# Patient Record
Sex: FEMALE | Race: BLACK OR AFRICAN AMERICAN | NOT HISPANIC OR LATINO | Employment: OTHER | URBAN - METROPOLITAN AREA
[De-identification: names, ages, dates, MRNs, and addresses within clinical notes are randomized per-mention and may not be internally consistent; named-entity substitution may affect disease eponyms.]

---

## 2017-02-09 ENCOUNTER — HOSPITAL ENCOUNTER (OUTPATIENT)
Dept: RADIOLOGY | Facility: HOSPITAL | Age: 67
Discharge: HOME | End: 2017-02-09
Attending: NURSE PRACTITIONER
Payer: MEDICARE

## 2017-02-09 DIAGNOSIS — Z12.31 VISIT FOR SCREENING MAMMOGRAM: ICD-10-CM

## 2017-02-09 DIAGNOSIS — Z12.39 BREAST CANCER SCREENING: ICD-10-CM

## 2017-02-09 PROCEDURE — 77063 BREAST TOMOSYNTHESIS BI: CPT | Mod: 26,,, | Performed by: RADIOLOGY

## 2017-02-09 PROCEDURE — 77067 SCR MAMMO BI INCL CAD: CPT | Mod: 26,,, | Performed by: RADIOLOGY

## 2017-02-09 PROCEDURE — 77067 SCR MAMMO BI INCL CAD: CPT | Mod: TC

## 2017-02-10 ENCOUNTER — OFFICE VISIT (OUTPATIENT)
Dept: PODIATRY | Facility: CLINIC | Age: 67
End: 2017-02-10
Payer: MEDICARE

## 2017-02-10 VITALS — WEIGHT: 235 LBS | BODY MASS INDEX: 31.83 KG/M2 | HEIGHT: 72 IN

## 2017-02-10 DIAGNOSIS — B35.3 TINEA PEDIS OF LEFT FOOT: Primary | ICD-10-CM

## 2017-02-10 DIAGNOSIS — S90.822A BLISTER OF FOOT, LEFT, INITIAL ENCOUNTER: ICD-10-CM

## 2017-02-10 LAB — KOH PREP SPEC: NORMAL

## 2017-02-10 PROCEDURE — 87102 FUNGUS ISOLATION CULTURE: CPT

## 2017-02-10 PROCEDURE — 99999 PR PBB SHADOW E&M-EST. PATIENT-LVL III: CPT | Mod: PBBFAC,,, | Performed by: PODIATRIST

## 2017-02-10 PROCEDURE — 87210 SMEAR WET MOUNT SALINE/INK: CPT

## 2017-02-10 PROCEDURE — 99213 OFFICE O/P EST LOW 20 MIN: CPT | Mod: PBBFAC | Performed by: PODIATRIST

## 2017-02-10 PROCEDURE — 87075 CULTR BACTERIA EXCEPT BLOOD: CPT

## 2017-02-10 PROCEDURE — 99214 OFFICE O/P EST MOD 30 MIN: CPT | Mod: S$PBB,,, | Performed by: PODIATRIST

## 2017-02-10 PROCEDURE — 87070 CULTURE OTHR SPECIMN AEROBIC: CPT

## 2017-02-10 PROCEDURE — 87107 FUNGI IDENTIFICATION MOLD: CPT

## 2017-02-10 NOTE — PROGRESS NOTES
CC:    Blister left foot  Knot under right foot      HPI:   Poonam Alvarez is a 66 y.o. female who presents to clinic with complaints of blister left foot, started about two months ago, dried out and has recurred in the same area recently.  She also has dry flaky skin underneath the right foot in the arch.  She used to get pedicures but concerned about fungal infection.  She reports no changes in activities or shoes.  She does wear nylon footies/socks.  No pain or constitutional symptoms.           Patient Active Problem List   Diagnosis    Posterior subcapsular polar senile cataract    Osteoarthritis    History of Graves' disease    Hypothyroidism, postablative    Right posterior subcapsular cataract - Right Eye    Diplopia    Other after-cataract, not obscuring vision    Radial styloid tenosynovitis    Status post cataract extraction and insertion of intraocular lens - Right Eye    Pseudophakia of both eyes    Other specified disorder of skin    AR (allergic rhinitis)    GERD (gastroesophageal reflux disease)    Left knee pain    Primary localized osteoarthrosis, lower leg    Knee pain, right    Pre-op exam    Pain in joint, lower leg    S/P total knee arthroplasty    Acute hypokalemia    Hypovolemia    Pre-syncope    Hypokalemia    Hypophosphatemia    Constipation    Anticoagulation monitoring, special range    Dyspnea    Ankle edema    Right shoulder pain    Screening for colon cancer    Bilateral knee pain    Chest pain            Current Outpatient Prescriptions on File Prior to Visit   Medication Sig Dispense Refill    acetaminophen (TYLENOL) 325 MG tablet Take 2 tablets (650 mg total) by mouth every 6 (six) hours as needed.      aspirin 81 MG chewable tablet Take 1 tablet by mouth Daily.      b complex vitamins capsule Take 1 capsule by mouth once daily.      calcium-vitamin D3-vitamin K (VIACTIV) 500-100-40 mg-unit-mcg Chew Take 1 tablet by mouth Daily.       cholecalciferol, vitamin D3, (VITAMIN D3) 2,000 unit Cap Take 1 capsule by mouth once daily.      fish oil-omega-3 fatty acids 300-1,000 mg capsule Take 1 g by mouth once daily.      fluocinolone-hydroq.-tretinoin 0.01-4-0.05 % Crea Apply 1 application topically every evening. To dark spots on face ONLY. Wash off qam and apply sunscreen. 30 g 2    hydroquinone 4 % Crea LINCOLN TO DARK AREAS ON NECK HS. USE SUNSCREEN D  2    meloxicam (MOBIC) 15 MG tablet TAKE ONE TABLET BY MOUTH ONCE DAILY (Patient taking differently: PT taking 1 tablet mon , fri and 1/2 tablet weds) 90 tablet 0    multivitamin-minerals-lutein (CENTRUM SILVER) Tab Take 1 tablet by mouth once daily.       SYNTHROID 137 mcg Tab tablet Take 1 tablet (137 mcg total) by mouth once daily. 90 tablet 3     No current facility-administered medications on file prior to visit.         ALLG:  Review of patient's allergies indicates:   Allergen Reactions    No known drug allergies            ROS:    General ROS: negative for - chills, fatigue or fever  Cardiovascular ROS: no chest pain or dyspnea on exertion  Musculoskeletal ROS: negative for - joint pain or joint stiffness   Skin: Negative for rash, Positive for nail or hair changes.  no itching.         EXAM:   Vitals:    02/10/17 1240   Weight: 106.6 kg (235 lb)   Height: 6' (1.829 m)        General: alert and orient x 3, well-developed, well-nourished and in no apparent distress.    Vasc: Palpable pedal pulses getachew. Feet appropriately warm to touch.  Neurological:  Light touch, proprioception, and Sharp/dull sensation is intact.  Sensorimotor function intact.   Derm:  Dry, scaly skin with some vesicles on the plantar right foot and left heel.  Medial arch of the left foot has a fluid-filled blister,no pus, no cellulitis, underlying skin is raw and tender with direct palpation.   No necrosis.    Msk:  Muscle strength is 5/5 in all groups bilaterally.  All joint range of motion is full with no crepitus.  No  significant foot deformities.         Assessment / Plan:    I counseled the patient on her conditions, their implications and medical management.      Tinea pedis of left foot  -     CULTURE, FUNGUS  -     Aerobic culture  -     Culture, Anaerobic  -     KOH prep    Blister of foot, left, initial encounter  - With patient's permission, the blister was de-roofed and cleaned with sterile normal saline.   -     CULTURE, FUNGUS  -     Aerobic culture  -     Culture, Anaerobic  -     KOH prep  - Patient to apply miconazole to the affected BID, keep clean and protected.      Return in about 10 days (around 2/20/2017).

## 2017-02-13 LAB — BACTERIA SPEC AEROBE CULT: NO GROWTH

## 2017-02-14 LAB — BACTERIA SPEC ANAEROBE CULT: NORMAL

## 2017-02-17 ENCOUNTER — TELEPHONE (OUTPATIENT)
Dept: INTERNAL MEDICINE | Facility: CLINIC | Age: 67
End: 2017-02-17

## 2017-02-17 ENCOUNTER — TELEPHONE (OUTPATIENT)
Dept: PLASTIC SURGERY | Facility: CLINIC | Age: 67
End: 2017-02-17

## 2017-02-17 ENCOUNTER — OFFICE VISIT (OUTPATIENT)
Dept: INTERNAL MEDICINE | Facility: CLINIC | Age: 67
End: 2017-02-17
Payer: MEDICARE

## 2017-02-17 VITALS — SYSTOLIC BLOOD PRESSURE: 118 MMHG | HEIGHT: 72 IN | HEART RATE: 78 BPM | DIASTOLIC BLOOD PRESSURE: 70 MMHG

## 2017-02-17 DIAGNOSIS — E78.00 PURE HYPERCHOLESTEROLEMIA: ICD-10-CM

## 2017-02-17 DIAGNOSIS — E03.4 HYPOTHYROIDISM DUE TO ACQUIRED ATROPHY OF THYROID: ICD-10-CM

## 2017-02-17 DIAGNOSIS — Z00.00 ROUTINE GENERAL MEDICAL EXAMINATION AT A HEALTH CARE FACILITY: Primary | ICD-10-CM

## 2017-02-17 DIAGNOSIS — E55.9 VITAMIN D DEFICIENCY DISEASE: ICD-10-CM

## 2017-02-17 PROCEDURE — 99999 PR PBB SHADOW E&M-EST. PATIENT-LVL III: CPT | Mod: PBBFAC,,, | Performed by: INTERNAL MEDICINE

## 2017-02-17 PROCEDURE — 99397 PER PM REEVAL EST PAT 65+ YR: CPT | Mod: S$PBB,,, | Performed by: INTERNAL MEDICINE

## 2017-02-17 PROCEDURE — 99213 OFFICE O/P EST LOW 20 MIN: CPT | Mod: PBBFAC | Performed by: INTERNAL MEDICINE

## 2017-02-17 RX ORDER — GLUCOSAMINE HCL 500 MG
1 TABLET ORAL DAILY
COMMUNITY
End: 2022-11-22 | Stop reason: CLARIF

## 2017-02-17 RX ORDER — LEVOTHYROXINE SODIUM 137 UG/1
137 TABLET ORAL DAILY
Qty: 90 TABLET | Refills: 3 | Status: SHIPPED | OUTPATIENT
Start: 2017-02-17 | End: 2018-10-30

## 2017-02-17 RX ORDER — MELOXICAM 15 MG/1
15 TABLET ORAL DAILY
Qty: 90 TABLET | Refills: 1 | Status: SHIPPED | OUTPATIENT
Start: 2017-02-17 | End: 2017-06-22 | Stop reason: SDUPTHER

## 2017-02-17 NOTE — TELEPHONE ENCOUNTER
Dr Romero' staff  Pt saw Dr romero Jan 2016 for breast reduction and still wants to get a breast reduction.  She tried several times to contact the office about the surgery. No response from the office.  Can someone contact her to let her know what is the next step in getting this procedure done  Thanks  Lindsey Bach M.D.

## 2017-02-17 NOTE — PROGRESS NOTES
CHIEF COMPLAINT: Annual exam     HISTORY OF PRESENT ILLNESS: This is a 66-year-old woman who presents for her annual exam. She has been feeling well.  She will have occasional indigestion which is usually not bad enough for medication.      Knees are doing well. She had a right knee replacement on 3/27/14. Right knee is doing well. She finished physical therapy for her knee. She is exercising regularly. She has gone from 247 2014 to 232 today.      She has had some right shoulder pain that comes and goes. She will see ortho next week.  She is taking mobic 15 mg on Monday, 1/2 tablet on Wednesday and 1 tablet on Friday (due to stomach issues). Stomach is ok with 3 times a week. She has not needed ranitidine as needed. .     No nausea, vomiting, constipation, diarrhea. No heartburn.      BP is doing fine off hydrochlorothiazide 25 mg daily. No swelling. She continues to take Levoxyl 137 mcg daily for hypothyroidism.      She has a history of positive PPD 2010 - took INH for 9 months, CXR  was fine. NO fever, chills, night sweats, weight loss.      She retired 2015 and is working on her health. She bikes rides for 55 minutes daily.  Her  (she is  from him)  of lung cancer in 2015    She takes stool softner and metamucil daily to keep her bowels regular.     REVIEW OF SYSTEMS: She denies fevers, chills, night sweats, fatigue, visual change, hearing loss, sinus congestion, sore throat, chest pain, shortness of breath, nausea, vomiting, constipation, diarrhea, dysuria, hematuria, polydipsia, polyuria, joint pain, muscle pain, headaches, anxiety, depression, insomnia.        PAST MEDICAL HISTORY:   1. Atrial flutter, status post ablation in 2008.   2. Graves disease, status post radioactive iodine, now hypothyroid.   3. Hypertension.   4. History of headaches.   5. Osteoarthritis of the shoulders, hands and knees.   6. Status post arthroscopic surgery of the right  knee 2008.   7. Status post arthroscopic surgery of the right knee in .   8. Tumandi stahl 20 years ago.   9. Hemorrhoids removed.   10. Cataract removal bilaterally  11. Positive PPD diagnosed , completed 9 months of INH 1/10  12. GERD     SOCIAL HISTORY: She does not smoke. She drinks alcohol twice a year.   She is , has three children. She is a .     FAMILY HISTORY: Father  at age 83 from complications of pneumonia.   Mother  at age 98 from old age.  Sister had a pulmonary embolus, another   sister had sarcoidosis. A son has Crohn's disease.      MEDICATIONS AND ALLERGIES: Updated in EPIC.      PHYSICAL EXAMINATION:   Visit Vitals    /70    Pulse 78    Ht 6' (1.829 m)       GENERAL: She is alert, oriented, no apparent distress. Affect within normal limits.   Conjunctiva anicteric. Tympanic membranes clear. Oropharynx clear.   NECK: Supple.   RESPIRATORY: Effort normal. Lungs are clear to auscultation.   HEART: Regular rate and rhythm without murmurs, gallops or rubs.   No lower extremity edema.   ABDOMEN: soft, non distended, non tender, bowel sounds present, no hepatosplenomgaly  BREAST: no abn seen, no nodules palpated, no axillary lad        ASSESSMENT AND PLAN:   1. Annual exam - discussed diet, exercise and safety issues.  2. OA knee - s/p right knee replacement - doing well.  2. ALlergic rhinitis -stable   3. Hypertension - off hctz - doing well  4. Hypothyroidism - tsh   5. Screening - mammogram . Bone density was normal 2008. Colonoscopy due 2015 - normal due   6. GERD - asx  7. Obestiy - working at diet, exercise and weight loss.   8. Positive ppd - cxr ok   I will see her back in 3 months , sooner if problems arise.   Pneumovax  Labs

## 2017-02-17 NOTE — TELEPHONE ENCOUNTER
Returned pt's call re: her Jan 2016 consult for breast reduction. No answer. Left a voice message.

## 2017-02-20 ENCOUNTER — OFFICE VISIT (OUTPATIENT)
Dept: PODIATRY | Facility: CLINIC | Age: 67
End: 2017-02-20
Payer: MEDICARE

## 2017-02-20 ENCOUNTER — TELEPHONE (OUTPATIENT)
Dept: ORTHOPEDICS | Facility: CLINIC | Age: 67
End: 2017-02-20

## 2017-02-20 VITALS — WEIGHT: 235 LBS | BODY MASS INDEX: 31.83 KG/M2 | HEIGHT: 72 IN

## 2017-02-20 DIAGNOSIS — M77.8 SHOULDER TENDINITIS, UNSPECIFIED LATERALITY: Primary | ICD-10-CM

## 2017-02-20 DIAGNOSIS — B35.3 TINEA PEDIS OF LEFT FOOT: Primary | ICD-10-CM

## 2017-02-20 PROCEDURE — 99999 PR PBB SHADOW E&M-EST. PATIENT-LVL III: CPT | Mod: PBBFAC,,, | Performed by: PODIATRIST

## 2017-02-20 PROCEDURE — 99213 OFFICE O/P EST LOW 20 MIN: CPT | Mod: PBBFAC | Performed by: PODIATRIST

## 2017-02-20 PROCEDURE — 99213 OFFICE O/P EST LOW 20 MIN: CPT | Mod: S$PBB,,, | Performed by: PODIATRIST

## 2017-02-20 NOTE — PROGRESS NOTES
CC:    Blister left foot      HPI:  2/10/17   Poonam Alvarez is a 66 y.o. female who presents to clinic with complaints of blister left foot, started about two months ago, dried out and has recurred in the same area recently.  She also has dry flaky skin underneath the right foot in the arch.  She used to get pedicures but concerned about fungal infection.  She reports no changes in activities or shoes.  She does wear nylon footies/socks.  No pain or constitutional symptoms.     2/20/17  follow up tinea pedis.  She reports some improvement with miconazole application.        Patient Active Problem List   Diagnosis    Posterior subcapsular polar senile cataract    Osteoarthritis    History of Graves' disease    Hypothyroidism, postablative    Right posterior subcapsular cataract - Right Eye    Diplopia    Other after-cataract, not obscuring vision    Radial styloid tenosynovitis    Status post cataract extraction and insertion of intraocular lens - Right Eye    Pseudophakia of both eyes    Other specified disorder of skin    AR (allergic rhinitis)    GERD (gastroesophageal reflux disease)    Left knee pain    Primary localized osteoarthrosis, lower leg    Knee pain, right    Pre-op exam    Pain in joint, lower leg    S/P total knee arthroplasty    Acute hypokalemia    Hypovolemia    Pre-syncope    Hypokalemia    Hypophosphatemia    Constipation    Anticoagulation monitoring, special range    Dyspnea    Ankle edema    Right shoulder pain    Screening for colon cancer    Bilateral knee pain    Chest pain            Current Outpatient Prescriptions on File Prior to Visit   Medication Sig Dispense Refill    acetaminophen (TYLENOL) 325 MG tablet Take 2 tablets (650 mg total) by mouth every 6 (six) hours as needed.      aspirin 81 MG chewable tablet Take 1 tablet by mouth Daily.      b complex vitamins capsule Take 1 capsule by mouth once daily.      calcium-vitamin D3-vitamin K  (VIACTIV) 500-100-40 mg-unit-mcg Chew Take 1 tablet by mouth Daily.      cholecalciferol, vitamin D3, (VITAMIN D3) 2,000 unit Cap Take 1 capsule by mouth once daily.      fish oil-omega-3 fatty acids 300-1,000 mg capsule Take 1 g by mouth once daily.      fluocinolone-hydroq.-tretinoin 0.01-4-0.05 % Crea Apply 1 application topically every evening. To dark spots on face ONLY. Wash off qam and apply sunscreen. 30 g 2    hydroquinone 4 % Crea LINCOLN TO DARK AREAS ON NECK HS. USE SUNSCREEN D  2    meloxicam (MOBIC) 15 MG tablet Take 1 tablet (15 mg total) by mouth once daily. 90 tablet 1    multivitamin-minerals-lutein (CENTRUM SILVER) Tab Take 1 tablet by mouth once daily.       SYNTHROID 137 mcg Tab tablet Take 1 tablet (137 mcg total) by mouth once daily. 90 tablet 3    UBIDECARENONE (COENZYME Q10) 100 mg Tab Take 1 tablet by mouth once daily.       No current facility-administered medications on file prior to visit.         ALLG:  Review of patient's allergies indicates:   Allergen Reactions    No known drug allergies            ROS:    General ROS: negative for - chills, fatigue or fever  Cardiovascular ROS: no chest pain or dyspnea on exertion  Musculoskeletal ROS: negative for - joint pain or joint stiffness   Skin: Negative for rash, Positive for nail or hair changes.  no itching.         EXAM:   Vitals:    02/20/17 1526   Weight: 106.6 kg (235 lb)   Height: 6' (1.829 m)        General: alert and orient x 3, well-developed, well-nourished and in no apparent distress.    Vasc: Palpable pedal pulses getachew. Feet appropriately warm to touch.  Neurological:  Light touch, proprioception, and Sharp/dull sensation is intact.  Sensorimotor function intact.   Derm:  Dry, scaly skin with some vesicles on the plantar right foot and left heel.  Medial arch of the left foot with dry scaly flaky skin, no blistering this visit, no cellulitis, skin healing well.  No necrosis.    Msk:  Muscle strength is 5/5 in all groups  bilaterally.  All joint range of motion is full with no crepitus.  No significant foot deformities.     KOH negative  Aerobic and Anaerobic Negative  Pending Fungal culture.       Assessment / Plan:    I counseled the patient on her conditions, their implications and medical management.      Tinea pedis of left foot    Improving  · Continue topical miconazole BID    Follow up after [place] trip (she is leaving this weekend)  Patient is amenable to plan.

## 2017-02-20 NOTE — TELEPHONE ENCOUNTER
----- Message from Alina James sent at 2/20/2017 10:26 AM CST -----  Contact: self  Pt would like to speak with you regarding a referral for PT for her shoulder.

## 2017-02-20 NOTE — TELEPHONE ENCOUNTER
Would like physical therapy orders for bilateral shoulder pain sent to the [facility] beginning the week of March 6th. I informed patient that I will notify Lexie of her request. Patient verbalized understanding.

## 2017-02-22 ENCOUNTER — OFFICE VISIT (OUTPATIENT)
Dept: ORTHOPEDICS | Facility: CLINIC | Age: 67
End: 2017-02-22
Payer: MEDICARE

## 2017-02-22 VITALS — HEIGHT: 72 IN | WEIGHT: 235 LBS | BODY MASS INDEX: 31.83 KG/M2

## 2017-02-22 DIAGNOSIS — M17.12 PRIMARY OSTEOARTHRITIS OF LEFT KNEE: ICD-10-CM

## 2017-02-22 DIAGNOSIS — M19.011 PRIMARY OSTEOARTHRITIS OF RIGHT SHOULDER: Primary | ICD-10-CM

## 2017-02-22 PROCEDURE — 99213 OFFICE O/P EST LOW 20 MIN: CPT | Mod: 25,S$PBB,, | Performed by: NURSE PRACTITIONER

## 2017-02-22 PROCEDURE — 20610 DRAIN/INJ JOINT/BURSA W/O US: CPT | Mod: PBBFAC | Performed by: NURSE PRACTITIONER

## 2017-02-22 PROCEDURE — 99213 OFFICE O/P EST LOW 20 MIN: CPT | Mod: PBBFAC | Performed by: NURSE PRACTITIONER

## 2017-02-22 PROCEDURE — 20610 DRAIN/INJ JOINT/BURSA W/O US: CPT | Mod: S$PBB,RT,, | Performed by: NURSE PRACTITIONER

## 2017-02-22 PROCEDURE — 99999 PR PBB SHADOW E&M-EST. PATIENT-LVL III: CPT | Mod: PBBFAC,,, | Performed by: NURSE PRACTITIONER

## 2017-02-22 RX ADMIN — TRIAMCINOLONE ACETONIDE 40 MG: 40 INJECTION, SUSPENSION INTRA-ARTICULAR; INTRAMUSCULAR at 02:02

## 2017-02-24 RX ORDER — TRIAMCINOLONE ACETONIDE 40 MG/ML
40 INJECTION, SUSPENSION INTRA-ARTICULAR; INTRAMUSCULAR
Status: COMPLETED | OUTPATIENT
Start: 2017-02-24 | End: 2017-02-22

## 2017-02-24 RX ORDER — TRIAMCINOLONE ACETONIDE 40 MG/ML
40 INJECTION, SUSPENSION INTRA-ARTICULAR; INTRAMUSCULAR
Status: COMPLETED | OUTPATIENT
Start: 2017-02-22 | End: 2017-02-22

## 2017-02-24 RX ORDER — TRIAMCINOLONE ACETONIDE 40 MG/ML
40 INJECTION, SUSPENSION INTRA-ARTICULAR; INTRAMUSCULAR
Status: DISCONTINUED | OUTPATIENT
Start: 2017-02-24 | End: 2017-02-24

## 2017-02-24 NOTE — PROGRESS NOTES
CC: Pain of the Right Shoulder and Pain of the Left Knee      HPI: Pt with left knee pain for years. She has been getting cortisone injections with relief of her pain and she returns today for another injection. She has had a right knee replacement in the past. She also c/o right shoulder pain which is aching and located over the shoulder and upper arm. It is worse with all movement of the shoulder. She has had an MRI in the past which showed severe osteoarthritis of the shoulder. She's not ready for shoulder replacement at this time. She is ambulating without assistive device. There is not a limp.    ROS  General: denies fever and chills  Resp: no c/o sob  CVS: no c/o cp  MSK: c/o right shoulder pain which is aching and global and left knee pain which is aching and medial and worse with activity    PE  General: AAOx3, pleasant and cooperative  Resp: respirations even and unlabored  MSK: left knee exam  0 degrees extension  120 degrees flexion  No warmth or erythema   - effusion    Right shoulder exam  + neer  + sifuentes  + pain with internal and external rotation    Assessment:  Right shoulder djd  Left knee djd    Plan:  cortisone injection right shoulder  Cortisone injection left knee  RICE  nsaids prn  F/u as needed    Knee Injection Procedure Note    Pre-operative Diagnosis: left knee degenerative arthritis    Post-operative Diagnosis: same    Indications: left knee pain    Anesthesia: none    Procedure Details     Verbal consent was obtained for the procedure. The injection site was identified and the skin was prepared with alcohol. The left knee was injected from an anterolateral approach with 1 ml of Kenalog and 5 ml Lidocaine under sterile technique using a 22 gauge needle. The needle was removed and the area cleansed and dressed.    Complications:  None; patient tolerated the procedure well.    she was advised to rest the knee today, using ice and elevation as needed for comfort and swelling. she did  receive immediate relief of the knee pain. she was told this would be short lived and is secondary to the lidocaine. she may have an increase in discomfort tonight followed by steady improvement over the next several days. It may take 1-3 weeks following the injection to get the full benefit of the medication.    Shoulder Injection Procedure Note    Pre-operative Diagnosis: right shoulder djd    Post-operative Diagnosis: same    Indications: right shoulder pain    Anesthesia: none    Procedure Details     Verbal consent was obtained for the procedure. The injection site was identified and the skin was prepared with alcohol. The right shoulder was injected from a posterior approach with 1 ml of Kenalog and 5 ml Lidocaine under sterile technique using a 22 gauge 1 1/2 inch needle. The needle was removed and the area cleansed and dressed.    Complications:  None; patient tolerated the procedure well.    she was advised to rest the shoulder today, using ice as needed for comfort and swelling. she did receive immediate relief of the shoulder pain. she was told this would be short lived and is secondary to the lidocaine. she may have an increase in discomfort tonight followed by steady improvement over the next several days. It may take 1-3 weeks following the injection to get the full benefit of the medication.

## 2017-03-08 LAB — FUNGUS SPEC CULT: NORMAL

## 2017-03-09 ENCOUNTER — TELEPHONE (OUTPATIENT)
Dept: INTERNAL MEDICINE | Facility: CLINIC | Age: 67
End: 2017-03-09

## 2017-03-09 RX ORDER — DOXYCYCLINE 100 MG/1
100 CAPSULE ORAL 2 TIMES DAILY
Qty: 20 CAPSULE | Refills: 0 | Status: SHIPPED | OUTPATIENT
Start: 2017-03-09 | End: 2017-04-26

## 2017-03-09 NOTE — TELEPHONE ENCOUNTER
Pt has chest and head congestion. X yesterday. Pt is coughing up green mucus. Pt also has laryngitis.

## 2017-03-09 NOTE — TELEPHONE ENCOUNTER
----- Message from Jen Sheth MA sent at 3/9/2017  9:29 AM CST -----  Contact: [phone]  Pt stated that she has a Very bad Cough and she would like a Medication called in to her Local Pharm[facility] [address] [place] [address] [address] AT [facility]. Please call. Thanks!

## 2017-03-10 RX ORDER — PROMETHAZINE HYDROCHLORIDE AND DEXTROMETHORPHAN HYDROBROMIDE 6.25; 15 MG/5ML; MG/5ML
10 SYRUP ORAL EVERY 6 HOURS PRN
Qty: 240 ML | Refills: 1 | Status: SHIPPED | OUTPATIENT
Start: 2017-03-10 | End: 2017-04-26

## 2017-03-10 NOTE — TELEPHONE ENCOUNTER
----- Message from Idalmis Hathaway sent at 3/10/2017  4:17 PM CST -----  Contact: Self/[phone]  Pt said that she is calling in regards to needing to remind there nurse to have the doctor call in something for her cough.            Thank you

## 2017-03-10 NOTE — TELEPHONE ENCOUNTER
----- Message from Briones Lashae sent at 3/10/2017  8:22 AM CST -----  Contact: self/ [phone] home/ [phone]   Type: Sooner appointment than  is able to schedule    When is the first available appointment? 03/13/2017    What is the nature of the appointment? Cough/congestion/gagging    What appointment type: urgent    Comments: pt states that the cough she is experiencing keeps her up all night and she would like access to an appt today to come in and get a shot.  Please call and advise.    Thank you

## 2017-03-10 NOTE — TELEPHONE ENCOUNTER
----- Message from Nikita Ravi sent at 3/10/2017  7:23 AM CST -----  Contact: self [phone]  Patient stated if possible can she have an urgent care appointment today for  Very bad Cough  . No urgent care appointment till tomorrow . Please call and advise ,Thanks !

## 2017-03-14 ENCOUNTER — CLINICAL SUPPORT (OUTPATIENT)
Dept: REHABILITATION | Facility: HOSPITAL | Age: 67
End: 2017-03-14
Attending: NURSE PRACTITIONER
Payer: MEDICARE

## 2017-03-14 DIAGNOSIS — M25.511 CHRONIC RIGHT SHOULDER PAIN: Primary | ICD-10-CM

## 2017-03-14 DIAGNOSIS — G89.29 CHRONIC RIGHT SHOULDER PAIN: Primary | ICD-10-CM

## 2017-03-14 PROCEDURE — 97161 PT EVAL LOW COMPLEX 20 MIN: CPT

## 2017-03-14 PROCEDURE — G8978 MOBILITY CURRENT STATUS: HCPCS | Mod: CK

## 2017-03-14 PROCEDURE — G8979 MOBILITY GOAL STATUS: HCPCS | Mod: CJ

## 2017-03-14 PROCEDURE — 97110 THERAPEUTIC EXERCISES: CPT

## 2017-03-14 NOTE — PLAN OF CARE
[facility]   PATIENT EVALUATION    Date: 03/14/2017  Start Time: 215  Stop Time: 200    Patient Name: Poonam Alvarez  Clinic Number: [id number]  Age: 66 y.o.  Gender: female    Diagnosis:   Encounter Diagnosis   Name Primary?    Chronic right shoulder pain Yes       Referring Physician: Lexie Mcmanus NP  Treatment Orders: PT Eval and Treat    Evaluation Date: 3/14/2017      History     Past Medical History:   Diagnosis Date    AR (allergic rhinitis) 12/7/2012    Atrial flutter     ablation October 2008    Cataract     Generalized headaches     GERD (gastroesophageal reflux disease) 3/8/2013    Grave's disease     s/p radioactive iodine, now hypothyroidism    Hypertension     Keloid cicatrix     Obesity     Osteoarthritis     shoulders, hands, knees    Positive PPD, treated     9 months of INH, completed 1/2010       Current Outpatient Prescriptions   Medication Sig    acetaminophen (TYLENOL) 325 MG tablet Take 2 tablets (650 mg total) by mouth every 6 (six) hours as needed.    aspirin 81 MG chewable tablet Take 1 tablet by mouth Daily.    b complex vitamins capsule Take 1 capsule by mouth once daily.    calcium-vitamin D3-vitamin K (VIACTIV) 500-100-40 mg-unit-mcg Chew Take 1 tablet by mouth Daily.    cholecalciferol, vitamin D3, (VITAMIN D3) 2,000 unit Cap Take 1 capsule by mouth once daily.    doxycycline (MONODOX) 100 MG capsule Take 1 capsule (100 mg total) by mouth 2 (two) times daily.    fish oil-omega-3 fatty acids 300-1,000 mg capsule Take 1 g by mouth once daily.    fluocinolone-hydroq.-tretinoin 0.01-4-0.05 % Crea Apply 1 application topically every evening. To dark spots on face ONLY. Wash off qam and apply sunscreen.    hydroquinone 4 % Crea LINCOLN TO DARK AREAS ON NECK HS. USE SUNSCREEN D    meloxicam (MOBIC) 15 MG tablet Take 1 tablet (15 mg total) by mouth once daily.    multivitamin-minerals-lutein (CENTRUM SILVER) Tab Take 1 tablet by  mouth once daily.     promethazine-dextromethorphan (PROMETHAZINE-DM) 6.25-15 mg/5 mL Syrp Take 10 mLs by mouth every 6 (six) hours as needed.    SYNTHROID 137 mcg Tab tablet Take 1 tablet (137 mcg total) by mouth once daily.    UBIDECARENONE (COENZYME Q10) 100 mg Tab Take 1 tablet by mouth once daily.     No current facility-administered medications for this visit.        Review of patient's allergies indicates:   Allergen Reactions    No known drug allergies          Subjective     History of Present Condition: Patient reports having arthritis in her shoulders and for the past several months she notes pain in her shoulder. She received an injection, but once the injection wears off she is back where she started. Patient wants to try PT to see if it helps relieve her symptoms. Last shoulder injection was 2 weeks prior.    Onset Date: 1 year  Precautions: standard    Mechanism of Injury: gradual    Pain Location: shoulder   Pain Description: Aching and Burning  Current Pain: 0/10  Least Pain: 0/10  Worst Pain: 10/10  Aggravating Factors: reaching  Relieving Factors: aleve and meloxicam    Diagnostic Tests: MRI  Prior Therapy: yes, knee    Occupation: retired  Sports/Recreational Activities: upper arm bike  Extremity Dominance: right    Prior Level of Function: Independent  Functional Deficits Leading to Referral/Nature of Injury: chronic nature  Patient Therapy Goals: decrease symptoms, increase ROM, increase strength in R shoulder  Cultural/Environmental/Spiritual Barriers to Treatment or Learning: none      Objective     Observation: patient appears stated age  Posture: protracted shoulders, forward head    Dermatomes: WNL  Myotomes: WFL  DTRs: NT    Palpation: NT    Shoulder Range of Motion:   Flexion- R:135 degrees, L:175 degrees  Abduction- R: 90 degrees, L:180 degrees  IR- R:45 degrees, L:80 degrees  ER- R:55 degrees, L:90 degrees      Joint Mobility: will assess next visit    Strength:   5/5 BUE, except  4/5 R IR    Scapular Control/Dyskinesis:      Normal / Subtle / Obvious   Left subtle   Right obvious     Special Tests: + impingement      Treatment:   Scapular retraction 10 sec x10  Table slides flex and scaption x10    Treatment ended with ice prop x10 min    PT reviewed FOTO scores for Poonam Alvarez on 3/14/17  FOTO score: 42    Functional Limitations Reports - G Codes  Category: mobility  Tool: FOTO      Current ():  CK  Goal (): CJ  Discharge ():  NA       Assessment     This is a 66 y.o. female referred to outpatient physical therapy and presents with a medical diagnosis of B shoulder pain and instability and demonstrates limitations as described in the problem list. Pt demonstrates good rehab potential. Pt will benefit from physcial therapy services in order to maximize pain free and/or functional use of bilateral shoulders. The following goals were discussed with the patient and patient is in agreement with them as to be addressed in the treatment plan. Pt was given a HEP consisting of treatment this visit. Pt verbally understood the instructions as they were given and demonstrated proper form and technique during therapy. Pt was advised to perform these exercises free of pain, and to stop performing them if pain occurs.     Medical necessity is demonstrated by the following problem list:   - Pain limits function of effected part for all activities  - Unable to participate in daily activities     Short Term Goals (4 Weeks):  - Pt will increase ROM to 115 deg flex and abd R shoulder  - Pt will increase strength to 5/5 R ER  - Decrease Pain to 3/10  - Pt to self correct posture with minimal cues  - Pt independent with HEP with progressions.     Long Term Goals (8 Weeks):  - Pt will increase ROM to WNL R shoulder  - Decrease Pain to 1/10  - Pt to return to 80% PLOF        Plan     Pt will be treated by physical therapy 2 times a week for 8 weeks for manual therapy, therapeutic exercise, home  exercise program, patient education, and modalities PRN to achieve established goals. Poonam may at times be seen by a PTA as part of the Rehab Team.     Therapist: Tiera Jules, PT, DPT    I CERTIFY THE NEED FOR THESE SERVICES FURNISHED UNDER THIS PLAN OF TREATMENT AND WHILE UNDER MY CARE    Physician's comments: ________________________________________________________________________________________________________________________________________________    Physician's Name: ___________________________________    Please sign and return plan of care. Thank you for this referral.

## 2017-03-14 NOTE — PROGRESS NOTES
[facility]   PATIENT EVALUATION    Date: 03/14/2017  Start Time: 215  Stop Time: 200    Patient Name: Poonam Alvarez  Clinic Number: [id number]  Age: 66 y.o.  Gender: female    Diagnosis:   Encounter Diagnosis   Name Primary?    Chronic right shoulder pain Yes       Referring Physician: Lexie Mcmanus NP  Treatment Orders: PT Eval and Treat    Evaluation Date: 3/14/2017      History     Past Medical History:   Diagnosis Date    AR (allergic rhinitis) 12/7/2012    Atrial flutter     ablation October 2008    Cataract     Generalized headaches     GERD (gastroesophageal reflux disease) 3/8/2013    Grave's disease     s/p radioactive iodine, now hypothyroidism    Hypertension     Keloid cicatrix     Obesity     Osteoarthritis     shoulders, hands, knees    Positive PPD, treated     9 months of INH, completed 1/2010       Current Outpatient Prescriptions   Medication Sig    acetaminophen (TYLENOL) 325 MG tablet Take 2 tablets (650 mg total) by mouth every 6 (six) hours as needed.    aspirin 81 MG chewable tablet Take 1 tablet by mouth Daily.    b complex vitamins capsule Take 1 capsule by mouth once daily.    calcium-vitamin D3-vitamin K (VIACTIV) 500-100-40 mg-unit-mcg Chew Take 1 tablet by mouth Daily.    cholecalciferol, vitamin D3, (VITAMIN D3) 2,000 unit Cap Take 1 capsule by mouth once daily.    doxycycline (MONODOX) 100 MG capsule Take 1 capsule (100 mg total) by mouth 2 (two) times daily.    fish oil-omega-3 fatty acids 300-1,000 mg capsule Take 1 g by mouth once daily.    fluocinolone-hydroq.-tretinoin 0.01-4-0.05 % Crea Apply 1 application topically every evening. To dark spots on face ONLY. Wash off qam and apply sunscreen.    hydroquinone 4 % Crea LINCOLN TO DARK AREAS ON NECK HS. USE SUNSCREEN D    meloxicam (MOBIC) 15 MG tablet Take 1 tablet (15 mg total) by mouth once daily.    multivitamin-minerals-lutein (CENTRUM SILVER) Tab Take 1 tablet by  mouth once daily.     promethazine-dextromethorphan (PROMETHAZINE-DM) 6.25-15 mg/5 mL Syrp Take 10 mLs by mouth every 6 (six) hours as needed.    SYNTHROID 137 mcg Tab tablet Take 1 tablet (137 mcg total) by mouth once daily.    UBIDECARENONE (COENZYME Q10) 100 mg Tab Take 1 tablet by mouth once daily.     No current facility-administered medications for this visit.        Review of patient's allergies indicates:   Allergen Reactions    No known drug allergies          Subjective     History of Present Condition: Patient reports having arthritis in her shoulders and for the past several months she notes pain in her shoulder. She received an injection, but once the injection wears off she is back where she started. Patient wants to try PT to see if it helps relieve her symptoms. Last shoulder injection was 2 weeks prior.    Onset Date: 1 year  Precautions: standard    Mechanism of Injury: gradual    Pain Location: shoulder   Pain Description: Aching and Burning  Current Pain: 0/10  Least Pain: 0/10  Worst Pain: 10/10  Aggravating Factors: reaching  Relieving Factors: aleve and meloxicam    Diagnostic Tests: MRI  Prior Therapy: yes, knee    Occupation: retired  Sports/Recreational Activities: upper arm bike  Extremity Dominance: right    Prior Level of Function: Independent  Functional Deficits Leading to Referral/Nature of Injury: chronic nature  Patient Therapy Goals: decrease symptoms, increase ROM, increase strength in R shoulder  Cultural/Environmental/Spiritual Barriers to Treatment or Learning: none      Objective     Observation: patient appears stated age  Posture: protracted shoulders, forward head    Dermatomes: WNL  Myotomes: WFL  DTRs: NT    Palpation: NT    Shoulder Range of Motion:   Flexion- R:135 degrees, L:175 degrees  Abduction- R: 90 degrees, L:180 degrees  IR- R:45 degrees, L:80 degrees  ER- R:55 degrees, L:90 degrees      Joint Mobility: will assess next visit    Strength:   5/5 BUE, except  4/5 R IR    Scapular Control/Dyskinesis:      Normal / Subtle / Obvious   Left subtle   Right obvious     Special Tests: + impingement      Treatment:   Scapular retraction 10 sec x10  Table slides flex and scaption x10    Treatment ended with ice prop x10 min    PT reviewed FOTO scores for Poonam Alvarez on 3/14/17  FOTO score: 42    Functional Limitations Reports - G Codes  Category: mobility  Tool: FOTO      Current ():  CK  Goal (): CJ  Discharge ():  NA       Assessment     This is a 66 y.o. female referred to outpatient physical therapy and presents with a medical diagnosis of B shoulder pain and instability and demonstrates limitations as described in the problem list. Pt demonstrates good rehab potential. Pt will benefit from physcial therapy services in order to maximize pain free and/or functional use of bilateral shoulders. The following goals were discussed with the patient and patient is in agreement with them as to be addressed in the treatment plan. Pt was given a HEP consisting of treatment this visit. Pt verbally understood the instructions as they were given and demonstrated proper form and technique during therapy. Pt was advised to perform these exercises free of pain, and to stop performing them if pain occurs.     Medical necessity is demonstrated by the following problem list:   - Pain limits function of effected part for all activities  - Unable to participate in daily activities     Short Term Goals (4 Weeks):  - Pt will increase ROM to 115 deg flex and abd R shoulder  - Pt will increase strength to 5/5 R ER  - Decrease Pain to 3/10  - Pt to self correct posture with minimal cues  - Pt independent with HEP with progressions.     Long Term Goals (8 Weeks):  - Pt will increase ROM to WNL R shoulder  - Decrease Pain to 1/10  - Pt to return to 80% PLOF        Plan     Pt will be treated by physical therapy 2 times a week for 8 weeks for manual therapy, therapeutic exercise, home  exercise program, patient education, and modalities PRN to achieve established goals. Poonam may at times be seen by a PTA as part of the Rehab Team.     Therapist: Tiera Jules, PT, DPT    I CERTIFY THE NEED FOR THESE SERVICES FURNISHED UNDER THIS PLAN OF TREATMENT AND WHILE UNDER MY CARE    Physician's comments: ________________________________________________________________________________________________________________________________________________    Physician's Name: ___________________________________    Please sign and return plan of care. Thank you for this referral.

## 2017-03-16 ENCOUNTER — CLINICAL SUPPORT (OUTPATIENT)
Dept: REHABILITATION | Facility: HOSPITAL | Age: 67
End: 2017-03-16
Attending: NURSE PRACTITIONER
Payer: MEDICARE

## 2017-03-16 DIAGNOSIS — G89.29 CHRONIC RIGHT SHOULDER PAIN: Primary | ICD-10-CM

## 2017-03-16 DIAGNOSIS — M25.511 CHRONIC RIGHT SHOULDER PAIN: Primary | ICD-10-CM

## 2017-03-16 PROCEDURE — 97140 MANUAL THERAPY 1/> REGIONS: CPT

## 2017-03-16 PROCEDURE — 97110 THERAPEUTIC EXERCISES: CPT

## 2017-03-16 NOTE — PROGRESS NOTES
Outpatient Physical Therapy Progress Note     Start of treatment: 2:00  End of treatment: 3:00  Total Treatment time: 60      Subjective: Pt reports 5/10 shld pn today, states that she has not performed HEP due to shld pn.    Objective: Pt with guarding posture of R shld    Treatment: Pt was instructed in and performed ther ex x 45 min for UE strengthening, stretching, ROM, flexibility,   UBE 6 min   Pulleys flex/ABD 3 min ea  Shld flex/scap on table 30x  Scap retraction 30x  prone row 30x  prone ext 30x  prone HAB 30x  Pt received grade I shld jt/GH mobs, PROM x 10 min  Pt received CP to R shld x 10 min      Assessment: Pt cortney tx with ther ex, manual tx well. Pt with limited ROM due to pn. This is a 66 y.o. female referred to outpatient physical therapy and presents with a medical diagnosis of B shoulder pain and instability and demonstrates limitations as described in the problem list. Pt demonstrates good rehab potential. Pt will benefit from physcial therapy services in order to maximize pain free and/or functional use of bilateral shoulders             Medical necessity is demonstrated by:   Fall risk  Unable to participate in daily activities  Continued inability to participate in vocational pursuits  Pain limits function of effected part for all activities  Unable to participate fully in daily activities  Requires skilled supervision to complete and progress HEP      Progress towards goals: fair    New/Revised Goals:    Plan:  Continue with established Plan of Care toward PT goals.

## 2017-03-21 ENCOUNTER — CLINICAL SUPPORT (OUTPATIENT)
Dept: REHABILITATION | Facility: HOSPITAL | Age: 67
End: 2017-03-21
Attending: NURSE PRACTITIONER
Payer: MEDICARE

## 2017-03-21 DIAGNOSIS — M25.511 CHRONIC RIGHT SHOULDER PAIN: Primary | ICD-10-CM

## 2017-03-21 DIAGNOSIS — G89.29 CHRONIC RIGHT SHOULDER PAIN: Primary | ICD-10-CM

## 2017-03-21 PROCEDURE — 97140 MANUAL THERAPY 1/> REGIONS: CPT

## 2017-03-21 PROCEDURE — 97110 THERAPEUTIC EXERCISES: CPT

## 2017-03-21 NOTE — PROGRESS NOTES
Outpatient Physical Therapy Progress Note     Start of treatment: 2:00  End of treatment: 3:00  Total Treatment time: 60      Subjective: Pt reports 1/10 shld pn today, states that she has not performed HEP due to shld pn.    Objective: Pt with guarding posture of R shld    Treatment: Pt was instructed in and performed ther ex x 45 min for UE strengthening, stretching, ROM, flexibility,   UBE 6 min   Pulleys flex/ABD 3 min ea  Shld flex/scap on table 30x  Scap retraction 30x  prone row 30x  prone ext 30x  prone HAB 30x  Pt received grade I shld jt/GH mobs, PROM x 10 min  Pt received CP to R shld x 10 min      Assessment: Pt cortney tx with ther ex, manual tx well. Pt with limited ROM due to pn. This is a 66 y.o. female referred to outpatient physical therapy and presents with a medical diagnosis of B shoulder pain and instability and demonstrates limitations as described in the problem list. Pt demonstrates good rehab potential. Pt will benefit from physcial therapy services in order to maximize pain free and/or functional use of bilateral shoulders             Medical necessity is demonstrated by:   Fall risk  Unable to participate in daily activities  Continued inability to participate in vocational pursuits  Pain limits function of effected part for all activities  Unable to participate fully in daily activities  Requires skilled supervision to complete and progress HEP      Progress towards goals: fair    New/Revised Goals:    Plan:  Continue with established Plan of Care toward PT goals.

## 2017-03-23 ENCOUNTER — CLINICAL SUPPORT (OUTPATIENT)
Dept: REHABILITATION | Facility: HOSPITAL | Age: 67
End: 2017-03-23
Attending: NURSE PRACTITIONER
Payer: MEDICARE

## 2017-03-23 DIAGNOSIS — G89.29 CHRONIC RIGHT SHOULDER PAIN: Primary | ICD-10-CM

## 2017-03-23 DIAGNOSIS — M25.511 CHRONIC RIGHT SHOULDER PAIN: Primary | ICD-10-CM

## 2017-03-23 PROCEDURE — 97110 THERAPEUTIC EXERCISES: CPT

## 2017-03-23 PROCEDURE — 97140 MANUAL THERAPY 1/> REGIONS: CPT

## 2017-03-23 NOTE — PROGRESS NOTES
Outpatient Physical Therapy Progress Note     Start of treatment: 2:00  End of treatment: 3:00  Total Treatment time: 60      Subjective: Pt reports that her shld is feeling better today,     Objective: Pt with guarding posture of R shld    Treatment: Pt was instructed in and performed ther ex x 45 min for UE strengthening, stretching, ROM, flexibility,   UBE 6 min   Pulleys flex/ABD 3 min ea  Shld flex/scap on table 30x  Scap retraction 30x  prone row 30x  prone ext 30x  prone HAB 30x  supine flex with wand 30x  Pt received grade I shld jt/GH mobs, PROM x 10 min  Pt received CP to R shld x 10 min      Assessment: Pt cortney tx with progression of ther ex, manual tx well. Pt with limited ROM due to pn. This is a 66 y.o. female referred to outpatient physical therapy and presents with a medical diagnosis of B shoulder pain and instability and demonstrates limitations as described in the problem list. Pt demonstrates good rehab potential. Pt will benefit from physcial therapy services in order to maximize pain free and/or functional use of bilateral shoulders             Medical necessity is demonstrated by:   Fall risk  Unable to participate in daily activities  Continued inability to participate in vocational pursuits  Pain limits function of effected part for all activities  Unable to participate fully in daily activities  Requires skilled supervision to complete and progress HEP      Progress towards goals: fair    New/Revised Goals:    Plan:  Continue with established Plan of Care toward PT goals.

## 2017-03-28 ENCOUNTER — CLINICAL SUPPORT (OUTPATIENT)
Dept: REHABILITATION | Facility: HOSPITAL | Age: 67
End: 2017-03-28
Attending: NURSE PRACTITIONER
Payer: MEDICARE

## 2017-03-28 DIAGNOSIS — M25.511 CHRONIC RIGHT SHOULDER PAIN: Primary | ICD-10-CM

## 2017-03-28 DIAGNOSIS — G89.29 CHRONIC RIGHT SHOULDER PAIN: Primary | ICD-10-CM

## 2017-03-28 PROCEDURE — 97110 THERAPEUTIC EXERCISES: CPT

## 2017-03-28 PROCEDURE — 97140 MANUAL THERAPY 1/> REGIONS: CPT

## 2017-03-28 NOTE — PROGRESS NOTES
" Outpatient Physical Therapy Progress Note     Start of treatment: 2:00  End of treatment: 3:00  Total Treatment time: 60      Subjective: Pt states that her shld "was really hurting over the weekend" reports that her shld is feeling better today and rates sx 2/10,     Objective: Pt with guarding posture of R shld    Treatment: Pt was instructed in and performed ther ex x 45 min for UE strengthening, stretching, ROM, flexibility,   UBE 6 min   Pulleys flex/ABD 3 min ea  horiz row OTB 30x  B shld ext OTB 30x  SL Er 30x  Shld flex/scap on table 30x  Scap retraction 30x  prone row 30x 1#  prone ext 30x1#  prone HAB 30x  supine flex with wand 30x  Pt received grade I shld jt/GH mobs, PROM x 10 min  Pt received CP to R shld x 10 min      Assessment: Pt cortney tx with progression of ther ex, manual tx well. Pt continues  with limited ROM due to pn. This is a 66 y.o. female referred to outpatient physical therapy and presents with a medical diagnosis of B shoulder pain and instability and demonstrates limitations as described in the problem list. Pt demonstrates good rehab potential. Pt will benefit from physcial therapy services in order to maximize pain free and/or functional use of bilateral shoulders             Medical necessity is demonstrated by:   Fall risk  Unable to participate in daily activities  Continued inability to participate in vocational pursuits  Pain limits function of effected part for all activities  Unable to participate fully in daily activities  Requires skilled supervision to complete and progress HEP      Progress towards goals: fair    New/Revised Goals:    Plan:  Continue with established Plan of Care toward PT goals.        "

## 2017-03-30 ENCOUNTER — CLINICAL SUPPORT (OUTPATIENT)
Dept: REHABILITATION | Facility: HOSPITAL | Age: 67
End: 2017-03-30
Attending: NURSE PRACTITIONER
Payer: MEDICARE

## 2017-03-30 DIAGNOSIS — M25.511 CHRONIC RIGHT SHOULDER PAIN: Primary | ICD-10-CM

## 2017-03-30 DIAGNOSIS — G89.29 CHRONIC RIGHT SHOULDER PAIN: Primary | ICD-10-CM

## 2017-03-30 PROCEDURE — 97140 MANUAL THERAPY 1/> REGIONS: CPT

## 2017-03-30 PROCEDURE — 97110 THERAPEUTIC EXERCISES: CPT

## 2017-03-30 NOTE — PROGRESS NOTES
Outpatient Physical Therapy Progress Note     Start of treatment: 2:00  End of treatment: 3:10  Total Treatment time: 60      Subjective: Pt is w/o c/o shld pn at this time.    Objective: Pt with guarding posture of R shld    Treatment: Pt was instructed in and performed ther ex x 45 min for UE strengthening, stretching, ROM, flexibility,   UBE 6 min   Pulleys flex/ABD 3 min ea  horiz row OTB 30x  B shld ext OTB 30x  SL Er 30x 1#  Shld flex/scap on table 30x  Scap retraction 30x  prone row 30x 1#  prone ext 30x1#  prone HAB 30x 1#  supine flex with wand 30x  supine protraction wand 30x  Pt received grade I shld jt/GH mobs, PROM x 10 min  Pt received CP to R shld x 10 min      Assessment: Pt cortney tx with progression of ther ex, manual tx well. Pt continues  with limited ROM due to pn and guarding . This is a 66 y.o. female referred to outpatient physical therapy and presents with a medical diagnosis of B shoulder pain and instability and demonstrates limitations as described in the problem list. Pt demonstrates good rehab potential. Pt will benefit from physcial therapy services in order to maximize pain free and/or functional use of bilateral shoulders             Medical necessity is demonstrated by:   Fall risk  Unable to participate in daily activities  Continued inability to participate in vocational pursuits  Pain limits function of effected part for all activities  Unable to participate fully in daily activities  Requires skilled supervision to complete and progress HEP      Progress towards goals: fair    New/Revised Goals:    Plan:  Continue with established Plan of Care toward PT goals.

## 2017-03-31 ENCOUNTER — HOSPITAL ENCOUNTER (OUTPATIENT)
Dept: RADIOLOGY | Facility: HOSPITAL | Age: 67
Discharge: HOME | End: 2017-03-31
Attending: INTERNAL MEDICINE
Payer: MEDICARE

## 2017-03-31 ENCOUNTER — OFFICE VISIT (OUTPATIENT)
Dept: INTERNAL MEDICINE | Facility: CLINIC | Age: 67
End: 2017-03-31
Payer: MEDICARE

## 2017-03-31 VITALS — SYSTOLIC BLOOD PRESSURE: 110 MMHG | DIASTOLIC BLOOD PRESSURE: 60 MMHG | HEART RATE: 80 BPM | HEIGHT: 72 IN

## 2017-03-31 DIAGNOSIS — K21.9 GASTROESOPHAGEAL REFLUX DISEASE WITHOUT ESOPHAGITIS: ICD-10-CM

## 2017-03-31 DIAGNOSIS — R05.9 COUGH: ICD-10-CM

## 2017-03-31 DIAGNOSIS — E89.0 HYPOTHYROIDISM, POSTABLATIVE: ICD-10-CM

## 2017-03-31 DIAGNOSIS — M17.10 PRIMARY LOCALIZED OSTEOARTHROSIS, LOWER LEG, UNSPECIFIED LATERALITY: ICD-10-CM

## 2017-03-31 DIAGNOSIS — R05.9 COUGH: Primary | ICD-10-CM

## 2017-03-31 PROCEDURE — 99214 OFFICE O/P EST MOD 30 MIN: CPT | Mod: S$PBB,,, | Performed by: INTERNAL MEDICINE

## 2017-03-31 PROCEDURE — 71020 XR CHEST PA AND LATERAL: CPT | Mod: 26,GC,, | Performed by: RADIOLOGY

## 2017-03-31 PROCEDURE — 99999 PR PBB SHADOW E&M-EST. PATIENT-LVL III: CPT | Mod: PBBFAC,,, | Performed by: INTERNAL MEDICINE

## 2017-03-31 PROCEDURE — 71020 XR CHEST PA AND LATERAL: CPT | Mod: TC

## 2017-03-31 NOTE — PROGRESS NOTES
CHIEF COMPLAINT:  Cough      HISTORY OF PRESENT ILLNESS: This is a 66-year-old woman who presents due to continued cough. She caught a cold early March.  I sent in doxycycline 100 mg twice daily on 3/9/17 which helped. She then took a cough syrup- promethazine DM.  Cough is improved but continues to persist. She will occasionally bring up beige mucous.  Cough is no longer keeping her awake.  She is not short of breath. She wheezes sporadically.  No hoarseness, sinus congestion, post nasal drip, ear pain, sore throat, fever, chills.  No allergy symptoms.  She is only using cough drops for the cough currently     Knees are doing well. She had a right knee replacement on 3/27/14. Right knee is doing well.       She has had some right shoulder pain that comes and goes. She is in physical therapy which is possibly helping. She saw Lexie Mcmanus in late 2017. She is taking mobic 15 mg on Monday, 1/2 tablet on Wednesday and 1 tablet on Friday (due to stomach issues). Stomach is ok with 3 times a week. She has not needed ranitidine as needed. .      No nausea, vomiting, constipation, diarrhea. No heartburn.       BP is doing fine off hydrochlorothiazide 25 mg daily. No swelling. She continues to take Levoxyl 137 mcg daily for hypothyroidism.       She has a history of positive PPD 2010 - took INH for 9 months, CXR  was fine. NO fever, chills, night sweats, weight loss.       She retired 2015 and is working on her health. She bikes rides for 55 minutes daily.  Her  (she is  from him)  of lung cancer in 2015     She takes stool softner and metamucil daily to keep her bowels regular.      REVIEW OF SYSTEMS: She denies fevers, chills, night sweats, fatigue, visual change, hearing loss, sinus congestion, sore throat, chest pain, shortness of breath, nausea, vomiting, constipation, diarrhea, dysuria, hematuria, polydipsia, polyuria, joint pain, muscle pain, headaches,  anxiety, depression, insomnia.          PAST MEDICAL HISTORY:   1. Atrial flutter, status post ablation in 2008.   2. Graves disease, status post radioactive iodine, now hypothyroid.   3. Hypertension.   4. History of headaches.   5. Osteoarthritis of the shoulders, hands and knees.   6. Status post arthroscopic surgery of the right knee 2008.   7. Status post arthroscopic surgery of the right knee in .   8. Tummy tuck 20 years ago.   9. Hemorrhoids removed.   10. Cataract removal bilaterally  11. Positive PPD diagnosed , completed 9 months of INH 1/10  12. GERD     SOCIAL HISTORY: She does not smoke. She drinks alcohol twice a year.   She is , has three children. She is a .     FAMILY HISTORY: Father  at age 83 from complications of pneumonia.   Mother  at age 98 from old age.  Sister had a pulmonary embolus, another   sister had sarcoidosis. A son has Crohn's disease.       MEDICATIONS AND ALLERGIES: Updated in EPIC.       PHYSICAL EXAMINATION:   /60 (BP Location: Left arm, Patient Position: Sitting, BP Method: Manual)  Pulse 80  Ht 6' (1.829 m)       GENERAL: She is alert, oriented, no apparent distress. Affect within normal limits.   Conjunctiva anicteric. Tympanic membranes clear. Oropharynx clear.   NECK: Supple.   RESPIRATORY: Effort normal. Lungs are clear to auscultation.   HEART: Regular rate and rhythm without murmurs, gallops or rubs.   No lower extremity edema.             ASSESSMENT AND PLAN:   1. Cough - slow to resolved. CXR to be complete. Mucinex twice daily.   2. OA knee - s/p right knee replacement - doing well.  2. ALlergic rhinitis -stable   3. Hypertension - off hctz - doing well  4. Hypothyroidism - tsh   5. Screening - mammogram . Bone density was normal 2008. Colonoscopy due 2015 - normal due   6. GERD - asx  7. Obestiy - working at diet, exercise and weight loss.   8. Positive ppd - cxr   9. Shoulder pain - in  physical therapy  I will see her back as scheduled , sooner if problems arise.   Pneumovax 12/12  Prevnar 11/15  Labs 12/16

## 2017-03-31 NOTE — MR AVS SNAPSHOT
[facility]  [address]  [facility] [address]  Phone: [phone]  Fax: [phone]                  Poonam Alvarez   3/31/2017 1:30 PM   Office Visit    Description:  Female : 1950   Provider:  Lindsey Bach MD   Department:  [facility]           Reason for Visit     Follow-up           Diagnoses this Visit        Comments    Cough    -  Primary     Gastroesophageal reflux disease without esophagitis         Hypothyroidism, postablative         Primary localized osteoarthrosis, lower leg, unspecified laterality                To Do List           Future Appointments        Provider Department Dept Phone    3/31/2017 2:15 PM [facility] [id number] [facility] [phone]    2017 2:00 PM Tiera Jules, PT [facility] [phone]    2017 2:00 PM Omer Orourke PTA [facility] [phone]    2017 2:00 PM Tiera Jules PT [facility] [phone]    2017 2:00 PM Tiera Jules, PT [facility] [phone]      Goals (5 Years of Data)     None      [facility]     [facility] Nurse Care Line -  Assistance  Unless otherwise directed by your provider, please contact [facility], our nurse care line that is available for  assistance.     Registered nurses in the [facility] provide: appointment scheduling, clinical advisement, health education, and other advisory services.  Call: [phone] (toll free)               Medications           Message regarding Medications     Verify the changes and/or additions to your medication regime listed below are the same as discussed with your clinician today.  If any of these changes or additions are incorrect, please notify your healthcare provider.             Verify that the below list of medications is an accurate representation of the medications you are currently  taking.  If none reported, the list may be blank. If incorrect, please contact your healthcare provider. Carry this list with you in case of emergency.           Current Medications     acetaminophen (TYLENOL) 325 MG tablet Take 2 tablets (650 mg total) by mouth every 6 (six) hours as needed.    aspirin 81 MG chewable tablet Take 1 tablet by mouth Daily.    b complex vitamins capsule Take 1 capsule by mouth once daily.    calcium-vitamin D3-vitamin K (VIACTIV) 500-100-40 mg-unit-mcg Chew Take 1 tablet by mouth Daily.    cholecalciferol, vitamin D3, (VITAMIN D3) 2,000 unit Cap Take 1 capsule by mouth once daily.    doxycycline (MONODOX) 100 MG capsule Take 1 capsule (100 mg total) by mouth 2 (two) times daily.    fish oil-omega-3 fatty acids 300-1,000 mg capsule Take 1 g by mouth once daily.    fluocinolone-hydroq.-tretinoin 0.01-4-0.05 % Crea Apply 1 application topically every evening. To dark spots on face ONLY. Wash off qam and apply sunscreen.    hydroquinone 4 % Crea LINCOLN TO DARK AREAS ON NECK HS. USE SUNSCREEN D    meloxicam (MOBIC) 15 MG tablet Take 1 tablet (15 mg total) by mouth once daily.    multivitamin-minerals-lutein (CENTRUM SILVER) Tab Take 1 tablet by mouth once daily.     promethazine-dextromethorphan (PROMETHAZINE-DM) 6.25-15 mg/5 mL Syrp Take 10 mLs by mouth every 6 (six) hours as needed.    SYNTHROID 137 mcg Tab tablet Take 1 tablet (137 mcg total) by mouth once daily.    UBIDECARENONE (COENZYME Q10) 100 mg Tab Take 1 tablet by mouth once daily.           Clinical Reference Information           Your Vitals Were     BP Pulse Height             110/60 (BP Location: Left arm, Patient Position: Sitting, BP Method: Manual) 80 6' (1.829 m)         Blood Pressure          Most Recent Value    BP  110/60      Allergies as of 3/31/2017     No Known Drug Allergies      Immunizations Administered on Date of Encounter - 3/31/2017     None      Orders Placed During Today's Visit     Future Labs/Procedures  Expected by Expires    X-Ray Chest PA And Lateral  3/31/2017 3/31/2018      Language Assistance Services     ATTENTION: Language assistance services are available, free of charge. Please call [phone].      ATENCIÓN: Si casey lopez, tiene a melendez disposición servicios gratuitos de asistencia lingüística. Llame al [phone].     CHÚ Ý: N?u b?n nói Ti?ng Vi?t, có các d?ch v? h? tr? ngôn ng? mi?n phí dành cho b?n. G?i s? [phone].         [facility] complies with applicable Federal civil rights laws and does not discriminate on the basis of race, color, national origin, age, disability, or sex.

## 2017-04-03 ENCOUNTER — TELEPHONE (OUTPATIENT)
Dept: INTERNAL MEDICINE | Facility: CLINIC | Age: 67
End: 2017-04-03

## 2017-04-03 NOTE — TELEPHONE ENCOUNTER
----- Message from Lindsey Bach MD sent at 4/2/2017 10:40 AM CDT -----  Please notify pt that her CXR is fine  SF

## 2017-04-04 ENCOUNTER — CLINICAL SUPPORT (OUTPATIENT)
Dept: REHABILITATION | Facility: HOSPITAL | Age: 67
End: 2017-04-04
Attending: NURSE PRACTITIONER
Payer: MEDICARE

## 2017-04-04 DIAGNOSIS — M25.511 CHRONIC RIGHT SHOULDER PAIN: Primary | ICD-10-CM

## 2017-04-04 DIAGNOSIS — G89.29 CHRONIC RIGHT SHOULDER PAIN: Primary | ICD-10-CM

## 2017-04-04 PROCEDURE — 97110 THERAPEUTIC EXERCISES: CPT

## 2017-04-04 NOTE — PROGRESS NOTES
Outpatient Physical Therapy Progress Note     Start of treatment: 2:00  End of treatment: 250  Total Treatment time: 50      Subjective: Pt is w/o c/o shld pn at this time and states she is doing better.      Objective: Pt with guarding posture of R shld    Treatment: Pt was instructed in and performed ther ex x 40 min for UE strengthening, stretching, ROM, flexibility,   UBE 6 min   Pulleys flex/ABD 3 min ea  horiz row OTB 30x  B shld ext OTB 30x  SL Er, Hab, scaption 30x 1#  Shld flex/scap on table 30x-np  Scap retraction 30x-np  prone row 30x 1#  prone ext 30x1#  prone HAB 30x 1#  supine flex with wand 30x-np  supine protraction wand 30x-np  Pt received grade I shld jt/GH mobs, PROM x 10 min-np  Pt received CP to R shld x 10 min      Assessment:   Patient required verbal and tactile cues for scapular retraction during prone exercises. She showed appropriate fatigue and strengthening during SL exercises. Patient would continue to benefit from PT intervention to progress toward functional goals.  This is a 66 y.o. female referred to outpatient physical therapy and presents with a medical diagnosis of B shoulder pain and instability and demonstrates limitations as described in the problem list. Pt demonstrates good rehab potential. Pt will benefit from physcial therapy services in order to maximize pain free and/or functional use of bilateral shoulders             Medical necessity is demonstrated by:   Fall risk  Unable to participate in daily activities  Continued inability to participate in vocational pursuits  Pain limits function of effected part for all activities  Unable to participate fully in daily activities  Requires skilled supervision to complete and progress HEP      Progress towards goals: fair    New/Revised Goals:    Plan:  Continue with established Plan of Care toward PT goals.        Tiera Jules, PT, DPT

## 2017-04-06 ENCOUNTER — CLINICAL SUPPORT (OUTPATIENT)
Dept: REHABILITATION | Facility: HOSPITAL | Age: 67
End: 2017-04-06
Attending: NURSE PRACTITIONER
Payer: MEDICARE

## 2017-04-06 DIAGNOSIS — M25.511 CHRONIC RIGHT SHOULDER PAIN: Primary | ICD-10-CM

## 2017-04-06 DIAGNOSIS — G89.29 CHRONIC RIGHT SHOULDER PAIN: Primary | ICD-10-CM

## 2017-04-06 PROCEDURE — 97110 THERAPEUTIC EXERCISES: CPT

## 2017-04-06 NOTE — PROGRESS NOTES
Outpatient Physical Therapy Progress Note     Start of treatment: 2:00  End of treatment: 3:00  Total Treatment time: 50      Subjective: Pt states that she feels her shld has improved, however states that she has been sore since last tx, Rates sx 2/10 today.      Objective: Pt with guarding posture of R shld    Treatment: Pt was instructed in and performed ther ex x 40 min for UE strengthening, stretching, ROM, flexibility,   UBE 6 min   Pulleys flex/ABD 3 min ea  horiz row OTB 30x  B shld ext OTB 30x  SL Er, Hab, scaption 30x 1#  Shld flex/scap on table 30x-np  Scap retraction 30x-np  prone row 30x 1#  prone ext 30x1#  prone HAB 30x 1#  supine flex with wand 30x-np  supine protraction wand 30x-np  Pt received grade I shld jt/GH mobs, PROM x 10 min-np  Pt received CP to R shld x 10 min      Assessment:   Patient required verbal and tactile cues for scapular retraction during prone exercises. She showed appropriate fatigue and reported continued sx with strengthening during SL exercises. Patient would continue to benefit from PT intervention to progress toward functional goals.  This is a 66 y.o. female referred to outpatient physical therapy and presents with a medical diagnosis of B shoulder pain and instability and demonstrates limitations as described in the problem list. Pt demonstrates good rehab potential. Pt will benefit from physcial therapy services in order to maximize pain free and/or functional use of bilateral shoulders             Medical necessity is demonstrated by:   Fall risk  Unable to participate in daily activities  Continued inability to participate in vocational pursuits  Pain limits function of effected part for all activities  Unable to participate fully in daily activities  Requires skilled supervision to complete and progress HEP      Progress towards goals: fair    New/Revised Goals:    Plan:  Continue with established Plan of Care toward PT goals.

## 2017-04-11 ENCOUNTER — CLINICAL SUPPORT (OUTPATIENT)
Dept: REHABILITATION | Facility: HOSPITAL | Age: 67
End: 2017-04-11
Attending: NURSE PRACTITIONER
Payer: MEDICARE

## 2017-04-11 DIAGNOSIS — G89.29 CHRONIC RIGHT SHOULDER PAIN: Primary | ICD-10-CM

## 2017-04-11 DIAGNOSIS — M25.511 CHRONIC RIGHT SHOULDER PAIN: Primary | ICD-10-CM

## 2017-04-11 PROCEDURE — 97110 THERAPEUTIC EXERCISES: CPT

## 2017-04-11 NOTE — PROGRESS NOTES
Outpatient Physical Therapy Progress Note     Start of treatment: 2:10  End of treatment: 2:50  Total Treatment time: 40  Visit number:9    Subjective: Pt states that she is doing okay today. Patient also thinks she may need another steroid injection.      Objective: Pt with guarding posture of R shld    Treatment: Pt was instructed in and performed ther ex x 40 min for UE strengthening, stretching, ROM, flexibility,   UBE 8 min   Pulleys flex/ABD 3 min ea  horiz row OTB 30x-np  B shld ext OTB 30x-np  SL Er, Hab, scaption 30x 1#  Shld flex/scap on table 30x-np  Scap retraction 30x-np  prone row 30x 1#  prone ext 30x1#-np  prone HAB 30x 1#-np  supine flex with wand 30x-np  supine protraction wand 30x-np  Pt received grade I shld jt/GH mobs, PROM x 10 min-np  Pt received CP to R shld x 10 min      Assessment:   Patient appeared limited this visit and was hesitant with certain ROM of R shoulder. She is showing a better understanding of scapular retraction with prone stabilization. Patient would continue to benefit from PT intervention to progress toward functional goals.  This is a 66 y.o. female referred to outpatient physical therapy and presents with a medical diagnosis of B shoulder pain and instability and demonstrates limitations as described in the problem list. Pt demonstrates good rehab potential. Pt will benefit from physcial therapy services in order to maximize pain free and/or functional use of bilateral shoulders             Medical necessity is demonstrated by:   Fall risk  Unable to participate in daily activities  Continued inability to participate in vocational pursuits  Pain limits function of effected part for all activities  Unable to participate fully in daily activities  Requires skilled supervision to complete and progress HEP      Progress towards goals: fair    New/Revised Goals:    Plan:  Continue with established Plan of Care toward PT goals.        Tiera Jules, PT, DPT

## 2017-04-20 ENCOUNTER — CLINICAL SUPPORT (OUTPATIENT)
Dept: REHABILITATION | Facility: HOSPITAL | Age: 67
End: 2017-04-20
Attending: NURSE PRACTITIONER
Payer: MEDICARE

## 2017-04-20 DIAGNOSIS — G89.29 CHRONIC RIGHT SHOULDER PAIN: Primary | ICD-10-CM

## 2017-04-20 DIAGNOSIS — M25.511 CHRONIC RIGHT SHOULDER PAIN: Primary | ICD-10-CM

## 2017-04-20 PROCEDURE — 97140 MANUAL THERAPY 1/> REGIONS: CPT

## 2017-04-20 PROCEDURE — 97110 THERAPEUTIC EXERCISES: CPT

## 2017-04-20 NOTE — PROGRESS NOTES
Outpatient Physical Therapy Progress Note     Start of treatment: 2:00  End of treatment: 3:10  Total Treatment time:   Visit number:10    Subjective: Pt states that she is doing okay today. Patient also thinks she may need another steroid injection.      Objective: Pt with guarding posture of R shld    Treatment: Pt was instructed in and performed ther ex x 40 min for UE strengthening, stretching, ROM, flexibility,   UBE 8 min   Pulleys flex/ABD 3 min ea  horiz row OTB 30x  B shld ext OTB 30x  SL Er, Hab, scaption 30x 1#  Shld flex/scap on table 30x-np  Scap retraction 30x-np  prone row 30x 1#  prone ext 30x1#-np  prone HAB 30x 1#-np  supine flex with wand 30x-np  supine protraction wand 30x-np  Pt received grade I shld jt/GH mobs, PROM x 10 min  Pt received CP to R shld x 10 min      Assessment:   Patient appeared less limited this visit and was less hesitant with certain ROM of R shoulder. She is showing a better understanding of scapular retraction with prone stabilization. Patient would continue to benefit from PT intervention to progress toward functional goals.  This is a 66 y.o. female referred to outpatient physical therapy and presents with a medical diagnosis of B shoulder pain and instability and demonstrates limitations as described in the problem list. Pt demonstrates good rehab potential. Pt will benefit from physcial therapy services in order to maximize pain free and/or functional use of bilateral shoulders             Medical necessity is demonstrated by:   Fall risk  Unable to participate in daily activities  Continued inability to participate in vocational pursuits  Pain limits function of effected part for all activities  Unable to participate fully in daily activities  Requires skilled supervision to complete and progress HEP      Progress towards goals: fair    New/Revised Goals:    Plan:  Continue with established Plan of Care toward PT goals.

## 2017-04-25 ENCOUNTER — CLINICAL SUPPORT (OUTPATIENT)
Dept: REHABILITATION | Facility: HOSPITAL | Age: 67
End: 2017-04-25
Attending: NURSE PRACTITIONER
Payer: MEDICARE

## 2017-04-25 DIAGNOSIS — M25.511 CHRONIC RIGHT SHOULDER PAIN: Primary | ICD-10-CM

## 2017-04-25 DIAGNOSIS — G89.29 CHRONIC RIGHT SHOULDER PAIN: Primary | ICD-10-CM

## 2017-04-25 PROCEDURE — 97110 THERAPEUTIC EXERCISES: CPT

## 2017-04-25 NOTE — PROGRESS NOTES
Outpatient Physical Therapy Progress Note     Start of treatment: 4:00  End of treatment: 5:10  Total Treatment time:   Visit number:11    Subjective: Pt states that she is doing good today, no pn. .      Objective: Pt with guarding posture of R shld    Treatment: Pt was instructed in and performed ther ex x 40 min for UE strengthening, stretching, ROM, flexibility,   UBE 8 min   Pulleys flex/ABD 3 min ea  horiz row GTB 30x  B shld ext GTB 30x  SL Er, Hab, scaption 30x 1#  Shld flex/scap on table 30x-  Scap retraction 30x-np  prone row 30x 1# np  prone ext 30x1#-np  prone HAB 30x 1#-np  supine flex with wand 30x to 90 degrees  supine protraction wand 30x-  Pt received grade I shld jt/GH mobs, PROM x 10 min NP per pt request  Pt received CP to R shld x 10 min      Assessment:   Patient appeared less limited this visit and was less hesitant with certain ROM of R shoulder, pt declined PROM/jt mobs on this date. She is showing a better understanding of scapular retraction with prone stabilization. Patient would continue to benefit from PT intervention to progress toward functional goals.  This is a 66 y.o. female referred to outpatient physical therapy and presents with a medical diagnosis of B shoulder pain and instability and demonstrates limitations as described in the problem list. Pt demonstrates good rehab potential. Pt will benefit from physcial therapy services in order to maximize pain free and/or functional use of bilateral shoulders             Medical necessity is demonstrated by:   Fall risk  Unable to participate in daily activities  Continued inability to participate in vocational pursuits  Pain limits function of effected part for all activities  Unable to participate fully in daily activities  Requires skilled supervision to complete and progress HEP      Progress towards goals: fair    New/Revised Goals:    Plan:  Continue with established Plan of Care toward PT goals.

## 2017-04-26 ENCOUNTER — OFFICE VISIT (OUTPATIENT)
Dept: INTERNAL MEDICINE | Facility: CLINIC | Age: 67
End: 2017-04-26
Payer: MEDICARE

## 2017-04-26 VITALS
SYSTOLIC BLOOD PRESSURE: 120 MMHG | DIASTOLIC BLOOD PRESSURE: 68 MMHG | HEART RATE: 84 BPM | HEIGHT: 72 IN | OXYGEN SATURATION: 95 %

## 2017-04-26 DIAGNOSIS — J30.9 ALLERGIC RHINITIS, UNSPECIFIED ALLERGIC RHINITIS TRIGGER, UNSPECIFIED RHINITIS SEASONALITY: ICD-10-CM

## 2017-04-26 DIAGNOSIS — E89.0 HYPOTHYROIDISM, POSTABLATIVE: ICD-10-CM

## 2017-04-26 DIAGNOSIS — I10 ESSENTIAL HYPERTENSION: Primary | ICD-10-CM

## 2017-04-26 PROCEDURE — 99214 OFFICE O/P EST MOD 30 MIN: CPT | Mod: S$PBB,,, | Performed by: INTERNAL MEDICINE

## 2017-04-26 PROCEDURE — 99999 PR PBB SHADOW E&M-EST. PATIENT-LVL II: CPT | Mod: PBBFAC,,, | Performed by: INTERNAL MEDICINE

## 2017-04-26 PROCEDURE — 99212 OFFICE O/P EST SF 10 MIN: CPT | Mod: PBBFAC | Performed by: INTERNAL MEDICINE

## 2017-04-26 RX ORDER — HYDROCHLOROTHIAZIDE 12.5 MG/1
TABLET ORAL
Qty: 30 TABLET | Refills: 3 | Status: SHIPPED | OUTPATIENT
Start: 2017-04-26 | End: 2017-07-27 | Stop reason: ALTCHOICE

## 2017-04-27 ENCOUNTER — CLINICAL SUPPORT (OUTPATIENT)
Dept: REHABILITATION | Facility: HOSPITAL | Age: 67
End: 2017-04-27
Attending: NURSE PRACTITIONER
Payer: MEDICARE

## 2017-04-27 DIAGNOSIS — M25.511 CHRONIC RIGHT SHOULDER PAIN: Primary | ICD-10-CM

## 2017-04-27 DIAGNOSIS — G89.29 CHRONIC RIGHT SHOULDER PAIN: Primary | ICD-10-CM

## 2017-04-27 PROCEDURE — 97140 MANUAL THERAPY 1/> REGIONS: CPT

## 2017-04-27 PROCEDURE — 97110 THERAPEUTIC EXERCISES: CPT

## 2017-04-27 NOTE — PROGRESS NOTES
Outpatient Physical Therapy Progress Note     Start of treatment: 2:00  End of treatment: 3:10  Total Treatment time:   Visit number:12    Subjective: Pt with no new complaint today, however post discussion with pt of her lack of progress she assured PTA that shoulder is feeling better and her function is improving.      Objective: Pt with guarding posture of R shld    Treatment: Pt was instructed in and performed ther ex x 40 min for UE strengthening, stretching, ROM, flexibility,   UBE 6 min   Pulleys flex/ABD 3 min ea  Wall clocks YTB 10  Wall slide flex with towel 30x  horiz row GTB 30x  B shld ext GTB 30x  SL Er, Hab, scaption 30x 1#  Shld flex/scap on table 30x- NP  Scap retraction 30x-np  prone row 30x 1#   prone ext 30x1#-  prone HAB 30x 1#-np  supine flex with wand 30x to 90 degrees  supine protraction wand 30x-  Pt received grade I shld jt/GH mobs, PROM x 10 min with improved cortney and ROM  Pt received CP to R shld x 10 min      Assessment:   Patient cortney tx with progression of ther ex well, improved cortney to PROM with increased ROM on this date, Pt was agreeable to progression of ther and did not decline any exs or manual tx on this date.  Patient would continue to benefit from PT intervention to progress toward functional goals.  This is a 66 y.o. female referred to outpatient physical therapy and presents with a medical diagnosis of B shoulder pain and instability and demonstrates limitations as described in the problem list. Pt demonstrates good rehab potential. Pt will benefit from physcial therapy services in order to maximize pain free and/or functional use of bilateral shoulders             Medical necessity is demonstrated by:   Fall risk  Unable to participate in daily activities  Continued inability to participate in vocational pursuits  Pain limits function of effected part for all activities  Unable to participate fully in daily activities  Requires skilled supervision to complete and progress  HEP      Progress towards goals: fair    New/Revised Goals:    Plan:  Continue with established Plan of Care toward PT goals.

## 2017-04-30 PROBLEM — I10 ESSENTIAL HYPERTENSION: Status: ACTIVE | Noted: 2017-04-30

## 2017-05-01 NOTE — PROGRESS NOTES
CHIEF COMPLAINT: Elevated blood pressure      HISTORY OF PRESENT ILLNESS: This is a 66-year-old woman who presents due to elevated blood pressure. She started ideal protein diet and was told that her blood pressure was elevated and to get it checked out. She denies headache, chest pain, shortness of breath, palpitations. She has lost 8 pounds since starting Ideal Protein diet. She is off HCTZ.     Cough has resolved. Knees are doing well. She had a right knee replacement on 3/27/14. Right knee is doing well.       She has had some right shoulder pain that comes and goes. She is in physical therapy which is possibly helping. She saw Lexie Mcmanus in late 2017. She is taking mobic 15 mg on Monday, 1/2 tablet on Wednesday and 1 tablet on Friday (due to stomach issues). Stomach is ok with 3 times a week. She has not needed ranitidine as needed. .      No nausea, vomiting, constipation, diarrhea. No heartburn.       She continues to take Levoxyl 137 mcg daily for hypothyroidism.       She has a history of positive PPD 2010 - took INH for 9 months, CXR  was fine. NO fever, chills, night sweats, weight loss.       She retired 2015 and is working on her health. She bikes rides for 55 minutes daily. Her  (she is  from him)  of lung cancer in 2015      She takes stool softner and metamucil daily to keep her bowels regular.       REVIEW OF SYSTEMS: She denies fevers, chills, night sweats, fatigue, visual change, hearing loss, sinus congestion, sore throat, chest pain, shortness of breath, nausea, vomiting, constipation, diarrhea, dysuria, hematuria, polydipsia, polyuria, joint pain, muscle pain, headaches, anxiety, depression, insomnia.           PAST MEDICAL HISTORY:   1. Atrial flutter, status post ablation in 2008.   2. Graves disease, status post radioactive iodine, now hypothyroid.   3. Hypertension.   4. History of headaches.   5. Osteoarthritis of the  shoulders, hands and knees.   6. Status post arthroscopic surgery of the right knee 2008.   7. Status post arthroscopic surgery of the right knee in .   8. Tummy tuck 20 years ago.   9. Hemorrhoids removed.   10. Cataract removal bilaterally  11. Positive PPD diagnosed , completed 9 months of INH 1/10  12. GERD     SOCIAL HISTORY: She does not smoke. She drinks alcohol twice a year.   She is , has three children. She is a .     FAMILY HISTORY: Father  at age 83 from complications of pneumonia.   Mother  at age 98 from old age. Sister had a pulmonary embolus, another   sister had sarcoidosis. A son has Crohn's disease.        MEDICATIONS AND ALLERGIES: Updated in EPIC.       PHYSICAL EXAMINATION:   /68  Pulse 84  Ht 6' (1.829 m)  SpO2 95%      GENERAL: She is alert, oriented, no apparent distress. Affect within normal limits.   Conjunctiva anicteric. Tympanic membranes clear. Oropharynx clear.   NECK: Supple.   RESPIRATORY: Effort normal. Lungs are clear to auscultation.   HEART: Regular rate and rhythm without murmurs, gallops or rubs.   No lower extremity edema.              ASSESSMENT AND PLAN:   1. HTN- doing well off HCTZ> continue to montior  2. OA knee - s/p right knee replacement - doing well.  2. ALlergic rhinitis -stable   3. Hypothyroidism - tsh stable on last labs  5. Screening - mammogram . Bone density was normal 2008. Colonoscopy due 2015 - normal due   6. GERD - asx  7. Obestiy - working at diet, exercise and weight loss.   8. Positive ppd - cxr stable  9. Shoulder pain - in physical therapy  I will see her back as scheduled , sooner if problems arise.   Pneumovax   Prevnar 11/15  Labs

## 2017-05-02 ENCOUNTER — CLINICAL SUPPORT (OUTPATIENT)
Dept: REHABILITATION | Facility: HOSPITAL | Age: 67
End: 2017-05-02
Attending: NURSE PRACTITIONER
Payer: MEDICARE

## 2017-05-02 DIAGNOSIS — M25.511 CHRONIC RIGHT SHOULDER PAIN: Primary | ICD-10-CM

## 2017-05-02 DIAGNOSIS — G89.29 CHRONIC RIGHT SHOULDER PAIN: Primary | ICD-10-CM

## 2017-05-02 PROCEDURE — 97140 MANUAL THERAPY 1/> REGIONS: CPT

## 2017-05-02 PROCEDURE — 97110 THERAPEUTIC EXERCISES: CPT

## 2017-05-02 NOTE — PROGRESS NOTES
Outpatient Physical Therapy Progress Note     Start of treatment: 2:00  End of treatment: 3:10  Total Treatment time:   Visit number:13    Subjective: Pt states that shoulder is feeling better and her function is improving.      Objective: Pt with guarding posture of R shld    Treatment: Pt was instructed in and performed ther ex x 40 min for UE strengthening, stretching, ROM, flexibility,   UBE 6 min   Pulleys flex/ABD 3 min ea  Wall clocks YTB 10  Wall slide flex with towel 30x  horiz row GTB 30x  B shld ext GTB 30x  SL Er, Hab, scaption 30x 1#  Shld flex/scap on table 30x- NP  Scap retraction 30x-np  prone row 30x 1#   prone ext 30x1#-  prone HAB 30x 1#-np  supine flex with wand 30x to 90 degrees  supine protraction wand 30x-  Pt received grade I shld jt/GH mobs, PROM x 10 min with improved cortney and ROM  Pt received CP to R shld x 10 min      Assessment:   Patient cortney tx with progression of ther ex well, improved cortney to PROM with increased ROM on this date, Pt was agreeable to progression of ther and did not decline any exs or manual tx again on this date.  Patient would continue to benefit from PT intervention to progress toward functional goals.  This is a 66 y.o. female referred to outpatient physical therapy and presents with a medical diagnosis of B shoulder pain and instability and demonstrates limitations as described in the problem list. Pt demonstrates good rehab potential. Pt will benefit from physcial therapy services in order to maximize pain free and/or functional use of bilateral shoulders             Medical necessity is demonstrated by:   Fall risk  Unable to participate in daily activities  Continued inability to participate in vocational pursuits  Pain limits function of effected part for all activities  Unable to participate fully in daily activities  Requires skilled supervision to complete and progress HEP      Progress towards goals: fair    New/Revised Goals:    Plan:  Continue with  established Plan of Care toward PT goals.

## 2017-05-04 ENCOUNTER — CLINICAL SUPPORT (OUTPATIENT)
Dept: REHABILITATION | Facility: HOSPITAL | Age: 67
End: 2017-05-04
Attending: NURSE PRACTITIONER
Payer: MEDICARE

## 2017-05-04 DIAGNOSIS — M25.511 CHRONIC RIGHT SHOULDER PAIN: Primary | ICD-10-CM

## 2017-05-04 DIAGNOSIS — G89.29 CHRONIC RIGHT SHOULDER PAIN: Primary | ICD-10-CM

## 2017-05-04 PROCEDURE — 97110 THERAPEUTIC EXERCISES: CPT

## 2017-05-04 NOTE — PROGRESS NOTES
Outpatient Physical Therapy Progress Note     Start of treatment: 2:00  End of treatment: 3:00  Total Treatment time:   Visit number:14    Subjective: Pt states that shoulder is doing a lot better.      Objective: Pt with guarding posture of R shld    Treatment: Pt was instructed in and performed ther ex x 50 min for UE strengthening, stretching, ROM, flexibility,   UBE 10 min   Pulleys flex/ABD 3 min ea  Ball on wall roll up 2x 10 holding 5 sec ea  Wall clocks YTB 10  Wall slide flex with towel 30x  horiz row GTB 30x  B shld ext GTB 30x  SL Er, Hab, scaption 30x 1#-np  Shld flex/scap on table 30x- NP  Scap retraction 30x-np  prone row 30x 1#   prone ext 30x1#-  prone HAB 30x 1#-np  supine flex with wand 30x to 90 degrees-np  supine protraction wand 30x-  Pt received grade I shld jt/GH mobs, PROM x 10 min with improved cortney and ROM  Pt received CP to R shld x 10 min      Assessment:   Patient showing improved AROM this visit during functional exercises. She continues to fatigue quickly, but is tolerating more exercises each visit to improve endurance. Patient would continue to benefit from PT intervention to progress toward functional goals.  This is a 66 y.o. female referred to outpatient physical therapy and presents with a medical diagnosis of B shoulder pain and instability and demonstrates limitations as described in the problem list. Pt demonstrates good rehab potential. Pt will benefit from physcial therapy services in order to maximize pain free and/or functional use of bilateral shoulders             Medical necessity is demonstrated by:   Fall risk  Unable to participate in daily activities  Continued inability to participate in vocational pursuits  Pain limits function of effected part for all activities  Unable to participate fully in daily activities  Requires skilled supervision to complete and progress HEP      Progress towards goals: fair    New/Revised Goals:    Plan:  Continue with established Plan  of Care toward PT goals.

## 2017-05-09 ENCOUNTER — CLINICAL SUPPORT (OUTPATIENT)
Dept: REHABILITATION | Facility: HOSPITAL | Age: 67
End: 2017-05-09
Attending: NURSE PRACTITIONER
Payer: MEDICARE

## 2017-05-09 DIAGNOSIS — G89.29 CHRONIC RIGHT SHOULDER PAIN: Primary | ICD-10-CM

## 2017-05-09 DIAGNOSIS — M25.511 CHRONIC RIGHT SHOULDER PAIN: Primary | ICD-10-CM

## 2017-05-09 PROCEDURE — 97140 MANUAL THERAPY 1/> REGIONS: CPT

## 2017-05-09 PROCEDURE — 97110 THERAPEUTIC EXERCISES: CPT

## 2017-05-09 NOTE — PROGRESS NOTES
Outpatient Physical Therapy Progress Note     Start of treatment: 2:00  End of treatment: 3:00  Total Treatment time:   Visit number:14    Subjective: Pt states that she is w/o c/o pn at this time.      Objective: Pt with guarding posture of R shld    Treatment: Pt was instructed in and performed ther ex x 50 min for UE strengthening, stretching, ROM, flexibility,   UBE 10 min   Pulleys flex/ABD 3 min ea  Ball on wall roll up 2x 10 holding 5 sec ea  Wall clocks YTB 10  Wall slide flex with towel 30x  horiz row GTB 30x  B shld ext GTB 30x  SL Er, Hab, scaption 30x 1#-  Shld flex/scap on table 30x- NP  Scap retraction 30x-np  prone row 30x 1# NP  prone ext 30x1#- NP  prone HAB 30x 1#-np  supine flex with wand 30x to 90 degrees-np  supine protraction wand 30x-NP  Pt received grade I shld jt/GH mobs, PROM x 10 min with improved cortney and ROM  Pt received CP to R shld x 10 min      Assessment:   Patient showing improved AROM this visit during functional exercises. She continues to fatigue quickly, but is tolerating more exercises each visit to improve endurance. Patient would continue to benefit from PT intervention to progress toward functional goals.  This is a 66 y.o. female referred to outpatient physical therapy and presents with a medical diagnosis of B shoulder pain and instability and demonstrates limitations as described in the problem list. Pt demonstrates good rehab potential. Pt will benefit from physcial therapy services in order to maximize pain free and/or functional use of bilateral shoulders             Medical necessity is demonstrated by:   Fall risk  Unable to participate in daily activities  Continued inability to participate in vocational pursuits  Pain limits function of effected part for all activities  Unable to participate fully in daily activities  Requires skilled supervision to complete and progress HEP      Progress towards goals: fair    New/Revised Goals:    Plan:  Continue with established  Plan of Care toward PT goals.

## 2017-05-11 ENCOUNTER — CLINICAL SUPPORT (OUTPATIENT)
Dept: REHABILITATION | Facility: HOSPITAL | Age: 67
End: 2017-05-11
Attending: NURSE PRACTITIONER
Payer: MEDICARE

## 2017-05-11 DIAGNOSIS — G89.29 CHRONIC RIGHT SHOULDER PAIN: Primary | ICD-10-CM

## 2017-05-11 DIAGNOSIS — M25.511 CHRONIC RIGHT SHOULDER PAIN: Primary | ICD-10-CM

## 2017-05-11 PROCEDURE — 97140 MANUAL THERAPY 1/> REGIONS: CPT

## 2017-05-11 PROCEDURE — 97110 THERAPEUTIC EXERCISES: CPT

## 2017-05-11 NOTE — PROGRESS NOTES
Outpatient Physical Therapy Progress Note     Start of treatment: 2:00  End of treatment: 3:00  Total Treatment time:   Visit number:14    Subjective: Pt states that she is w/o c/o pn again today.      Objective: Pt with guarding posture of R shld    Treatment: Pt was instructed in and performed ther ex x 50 min for UE strengthening, stretching, ROM, flexibility,   UBE 10 min   Pulleys flex/ABD 3 min ea  Ball on wall roll up 2x 10 holding 5 sec ea  Wall clocks OTB 10  Wall slide flex with towel 30x  horiz row GTB 30x  B shld ext GTB 30x  SL Er, Hab, scaption 30x 1#-  Shld flex/scap on table 30x- NP  Scap retraction 30x-np  prone row 30x 1#   prone ext 30x1#-   prone HAB 30x   supine flex with wand 30x to 90 degrees-np  supine protraction wand 30x-NP  Pt received grade I shld jt/GH mobs, PROM x 10 min with improved cortney and ROM  Pt received CP to R shld x 10 min      Assessment:   Patient showing improved AROM this visit during functional exercises. She continues to fatigue quickly, but is tolerating more exercises and increased resistance each visit to improve endurance. Patient would continue to benefit from PT intervention to progress toward functional goals.  This is a 66 y.o. female referred to outpatient physical therapy and presents with a medical diagnosis of B shoulder pain and instability and demonstrates limitations as described in the problem list. Pt demonstrates good rehab potential. Pt will benefit from physcial therapy services in order to maximize pain free and/or functional use of bilateral shoulders             Medical necessity is demonstrated by:   Fall risk  Unable to participate in daily activities  Continued inability to participate in vocational pursuits  Pain limits function of effected part for all activities  Unable to participate fully in daily activities  Requires skilled supervision to complete and progress HEP      Progress towards goals: fair    New/Revised Goals:    Plan:  Continue  with established Plan of Care toward PT goals.

## 2017-05-18 ENCOUNTER — CLINICAL SUPPORT (OUTPATIENT)
Dept: REHABILITATION | Facility: HOSPITAL | Age: 67
End: 2017-05-18
Attending: NURSE PRACTITIONER
Payer: MEDICARE

## 2017-05-18 DIAGNOSIS — M25.511 CHRONIC RIGHT SHOULDER PAIN: Primary | ICD-10-CM

## 2017-05-18 DIAGNOSIS — G89.29 CHRONIC RIGHT SHOULDER PAIN: Primary | ICD-10-CM

## 2017-05-18 PROCEDURE — 97110 THERAPEUTIC EXERCISES: CPT

## 2017-05-18 NOTE — PROGRESS NOTES
Outpatient Physical Therapy Progress Note     Start of treatment: 2:00  End of treatment: 3:00  Total Treatment time:   Visit number:17    Subjective: Pt states that she feels like she is doing better.      Objective: Pt with guarding posture of R shld    Treatment: Pt was instructed in and performed ther ex x 50 min for UE strengthening, stretching, ROM, flexibility,   UBE 10 min   Pulleys flex/ABD 3 min ea-  Ball on wall roll up 2x 10 holding 5 sec ea  Wall clocks OTB 10  Wall slide flex with towel 30x  horiz row GTB 30x-np  B shld ext GTB 30x-np  SL Er, Hab, scaption 30x 2#-  Shld flex/scap on table 30x- NP  prone row 30x 1# -np  prone ext 30x1#- np  prone HAB 30x -np  supine flex with wand 30x to 90 degrees-np  supine protraction wand 30x-NP  Pt received grade I shld jt/GH mobs, PROM x 10 min with improved cortney and ROM-np  Pt received CP to R shld x 10 min      Assessment:   Patient tolerated increased resistance fairly well during SL. Patient continues to show compensations with upper trap and thoracic rotation that required cuing. Patient would continue to benefit from PT intervention to progress toward functional goals.  This is a 66 y.o. female referred to outpatient physical therapy and presents with a medical diagnosis of B shoulder pain and instability and demonstrates limitations as described in the problem list. Pt demonstrates good rehab potential. Pt will benefit from physcial therapy services in order to maximize pain free and/or functional use of bilateral shoulders             Medical necessity is demonstrated by:   Fall risk  Unable to participate in daily activities  Continued inability to participate in vocational pursuits  Pain limits function of effected part for all activities  Unable to participate fully in daily activities  Requires skilled supervision to complete and progress HEP      Progress towards goals: fair    New/Revised Goals:    Plan:  Continue with established Plan of Care toward  PT goals.        Tiera Jules, PT, DPT

## 2017-05-19 DIAGNOSIS — M77.8 SHOULDER TENDINITIS, RIGHT: Primary | ICD-10-CM

## 2017-05-19 NOTE — PROGRESS NOTES
Pt is getting relief with PT for right shoulder and would like to continue. Orders to continue PT at [facility] entered as requested.

## 2017-05-23 ENCOUNTER — CLINICAL SUPPORT (OUTPATIENT)
Dept: REHABILITATION | Facility: HOSPITAL | Age: 67
End: 2017-05-23
Attending: NURSE PRACTITIONER
Payer: MEDICARE

## 2017-05-23 DIAGNOSIS — M25.511 CHRONIC RIGHT SHOULDER PAIN: Primary | ICD-10-CM

## 2017-05-23 DIAGNOSIS — G89.29 CHRONIC RIGHT SHOULDER PAIN: Primary | ICD-10-CM

## 2017-05-23 PROCEDURE — 97140 MANUAL THERAPY 1/> REGIONS: CPT

## 2017-05-23 PROCEDURE — 97110 THERAPEUTIC EXERCISES: CPT

## 2017-05-23 NOTE — PROGRESS NOTES
Outpatient Physical Therapy Progress Note     Start of treatment: 2:12  End of treatment: 3:06  Total Treatment time:   Visit number:18    Subjective: Pt is w/o complaint on this date.      Objective: Pt with guarding posture of R shld    Treatment: Pt was instructed in and performed ther ex x 50 min for UE strengthening, stretching, ROM, flexibility,   UBE 10 min   Pulleys flex/ABD 3 min ea-  Ball on wall roll up 2x 10 holding 5 sec ea  Wall clocks OTB 10  Wall slide flex with towel 30x  horiz row GTB 30x-  B shld ext GTB 30x  SL Er, Hab, scaption 30x 2#-  Shld flex/scap on table 30x- NP  prone row 30x 1# -np  prone ext 30x1#- np  prone HAB 30x -np  supine flex with wand 30x to 90 degrees-np  supine protraction wand 30x-NP  Pt received grade I shld jt/GH mobs, PROM x 10 min with improved cortney and ROM-np  Pt received CP to R shld x 10 min      Assessment:   Patient tolerated tx with ther ex and manual tx well. Patient continues to show compensations with upper trap and thoracic rotation that required cuing. Patient would continue to benefit from PT intervention to progress toward functional goals.Improved AROM with flex and ER.  This is a 66 y.o. female referred to outpatient physical therapy and presents with a medical diagnosis of B shoulder pain and instability and demonstrates limitations as described in the problem list. Pt demonstrates good rehab potential. Pt will benefit from physcial therapy services in order to maximize pain free and/or functional use of bilateral shoulders             Medical necessity is demonstrated by:   Fall risk  Unable to participate in daily activities  Continued inability to participate in vocational pursuits  Pain limits function of effected part for all activities  Unable to participate fully in daily activities  Requires skilled supervision to complete and progress HEP      Progress towards goals: fair    New/Revised Goals:    Plan:  Continue with established Plan of Care  toward PT goals.

## 2017-05-24 ENCOUNTER — TELEPHONE (OUTPATIENT)
Dept: PODIATRY | Facility: CLINIC | Age: 67
End: 2017-05-24

## 2017-05-24 NOTE — TELEPHONE ENCOUNTER
----- Message from Idania Paris MA sent at 5/24/2017 11:35 AM CDT -----  Contact: self   Pt is calling requesting advice for the toe that was treated for fungus but the toe is very sensitive when she has her shoes on. The sensitivity comes and goes that was her reason for making the appt, she just canceled. Pt can be reached at  [phone].

## 2017-05-31 ENCOUNTER — CLINICAL SUPPORT (OUTPATIENT)
Dept: REHABILITATION | Facility: HOSPITAL | Age: 67
End: 2017-05-31
Attending: NURSE PRACTITIONER
Payer: MEDICARE

## 2017-05-31 DIAGNOSIS — M25.511 CHRONIC RIGHT SHOULDER PAIN: Primary | ICD-10-CM

## 2017-05-31 DIAGNOSIS — G89.29 CHRONIC RIGHT SHOULDER PAIN: Primary | ICD-10-CM

## 2017-05-31 PROCEDURE — 97110 THERAPEUTIC EXERCISES: CPT

## 2017-05-31 NOTE — PROGRESS NOTES
Outpatient Physical Therapy Progress Note     Start of treatment: 2:00  End of treatment: 240  Total Treatment time: 40  Visit number:19    Subjective: Pt is w/o complaint on this date and continues to note subjective improvement.      Objective: Pt with guarding posture of R shld    Treatment: Pt was instructed in and performed ther ex x 30 min for UE strengthening, stretching, ROM, flexibility,   UBE 10 min -np  Pulleys flex/ABD 3 min ea-np  Pulleys IR x3 min  Ball on wall roll up 2x 10 holding 5 sec ea-np  4 way ball on wall x30 sec ea  Wall clocks OTB 10  No money otb x30  Wall slide flex with towel 30x-np  horiz row GTB 30x-  B shld ext GTB 30x  SL Er, Hab, scaption 30x 2#-np  Shld flex/scap on table 30x- NP  prone row 30x 1# -np  prone ext 30x1#- np  prone HAB 30x -np  supine flex with wand 30x to 90 degrees-np  supine protraction wand 30x-NP  Pt received grade I shld jt/GH mobs, PROM x 10 min with improved cortney and ROM-np  Pt received CP to R shld x 10 min      Assessment:   Patient showed improved AROM this visit and is showing better scap stabilization. Patient treatment limited this visit secondary to bad weather and patient fear of flooding. Patient would continue to benefit from PT intervention to progress toward functional goals.Improved AROM with flex and ER.  This is a 66 y.o. female referred to outpatient physical therapy and presents with a medical diagnosis of B shoulder pain and instability and demonstrates limitations as described in the problem list. Pt demonstrates good rehab potential. Pt will benefit from physcial therapy services in order to maximize pain free and/or functional use of bilateral shoulders             Medical necessity is demonstrated by:   Fall risk  Unable to participate in daily activities  Continued inability to participate in vocational pursuits  Pain limits function of effected part for all activities  Unable to participate fully in daily activities  Requires skilled  supervision to complete and progress HEP      Progress towards goals: fair    New/Revised Goals:    Plan:  Continue with established Plan of Care toward PT goals.       Tiera Jules, PT, DPT

## 2017-06-06 ENCOUNTER — CLINICAL SUPPORT (OUTPATIENT)
Dept: REHABILITATION | Facility: HOSPITAL | Age: 67
End: 2017-06-06
Attending: NURSE PRACTITIONER
Payer: MEDICARE

## 2017-06-06 DIAGNOSIS — M25.511 CHRONIC RIGHT SHOULDER PAIN: Primary | ICD-10-CM

## 2017-06-06 DIAGNOSIS — G89.29 CHRONIC RIGHT SHOULDER PAIN: Primary | ICD-10-CM

## 2017-06-06 PROCEDURE — 97110 THERAPEUTIC EXERCISES: CPT

## 2017-06-06 PROCEDURE — 97140 MANUAL THERAPY 1/> REGIONS: CPT

## 2017-06-06 NOTE — PROGRESS NOTES
" Outpatient Physical Therapy Progress Note     Start of treatment: 2:00  End of treatment: 3:15  Total Treatment time: 60  Visit number:20    Subjective: Pt is w/o complaint on this date and continues to note improved function, stating, "I'm using it more".      Objective: Pt with guarding posture of R shld    Treatment: Pt was instructed in and performed ther ex x 30 min for UE strengthening, stretching, ROM, flexibility,   UBE 10 min   Pulleys flex/ABD 3 min ea  Pulleys IR x3 min  Ball on wall roll up 15x holding 15 sec ea-  4 way ball on wall x30 sec ea ABD  Wall clocks OTB 10x  No money otb x30 NP  Wall slide flex with towel 30x-  horiz row GTB 30x-  B shld ext GTB 30x  SL Er, Hab, scaption 30x 1#-  prone row 30x 1# -  prone ext 30x1#-   prone HAB 30x -  supine flex with wand 30x to 90 degrees-np  supine protraction wand 30x-NP  Pt received grade I shld jt/GH mobs, PROM x 10 min with improved cortney and ROM-  Pt received CP to R shld x 10 min      Assessment:   Patient showed improved AROM this visit and is showing better scap stabilization. . Patient would continue to benefit from PT intervention to progress toward functional goals.Improved AROM with flex and ER.  This is a 66 y.o. female referred to outpatient physical therapy and presents with a medical diagnosis of B shoulder pain and instability and demonstrates limitations as described in the problem list. Pt demonstrates good rehab potential. Pt will benefit from physcial therapy services in order to maximize pain free and/or functional use of bilateral shoulders             Medical necessity is demonstrated by:   Fall risk  Unable to participate in daily activities  Continued inability to participate in vocational pursuits  Pain limits function of effected part for all activities  Unable to participate fully in daily activities  Requires skilled supervision to complete and progress HEP      Progress towards goals: fair    New/Revised Goals:  "   Plan:  Continue with established Plan of Care toward PT goals.       Tiera Jules, PT, DPT

## 2017-06-07 ENCOUNTER — OFFICE VISIT (OUTPATIENT)
Dept: PODIATRY | Facility: CLINIC | Age: 67
End: 2017-06-07
Payer: MEDICARE

## 2017-06-07 VITALS
HEIGHT: 72 IN | WEIGHT: 235 LBS | SYSTOLIC BLOOD PRESSURE: 102 MMHG | BODY MASS INDEX: 31.83 KG/M2 | DIASTOLIC BLOOD PRESSURE: 59 MMHG

## 2017-06-07 DIAGNOSIS — B35.3 TINEA PEDIS, RECURRENT: ICD-10-CM

## 2017-06-07 DIAGNOSIS — R20.3 SENSITIVE SKIN: Primary | ICD-10-CM

## 2017-06-07 PROCEDURE — 99999 PR PBB SHADOW E&M-EST. PATIENT-LVL II: CPT | Mod: PBBFAC,,, | Performed by: PODIATRIST

## 2017-06-07 PROCEDURE — 1159F MED LIST DOCD IN RCRD: CPT | Mod: ,,, | Performed by: PODIATRIST

## 2017-06-07 PROCEDURE — 1157F ADVNC CARE PLAN IN RCRD: CPT | Mod: ,,, | Performed by: PODIATRIST

## 2017-06-07 PROCEDURE — 1125F AMNT PAIN NOTED PAIN PRSNT: CPT | Mod: ,,, | Performed by: PODIATRIST

## 2017-06-07 PROCEDURE — 99214 OFFICE O/P EST MOD 30 MIN: CPT | Mod: S$PBB,,, | Performed by: PODIATRIST

## 2017-06-07 PROCEDURE — 99212 OFFICE O/P EST SF 10 MIN: CPT | Mod: PBBFAC | Performed by: PODIATRIST

## 2017-06-08 NOTE — PROGRESS NOTES
"CC:    right great toe       HPI:   Poonam Alvarez is a 66 y.o. female who presents to clinic with complaints of sensitivity to the right great toe, in close toe shoes.  There is an area of dry skin where the "sensitivity" is noted.   No trauma reported to the area.  No changes in shoes.    She has history of tinea improved with miconazole on the left foot.         Patient Active Problem List   Diagnosis    Posterior subcapsular polar senile cataract    Osteoarthritis    History of Graves' disease    Hypothyroidism, postablative    Right posterior subcapsular cataract - Right Eye    Diplopia    Other after-cataract, not obscuring vision    Radial styloid tenosynovitis    Status post cataract extraction and insertion of intraocular lens - Right Eye    Pseudophakia of both eyes    Other specified disorder of skin    AR (allergic rhinitis)    GERD (gastroesophageal reflux disease)    Left knee pain    Primary localized osteoarthrosis, lower leg    Knee pain, right    Pre-op exam    Pain in joint, lower leg    S/P total knee arthroplasty    Acute hypokalemia    Hypovolemia    Pre-syncope    Hypokalemia    Hypophosphatemia    Constipation    Anticoagulation monitoring, special range    Dyspnea    Ankle edema    Right shoulder pain    Screening for colon cancer    Bilateral knee pain    Chest pain    Essential hypertension            Current Outpatient Prescriptions on File Prior to Visit   Medication Sig Dispense Refill    aspirin 81 MG chewable tablet Take 1 tablet by mouth Daily.      b complex vitamins capsule Take 1 capsule by mouth once daily.      calcium-vitamin D3-vitamin K (VIACTIV) 500-100-40 mg-unit-mcg Chew Take 1 tablet by mouth Daily.      cholecalciferol, vitamin D3, (VITAMIN D3) 2,000 unit Cap Take 1 capsule by mouth once daily.      fish oil-omega-3 fatty acids 300-1,000 mg capsule Take 2 g by mouth once daily.       fluocinolone-hydroq.-tretinoin 0.01-4-0.05 % " Crea Apply 1 application topically every evening. To dark spots on face ONLY. Wash off qam and apply sunscreen. 30 g 2    hydrochlorothiazide (HYDRODIURIL) 12.5 MG Tab 1/2-1 tablet daily as needed 30 tablet 3    hydroquinone 4 % Crea LINCOLN TO DARK AREAS ON NECK HS. USE SUNSCREEN D  2    meloxicam (MOBIC) 15 MG tablet Take 1 tablet (15 mg total) by mouth once daily. 90 tablet 1    multivitamin-minerals-lutein (CENTRUM SILVER) Tab Take 1 tablet by mouth once daily.       SYNTHROID 137 mcg Tab tablet Take 1 tablet (137 mcg total) by mouth once daily. 90 tablet 3    UBIDECARENONE (COENZYME Q10) 100 mg Tab Take 1 tablet by mouth once daily.       No current facility-administered medications on file prior to visit.         ALLG:  Review of patient's allergies indicates:   Allergen Reactions    No known drug allergies            ROS:    General ROS: negative for - chills, fatigue or fever  Cardiovascular ROS: no chest pain or dyspnea on exertion  Musculoskeletal ROS: negative for - joint pain or joint stiffness   Skin: Negative for rash, Positive for nail or hair changes.  no itching.         EXAM:   Vitals:    06/07/17 1518   BP: (!) 102/59   Weight: 106.6 kg (235 lb)   Height: 6' (1.829 m)        General: alert and orient x 3, well-developed, well-nourished and in no apparent distress.    Vasc: Palpable pedal pulses getachew. Feet appropriately warm to touch.  Neurological:  Light touch, proprioception, and Sharp/dull sensation is intact.  Sensorimotor function intact.   Derm:  Dry, scaly skin with some vesicles on the plantar medial right hallux, where a pinch callus wound be.  No open wounds.      Msk:  Muscle strength is 5/5 in all groups bilaterally.  All joint range of motion is full with no crepitus.  No significant foot deformities.         Assessment / Plan:    I counseled the patient on her conditions, their implications and medical management.      Sensitive skin - Right Foot 2/2  Tinea pedis,  recurrent  · topical miconazole to the area.    · Monitor shoe wear.   · Call or return to clinic prn if these symptoms worsen or fail to improve as anticipated.     Patient is amenable to plan.

## 2017-06-13 ENCOUNTER — TELEPHONE (OUTPATIENT)
Dept: ORTHOPEDICS | Facility: CLINIC | Age: 67
End: 2017-06-13

## 2017-06-13 NOTE — TELEPHONE ENCOUNTER
I spoke with patient regarding call and I informed her that Lexie is out of the office this week and I can schedule her with another provider. Patient scheduled for Wednesday 6/14/17 at 2:30pm as requested.

## 2017-06-13 NOTE — TELEPHONE ENCOUNTER
----- Message from Alina James sent at 6/13/2017  8:38 AM CDT -----  Contact: self  Pt would like to speak with you regarding scheduling an appt to have an inj in her rt shoulder. Ms. Alvarez can be reached at [phone].

## 2017-06-14 ENCOUNTER — HOSPITAL ENCOUNTER (OUTPATIENT)
Dept: RADIOLOGY | Facility: HOSPITAL | Age: 67
Discharge: HOME | End: 2017-06-14
Attending: ORTHOPAEDIC SURGERY
Payer: MEDICARE

## 2017-06-14 ENCOUNTER — OFFICE VISIT (OUTPATIENT)
Dept: ORTHOPEDICS | Facility: CLINIC | Age: 67
End: 2017-06-14
Payer: MEDICARE

## 2017-06-14 VITALS
DIASTOLIC BLOOD PRESSURE: 62 MMHG | BODY MASS INDEX: 31.83 KG/M2 | RESPIRATION RATE: 16 BRPM | HEART RATE: 75 BPM | WEIGHT: 235 LBS | HEIGHT: 72 IN | SYSTOLIC BLOOD PRESSURE: 114 MMHG

## 2017-06-14 DIAGNOSIS — M25.511 ACUTE PAIN OF RIGHT SHOULDER: ICD-10-CM

## 2017-06-14 DIAGNOSIS — M25.511 ACUTE PAIN OF RIGHT SHOULDER: Primary | ICD-10-CM

## 2017-06-14 PROCEDURE — 99999 PR PBB SHADOW E&M-EST. PATIENT-LVL IV: CPT | Mod: PBBFAC,,, | Performed by: PHYSICIAN ASSISTANT

## 2017-06-14 PROCEDURE — 99214 OFFICE O/P EST MOD 30 MIN: CPT | Mod: PBBFAC | Performed by: PHYSICIAN ASSISTANT

## 2017-06-14 PROCEDURE — 99499 UNLISTED E&M SERVICE: CPT | Mod: S$PBB,,, | Performed by: PHYSICIAN ASSISTANT

## 2017-06-19 ENCOUNTER — OFFICE VISIT (OUTPATIENT)
Dept: ORTHOPEDICS | Facility: CLINIC | Age: 67
End: 2017-06-19
Payer: MEDICARE

## 2017-06-19 VITALS — WEIGHT: 235 LBS | BODY MASS INDEX: 31.83 KG/M2 | HEIGHT: 72 IN

## 2017-06-19 DIAGNOSIS — M19.011 PRIMARY OSTEOARTHRITIS OF RIGHT SHOULDER: Primary | ICD-10-CM

## 2017-06-19 PROCEDURE — 20610 DRAIN/INJ JOINT/BURSA W/O US: CPT | Mod: PBBFAC | Performed by: NURSE PRACTITIONER

## 2017-06-19 PROCEDURE — 1157F ADVNC CARE PLAN IN RCRD: CPT | Mod: ,,, | Performed by: NURSE PRACTITIONER

## 2017-06-19 PROCEDURE — 20610 DRAIN/INJ JOINT/BURSA W/O US: CPT | Mod: S$PBB,RT,, | Performed by: NURSE PRACTITIONER

## 2017-06-19 PROCEDURE — 99999 PR PBB SHADOW E&M-EST. PATIENT-LVL III: CPT | Mod: PBBFAC,,, | Performed by: NURSE PRACTITIONER

## 2017-06-19 PROCEDURE — 99213 OFFICE O/P EST LOW 20 MIN: CPT | Mod: 25,S$PBB,, | Performed by: NURSE PRACTITIONER

## 2017-06-19 PROCEDURE — 99213 OFFICE O/P EST LOW 20 MIN: CPT | Mod: PBBFAC,25 | Performed by: NURSE PRACTITIONER

## 2017-06-19 PROCEDURE — 1159F MED LIST DOCD IN RCRD: CPT | Mod: ,,, | Performed by: NURSE PRACTITIONER

## 2017-06-19 RX ADMIN — TRIAMCINOLONE ACETONIDE 40 MG: 40 INJECTION, SUSPENSION INTRA-ARTICULAR; INTRAMUSCULAR at 11:06

## 2017-06-20 RX ORDER — TRIAMCINOLONE ACETONIDE 40 MG/ML
40 INJECTION, SUSPENSION INTRA-ARTICULAR; INTRAMUSCULAR
Status: COMPLETED | OUTPATIENT
Start: 2017-06-19 | End: 2017-06-19

## 2017-06-20 NOTE — PROGRESS NOTES
CC: Pain of the Right Shoulder      HPI: Pt with right shoulder pain for the past several weeks. The pain is aching and worse with movement. She has known advanced djd of the right shoulder and has had cortisone injections with relief in the past. She is not ready for shoulder replacement surgery yet and comes in for another cortisone injection today if possible. Her last cortisone injection was in February.    ROS  General: denies fever and chills  Resp: no c/o sob  CVS: no c/o cp  MSK: c/o right shoulder pain which is aching and worse with movement    PE  General: AAOx3, pleasant and cooperative  Resp: respirations even and unlabored  MSK: right shoulder exam  + pain with internal and external rotation  + neer  + sifuentes      Assessment:  Right shoulder djd    Plan:  Cortisone injection right shoulder today  nsaids prn  F/u as needed    Shoulder Injection Procedure Note    Pre-operative Diagnosis: right shoulder pain    Post-operative Diagnosis: same    Indications: right shoulder pain    Anesthesia: none    Procedure Details     Verbal consent was obtained for the procedure. The injection site was identified and the skin was prepared with alcohol. The right shoulder was injected from a posterior approach with 1 ml of Kenalog and 5 ml Lidocaine under sterile technique using a 22 gauge 1 1/2 inch needle. The needle was removed and the area cleansed and dressed.    Complications:  None; patient tolerated the procedure well.    she was advised to rest the shoulder today, using ice as needed for comfort and swelling. she did receive immediate relief of the shoulder pain. she was told this would be short lived and is secondary to the lidocaine. she may have an increase in discomfort tonight followed by steady improvement over the next several days. It may take 1-3 weeks following the injection to get the full benefit of the medication.

## 2017-06-22 ENCOUNTER — TELEPHONE (OUTPATIENT)
Dept: INTERNAL MEDICINE | Facility: CLINIC | Age: 67
End: 2017-06-22

## 2017-06-22 RX ORDER — MELOXICAM 15 MG/1
15 TABLET ORAL DAILY
Qty: 90 TABLET | Refills: 1 | Status: SHIPPED | OUTPATIENT
Start: 2017-06-22 | End: 2017-06-22 | Stop reason: SDUPTHER

## 2017-06-22 RX ORDER — MELOXICAM 15 MG/1
15 TABLET ORAL DAILY
Qty: 90 TABLET | Refills: 1 | Status: SHIPPED | OUTPATIENT
Start: 2017-06-22 | End: 2017-06-23 | Stop reason: SDUPTHER

## 2017-06-22 NOTE — TELEPHONE ENCOUNTER
----- Message from Tanya Gautam MA sent at 6/22/2017 12:46 PM CDT -----  Contact: self - [phone]   Type: Rx    Name of medication(s): meloxicam (MOBIC) 15 MG tablet    Is this a refill? New rx? Refill     Who prescribed medication?    [facility] Phone[facility] & [facility] [address] [place] [address] [address] AT [facility]    Comments: Please call. Thanks!

## 2017-06-23 RX ORDER — MELOXICAM 15 MG/1
15 TABLET ORAL DAILY
Qty: 90 TABLET | Refills: 1 | Status: SHIPPED | OUTPATIENT
Start: 2017-06-23 | End: 2018-10-30 | Stop reason: SDUPTHER

## 2017-06-28 ENCOUNTER — CLINICAL SUPPORT (OUTPATIENT)
Dept: REHABILITATION | Facility: HOSPITAL | Age: 67
End: 2017-06-28
Attending: NURSE PRACTITIONER
Payer: MEDICARE

## 2017-06-28 DIAGNOSIS — M25.511 CHRONIC RIGHT SHOULDER PAIN: Primary | ICD-10-CM

## 2017-06-28 DIAGNOSIS — G89.29 CHRONIC RIGHT SHOULDER PAIN: Primary | ICD-10-CM

## 2017-06-28 PROCEDURE — 97110 THERAPEUTIC EXERCISES: CPT

## 2017-06-28 PROCEDURE — 97140 MANUAL THERAPY 1/> REGIONS: CPT

## 2017-06-28 NOTE — PROGRESS NOTES
Outpatient Physical Therapy Progress Note     Start of treatment: 2:00  End of treatment: 3:15  Total Treatment time: 60  Visit number:21    Subjective: Pt is w/o complaint on this date and continues to note improved function, .      Objective: Pt with guarding posture of R shld    Treatment: Pt was instructed in and performed ther ex x 30 min for UE strengthening, stretching, ROM, flexibility,   UBE 10 min   Pulleys flex/ABD 3 min ea  Pulleys IR x3 min  Ball on wall roll up 15x holding 15 sec ea-  4 way ball on wall x30 sec ea ABD  Wall clocks OTB 10x  No money otb x30 NP  Wall slide flex with towel 30x-  horiz row GTB 30x-  B shld ext GTB 30x  SL Er, Hab, ABD 30x 1#-  prone row 30x 1# -  prone ext 30x1#-   prone HAB 30x -NP  supine flex with wand 30x to 90 degrees-np  supine protraction wand 30x-NP  Pt received grade I shld jt/GH mobs, PROM x 10 min with improved cortney and ROM-  Pt received CP to R shld x 10 min      Assessment:   Patient showed good AROM this visit and is showing better scap stabilization control. . Patient would continue to benefit from PT intervention to progress toward functional goals.Improved AROM with flex and ER.  This is a 66 y.o. female referred to outpatient physical therapy and presents with a medical diagnosis of B shoulder pain and instability and demonstrates limitations as described in the problem list. Pt demonstrates good rehab potential. Pt will benefit from physcial therapy services in order to maximize pain free and/or functional use of bilateral shoulders             Medical necessity is demonstrated by:   Fall risk  Unable to participate in daily activities  Continued inability to participate in vocational pursuits  Pain limits function of effected part for all activities  Unable to participate fully in daily activities  Requires skilled supervision to complete and progress HEP      Progress towards goals: fair    New/Revised Goals:    Plan:  Continue with established Plan of  Care toward PT goals.

## 2017-07-14 ENCOUNTER — CLINICAL SUPPORT (OUTPATIENT)
Dept: REHABILITATION | Facility: HOSPITAL | Age: 67
End: 2017-07-14
Attending: NURSE PRACTITIONER
Payer: MEDICARE

## 2017-07-14 DIAGNOSIS — M25.511 CHRONIC RIGHT SHOULDER PAIN: Primary | ICD-10-CM

## 2017-07-14 DIAGNOSIS — G89.29 CHRONIC RIGHT SHOULDER PAIN: Primary | ICD-10-CM

## 2017-07-14 PROCEDURE — G8979 MOBILITY GOAL STATUS: HCPCS | Mod: CI

## 2017-07-14 PROCEDURE — G8978 MOBILITY CURRENT STATUS: HCPCS | Mod: CJ

## 2017-07-14 PROCEDURE — 97110 THERAPEUTIC EXERCISES: CPT

## 2017-07-14 NOTE — PROGRESS NOTES
Outpatient Physical Therapy Progress Note     Start of treatment: 2:30  End of treatment: 300  Total Treatment time: 30  Visit number:30    Subjective: Pt states her shoulder is doing well.   Pain: 0/10    Objective:  Measurements this visit:  Shoulder Range of Motion:   Flexion- R:150 degrees, L:175 degrees  Abduction- R: 130 degrees, L:180 degrees  IR- R:50 degrees, L:80 degrees  ER- R:75 degrees, L:90 degrees    Strength:   5/5 BUE, except 4/5 R ER    Treatment: Pt was instructed in and performed ther ex x 30 min for UE strengthening, stretching, ROM, flexibility,   UBE 10 min     NP:  Pulleys flex/ABD 3 min ea  Pulleys IR x3 min  Ball on wall roll up 15x holding 15 sec ea-  4 way ball on wall x30 sec ea ABD  Wall clocks OTB 10x  No money otb x30 NP  Wall slide flex with towel 30x-  horiz row GTB 30x-  B shld ext GTB 30x  SL Er, Hab, ABD 30x 1#-  prone row 30x 1# -  prone ext 30x1#-   prone HAB 30x -NP  supine flex with wand 30x to 90 degrees-np  supine protraction wand 30x-NP  Pt received grade I shld jt/GH mobs, PROM x 10 min with improved cortney and ROM-  Pt received CP to R shld x 10 min      Assessment:   Upon reassessment patient is showing improved strength and ROM. She is showing good benefit from PT and is demonstrating steady progress toward meeting goals. Patient would continue to benefit from PT intervention to progress toward functional goals.Improved AROM with flex and ER.  This is a 66 y.o. female referred to outpatient physical therapy and presents with a medical diagnosis of B shoulder pain and instability and demonstrates limitations as described in the problem list. Pt demonstrates good rehab potential. Pt will benefit from physcial therapy services in order to maximize pain free and/or functional use of bilateral shoulders             Medical necessity is demonstrated by:   Unable to participate in daily activities    Progress towards goals: fair    New/Revised Goals:    Plan:  Continue with  established Plan of Care toward PT goals 2x a week for 4 more weeks.      Tiera Jules, PT, DPT

## 2017-07-24 ENCOUNTER — NURSE TRIAGE (OUTPATIENT)
Dept: ADMINISTRATIVE | Facility: CLINIC | Age: 67
End: 2017-07-24

## 2017-07-24 ENCOUNTER — OFFICE VISIT (OUTPATIENT)
Dept: OBSTETRICS AND GYNECOLOGY | Facility: CLINIC | Age: 67
End: 2017-07-24
Payer: MEDICARE

## 2017-07-24 ENCOUNTER — TELEPHONE (OUTPATIENT)
Dept: SURGERY | Facility: CLINIC | Age: 67
End: 2017-07-24

## 2017-07-24 ENCOUNTER — TELEPHONE (OUTPATIENT)
Dept: INTERNAL MEDICINE | Facility: CLINIC | Age: 67
End: 2017-07-24

## 2017-07-24 VITALS — DIASTOLIC BLOOD PRESSURE: 82 MMHG | SYSTOLIC BLOOD PRESSURE: 126 MMHG

## 2017-07-24 DIAGNOSIS — K46.9 HERNIA: Primary | ICD-10-CM

## 2017-07-24 PROCEDURE — 1159F MED LIST DOCD IN RCRD: CPT | Mod: S$GLB,,, | Performed by: NURSE PRACTITIONER

## 2017-07-24 PROCEDURE — 99999 PR PBB SHADOW E&M-EST. PATIENT-LVL III: CPT | Mod: PBBFAC,,, | Performed by: NURSE PRACTITIONER

## 2017-07-24 PROCEDURE — 1157F ADVNC CARE PLAN IN RCRD: CPT | Mod: S$GLB,,, | Performed by: NURSE PRACTITIONER

## 2017-07-24 PROCEDURE — 99213 OFFICE O/P EST LOW 20 MIN: CPT | Mod: PBBFAC | Performed by: NURSE PRACTITIONER

## 2017-07-24 PROCEDURE — 1125F AMNT PAIN NOTED PAIN PRSNT: CPT | Mod: S$GLB,,, | Performed by: NURSE PRACTITIONER

## 2017-07-24 PROCEDURE — 99213 OFFICE O/P EST LOW 20 MIN: CPT | Mod: S$GLB,,, | Performed by: NURSE PRACTITIONER

## 2017-07-24 NOTE — TELEPHONE ENCOUNTER
Pt has an hair bum/cyst in her vaginal area. Pt states that she has left several messages for gyn and no one will call her back. Pt wants pcp to reach out to them and have them see her today. Please advise

## 2017-07-24 NOTE — PROGRESS NOTES
"  CC: Knot in groin    HPI: Pt is a 66 y.o.  female who presents for c/o a "Knot in her groin."  Reports last night she had urinary frequency and noticed the knot when using the restroom.  Reports some associated dull ache. Denies any associated N/V, colicky abdominal pain, or abdominal distention.          ROS:  GENERAL: Feeling well overall. Denies fever or chills.   SKIN: Denies rash or lesions.   HEAD: Denies head injury or headache.   NODES: Denies enlarged lymph nodes.   CHEST: Denies chest pain or shortness of breath.   CARDIOVASCULAR: Denies palpitations or left sided chest pain.   ABDOMEN: No abdominal pain, constipation, diarrhea, nausea, vomiting or rectal bleeding.   URINARY: No dysuria, hematuria, or burning on urination.  REPRODUCTIVE: See HPI.   BREASTS: Denies pain, lumps, or nipple discharge.   HEMATOLOGIC: No easy bruisability or excessive bleeding.   MUSCULOSKELETAL: Denies joint pain or swelling.   NEUROLOGIC: Denies syncope or weakness.   PSYCHIATRIC: Denies depression, anxiety or mood swings.    PE:   APPEARANCE: Well nourished, well developed, Black or  female in no acute distress.  VULVA: No lesions. Normal external female genitalia. + irreducible hernia bulge to left groin.  + tender to palpation    URETHRAL MEATUS: Normal size and location, no lesions, no prolapse.  URETHRA: No masses, tenderness, or prolapse.  VAGINA: Moist. No lesions or lacerations noted. No abnormal discharge present. No odor present.   CERVIX: No lesions or discharge. No cervical motion tenderness.   UTERUS: Normal size, regular shape, mobile, non-tender.  ADNEXA: No tenderness. No fullness or masses palpated in the adnexal regions.   ANUS PERINEUM: Normal.      Diagnosis:  1. Hernia        Plan:   STAT Referral to General surgery   Discussed signs and symptoms of a strangulated hernia and need for emergent ER if she experiences any N/V, fever, sudden pain that quickly intensifies, a hernia bulge that " turns red, purple or dark, and inability to move your bowels or pass gas.      Orders Placed This Encounter    Ambulatory Referral to General Surgery       CAPRICE SilvermanC

## 2017-07-24 NOTE — TELEPHONE ENCOUNTER
Pt needs to be seen by [facility] for a cyst in her pubic area      Please call pt and advise  pt

## 2017-07-24 NOTE — TELEPHONE ENCOUNTER
----- Message from Callie Knapp sent at 7/24/2017  3:22 PM CDT -----  Contact: Self  Poonam Called and wanted to schedule a stat appointment with  And also had general questions. Please call  Poonam @ [phone]  . Thanks :)

## 2017-07-24 NOTE — TELEPHONE ENCOUNTER
Reason for Disposition   [1] Caller requesting NON-URGENT health information AND [2] PCP's office is the best resource    Protocols used: ST INFORMATION ONLY CALL-A-BHASKAR Levin is calling to report a lump under her pubic hair above the labia.  States this area is sore and did cause her to not rest well last night.  Poonam is unsure whom she should see for this.  Please contact Poonam at [phone] to advise.

## 2017-07-24 NOTE — TELEPHONE ENCOUNTER
----- Message from Sirisha Jones sent at 7/24/2017  3:02 PM CDT -----  Contact: Ying/urgent care  Caller states pt needs consult for hernia.Please call Ying moeller @ [phone] or you can Im her at [facility] [place].THank you.

## 2017-07-24 NOTE — TELEPHONE ENCOUNTER
----- Message from Kristen Cornell sent at 7/24/2017 10:08 AM CDT -----  Contact: Self/[phone]  Patient has a cyst under her pubic hair the size of a quarter. Was triaged this morning. Called back and wanted to come in and I scheduled her to see Nan Genao NP this afternoon. Patient wanted you to be informed.    Thank You

## 2017-07-24 NOTE — TELEPHONE ENCOUNTER
Pt reports hernia appeared over the wknd. Says she wants it out. sched pt to see Dr. ANTUNEZ and reminder mailed to her home.

## 2017-07-25 ENCOUNTER — TELEPHONE (OUTPATIENT)
Dept: OBSTETRICS AND GYNECOLOGY | Facility: CLINIC | Age: 67
End: 2017-07-25

## 2017-07-27 ENCOUNTER — INITIAL CONSULT (OUTPATIENT)
Dept: SURGERY | Facility: CLINIC | Age: 67
End: 2017-07-27
Attending: SURGERY
Payer: MEDICARE

## 2017-07-27 VITALS
WEIGHT: 217.69 LBS | BODY MASS INDEX: 29.48 KG/M2 | HEIGHT: 72 IN | TEMPERATURE: 99 F | SYSTOLIC BLOOD PRESSURE: 106 MMHG | DIASTOLIC BLOOD PRESSURE: 63 MMHG | HEART RATE: 77 BPM

## 2017-07-27 DIAGNOSIS — K46.9 HERNIA: ICD-10-CM

## 2017-07-27 DIAGNOSIS — R19.09 LEFT GROIN MASS: Primary | ICD-10-CM

## 2017-07-27 PROCEDURE — 99203 OFFICE O/P NEW LOW 30 MIN: CPT | Mod: S$PBB,,, | Performed by: SURGERY

## 2017-07-27 PROCEDURE — 1126F AMNT PAIN NOTED NONE PRSNT: CPT | Mod: ,,, | Performed by: SURGERY

## 2017-07-27 PROCEDURE — 1157F ADVNC CARE PLAN IN RCRD: CPT | Mod: ,,, | Performed by: SURGERY

## 2017-07-27 PROCEDURE — 99999 PR PBB SHADOW E&M-EST. PATIENT-LVL III: CPT | Mod: PBBFAC,,, | Performed by: SURGERY

## 2017-07-27 PROCEDURE — 1159F MED LIST DOCD IN RCRD: CPT | Mod: ,,, | Performed by: SURGERY

## 2017-07-27 PROCEDURE — 99213 OFFICE O/P EST LOW 20 MIN: CPT | Mod: PBBFAC | Performed by: SURGERY

## 2017-07-27 NOTE — PATIENT INSTRUCTIONS
Anatomy of the Abdomen and Groin  The abdomen is the largest space (cavity) in the body. It lies between the chest and the pelvis, holding many of the bodys organs. These include the liver, stomach, and intestines. The groin is the area in the body where the upper thighs meet the lowest part of the abdomen. Normally, the abdomen and groin are kept separate by a wall of muscle and tissue. The only openings in the wall are small tunnels called the inguinal and femoral canals. These allow nerves, blood vessels, and other structures to pass between these two areas.     The abdominal wall is formed by layers of tissue, such as muscle and connective tissue. It helps protect and enclose the intestines and other organs.          The abdomen and groin  · Muscles. These are soft tissues. They enclose the intestines and other organs in the abdomen.  · Connective tissue. These help bind the muscles together.  · Inguinal canal. This is a tunnel in the groin. It is formed by layers of muscle and other tissues in the wall of the abdomen.  · Femoral canal. This is a tunnel in the wall of the abdomen. It allows blood vessels and nerves to pass through the groin into the leg.  · Spermatic cord. This passes through the inguinal canal and connects to the testicle.  Date Last Reviewed: 10/1/2016  © 3461-8996 [employer]. [address] [place] [address], [place] [address]. All rights reserved. This information is not intended as a substitute for professional medical care. Always follow your healthcare professional's instructions.      How a Hernia Develops  Although a hernia bulge may appear suddenly, hernias often take years to develop. They grow larger as pressure inside the body presses the intestines or other tissues out through a weak area in the abdominal wall, often at the belly button or a site of previous surgery. With time, these tissues can bulge out beneath the skin.  Stages of hernia development    The wall weakens  or tears. The abdominal lining bulges out through a weak area and begins to form a hernia sac. The sac may contain fat, intestine, or other tissues. At this point, the hernia may or may not cause a visible bulge.        The intestine pushes into the sac. As the intestine pushes further into the sac, it forms a visible bulge. The bulge may flatten when you lie down or push against it. This is called a reducible hernia and does not cause any immediate danger.             The intestine may become trapped. The sac containing the intestine may become trapped by muscle (incarcerated). If this happens, you wont be able to flatten the bulge. You may also have pain. Prompt treatment is needed.        The intestine may become strangulated. If the intestine is tightly trapped, it becomes strangulated. The strangulated area loses blood supply and may die. This can cause severe pain and block the intestine. Emergency surgery is needed.   Date Last Reviewed: 8/1/2016  © 1056-3290 [employer]. [address] [place] [address], [place] [address]. All rights reserved. This information is not intended as a substitute for professional medical care. Always follow your healthcare professional's instructions.

## 2017-07-27 NOTE — PROGRESS NOTES
Subjective:      Poonam Alvarez is a 66 y.o. female who was referred by OB/GYN for evaluation of left groin pain, groin lump. Symptoms were first noted 1 week ago. Picked up on a bottle of [place] water.  No pain associated. Lump is not reducible. Patient has no symptoms of  chronic constipation, chronic cough, difficulty urinating. Patient does not have previous hx of groin surgery.    Now with left leg discomfort  2 days later she noted a knot. Monday saw a PCP.  The area is sore.      Active Ambulatory Problems     Diagnosis Date Noted    Posterior subcapsular polar senile cataract 01/26/2011    Osteoarthritis 06/28/2012    History of Graves' disease 06/28/2012    Hypothyroidism, postablative 06/28/2012    Right posterior subcapsular cataract - Right Eye 07/07/2012    Diplopia 05/05/2011    Other after-cataract, not obscuring vision 01/26/2011    Radial styloid tenosynovitis 05/31/2010    Status post cataract extraction and insertion of intraocular lens - Right Eye 07/19/2012    Pseudophakia of both eyes 08/17/2012    Other specified disorder of skin 10/17/2012    AR (allergic rhinitis) 12/07/2012    GERD (gastroesophageal reflux disease) 03/08/2013    Left knee pain 07/23/2013    Primary localized osteoarthrosis, lower leg 07/23/2013    Knee pain, right 01/06/2014    Pre-op exam 03/27/2014    Pain in joint, lower leg 03/27/2014    S/P total knee arthroplasty 03/28/2014    Acute hypokalemia 03/28/2014    Hypovolemia 03/28/2014    Pre-syncope 03/29/2014    Hypokalemia 03/30/2014    Hypophosphatemia 03/30/2014    Constipation 04/01/2014    Anticoagulation monitoring, special range 04/01/2014    Dyspnea 08/29/2014    Ankle edema 08/29/2014    Right shoulder pain 03/06/2015    Screening for colon cancer 11/05/2015    Bilateral knee pain 11/17/2015    Chest pain 01/20/2016    Essential hypertension 04/30/2017     Resolved Ambulatory Problems     Diagnosis Date Noted    Atrial  flutter 06/28/2012     Past Medical History:   Diagnosis Date    AR (allergic rhinitis) 12/7/2012    Atrial flutter     Cataract     Generalized headaches     GERD (gastroesophageal reflux disease) 3/8/2013    Grave's disease     Hypertension     Keloid cicatrix     Obesity     Osteoarthritis     Positive PPD, treated      Review of Systems  Constitutional: negative for chills, fatigue, fevers, malaise and night sweats  Eyes: negative for icterus and irritation  Respiratory: negative for cough and dyspnea on exertion  Cardiovascular: negative for chest pain and palpitations  Gastrointestinal: negative for constipation, diarrhea and dysphagia  Genitourinary:negative for dysuria, hematuria and nocturia  Integument/breast: negative for rash and skin color change  Hematologic/lymphatic: negative for bleeding and easy bruising  Neurological: negative for gait problems, headaches and seizures  Behavioral/Psych: negative for anxiety and depression    ENT ROS: negative  Endocrine ROS: negative for - hair pattern changes, malaise/lethargy, mood swings, palpitations or polydipsia/polyuria       Objective:       Vitals:    07/27/17 1332   BP: 106/63   Pulse: 77   Temp: 98.5 °F (36.9 °C)       General :  alert, appears stated age and cooperative   Neck:  no asymmetry, masses, or scars   Lungs:  clear to auscultation bilaterally   Heart:  regular rate and rhythm   Abdomen: soft, non-tender; bowel sounds normal; no masses,  no organomegaly   Genitalia:   No scarring.   Left groin mass is palpable on left side without Valsalva.       Assessment:     Patient presenting with likely left inguinal hernia vs groin mass.  Hernia is much more likely.   Plan:      1. Discussed possibility of incarceration, strangulation, enlargement in size over time, and the risk of emergency surgery in the face of strangulation.  Also discussed the risk of surgery including recurrence which can be up to 50% in the case of incisional or  complex hernias, use of prosthetic materials (mesh) and the increased risk of infxn, post-op infxn and the possible need for re-operation and removal of mesh if used, possibility of post-op SBO or ileus, and the risks of general anesthetic including MI, CVA, sudden death or even reaction to anesthetic medications. The patient understands the risks, any and all questions were answered to the patient's satisfaction.  2. Patient has elected to proceed with surgical treatment. In the interim we will obtain and US(limited) Procedure will be scheduled.  Written consent will be obtained on the morning of surgery.  3. Follow up for surgery.

## 2017-07-28 ENCOUNTER — HOSPITAL ENCOUNTER (OUTPATIENT)
Dept: RADIOLOGY | Facility: OTHER | Age: 67
Discharge: HOME | End: 2017-07-28
Attending: SURGERY
Payer: MEDICARE

## 2017-07-28 DIAGNOSIS — R19.09 MASS OF LEFT INGUINAL REGION: Primary | ICD-10-CM

## 2017-07-28 DIAGNOSIS — K40.90 LEFT INGUINAL HERNIA: ICD-10-CM

## 2017-07-28 DIAGNOSIS — R19.09 LEFT GROIN MASS: ICD-10-CM

## 2017-07-28 PROCEDURE — 76705 ECHO EXAM OF ABDOMEN: CPT | Mod: TC

## 2017-07-28 PROCEDURE — 76705 ECHO EXAM OF ABDOMEN: CPT | Mod: 26,,, | Performed by: RADIOLOGY

## 2017-07-28 RX ORDER — SODIUM CHLORIDE 9 MG/ML
INJECTION, SOLUTION INTRAVENOUS CONTINUOUS
Status: CANCELLED | OUTPATIENT
Start: 2017-07-28

## 2017-07-31 ENCOUNTER — TELEPHONE (OUTPATIENT)
Dept: SURGERY | Facility: CLINIC | Age: 67
End: 2017-07-31

## 2017-07-31 NOTE — PLAN OF CARE
07/31/17 1110   ED Admissions Case Approval   ED Admissions Case Approval (!) CM Approved  (OP)

## 2017-07-31 NOTE — PLAN OF CARE
07/31/17 1103   ED Admissions Case Approval   ED Admissions Case Approval (!) CM Approved  (OP)

## 2017-08-02 ENCOUNTER — ANESTHESIA EVENT (OUTPATIENT)
Dept: SURGERY | Facility: OTHER | Age: 67
End: 2017-08-02
Payer: MEDICARE

## 2017-08-02 ENCOUNTER — HOSPITAL ENCOUNTER (OUTPATIENT)
Dept: PREADMISSION TESTING | Facility: OTHER | Age: 67
Discharge: HOME | End: 2017-08-02
Attending: SURGERY
Payer: MEDICARE

## 2017-08-02 VITALS
HEART RATE: 71 BPM | BODY MASS INDEX: 28.04 KG/M2 | TEMPERATURE: 98 F | HEIGHT: 72 IN | SYSTOLIC BLOOD PRESSURE: 118 MMHG | OXYGEN SATURATION: 98 % | DIASTOLIC BLOOD PRESSURE: 73 MMHG | WEIGHT: 207 LBS

## 2017-08-02 RX ORDER — SODIUM CHLORIDE, SODIUM LACTATE, POTASSIUM CHLORIDE, CALCIUM CHLORIDE 600; 310; 30; 20 MG/100ML; MG/100ML; MG/100ML; MG/100ML
INJECTION, SOLUTION INTRAVENOUS CONTINUOUS
Status: CANCELLED | OUTPATIENT
Start: 2017-08-02

## 2017-08-02 NOTE — ANESTHESIA PREPROCEDURE EVALUATION
08/02/2017  Poonam Alvarez is a 66 y.o., female.    Anesthesia Evaluation    I have reviewed the Patient Summary Reports.    I have reviewed the Nursing Notes.   I have reviewed the Medications.     Review of Systems  Anesthesia Hx:  No problems with previous Anesthesia    Social:  Non-Smoker    Cardiovascular:   Exercise tolerance: good Hypertension, well controlled    Pulmonary:   Denies Shortness of breath.    Hepatic/GI:   GERD    Neurological:   Denies Headaches.    Endocrine:   Hypothyroidism        Physical Exam  General:  Well nourished    Airway/Jaw/Neck:  Airway Findings: Mouth Opening: Normal Tongue: Normal  General Airway Assessment: Adult  Mallampati: I  TM Distance: Normal, at least 6 cm  Jaw/Neck Findings:  Neck ROM: Normal ROM      Dental:  Dental Findings: lower partial dentures             Anesthesia Plan  Type of Anesthesia, risks & benefits discussed:  Anesthesia Type:  general  Patient's Preference:   Intra-op Monitoring Plan:   Intra-op Monitoring Plan Comments:   Post Op Pain Control Plan:   Post Op Pain Control Plan Comments:   Induction:   IV  Beta Blocker:         Informed Consent: Patient understands risks and agrees with Anesthesia plan.  Questions answered. Anesthesia consent signed with patient.  ASA Score: 2     Day of Surgery Review of History & Physical:    H&P update referred to the surgeon.         Ready For Surgery From Anesthesia Perspective.

## 2017-08-02 NOTE — DISCHARGE INSTRUCTIONS
PRE-ADMIT TESTING -  [phone]    [address]  [facility]        OUTPATIENT SURGERY UNIT - [phone]    Your surgery has been scheduled at [facility]. We are pleased to have the opportunity to serve you. For Further Information please call [phone].    On the day of surgery please report to the Information Desk on the 1st floor.    · CONTACT YOUR PHYSICIAN'S OFFICE THE DAY PRIOR TO YOUR SURGERY TO OBTAIN YOUR ARRIVAL TIME.     · The evening before surgery do not eat anything after 9 p.m. ( this includes hard candy, chewing gum and mints).  You may only have GATORADE, POWERADE AND WATER  from 9 p.m. until you leave your home.   DO NOT DRINK ANY LIQUIDS ON THE WAY TO THE HOSPITAL.      SPECIAL MEDICATION INSTRUCTIONS: TAKE medications checked off by the Anesthesiologist on your Medication List.    Angiogram Patients: Take medications as instructed by your physician, including aspirin.     Surgery Patients:    If you take ASPIRIN - Your PHYSICIAN/SURGEON will need to inform you IF/OR when you need to stop taking aspirin prior to your surgery.     Do Not take any medications containing IBUPROFEN.  Do Not Wear any make-up or dark nail polish   (especially eye make-up) to surgery. If you come to surgery with makeup on you will be required to remove the makeup or nail polish.    Do not shave your surgical area at least 5 days prior to your surgery. The surgical prep will be performed at the hospital according to Infection Control regulations.    Leave all valuables at home.   Do Not wear any jewelry or watches, including any metal in body piercings.  Contact Lens must be removed before surgery. Either do not wear the contact lens or bring a case and solution for storage.  Please bring a container for eyeglasses or dentures as required.  Bring any paperwork your physician has provided, such as consent forms,  history and physicals, doctor's orders, etc.   Bring comfortable clothes  that are loose fitting to wear upon discharge. Take into consideration the type of surgery being performed.  Maintain your diet as advised per your physician the day prior to surgery.      Adequate rest the night before surgery is advised.   Park in the Parking lot behind the hospital or in the [facility] across the street from the parking lot. Parking is complimentary.  If you will be discharged the same day as your procedure, please arrange for a responsible adult to drive you home or to accompany you if traveling by taxi.   YOU WILL NOT BE PERMITTED TO DRIVE OR TO LEAVE THE HOSPITAL ALONE AFTER SURGERY.   It is strongly recommended that you arrange for someone to remain with you for the first 24 hrs following your surgery.       Thank you for your cooperation.  The Staff of [facility].        Bathing Instructions                                                                 Please shower the evening before and morning of your procedure with    ANTIBACTERIAL SOAP. ( DIAL, etc )  Concentrate on the surgical area   for at least 3 minutes and rinse completely. Dry off as usual.   Do not use any deodorant, powder, body lotions, perfume, after shave or    cologne.

## 2017-08-03 ENCOUNTER — TELEPHONE (OUTPATIENT)
Dept: SURGERY | Facility: CLINIC | Age: 67
End: 2017-08-03

## 2017-08-04 ENCOUNTER — ANESTHESIA (OUTPATIENT)
Dept: SURGERY | Facility: OTHER | Age: 67
End: 2017-08-04
Payer: MEDICARE

## 2017-08-04 ENCOUNTER — SURGERY (OUTPATIENT)
Age: 67
End: 2017-08-04

## 2017-08-04 ENCOUNTER — HOSPITAL ENCOUNTER (OUTPATIENT)
Facility: OTHER | Age: 67
Discharge: HOME | End: 2017-08-04
Attending: SURGERY | Admitting: SURGERY
Payer: MEDICARE

## 2017-08-04 VITALS
HEART RATE: 65 BPM | OXYGEN SATURATION: 100 % | WEIGHT: 207 LBS | TEMPERATURE: 98 F | DIASTOLIC BLOOD PRESSURE: 72 MMHG | HEIGHT: 72 IN | SYSTOLIC BLOOD PRESSURE: 130 MMHG | BODY MASS INDEX: 28.04 KG/M2 | RESPIRATION RATE: 16 BRPM

## 2017-08-04 DIAGNOSIS — R19.09 LEFT GROIN MASS: ICD-10-CM

## 2017-08-04 DIAGNOSIS — K40.90 LEFT INGUINAL HERNIA: ICD-10-CM

## 2017-08-04 DIAGNOSIS — R19.09 MASS OF LEFT INGUINAL REGION: ICD-10-CM

## 2017-08-04 PROCEDURE — 37000008 HC ANESTHESIA 1ST 15 MINUTES: Performed by: SURGERY

## 2017-08-04 PROCEDURE — 36000706: Performed by: SURGERY

## 2017-08-04 PROCEDURE — 25000003 PHARM REV CODE 250: Performed by: NURSE ANESTHETIST, CERTIFIED REGISTERED

## 2017-08-04 PROCEDURE — 37000009 HC ANESTHESIA EA ADD 15 MINS: Performed by: SURGERY

## 2017-08-04 PROCEDURE — 88302 TISSUE EXAM BY PATHOLOGIST: CPT | Mod: 26,,, | Performed by: PATHOLOGY

## 2017-08-04 PROCEDURE — 71000033 HC RECOVERY, INTIAL HOUR: Performed by: SURGERY

## 2017-08-04 PROCEDURE — 25000003 PHARM REV CODE 250: Performed by: SURGERY

## 2017-08-04 PROCEDURE — 49507 PRP I/HERN INIT BLOCK >5 YR: CPT | Mod: LT,GC,, | Performed by: SURGERY

## 2017-08-04 PROCEDURE — 63600175 PHARM REV CODE 636 W HCPCS: Performed by: SPECIALIST

## 2017-08-04 PROCEDURE — 71000015 HC POSTOP RECOV 1ST HR: Performed by: SURGERY

## 2017-08-04 PROCEDURE — 63600175 PHARM REV CODE 636 W HCPCS: Performed by: NURSE ANESTHETIST, CERTIFIED REGISTERED

## 2017-08-04 PROCEDURE — 63600175 PHARM REV CODE 636 W HCPCS: Performed by: SURGERY

## 2017-08-04 PROCEDURE — C1781 MESH (IMPLANTABLE): HCPCS | Performed by: SURGERY

## 2017-08-04 PROCEDURE — 88302 TISSUE EXAM BY PATHOLOGIST: CPT | Performed by: PATHOLOGY

## 2017-08-04 PROCEDURE — 71000039 HC RECOVERY, EACH ADD'L HOUR: Performed by: SURGERY

## 2017-08-04 PROCEDURE — 36000707: Performed by: SURGERY

## 2017-08-04 PROCEDURE — 25000003 PHARM REV CODE 250: Performed by: ANESTHESIOLOGY

## 2017-08-04 DEVICE — MESH PARIETEX PROGRIP LEFT: Type: IMPLANTABLE DEVICE | Site: INGUINAL | Status: FUNCTIONAL

## 2017-08-04 RX ORDER — LIDOCAINE HCL/PF 100 MG/5ML
SYRINGE (ML) INTRAVENOUS
Status: DISCONTINUED | OUTPATIENT
Start: 2017-08-04 | End: 2017-08-04

## 2017-08-04 RX ORDER — GLYCOPYRROLATE 0.2 MG/ML
INJECTION INTRAMUSCULAR; INTRAVENOUS
Status: DISCONTINUED | OUTPATIENT
Start: 2017-08-04 | End: 2017-08-04

## 2017-08-04 RX ORDER — NEOSTIGMINE METHYLSULFATE 1 MG/ML
INJECTION, SOLUTION INTRAVENOUS
Status: DISCONTINUED | OUTPATIENT
Start: 2017-08-04 | End: 2017-08-04

## 2017-08-04 RX ORDER — OXYCODONE HYDROCHLORIDE 5 MG/1
5 TABLET ORAL
Status: DISCONTINUED | OUTPATIENT
Start: 2017-08-04 | End: 2017-08-04 | Stop reason: HOSPADM

## 2017-08-04 RX ORDER — ACETAMINOPHEN 10 MG/ML
INJECTION, SOLUTION INTRAVENOUS
Status: DISCONTINUED | OUTPATIENT
Start: 2017-08-04 | End: 2017-08-04

## 2017-08-04 RX ORDER — SODIUM CHLORIDE 0.9 % (FLUSH) 0.9 %
3 SYRINGE (ML) INJECTION
Status: DISCONTINUED | OUTPATIENT
Start: 2017-08-04 | End: 2017-08-04 | Stop reason: HOSPADM

## 2017-08-04 RX ORDER — OXYCODONE AND ACETAMINOPHEN 5; 325 MG/1; MG/1
1 TABLET ORAL EVERY 4 HOURS PRN
Qty: 55 TABLET | Refills: 0 | Status: SHIPPED | OUTPATIENT
Start: 2017-08-04 | End: 2017-08-04 | Stop reason: HOSPADM

## 2017-08-04 RX ORDER — FENTANYL CITRATE 50 UG/ML
25 INJECTION, SOLUTION INTRAMUSCULAR; INTRAVENOUS EVERY 5 MIN PRN
Status: DISCONTINUED | OUTPATIENT
Start: 2017-08-04 | End: 2017-08-04 | Stop reason: HOSPADM

## 2017-08-04 RX ORDER — HYDROMORPHONE HYDROCHLORIDE 2 MG/ML
0.4 INJECTION, SOLUTION INTRAMUSCULAR; INTRAVENOUS; SUBCUTANEOUS EVERY 5 MIN PRN
Status: DISCONTINUED | OUTPATIENT
Start: 2017-08-04 | End: 2017-08-04 | Stop reason: HOSPADM

## 2017-08-04 RX ORDER — BACITRACIN 50000 [IU]/1
INJECTION, POWDER, FOR SOLUTION INTRAMUSCULAR
Status: DISCONTINUED | OUTPATIENT
Start: 2017-08-04 | End: 2017-08-04 | Stop reason: HOSPADM

## 2017-08-04 RX ORDER — MIDAZOLAM HYDROCHLORIDE 1 MG/ML
INJECTION INTRAMUSCULAR; INTRAVENOUS
Status: DISCONTINUED | OUTPATIENT
Start: 2017-08-04 | End: 2017-08-04

## 2017-08-04 RX ORDER — MEPERIDINE HYDROCHLORIDE 50 MG/ML
12.5 INJECTION INTRAMUSCULAR; INTRAVENOUS; SUBCUTANEOUS ONCE AS NEEDED
Status: COMPLETED | OUTPATIENT
Start: 2017-08-04 | End: 2017-08-04

## 2017-08-04 RX ORDER — SENNOSIDES 8.6 MG/1
1 TABLET ORAL DAILY
Qty: 40 TABLET | Refills: 0 | Status: SHIPPED | OUTPATIENT
Start: 2017-08-04 | End: 2019-10-11

## 2017-08-04 RX ORDER — DIPHENHYDRAMINE HYDROCHLORIDE 50 MG/ML
25 INJECTION INTRAMUSCULAR; INTRAVENOUS EVERY 6 HOURS PRN
Status: DISCONTINUED | OUTPATIENT
Start: 2017-08-04 | End: 2017-08-04 | Stop reason: HOSPADM

## 2017-08-04 RX ORDER — CEFAZOLIN SODIUM 2 G/50ML
2 SOLUTION INTRAVENOUS
Status: COMPLETED | OUTPATIENT
Start: 2017-08-04 | End: 2017-08-04

## 2017-08-04 RX ORDER — FENTANYL CITRATE 50 UG/ML
INJECTION, SOLUTION INTRAMUSCULAR; INTRAVENOUS
Status: DISCONTINUED | OUTPATIENT
Start: 2017-08-04 | End: 2017-08-04

## 2017-08-04 RX ORDER — PROPOFOL 10 MG/ML
VIAL (ML) INTRAVENOUS
Status: DISCONTINUED | OUTPATIENT
Start: 2017-08-04 | End: 2017-08-04

## 2017-08-04 RX ORDER — ROCURONIUM BROMIDE 10 MG/ML
INJECTION, SOLUTION INTRAVENOUS
Status: DISCONTINUED | OUTPATIENT
Start: 2017-08-04 | End: 2017-08-04

## 2017-08-04 RX ORDER — BUPIVACAINE HYDROCHLORIDE 2.5 MG/ML
INJECTION, SOLUTION EPIDURAL; INFILTRATION; INTRACAUDAL
Status: DISCONTINUED | OUTPATIENT
Start: 2017-08-04 | End: 2017-08-04 | Stop reason: HOSPADM

## 2017-08-04 RX ORDER — SODIUM CHLORIDE, SODIUM LACTATE, POTASSIUM CHLORIDE, CALCIUM CHLORIDE 600; 310; 30; 20 MG/100ML; MG/100ML; MG/100ML; MG/100ML
INJECTION, SOLUTION INTRAVENOUS CONTINUOUS
Status: DISCONTINUED | OUTPATIENT
Start: 2017-08-04 | End: 2017-08-04 | Stop reason: HOSPADM

## 2017-08-04 RX ORDER — SODIUM CHLORIDE 9 MG/ML
INJECTION, SOLUTION INTRAVENOUS CONTINUOUS
Status: DISCONTINUED | OUTPATIENT
Start: 2017-08-04 | End: 2017-08-04 | Stop reason: HOSPADM

## 2017-08-04 RX ORDER — ONDANSETRON 2 MG/ML
4 INJECTION INTRAMUSCULAR; INTRAVENOUS DAILY PRN
Status: DISCONTINUED | OUTPATIENT
Start: 2017-08-04 | End: 2017-08-04 | Stop reason: HOSPADM

## 2017-08-04 RX ORDER — ONDANSETRON 2 MG/ML
INJECTION INTRAMUSCULAR; INTRAVENOUS
Status: DISCONTINUED | OUTPATIENT
Start: 2017-08-04 | End: 2017-08-04

## 2017-08-04 RX ORDER — MEPERIDINE HYDROCHLORIDE 50 MG/ML
12.5 INJECTION INTRAMUSCULAR; INTRAVENOUS; SUBCUTANEOUS ONCE AS NEEDED
Status: DISCONTINUED | OUTPATIENT
Start: 2017-08-04 | End: 2017-08-04 | Stop reason: HOSPADM

## 2017-08-04 RX ORDER — KETOROLAC TROMETHAMINE 30 MG/ML
INJECTION, SOLUTION INTRAMUSCULAR; INTRAVENOUS
Status: DISCONTINUED | OUTPATIENT
Start: 2017-08-04 | End: 2017-08-04

## 2017-08-04 RX ORDER — ACETAMINOPHEN AND CODEINE PHOSPHATE 300; 30 MG/1; MG/1
1 TABLET ORAL
Qty: 31 TABLET | Refills: 0 | Status: SHIPPED | OUTPATIENT
Start: 2017-08-04 | End: 2017-08-14

## 2017-08-04 RX ADMIN — GLYCOPYRROLATE 0.6 MG: 0.2 INJECTION, SOLUTION INTRAMUSCULAR; INTRAVENOUS at 11:08

## 2017-08-04 RX ADMIN — MEPERIDINE HYDROCHLORIDE 12.5 MG: 50 INJECTION INTRAMUSCULAR; INTRAVENOUS; SUBCUTANEOUS at 12:08

## 2017-08-04 RX ADMIN — SODIUM CHLORIDE, SODIUM LACTATE, POTASSIUM CHLORIDE, AND CALCIUM CHLORIDE: 600; 310; 30; 20 INJECTION, SOLUTION INTRAVENOUS at 08:08

## 2017-08-04 RX ADMIN — GLYCOPYRROLATE 0.2 MG: 0.2 INJECTION, SOLUTION INTRAMUSCULAR; INTRAVENOUS at 10:08

## 2017-08-04 RX ADMIN — FENTANYL CITRATE 100 MCG: 50 INJECTION, SOLUTION INTRAMUSCULAR; INTRAVENOUS at 09:08

## 2017-08-04 RX ADMIN — BACITRACIN 50000 UNITS: 50000 INJECTION, POWDER, FOR SOLUTION INTRAMUSCULAR at 10:08

## 2017-08-04 RX ADMIN — ROCURONIUM BROMIDE 40 MG: 10 INJECTION, SOLUTION INTRAVENOUS at 09:08

## 2017-08-04 RX ADMIN — BUPIVACAINE HYDROCHLORIDE 10 ML: 2.5 INJECTION, SOLUTION EPIDURAL; INFILTRATION; INTRACAUDAL; PERINEURAL at 10:08

## 2017-08-04 RX ADMIN — NEOSTIGMINE METHYLSULFATE 3 MG: 1 INJECTION INTRAVENOUS at 11:08

## 2017-08-04 RX ADMIN — LIDOCAINE HYDROCHLORIDE 50 MG: 20 INJECTION, SOLUTION INTRAVENOUS at 09:08

## 2017-08-04 RX ADMIN — KETOROLAC TROMETHAMINE 30 MG: 30 INJECTION, SOLUTION INTRAMUSCULAR; INTRAVENOUS at 11:08

## 2017-08-04 RX ADMIN — MIDAZOLAM HYDROCHLORIDE 2 MG: 1 INJECTION, SOLUTION INTRAMUSCULAR; INTRAVENOUS at 09:08

## 2017-08-04 RX ADMIN — CEFAZOLIN SODIUM 2 G: 2 SOLUTION INTRAVENOUS at 10:08

## 2017-08-04 RX ADMIN — PROPOFOL 200 MG: 10 INJECTION, EMULSION INTRAVENOUS at 09:08

## 2017-08-04 RX ADMIN — ONDANSETRON 4 MG: 2 INJECTION INTRAMUSCULAR; INTRAVENOUS at 11:08

## 2017-08-04 RX ADMIN — ACETAMINOPHEN 1000 MG: 10 INJECTION, SOLUTION INTRAVENOUS at 10:08

## 2017-08-04 NOTE — TRANSFER OF CARE
Anesthesia Transfer of Care Note    Patient: Poonam Alvarez    Procedure(s) Performed: Procedure(s) (LRB):  REPAIR-HERNIA-INGUINAL-INITIAL (5 YRS+) OPEN - 61893 (Left)  INSERTION-IMPLANT,  MESH PLACEMENT-27294 (Left)    Patient location: PACU    Anesthesia Type: general    Transport from OR: Transported from OR on 2-3 L/min O2 by NC with adequate spontaneous ventilation    Post pain: adequate analgesia    Post assessment: no apparent anesthetic complications    Post vital signs: stable    Level of consciousness: sedated and responds to stimulation    Nausea/Vomiting: no nausea/vomiting    Complications: none    Transfer of care protocol was followed      Last vitals:   Visit Vitals  /77 (BP Location: Right arm, Patient Position: Lying, BP Method: Automatic)   Pulse 68   Temp 37.2 °C (98.9 °F) (Oral)   Resp 16   Ht 6' (1.829 m)   Wt 93.9 kg (207 lb)   SpO2 99%   BMI 28.07 kg/m²

## 2017-08-04 NOTE — DISCHARGE INSTRUCTIONS
"  Instructions    Please take pain medication as needed, if taking narcotics please avoid driving.   We recommend a high fiber diet and use of stool softeners (senna daily) while on narcotics as they might cause constipation.   You may leave the dressing in place and do not shower for 48 hours, then you might take off the outer dressing. Once dressing removed you can shower and you will find small pieces of tape over your incision called "steri strips" which will fall on their own, or you can remove them in 10 days.   Do not lift anything heavier than 10 lb for at least 4 weeks.   Avoid swimming pools, baths or hot tubs for at least 2 weeks.   Please call if fevers, chills, sob, increase drainage from your wound or bleeding or you may go to the ED.     Please call as well to make an appointment in 2 weeks with Dr. Salinas for wound recheck.           Discharge Instructions for Open Hernia Repair  You had a procedure called open hernia repair. Also called a rupture, a hernia is a tear or weakness in the wall of the belly. This weakness may be present at birth. Or it can be caused by the wear and tear of daily living. Hernias may get worse with time or with physical stress. But surgery can help repair the weakness and eliminate symptoms.    Activity after surgery  Recommendations include the following:    · After surgery, take it easy for the rest of the day. If you had general anesthesia, dont use machinery or power tools, drink alcohol, or make any major decisions for at least the first 24 hours.  · Dont drive while you are still taking opioid pain medicine, and dont drive until you are able to step firmly on the brake pedal without hesitation.  · Ask others to help with chores and errands while you recover.  · Dont lift anything heavier than 10 pounds until your healthcare provider says its OK.  · Dont mow the lawn, use a vacuum , or do other strenuous activities until your healthcare provider says its " OK.  · Walk as often as you feel able.  · Continue the coughing and deep breathing exercises that you learned in the hospital.  · Ask your healthcare provider when you can expect to return to work.  · Avoid constipation:  ¨ Eat fruits, vegetables, and whole grains.  ¨ Drink 6 to 8 glasses of water a day, unless otherwise instructed.  ¨ Use a laxative or a mild stool softener as instructed by your healthcare provider.    Bandage and incision care  Tips include the following:  · Do not get the bandage or wound wet for 48 hours.  · If strips of tape were used to close your incision, dont pull them off. Let them fall off on their own.  · Remove any gauze bandage in 48 hours.  · Wash your incision with mild soap and water. Pat it dry. Dont use oils, powders, or lotions on your incision. Do not soak your incision or take tub baths until cleared by your healthcare provider.    Follow-up care  Keep follow-up appointments during your recovery. These allow your healthcare provider to check your progress and make sure youre healing well. You may also need to have your stitches, staples, or bandage removed. During office visits, tell your healthcare provider if you have any new symptoms. And be sure to ask any questions you have.     When to call your healthcare provider  Call your healthcare provider immediately if you have any of the following:    · A large amount of swelling or bruising (some testicular swelling and bruising is common)  · Bleeding  · Increasing pain  · Increased redness or drainage of the incision  · Fever of 100.4°F (38.0°C) or higher, or as directed by your healthcare provider  · Trouble urinating  · Nausea or vomiting       Discharge Instructions: After Your Surgery  Youve just had surgery. During surgery you were given medicine called anesthesia to keep you relaxed and free of pain. After surgery you may have some pain or nausea. This is common. Here are some tips for feeling better and getting well  after surgery.     Stay on schedule with your medication.     Going home  Your doctor or nurse will show you how to take care of yourself when you go home. He or she will also answer your questions. Have an adult family member or friend drive you home. For the first 24 hours after your surgery:    · Do not drive or use heavy equipment.  · Do not make important decisions or sign legal papers.  · Do not drink alcohol.  · Have someone stay with you, if needed. He or she can watch for problems and help keep you safe.    Be sure to go to all follow-up visits with your doctor. And rest after your surgery for as long as your doctor tells you to.    Coping with pain  If you have pain after surgery, pain medicine will help you feel better. Take it as told, before pain becomes severe. Also, ask your doctor or pharmacist about other ways to control pain. This might be with heat, ice, or relaxation. And follow any other instructions your surgeon or nurse gives you.    Tips for taking pain medicine  To get the best relief possible, remember these points:    · Pain medicines can upset your stomach. Taking them with a little food may help.  · Most pain relievers taken by mouth need at least 20 to 30 minutes to start to work.  · Taking medicine on a schedule can help you remember to take it. Try to time your medicine so that you can take it before starting an activity. This might be before you get dressed, go for a walk, or sit down for dinner.  · Constipation is a common side effect of pain medicines. Call your doctor before taking any medicines such as laxatives or stool softeners to help ease constipation. Also ask if you should skip any foods. Drinking lots of fluids and eating foods such as fruits and vegetables that are high in fiber can also help. Remember, do not take laxatives unless your surgeon has prescribed them.  · Drinking alcohol and taking pain medicine can cause dizziness and slow your breathing. It can even be  deadly. Do not drink alcohol while taking pain medicine.  · Pain medicine can make you react more slowly to things. Do not drive or run machinery while taking pain medicine.    Your health care provider may tell you to take acetaminophen to help ease your pain. Ask him or her how much you are supposed to take each day. Acetaminophen or other pain relievers may interact with your prescription medicines or other over-the-counter (OTC) drugs. Some prescription medicines have acetaminophen and other ingredients. Using both prescription and OTC acetaminophen for pain can cause you to overdose. Read the labels on your OTC medicines with care. This will help you to clearly know the list of ingredients, how much to take, and any warnings. It may also help you not take too much acetaminophen. If you have questions or do not understand the information, ask your pharmacist or health care provider to explain it to you before you take the OTC medicine.    Managing nausea  Some people have an upset stomach after surgery. This is often because of anesthesia, pain, or pain medicine, or the stress of surgery. These tips will help you handle nausea and eat healthy foods as you get better. If you were on a special food plan before surgery, ask your doctor if you should follow it while you get better. These tips may help:    · Do not push yourself to eat. Your body will tell you when to eat and how much.  · Start off with clear liquids and soup. They are easier to digest.  · Next try semi-solid foods, such as mashed potatoes, applesauce, and gelatin, as you feel ready.  · Slowly move to solid foods. Dont eat fatty, rich, or spicy foods at first.  · Do not force yourself to have 3 large meals a day. Instead eat smaller amounts more often.  · Take pain medicines with a small amount of solid food, such as crackers or toast, to avoid nausea.     Call your surgeon if  · You still have pain an hour after taking medicine. The medicine may  not be strong enough.  · You feel too sleepy, dizzy, or groggy. The medicine may be too strong.  · You have side effects like nausea, vomiting, or skin changes, such as rash, itching, or hives.       If you have obstructive sleep apnea  You were given anesthesia medicine during surgery to keep you comfortable and free of pain. After surgery, you may have more apnea spells because of this medicine and other medicines you were given. The spells may last longer than usual.   At home:    · Keep using the continuous positive airway pressure (CPAP) device when you sleep. Unless your health care provider tells you not to, use it when you sleep, day or night. CPAP is a common device used to treat obstructive sleep apnea.  · Talk with your provider before taking any pain medicine, muscle relaxants, or sedatives. Your provider will tell you about the possible dangers of taking these medicines.    © 9307-8171 [employer]. [address] [place] [address], [place] [address]. All rights reserved. This information is not intended as a substitute for professional medical care. Always follow your healthcare professional's instructions.    PLEASE FOLLOW ANY OTHER INSTRUCTIONS PROVIDED TO YOU BY DR. LOGAN!

## 2017-08-04 NOTE — BRIEF OP NOTE
[facility]  Brief Operative Note     SUMMARY     Surgery Date: 8/4/2017     Surgeon(s) and Role:     * Loli Rosario MD - Primary    Assisting Surgeon: None    Pre-op Diagnosis:  Mass of left inguinal region [R19.09]    Post-op Diagnosis:  Post-Op Diagnosis Codes:     * Mass of left inguinal region [R19.09]    Procedure(s) (LRB):  REPAIR-HERNIA-INGUINAL-INITIAL (5 YRS+) OPEN - 35666 (Left)  INSERTION-IMPLANT,  MESH PLACEMENT-82653 (Left)    Anesthesia: General    Description of the findings of the procedure: Left direct inguinal hernia repair with mesh, incarcerated omentum found.     Findings/Key Components: See Op note    Estimated Blood Loss: * No values recorded between 8/4/2017 10:09 AM and 8/4/2017 11:25 AM *         Specimens:   Specimen (12h ago through future)    Start     Ordered    08/04/17 1110  Specimen to Pathology - Surgery  Once     Comments:  1. LEFT INGUINAL HERNIA SAC      08/04/17 1111          Discharge Note    SUMMARY     Admit Date: 8/4/2017    Discharge Date and Time:  08/04/2017 11:26 AM    Hospital Course (synopsis of major diagnoses, care, treatment, and services provided during the course of the hospital stay):   Patient tolerated the procedure well and was discharged the same day with instructions to follow up Dr. Salinas in two weeks and with lifting restrictions no more than 10 lb for at least 6 weeks.      Final Diagnosis: Post-Op Diagnosis Codes:     * Mass of left inguinal region [R19.09]    Disposition: Home or Self Care    Follow Up/Patient Instructions:     Medications:  Reconciled Home Medications:   Current Discharge Medication List      START taking these medications    Details   senna (SENNA CONCENTRATE) 8.6 mg tablet Take 1 tablet by mouth once daily.  Qty: 40 tablet, Refills: 0         CONTINUE these medications which have NOT CHANGED    Details   b complex vitamins capsule Take 1 capsule by mouth once daily.      calcium-vitamin D3-vitamin K  (VIACTIV) 500-100-40 mg-unit-mcg Chew Take 1 tablet by mouth Daily.      cholecalciferol, vitamin D3, (VITAMIN D3) 2,000 unit Cap Take 1 capsule by mouth once daily.      multivitamin-minerals-lutein (CENTRUM SILVER) Tab Take 1 tablet by mouth once daily.       UBIDECARENONE (COENZYME Q10) 100 mg Tab Take 1 tablet by mouth once daily.      aspirin 81 MG chewable tablet Take 1 tablet by mouth Daily.      fish oil-omega-3 fatty acids 300-1,000 mg capsule Take 2 g by mouth once daily.       meloxicam (MOBIC) 15 MG tablet Take 1 tablet (15 mg total) by mouth once daily.  Qty: 90 tablet, Refills: 1      SYNTHROID 137 mcg Tab tablet Take 1 tablet (137 mcg total) by mouth once daily.  Qty: 90 tablet, Refills: 3             Discharge Procedure Orders  Diet general     Activity as tolerated     Keep surgical extremity elevated     Ice to affected area     Weight bearing restrictions (specify)     Call MD for:  temperature >100.4     Call MD for:  persistent nausea and vomiting     Call MD for:  severe uncontrolled pain     Call MD for:  difficulty breathing, headache or visual disturbances     Call MD for:  redness, tenderness, or signs of infection (pain, swelling, redness, odor or green/yellow discharge around incision site)     Call MD for:  hives     Call MD for:  persistent dizziness or light-headedness     Call MD for:  extreme fatigue     Remove dressing in 48 hours       Follow-up Information     Loli Rosario MD. Schedule an appointment as soon as possible for a visit in 2 weeks.    Specialty:  General Surgery  Why:  For wound re-check  Contact information:  [address]  [address] [id number] [phone]

## 2017-08-04 NOTE — PLAN OF CARE
Patient prefers to have Pamela present for discharge teaching. Please contact them @[phone].

## 2017-08-04 NOTE — OP NOTE
[facility]  Endocrine Surgery  Operative Note    SUMMARY     Date of Procedure: 8/4/2017     Procedure: Left inguinal hernia repair with mesh    Attending Surgeon: Loli Rosario M.D.     [place] Surgeon: MONIKA Nj MD    Pre-Operative Diagnosis:   Left inguinal hernia       Post-Operative Diagnosis:   Same (indirect containing incarcerated omentum)    Anesthesia: General    Indications: Poonam Alvarez is a 66 y.o..female who presented with a history of a left incarcerated inguinal hernia.      Procedure in detail:  The patient was seen again in the Holding Room. The risks, benefits, complications, treatment options, and expected outcomes were discussed with the patient. The possibilities of reaction to medication, pulmonary aspiration, perforation of viscus, bleeding, recurrent infection, the need for additional procedures, and development of a complication requiring transfusion or further operation were discussed with the patient and/or family. There was concurrence with the proposed plan, and informed consent was obtained. The site of surgery was properly noted/marked. The patient was taken to the Operating Room, identified as Poonam Alvarez, and the procedure verified as hernia repair. A Time Out was held and the above information confirmed.    The patient was placed in the supine position and underwent induction of anesthesia, the lower abdomen and groin was prepped and draped in the standard fashion.  A transverse incision was made. Dissection was carried through the soft tissue to expose the inguinal canal and inguinal ligament along its lower edge. The external oblique fascia was split along the course of its fibers, exposing the inguinal canal. The cord and nerve were looped using a Penrose drain and reflected out of the field. The hernia sac was opened and there were contents noted (incarcerated omentum). It was then amputated and closed using a 3-0 Vicryl suture. The defect was exposed  and a piece of ProGrip mesh was trimmed to size and placed over the defect.  Prolene (0) suture was then used in an interrupted fashion to repair the defect, with a stitch being placed at the pubic tubercle. The mesh was laid inferiorly and superiorly along the canal to a level just beyond the internal ring. The mesh was placed into the canal without nerve entrapment.     The contents were then returned to canal and the external oblique fashion was then closed in an interrupted fashion using 3-0 Vicryl suture taking care not to cause entrapment. Following this, 0.5% Marcaine was used to anesthetize the skin over the mid-portion of the inguinal canal. The wound was closed placing 3-0 Vicryl in the scarpas fascia in an interrupted fashion followed by 4-0 Monocryl to close the skin in an running subcuticular fashion. All sponge, instrument, and needle counts were correct at the termination of the hernia repair procedure.   Sterile dressing were placed.  The patient was successful extubated at transferred to the PACU in stable condition.    Description of the Findings of the Procedure:  Left inguinal hernia repair with mesh    Significant Surgical Tasks Conducted by the Assistant(s), if Applicable: none    Complications: No    Estimated Blood Loss (EBL):10mL           Implants:   Implant Name Type Inv. Item Serial No.  Lot No. LRB No. Used                                   Specimens:   Specimen (12h ago through future)    Start     Ordered    08/04/17 1110  Specimen to Pathology - Surgery  Once     Comments:  1. LEFT INGUINAL HERNIA SAC      08/04/17 1111           Condition: Good    Disposition: PACU - hemodynamically stable.    Attestation: I was present and scrubbed for the entire procedure.

## 2017-08-04 NOTE — INTERVAL H&P NOTE
The patient has been examined and the H&P has been reviewed:    I concur with the findings and no changes have occurred since H&P was written.    Anesthesia/Surgery risks, benefits and alternative options discussed and understood by patient/family.          Active Hospital Problems    Diagnosis  POA    Left inguinal hernia [K40.90]  Yes      Resolved Hospital Problems    Diagnosis Date Resolved POA   No resolved problems to display.

## 2017-08-04 NOTE — ANESTHESIA POSTPROCEDURE EVALUATION
Anesthesia Post Evaluation    Patient: Poonam Alvarez    Procedure(s) Performed: Procedure(s) (LRB):  REPAIR-HERNIA-INGUINAL-INITIAL (5 YRS+) OPEN - 48896 (Left)  INSERTION-IMPLANT,  MESH PLACEMENT-52462 (Left)    Final Anesthesia Type: general  Patient location during evaluation: PACU  Patient participation: Yes- Able to Participate  Level of consciousness: awake and alert  Post-procedure vital signs: reviewed and stable  Pain management: adequate  Airway patency: patent  PONV status at discharge: No PONV  Anesthetic complications: no      Cardiovascular status: blood pressure returned to baseline  Respiratory status: unassisted  Hydration status: euvolemic  Follow-up not needed.        Visit Vitals  /74   Pulse 60   Temp 36.6 °C (97.9 °F) (Oral)   Resp 16   Ht 6' (1.829 m)   Wt 93.9 kg (207 lb)   SpO2 100%   BMI 28.07 kg/m²       Pain/Heber Score: Pain Assessment Performed: Yes (8/4/2017 12:35 PM)  Presence of Pain: denies (8/4/2017 12:35 PM)  Heber Score: 10 (8/4/2017 12:35 PM)

## 2017-08-04 NOTE — OR NURSING
"Pt stated cannot take oxycodone or hydrocodone because when she had her knee surgery both " dropped my pressure." Dr. Willis notified and stated she will write something different for pt to go home with for pain control.   "

## 2017-08-07 ENCOUNTER — TELEPHONE (OUTPATIENT)
Dept: SURGERY | Facility: CLINIC | Age: 67
End: 2017-08-07

## 2017-08-07 NOTE — TELEPHONE ENCOUNTER
Pt reports she would like to come in this Thursday to see Dr. Salinas and be released to start going back to work slowly.  She reports she would like to start doing 3-4 hours of work the following day after 1wk post op appt and slowly increasing her hrs.  She reports that she does not lift/push/pull anything greater than 10 lbs. Says she is a  and talks all day long.    Advised to pt that I will discuss w/ Dr. ANTUNEZ regarding this and get back w/ the pt.

## 2017-08-07 NOTE — TELEPHONE ENCOUNTER
----- Message from Debora uDtta RN sent at 8/7/2017  9:44 AM CDT -----  Good morning,     Ms. Grayson called and would like for you to call her when you get a chance regarding her return to work note.  Her phone number is [phone].      Also, is your phone still forwarded to me?  If so, can I forward it back to you?    Thanks,    Debora

## 2017-08-09 NOTE — TELEPHONE ENCOUNTER
Spoke to pt who would like return to work note and will  tomorrow.     Return to work note done and will leave at  for the pt to .

## 2017-08-16 RX ORDER — HYDROCHLOROTHIAZIDE 12.5 MG/1
TABLET ORAL
Qty: 30 TABLET | Refills: 4 | Status: SHIPPED | OUTPATIENT
Start: 2017-08-16 | End: 2018-01-20 | Stop reason: SDUPTHER

## 2017-08-17 ENCOUNTER — OFFICE VISIT (OUTPATIENT)
Dept: SURGERY | Facility: CLINIC | Age: 67
End: 2017-08-17
Attending: SURGERY
Payer: MEDICARE

## 2017-08-17 VITALS
TEMPERATURE: 99 F | DIASTOLIC BLOOD PRESSURE: 79 MMHG | BODY MASS INDEX: 28.31 KG/M2 | HEART RATE: 81 BPM | SYSTOLIC BLOOD PRESSURE: 113 MMHG | WEIGHT: 209 LBS | HEIGHT: 72 IN

## 2017-08-17 DIAGNOSIS — R19.09 MASS OF LEFT INGUINAL REGION: ICD-10-CM

## 2017-08-17 DIAGNOSIS — Z09 POSTOP CHECK: Primary | ICD-10-CM

## 2017-08-17 DIAGNOSIS — K46.9 HERNIA: ICD-10-CM

## 2017-08-17 PROCEDURE — 99214 OFFICE O/P EST MOD 30 MIN: CPT | Mod: PBBFAC | Performed by: SURGERY

## 2017-08-17 PROCEDURE — 99999 PR PBB SHADOW E&M-EST. PATIENT-LVL IV: CPT | Mod: PBBFAC,,, | Performed by: SURGERY

## 2017-08-17 PROCEDURE — 99024 POSTOP FOLLOW-UP VISIT: CPT | Mod: ,,, | Performed by: SURGERY

## 2017-08-17 NOTE — LETTER
Endocrine/General Surgery  [address] [place] [address]  Phone: [phone]  Fax: [phone] August 17, 2017         ELI Lake NP  [address] [facility] [address]    Patient: Poonam Alvarez   YOB: 1950   Date of Visit: 8/17/2017     Dear Dr. Lake:    I wanted to let you know that I operated on Poonam Alvarez. She underwent an open left inguinal hernia repair with mesh on 8/4/2017.  You will find a copy of my operative report and the final pathology in [facility] for your review. She did well after the surgery and will see me back in the clinic today. I suspect that she will have an uneventful recovery.    If you have any questions or concerns, please contact me. Again, thank you kindly for this referral.    Regards,      Loli Rosario MD      CC  Malinda Negrete, BOAZ  [address] [address]  VIA In Basket     Lindsey Bach MD  [address]  [place] [address]  VIA In Basket

## 2017-08-17 NOTE — PATIENT INSTRUCTIONS
May take Magnesium Citrate intermittently for constipation.      Constipation (Adult)  Constipation means that you have bowel movements that are less frequent than usual. Stools often become very hard and difficult to pass.  Constipation is very common. At some point in life it affects almost everyone. Since everyone's bowel habits are different, what is constipation to one person may not be to another. Your healthcare provider may do tests to diagnose constipation. It depends on what he or she finds when evaluating you.    Symptoms of constipation include:  · Abdominal pain  · Bloating  · Vomiting  · Painful bowel movements  · Itching, swelling, bleeding, or pain around the anus  Causes  Constipation can have many causes. These include:  · Diet low in fiber  · Too much dairy  · Not drinking enough liquids  · Lack of exercise or physical activity. This is especially true for older adults.  · Changes in lifestyle or daily routine, including pregnancy, aging, work, and travel  · Frequent use or misuse of laxatives  · Ignoring the urge to have a bowel movement or delaying it until later  · Medicines, such as certain prescription pain medicines, iron supplements, antacids, certain antidepressants, and calcium supplements  · Diseases like irritable bowel syndrome, bowel obstructions, stroke, diabetes, thyroid disease, Parkinson disease, hemorrhoids, and colon cancer  Complications  Potential complications of constipation can include:  · Hemorrhoids  · Rectal bleeding from hemorrhoids or anal fissures (skin tears)  · Hernias  · Dependency on laxatives  · Chronic constipation  · Fecal impaction  · Bowel obstruction or perforation  Home care  All treatment should be done after talking with your healthcare provider. This is especially true if you have another medical problems, are taking prescription medicines, or are an older adult. Treatment most often involves lifestyle changes. You may also need medicines. Your  healthcare provider will tell you which will work best for you. Follow the advice below to help avoid this problem in the future.  Lifestyle changes  These lifestyle changes can help prevent constipation:  · Diet. Eat a high-fiber diet, with fresh fruit and vegetables, and reduce dairy intake, meats, and processed foods  · Fluids. It's important to get enough fluids each day. Drink plenty of water when you eat more fiber. If you are on diet that limits the amount of fluid you can have, talk about this with your healthcare provider.  · Regular exercise. Check with your healthcare provider first.  Medications  Take any medicines as directed. Some laxatives are safe to use only every now and then. Others can be taken on a regular basis. Talk with your doctor or pharmacist if you have questions.  Prescription pain medicines can cause constipation. If you are taking this kind of medicine, ask your healthcare provider if you should also take a stool softener.  Medicines you may take to treat constipation include:  · Fiber supplements  · Stool softeners  · Laxatives  · Enemas  · Rectal suppositories  Follow-up care  Follow up with your healthcare provider if symptoms don't get better in the next few days. You may need to have more tests or see a specialist.  Call 911  Call 911 if any of these occur:  · Trouble breathing  · Stiff, rigid abdomen that is severely painful to touch  · Confusion  · Fainting or loss of consciousness  · Rapid heart rate  · Chest pain  When to seek medical advice  Call your healthcare provider right away if any of these occur:  · Fever over 100.4°F (38°C)  · Failure to resume normal bowel movements  · Pain in your abdomen or back gets worse  · Nausea or vomiting  · Swelling in your abdomen  · Blood in the stool  · Black, tarry stool  · Involuntary weight loss  · Weakness  Date Last Reviewed: 12/30/2015  © 5379-9969 [employer]. [address] [place] [address], [place] [address]. All rights  reserved. This information is not intended as a substitute for professional medical care. Always follow your healthcare professional's instructions.

## 2017-08-17 NOTE — LETTER
Endocrine/General Surgery  [address] [place] [address]  Phone: [phone]  Fax: [phone] August 17, 2017     Patient: Poonam Alvarez   YOB: 1950   Date of Visit: 8/17/2017       To whom it may concern,    I saw Ms. Alvarez in clinic. Poonam has been under my care since 8/4/2017.    She is clear to return to school/work on 8/11/2017.  She can not lift/pull/push more than 10 pounds until 9/4/2017, though she can currently perform all duties of a  without restriction.     If you have any questions or concerns, please don't hesitate to contact my office. Thank you for accomodating Poonam during this time of her treatment.        Loli Lucas MD

## 2017-08-17 NOTE — PROGRESS NOTES
Subjective:       Poonam Alvarez presents to the clinic 2 weeks following left inguinal hernia repair with mesh. Eating a regular diet without difficulty. Bowel movements are Abnormal- continued constipation present prior to surgery.  The patient is not having any pain.       Objective:      /79 (BP Location: Right arm, Patient Position: Sitting, BP Method: Medium (Automatic))   Pulse 81   Temp 98.5 °F (36.9 °C) (Oral)   Ht 6' (1.829 m)   Wt 94.8 kg (208 lb 15.9 oz)   BMI 28.34 kg/m²     General:  alert, appears stated age and cooperative   Abdomen: soft, bowel sounds active, non-tender   Incision:   healing well, no drainage, no erythema, no hernia, no seroma, mild swelling consistent with healing induration ridge, no dehiscence, incision well approximated       Assessment:      Doing well postoperatively following open LIH repair with mesh.      Plan:      1. Continue any current medications.  We discussed natural and over the counter ways to combat constipation.  2. Wound care discussed.  3. Return to full duty in 6-8 weeks from the date of surgery.  4. Follow up prn.

## 2017-08-21 ENCOUNTER — TELEPHONE (OUTPATIENT)
Dept: PLASTIC SURGERY | Facility: CLINIC | Age: 67
End: 2017-08-21

## 2017-09-05 ENCOUNTER — TELEPHONE (OUTPATIENT)
Dept: SURGERY | Facility: CLINIC | Age: 67
End: 2017-09-05

## 2017-09-05 ENCOUNTER — TELEPHONE (OUTPATIENT)
Dept: OBSTETRICS AND GYNECOLOGY | Facility: CLINIC | Age: 67
End: 2017-09-05

## 2017-09-05 NOTE — TELEPHONE ENCOUNTER
----- Message from Christopher Zendejas sent at 9/5/2017  9:00 AM CDT -----  Contact: Pt   Pt would like a call back from nurse in ref to scheduling annual visit    Can be reached at [phone]

## 2017-09-05 NOTE — TELEPHONE ENCOUNTER
Idalia ANTUNEZ,  Pt is asking for a return to full duty note.  On the last RTW note, you advised pt on restricted duty until 9/4/17. I just wanted to make sure that this still stands and if so, I can do the pts RTW stating as of today, she is on full duty and fax it to the number that she provided. (Deepti at [facility] [phone]).  Thanks! M

## 2017-09-05 NOTE — TELEPHONE ENCOUNTER
----- Message from Christopher Zendejas sent at 9/5/2017  9:01 AM CDT -----  Contact: Pt   Pt would like a call back from staff in ref to note for work    Can be reached at [phone]

## 2017-09-05 NOTE — TELEPHONE ENCOUNTER
Returning patient call. Informed her that the October schedule is not open. Patient is not due for annul until 10/5/17. Patient stated that once opened she would like a Friday appointment after noon if possible. informed patient that I would try my best to work with her schedule and would give her a call once the October schedule is opened. Patient verbalized understanding.

## 2017-09-07 ENCOUNTER — TELEPHONE (OUTPATIENT)
Dept: OBSTETRICS AND GYNECOLOGY | Facility: CLINIC | Age: 67
End: 2017-09-07

## 2017-09-07 NOTE — TELEPHONE ENCOUNTER
Called patient to schedule her for WWE exam on Fri., 11/3 @1pm. Patient verbalized understanding.

## 2017-09-15 ENCOUNTER — OFFICE VISIT (OUTPATIENT)
Dept: ORTHOPEDICS | Facility: CLINIC | Age: 67
End: 2017-09-15
Payer: MEDICARE

## 2017-09-15 DIAGNOSIS — M19.012 PRIMARY OSTEOARTHRITIS OF LEFT SHOULDER: Primary | ICD-10-CM

## 2017-09-15 DIAGNOSIS — M17.12 PRIMARY OSTEOARTHRITIS OF LEFT KNEE: ICD-10-CM

## 2017-09-15 PROCEDURE — 99213 OFFICE O/P EST LOW 20 MIN: CPT | Mod: S$PBB,25,, | Performed by: NURSE PRACTITIONER

## 2017-09-15 PROCEDURE — 99213 OFFICE O/P EST LOW 20 MIN: CPT | Mod: PBBFAC | Performed by: NURSE PRACTITIONER

## 2017-09-15 PROCEDURE — 20610 DRAIN/INJ JOINT/BURSA W/O US: CPT | Mod: PBBFAC | Performed by: NURSE PRACTITIONER

## 2017-09-15 PROCEDURE — 99999 PR PBB SHADOW E&M-EST. PATIENT-LVL III: CPT | Mod: PBBFAC,,, | Performed by: NURSE PRACTITIONER

## 2017-09-15 PROCEDURE — 20610 DRAIN/INJ JOINT/BURSA W/O US: CPT | Mod: S$PBB,LT,, | Performed by: NURSE PRACTITIONER

## 2017-09-15 PROCEDURE — 1159F MED LIST DOCD IN RCRD: CPT | Mod: ,,, | Performed by: NURSE PRACTITIONER

## 2017-09-15 PROCEDURE — 1125F AMNT PAIN NOTED PAIN PRSNT: CPT | Mod: ,,, | Performed by: NURSE PRACTITIONER

## 2017-09-15 PROCEDURE — 1157F ADVNC CARE PLAN IN RCRD: CPT | Mod: ,,, | Performed by: NURSE PRACTITIONER

## 2017-09-15 RX ORDER — TRIAMCINOLONE ACETONIDE 40 MG/ML
40 INJECTION, SUSPENSION INTRA-ARTICULAR; INTRAMUSCULAR
Status: COMPLETED | OUTPATIENT
Start: 2017-09-15 | End: 2017-09-15

## 2017-09-15 RX ADMIN — TRIAMCINOLONE ACETONIDE 40 MG: 40 INJECTION, SUSPENSION INTRA-ARTICULAR; INTRAMUSCULAR at 02:09

## 2017-09-15 NOTE — PROGRESS NOTES
CC: Pain of the Left Shoulder and Pain of the Left Knee      HPI: Pt with left knee pain for the past few weeks. She has known djd and returns for a cortisone injection today. She is not ready for knee replacement at this time. She has a trip to [place] coming up and wants to get pain relief prior to leaving. She also has left shoulder pain which is aching and global. She has severe djd of the left shoulder, but she is not ready to pursue shoulder replacement at this time. She would like to have a cortisone injection in the shoulder today in anticipation of her trip. Her last injections were 6/19/17. She has recently lost a lot of weight through [employer]. She is ambulating without assistive device. There is not a limp.    ROS  General: denies fever and chills  Resp: no c/o sob  CVS: no c/o cp  MSK: c/o left shoulder pain which is aching and global and left knee pain which is global and medial    PE  General: AAOx3, pleasant and cooperative  Resp: respirations even and unlabored  MSK: left knee exam  0 degrees extension  120 degrees flexion  No warmth or erythema   - effusion    Left shoulder exam  + neer  + sifuentes  Limited ROM due to pain    Assessment:  Left shoulder djd  Left knee djd    Plan:  Cortisone injection left shoulder  Cortisone injection left knee  RICE  otc pain relievers prn  F/u as needed    Shoulder Injection Procedure Note    Pre-operative Diagnosis: left shoulder pain    Post-operative Diagnosis: same    Indications: left shoulder pain    Anesthesia: none    Procedure Details     Verbal consent was obtained for the procedure. The injection site was identified and the skin was prepared with alcohol. The left shoulder was injected from a posterior approach with 1 ml of Kenalog and 5 ml Lidocaine under sterile technique using a 22 gauge 1 1/2 inch needle. The needle was removed and the area cleansed and dressed.    Complications:  None; patient tolerated the procedure well.    she was advised  to rest the shoulder today, using ice as needed for comfort and swelling. she did receive immediate relief of the shoulder pain. she was told this would be short lived and is secondary to the lidocaine. she may have an increase in discomfort tonight followed by steady improvement over the next several days. It may take 1-3 weeks following the injection to get the full benefit of the medication.    Knee Injection Procedure Note    Pre-operative Diagnosis: left knee degenerative arthritis    Post-operative Diagnosis: same    Indications: left knee pain    Anesthesia: none    Procedure Details     Verbal consent was obtained for the procedure. The injection site was identified and the skin was prepared with alcohol. The left knee was injected from an anterolateral approach with 1 ml of Kenalog and 5 ml Lidocaine under sterile technique using a 22 gauge needle. The needle was removed and the area cleansed and dressed.    Complications:  None; patient tolerated the procedure well.    she was advised to rest the knee today, using ice and elevation as needed for comfort and swelling. she did receive immediate relief of the knee pain. she was told this would be short lived and is secondary to the lidocaine. she may have an increase in discomfort tonight followed by steady improvement over the next several days. It may take 1-3 weeks following the injection to get the full benefit of the medication.

## 2017-09-18 ENCOUNTER — LAB VISIT (OUTPATIENT)
Dept: LAB | Facility: HOSPITAL | Age: 67
End: 2017-09-18
Attending: INTERNAL MEDICINE
Payer: MEDICARE

## 2017-09-18 ENCOUNTER — OFFICE VISIT (OUTPATIENT)
Dept: INTERNAL MEDICINE | Facility: CLINIC | Age: 67
End: 2017-09-18
Payer: MEDICARE

## 2017-09-18 ENCOUNTER — IMMUNIZATION (OUTPATIENT)
Dept: INTERNAL MEDICINE | Facility: CLINIC | Age: 67
End: 2017-09-18
Payer: MEDICARE

## 2017-09-18 VITALS
BODY MASS INDEX: 28.24 KG/M2 | WEIGHT: 208.5 LBS | HEART RATE: 76 BPM | HEIGHT: 72 IN | SYSTOLIC BLOOD PRESSURE: 105 MMHG | DIASTOLIC BLOOD PRESSURE: 69 MMHG

## 2017-09-18 DIAGNOSIS — I10 ESSENTIAL HYPERTENSION: ICD-10-CM

## 2017-09-18 DIAGNOSIS — M25.561 RIGHT KNEE PAIN, UNSPECIFIED CHRONICITY: ICD-10-CM

## 2017-09-18 DIAGNOSIS — I10 ESSENTIAL HYPERTENSION: Primary | ICD-10-CM

## 2017-09-18 DIAGNOSIS — E89.0 HYPOTHYROIDISM, POSTABLATIVE: ICD-10-CM

## 2017-09-18 DIAGNOSIS — K21.9 GASTROESOPHAGEAL REFLUX DISEASE WITHOUT ESOPHAGITIS: ICD-10-CM

## 2017-09-18 LAB
ALBUMIN SERPL BCP-MCNC: 3.8 G/DL
ALP SERPL-CCNC: 79 U/L
ALT SERPL W/O P-5'-P-CCNC: 20 U/L
ANION GAP SERPL CALC-SCNC: 8 MMOL/L
AST SERPL-CCNC: 21 U/L
BASOPHILS # BLD AUTO: 0.01 K/UL
BASOPHILS NFR BLD: 0.2 %
BILIRUB SERPL-MCNC: 0.3 MG/DL
BUN SERPL-MCNC: 18 MG/DL
CALCIUM SERPL-MCNC: 9.8 MG/DL
CHLORIDE SERPL-SCNC: 105 MMOL/L
CHOLEST SERPL-MCNC: 232 MG/DL
CHOLEST/HDLC SERPL: 3.5 {RATIO}
CO2 SERPL-SCNC: 26 MMOL/L
CREAT SERPL-MCNC: 0.8 MG/DL
DIFFERENTIAL METHOD: ABNORMAL
EOSINOPHIL # BLD AUTO: 0 K/UL
EOSINOPHIL NFR BLD: 0 %
ERYTHROCYTE [DISTWIDTH] IN BLOOD BY AUTOMATED COUNT: 17.3 %
EST. GFR  (AFRICAN AMERICAN): >60 ML/MIN/1.73 M^2
EST. GFR  (NON AFRICAN AMERICAN): >60 ML/MIN/1.73 M^2
GLUCOSE SERPL-MCNC: 90 MG/DL
HCT VFR BLD AUTO: 35.8 %
HDLC SERPL-MCNC: 66 MG/DL
HDLC SERPL: 28.4 %
HGB BLD-MCNC: 11.7 G/DL
LDLC SERPL CALC-MCNC: 147.4 MG/DL
LYMPHOCYTES # BLD AUTO: 1.6 K/UL
LYMPHOCYTES NFR BLD: 27.1 %
MCH RBC QN AUTO: 26.4 PG
MCHC RBC AUTO-ENTMCNC: 32.7 G/DL
MCV RBC AUTO: 81 FL
MONOCYTES # BLD AUTO: 0.5 K/UL
MONOCYTES NFR BLD: 7.9 %
NEUTROPHILS # BLD AUTO: 3.8 K/UL
NEUTROPHILS NFR BLD: 64.6 %
NONHDLC SERPL-MCNC: 166 MG/DL
PLATELET # BLD AUTO: 291 K/UL
PMV BLD AUTO: 11.3 FL
POTASSIUM SERPL-SCNC: 3.9 MMOL/L
PROT SERPL-MCNC: 7.6 G/DL
RBC # BLD AUTO: 4.43 M/UL
SODIUM SERPL-SCNC: 139 MMOL/L
TRIGL SERPL-MCNC: 93 MG/DL
TSH SERPL DL<=0.005 MIU/L-ACNC: 1.75 UIU/ML
WBC # BLD AUTO: 5.83 K/UL

## 2017-09-18 PROCEDURE — 1157F ADVNC CARE PLAN IN RCRD: CPT | Mod: ,,, | Performed by: INTERNAL MEDICINE

## 2017-09-18 PROCEDURE — 99214 OFFICE O/P EST MOD 30 MIN: CPT | Mod: S$PBB,,, | Performed by: INTERNAL MEDICINE

## 2017-09-18 PROCEDURE — 1126F AMNT PAIN NOTED NONE PRSNT: CPT | Mod: ,,, | Performed by: INTERNAL MEDICINE

## 2017-09-18 PROCEDURE — 36415 COLL VENOUS BLD VENIPUNCTURE: CPT

## 2017-09-18 PROCEDURE — 85025 COMPLETE CBC W/AUTO DIFF WBC: CPT

## 2017-09-18 PROCEDURE — 99213 OFFICE O/P EST LOW 20 MIN: CPT | Mod: PBBFAC | Performed by: INTERNAL MEDICINE

## 2017-09-18 PROCEDURE — 3074F SYST BP LT 130 MM HG: CPT | Mod: ,,, | Performed by: INTERNAL MEDICINE

## 2017-09-18 PROCEDURE — 1159F MED LIST DOCD IN RCRD: CPT | Mod: ,,, | Performed by: INTERNAL MEDICINE

## 2017-09-18 PROCEDURE — 80061 LIPID PANEL: CPT

## 2017-09-18 PROCEDURE — 99999 PR PBB SHADOW E&M-EST. PATIENT-LVL III: CPT | Mod: PBBFAC,,, | Performed by: INTERNAL MEDICINE

## 2017-09-18 PROCEDURE — 3078F DIAST BP <80 MM HG: CPT | Mod: ,,, | Performed by: INTERNAL MEDICINE

## 2017-09-18 PROCEDURE — 80053 COMPREHEN METABOLIC PANEL: CPT

## 2017-09-18 PROCEDURE — 84443 ASSAY THYROID STIM HORMONE: CPT

## 2017-09-18 PROCEDURE — G0008 ADMIN INFLUENZA VIRUS VAC: HCPCS | Mod: PBBFAC

## 2017-09-18 NOTE — PROGRESS NOTES
CHIEF COMPLAINT: Follow up of hypertension, arthritis, positive ppd      HISTORY OF PRESENT ILLNESS: This is a 66-year-old woman who presents for follow up of above. BP has been doing well.  She is taking HCTZ 12.5 mg daily for edema.  No swelling.    Weight is good.  She lost 34 pounds on ideal protein and is maintaining her weight.     She had an injection in her left knee and right shoulder on 9/15/17 and her joints are doing ebtter.     She is taking mobic 15 mg on Monday, 1/2 tablet on Wednesday and 1 tablet on Friday (due to stomach issues). Stomach is ok with 3 times a week. She has not needed ranitidine as needed. .      No nausea, vomiting, constipation, diarrhea. No heartburn.       She continues to take Levoxyl 137 mcg daily for hypothyroidism.       She has a history of positive PPD 2010 - took INH for 9 months, CXR  was fine. NO fever, chills, night sweats, weight loss.       Her  (she is  from him)  of lung cancer in 2015      She takes stool softner - dulcolax and metamucil daily to keep her bowels regular.     Since our last visit, she had an incarcerated inguinal hernia repaired on 17      REVIEW OF SYSTEMS: She denies fevers, chills, night sweats, fatigue, visual change, hearing loss, sinus congestion, sore throat, chest pain, shortness of breath, nausea, vomiting, constipation, diarrhea, dysuria, hematuria, polydipsia, polyuria, joint pain, muscle pain, headaches, anxiety, depression, insomnia.           PAST MEDICAL HISTORY:   1. Atrial flutter, status post ablation in 2008.   2. Graves disease, status post radioactive iodine, now hypothyroid.   3. Hypertension.   4. History of headaches.   5. Osteoarthritis of the shoulders, hands and knees.   6. Status post arthroscopic surgery of the right knee 2008.   7. Status post arthroscopic surgery of the right knee in .   8. Tummy tuck 20 years ago.   9. Hemorrhoids removed.   10. Cataract  removal bilaterally  11. Positive PPD diagnosed , completed 9 months of INH 1/10  12. GERD     SOCIAL HISTORY: She does not smoke. She drinks alcohol twice a year.   She is , has three children. She is a .     FAMILY HISTORY: Father  at age 83 from complications of pneumonia.   Mother  at age 98 from old age. Sister had a pulmonary embolus, another   sister had sarcoidosis. A son has Crohn's disease.        MEDICATIONS AND ALLERGIES: Updated in EPIC.       PHYSICAL EXAMINATION:    /69 (BP Location: Right arm, Patient Position: Sitting, BP Method: Medium (Manual))   Pulse 76   Ht 6' (1.829 m)   Wt 94.6 kg (208 lb 8 oz)   BMI 28.28 kg/m²     GENERAL: She is alert, oriented, no apparent distress. Affect within normal limits.   Conjunctiva anicteric. Tympanic membranes clear. Oropharynx clear.   NECK: Supple.   RESPIRATORY: Effort normal. Lungs are clear to auscultation.   HEART: Regular rate and rhythm without murmurs, gallops or rubs.   No lower extremity edema.              ASSESSMENT AND PLAN:   1. HTN- doing well   2. OA knees - s/p injection in left knee - s/p right knee replacement - doing well.  2. ALlergic rhinitis -stable   3. Hypothyroidism - tsh   5. Screening - mammogram . Bone density was normal 2008. Colonoscopy due 2015 - normal due   6. GERD - asx  7. Obestiy - doing well with diet, exercise and weight loss.   8. Positive ppd - cxr stable  9. Left inguinal hernia repair - doing well  I will see her back in 6 months, sooner if problems arise.   Pneumovax   Prevnar 11/15  Labs

## 2017-10-09 ENCOUNTER — TELEPHONE (OUTPATIENT)
Dept: INTERNAL MEDICINE | Facility: CLINIC | Age: 67
End: 2017-10-09

## 2017-10-09 NOTE — TELEPHONE ENCOUNTER
----- Message from Kristen Cornell sent at 10/9/2017  2:53 PM CDT -----  Contact: Patient [phone]  Patient needs a letter mailed to her stating that she had the flu shot.    Please call and advise.    Thank You

## 2017-10-16 RX ORDER — LEVOTHYROXINE SODIUM 137 UG/1
TABLET ORAL
Qty: 90 TABLET | Refills: 4 | Status: SHIPPED | OUTPATIENT
Start: 2017-10-16 | End: 2017-11-24

## 2017-11-06 ENCOUNTER — TELEPHONE (OUTPATIENT)
Dept: INTERNAL MEDICINE | Facility: CLINIC | Age: 67
End: 2017-11-06

## 2017-11-06 DIAGNOSIS — E78.5 HYPERLIPIDEMIA, UNSPECIFIED HYPERLIPIDEMIA TYPE: Primary | ICD-10-CM

## 2017-11-06 NOTE — TELEPHONE ENCOUNTER
I have ordered the cardiac calcium score - it is likely not covered under her insurance. She will need to find out the cost. Transfer her to the financial reps

## 2017-11-06 NOTE — TELEPHONE ENCOUNTER
Pt states that she isn't having an issue however she wants to have a cardiac calium scoring test done. See below

## 2017-11-06 NOTE — TELEPHONE ENCOUNTER
----- Message from Nikita Ravi sent at 11/6/2017  9:26 AM CST -----  Contact: self [phone]   Patient would like a call back from the office to discuss getting an order for cardiac calcium score test . please advise, Thanks !

## 2017-11-24 ENCOUNTER — OFFICE VISIT (OUTPATIENT)
Dept: OBSTETRICS AND GYNECOLOGY | Facility: CLINIC | Age: 67
End: 2017-11-24
Payer: MEDICARE

## 2017-11-24 VITALS — DIASTOLIC BLOOD PRESSURE: 80 MMHG | HEIGHT: 72 IN | SYSTOLIC BLOOD PRESSURE: 100 MMHG

## 2017-11-24 DIAGNOSIS — Z01.419 WOMEN'S ANNUAL ROUTINE GYNECOLOGICAL EXAMINATION: Primary | ICD-10-CM

## 2017-11-24 DIAGNOSIS — Z11.51 SCREENING FOR HPV (HUMAN PAPILLOMAVIRUS): ICD-10-CM

## 2017-11-24 DIAGNOSIS — Z12.31 ENCOUNTER FOR SCREENING MAMMOGRAM FOR BREAST CANCER: ICD-10-CM

## 2017-11-24 DIAGNOSIS — Z12.4 ENCOUNTER FOR PAPANICOLAOU SMEAR FOR CERVICAL CANCER SCREENING: ICD-10-CM

## 2017-11-24 PROCEDURE — G0101 CA SCREEN;PELVIC/BREAST EXAM: HCPCS | Mod: PBBFAC | Performed by: NURSE PRACTITIONER

## 2017-11-24 PROCEDURE — 87624 HPV HI-RISK TYP POOLED RSLT: CPT

## 2017-11-24 PROCEDURE — 88175 CYTOPATH C/V AUTO FLUID REDO: CPT | Performed by: PATHOLOGY

## 2017-11-24 PROCEDURE — 88141 CYTOPATH C/V INTERPRET: CPT | Mod: ,,, | Performed by: PATHOLOGY

## 2017-11-24 PROCEDURE — 99213 OFFICE O/P EST LOW 20 MIN: CPT | Mod: PBBFAC | Performed by: NURSE PRACTITIONER

## 2017-11-24 PROCEDURE — G0101 CA SCREEN;PELVIC/BREAST EXAM: HCPCS | Mod: S$PBB,,, | Performed by: NURSE PRACTITIONER

## 2017-11-24 PROCEDURE — 99999 PR PBB SHADOW E&M-EST. PATIENT-LVL III: CPT | Mod: PBBFAC,,, | Performed by: NURSE PRACTITIONER

## 2017-11-24 NOTE — PROGRESS NOTES
CC: Annual  HPI: Pt is a 66 y.o.  female who presents for routine annual exam. She is postmenopausal.  Denies any GYN complaints.  Denies any episodes of postmenopausal bleeding. Pt with normal screening mammogram in 2/2017.    ROS:  GENERAL: Feeling well overall. Denies fever or chills.   SKIN: Denies rash or lesions.   HEAD: Denies head injury or headache.   NODES: Denies enlarged lymph nodes.   CHEST: Denies chest pain or shortness of breath.   CARDIOVASCULAR: Denies palpitations or left sided chest pain.   ABDOMEN: No abdominal pain, constipation, diarrhea, nausea, vomiting or rectal bleeding.   URINARY: No dysuria, hematuria, or burning on urination.  REPRODUCTIVE: See HPI.   BREASTS: Denies pain, lumps, or nipple discharge.   HEMATOLOGIC: No easy bruisability or excessive bleeding.   MUSCULOSKELETAL: Denies joint pain or swelling.   NEUROLOGIC: Denies syncope or weakness.   PSYCHIATRIC: Denies depression, anxiety or mood swings.    PE:   APPEARANCE: Well nourished, well developed, Black or  female in no acute distress.  NODES: no cervical, supraclavicular, or inguinal lymphadenopathy  BREASTS: Symmetrical, no skin changes or visible lesions. No palpable masses, nipple discharge or adenopathy bilaterally.  ABDOMEN: Soft. No tenderness or masses. No distention. No hernias palpated. No CVA tenderness.  VULVA: No lesions. Normal external female genitalia.  URETHRAL MEATUS: Normal size and location, no lesions, no prolapse.  URETHRA: No masses, tenderness, or prolapse.  VAGINA: . Atrophic. No lesions or lacerations noted. No abnormal discharge present. No odor present.   CERVIX:  No lesions or discharge. No cervical motion tenderness.   UTERUS: Normal size, regular shape, mobile, non-tender.  ADNEXA: No tenderness. No fullness or masses palpated in the adnexal regions.   ANUS PERINEUM: Normal.      Diagnosis:  1. Women's annual routine gynecological examination    2. Screening for HPV (human  papillomavirus)    3. Encounter for Papanicolaou smear for cervical cancer screening    4. Encounter for screening mammogram for breast cancer        Plan:   Pap  HPV cotest  Mammo after 2/10/18    Orders Placed This Encounter    HPV High Risk Genotypes, PCR    Mammo Digital Screening Bilat with Tomosynthesis CAD    Liquid-based pap smear, screening       Patient was counseled today on the new ACS guidelines for cervical cytology screening as well as the current recommendations for breast cancer screening. She was counseled to follow up with her PCP for other routine health maintenance. Counseling session lasted approximately 10 minutes, and all her questions were answered.    Follow-up with me in 1 year for routine exam    Malinda Negrete, JOSE-C

## 2017-11-29 LAB
HPV16 AG SPEC QL: NEGATIVE
HPV16+18+H RISK 12 DNA CVX-IMP: NEGATIVE
HPV18 DNA SPEC QL NAA+PROBE: NEGATIVE

## 2017-11-30 ENCOUNTER — TELEPHONE (OUTPATIENT)
Dept: OBSTETRICS AND GYNECOLOGY | Facility: CLINIC | Age: 67
End: 2017-11-30

## 2017-11-30 NOTE — TELEPHONE ENCOUNTER
Called pt to discuss her pap smear was not able to be processed due to scant cellularity.  HPV co-test was negative.  Discussed since she is 66 y.o. With no h.o abnormal pap and negative co-test no need for a repeat pap smear.  Pt desires a repeat pap in 1 year.   Will do then.  Pt verbalized understanding.

## 2017-11-30 NOTE — TELEPHONE ENCOUNTER
----- Message from Ying Mustafa MA sent at 11/30/2017  2:42 PM CST -----  Contact: ROSALINDA FARIAS [[id number]]      ----- Message -----  From: Debora Andres  Sent: 11/30/2017   2:24 PM  To: [facility] Staff    x_  1st Request  _  2nd Request  _  3rd Request        Who: ROSALINDA FARIAS [[id number]]    Why: Requesting a call back in regards to a call from the office she stated that she will have her phone near her and will be waiting for the call.     What Number to Call Back: [phone]    When to Expect a call back: (Within 24 hours)    Please return the call at earliest convenience. Thanks!

## 2018-01-12 ENCOUNTER — HOSPITAL ENCOUNTER (OUTPATIENT)
Dept: RADIOLOGY | Facility: OTHER | Age: 68
Discharge: HOME | End: 2018-01-12
Attending: INTERNAL MEDICINE
Payer: MEDICARE

## 2018-01-12 DIAGNOSIS — E78.5 HYPERLIPIDEMIA, UNSPECIFIED HYPERLIPIDEMIA TYPE: ICD-10-CM

## 2018-01-12 PROCEDURE — 75571 CT HRT W/O DYE W/CA TEST: CPT | Mod: 26,,, | Performed by: INTERNAL MEDICINE

## 2018-01-12 PROCEDURE — 75571 CT HRT W/O DYE W/CA TEST: CPT | Mod: TC

## 2018-01-21 RX ORDER — HYDROCHLOROTHIAZIDE 12.5 MG/1
TABLET ORAL
Qty: 30 TABLET | Refills: 6 | Status: SHIPPED | OUTPATIENT
Start: 2018-01-21 | End: 2018-05-01 | Stop reason: SDUPTHER

## 2018-02-05 ENCOUNTER — TELEPHONE (OUTPATIENT)
Dept: ORTHOPEDICS | Facility: CLINIC | Age: 68
End: 2018-02-05

## 2018-02-05 NOTE — TELEPHONE ENCOUNTER
Spoke with  and she explained to me that she is having severe pain in right knee, she also stated that  did her R knee replacement in 2014 and she does not understand why she's experiencing this pain. I informed  that I could get her in sooner than the 16th but it would be in the primary care location for [facility]. The pt verbalized understanding and accepted the appt. She was scheduled for 6:15pm on Thursday 2/8/18.

## 2018-02-08 ENCOUNTER — TELEPHONE (OUTPATIENT)
Dept: ORTHOPEDICS | Facility: CLINIC | Age: 68
End: 2018-02-08

## 2018-02-08 ENCOUNTER — OFFICE VISIT (OUTPATIENT)
Dept: ORTHOPEDICS | Facility: CLINIC | Age: 68
End: 2018-02-08
Payer: MEDICARE

## 2018-02-08 VITALS — HEIGHT: 72 IN

## 2018-02-08 DIAGNOSIS — B02.9 HERPES ZOSTER WITHOUT COMPLICATION: Primary | ICD-10-CM

## 2018-02-08 PROCEDURE — 1125F AMNT PAIN NOTED PAIN PRSNT: CPT | Mod: ,,, | Performed by: NURSE PRACTITIONER

## 2018-02-08 PROCEDURE — 99213 OFFICE O/P EST LOW 20 MIN: CPT | Mod: S$PBB,,, | Performed by: NURSE PRACTITIONER

## 2018-02-08 PROCEDURE — 1159F MED LIST DOCD IN RCRD: CPT | Mod: ,,, | Performed by: NURSE PRACTITIONER

## 2018-02-08 PROCEDURE — 99213 OFFICE O/P EST LOW 20 MIN: CPT | Mod: PBBFAC | Performed by: NURSE PRACTITIONER

## 2018-02-08 PROCEDURE — 99999 PR PBB SHADOW E&M-EST. PATIENT-LVL III: CPT | Mod: PBBFAC,,, | Performed by: NURSE PRACTITIONER

## 2018-02-08 RX ORDER — VALACYCLOVIR HYDROCHLORIDE 1 G/1
1000 TABLET, FILM COATED ORAL 3 TIMES DAILY
Qty: 30 TABLET | Refills: 0 | Status: SHIPPED | OUTPATIENT
Start: 2018-02-08 | End: 2018-10-30

## 2018-02-08 NOTE — TELEPHONE ENCOUNTER
Called to confirm appointment for this evening in [facility]. Received no answer.

## 2018-02-09 NOTE — PROGRESS NOTES
"CC: Pain and Rash of the Right Knee      HPI: Pt with right leg pain from mid thigh to the ankle. The pain started spontaneously on Tuesday. The pain was aching and throbbing. She has tried using "arthritis cream" and that helped a lot, but yesterday she noticed a new area to the lower/inner leg with 2 clusters of blisters. The area is itching, but she has not scratched it. She has a right knee replacement and has not had any problems with her knee since surgery. 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 She is ambulating without assistive device. There is not a limp.    ROS  General: denies fever and chills  Resp: no c/o sob  CVS: no c/o cp  MSK: c/o right leg pain with new onset blisters to the lower leg    PE  General: AAOx3, pleasant and cooperative  Resp: respirations even and unlabored  MSK: right knee exam  0 degrees extension  120 degrees flexion  No warmth or erythema   - effusion    Assessment:  Right leg shingles    Plan:  Discussed with Dr. Julia Cabrera by phone and will treat with valtrex 1gm po tid for 10 days. If no improvement by Monday, she will come in for further evaluation  "

## 2018-02-16 ENCOUNTER — TELEPHONE (OUTPATIENT)
Dept: OBSTETRICS AND GYNECOLOGY | Facility: CLINIC | Age: 68
End: 2018-02-16

## 2018-02-16 ENCOUNTER — HOSPITAL ENCOUNTER (OUTPATIENT)
Dept: RADIOLOGY | Facility: HOSPITAL | Age: 68
Discharge: HOME | End: 2018-02-16
Attending: NURSE PRACTITIONER
Payer: MEDICARE

## 2018-02-16 DIAGNOSIS — Z12.31 ENCOUNTER FOR SCREENING MAMMOGRAM FOR BREAST CANCER: ICD-10-CM

## 2018-02-16 PROCEDURE — 77067 SCR MAMMO BI INCL CAD: CPT | Mod: TC

## 2018-02-16 PROCEDURE — 77063 BREAST TOMOSYNTHESIS BI: CPT | Mod: 26,,, | Performed by: RADIOLOGY

## 2018-02-16 PROCEDURE — 77067 SCR MAMMO BI INCL CAD: CPT | Mod: 26,,, | Performed by: RADIOLOGY

## 2018-02-16 NOTE — TELEPHONE ENCOUNTER
Patient notified of results. Verbalized understanding.           Please notify pt her recent mammogram was normal.

## 2018-03-09 ENCOUNTER — TELEPHONE (OUTPATIENT)
Dept: ORTHOPEDICS | Facility: CLINIC | Age: 68
End: 2018-03-09

## 2018-03-09 NOTE — TELEPHONE ENCOUNTER
Spoke with  and we scheduled her to come in on 3/15 at 3:15. I told pt it was ok to come in from 11 to 1pm. I did inform her that we go to lunch at 12 so if she were to come at that time she would have to wait until 1 and she verbalized understanding.

## 2018-03-09 NOTE — TELEPHONE ENCOUNTER
----- Message from Alina James sent at 3/8/2018  4:36 PM CST -----  Contact: self/home   Pt is having rt shoulder pain and would like to schedule an appt for a cortisone inj next week if possible. Pt can be reached at [phone].

## 2018-03-15 ENCOUNTER — OFFICE VISIT (OUTPATIENT)
Dept: ORTHOPEDICS | Facility: CLINIC | Age: 68
End: 2018-03-15
Payer: MEDICARE

## 2018-03-15 DIAGNOSIS — M17.12 PRIMARY OSTEOARTHRITIS OF LEFT KNEE: Primary | ICD-10-CM

## 2018-03-15 DIAGNOSIS — M19.011 PRIMARY OSTEOARTHRITIS OF RIGHT SHOULDER: ICD-10-CM

## 2018-03-15 PROCEDURE — 99213 OFFICE O/P EST LOW 20 MIN: CPT | Mod: 25,S$PBB,, | Performed by: NURSE PRACTITIONER

## 2018-03-15 PROCEDURE — 99213 OFFICE O/P EST LOW 20 MIN: CPT | Mod: PBBFAC | Performed by: NURSE PRACTITIONER

## 2018-03-15 PROCEDURE — 20610 DRAIN/INJ JOINT/BURSA W/O US: CPT | Mod: PBBFAC | Performed by: NURSE PRACTITIONER

## 2018-03-15 PROCEDURE — 99999 PR PBB SHADOW E&M-EST. PATIENT-LVL III: CPT | Mod: PBBFAC,,, | Performed by: NURSE PRACTITIONER

## 2018-03-15 PROCEDURE — 20610 DRAIN/INJ JOINT/BURSA W/O US: CPT | Mod: S$PBB,RT,, | Performed by: NURSE PRACTITIONER

## 2018-03-15 RX ORDER — TRIAMCINOLONE ACETONIDE 40 MG/ML
40 INJECTION, SUSPENSION INTRA-ARTICULAR; INTRAMUSCULAR
Status: COMPLETED | OUTPATIENT
Start: 2018-03-15 | End: 2018-03-15

## 2018-03-15 RX ADMIN — TRIAMCINOLONE ACETONIDE 40 MG: 40 INJECTION, SUSPENSION INTRA-ARTICULAR; INTRAMUSCULAR at 04:03

## 2018-03-15 NOTE — PROGRESS NOTES
CC: Pain of the Right Shoulder and Pain of the Left Knee      HPI: Pt with right shoulder pain for the past several weeks. She has known djd of the shoulder, but has been putting off shoulder replacement surgery due to other obligations. She has had cortisone injections in the past with good relief and would like to repeat that today. She also has known left knee djd for which she has had cortisone injections with good relief in the past. The pain is aching and global and worse with increased activity. She would like to repeat cortisone injections today. Her last injections were in September. She is ambulating without assistive device. There is not a limp.    ROS  General: denies fever and chills  Resp: no c/o sob  CVS: no c/o cp  MSK: c/o right shoulder pain which is aching and global with radiation to the upper arm. The pain is worse at night. 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    Assessment:  Right shoulder djd  Left knee djd    Plan:  Cortisone injection right shoulder today  Cortisone injection left knee today  RICE  nsaids prn  F/u as needed    Shoulder Injection Procedure Note    Pre-operative Diagnosis: right shoulder pain    Post-operative Diagnosis: same    Indications: right shoulder pain    Anesthesia: none    Procedure Details     Verbal consent was obtained for the procedure. The injection site was identified and the skin was prepared with alcohol. The right shoulder was injected from a posterior approach with 1 ml of Kenalog and 5 ml Lidocaine under sterile technique using a 22 gauge  1 1/2 inch needle. The needle was removed and the area cleansed and dressed.    Complications:  None; patient tolerated the procedure well.    she was advised to rest the shoulder today, using ice as needed for comfort and swelling. she did receive immediate relief of the shoulder pain. she was told this would be short lived and is secondary to the lidocaine. she may have an increase in discomfort tonight followed by steady improvement over the next several days. It may take 1-3 weeks following the injection to get the full benefit of the medication.    Knee Injection Procedure Note    Pre-operative Diagnosis: left knee degenerative arthritis    Post-operative Diagnosis: same    Indications: left knee pain    Anesthesia: none    Procedure Details     Verbal consent was obtained for the procedure. The injection site was identified and the skin was prepared with alcohol. The left knee was injected from an anterolateral approach with 1 ml of Kenalog and 5 ml Lidocaine under sterile technique using a 22 gauge needle. The needle was removed and the area cleansed and dressed.    Complications:  None; patient tolerated the procedure well.    she was advised to rest the knee today, using ice and elevation as needed for comfort and swelling. she did receive immediate relief of the knee pain. she was told this would be short lived and is secondary to the lidocaine. she may have an increase in discomfort tonight followed by steady improvement over the next several days. It may take 1-3 weeks following the injection to get the full benefit of the medication.

## 2018-05-01 RX ORDER — HYDROCHLOROTHIAZIDE 12.5 MG/1
TABLET ORAL
Qty: 90 TABLET | Refills: 4 | Status: SHIPPED | OUTPATIENT
Start: 2018-05-01 | End: 2018-10-30 | Stop reason: SDUPTHER

## 2018-06-05 ENCOUNTER — TELEPHONE (OUTPATIENT)
Dept: ORTHOPEDICS | Facility: CLINIC | Age: 68
End: 2018-06-05

## 2018-06-05 NOTE — TELEPHONE ENCOUNTER
Spoke with  and the pt stated that she was interested in having another CSI in her left knee and R shoulder. I offered her an appt for Friday 6/15 at 11:15 which would put her a her 3 month jairon to have another, but the time was too early and the pt wanted something later. I then offered her an appt for Tuesday 6/19 at 3:45pm and  accepted.

## 2018-06-05 NOTE — TELEPHONE ENCOUNTER
----- Message from Bert Marino sent at 6/5/2018  9:10 AM CDT -----  Contact: Pt   Pt would like to be called back regarding scheduling injection.     Pt can be reached at [phone].

## 2018-06-19 ENCOUNTER — OFFICE VISIT (OUTPATIENT)
Dept: ORTHOPEDICS | Facility: CLINIC | Age: 68
End: 2018-06-19
Payer: MEDICARE

## 2018-06-19 DIAGNOSIS — M19.011 PRIMARY OSTEOARTHRITIS OF RIGHT SHOULDER: Primary | ICD-10-CM

## 2018-06-19 DIAGNOSIS — M17.12 PRIMARY OSTEOARTHRITIS OF LEFT KNEE: ICD-10-CM

## 2018-06-19 PROCEDURE — 99499 UNLISTED E&M SERVICE: CPT | Mod: S$PBB,,, | Performed by: NURSE PRACTITIONER

## 2018-06-19 PROCEDURE — 20610 DRAIN/INJ JOINT/BURSA W/O US: CPT | Mod: PBBFAC | Performed by: NURSE PRACTITIONER

## 2018-06-19 PROCEDURE — 99999 PR PBB SHADOW E&M-EST. PATIENT-LVL III: CPT | Mod: PBBFAC,,, | Performed by: NURSE PRACTITIONER

## 2018-06-19 PROCEDURE — 99213 OFFICE O/P EST LOW 20 MIN: CPT | Mod: PBBFAC,25 | Performed by: NURSE PRACTITIONER

## 2018-06-19 PROCEDURE — 20610 DRAIN/INJ JOINT/BURSA W/O US: CPT | Mod: S$PBB,RT,, | Performed by: NURSE PRACTITIONER

## 2018-06-19 RX ORDER — TRIAMCINOLONE ACETONIDE 40 MG/ML
40 INJECTION, SUSPENSION INTRA-ARTICULAR; INTRAMUSCULAR
Status: COMPLETED | OUTPATIENT
Start: 2018-06-19 | End: 2018-06-19

## 2018-06-19 RX ADMIN — TRIAMCINOLONE ACETONIDE 40 MG: 40 INJECTION, SUSPENSION INTRA-ARTICULAR; INTRAMUSCULAR at 05:06

## 2018-06-19 NOTE — PROGRESS NOTES
Pt with known djd of the right shoulder and left knee. She returns today for cortisone injections. She was last seen in March. She is going to consider shoulder surgery at the end of the year. The knee isn't very painful and she would prefer to continue having injections until she takes care of the shoulder.    Knee Injection Procedure Note    Diagnosis: left knee degenerative arthritis  Indications: left knee pain  Procedure Details: Verbal consent was obtained for the procedure. The injection site was identified and the skin was prepared with alcohol. The left knee was injected from an anterolateral approach with 1 ml of Kenalog and 4 ml Lidocaine under sterile technique using a 22 gauge needle. The needle was removed and the area cleansed and dressed.  Complications:  Patient tolerated the procedure well.    she was advised to rest the knee today, using ice and elevation as needed for comfort and swelling.Immediate relief of the knee pain may be short lived and secondary to the lidocaine. she may have an increase in discomfort tonight followed by steady improvement over the next several days. It may take 1-2 weeks following the injection to get the full benefit of the medication.    Shoulder Injection Procedure Note    Pre-operative Diagnosis: right shoulder pain    Post-operative Diagnosis: same    Indications: right shoulder pain    Anesthesia: none    Procedure Details     Verbal consent was obtained for the procedure. The injection site was identified and the skin was prepared with alcohol. The right shoulder was injected from a posterior approach with 1 ml of Kenalog and 5 ml Lidocaine under sterile technique using a 22 gauge 1 1/2 inch needle. The needle was removed and the area cleansed and dressed.    Complications:  None; patient tolerated the procedure well.    she was advised to rest the shoulder today, using ice as needed for comfort and swelling. she did receive immediate relief of the shoulder pain.  she was told this would be short lived and is secondary to the lidocaine. she may have an increase in discomfort tonight followed by steady improvement over the next several days. It may take 1-3 weeks following the injection to get the full benefit of the medication.

## 2018-07-30 ENCOUNTER — TELEPHONE (OUTPATIENT)
Dept: INTERNAL MEDICINE | Facility: CLINIC | Age: 68
End: 2018-07-30

## 2018-07-30 ENCOUNTER — TELEPHONE (OUTPATIENT)
Dept: PODIATRY | Facility: CLINIC | Age: 68
End: 2018-07-30

## 2018-07-30 NOTE — TELEPHONE ENCOUNTER
----- Message from Ying Alvarez sent at 7/30/2018  8:10 AM CDT -----  Contact: self  Patient Requesting Sooner Appointment.     Reason for sooner appt.: right foot sharp pain   When is the first available appointment?n/a   Communication Preference:[phone]  Additional Information:

## 2018-07-30 NOTE — TELEPHONE ENCOUNTER
Spoke with pt, has a cold not as bad a usual, head and chest congestion started with a scratchy throat on Friday. Tried a few OTC medication. Would like something called in did not want to schedule appointment.

## 2018-07-30 NOTE — TELEPHONE ENCOUNTER
----- Message from Kristen Cornell sent at 7/30/2018  8:16 AM CDT -----  Contact: [phone]      ----- Message -----  From: Nikita Ravi  Sent: 7/30/2018   8:14 AM  To: Isreal GOMES Staff    Patient requesting a call from the office to discuss getting medication call into pharmacy for cold , offer appointment patient denied. Please call and advise, Thanksw

## 2018-08-03 ENCOUNTER — OFFICE VISIT (OUTPATIENT)
Dept: INTERNAL MEDICINE | Facility: CLINIC | Age: 68
End: 2018-08-03
Attending: FAMILY MEDICINE
Payer: MEDICARE

## 2018-08-03 ENCOUNTER — TELEPHONE (OUTPATIENT)
Dept: PODIATRY | Facility: CLINIC | Age: 68
End: 2018-08-03

## 2018-08-03 ENCOUNTER — APPOINTMENT (OUTPATIENT)
Dept: PODIATRY | Facility: CLINIC | Age: 68
End: 2018-08-03
Payer: MEDICARE

## 2018-08-03 VITALS
HEART RATE: 79 BPM | OXYGEN SATURATION: 97 % | SYSTOLIC BLOOD PRESSURE: 102 MMHG | HEIGHT: 69 IN | DIASTOLIC BLOOD PRESSURE: 80 MMHG

## 2018-08-03 DIAGNOSIS — M72.2 PLANTAR FASCIITIS: Primary | ICD-10-CM

## 2018-08-03 PROCEDURE — 99213 OFFICE O/P EST LOW 20 MIN: CPT | Mod: S$PBB,,, | Performed by: FAMILY MEDICINE

## 2018-08-03 PROCEDURE — 99999 PR PBB SHADOW E&M-EST. PATIENT-LVL III: CPT | Mod: PBBFAC,,, | Performed by: FAMILY MEDICINE

## 2018-08-03 PROCEDURE — 99213 OFFICE O/P EST LOW 20 MIN: CPT | Mod: PBBFAC | Performed by: FAMILY MEDICINE

## 2018-08-03 NOTE — PATIENT INSTRUCTIONS
Plantar Fasciitis  Plantar fasciitis is a painful swelling of the plantar fascia. The plantar fascia is a thick, fibrous layer of tissue. It covers the bones on the bottom of your foot. And it supports the foot bones in an arched position.  This can happen gradually or suddenly. It usually affects one foot at a time. Heel pain can be sharp, like a knife sticking into the bottom of your foot. You may feel pain after exercising, long-distance jogging, stair climbing, long periods of standing, or after standing up.  Risk factors include: non-active lifestyle, arthritis, diabetes, obesity or recent weight gain, flat foot, high arch. Wearing high heels, loose shoes, or shoes with poor arch support for long periods of time adds to the risk. This problem is commonly found in runners and dancers. It also found in people who stand on hard surfaces for long periods of time.  Foot pain from this condition is usually worse in the morning. But it improves with walking. By the end of the day there may be a dull aching. Treatment requires short-term rest and controlling swelling. It may take up to 9 months before all symptoms go away. Rarely, a steroid injection into the foot, or surgery, may be needed.  Home care  · If you are overweight, lose weight to help healing.  · Choose supportive shoes with good arch support and shock absorbency. Replace athletic shoes when they become worn out. Dont walk or run barefoot.  · Premade or custom-fitted shoe inserts may be helpful. Inserts made of silicone seem to be the most effective. Custom-made inserts can be provided by a podiatrist or foot specialist, physical therapist, or orthopedist.  · Premade or custom-made night splints keep the heel stretched out while you sleep. They may prevent morning pain.  · Avoid activities that stress the feet: jogging, prolonged standing or walking, contact sports, etc.  · First thing in the morning and before sports, stretch the bottom of your feet.  Gently flex your ankle so the toes move toward your knee.  · Icing may help control heel pain. Apply an ice pack to the heel for 10-20 minutes as a preventive. Or ice your heel after a severe flare-up of symptoms. You may repeat this every 1-2 hours as needed.  · You may use over-the-counter pain medicine to control pain, unless another medicine was prescribed. Anti-inflammatory pain medicines, such as ibuprofen or naproxen, may work better than acetaminophen. If you have chronic liver or kidney disease or ever had a stomach ulcer or GI bleeding, talk with your healthcare provider before using these medicines.  Follow-up care  Follow up with your healthcare provider, physical therapist, or podiatrist or foot specialist as advised.  Call for an appointment if pain worsens or there is no relief after a few weeks of home treatment. Shoe inserts, a night splint, or a special boot may be required.  If X-rays were taken, you will be told of any new findings that may affect your care.  When to seek medical advice  Call your healthcare provider right away if any of these occur:  · Foot swelling  · Redness with increasing pain  Date Last Reviewed: 11/21/2015  © 6560-9156 [employer]. [address] [place] [address]. All rights reserved. This information is not intended as a substitute for professional medical care. Always follow your healthcare professional's instructions.        Treating Plantar Fasciitis  First, your healthcare provider tries to determine the cause of your problem in order to suggest ways to relieve pain. If your pain is due to poor foot mechanics, custom-made shoe inserts (orthoses) may help.    Reduce symptoms  · To relieve mild symptoms, try aspirin, ibuprofen, or other medicines as directed. Rubbing ice on the affected area may also help.  · To reduce severe pain and swelling, your healthcare provider may prescribe pills or injections or a walking cast in some instances.  Physical therapy, such as ultrasound or a daily stretching program, may also be recommended. Surgery is rarely required.  · To reduce symptoms caused by poor foot mechanics, your foot may be taped. This supports the arch and temporarily controls movement. Night splints may also help by stretching the fascia.  Control movement  If taping helps, your healthcare provider may prescribe orthoses. Built from plaster casts of your feet, these inserts control the way your foot moves. As a result, your symptoms should go away.  Reduce overuse  Every time your foot strikes the ground, the plantar fascia is stretched. You can reduce the strain on the plantar fascia and the possibility of overuse by following these suggestions:  · Lose any excess weight.  · Avoid running on hard or uneven ground.  · Use orthoses at all times in your shoes and house slippers.  If surgery is needed  Your healthcare provider may consider surgery if other types of treatment don't control your pain. During surgery, the plantar fascia is partially cut to release tension. As you heal, fibrous tissue fills the space between the heel bone and the plantar fascia.   Date Last Reviewed: 10/14/2015  © 9403-7605 [employer]. [address] [place] [address]. All rights reserved. This information is not intended as a substitute for professional medical care. Always follow your healthcare professional's instructions.        Understanding Plantar Fasciitis    Plantar fasciitis is a condition that causes foot and heel pain. The plantar fascia is a tough band of tissue that runs across the bottom of the foot from the heel to the toes. This tissue pulls on the heel bone. It supports the arch of the foot as it pushes off the ground. If the tissue becomes irritated or red and swollen (inflamed), it is called plantar fasciitis.  How to say it  PLAN-tuhr [id number]-IY-[id number]   What causes plantar fasciitis?  Plantar fasciitis most often occurs from  overusing the plantar fascia. The tissue may become damaged from activities that put repeated stress on the heel and foot. Or it may wear down over time with age and ankle stiffness. You are more likely to have plantar fasciitis if you:  · Do activities that require a lot of running, jumping, or dancing  · Have a job that requires being on your feet for long periods  · Are overweight or obese  · Have certain foot problems, such as a tight Achilles tendon, flat feet, or high arches  · Often wear poorly fitting shoes  Symptoms of plantar fasciitis  The condition most often causes pain in the heel and the bottom of the foot. The pain may occur when you take your first steps in the morning. It may get better as you walk throughout the day. But as you continue to put weight on the foot, the pain often returns. Pain may also occur after standing or sitting for long periods.  Treating plantar fasciitis  Treatments for plantar fasciitis include:  · Resting the foot. This involves limiting movements that make your foot hurt. You may also need to avoid certain sports and types of work for a time.  · Using cold packs. Put an ice pack on the heel and foot to help reduce pain and swelling.  · Taking pain medicines. Prescription and over-the-counter pain medicines can help relieve pain and swelling.  · Using heel cups or foot inserts (orthotics). These are placed in the shoes to help support the heel or arch and cushion the heel. You may also be told to buy proper-fitting shoes with good arch support and cushioned soles.  · Taping the foot. This supports the arch and limits the movement of the plantar fascia to help relieve symptoms.  · Wearing a night splint. This stretches the plantar fascia and leg muscles while you sleep. This may help relieve pain.  · Doing exercises and physical therapy. These stretch and strengthen the plantar fascia and the muscles in the leg that support the heel and foot.  · Getting shots of medicine  into the foot. These may help relieve symptoms for a time.  · Having surgery. This may be needed if other treatments fail to relieve symptoms. During surgery, the surgeon may partially cut the plantar fascia to release tension.  Possible complications of plantar fasciitis  Without proper care and treatment, healing may take longer than normal. Also, symptoms may continue or get worse. Over time, the plantar fascia may be damaged. This can make it hard to walk or even stand without pain.  When to call your healthcare provider  Call your healthcare provider right away if you have any of these:  · Fever of 100.4°F (38°C) or higher, or as directed  · Symptoms that dont get better with treatment, or get worse  · New symptoms, such as numbness, tingling, or weakness in the foot   Date Last Reviewed: 3/10/2016  © 1346-4879 [employer]. [address] [place] [address]. All rights reserved. This information is not intended as a substitute for professional medical care. Always follow your healthcare professional's instructions.        Toe Extension (Flexibility)    These instructions are for your right foot. Switch sides for your left foot.  1. Sit in a chair. Rest your right ankle on your left knee.  2. Hold your toes with your right hand. Gently bend the toes backward. Feel a stretch in the undersides of the toes and ball of the foot. Hold for 30 to 60 seconds.  3. Then gently bend the toes in the other direction. Gently press on them until your foot is pointed. Hold for 30 to 60 seconds.  4. Repeat 5 times, or as instructed.  Date Last Reviewed: 5/1/2016  © 2893-3164 [employer]. [address] [place] [address]. All rights reserved. This information is not intended as a substitute for professional medical care. Always follow your healthcare professional's instructions.

## 2018-08-03 NOTE — PROGRESS NOTES
Subjective:       Patient ID: Poonam Alvarez is a 67 y.o. female.    Chief Complaint: Foot Pain (right, worsens in morning )    HPI  Review of Systems   Constitutional: Negative for chills, fatigue, fever and unexpected weight change.   HENT: Negative for congestion and trouble swallowing.    Eyes: Negative for redness and visual disturbance.   Respiratory: Negative for cough, chest tightness and shortness of breath.    Cardiovascular: Negative for chest pain, palpitations and leg swelling.   Gastrointestinal: Negative for abdominal pain and blood in stool.   Genitourinary: Negative for difficulty urinating, hematuria and menstrual problem.   Musculoskeletal: Positive for gait problem. Negative for arthralgias, back pain, joint swelling, myalgias and neck pain.   Skin: Negative for color change and rash.   Neurological: Negative for tremors, speech difficulty, weakness, numbness and headaches.   Hematological: Negative for adenopathy. Does not bruise/bleed easily.   Psychiatric/Behavioral: Negative for behavioral problems, confusion and sleep disturbance. The patient is not nervous/anxious.        Objective:      Physical Exam   Constitutional: She is oriented to person, place, and time. She appears well-developed and well-nourished. No distress.   Neck: Neck supple.   Pulmonary/Chest: Effort normal.   Musculoskeletal: She exhibits no edema.        Right lower leg: She exhibits no edema.        Left lower leg: She exhibits no edema.        Right foot: There is tenderness. There is normal range of motion, no bony tenderness, no swelling, normal capillary refill, no crepitus, no deformity and no laceration.        Feet:    Neurological: She is alert and oriented to person, place, and time.   Skin: Skin is warm and dry. No rash noted.   Psychiatric: She has a normal mood and affect. Her behavior is normal. Judgment and thought content normal.   Nursing note and vitals reviewed.      Assessment:       1. Plantar  fasciitis        Plan:   Poonam was seen today for foot pain.    Diagnoses and all orders for this visit:    Plantar fasciitis      See meds, orders, follow up, routing and instructions sections of encounter.  HISTORY OF PRESENT ILLNESS:  A 67-year-old new patient, right foot pain one   week, no injury, mid arch.  No bony tenderness.  No redness, swelling.  No   injury.  She did change pattern at home walking on a tile floor, but she has   sedentary or nonambulatory job and other activities.    Tenderness in the mid arch.  No heel tenderness.  No forefoot tenderness and no   posterior bony tenderness.    RECOMMENDATIONS:  Stretching plantar fascia handouts.  Reassurance.  She is   wearing an insert at this time, may try different variety.  Follow up in four to   six weeks if still symptomatic, sooner for any changes, symptoms or onset of   bony tenderness.      MISHEL/HN  dd: 08/03/2018 15:47:34 (CDT)  td: 08/04/2018 03:00:43 (CDT)  Doc ID   #[id number]  Job ID #[id number]    CC:

## 2018-08-03 NOTE — TELEPHONE ENCOUNTER
----- Message from Kathy Perez sent at 8/3/2018 12:42 PM CDT -----  Contact: self  Pt called stating the [place] [employer] [place] on her way to her 1:15 appt and she just wanted to inform someone incase she is late. Pt could be reached at [phone]

## 2018-10-23 DIAGNOSIS — Z78.0 POSTMENOPAUSAL: Primary | ICD-10-CM

## 2018-10-28 RX ORDER — LEVOTHYROXINE SODIUM 137 UG/1
TABLET ORAL
Qty: 90 TABLET | Refills: 4 | Status: SHIPPED | OUTPATIENT
Start: 2018-10-28 | End: 2018-10-30 | Stop reason: SDUPTHER

## 2018-10-30 ENCOUNTER — IMMUNIZATION (OUTPATIENT)
Dept: PHARMACY | Facility: CLINIC | Age: 68
End: 2018-10-30
Payer: MEDICARE

## 2018-10-30 ENCOUNTER — OFFICE VISIT (OUTPATIENT)
Dept: ORTHOPEDICS | Facility: CLINIC | Age: 68
End: 2018-10-30
Payer: MEDICARE

## 2018-10-30 ENCOUNTER — HOSPITAL ENCOUNTER (OUTPATIENT)
Dept: RADIOLOGY | Facility: HOSPITAL | Age: 68
Discharge: HOME | End: 2018-10-30
Attending: NURSE PRACTITIONER
Payer: MEDICARE

## 2018-10-30 ENCOUNTER — OFFICE VISIT (OUTPATIENT)
Dept: INTERNAL MEDICINE | Facility: CLINIC | Age: 68
End: 2018-10-30
Payer: MEDICARE

## 2018-10-30 ENCOUNTER — IMMUNIZATION (OUTPATIENT)
Dept: INTERNAL MEDICINE | Facility: CLINIC | Age: 68
End: 2018-10-30
Payer: MEDICARE

## 2018-10-30 VITALS
OXYGEN SATURATION: 99 % | HEART RATE: 71 BPM | DIASTOLIC BLOOD PRESSURE: 60 MMHG | BODY MASS INDEX: 28.28 KG/M2 | SYSTOLIC BLOOD PRESSURE: 100 MMHG | HEIGHT: 72 IN

## 2018-10-30 DIAGNOSIS — Z23 NEED FOR TETANUS, DIPHTHERIA, AND ACELLULAR PERTUSSIS (TDAP) VACCINE: ICD-10-CM

## 2018-10-30 DIAGNOSIS — Z12.31 SCREENING MAMMOGRAM, ENCOUNTER FOR: ICD-10-CM

## 2018-10-30 DIAGNOSIS — I10 ESSENTIAL HYPERTENSION: Primary | ICD-10-CM

## 2018-10-30 DIAGNOSIS — R76.11 POSITIVE PPD: ICD-10-CM

## 2018-10-30 DIAGNOSIS — M25.562 LEFT KNEE PAIN, UNSPECIFIED CHRONICITY: ICD-10-CM

## 2018-10-30 DIAGNOSIS — M19.011 PRIMARY OSTEOARTHRITIS OF RIGHT SHOULDER: ICD-10-CM

## 2018-10-30 DIAGNOSIS — E55.9 VITAMIN D DEFICIENCY DISEASE: ICD-10-CM

## 2018-10-30 DIAGNOSIS — M25.562 LEFT KNEE PAIN, UNSPECIFIED CHRONICITY: Primary | ICD-10-CM

## 2018-10-30 DIAGNOSIS — E53.8 VITAMIN B12 DEFICIENCY: ICD-10-CM

## 2018-10-30 DIAGNOSIS — M17.12 PRIMARY OSTEOARTHRITIS OF LEFT KNEE: ICD-10-CM

## 2018-10-30 PROCEDURE — 73562 X-RAY EXAM OF KNEE 3: CPT | Mod: 26,XS,RT, | Performed by: RADIOLOGY

## 2018-10-30 PROCEDURE — 20610 DRAIN/INJ JOINT/BURSA W/O US: CPT | Mod: S$PBB,RT,, | Performed by: NURSE PRACTITIONER

## 2018-10-30 PROCEDURE — 99213 OFFICE O/P EST LOW 20 MIN: CPT | Mod: S$PBB,25,, | Performed by: NURSE PRACTITIONER

## 2018-10-30 PROCEDURE — 73564 X-RAY EXAM KNEE 4 OR MORE: CPT | Mod: TC,LT

## 2018-10-30 PROCEDURE — 99999 PR PBB SHADOW E&M-EST. PATIENT-LVL III: CPT | Mod: PBBFAC,,, | Performed by: INTERNAL MEDICINE

## 2018-10-30 PROCEDURE — G0008 ADMIN INFLUENZA VIRUS VAC: HCPCS | Mod: PBBFAC

## 2018-10-30 PROCEDURE — 20610 DRAIN/INJ JOINT/BURSA W/O US: CPT | Mod: PBBFAC,LT,59

## 2018-10-30 PROCEDURE — 73564 X-RAY EXAM KNEE 4 OR MORE: CPT | Mod: 26,LT,, | Performed by: RADIOLOGY

## 2018-10-30 PROCEDURE — 99999 PR PBB SHADOW E&M-EST. PATIENT-LVL III: CPT | Mod: PBBFAC,,, | Performed by: NURSE PRACTITIONER

## 2018-10-30 PROCEDURE — 99213 OFFICE O/P EST LOW 20 MIN: CPT | Mod: PBBFAC,25,27 | Performed by: NURSE PRACTITIONER

## 2018-10-30 PROCEDURE — 90662 IIV NO PRSV INCREASED AG IM: CPT | Mod: PBBFAC

## 2018-10-30 PROCEDURE — 99213 OFFICE O/P EST LOW 20 MIN: CPT | Mod: PBBFAC,25 | Performed by: INTERNAL MEDICINE

## 2018-10-30 PROCEDURE — 20610 DRAIN/INJ JOINT/BURSA W/O US: CPT | Mod: PBBFAC | Performed by: NURSE PRACTITIONER

## 2018-10-30 RX ORDER — TRIAMCINOLONE ACETONIDE 40 MG/ML
40 INJECTION, SUSPENSION INTRA-ARTICULAR; INTRAMUSCULAR
Status: COMPLETED | OUTPATIENT
Start: 2018-10-30 | End: 2018-10-30

## 2018-10-30 RX ORDER — MELOXICAM 15 MG/1
15 TABLET ORAL DAILY
Qty: 90 TABLET | Refills: 1 | Status: SHIPPED | OUTPATIENT
Start: 2018-10-30 | End: 2019-04-29 | Stop reason: SDUPTHER

## 2018-10-30 RX ORDER — LEVOTHYROXINE SODIUM 137 UG/1
137 TABLET ORAL DAILY
Qty: 90 TABLET | Refills: 4 | Status: SHIPPED | OUTPATIENT
Start: 2018-10-30 | End: 2019-01-07 | Stop reason: SDUPTHER

## 2018-10-30 RX ORDER — HYDROCHLOROTHIAZIDE 12.5 MG/1
TABLET ORAL
Qty: 90 TABLET | Refills: 4 | Status: SHIPPED | OUTPATIENT
Start: 2018-10-30 | End: 2019-10-11

## 2018-10-30 RX ORDER — PIMECROLIMUS 10 MG/G
CREAM TOPICAL
Refills: 3 | COMMUNITY
Start: 2018-10-19 | End: 2018-10-30

## 2018-10-30 RX ADMIN — TRIAMCINOLONE ACETONIDE 40 MG: 40 INJECTION, SUSPENSION INTRA-ARTICULAR; INTRAMUSCULAR at 02:10

## 2018-10-30 NOTE — PROGRESS NOTES
CC: Pain of the Left Knee      HPI: Pt with left knee and right shoulder pain. The left knee pain is aching and global and worse with activity, but she reports that the pain is completely relieved with a cortisone injection which she has gotten in the past most recently in June. She also has chronic right shoulder pain. She knows that she needs to have a right shoulder replacement, but she hasn't been ready until now. She has been getting cortisone injections for the right shoulder as well with good relief. She comes in today for cortisone injections and to get an appt to discuss shoulder replacement. She is ambulating without assistive device. There is not a limp.    ROS  General: denies fever and chills  Resp: no c/o sob  CVS: no c/o cp  MSK: c/o left knee pain which is aching and right shoulder pain which is aching    PE  General: AAOx3, pleasant and cooperative  Resp: respirations even and unlabored  MSK: left knee exam  0 degrees extension  120 degrees flexion  No warmth or erythema   - effusion    Right shoulder exam  + neer  + sifuentes  Range of motion limited by pain    Xray:  Pt declined a shoulder xray today  Left knee: Left: There is mild DJD.  No fracture dislocation bone destruction seen.    Assessment:  Right shoulder djd  Left knee djd    Plan:  Cortisone injection right shoulder  Cortisone injection left knee  Tylenol prn  appt with Dr. Xie for 11/29    Knee Injection Procedure Note    Diagnosis: left knee degenerative arthritis  Indications: left knee pain  Procedure Details: Verbal consent was obtained for the procedure. The injection site was identified and the skin was prepared with alcohol. The left knee was injected from an anterolateral approach with 1 ml of Kenalog and 4 ml Lidocaine under sterile technique using a 22 gauge needle. The needle was removed and the area cleansed and dressed.  Complications:  Patient tolerated the procedure well.    she was advised to rest the knee today,  using ice and elevation as needed for comfort and swelling.Immediate relief of the knee pain may be short lived and secondary to the lidocaine. she may have an increase in discomfort tonight followed by steady improvement over the next several days. It may take 1-2 weeks following the injection to get the full benefit of the medication.    Shoulder Injection Procedure Note    Pre-operative Diagnosis: right shoulder pain    Post-operative Diagnosis: same    Indications: right shoulder pain    Anesthesia: none    Procedure Details     Verbal consent was obtained for the procedure. The injection site was identified and the skin was prepared with alcohol. The right shoulder was injected from a posterior approach with 1 ml of Kenalog and 5 ml Lidocaine under sterile technique using a 22 gauge 1 1/2 inch needle. The needle was removed and the area cleansed and dressed.    Complications:  None; patient tolerated the procedure well.    she was advised to rest the shoulder today, using ice as needed for comfort and swelling. she did receive immediate relief of the shoulder pain. she was told this would be short lived and is secondary to the lidocaine. she may have an increase in discomfort tonight followed by steady improvement over the next several days. It may take 1-3 weeks following the injection to get the full benefit of the medication.

## 2018-10-30 NOTE — PROGRESS NOTES
CHIEF COMPLAINT: Annual exam      HISTORY OF PRESENT ILLNESS: This is a 67-year-old woman who presents for her annual exam    She has hyperpigmentation of the skin of her face, neck and upper chest.  Does not itch or hurt.  She saw a dermatologist, Laura Ovalle in [place] at [facility] who did a biopsy and said that it was a hyperpigmentation problem. She is using hydrocortisone cream on the area. She thinks the hydrocortisone cream is helping. Symptoms started several years ago after a bad sunburn then had chemicals put in her hair.  She saw dermatology last week. She may have laser done      BP has been doing well.  She is taking HCTZ 12.5 mg daily for edema.  No swelling.     Weight is good.  She recently gained 5 pounds. SHe had lost 34 pounds on ideal protein.     She had an injection in her left knee and right shoulder on 9/15/17 and her joints are doing ebtter.       She is taking mobic 15 mg on Monday andon Friday (due to stomach issues). Stomach is ok with 3 times a week. She has not needed ranitidine as needed. .      She continues to take Levoxyl 137 mcg daily for hypothyroidism.       She has a history of positive PPD 2010 - took INH for 9 months, CXR  was fine. NO fever, chills, night sweats, weight loss.       Her  (she is  from him)  of lung cancer in 2015      She takes stool softner - dulcolax and metamucil daily to keep her bowels regular.      She had an incarcerated inguinal hernia repaired on 17    She right shoulder pain - she will see Lexie Mcmanus today.     She has been tired. She has not been exercising regularly and does feel better when she exercises.     She is currently working for [employer] at [employer] - as a .       REVIEW OF SYSTEMS: She denies fevers, chills, night sweats, visual change, hearing loss, sinus congestion, sore throat, chest pain, shortness of breath, nausea, vomiting,  constipation, diarrhea, dysuria, hematuria, polydipsia, polyuria, other joint pain, muscle pain, headaches, anxiety, depression, insomnia.           PAST MEDICAL HISTORY:   1. Atrial flutter, status post ablation in 2008.   2. Graves disease, status post radioactive iodine, now hypothyroid.   3. Hypertension.   4. History of headaches.   5. Osteoarthritis of the shoulders, hands and knees.   6. Status post arthroscopic surgery of the right knee 2008.   7. Status post arthroscopic surgery of the right knee in .   8. Tummy tuck 20 years ago.   9. Hemorrhoids removed.   10. Cataract removal bilaterally  11. Positive PPD diagnosed , completed 9 months of INH 1/10  12. GERD     SOCIAL HISTORY: She does not smoke. She drinks alcohol twice a year.   She is , has three children. She is a .     FAMILY HISTORY: Father  at age 83 from complications of pneumonia.   Mother  at age 98 from old age. Sister had a pulmonary embolus, another   sister had sarcoidosis. A son has Crohn's disease.        MEDICATIONS AND ALLERGIES: Updated in EPIC.       PHYSICAL EXAMINATION:   /60 (BP Location: Left arm, Patient Position: Sitting, BP Method: Medium (Manual))   Pulse 71   Ht 6' (1.829 m)   SpO2 99%   BMI 28.28 kg/m²     GENERAL: She is alert, oriented, no apparent distress. Affect within normal limits.   Conjunctiva anicteric. Tympanic membranes clear. Oropharynx clear.   NECK: Supple.   RESPIRATORY: Effort normal. Lungs are clear to auscultation.   HEART: Regular rate and rhythm without murmurs, gallops or rubs.   No lower extremity edema.   ABDOMEN: soft, non distended, non tender, bowel sounds present, no hepatosplenomgaly  BREAST: no abn seen, no nodules palpated, no axillary lad               ASSESSMENT AND PLAN:     Annual exam - discussed diet, exercise and safety issues.    1. HTN- doing well   2. OA knees - s/p injection in left knee - s/p right knee replacement -  doing well.  2. ALlergic rhinitis -stable   3. Hypothyroidism - tsh   5. Screening - mammogram 2/18. Bone density was normal 07/2008. Colonoscopy due 11/5/2015 - normal due 2025  6. GERD - asx  7. Obestiy - doing well with diet, exercise and weight loss.   8. Positive ppd - cxr stable  9. Left inguinal hernia repair - doing well  I will see her back in 6 months, sooner if problems arise.   INfluenza and adacel  Pneumovax 12/12  Prevnar 11/15  Labs 12/16

## 2018-10-31 ENCOUNTER — TELEPHONE (OUTPATIENT)
Dept: INTERNAL MEDICINE | Facility: CLINIC | Age: 68
End: 2018-10-31

## 2018-10-31 NOTE — TELEPHONE ENCOUNTER
----- Message from Emiliano Ames sent at 10/31/2018  3:23 PM CDT -----  Contact: Patient [phone]  The patient said you wanted her to have a chest xray but, there are no orders.    Thank you

## 2018-11-29 ENCOUNTER — OFFICE VISIT (OUTPATIENT)
Dept: SPORTS MEDICINE | Facility: CLINIC | Age: 68
End: 2018-11-29
Payer: MEDICARE

## 2018-11-29 ENCOUNTER — HOSPITAL ENCOUNTER (OUTPATIENT)
Dept: RADIOLOGY | Facility: HOSPITAL | Age: 68
Discharge: HOME | End: 2018-11-29
Attending: ORTHOPAEDIC SURGERY
Payer: MEDICARE

## 2018-11-29 VITALS — BODY MASS INDEX: 28.21 KG/M2 | WEIGHT: 208 LBS

## 2018-11-29 DIAGNOSIS — M25.511 CHRONIC RIGHT SHOULDER PAIN: ICD-10-CM

## 2018-11-29 DIAGNOSIS — G89.29 CHRONIC RIGHT SHOULDER PAIN: ICD-10-CM

## 2018-11-29 DIAGNOSIS — M19.011 PRIMARY OSTEOARTHRITIS OF RIGHT SHOULDER: ICD-10-CM

## 2018-11-29 DIAGNOSIS — M25.511 RIGHT SHOULDER PAIN, UNSPECIFIED CHRONICITY: ICD-10-CM

## 2018-11-29 DIAGNOSIS — M25.511 RIGHT SHOULDER PAIN, UNSPECIFIED CHRONICITY: Primary | ICD-10-CM

## 2018-11-29 PROCEDURE — 99999 PR PBB SHADOW E&M-EST. PATIENT-LVL III: CPT | Mod: PBBFAC,,, | Performed by: ORTHOPAEDIC SURGERY

## 2018-11-29 PROCEDURE — 73030 X-RAY EXAM OF SHOULDER: CPT | Mod: TC,FY,PO,RT

## 2018-11-29 PROCEDURE — 73030 X-RAY EXAM OF SHOULDER: CPT | Mod: 26,RT,, | Performed by: RADIOLOGY

## 2018-11-29 PROCEDURE — 99213 OFFICE O/P EST LOW 20 MIN: CPT | Mod: PBBFAC,25,PO | Performed by: ORTHOPAEDIC SURGERY

## 2018-11-29 PROCEDURE — 99214 OFFICE O/P EST MOD 30 MIN: CPT | Mod: S$PBB,,, | Performed by: ORTHOPAEDIC SURGERY

## 2018-11-29 NOTE — LETTER
November 29, 2018      Lexie Mcmanus NP  [address]  [facility] [address]           [facility]  [address]  [facility] [address]  Phone: [phone]          Patient: Poonam Alvarez   MR Number: [id number]   YOB: 1950   Date of Visit: 11/29/2018       Dear Lexie Mcmanus:    Thank you for referring Poonam Alvarez to me for evaluation. Attached you will find relevant portions of my assessment and plan of care.    If you have questions, please do not hesitate to call me. I look forward to following Poonam Alvarez along with you.    Sincerely,    Sage Xie MD    Enclosure  CC:  No Recipients    If you would like to receive this communication electronically, please contact [email or web address] or [phone] to request more information on [facility] Link access.    For providers and/or their staff who would like to refer a patient to [facility], please contact us through our one-stop-shop provider referral line, [facility], at [phone].    If you feel you have received this communication in error or would no longer like to receive these types of communications, please e-mail [email or web address]

## 2018-11-29 NOTE — PROGRESS NOTES
CC:  right shoulder pain     67 y.o. Female Chronic right shoulder pain for about 1 year without TOYA.       She reports that the pain and weakness is worse with overhead activity. It also bothers her at night.    Is affecting ADLs.     PHYSICAL EXAMINATION:  General:  The patient is alert and oriented x 3.  Mood is pleasant.  Observation of ears, eyes and nose reveal no gross abnormalities.  No labored breathing observed.  Gait is coordinated. Patient can toe walk and heel walk without difficulty.       RIGHT Shoulder / Upper Extremity Exam    OBSERVATION:     Swelling  none  Deformity  none   Discoloration  none   Scapular winging none   Scars   none  Atrophy  none    TENDERNESS / CREPITUS (T/C):          T/C      T/C   Clavicle   -/-  SUPRAspinatus    -/-     AC Jt.    -/-  INFRAspinatus  -/-    SC Jt.    -/-  Deltoid    -/-      G. Tuberosity  -/-  LH BICEP groove  +/-   Acromion:  -/-  Midline Neck   -/-     Scapular Spine -/-  Trapezium   -/-   SMA Scapula  -/-  GH jt. line - post  -/-     Scapulothoracic  -/-         ROM: (* = with pain)  Right shoulder   Left shoulder        AROM (PROM)   AROM (PROM)   FE    170° (175°)     170° (175°)     ER at 0°    60°  (65°)    60°  (65°)   ER at 90° ABD  85°  (90°)    90°  (90°)   IR at 90°  ABD   40  (40°)     40  (40°)      IR (spine level)   T10     T10    STRENGTH: (* = with pain) RIGHT SHOULDER  LEFT SHOULDER   SCAPTION   5/5    5/5    IR    5/5    5/5   ER    5/5    5/5   BICEPS   5/5    5/5   Deltoid    5/5    5/5     SIGNS:  Painful side       NEER   +    OHENRIS  neg    RODAS   +    SPEEDS  neg     DROP ARM   +   BELLY PRESS neg   Superior escape none    LIFT-OFF  neg   X-Body ADD    neg    MOVING VALGUS neg        STABILITY TESTING    RIGHT SHOULDER   LEFT SHOULDER     Translation     Anterior  up face     up face    Posterior  up face    up face    Sulcus   < 10mm    < 10 mm     Signs   Apprehension   neg      neg       Relocation   no change     no  change      Jerk test  neg     neg    EXTREMITY NEURO-VASCULAR EXAM    Sensation grossly intact to light touch all dermatomal regions.    DTR 2+ Biceps, Triceps, BR and Negative Jarreds sign   Grossly intact motor function at Elbow, Wrist and Hand   Distal pulses radial and ulnar 2+, brisk cap refill, symmetric.      NECK:  Painless FROM and spinous processes non-tender. Negative Spurlings sign.      OTHER FINDINGS:  + scapular dyskinesia    XRAYS (11/29/18):  Significant DJD and joint space narrowing.  Irregularity of the osseous labrum identified.  There is also evidence of 1 cm calcified and/or ossified density adjacent to the a posterior aspect of the labrum probably a old fractured fragment versus loose body.  There is also some calcification noted above the humeral head consistent with calcified tendinopathy.  The degenerative osteoarthritic changes progressed as compared to the previous study.  No bone destruction.      ASSESSMENT: Right shoulder pain, DJD    PLAN:    1. MRI to rule out Rotator cuff tear  2. CT for presurgical planning  3. Follow up in clinic to discuss MRI and Ct    All questions were answered, pt will contact us for questions or concerns in the interim.

## 2018-12-26 ENCOUNTER — OFFICE VISIT (OUTPATIENT)
Dept: OBSTETRICS AND GYNECOLOGY | Facility: CLINIC | Age: 68
End: 2018-12-26
Attending: OBSTETRICS & GYNECOLOGY
Payer: MEDICARE

## 2018-12-26 ENCOUNTER — HOSPITAL ENCOUNTER (OUTPATIENT)
Dept: RADIOLOGY | Facility: HOSPITAL | Age: 68
Discharge: HOME | End: 2018-12-26
Attending: ORTHOPAEDIC SURGERY
Payer: MEDICARE

## 2018-12-26 VITALS — HEIGHT: 72 IN | DIASTOLIC BLOOD PRESSURE: 70 MMHG | BODY MASS INDEX: 28.21 KG/M2 | SYSTOLIC BLOOD PRESSURE: 110 MMHG

## 2018-12-26 DIAGNOSIS — M19.011 PRIMARY OSTEOARTHRITIS OF RIGHT SHOULDER: ICD-10-CM

## 2018-12-26 DIAGNOSIS — Z12.31 ENCOUNTER FOR SCREENING MAMMOGRAM FOR BREAST CANCER: ICD-10-CM

## 2018-12-26 DIAGNOSIS — Z78.0 POST-MENOPAUSAL: ICD-10-CM

## 2018-12-26 DIAGNOSIS — M25.511 CHRONIC RIGHT SHOULDER PAIN: ICD-10-CM

## 2018-12-26 DIAGNOSIS — N95.2 POSTMENOPAUSAL ATROPHIC VAGINITIS: ICD-10-CM

## 2018-12-26 DIAGNOSIS — G89.29 CHRONIC RIGHT SHOULDER PAIN: ICD-10-CM

## 2018-12-26 DIAGNOSIS — Z01.419 WOMEN'S ANNUAL ROUTINE GYNECOLOGICAL EXAMINATION: Primary | ICD-10-CM

## 2018-12-26 PROCEDURE — G0101 CA SCREEN;PELVIC/BREAST EXAM: HCPCS | Mod: S$PBB,,, | Performed by: OBSTETRICS & GYNECOLOGY

## 2018-12-26 PROCEDURE — 99999 PR PBB SHADOW E&M-EST. PATIENT-LVL III: CPT | Mod: PBBFAC,,, | Performed by: OBSTETRICS & GYNECOLOGY

## 2018-12-26 PROCEDURE — 73221 MRI JOINT UPR EXTREM W/O DYE: CPT | Mod: TC,RT

## 2018-12-26 PROCEDURE — 73200 CT UPPER EXTREMITY W/O DYE: CPT | Mod: 26,RT,, | Performed by: RADIOLOGY

## 2018-12-26 PROCEDURE — 73221 MRI JOINT UPR EXTREM W/O DYE: CPT | Mod: 26,RT,, | Performed by: RADIOLOGY

## 2018-12-26 PROCEDURE — 99213 OFFICE O/P EST LOW 20 MIN: CPT | Mod: PBBFAC | Performed by: OBSTETRICS & GYNECOLOGY

## 2018-12-26 PROCEDURE — 73200 CT UPPER EXTREMITY W/O DYE: CPT | Mod: TC,RT

## 2018-12-26 NOTE — PROGRESS NOTES
Subjective:     Patient ID: Poonam Alvarez is a 68 y.o. female.     Chief Complaint: Well Woman     History of Present Illness: This patient is a 68 y.o. female, who presents to the GYN clinic for her gyn well woman yearly exam.  She denies gyn complaints.      No LMP recorded. Patient is postmenopausal.    Review of Systems    GENERAL: No fever, chills, fatigability or weightchange  SKIN: No rashes, itching or changes in color or texture of skin.  HEAD: No headaches or recent head trauma.  EYES: Visual acuity fine. No photophobia,r diplopia.  EARS: Denies earache or vertigo  NOSE: No loss of smell, no epistaxis or postnasal drip.  MOUTH & THROAT: No hoarseness or change in voice.   NODES: Denies swollen glands.  CHEST: Denies FERRARO, cyanosis, wheezing, cough and sputum production.  CARDIOVASCULAR: Denies chest pain, PND, orthopnea or reduced exercise tolerance.  ABDOMEN: Appetite fine. No weight loss. bloating, Denies diarrhea, abdominal pain, hematemesis or blood in stool.  URINARY: No flank pain, dysuria or hematuria.  PERIPHERAL VASCULAR: No claudication or cyanosis.Varicosities  MUSCULOSKELETAL: No joint stiffness or swelling. Denies back pain.muscle aches  NEUROLOGIC: No history of seizures, paralysis, alteration of gait or coordination.       Objective:       Physical Exam     APPEARANCE: Well nourished, well developed, in no acute distress.    GENITOURINARY:  Vulva: No lesions. Normal female genital architecture.  Urethral Meatus: Normal size and location, no lesions, no prolapse.  Urethra: No masses, tenderness, prolapse or scarring.  Vagina: thin, atrophic and with decreased rugae, no discharge, no significant cystocele or rectocele.  Cervix: Absent  Uterus: Absent  Adnexa: No masses, tenderness or CDS nodularity.  Anus Perineum: No lesions, no relaxation, no external hemorrhoids.  Breasts: Symmetrical, no skin changes or visible lesions. No palpable masses, nipple discharge or adenopathy  bilaterally.  Abdomen: No masses, tenderness, hernia or ascites, no hepatasplenomegaly  Neck: Supple. Symmetric without masses. No thyromegaly.  Skin: No rashes, lesions, ulcers, acne, hirsutism.  Respiratory: Breath sounds clear bilateraly. Good air movement. No rales. No wheezes.  Cardiovascular: Normal rate. Regular rhythm.  Peripheral/lower extremities: No edema, erythema or tenderness.  Lymphatic: No axillary, neck or groin nodes palp.  Mental Status: Alert, oriented x 3, normal affect and mood.          @PROCEDURE:@           Assessment:      1. Women's annual routine gynecological examination    2. Post-menopausal    3. Postmenopausal atrophic vaginitis    4. Encounter for screening mammogram for breast cancer               Plan:  1.  Discuss new Pap smear recommendations.  2.  Mammogram order is placed.  3.  Return to clinic for well-woman exam.                No orders of the defined types were placed in this encounter.

## 2019-01-02 ENCOUNTER — TELEPHONE (OUTPATIENT)
Dept: ORTHOPEDICS | Facility: CLINIC | Age: 69
End: 2019-01-02

## 2019-01-02 DIAGNOSIS — M17.12 PRIMARY OSTEOARTHRITIS OF LEFT KNEE: Primary | ICD-10-CM

## 2019-01-02 NOTE — TELEPHONE ENCOUNTER
Spoke with  and she stated that the cortisone injections are no longer lasting and she would like to try gel injections. I informed her that I would send message to Lexie to order injections and give her a callback to schedule once this is done.  verbalized understanding.

## 2019-01-02 NOTE — TELEPHONE ENCOUNTER
----- Message from Srinivasa Holloway sent at 1/2/2019  3:55 PM CST -----  Contact: Self/ [phone]  Patient would like a call back to make a appt for her left knee injection patient would like Euflexxa.

## 2019-01-04 ENCOUNTER — TELEPHONE (OUTPATIENT)
Dept: ORTHOPEDICS | Facility: CLINIC | Age: 69
End: 2019-01-04

## 2019-01-04 DIAGNOSIS — M17.12 PRIMARY OSTEOARTHRITIS OF LEFT KNEE: Primary | ICD-10-CM

## 2019-01-04 NOTE — TELEPHONE ENCOUNTER
Spoke with pt and she stated that she was in a lot of pain   And wanted to being seen today by SB. She wanted an inj  But she still would have to wait until next week. Pt also stated she would like for SB to give her a call to see what she can take for her pain. I told her I som forward this message to SB to give her a call.         ----- Message from Ying Alvarez sent at 1/4/2019  1:52 PM CST -----  Contact: Self  Needs Advice    Reason for call:pt is feeling the need to be seen today for left knee pain I did offer pt appt on 1/11 she denied it pt can be reached @         Communication Preference: [phone]     Additional Information:

## 2019-01-04 NOTE — TELEPHONE ENCOUNTER
----- Message from Mindy Boyce MA sent at 1/4/2019  2:41 PM CST -----  Spoke with pt and she stated that she was in a lot of pain   And wanted to being seen today by SB. She wanted an inj  But she still would have to wait until next week. Pt also stated she would like for SB to give her a call to see what she can take for her pain. I told her I som forward this message to SB to give her a call.       Pt MRN: [id number]        ----- Message from Ying Alvarez sent at 1/4/2019  1:52 PM CST -----  Contact: Self  Needs Advice    Reason for call:pt is feeling the need to be seen today for left knee pain I did offer pt appt on 1/11 she denied it pt can be reached @         Communication Preference: [phone]     Additional Information:

## 2019-01-07 ENCOUNTER — OFFICE VISIT (OUTPATIENT)
Dept: ORTHOPEDICS | Facility: CLINIC | Age: 69
End: 2019-01-07
Payer: MEDICARE

## 2019-01-07 DIAGNOSIS — M17.12 PRIMARY OSTEOARTHRITIS OF LEFT KNEE: ICD-10-CM

## 2019-01-07 PROCEDURE — 99999 PR PBB SHADOW E&M-EST. PATIENT-LVL III: CPT | Mod: PBBFAC,,, | Performed by: NURSE PRACTITIONER

## 2019-01-07 PROCEDURE — 20610 DRAIN/INJ JOINT/BURSA W/O US: CPT | Mod: S$PBB,LT,, | Performed by: NURSE PRACTITIONER

## 2019-01-07 PROCEDURE — 99213 OFFICE O/P EST LOW 20 MIN: CPT | Mod: PBBFAC | Performed by: NURSE PRACTITIONER

## 2019-01-07 PROCEDURE — 99999 PR PBB SHADOW E&M-EST. PATIENT-LVL III: ICD-10-PCS | Mod: PBBFAC,,, | Performed by: NURSE PRACTITIONER

## 2019-01-07 PROCEDURE — 99499 UNLISTED E&M SERVICE: CPT | Mod: S$PBB,,, | Performed by: NURSE PRACTITIONER

## 2019-01-07 PROCEDURE — 99499 NO LOS: ICD-10-PCS | Mod: S$PBB,,, | Performed by: NURSE PRACTITIONER

## 2019-01-07 PROCEDURE — 20610 PR DRAIN/INJECT LARGE JOINT/BURSA: ICD-10-PCS | Mod: S$PBB,LT,, | Performed by: NURSE PRACTITIONER

## 2019-01-07 PROCEDURE — 20610 DRAIN/INJ JOINT/BURSA W/O US: CPT | Mod: PBBFAC | Performed by: NURSE PRACTITIONER

## 2019-01-07 RX ORDER — LEVOTHYROXINE SODIUM 137 UG/1
137 TABLET ORAL DAILY
Qty: 90 TABLET | Refills: 4 | Status: SHIPPED | OUTPATIENT
Start: 2019-01-07 | End: 2019-10-29 | Stop reason: SDUPTHER

## 2019-01-07 RX ADMIN — Medication 20 MG: at 03:01

## 2019-01-07 NOTE — TELEPHONE ENCOUNTER
----- Message from Sepideh S Grayson sent at 1/7/2019  1:59 PM CST -----  Contact: Pt Mobile [phone] or Home [phone]  RX request - refill or new RX.  Is this a refill or new RX:  Refill  RX name and strength: SYNTHROID 137 mcg Tab tablet  Directions:   Is this a 30 day or 90 day RX:  30  Local pharmacy or mail order pharmacy:  [facility] [place] [address] AT [facility] [address]  Pharmacy name and phone #Phone# [phone],Fax# [phone]

## 2019-01-08 ENCOUNTER — TELEPHONE (OUTPATIENT)
Dept: INTERNAL MEDICINE | Facility: CLINIC | Age: 69
End: 2019-01-08

## 2019-01-08 NOTE — TELEPHONE ENCOUNTER
----- Message from Canelo Plaza sent at 1/8/2019 12:15 PM CST -----  Contact: [facility]  [phone] Ref # [id number]  [employer] calling stating patient is need P.A for Rx SYNTHROID 137 mcg Tab tablet    Requesting a call back with response.    [facility]  [phone] Ref # [id number]    Please call an advise  Thank you

## 2019-01-09 NOTE — PROGRESS NOTES
Poonam Alvarez presents to clinic today for the first left knee Euflexxa injection.    Exam demonstrates the no effusion in the  left knee, and the skin is intact.    Diagnosis: osteoarthritis knee    After time out was performed and patient ID, side, and site were verified, the  left  knee was sterilly prepped in the standard fashion.  A 22-gauge needle was introduced into left knee joint from an sawyer-lateral site without complication and knee was then injected with 2 ml of Euflexxa.  Sterile dressing was applied.  The patient was instructed to resume activities as tolerated and to call with any problems.     We will see Poonam Alvarez back next week for the second injection.

## 2019-01-14 ENCOUNTER — OFFICE VISIT (OUTPATIENT)
Dept: ORTHOPEDICS | Facility: CLINIC | Age: 69
End: 2019-01-14
Payer: MEDICARE

## 2019-01-14 VITALS — WEIGHT: 207.88 LBS | BODY MASS INDEX: 28.2 KG/M2

## 2019-01-14 DIAGNOSIS — M17.12 PRIMARY OSTEOARTHRITIS OF LEFT KNEE: ICD-10-CM

## 2019-01-14 PROCEDURE — 20610 PR DRAIN/INJECT LARGE JOINT/BURSA: ICD-10-PCS | Mod: S$PBB,LT,, | Performed by: NURSE PRACTITIONER

## 2019-01-14 PROCEDURE — 20610 DRAIN/INJ JOINT/BURSA W/O US: CPT | Mod: PBBFAC | Performed by: NURSE PRACTITIONER

## 2019-01-14 PROCEDURE — 99999 PR PBB SHADOW E&M-EST. PATIENT-LVL III: CPT | Mod: PBBFAC,,, | Performed by: NURSE PRACTITIONER

## 2019-01-14 PROCEDURE — 20610 DRAIN/INJ JOINT/BURSA W/O US: CPT | Mod: S$PBB,LT,, | Performed by: NURSE PRACTITIONER

## 2019-01-14 PROCEDURE — 99213 OFFICE O/P EST LOW 20 MIN: CPT | Mod: PBBFAC | Performed by: NURSE PRACTITIONER

## 2019-01-14 PROCEDURE — 99499 NO LOS: ICD-10-PCS | Mod: S$PBB,,, | Performed by: NURSE PRACTITIONER

## 2019-01-14 PROCEDURE — 99999 PR PBB SHADOW E&M-EST. PATIENT-LVL III: ICD-10-PCS | Mod: PBBFAC,,, | Performed by: NURSE PRACTITIONER

## 2019-01-14 PROCEDURE — 99499 UNLISTED E&M SERVICE: CPT | Mod: S$PBB,,, | Performed by: NURSE PRACTITIONER

## 2019-01-14 RX ADMIN — Medication 20 MG: at 04:01

## 2019-01-14 NOTE — PROGRESS NOTES
Poonam Alvarez presents to clinic today for the second left knee Euflexxa injection.    Exam demonstrates the no effusion in the  left knee, and the skin is intact.    Diagnosis: osteoarthritis knee    After time out was performed and patient ID, side, and site were verified, the  left  knee was sterilly prepped in the standard fashion.  A 22-gauge needle was introduced into left knee joint from an sawyer-lateral site without complication and knee was then injected with 2 ml of Euflexxa.  Sterile dressing was applied.  The patient was instructed to resume activities as tolerated and to call with any problems.     We will see Poonam Alvarez back next week for the third injection.

## 2019-01-21 ENCOUNTER — OFFICE VISIT (OUTPATIENT)
Dept: ORTHOPEDICS | Facility: CLINIC | Age: 69
End: 2019-01-21
Payer: MEDICARE

## 2019-01-21 ENCOUNTER — HOSPITAL ENCOUNTER (OUTPATIENT)
Dept: RADIOLOGY | Facility: HOSPITAL | Age: 69
Discharge: HOME | End: 2019-01-21
Attending: NURSE PRACTITIONER
Payer: MEDICARE

## 2019-01-21 ENCOUNTER — TELEPHONE (OUTPATIENT)
Dept: ORTHOPEDICS | Facility: CLINIC | Age: 69
End: 2019-01-21

## 2019-01-21 VITALS — HEIGHT: 72 IN | BODY MASS INDEX: 28.16 KG/M2 | WEIGHT: 207.88 LBS

## 2019-01-21 DIAGNOSIS — M25.562 LEFT KNEE PAIN, UNSPECIFIED CHRONICITY: Primary | ICD-10-CM

## 2019-01-21 DIAGNOSIS — M25.562 LEFT KNEE PAIN, UNSPECIFIED CHRONICITY: ICD-10-CM

## 2019-01-21 DIAGNOSIS — M17.12 PRIMARY OSTEOARTHRITIS OF LEFT KNEE: ICD-10-CM

## 2019-01-21 PROCEDURE — 99999 PR PBB SHADOW E&M-EST. PATIENT-LVL III: CPT | Mod: PBBFAC,,, | Performed by: NURSE PRACTITIONER

## 2019-01-21 PROCEDURE — 20610 DRAIN/INJ JOINT/BURSA W/O US: CPT | Mod: PBBFAC | Performed by: NURSE PRACTITIONER

## 2019-01-21 PROCEDURE — 73564 XR KNEE ORTHO LEFT WITH FLEXION: ICD-10-PCS | Mod: 26,LT,, | Performed by: RADIOLOGY

## 2019-01-21 PROCEDURE — 99999 PR PBB SHADOW E&M-EST. PATIENT-LVL III: ICD-10-PCS | Mod: PBBFAC,,, | Performed by: NURSE PRACTITIONER

## 2019-01-21 PROCEDURE — 73562 X-RAY EXAM OF KNEE 3: CPT | Mod: TC,RT,59

## 2019-01-21 PROCEDURE — 20610 PR DRAIN/INJECT LARGE JOINT/BURSA: ICD-10-PCS | Mod: S$PBB,LT,, | Performed by: NURSE PRACTITIONER

## 2019-01-21 PROCEDURE — 73562 XR KNEE ORTHO LEFT WITH FLEXION: ICD-10-PCS | Mod: 26,XS,LT, | Performed by: RADIOLOGY

## 2019-01-21 PROCEDURE — 99213 OFFICE O/P EST LOW 20 MIN: CPT | Mod: PBBFAC,25 | Performed by: NURSE PRACTITIONER

## 2019-01-21 PROCEDURE — 73564 X-RAY EXAM KNEE 4 OR MORE: CPT | Mod: 26,LT,, | Performed by: RADIOLOGY

## 2019-01-21 PROCEDURE — 99499 UNLISTED E&M SERVICE: CPT | Mod: S$PBB,,, | Performed by: NURSE PRACTITIONER

## 2019-01-21 PROCEDURE — 20610 DRAIN/INJ JOINT/BURSA W/O US: CPT | Mod: S$PBB,LT,, | Performed by: NURSE PRACTITIONER

## 2019-01-21 PROCEDURE — 73562 X-RAY EXAM OF KNEE 3: CPT | Mod: 26,XS,LT, | Performed by: RADIOLOGY

## 2019-01-21 PROCEDURE — 99499 NO LOS: ICD-10-PCS | Mod: S$PBB,,, | Performed by: NURSE PRACTITIONER

## 2019-01-21 RX ORDER — GABAPENTIN 100 MG/1
300 CAPSULE ORAL NIGHTLY
Qty: 90 CAPSULE | Refills: 2 | Status: SHIPPED | OUTPATIENT
Start: 2019-01-21 | End: 2019-05-17 | Stop reason: SDUPTHER

## 2019-01-21 RX ADMIN — Medication 20 MG: at 10:01

## 2019-01-21 NOTE — TELEPHONE ENCOUNTER
Spoke with  and the pt stated that her left knee has been in extreme pain since last injections. She stated that she would like to come in a little earlier today to have x rays and further evaluation of the knee.  asked if 2pm would be ok. I informed her that it would be and that x rays will be ordered for her upon arrival. Lexie was also informed of this.

## 2019-01-21 NOTE — TELEPHONE ENCOUNTER
----- Message from Kathy Perez sent at 1/21/2019  8:05 AM CST -----  Contact: Self  Would like a call back at [phone] regarding her knee. Sometime today

## 2019-01-22 NOTE — PROGRESS NOTES
Poonam Alvarez presents to clinic today for the third left knee Euflexxa injection.    Exam demonstrates the no effusion in the  left knee, and the skin is intact.    Diagnosis: osteoarthritis knee    After time out was performed and patient ID, side, and site were verified, the  left  knee was sterilly prepped in the standard fashion.  A 22-gauge needle was introduced into left knee joint from an sawyer-lateral site without complication and knee was then injected with 2 ml of Euflexxa.  Sterile dressing was applied.  The patient was instructed to resume activities as tolerated and to call with any problems.     Pt reports good relief and will f/u as needed.

## 2019-02-13 ENCOUNTER — TELEPHONE (OUTPATIENT)
Dept: ORTHOPEDICS | Facility: CLINIC | Age: 69
End: 2019-02-13

## 2019-02-13 NOTE — TELEPHONE ENCOUNTER
----- Message from Gloria Ly MA sent at 2/13/2019  1:52 PM CST -----  Contact: patient       ----- Message -----  From: Aliza Aldrich  Sent: 2/13/2019   1:28 PM  To: Marietta Owen Staff    Please call pt at [phone]    Patient is requesting to have an MRI scan of the left knee    Thank you

## 2019-02-13 NOTE — TELEPHONE ENCOUNTER
Returned call to patient and explained that the MRI wouldn't benefit her as the xray already shows significant arthritis. She understands and would like to try a cortisone injection tomorrow afternoon. Appt made as requested.

## 2019-02-14 ENCOUNTER — OFFICE VISIT (OUTPATIENT)
Dept: ORTHOPEDICS | Facility: CLINIC | Age: 69
End: 2019-02-14
Payer: MEDICARE

## 2019-02-14 VITALS — HEIGHT: 72 IN | BODY MASS INDEX: 28.16 KG/M2 | WEIGHT: 207.88 LBS

## 2019-02-14 DIAGNOSIS — M17.12 PRIMARY OSTEOARTHRITIS OF LEFT KNEE: Primary | ICD-10-CM

## 2019-02-14 PROCEDURE — 99213 OFFICE O/P EST LOW 20 MIN: CPT | Mod: PBBFAC | Performed by: NURSE PRACTITIONER

## 2019-02-14 PROCEDURE — 99999 PR PBB SHADOW E&M-EST. PATIENT-LVL III: ICD-10-PCS | Mod: PBBFAC,,, | Performed by: NURSE PRACTITIONER

## 2019-02-14 PROCEDURE — 99213 PR OFFICE/OUTPT VISIT, EST, LEVL III, 20-29 MIN: ICD-10-PCS | Mod: S$PBB,25,, | Performed by: NURSE PRACTITIONER

## 2019-02-14 PROCEDURE — 20610 DRAIN/INJ JOINT/BURSA W/O US: CPT | Mod: PBBFAC | Performed by: NURSE PRACTITIONER

## 2019-02-14 PROCEDURE — 20610 PR DRAIN/INJECT LARGE JOINT/BURSA: ICD-10-PCS | Mod: S$PBB,LT,, | Performed by: NURSE PRACTITIONER

## 2019-02-14 PROCEDURE — 20610 DRAIN/INJ JOINT/BURSA W/O US: CPT | Mod: S$PBB,LT,, | Performed by: NURSE PRACTITIONER

## 2019-02-14 PROCEDURE — 99213 OFFICE O/P EST LOW 20 MIN: CPT | Mod: S$PBB,25,, | Performed by: NURSE PRACTITIONER

## 2019-02-14 PROCEDURE — 99999 PR PBB SHADOW E&M-EST. PATIENT-LVL III: CPT | Mod: PBBFAC,,, | Performed by: NURSE PRACTITIONER

## 2019-02-14 RX ADMIN — TRIAMCINOLONE ACETONIDE 40 MG: 40 INJECTION, SUSPENSION INTRA-ARTICULAR; INTRAMUSCULAR at 10:02

## 2019-02-15 ENCOUNTER — TELEPHONE (OUTPATIENT)
Dept: ORTHOPEDICS | Facility: CLINIC | Age: 69
End: 2019-02-15

## 2019-02-15 NOTE — TELEPHONE ENCOUNTER
----- Message from Charanjit Ravi sent at 2/15/2019  1:12 PM CST -----  Contact: self  Needs Advice    Reason for call: Need to speak to someone        Communication Preference: Phone     Additional Information: N/A

## 2019-02-15 NOTE — TELEPHONE ENCOUNTER
Attempted to contact  several times but did not get an answer. Also the phone did not ring at all. I also was unable to leave a message.

## 2019-02-17 RX ORDER — TRIAMCINOLONE ACETONIDE 40 MG/ML
40 INJECTION, SUSPENSION INTRA-ARTICULAR; INTRAMUSCULAR
Status: COMPLETED | OUTPATIENT
Start: 2019-02-14 | End: 2019-02-14

## 2019-02-17 NOTE — PROGRESS NOTES
CC: Pain of the Left Knee      HPI: Pt with c/o increased left knee pain for the past few weeks. She completed the euflexxa injections at the end of January and reports no relief from them. She wanted to get an MRI, but I explained to her, by phone, that an MRI is unnecessary in light of the amount of arthritis we know she has as seen on the xray. She had a right knee replacement in the past and is not interested in a knee replacement at this time. I explained that we can try a cortisone injection to see if that will give her some pain relief for now and she comes in today to get that injection. She is ambulating without assistive device. There is not a limp.    ROS  General: denies fever and chills  Resp: no c/o sob  CVS: no c/o cp  MSK: c/o left knee pain which is aching and global and constant. There is some locking and catching as well    PE  General: AAOx3, pleasant and cooperative  Resp: respirations even and unlabored  MSK: left knee exam  0 degrees extension  120 degrees flexion  No warmth or erythema   - effusion    Assessment:  Left knee djd    Plan:  Cortisone injection left knee today  RICE  nsaids prn  F/u as needed    Knee Injection Procedure Note    Diagnosis: left knee degenerative arthritis  Indications: left knee pain  Procedure Details: Verbal consent was obtained for the procedure. The injection site was identified and the skin was prepared with alcohol. The left knee was injected from an anterolateral approach with 1 ml of Kenalog and 4 ml Lidocaine under sterile technique using a 22 gauge needle. The needle was removed and the area cleansed and dressed.  Complications:  Patient tolerated the procedure well.    she was advised to rest the knee today, using ice and elevation as needed for comfort and swelling.Immediate relief of the knee pain may be short lived and secondary to the lidocaine. she may have an increase in discomfort tonight followed by steady improvement over the next several days.  It may take 1-2 weeks following the injection to get the full benefit of the medication.

## 2019-02-18 ENCOUNTER — HOSPITAL ENCOUNTER (OUTPATIENT)
Dept: RADIOLOGY | Facility: HOSPITAL | Age: 69
Discharge: HOME | End: 2019-02-18
Attending: INTERNAL MEDICINE
Payer: MEDICARE

## 2019-02-18 DIAGNOSIS — Z12.31 SCREENING MAMMOGRAM, ENCOUNTER FOR: ICD-10-CM

## 2019-02-18 PROCEDURE — 77063 BREAST TOMOSYNTHESIS BI: CPT | Mod: 26,,, | Performed by: RADIOLOGY

## 2019-02-18 PROCEDURE — 77067 MAMMO DIGITAL SCREENING BILAT WITH TOMOSYNTHESIS_CAD: ICD-10-PCS | Mod: 26,,, | Performed by: RADIOLOGY

## 2019-02-18 PROCEDURE — 77067 SCR MAMMO BI INCL CAD: CPT | Mod: 26,,, | Performed by: RADIOLOGY

## 2019-02-18 PROCEDURE — 77063 MAMMO DIGITAL SCREENING BILAT WITH TOMOSYNTHESIS_CAD: ICD-10-PCS | Mod: 26,,, | Performed by: RADIOLOGY

## 2019-02-18 PROCEDURE — 77067 SCR MAMMO BI INCL CAD: CPT | Mod: TC

## 2019-02-28 ENCOUNTER — OFFICE VISIT (OUTPATIENT)
Dept: SPORTS MEDICINE | Facility: CLINIC | Age: 69
End: 2019-02-28
Payer: MEDICARE

## 2019-02-28 VITALS — WEIGHT: 207 LBS | HEIGHT: 72 IN | BODY MASS INDEX: 28.04 KG/M2

## 2019-02-28 DIAGNOSIS — G89.29 CHRONIC RIGHT SHOULDER PAIN: Primary | ICD-10-CM

## 2019-02-28 DIAGNOSIS — M25.511 CHRONIC RIGHT SHOULDER PAIN: Primary | ICD-10-CM

## 2019-02-28 DIAGNOSIS — M19.011 DJD OF RIGHT SHOULDER: ICD-10-CM

## 2019-02-28 PROCEDURE — 99999 PR PBB SHADOW E&M-EST. PATIENT-LVL II: CPT | Mod: PBBFAC,,, | Performed by: ORTHOPAEDIC SURGERY

## 2019-02-28 PROCEDURE — 99999 PR PBB SHADOW E&M-EST. PATIENT-LVL II: ICD-10-PCS | Mod: PBBFAC,,, | Performed by: ORTHOPAEDIC SURGERY

## 2019-02-28 PROCEDURE — 99214 PR OFFICE/OUTPT VISIT, EST, LEVL IV, 30-39 MIN: ICD-10-PCS | Mod: S$PBB,,, | Performed by: ORTHOPAEDIC SURGERY

## 2019-02-28 PROCEDURE — 99212 OFFICE O/P EST SF 10 MIN: CPT | Mod: PBBFAC,PO | Performed by: ORTHOPAEDIC SURGERY

## 2019-02-28 PROCEDURE — 99214 OFFICE O/P EST MOD 30 MIN: CPT | Mod: S$PBB,,, | Performed by: ORTHOPAEDIC SURGERY

## 2019-02-28 NOTE — PROGRESS NOTES
CC:  right shoulder pain     68 y.o. Female Chronic right shoulder pain for about 1 year without TOYA.       She reports that the pain and weakness is worse with overhead activity. It also bothers her at night.    Is affecting ADLs.     PHYSICAL EXAMINATION:  General:  The patient is alert and oriented x 3.  Mood is pleasant.  Observation of ears, eyes and nose reveal no gross abnormalities.  No labored breathing observed.  Gait is coordinated. Patient can toe walk and heel walk without difficulty.       RIGHT Shoulder / Upper Extremity Exam    OBSERVATION:     Swelling  none  Deformity  none   Discoloration  none   Scapular winging none   Scars   none  Atrophy  none    TENDERNESS / CREPITUS (T/C):          T/C      T/C   Clavicle   -/-  SUPRAspinatus    -/-     AC Jt.    -/-  INFRAspinatus  -/-    SC Jt.    -/-  Deltoid    -/-      G. Tuberosity  -/-  LH BICEP groove  +/-   Acromion:  -/-  Midline Neck   -/-     Scapular Spine -/-  Trapezium   -/-   SMA Scapula  -/-  GH jt. line - post  -/-     Scapulothoracic  -/-         ROM: (* = with pain)  Right shoulder   Left shoulder        AROM (PROM)   AROM (PROM)   FE    170° (175°)     170° (175°)     ER at 0°    60°  (65°)    60°  (65°)   ER at 90° ABD  85°  (90°)    90°  (90°)   IR at 90°  ABD   40  (40°)     40  (40°)      IR (spine level)   T10     T10    STRENGTH: (* = with pain) RIGHT SHOULDER  LEFT SHOULDER   SCAPTION   5/5    5/5    IR    5/5    5/5   ER    5/5    5/5   BICEPS   5/5    5/5   Deltoid    5/5    5/5     SIGNS:  Painful side       NEER   +    OHENRIS  neg    RODAS   +    SPEEDS  neg     DROP ARM   +   BELLY PRESS neg   Superior escape none    LIFT-OFF  neg   X-Body ADD    neg    MOVING VALGUS neg        STABILITY TESTING    RIGHT SHOULDER   LEFT SHOULDER     Translation     Anterior  up face     up face    Posterior  up face    up face    Sulcus   < 10mm    < 10 mm     Signs   Apprehension   neg      neg       Relocation   no change     no  change      Jerk test  neg     neg    EXTREMITY NEURO-VASCULAR EXAM    Sensation grossly intact to light touch all dermatomal regions.    DTR 2+ Biceps, Triceps, BR and Negative Jarreds sign   Grossly intact motor function at Elbow, Wrist and Hand   Distal pulses radial and ulnar 2+, brisk cap refill, symmetric.      NECK:  Painless FROM and spinous processes non-tender. Negative Spurlings sign.      OTHER FINDINGS:  + scapular dyskinesia    XRAYS (11/29/18):  Significant DJD and joint space narrowing.  Irregularity of the osseous labrum identified.  There is also evidence of 1 cm calcified and/or ossified density adjacent to the a posterior aspect of the labrum probably a old fractured fragment versus loose body.  There is also some calcification noted above the humeral head consistent with calcified tendinopathy.  The degenerative osteoarthritic changes progressed as compared to the previous study.  No bone destruction.      MRI (12/26/18):  Rotator cuff tendinosis with full-thickness supraspinatus tendon tear and partial-thickness infraspinatus tendon tear.  Moderate supraspinatus muscle atrophy with mild atrophy of remaining rotator cuff muscles.  Mineralized foci within the rotator cuff may represent heterotopic calcification or calcific tendinitis.    Severe glenohumeral cartilage loss with subchondral edema and cystic change.  Mild effusion with synovitis.    Biceps tendinosis and tenosynovitis.    Moderate acromioclavicular arthrosis.    Subacromial subdeltoid bursitis.    CT (12/26/18):  There is severe DJD of the glenohumeral joint a pattern suggestive of H ADD arthropathy.  No fracture dislocation bone destruction seen.  There is DJD of the AC joint and spine.  Right lung is clear.  No loose bodies are seen.  No soft tissue masses are identified.  There is calcific rotator cuff tendinitis.      ASSESSMENT: Right shoulder pain, DJD and rotator cuff tear    PLAN:    1. Total shoulder replacement with  possible RCR (open)  2. Will need PCP clearance    All questions were answered, pt will contact us for questions or concerns in the interim.

## 2019-03-13 ENCOUNTER — TELEPHONE (OUTPATIENT)
Dept: SPORTS MEDICINE | Facility: CLINIC | Age: 69
End: 2019-03-13

## 2019-03-13 DIAGNOSIS — M19.011 DJD OF RIGHT SHOULDER: Primary | ICD-10-CM

## 2019-03-13 DIAGNOSIS — M19.011 PRIMARY OSTEOARTHRITIS OF RIGHT SHOULDER: Primary | ICD-10-CM

## 2019-03-13 NOTE — TELEPHONE ENCOUNTER
Right shoulder djd 4/19    ----- Message from Lizbet Villalobos MA sent at 3/13/2019  8:36 AM CDT -----  [facility]    Pre op - 03/26/19    Date of surgery - 04/01/19

## 2019-03-18 ENCOUNTER — TELEPHONE (OUTPATIENT)
Dept: INTERNAL MEDICINE | Facility: CLINIC | Age: 69
End: 2019-03-18

## 2019-03-18 NOTE — TELEPHONE ENCOUNTER
----- Message from Yolette Flannery sent at 3/18/2019  4:29 PM CDT -----  Contact: Pt  Pt was calling to speak with nurse about a sooner appt.    Pt would like a call back at [phone].    Thank You.

## 2019-03-18 NOTE — TELEPHONE ENCOUNTER
----- Message from Yolette Flannery sent at 3/18/2019  4:29 PM CDT -----  Contact: Pt  Pt was calling to speak with nurse about a sooner appt.    Pt would like a call back at [phone].    Thank You.

## 2019-03-19 ENCOUNTER — TELEPHONE (OUTPATIENT)
Dept: INTERNAL MEDICINE | Facility: CLINIC | Age: 69
End: 2019-03-19

## 2019-03-19 DIAGNOSIS — I10 ESSENTIAL HYPERTENSION: Primary | ICD-10-CM

## 2019-03-19 NOTE — TELEPHONE ENCOUNTER
----- Message from Lev Mccall MA sent at 3/19/2019  1:51 PM CDT -----  Regarding: FW: Medical Clearance      ----- Message -----  From: Daniel Zavala MA  Sent: 3/19/2019   1:40 PM  To: Lindsey Bach MD, [facility] Staff  Subject: Medical Clearance                                Dr. Bach,    Dr. Xie is recommending surgery for your mutual patient.  Prior to the pre-op appointment on 3/26/19 they will need to have the following completed through your office:    -EKG  -Blood work  -Letter stating medically cleared for surgery    Please feel free to contact myself or Dr. Xie with questions.    Thank you,    Daniel  Medical Assistant to Angelica Mahan PA-C

## 2019-03-21 ENCOUNTER — HOSPITAL ENCOUNTER (OUTPATIENT)
Dept: CARDIOLOGY | Facility: CLINIC | Age: 69
Discharge: HOME | End: 2019-03-21
Payer: MEDICARE

## 2019-03-21 DIAGNOSIS — I10 ESSENTIAL HYPERTENSION: ICD-10-CM

## 2019-03-21 PROCEDURE — 93010 EKG 12-LEAD: ICD-10-PCS | Mod: S$PBB,,, | Performed by: INTERNAL MEDICINE

## 2019-03-21 PROCEDURE — 93010 ELECTROCARDIOGRAM REPORT: CPT | Mod: S$PBB,,, | Performed by: INTERNAL MEDICINE

## 2019-03-21 PROCEDURE — 93005 ELECTROCARDIOGRAM TRACING: CPT | Mod: PBBFAC | Performed by: INTERNAL MEDICINE

## 2019-03-23 ENCOUNTER — LAB VISIT (OUTPATIENT)
Dept: LAB | Facility: HOSPITAL | Age: 69
End: 2019-03-23
Attending: INTERNAL MEDICINE
Payer: MEDICARE

## 2019-03-23 DIAGNOSIS — I10 ESSENTIAL HYPERTENSION: ICD-10-CM

## 2019-03-23 LAB
ALBUMIN SERPL BCP-MCNC: 3.7 G/DL
ALP SERPL-CCNC: 81 U/L
ALT SERPL W/O P-5'-P-CCNC: 16 U/L
ANION GAP SERPL CALC-SCNC: 10 MMOL/L
AST SERPL-CCNC: 23 U/L
BASOPHILS # BLD AUTO: 0.02 K/UL
BASOPHILS NFR BLD: 0.5 %
BILIRUB SERPL-MCNC: 0.4 MG/DL
BUN SERPL-MCNC: 17 MG/DL
CALCIUM SERPL-MCNC: 10.1 MG/DL
CHLORIDE SERPL-SCNC: 105 MMOL/L
CO2 SERPL-SCNC: 27 MMOL/L
CREAT SERPL-MCNC: 0.8 MG/DL
DIFFERENTIAL METHOD: ABNORMAL
EOSINOPHIL # BLD AUTO: 0.1 K/UL
EOSINOPHIL NFR BLD: 1.4 %
ERYTHROCYTE [DISTWIDTH] IN BLOOD BY AUTOMATED COUNT: 15.7 %
EST. GFR  (AFRICAN AMERICAN): >60 ML/MIN/1.73 M^2
EST. GFR  (NON AFRICAN AMERICAN): >60 ML/MIN/1.73 M^2
GLUCOSE SERPL-MCNC: 69 MG/DL
HCT VFR BLD AUTO: 37.7 %
HGB BLD-MCNC: 11.6 G/DL
LYMPHOCYTES # BLD AUTO: 1.7 K/UL
LYMPHOCYTES NFR BLD: 39.1 %
MCH RBC QN AUTO: 26.4 PG
MCHC RBC AUTO-ENTMCNC: 30.8 G/DL
MCV RBC AUTO: 86 FL
MONOCYTES # BLD AUTO: 0.5 K/UL
MONOCYTES NFR BLD: 10.7 %
NEUTROPHILS # BLD AUTO: 2 K/UL
NEUTROPHILS NFR BLD: 48.1 %
PLATELET # BLD AUTO: 259 K/UL
PMV BLD AUTO: 10.6 FL
POTASSIUM SERPL-SCNC: 4.2 MMOL/L
PROT SERPL-MCNC: 7 G/DL
RBC # BLD AUTO: 4.4 M/UL
SODIUM SERPL-SCNC: 142 MMOL/L
WBC # BLD AUTO: 4.22 K/UL

## 2019-03-23 PROCEDURE — 80053 COMPREHEN METABOLIC PANEL: CPT

## 2019-03-23 PROCEDURE — 85025 COMPLETE CBC W/AUTO DIFF WBC: CPT

## 2019-03-23 PROCEDURE — 36415 COLL VENOUS BLD VENIPUNCTURE: CPT

## 2019-03-25 ENCOUNTER — OFFICE VISIT (OUTPATIENT)
Dept: INTERNAL MEDICINE | Facility: CLINIC | Age: 69
End: 2019-03-25
Payer: MEDICARE

## 2019-03-25 VITALS
HEIGHT: 72 IN | HEART RATE: 70 BPM | SYSTOLIC BLOOD PRESSURE: 110 MMHG | DIASTOLIC BLOOD PRESSURE: 70 MMHG | WEIGHT: 215 LBS | BODY MASS INDEX: 29.12 KG/M2

## 2019-03-25 DIAGNOSIS — M25.511 CHRONIC RIGHT SHOULDER PAIN: Primary | ICD-10-CM

## 2019-03-25 DIAGNOSIS — J30.9 ALLERGIC RHINITIS, UNSPECIFIED SEASONALITY, UNSPECIFIED TRIGGER: ICD-10-CM

## 2019-03-25 DIAGNOSIS — K21.9 GASTROESOPHAGEAL REFLUX DISEASE WITHOUT ESOPHAGITIS: ICD-10-CM

## 2019-03-25 DIAGNOSIS — G89.29 CHRONIC RIGHT SHOULDER PAIN: Primary | ICD-10-CM

## 2019-03-25 DIAGNOSIS — E89.0 HYPOTHYROIDISM, POSTABLATIVE: ICD-10-CM

## 2019-03-25 PROCEDURE — 99999 PR PBB SHADOW E&M-EST. PATIENT-LVL III: CPT | Mod: PBBFAC,,, | Performed by: INTERNAL MEDICINE

## 2019-03-25 PROCEDURE — 99214 OFFICE O/P EST MOD 30 MIN: CPT | Mod: S$PBB,,, | Performed by: INTERNAL MEDICINE

## 2019-03-25 PROCEDURE — 99213 OFFICE O/P EST LOW 20 MIN: CPT | Mod: PBBFAC | Performed by: INTERNAL MEDICINE

## 2019-03-25 PROCEDURE — 99999 PR PBB SHADOW E&M-EST. PATIENT-LVL III: ICD-10-PCS | Mod: PBBFAC,,, | Performed by: INTERNAL MEDICINE

## 2019-03-25 PROCEDURE — 99214 PR OFFICE/OUTPT VISIT, EST, LEVL IV, 30-39 MIN: ICD-10-PCS | Mod: S$PBB,,, | Performed by: INTERNAL MEDICINE

## 2019-03-25 NOTE — PROGRESS NOTES
CHIEF COMPLAINT: pre op exam f      HISTORY OF PRESENT ILLNESS: This is a 68-year-old woman who presents for pre op exam for  right shoulder arthroplasty on 19 with Dr Sage Xie. She currently does not have right shoulder pain. When she has right shoulder pain, her pain is 6/10.  She has decreased range of motion at the right shoulder. She can have pain to sleep on the right side.  Her right shoulder issues are limiting her in activities of daily living. She has pain when she tries to get dressed and doing housework.     The hyperpigmentation of the skin of her face, neck and upper chest is improving. She has seen Pino Clay MD.       BP has been doing well.  She is taking HCTZ 12.5 mg daily for edema.  No swelling.     Weight is good. SHe had lost 34 pounds on ideal protein. She has been off ideal protein for the last year and has gained 8 pounds in the last year.     Knees are doing fine. Had a right knee replacement.     She is taking mobic 15 mg on  only(due to stomach issues). Stomach is ok. She has not needed ranitidine as needed. .      She continues to take Levoxyl 137 mcg daily for hypothyroidism.       She has a history of positive PPD 2010 - took INH for 9 months, CXR  was fine. NO fever, chills, night sweats, weight loss.       Her  (she is  from him)  of lung cancer in 2015      She takes stool softner - dulcolax and metamucil daily to keep her bowels regular.      She had an incarcerated inguinal hernia repaired on 17      She is currently working for [employer] at [employer] for [employer] - as a .     She is taking gabapentin 100 mg 2 in the morning and 1 at night for left knee pain - pain is better. She is getting off the gabapentin.     REVIEW OF SYSTEMS: She denies fevers, chills, night sweats, fatigue, visual change, hearing loss, sinus congestion, sore throat, chest pain, shortness of breath, nausea, vomiting, constipation,  diarrhea, dysuria, hematuria, polydipsia, polyuria, joint pain, muscle pain, headaches, anxiety, depression, insomnia.         REVIEW OF SYSTEMS: She denies fevers, chills, night sweats, visual change, hearing loss, sinus congestion, sore throat, chest pain, shortness of breath, nausea, vomiting, constipation, diarrhea, dysuria, hematuria, polydipsia, polyuria, other joint pain, muscle pain, headaches, anxiety, depression, insomnia.           PAST MEDICAL HISTORY:   1. Atrial flutter, status post ablation in 2008.   2. Graves disease, status post radioactive iodine, now hypothyroid.   3. Hypertension.   4. History of headaches.   5. Osteoarthritis of the shoulders, hands and knees.   6. Status post arthroscopic surgery of the right knee 2008.   7. Status post arthroscopic surgery of the right knee in .   8. Tummy tuck 20 years ago.   9. Hemorrhoids removed.   10. Cataract removal bilaterally  11. Positive PPD diagnosed , completed 9 months of INH 1/10  12. GERD     SOCIAL HISTORY: She does not smoke. She drinks alcohol twice a year.   She is , has three children. She is a .     FAMILY HISTORY: Father  at age 83 from complications of pneumonia.   Mother  at age 98 from old age. Sister had a pulmonary embolus, another   sister had sarcoidosis. A son has Crohn's disease.        MEDICATIONS AND ALLERGIES: Updated in EPIC.       PHYSICAL EXAMINATION:    /70   Pulse 70   Ht 6' (1.829 m)   Wt 97.5 kg (215 lb)   BMI 29.16 kg/m²     GENERAL: She is alert, oriented, no apparent distress. Affect within normal limits.   Conjunctiva anicteric. Tympanic membranes clear. Oropharynx clear.   NECK: Supple.   RESPIRATORY: Effort normal. Lungs are clear to auscultation.   HEART: Regular rate and rhythm without murmurs, gallops or rubs.   No lower extremity edema.   Decreased range of motion of the right shoulder     EKG 3/21/19 reviewed    CBC and CMP acceptable.            ASSESSMENT AND PLAN:   1. OA right shoulder -  cleared for surgery. May have procedure done.  SHe had immediate release oxycodone and oxycodone 12 hour preparation after her knee surgery. She does not want oxycodone immediate release. She has taken Tylenol #3 without problems.   2. HTN- doing well   3. OA knees - s/p injection in left knee - s/p right knee replacement - doing well.  4. ALlergic rhinitis -stable   5. Hypothyroidism - tsh stable  6. GERD - asx  7. Obestiy - doing well with diet, exercise and weight loss.   8. Positive ppd - cxr stable  9. Left inguinal hernia repair - doing well    Screening - mammogram 2/19. Bone density was normal 07/2008. Colonoscopy due 11/5/2015 - normal due 2025    I will see her back in 6 months, sooner if problems arise.   INfluenza and adacel  Pneumovax 12/12  Prevnar 11/15

## 2019-03-26 ENCOUNTER — OFFICE VISIT (OUTPATIENT)
Dept: SPORTS MEDICINE | Facility: CLINIC | Age: 69
End: 2019-03-26
Payer: MEDICARE

## 2019-03-26 VITALS — BODY MASS INDEX: 29.12 KG/M2 | HEIGHT: 72 IN | WEIGHT: 215 LBS

## 2019-03-26 DIAGNOSIS — M19.011 PRIMARY OSTEOARTHRITIS OF RIGHT SHOULDER: Primary | ICD-10-CM

## 2019-03-26 PROCEDURE — 99999 PR PBB SHADOW E&M-EST. PATIENT-LVL III: CPT | Mod: PBBFAC,,, | Performed by: PHYSICIAN ASSISTANT

## 2019-03-26 PROCEDURE — 99499 NO LOS: ICD-10-PCS | Mod: S$PBB,,, | Performed by: PHYSICIAN ASSISTANT

## 2019-03-26 PROCEDURE — 99499 UNLISTED E&M SERVICE: CPT | Mod: S$PBB,,, | Performed by: PHYSICIAN ASSISTANT

## 2019-03-26 PROCEDURE — 99213 OFFICE O/P EST LOW 20 MIN: CPT | Mod: PBBFAC,PO | Performed by: PHYSICIAN ASSISTANT

## 2019-03-26 PROCEDURE — 99999 PR PBB SHADOW E&M-EST. PATIENT-LVL III: ICD-10-PCS | Mod: PBBFAC,,, | Performed by: PHYSICIAN ASSISTANT

## 2019-03-26 RX ORDER — PROMETHAZINE HYDROCHLORIDE 25 MG/1
25 TABLET ORAL EVERY 6 HOURS PRN
Qty: 40 TABLET | Refills: 0 | Status: SHIPPED | OUTPATIENT
Start: 2019-03-26 | End: 2019-12-23

## 2019-03-26 RX ORDER — OXYCODONE HYDROCHLORIDE 5 MG/1
10 TABLET ORAL
Status: CANCELLED | OUTPATIENT
Start: 2019-03-26

## 2019-03-26 RX ORDER — ASPIRIN 325 MG
325 TABLET, DELAYED RELEASE (ENTERIC COATED) ORAL 2 TIMES DAILY
Qty: 42 TABLET | Refills: 0 | Status: SHIPPED | OUTPATIENT
Start: 2019-03-26 | End: 2020-06-26 | Stop reason: DRUGHIGH

## 2019-03-26 RX ORDER — ROPIVACAINE HYDROCHLORIDE 2 MG/ML
6 INJECTION, SOLUTION EPIDURAL; INFILTRATION; PERINEURAL CONTINUOUS
Status: CANCELLED | OUTPATIENT
Start: 2019-03-26

## 2019-03-26 RX ORDER — AMOXICILLIN 250 MG
1 CAPSULE ORAL 2 TIMES DAILY
Status: CANCELLED | OUTPATIENT
Start: 2019-03-26

## 2019-03-26 RX ORDER — ACETAMINOPHEN AND CODEINE PHOSPHATE 300; 30 MG/1; MG/1
1 TABLET ORAL EVERY 6 HOURS PRN
Qty: 30 TABLET | Refills: 0 | Status: SHIPPED | OUTPATIENT
Start: 2019-03-26 | End: 2019-12-23

## 2019-03-26 RX ORDER — FAMOTIDINE 20 MG/1
20 TABLET, FILM COATED ORAL 2 TIMES DAILY
Status: CANCELLED | OUTPATIENT
Start: 2019-03-26

## 2019-03-26 RX ORDER — OXYCODONE HYDROCHLORIDE 5 MG/1
5 TABLET ORAL
Status: CANCELLED | OUTPATIENT
Start: 2019-03-26

## 2019-03-26 RX ORDER — TRAMADOL HYDROCHLORIDE 50 MG/1
50 TABLET ORAL EVERY 6 HOURS PRN
Qty: 40 TABLET | Refills: 0 | Status: SHIPPED | OUTPATIENT
Start: 2019-03-26 | End: 2019-04-16 | Stop reason: SDUPTHER

## 2019-03-26 RX ORDER — ACETAMINOPHEN 500 MG
1000 TABLET ORAL EVERY 6 HOURS
Status: CANCELLED | OUTPATIENT
Start: 2019-03-26 | End: 2019-03-28

## 2019-03-26 RX ORDER — DEXTROSE MONOHYDRATE AND SODIUM CHLORIDE 5; .9 G/100ML; G/100ML
INJECTION, SOLUTION INTRAVENOUS CONTINUOUS
Status: CANCELLED | OUTPATIENT
Start: 2019-03-26

## 2019-03-26 RX ORDER — MUPIROCIN 20 MG/G
OINTMENT TOPICAL
Status: CANCELLED | OUTPATIENT
Start: 2019-03-26

## 2019-03-26 RX ORDER — SODIUM CHLORIDE 0.9 % (FLUSH) 0.9 %
3 SYRINGE (ML) INJECTION
Status: CANCELLED | OUTPATIENT
Start: 2019-03-26

## 2019-03-26 RX ORDER — MUPIROCIN 20 MG/G
1 OINTMENT TOPICAL 2 TIMES DAILY
Status: CANCELLED | OUTPATIENT
Start: 2019-03-26 | End: 2019-03-31

## 2019-03-26 RX ORDER — ONDANSETRON 4 MG/1
8 TABLET, ORALLY DISINTEGRATING ORAL EVERY 8 HOURS PRN
Status: CANCELLED | OUTPATIENT
Start: 2019-03-26

## 2019-03-26 RX ORDER — POLYETHYLENE GLYCOL 3350 17 G/17G
17 POWDER, FOR SOLUTION ORAL DAILY
Status: CANCELLED | OUTPATIENT
Start: 2019-03-26

## 2019-03-26 NOTE — H&P (VIEW-ONLY)
"CC:  right shoulder pain     HPI:    PLAN OF ACTION: Poonam Alvarez  is here for a completion of her perioperative paperwork. she Is scheduled to undergo right shoulder arthoplasty on 4/1/19.  She does need clearance for this procedure which she received from Dr. Bach. "cleared for surgery. May have procedure done."    Risks, indications and benefits of the surgical procedure were discussed with the patient. All questions with regard to surgery, rehab, expected return to functional activities, activities of daily living and recreational endeavors were answered to her satisfaction.    Review of patient's allergies indicates:   Allergen Reactions    Hydrocodone-acetaminophen Other (See Comments)     Low blood pressure    Oxycodone-acetaminophen Other (See Comments)     Causes low blood pressure       Past Medical History:   Diagnosis Date    AR (allergic rhinitis) 12/7/2012    Atrial flutter     ablation October 2008    Cataract     Generalized headaches     GERD (gastroesophageal reflux disease) 03/08/2013    resolved    Grave's disease     s/p radioactive iodine, now hypothyroidism    Keloid cicatrix     Obesity     Osteoarthritis     shoulders, hands, knees    Positive PPD, treated     9 months of INH, completed 1/2010       Past Surgical History:   Procedure Laterality Date    ABLATION OF DYSRHYTHMIC FOCUS  10/24/2008    atrial flutter    ARTHROPLASTY, KNEE, TOTAL Right 3/27/2014    Performed [facility] at [facility] 2ND FLR    CAROTID ENDARTERECTOMY      CATARACT EXTRACTION W/  INTRAOCULAR LENS IMPLANT Bilateral     COLONOSCOPY N/A 11/5/2015    Performed by Jose Ly MD at [facility] ENDO (4TH FLR)    EYE SURGERY      cataract removal right eye    INSERTION-IMPLANT,  MESH PLACEMENT-55504 Left 8/4/2017    [facility]    KNEE ARTHROSCOPY W/ DEBRIDEMENT      right, 2005 and 2008    KNEE SURGERY  3-27-14    right TKA    " REPAIR-HERNIA-INGUINAL-INITIAL (5 YRS+) OPEN - 39011 Left 8/4/2017    [facility] at [facility]       Social History     Socioeconomic History    Marital status:      Spouse name: Not on file    Number of children: Not on file    Years of education: Not on file    Highest education level: Not on file   Occupational History    Occupation:      Employer: [employer]   Social Needs    Financial resource strain: Not on file    Food insecurity:     Worry: Not on file     Inability: Not on file    Transportation needs:     Medical: Not on file     Non-medical: Not on file   Tobacco Use    Smoking status: Never Smoker    Smokeless tobacco: Never Used   Substance and Sexual Activity    Alcohol use: No    Drug use: No    Sexual activity: Not Currently     Partners: Male     Birth control/protection: None   Lifestyle    Physical activity:     Days per week: Not on file     Minutes per session: Not on file    Stress: Not on file   Relationships    Social connections:     Talks on phone: Not on file     Gets together: Not on file     Attends Judaism service: Not on file     Active member of club or organization: Not on file     Attends meetings of clubs or organizations: Not on file     Relationship status: Not on file    Intimate partner violence:     Fear of current or ex partner: Not on file     Emotionally abused: Not on file     Physically abused: Not on file     Forced sexual activity: Not on file   Other Topics Concern    Are you pregnant or think you may be? No    Breast-feeding No   Social History Narrative           Family History   Problem Relation Age of Onset    Diabetes Mother     Glaucoma Father     Cataracts Father     No Known Problems Sister     No Known Problems Son     No Known Problems Brother     No Known Problems Maternal Aunt     No Known Problems Maternal Uncle     No Known Problems Paternal Aunt      No Known Problems Paternal Uncle     No Known Problems Maternal Grandmother     No Known Problems Maternal Grandfather     No Known Problems Paternal Grandmother     No Known Problems Paternal Grandfather     Colon cancer Neg Hx     Ovarian cancer Neg Hx     Breast cancer Neg Hx          Current Outpatient Medications:     aspirin 81 MG chewable tablet, Take 1 tablet by mouth Daily., Disp: , Rfl:     b complex vitamins capsule, Take 1 capsule by mouth once daily., Disp: , Rfl:     calcium-vitamin D3-vitamin K (VIACTIV) 500-100-40 mg-unit-mcg Chew, Take 1 tablet by mouth Daily., Disp: , Rfl:     cholecalciferol, vitamin D3, (VITAMIN D3) 2,000 unit Cap, Take 1 capsule by mouth once daily., Disp: , Rfl:     fish oil-omega-3 fatty acids 300-1,000 mg capsule, Take 2 g by mouth once daily. , Disp: , Rfl:     gabapentin (NEURONTIN) 100 MG capsule, Take 3 capsules (300 mg total) by mouth every evening., Disp: 90 capsule, Rfl: 2    hydroCHLOROthiazide (HYDRODIURIL) 12.5 MG Tab, TAKE 1/2 TO 1 TABLET BY MOUTH DAILY AS NEEDED, Disp: 90 tablet, Rfl: 4    meloxicam (MOBIC) 15 MG tablet, Take 1 tablet (15 mg total) by mouth once daily., Disp: 90 tablet, Rfl: 1    multivitamin-minerals-lutein (CENTRUM SILVER) Tab, Take 1 tablet by mouth once daily. , Disp: , Rfl:     senna (SENNA CONCENTRATE) 8.6 mg tablet, Take 1 tablet by mouth once daily., Disp: 40 tablet, Rfl: 0    SYNTHROID 137 mcg Tab tablet, Take 1 tablet (137 mcg total) by mouth once daily., Disp: 90 tablet, Rfl: 4    UBIDECARENONE (COENZYME Q10) 100 mg Tab, Take 1 tablet by mouth once daily., Disp: , Rfl:     acetaminophen-codeine 300-30mg (TYLENOL #3) 300-30 mg Tab, Take 1 tablet by mouth every 6 (six) hours as needed., Disp: 30 tablet, Rfl: 0    aspirin (ECOTRIN) 325 MG EC tablet, Take 1 tablet (325 mg total) by mouth 2 (two) times daily. Take an antacid with medication. for 42 doses, Disp: 42 tablet, Rfl: 0    promethazine (PHENERGAN) 25 MG  tablet, Take 1 tablet (25 mg total) by mouth every 6 (six) hours as needed for Nausea., Disp: 40 tablet, Rfl: 0    traMADol (ULTRAM) 50 mg tablet, Take 1 tablet (50 mg total) by mouth every 6 (six) hours as needed for Pain., Disp: 40 tablet, Rfl: 0    Please see patient's chart for applicable emotional/behavioral/social status.    REVIEW OF SYSTEMS:  Constitution: Negative. Negative for chills, fever and night sweats.   HENT: Negative for congestion and headaches.    Eyes: Negative for blurred vision, left vision loss and right vision loss.   Cardiovascular: Negative for chest pain and syncope.   Respiratory: Negative for cough and shortness of breath.    Endocrine: Negative for polydipsia, polyphagia and polyuria.   Hematologic/Lymphatic: Negative for bleeding problem. Does not bruise/bleed easily.   Skin: Negative for dry skin, itching and rash.   Musculoskeletal: Negative for falls. Positive for right shoulder pain and muscle weakness.   Gastrointestinal: Negative for abdominal pain and bowel incontinence.   Genitourinary: Negative for bladder incontinence and nocturia.   Neurological: Negative for disturbances in coordination, loss of balance and seizures.   Psychiatric/Behavioral: Negative for depression. The patient does not have insomnia.    Allergic/Immunologic: Negative for hives and persistent infections.     Once no other questions were asked, a brief history and physical exam was then performed.    PHYSICAL EXAM:  GEN: A&Ox3, WD WN NAD  HEENT: WNL  CHEST: CTAB, no W/R/R  HEART: RRR, no M/R/G  ABD: Soft, NT ND, BS x4 QUADS  MS; See Epic  NEURO: CN II-XII intact       The surgical consent was then reviewed with the patient, who agreed with all the contents of the consent form and it was signed. she was then given the [facility] surgery packet to bring with her to [facility] for the anesthesia portion of her perioperative paperwork.     PHYSICAL THERAPY:  She was also instructed regarding physical therapy and  will begin 2 weeks post op. She was given a copy of the original prescription to schedule. Another copy of this prescription was also faxed to [facility].    POST OP CARE:instructions were reviewed including care of the wound and dressing after surgery and when she can shower.     PAIN MANAGEMENT: Poonam Alvarez was also given her pain management regime, which includes the TENS unit given to her by Selwyn Velasquez along with the education required for its use. She was also instructed regarding the Polar ice unit that will be in place after surgery and her postoperative pain medications.     MEDICATION:  Percocet 10/325mg 1 po q 4-6 hours prn pain  Ultram 50 mg one p.o. q.4-6 hours p.r.n. breakthrough pain,   Phenergan 25 mg one p.o. q.4-6 hours p.r.n. nausea and vomiting.  Colace 100mg BID PRN constipation  EC ASA 325mg BID x 3 weeks  Prilosec OTC    Patient was educated on the signs and symptoms of DVTs as well as the risk of their occurrence.     IMPRESSION: surgical candidate for right shoulder arthroplasty    CONCLUSION: Pre-operative information reviewed with patient and they have voiced their understanding and signed consent. Proceed with surgery as planned.    Dr. Xie was present in the office at the time of pre op appointment and available for questions if the patient had any for him. As there were no other questions to be asked, she was given my business card along with Sage Xie MD business card if she has any questions or concerns prior to surgery or in the postop period.

## 2019-03-26 NOTE — H&P
"CC:  right shoulder pain     HPI:    PLAN OF ACTION: Poonam Alvarez  is here for a completion of her perioperative paperwork. she Is scheduled to undergo right shoulder arthoplasty on 4/1/19.  She does need clearance for this procedure which she received from Dr. Bach. "cleared for surgery. May have procedure done."    Risks, indications and benefits of the surgical procedure were discussed with the patient. All questions with regard to surgery, rehab, expected return to functional activities, activities of daily living and recreational endeavors were answered to her satisfaction.    Review of patient's allergies indicates:   Allergen Reactions    Hydrocodone-acetaminophen Other (See Comments)     Low blood pressure    Oxycodone-acetaminophen Other (See Comments)     Causes low blood pressure       Past Medical History:   Diagnosis Date    AR (allergic rhinitis) 12/7/2012    Atrial flutter     ablation October 2008    Cataract     Generalized headaches     GERD (gastroesophageal reflux disease) 03/08/2013    resolved    Grave's disease     s/p radioactive iodine, now hypothyroidism    Keloid cicatrix     Obesity     Osteoarthritis     shoulders, hands, knees    Positive PPD, treated     9 months of INH, completed 1/2010       Past Surgical History:   Procedure Laterality Date    ABLATION OF DYSRHYTHMIC FOCUS  10/24/2008    atrial flutter    ARTHROPLASTY, KNEE, TOTAL Right 3/27/2014    Performed by [facility] Schneider [facility] at [facility] OR 2ND FLR    CAROTID ENDARTERECTOMY      CATARACT EXTRACTION W/  INTRAOCULAR LENS IMPLANT Bilateral     COLONOSCOPY N/A 11/5/2015    Performed by Jose Ly MD at [facility] ENDO (4TH FLR)    EYE SURGERY      cataract removal right eye    INSERTION-IMPLANT,  MESH PLACEMENT-22547 Left 8/4/2017    [facility]    KNEE ARTHROSCOPY W/ DEBRIDEMENT      right, 2005 and 2008    KNEE SURGERY  3-27-14    right TKA    " REPAIR-HERNIA-INGUINAL-INITIAL (5 YRS+) OPEN - 36032 Left 8/4/2017    [facility] OR       Social History     Socioeconomic History    Marital status:      Spouse name: Not on file    Number of children: Not on file    Years of education: Not on file    Highest education level: Not on file   Occupational History    Occupation:      Employer: [employer]   Social Needs    Financial resource strain: Not on file    Food insecurity:     Worry: Not on file     Inability: Not on file    Transportation needs:     Medical: Not on file     Non-medical: Not on file   Tobacco Use    Smoking status: Never Smoker    Smokeless tobacco: Never Used   Substance and Sexual Activity    Alcohol use: No    Drug use: No    Sexual activity: Not Currently     Partners: Male     Birth control/protection: None   Lifestyle    Physical activity:     Days per week: Not on file     Minutes per session: Not on file    Stress: Not on file   Relationships    Social connections:     Talks on phone: Not on file     Gets together: Not on file     Attends Alevism service: Not on file     Active member of club or organization: Not on file     Attends meetings of clubs or organizations: Not on file     Relationship status: Not on file    Intimate partner violence:     Fear of current or ex partner: Not on file     Emotionally abused: Not on file     Physically abused: Not on file     Forced sexual activity: Not on file   Other Topics Concern    Are you pregnant or think you may be? No    Breast-feeding No   Social History Narrative           Family History   Problem Relation Age of Onset    Diabetes Mother     Glaucoma Father     Cataracts Father     No Known Problems Sister     No Known Problems Son     No Known Problems Brother     No Known Problems Maternal Aunt     No Known Problems Maternal Uncle     No Known Problems Paternal Aunt      No Known Problems Paternal Uncle     No Known Problems Maternal Grandmother     No Known Problems Maternal Grandfather     No Known Problems Paternal Grandmother     No Known Problems Paternal Grandfather     Colon cancer Neg Hx     Ovarian cancer Neg Hx     Breast cancer Neg Hx          Current Outpatient Medications:     aspirin 81 MG chewable tablet, Take 1 tablet by mouth Daily., Disp: , Rfl:     b complex vitamins capsule, Take 1 capsule by mouth once daily., Disp: , Rfl:     calcium-vitamin D3-vitamin K (VIACTIV) 500-100-40 mg-unit-mcg Chew, Take 1 tablet by mouth Daily., Disp: , Rfl:     cholecalciferol, vitamin D3, (VITAMIN D3) 2,000 unit Cap, Take 1 capsule by mouth once daily., Disp: , Rfl:     fish oil-omega-3 fatty acids 300-1,000 mg capsule, Take 2 g by mouth once daily. , Disp: , Rfl:     gabapentin (NEURONTIN) 100 MG capsule, Take 3 capsules (300 mg total) by mouth every evening., Disp: 90 capsule, Rfl: 2    hydroCHLOROthiazide (HYDRODIURIL) 12.5 MG Tab, TAKE 1/2 TO 1 TABLET BY MOUTH DAILY AS NEEDED, Disp: 90 tablet, Rfl: 4    meloxicam (MOBIC) 15 MG tablet, Take 1 tablet (15 mg total) by mouth once daily., Disp: 90 tablet, Rfl: 1    multivitamin-minerals-lutein (CENTRUM SILVER) Tab, Take 1 tablet by mouth once daily. , Disp: , Rfl:     senna (SENNA CONCENTRATE) 8.6 mg tablet, Take 1 tablet by mouth once daily., Disp: 40 tablet, Rfl: 0    SYNTHROID 137 mcg Tab tablet, Take 1 tablet (137 mcg total) by mouth once daily., Disp: 90 tablet, Rfl: 4    UBIDECARENONE (COENZYME Q10) 100 mg Tab, Take 1 tablet by mouth once daily., Disp: , Rfl:     acetaminophen-codeine 300-30mg (TYLENOL #3) 300-30 mg Tab, Take 1 tablet by mouth every 6 (six) hours as needed., Disp: 30 tablet, Rfl: 0    aspirin (ECOTRIN) 325 MG EC tablet, Take 1 tablet (325 mg total) by mouth 2 (two) times daily. Take an antacid with medication. for 42 doses, Disp: 42 tablet, Rfl: 0    promethazine (PHENERGAN) 25 MG  tablet, Take 1 tablet (25 mg total) by mouth every 6 (six) hours as needed for Nausea., Disp: 40 tablet, Rfl: 0    traMADol (ULTRAM) 50 mg tablet, Take 1 tablet (50 mg total) by mouth every 6 (six) hours as needed for Pain., Disp: 40 tablet, Rfl: 0    Please see patient's chart for applicable emotional/behavioral/social status.    REVIEW OF SYSTEMS:  Constitution: Negative. Negative for chills, fever and night sweats.   HENT: Negative for congestion and headaches.    Eyes: Negative for blurred vision, left vision loss and right vision loss.   Cardiovascular: Negative for chest pain and syncope.   Respiratory: Negative for cough and shortness of breath.    Endocrine: Negative for polydipsia, polyphagia and polyuria.   Hematologic/Lymphatic: Negative for bleeding problem. Does not bruise/bleed easily.   Skin: Negative for dry skin, itching and rash.   Musculoskeletal: Negative for falls. Positive for right shoulder pain and muscle weakness.   Gastrointestinal: Negative for abdominal pain and bowel incontinence.   Genitourinary: Negative for bladder incontinence and nocturia.   Neurological: Negative for disturbances in coordination, loss of balance and seizures.   Psychiatric/Behavioral: Negative for depression. The patient does not have insomnia.    Allergic/Immunologic: Negative for hives and persistent infections.     Once no other questions were asked, a brief history and physical exam was then performed.    PHYSICAL EXAM:  GEN: A&Ox3, WD WN NAD  HEENT: WNL  CHEST: CTAB, no W/R/R  HEART: RRR, no M/R/G  ABD: Soft, NT ND, BS x4 QUADS  MS; See Epic  NEURO: CN II-XII intact       The surgical consent was then reviewed with the patient, who agreed with all the contents of the consent form and it was signed. she was then given the [facility] surgery packet to bring with her to [facility] for the anesthesia portion of her perioperative paperwork.     PHYSICAL THERAPY:  She was also instructed regarding physical therapy and  will begin 2 weeks post op. She was given a copy of the original prescription to schedule. Another copy of this prescription was also faxed to [facility].    POST OP CARE:instructions were reviewed including care of the wound and dressing after surgery and when she can shower.     PAIN MANAGEMENT: Poonam Alvarez was also given her pain management regime, which includes the TENS unit given to her by Selwyn Velasquez along with the education required for its use. She was also instructed regarding the Polar ice unit that will be in place after surgery and her postoperative pain medications.     MEDICATION:  Percocet 10/325mg 1 po q 4-6 hours prn pain  Ultram 50 mg one p.o. q.4-6 hours p.r.n. breakthrough pain,   Phenergan 25 mg one p.o. q.4-6 hours p.r.n. nausea and vomiting.  Colace 100mg BID PRN constipation  EC ASA 325mg BID x 3 weeks  Prilosec OTC    Patient was educated on the signs and symptoms of DVTs as well as the risk of their occurrence.     IMPRESSION: surgical candidate for right shoulder arthroplasty    CONCLUSION: Pre-operative information reviewed with patient and they have voiced their understanding and signed consent. Proceed with surgery as planned.    Dr. Xie was present in the office at the time of pre op appointment and available for questions if the patient had any for him. As there were no other questions to be asked, she was given my business card along with Sage Xie MD business card if she has any questions or concerns prior to surgery or in the postop period.

## 2019-03-27 ENCOUNTER — ANESTHESIA EVENT (OUTPATIENT)
Dept: SURGERY | Facility: OTHER | Age: 69
DRG: 483 | End: 2019-03-27
Payer: MEDICARE

## 2019-03-27 ENCOUNTER — HOSPITAL ENCOUNTER (OUTPATIENT)
Dept: PREADMISSION TESTING | Facility: OTHER | Age: 69
Discharge: HOME | End: 2019-03-27
Attending: ORTHOPAEDIC SURGERY
Payer: MEDICARE

## 2019-03-27 VITALS
WEIGHT: 215 LBS | SYSTOLIC BLOOD PRESSURE: 110 MMHG | DIASTOLIC BLOOD PRESSURE: 70 MMHG | HEIGHT: 72 IN | TEMPERATURE: 98 F | OXYGEN SATURATION: 100 % | HEART RATE: 76 BPM | BODY MASS INDEX: 29.12 KG/M2

## 2019-03-27 DIAGNOSIS — M19.011 PRIMARY OSTEOARTHRITIS OF RIGHT SHOULDER: ICD-10-CM

## 2019-03-27 LAB
ABO + RH BLD: NORMAL
BLD GP AB SCN CELLS X3 SERPL QL: NORMAL

## 2019-03-27 PROCEDURE — 86850 RBC ANTIBODY SCREEN: CPT

## 2019-03-27 PROCEDURE — 36415 COLL VENOUS BLD VENIPUNCTURE: CPT

## 2019-03-27 RX ORDER — SODIUM CHLORIDE, SODIUM LACTATE, POTASSIUM CHLORIDE, CALCIUM CHLORIDE 600; 310; 30; 20 MG/100ML; MG/100ML; MG/100ML; MG/100ML
INJECTION, SOLUTION INTRAVENOUS CONTINUOUS
Status: CANCELLED | OUTPATIENT
Start: 2019-03-27

## 2019-03-27 NOTE — ANESTHESIA PREPROCEDURE EVALUATION
03/27/2019  Poonam Alvarez is a 68 y.o., female.    Anesthesia Evaluation    I have reviewed the Patient Summary Reports.    I have reviewed the Nursing Notes.   I have reviewed the Medications.     Review of Systems  Anesthesia Hx:  No problems with previous Anesthesia  Denies Family Hx of Anesthesia complications.   Denies Personal Hx of Anesthesia complications.   Social:  Non-Smoker    Hematology/Oncology:  Hematology Normal   Oncology Normal     EENT/Dental:EENT/Dental Normal   Cardiovascular:   Exercise tolerance: good Denies Hypertension.  2008 ablation for a-fib   Pulmonary:   Denies Shortness of breath.    Renal/:  Renal/ Normal     Hepatic/GI:   GERD    Musculoskeletal:   Arthritis     Neurological:   Denies Headaches.    Endocrine:   Hypothyroidism    Dermatological:  Skin Normal    Psych:  Psychiatric Normal           Physical Exam  General:  Obesity    Airway/Jaw/Neck:  Airway Findings: Mouth Opening: Normal Tongue: Normal  General Airway Assessment: Adult  Mallampati: I  TM Distance: Normal, at least 6 cm  Jaw/Neck Findings:  Neck ROM: Normal ROM      Dental:  Dental Findings: Lower partial dentures             Anesthesia Plan  Type of Anesthesia, risks & benefits discussed:  Anesthesia Type:  general  Patient's Preference:   Intra-op Monitoring Plan: standard ASA monitors  Intra-op Monitoring Plan Comments:   Post Op Pain Control Plan: peripheral nerve block and multimodal analgesia  Post Op Pain Control Plan Comments:   Induction:   IV  Beta Blocker:         Informed Consent: Patient understands risks and agrees with Anesthesia plan.  Questions answered. Anesthesia consent signed with patient.  ASA Score: 3     Day of Surgery Review of History & Physical:   Significant changes noted: Surgeon notified.  H&P update referred to the surgeon.         Ready For Surgery From Anesthesia  Perspective.

## 2019-03-27 NOTE — DISCHARGE INSTRUCTIONS
PRE-ADMIT TESTING -  [phone]    [address]  [place] [facility]          Your surgery has been scheduled at [facility]. We are pleased to have the opportunity to serve you. For Further Information please call [phone].    On the day of surgery please report to the Information Desk on the 1st floor.    · CONTACT YOUR PHYSICIAN'S OFFICE THE DAY PRIOR TO YOUR SURGERY TO OBTAIN YOUR ARRIVAL TIME.     · The evening before surgery do not eat anything after 9 p.m. ( this includes hard candy, chewing gum and mints).  You may only have GATORADE, POWERADE AND WATER  from 9 p.m. until you leave your home.   DO NOT DRINK ANY LIQUIDS ON THE WAY TO THE HOSPITAL.      SPECIAL MEDICATION INSTRUCTIONS: TAKE medications checked off by the Anesthesiologist on your Medication List.    Angiogram Patients: Take medications as instructed by your physician, including aspirin.     Surgery Patients:    If you take ASPIRIN - Your PHYSICIAN/SURGEON will need to inform you IF/OR when you need to stop taking aspirin prior to your surgery.     Do Not take any medications containing IBUPROFEN.  Do Not Wear any make-up or dark nail polish   (especially eye make-up) to surgery. If you come to surgery with makeup on you will be required to remove the makeup or nail polish.    Do not shave your surgical area at least 5 days prior to your surgery. The surgical prep will be performed at the hospital according to Infection Control regulations.    Leave all valuables at home.   Do Not wear any jewelry or watches, including any metal in body piercings. Jewelry must be removed prior to coming to the hospital.  There is a possibility that rings that are unable to be removed may be cut off if they are on the surgical extremity.    Contact Lens must be removed before surgery. Either do not wear the contact lens or bring a case and solution for storage.  Please bring a container for eyeglasses or dentures as required.  Bring  any paperwork your physician has provided, such as consent forms,  history and physicals, doctor's orders, etc.   Bring comfortable clothes that are loose fitting to wear upon discharge. Take into consideration the type of surgery being performed.  Maintain your diet as advised per your physician the day prior to surgery.      Adequate rest the night before surgery is advised.   Park in the Parking lot behind the hospital or in the [facility] across the street from the parking lot. Parking is complimentary.  If you will be discharged the same day as your procedure, please arrange for a responsible adult to drive you home or to accompany you if traveling by taxi.   YOU WILL NOT BE PERMITTED TO DRIVE OR TO LEAVE THE HOSPITAL ALONE AFTER SURGERY.   It is strongly recommended that you arrange for someone to remain with you for the first 24 hrs following your surgery.       Thank you for your cooperation.  The Staff of [facility].                Bathing Instructions with Hibiclens     Shower the evening before and morning of your procedure with Hibiclens:   Wash your face with water and your regular face wash/soap   Apply Hibiclens directly on your skin or on a wet washcloth and wash gently. When showering: Move away from the shower stream when applying Hibiclens to avoid rinsing off too soon.   Rinse thoroughly with warm water   Do not dilute Hibiclens         Dry off as usual, do not use any deodorant, powder, body lotions, perfume, after shave or cologne.

## 2019-03-29 ENCOUNTER — TELEPHONE (OUTPATIENT)
Dept: SPORTS MEDICINE | Facility: CLINIC | Age: 69
End: 2019-03-29

## 2019-03-29 NOTE — TELEPHONE ENCOUNTER
Called to notify patient her FMLA is complete. She did not answer and her voicemail is full.    Lauren Haynes MA  [facility]

## 2019-04-01 ENCOUNTER — HOSPITAL ENCOUNTER (INPATIENT)
Facility: OTHER | Age: 69
LOS: 1 days | Discharge: HOME HEALTH | DRG: 483 | End: 2019-04-02
Attending: ORTHOPAEDIC SURGERY | Admitting: ORTHOPAEDIC SURGERY
Payer: MEDICARE

## 2019-04-01 ENCOUNTER — ANESTHESIA (OUTPATIENT)
Dept: SURGERY | Facility: OTHER | Age: 69
DRG: 483 | End: 2019-04-01
Payer: MEDICARE

## 2019-04-01 DIAGNOSIS — M19.011 PRIMARY OSTEOARTHRITIS OF RIGHT SHOULDER: ICD-10-CM

## 2019-04-01 LAB
HCT VFR BLD AUTO: 36 % (ref 37–48.5)
HGB BLD-MCNC: 11.4 G/DL (ref 12–16)

## 2019-04-01 PROCEDURE — 23430 REPAIR BICEPS TENDON: CPT | Mod: 51,59,RT,GC | Performed by: ORTHOPAEDIC SURGERY

## 2019-04-01 PROCEDURE — 63600175 PHARM REV CODE 636 W HCPCS: Performed by: ORTHOPAEDIC SURGERY

## 2019-04-01 PROCEDURE — 94799 UNLISTED PULMONARY SVC/PX: CPT

## 2019-04-01 PROCEDURE — 63600175 PHARM REV CODE 636 W HCPCS: Performed by: NURSE ANESTHETIST, CERTIFIED REGISTERED

## 2019-04-01 PROCEDURE — 37000009 HC ANESTHESIA EA ADD 15 MINS: Performed by: ORTHOPAEDIC SURGERY

## 2019-04-01 PROCEDURE — C1713 ANCHOR/SCREW BN/BN,TIS/BN: HCPCS | Performed by: ORTHOPAEDIC SURGERY

## 2019-04-01 PROCEDURE — 25000003 PHARM REV CODE 250: Performed by: PHYSICIAN ASSISTANT

## 2019-04-01 PROCEDURE — 36000711: Performed by: ORTHOPAEDIC SURGERY

## 2019-04-01 PROCEDURE — 63600175 PHARM REV CODE 636 W HCPCS: Performed by: ANESTHESIOLOGY

## 2019-04-01 PROCEDURE — 23472 PR RECONSTR TOTAL SHOULDER IMPLANT: ICD-10-PCS | Mod: RT,GC,, | Performed by: ORTHOPAEDIC SURGERY

## 2019-04-01 PROCEDURE — 63600175 PHARM REV CODE 636 W HCPCS: Performed by: PHYSICIAN ASSISTANT

## 2019-04-01 PROCEDURE — 97530 THERAPEUTIC ACTIVITIES: CPT

## 2019-04-01 PROCEDURE — 37000008 HC ANESTHESIA 1ST 15 MINUTES: Performed by: ORTHOPAEDIC SURGERY

## 2019-04-01 PROCEDURE — 94761 N-INVAS EAR/PLS OXIMETRY MLT: CPT

## 2019-04-01 PROCEDURE — 25000003 PHARM REV CODE 250: Performed by: STUDENT IN AN ORGANIZED HEALTH CARE EDUCATION/TRAINING PROGRAM

## 2019-04-01 PROCEDURE — 27201423 OPTIME MED/SURG SUP & DEVICES STERILE SUPPLY: Performed by: ORTHOPAEDIC SURGERY

## 2019-04-01 PROCEDURE — 23430 PR REPAIR BICEPS LONG TENDON: ICD-10-PCS | Mod: 51,59,RT,GC | Performed by: ORTHOPAEDIC SURGERY

## 2019-04-01 PROCEDURE — 36000710: Performed by: ORTHOPAEDIC SURGERY

## 2019-04-01 PROCEDURE — 71000039 HC RECOVERY, EACH ADD'L HOUR: Performed by: ORTHOPAEDIC SURGERY

## 2019-04-01 PROCEDURE — C1776 JOINT DEVICE (IMPLANTABLE): HCPCS | Performed by: ORTHOPAEDIC SURGERY

## 2019-04-01 PROCEDURE — 11000001 HC ACUTE MED/SURG PRIVATE ROOM

## 2019-04-01 PROCEDURE — 25000003 PHARM REV CODE 250: Performed by: ANESTHESIOLOGY

## 2019-04-01 PROCEDURE — 23472 RECONSTRUCT SHOULDER JOINT: CPT | Mod: RT,GC,, | Performed by: ORTHOPAEDIC SURGERY

## 2019-04-01 PROCEDURE — 71000033 HC RECOVERY, INTIAL HOUR: Performed by: ORTHOPAEDIC SURGERY

## 2019-04-01 PROCEDURE — 23412 REPAIR ROTATOR CUFF CHRONIC: CPT | Mod: 59,51,RT,GC | Performed by: ORTHOPAEDIC SURGERY

## 2019-04-01 PROCEDURE — 97166 OT EVAL MOD COMPLEX 45 MIN: CPT

## 2019-04-01 PROCEDURE — 97535 SELF CARE MNGMENT TRAINING: CPT

## 2019-04-01 PROCEDURE — 25000003 PHARM REV CODE 250: Performed by: NURSE ANESTHETIST, CERTIFIED REGISTERED

## 2019-04-01 PROCEDURE — C1729 CATH, DRAINAGE: HCPCS | Performed by: ORTHOPAEDIC SURGERY

## 2019-04-01 PROCEDURE — 85014 HEMATOCRIT: CPT

## 2019-04-01 PROCEDURE — 85018 HEMOGLOBIN: CPT

## 2019-04-01 PROCEDURE — 23412 PR REPAIR ROTATOR CUFF,CHRONIC: ICD-10-PCS | Mod: 59,51,RT,GC | Performed by: ORTHOPAEDIC SURGERY

## 2019-04-01 DEVICE — PIN GUIDE DELTA 2.5X120MM: Type: IMPLANTABLE DEVICE | Site: SHOULDER | Status: FUNCTIONAL

## 2019-04-01 DEVICE — IMPLANTABLE DEVICE: Type: IMPLANTABLE DEVICE | Site: SHOULDER | Status: FUNCTIONAL

## 2019-04-01 DEVICE — CEMENT BONE IMPLANT: Type: IMPLANTABLE DEVICE | Site: SHOULDER | Status: FUNCTIONAL

## 2019-04-01 RX ORDER — CEFAZOLIN SODIUM 2 G/50ML
2 SOLUTION INTRAVENOUS
Status: COMPLETED | OUTPATIENT
Start: 2019-04-01 | End: 2019-04-02

## 2019-04-01 RX ORDER — ASPIRIN 325 MG
325 TABLET ORAL 2 TIMES DAILY
Status: DISCONTINUED | OUTPATIENT
Start: 2019-04-01 | End: 2019-04-02 | Stop reason: HOSPADM

## 2019-04-01 RX ORDER — MUPIROCIN 20 MG/G
OINTMENT TOPICAL
Status: DISCONTINUED | OUTPATIENT
Start: 2019-04-01 | End: 2019-04-01 | Stop reason: HOSPADM

## 2019-04-01 RX ORDER — DEXTROSE MONOHYDRATE AND SODIUM CHLORIDE 5; .9 G/100ML; G/100ML
INJECTION, SOLUTION INTRAVENOUS CONTINUOUS
Status: DISCONTINUED | OUTPATIENT
Start: 2019-04-01 | End: 2019-04-02 | Stop reason: HOSPADM

## 2019-04-01 RX ORDER — FENTANYL CITRATE 50 UG/ML
100 INJECTION, SOLUTION INTRAMUSCULAR; INTRAVENOUS EVERY 5 MIN PRN
Status: DISCONTINUED | OUTPATIENT
Start: 2019-04-01 | End: 2019-04-01

## 2019-04-01 RX ORDER — ACETAMINOPHEN 500 MG
1000 TABLET ORAL EVERY 6 HOURS
Status: DISCONTINUED | OUTPATIENT
Start: 2019-04-01 | End: 2019-04-02 | Stop reason: HOSPADM

## 2019-04-01 RX ORDER — MUPIROCIN 20 MG/G
1 OINTMENT TOPICAL 2 TIMES DAILY
Status: DISCONTINUED | OUTPATIENT
Start: 2019-04-01 | End: 2019-04-02 | Stop reason: HOSPADM

## 2019-04-01 RX ORDER — MIDAZOLAM HYDROCHLORIDE 1 MG/ML
2 INJECTION INTRAMUSCULAR; INTRAVENOUS
Status: DISCONTINUED | OUTPATIENT
Start: 2019-04-01 | End: 2019-04-01 | Stop reason: HOSPADM

## 2019-04-01 RX ORDER — LIDOCAINE HCL/PF 100 MG/5ML
SYRINGE (ML) INTRAVENOUS
Status: DISCONTINUED | OUTPATIENT
Start: 2019-04-01 | End: 2019-04-01

## 2019-04-01 RX ORDER — ONDANSETRON HYDROCHLORIDE 2 MG/ML
INJECTION, SOLUTION INTRAMUSCULAR; INTRAVENOUS
Status: DISCONTINUED | OUTPATIENT
Start: 2019-04-01 | End: 2019-04-01

## 2019-04-01 RX ORDER — PHENYLEPHRINE HYDROCHLORIDE 10 MG/ML
INJECTION INTRAVENOUS
Status: DISCONTINUED | OUTPATIENT
Start: 2019-04-01 | End: 2019-04-01

## 2019-04-01 RX ORDER — ONDANSETRON 8 MG/1
8 TABLET, ORALLY DISINTEGRATING ORAL EVERY 8 HOURS PRN
Status: DISCONTINUED | OUTPATIENT
Start: 2019-04-01 | End: 2019-04-02 | Stop reason: HOSPADM

## 2019-04-01 RX ORDER — GLYCOPYRROLATE 0.2 MG/ML
INJECTION INTRAMUSCULAR; INTRAVENOUS
Status: DISCONTINUED | OUTPATIENT
Start: 2019-04-01 | End: 2019-04-01

## 2019-04-01 RX ORDER — POLYETHYLENE GLYCOL 3350 17 G/17G
17 POWDER, FOR SOLUTION ORAL DAILY
Status: DISCONTINUED | OUTPATIENT
Start: 2019-04-01 | End: 2019-04-02 | Stop reason: HOSPADM

## 2019-04-01 RX ORDER — ACETAMINOPHEN 10 MG/ML
INJECTION, SOLUTION INTRAVENOUS
Status: DISCONTINUED | OUTPATIENT
Start: 2019-04-01 | End: 2019-04-01

## 2019-04-01 RX ORDER — DEXAMETHASONE SODIUM PHOSPHATE 4 MG/ML
INJECTION, SOLUTION INTRA-ARTICULAR; INTRALESIONAL; INTRAMUSCULAR; INTRAVENOUS; SOFT TISSUE
Status: DISCONTINUED | OUTPATIENT
Start: 2019-04-01 | End: 2019-04-01

## 2019-04-01 RX ORDER — NEOSTIGMINE METHYLSULFATE 1 MG/ML
INJECTION, SOLUTION INTRAVENOUS
Status: DISCONTINUED | OUTPATIENT
Start: 2019-04-01 | End: 2019-04-01

## 2019-04-01 RX ORDER — FENTANYL CITRATE 50 UG/ML
INJECTION, SOLUTION INTRAMUSCULAR; INTRAVENOUS
Status: DISCONTINUED | OUTPATIENT
Start: 2019-04-01 | End: 2019-04-01

## 2019-04-01 RX ORDER — ROPIVACAINE HYDROCHLORIDE 5 MG/ML
INJECTION, SOLUTION EPIDURAL; INFILTRATION; PERINEURAL
Status: COMPLETED | OUTPATIENT
Start: 2019-04-01 | End: 2019-04-01

## 2019-04-01 RX ORDER — MIDAZOLAM HYDROCHLORIDE 1 MG/ML
2 INJECTION INTRAMUSCULAR; INTRAVENOUS
Status: COMPLETED | OUTPATIENT
Start: 2019-04-01 | End: 2019-04-01

## 2019-04-01 RX ORDER — HYDROMORPHONE HYDROCHLORIDE 2 MG/ML
0.4 INJECTION, SOLUTION INTRAMUSCULAR; INTRAVENOUS; SUBCUTANEOUS EVERY 5 MIN PRN
Status: DISCONTINUED | OUTPATIENT
Start: 2019-04-01 | End: 2019-04-01 | Stop reason: HOSPADM

## 2019-04-01 RX ORDER — SODIUM CHLORIDE, SODIUM LACTATE, POTASSIUM CHLORIDE, CALCIUM CHLORIDE 600; 310; 30; 20 MG/100ML; MG/100ML; MG/100ML; MG/100ML
INJECTION, SOLUTION INTRAVENOUS CONTINUOUS
Status: DISCONTINUED | OUTPATIENT
Start: 2019-04-01 | End: 2019-04-02 | Stop reason: HOSPADM

## 2019-04-01 RX ORDER — GABAPENTIN 300 MG/1
300 CAPSULE ORAL NIGHTLY
Status: DISCONTINUED | OUTPATIENT
Start: 2019-04-01 | End: 2019-04-02 | Stop reason: HOSPADM

## 2019-04-01 RX ORDER — ROPIVACAINE HYDROCHLORIDE 2 MG/ML
6 INJECTION, SOLUTION EPIDURAL; INFILTRATION; PERINEURAL CONTINUOUS
Status: DISCONTINUED | OUTPATIENT
Start: 2019-04-01 | End: 2019-04-01 | Stop reason: SDUPTHER

## 2019-04-01 RX ORDER — ONDANSETRON 2 MG/ML
4 INJECTION INTRAMUSCULAR; INTRAVENOUS DAILY PRN
Status: DISCONTINUED | OUTPATIENT
Start: 2019-04-01 | End: 2019-04-01 | Stop reason: HOSPADM

## 2019-04-01 RX ORDER — SODIUM CHLORIDE 0.9 % (FLUSH) 0.9 %
3 SYRINGE (ML) INJECTION
Status: DISCONTINUED | OUTPATIENT
Start: 2019-04-01 | End: 2019-04-02 | Stop reason: HOSPADM

## 2019-04-01 RX ORDER — PROPOFOL 10 MG/ML
VIAL (ML) INTRAVENOUS
Status: DISCONTINUED | OUTPATIENT
Start: 2019-04-01 | End: 2019-04-01

## 2019-04-01 RX ORDER — ROCURONIUM BROMIDE 10 MG/ML
INJECTION, SOLUTION INTRAVENOUS
Status: DISCONTINUED | OUTPATIENT
Start: 2019-04-01 | End: 2019-04-01

## 2019-04-01 RX ORDER — CEFAZOLIN SODIUM 1 G/3ML
2 INJECTION, POWDER, FOR SOLUTION INTRAMUSCULAR; INTRAVENOUS
Status: COMPLETED | OUTPATIENT
Start: 2019-04-01 | End: 2019-04-01

## 2019-04-01 RX ORDER — AMOXICILLIN 250 MG
1 CAPSULE ORAL 2 TIMES DAILY
Status: DISCONTINUED | OUTPATIENT
Start: 2019-04-01 | End: 2019-04-02 | Stop reason: HOSPADM

## 2019-04-01 RX ORDER — OXYCODONE HYDROCHLORIDE 5 MG/1
10 TABLET ORAL
Status: DISCONTINUED | OUTPATIENT
Start: 2019-04-01 | End: 2019-04-02 | Stop reason: HOSPADM

## 2019-04-01 RX ORDER — FAMOTIDINE 20 MG/1
20 TABLET, FILM COATED ORAL 2 TIMES DAILY
Status: DISCONTINUED | OUTPATIENT
Start: 2019-04-01 | End: 2019-04-02 | Stop reason: HOSPADM

## 2019-04-01 RX ORDER — OXYCODONE HYDROCHLORIDE 5 MG/1
5 TABLET ORAL
Status: DISCONTINUED | OUTPATIENT
Start: 2019-04-01 | End: 2019-04-01 | Stop reason: HOSPADM

## 2019-04-01 RX ORDER — OXYCODONE HYDROCHLORIDE 5 MG/1
5 TABLET ORAL
Status: DISCONTINUED | OUTPATIENT
Start: 2019-04-01 | End: 2019-04-02 | Stop reason: HOSPADM

## 2019-04-01 RX ORDER — MEPERIDINE HYDROCHLORIDE 25 MG/ML
12.5 INJECTION INTRAMUSCULAR; INTRAVENOUS; SUBCUTANEOUS ONCE AS NEEDED
Status: DISCONTINUED | OUTPATIENT
Start: 2019-04-01 | End: 2019-04-01 | Stop reason: HOSPADM

## 2019-04-01 RX ADMIN — LIDOCAINE HYDROCHLORIDE 100 MG: 20 INJECTION, SOLUTION INTRAVENOUS at 07:04

## 2019-04-01 RX ADMIN — GLYCOPYRROLATE 0.6 MG: 0.2 INJECTION, SOLUTION INTRAMUSCULAR; INTRAVENOUS at 11:04

## 2019-04-01 RX ADMIN — MIDAZOLAM HYDROCHLORIDE 2 MG: 1 INJECTION, SOLUTION INTRAMUSCULAR; INTRAVENOUS at 06:04

## 2019-04-01 RX ADMIN — SODIUM CHLORIDE, SODIUM LACTATE, POTASSIUM CHLORIDE, AND CALCIUM CHLORIDE: 600; 310; 30; 20 INJECTION, SOLUTION INTRAVENOUS at 09:04

## 2019-04-01 RX ADMIN — ROPIVACAINE HYDROCHLORIDE: 2 INJECTION, SOLUTION EPIDURAL; INFILTRATION at 12:04

## 2019-04-01 RX ADMIN — PHENYLEPHRINE HYDROCHLORIDE 0.5 MCG/KG/MIN: 10 INJECTION INTRAVENOUS at 08:04

## 2019-04-01 RX ADMIN — FENTANYL CITRATE 50 MCG: 50 INJECTION, SOLUTION INTRAMUSCULAR; INTRAVENOUS at 10:04

## 2019-04-01 RX ADMIN — ASPIRIN 325 MG ORAL TABLET 325 MG: 325 PILL ORAL at 08:04

## 2019-04-01 RX ADMIN — ROPIVACAINE HYDROCHLORIDE 20 ML: 5 INJECTION, SOLUTION EPIDURAL; INFILTRATION; PERINEURAL at 06:04

## 2019-04-01 RX ADMIN — ONDANSETRON 4 MG: 2 INJECTION, SOLUTION INTRAMUSCULAR; INTRAVENOUS at 08:04

## 2019-04-01 RX ADMIN — PHENYLEPHRINE HYDROCHLORIDE 200 MCG: 10 INJECTION INTRAVENOUS at 10:04

## 2019-04-01 RX ADMIN — ACETAMINOPHEN 1000 MG: 10 INJECTION, SOLUTION INTRAVENOUS at 10:04

## 2019-04-01 RX ADMIN — PHENYLEPHRINE HYDROCHLORIDE 100 MCG: 10 INJECTION INTRAVENOUS at 10:04

## 2019-04-01 RX ADMIN — ROCURONIUM BROMIDE 50 MG: 10 INJECTION, SOLUTION INTRAVENOUS at 07:04

## 2019-04-01 RX ADMIN — DEXTROSE AND SODIUM CHLORIDE: 5; .9 INJECTION, SOLUTION INTRAVENOUS at 11:04

## 2019-04-01 RX ADMIN — DEXAMETHASONE SODIUM PHOSPHATE 8 MG: 4 INJECTION, SOLUTION INTRAMUSCULAR; INTRAVENOUS at 08:04

## 2019-04-01 RX ADMIN — POLYETHYLENE GLYCOL 3350 17 G: 17 POWDER, FOR SOLUTION ORAL at 04:04

## 2019-04-01 RX ADMIN — GLYCOPYRROLATE 0.2 MG: 0.2 INJECTION, SOLUTION INTRAMUSCULAR; INTRAVENOUS at 08:04

## 2019-04-01 RX ADMIN — SODIUM CHLORIDE, SODIUM LACTATE, POTASSIUM CHLORIDE, AND CALCIUM CHLORIDE: 600; 310; 30; 20 INJECTION, SOLUTION INTRAVENOUS at 06:04

## 2019-04-01 RX ADMIN — CEFAZOLIN SODIUM 2 G: 2 SOLUTION INTRAVENOUS at 11:04

## 2019-04-01 RX ADMIN — FENTANYL CITRATE 100 MCG: 50 INJECTION, SOLUTION INTRAMUSCULAR; INTRAVENOUS at 07:04

## 2019-04-01 RX ADMIN — ACETAMINOPHEN 1000 MG: 500 TABLET ORAL at 06:04

## 2019-04-01 RX ADMIN — PROPOFOL 150 MG: 10 INJECTION, EMULSION INTRAVENOUS at 07:04

## 2019-04-01 RX ADMIN — DEXTROSE AND SODIUM CHLORIDE: 5; .9 INJECTION, SOLUTION INTRAVENOUS at 04:04

## 2019-04-01 RX ADMIN — CEFAZOLIN SODIUM 2 G: 2 SOLUTION INTRAVENOUS at 04:04

## 2019-04-01 RX ADMIN — STANDARDIZED SENNA CONCENTRATE AND DOCUSATE SODIUM 1 TABLET: 8.6; 5 TABLET, FILM COATED ORAL at 08:04

## 2019-04-01 RX ADMIN — PROPOFOL 30 MG: 10 INJECTION, EMULSION INTRAVENOUS at 10:04

## 2019-04-01 RX ADMIN — FAMOTIDINE 20 MG: 20 TABLET, FILM COATED ORAL at 08:04

## 2019-04-01 RX ADMIN — MUPIROCIN 1 G: 20 OINTMENT TOPICAL at 08:04

## 2019-04-01 RX ADMIN — GABAPENTIN 300 MG: 300 CAPSULE ORAL at 08:04

## 2019-04-01 RX ADMIN — CEFAZOLIN 2 G: 330 INJECTION, POWDER, FOR SOLUTION INTRAMUSCULAR; INTRAVENOUS at 08:04

## 2019-04-01 RX ADMIN — NEOSTIGMINE METHYLSULFATE 5 MG: 1 INJECTION INTRAVENOUS at 11:04

## 2019-04-01 RX ADMIN — PHENYLEPHRINE HYDROCHLORIDE 100 MCG: 10 INJECTION INTRAVENOUS at 11:04

## 2019-04-01 RX ADMIN — SODIUM CHLORIDE, SODIUM LACTATE, POTASSIUM CHLORIDE, AND CALCIUM CHLORIDE: 600; 310; 30; 20 INJECTION, SOLUTION INTRAVENOUS at 11:04

## 2019-04-01 RX ADMIN — FENTANYL CITRATE 100 MCG: 50 INJECTION, SOLUTION INTRAMUSCULAR; INTRAVENOUS at 06:04

## 2019-04-01 RX ADMIN — MUPIROCIN: 20 OINTMENT TOPICAL at 06:04

## 2019-04-01 NOTE — PLAN OF CARE
Patient met with Angelica RN Ortho Liaison - discharge disposition has changed to home with home health - MD updated - patient request I speak with her daughter for agency preference - voicemail left

## 2019-04-01 NOTE — ANESTHESIA POSTPROCEDURE EVALUATION
Anesthesia Post Evaluation    Patient: Poonam Alvarez    Procedure(s) Performed: Procedure(s) (LRB):  ARTHROPLASTY, SHOULDER (Right)    Final Anesthesia Type: general  Patient location during evaluation: PACU  Patient participation: Yes- Able to Participate  Level of consciousness: awake and alert  Post-procedure vital signs: reviewed and stable  Pain management: adequate  Airway patency: patent  PONV status at discharge: No PONV  Anesthetic complications: no      Cardiovascular status: blood pressure returned to baseline  Respiratory status: unassisted and spontaneous ventilation  Hydration status: euvolemic  Follow-up not needed.          Vitals Value Taken Time   /66 4/1/2019 12:46 PM   Temp 36.7 °C (98 °F) 4/1/2019  5:55 AM   Pulse 64 4/1/2019 12:52 PM   Resp 16 4/1/2019 12:01 PM   SpO2 100 % 4/1/2019 12:52 PM   Vitals shown include unvalidated device data.      No case tracking events are documented in the log.      Pain/Heber Score: Pain Rating Prior to Med Admin: 2 (4/1/2019 12:37 PM)  Pain Rating Post Med Admin: 0 (4/1/2019 11:42 AM)  Heber Score: 9 (4/1/2019 11:42 AM)

## 2019-04-01 NOTE — ANESTHESIA PROCEDURE NOTES
Peripheral Block    Patient location during procedure: holding area   Block not for primary anesthetic.  Reason for block: at surgeon's request and post-op pain management   Post-op Pain Location: shoulder pain   End time: 4/1/2019 7:23 AM  Staffing  Anesthesiologist: Otoniel Dominguez MD  Performed: anesthesiologist   Preanesthetic Checklist  Completed: patient identified, site marked, surgical consent, pre-op evaluation, timeout performed, IV checked, risks and benefits discussed and monitors and equipment checked  Peripheral Block  Patient position: left lateral decubitus  Prep: ChloraPrep  Patient monitoring: heart rate, cardiac monitor, continuous pulse ox and frequent blood pressure checks  Block type: interscalene  Laterality: right  Injection technique: continuous  Needle  Needle type: Tuohy   Needle gauge: 19 G  Needle length: 3.5 in  Needle localization: ultrasound guidance and nerve stimulator  Catheter type: non-stimulating  Catheter size: 20 G   -ultrasound image captured on disc.  Assessment  Injection assessment: negative aspiration, negative parasthesia and local visualized surrounding nerve  Paresthesia pain: none  Slow fractionated injection: yes

## 2019-04-01 NOTE — PT/OT/SLP EVAL
Occupational Therapy   Evaluation and Treatment    Name: Poonam Alvarez  MRN: [id number]  Admitting Diagnosis:  Primary osteoarthritis of right shoulder Day of Surgery    Recommendations:     Discharge Recommendations: home with home health, home health OT, home health PT  Discharge Equipment Recommendations:  other (see comments)(3-in-1 commode )  Barriers to discharge:  Other (Comment)(current level of function)    Assessment:   Initial OT eval/treat complete.  Verbalized understanding of NWB to RUE, movement restrictions, and not removing UE sling.  Unable to perform ball squeezes though verbalized understanding of there-ex.  Numbness at entire forearm/hand with inability move fingers.  CGA for functional ambulation bed<>bathroom.  Total A for UB dressing to don hospital gown and adjust sling.  Received adjustment to sling though placed slack in shoulder strap once returning to bed for line protection as central line is at L-lateral neck.  Recommend  OT.  Needs 3-in-1 commode.  Son able to provide assist while at home, but needs MD signature on [facility] paperwork approval for time off from work.  To benefit from continued acute care OT services to increase independence in self-care/functional transfers.  OT to follow.     Poonam Alvarez is a 68 y.o. female with a medical diagnosis of Primary osteoarthritis of right shoulder.  She presents with below deficits decreasing independence in self-care/functional transfers. Performance deficits affecting function: weakness, impaired sensation, impaired self care skills, impaired balance, decreased upper extremity function, decreased ROM, impaired fine motor, impaired skin, orthopedic precautions.      Rehab Prognosis: Good; patient would benefit from acute skilled OT services to address these deficits and reach maximum level of function.       Plan:     Patient to be seen daily to address the above listed problems via self-care/home management, therapeutic activities,  therapeutic exercises  · Plan of Care Expires: 04/08/19  · Plan of Care Reviewed with: patient    Subjective     Chief Complaint: No c/o pain.    Patient/Family Comments/goals: Return to PLOF.    Occupational Profile:  Has lived alone since spouse passed away in 3SH with 5 FISH and R-handrail, bedroom/bathroom on 2nd FL with 1 flight of stairs and R-handrail.  Pt. Reports rarely going to 3rd FL.  Has tub/shower combo and standard toilet.  Works part-time as a  for [employer].  PTA, independent with ADL, cooking, cleaning, and driving.  Enjoys gardening.   Equipment Used at Home:  none  Assistance upon Discharge: Son able to provide assist while at home, but needs MD signature on [facility] paperwork approval for time off from work.     Pain/Comfort:  Pain Rating 1: 0/10  Pain Rating Post-Intervention 1: 0/10    Patients cultural, spiritual, Episcopal conflicts given the current situation: other (see comments)(None stated. )    Objective:     Communicated with: Nursing prior to session.  Patient found HOB elevated with central line, peripheral IV, garza catheter upon OT entry to room.    General Precautions: Standard, fall   Orthopedic Precautions:RUE non weight bearing(no AROM to shoulder, ER, or ABD)   Braces: Shoulder abduction brace, UE Sling(do not remove sling per MD orders)     Occupational Performance:    Bed Mobility:    · Patient completed Supine to Sit with stand by assistance and HOB elevated.    Functional Mobility/Transfers:  · Patient completed Sit <> Stand Transfer with contact guard assistance  with  no assistive device   · Patient completed Toilet Transfer Step Transfer technique with contact guard assistance with  no AD  · Functional Mobility: Ambulated short household distance bed<>bathroom with CGA for steadying/safety without AD.     Activities of Daily Living:  · Upper Body Dressing: total assistance for sling adjustment and [facility] gown    Cognitive/Visual  Perceptual:  Cognitive/Psychosocial Skills:  -       Oriented to: Person, Place, Time and Situation   -       Follows Commands/attention:Follows one-step commands and Follows two-step commands  -       Communication: clear/fluent  -       Memory: No Deficits noted  -       Safety awareness/insight to disability: intact   -       Mood/Affect/Coping skills/emotional control: Appropriate to situation  Visual/Perceptual:  -grossly  intact    Physical Exam:  Postural examination/scapula alignment: -       Rounded shoulders  -       Forward head  Skin integrity: R-shoulder with dressing and polar ice padding  Edema:  None noted  Sensation: impaired light touch to R-fingers, hand, and forearm endorsing tingling sensation  Dominant hand: -       Right  Upper Extremity Range of Motion:  -       Right Upper Extremity: RUE hand movement currently impaired (recieved nerve block for procedure); other movements not tested due to MD orders  -       Left Upper Extremity: WFL  Upper Extremity Strength: -       Right Upper Extremity: RUE hand movement currently impaired (recieved nerve block for procedure); other movements not teted due to MD orders  -       Left Upper Extremity: WFL   Strength: -       Right Upper Extremity: impaired   -       Left Upper Extremity: WFL  Fine Motor Coordination: RUE impaired    AMPAC 6 Click ADL:  AMPAC Total Score: 19    Treatment & Education:  Educated on role of OT, POC, functional transfer/ADL safety, NWB to RUE, movement restrictions, no removal of sling, and ball squeezes.   Education:    Patient left HOB elevated with all lines intact, call button in reach, nursing notified and son present    GOALS:   Multidisciplinary Problems     Occupational Therapy Goals        Problem: Occupational Therapy Goal    Goal Priority Disciplines Outcome Interventions   Occupational Therapy Goal     OT, PT/OT Ongoing (interventions implemented as appropriate)    Description:  Goals to be met by:    4/15/19.    Patient will increase functional independence with ADLs by performing:    UE Dressing with Moderate Assistance.  LE Dressing with Stand-by Assistance.  Grooming while standing at sink with Stand-by Assistance.  Toileting from bedside commode with Stand-by Assistance for hygiene and clothing management.   Toilet transfer to bedside commode with Stand-by Assistance.                      History:     Past Medical History:   Diagnosis Date    AR (allergic rhinitis) 12/7/2012    Atrial flutter     ablation October 2008    Cataract     Generalized headaches     GERD (gastroesophageal reflux disease) 03/08/2013    resolved    Grave's disease     s/p radioactive iodine, now hypothyroidism    Keloid cicatrix     Obesity     Osteoarthritis     shoulders, hands, knees    Positive PPD, treated     9 months of INH, completed 1/2010         Past Surgical History:   Procedure Laterality Date    ABLATION OF DYSRHYTHMIC FOCUS  10/24/2008    atrial flutter    ARTHROPLASTY, KNEE, TOTAL Right 3/27/2014    [facility] 2[facility]    CATARACT EXTRACTION W/  INTRAOCULAR LENS IMPLANT Bilateral     COLONOSCOPY N/A 11/5/2015    Per[facility] Jose Ly [facility] (4TH FLR)    EYE SURGERY      cataract removal right eye    HERNIA REPAIR      INSERTION-IMPLANT,  MESH PLACEMENT-[id number] Left 8/4/2017    Performed by Loli Rosario MD at [facility] OR    KNEE ARTHROSCOPY W/ DEBRIDEMENT      right, 2005 and 2008    KNEE SURGERY  3-27-14    right TKA    REPAIR-HERNIA-INGUINAL-INITIAL (5 YRS+) OPEN - [id number] Left 8/4/2017    Performed by Loli Rosario MD at [facility] OR       Time Tracking:     OT Date of Treatment: 04/01/19  OT Start Time: 1631  OT Stop Time: 1720  OT Total Time (min): 49 min    Billable Minutes:Evaluation 15  Self Care/Home Management 20  Therapeutic Activity 14    Aida Christianson, OT  4/1/2019

## 2019-04-01 NOTE — PLAN OF CARE
[facility]    HOME HEALTH ORDERS  FACE TO FACE ENCOUNTER    Patient Name: Poonam Alvarez  YOB: 1950    PCP: Lindsey Bach MD   [place]P Address[place] [address] [facility] [place] [address]  PCP Phone Number: [phone]  PCP Fax: [phone]    Encounter Date: 04/01/2019    Admit to Home Health    Diagnoses:  Active Hospital Problems    Diagnosis  POA    Primary osteoarthritis of right shoulder [M19.011]  Yes      Resolved Hospital Problems   No resolved problems to display.       Future Appointments   Date Time Provider Department Center   4/16/2019 10:30 AM Angelica Mahan PA-C [facility]   4/18/2019  5:00 PM Tiera Carreon PT [facility]   5/14/2019 11:00 AM Sage Xie MD [facility]           I have seen and examined this patient face to face today. My clinical findings that support the need for the home health skilled services and home bound status are the following:  Weakness/numbness causing balance and gait disturbance due to Joint Replacement making it taxing to leave home.    Allergies:  Review of patient's allergies indicates:   Allergen Reactions    Hydrocodone-acetaminophen Other (See Comments)     Low blood pressure; doesn't want    Oxycodone-acetaminophen Other (See Comments)     Causes low blood pressure; doesn't want       Diet: regular diet    Activities: nwb rue, stay in sling at all times, no ROM    Nursing:   SN to complete comprehensive assessment including routine vital signs. Instruct on disease process and s/s of complications to report to MD. Review/verify medication list sent home with the patient at time of discharge  and instruct patient/caregiver as needed. Frequency may be adjusted depending on start of care date.    Notify MD if SBP > 160 or < 90; DBP > 90 or < 50; HR > 120 or < 50; Temp > 101; Other:         CONSULTS:    Physical Therapy to evaluate and treat. Evaluate for home safety and equipment needs;  Establish/upgrade home exercise program. Perform / instruct on therapeutic exercises, gait training, transfer training, and Range of Motion.  Occupational Therapy to evaluate and treat. Evaluate home environment for safety and equipment needs. Perform/Instruct on transfers, ADL training, ROM, and therapeutic exercises.  Aide to provide assistance with personal care, ADLs, and vital signs.    NWB RUE, stay in sling at all times, no ROM    WOUND CARE ORDERS  n/a      Medications: Review discharge medications with patient and family and provide education.      Current Discharge Medication List      CONTINUE these medications which have NOT CHANGED    Details   calcium-vitamin D3-vitamin K (VIACTIV) 500-100-40 mg-unit-mcg Chew Take 1 tablet by mouth Daily.      cholecalciferol, vitamin D3, (VITAMIN D3) 2,000 unit Cap Take 1 capsule by mouth once daily.      gabapentin (NEURONTIN) 100 MG capsule Take 3 capsules (300 mg total) by mouth every evening.  Qty: 90 capsule, Refills: 2    Associated Diagnoses: Left knee pain, unspecified chronicity      SYNTHROID 137 mcg Tab tablet Take 1 tablet (137 mcg total) by mouth once daily.  Qty: 90 tablet, Refills: 4      UBIDECARENONE (COENZYME Q10) 100 mg Tab Take 1 tablet by mouth once daily.      acetaminophen-codeine 300-30mg (TYLENOL #3) 300-30 mg Tab Take 1 tablet by mouth every 6 (six) hours as needed.  Qty: 30 tablet, Refills: 0    Associated Diagnoses: Primary osteoarthritis of right shoulder      aspirin (ECOTRIN) 325 MG EC tablet Take 1 tablet (325 mg total) by mouth 2 (two) times daily. Take an antacid with medication. for 42 doses  Qty: 42 tablet, Refills: 0    Associated Diagnoses: Primary osteoarthritis of right shoulder      aspirin 81 MG chewable tablet Take 1 tablet by mouth Daily.      b complex vitamins capsule Take 1 capsule by mouth once daily.      fish oil-omega-3 fatty acids 300-1,000 mg capsule Take 2 g by mouth once daily.       hydroCHLOROthiazide  (HYDRODIURIL) 12.5 MG Tab TAKE 1/2 TO 1 TABLET BY MOUTH DAILY AS NEEDED  Qty: 90 tablet, Refills: 4      meloxicam (MOBIC) 15 MG tablet Take 1 tablet (15 mg total) by mouth once daily.  Qty: 90 tablet, Refills: 1      multivitamin-minerals-lutein (CENTRUM SILVER) Tab Take 1 tablet by mouth once daily.       promethazine (PHENERGAN) 25 MG tablet Take 1 tablet (25 mg total) by mouth every 6 (six) hours as needed for Nausea.  Qty: 40 tablet, Refills: 0    Associated Diagnoses: Primary osteoarthritis of right shoulder      senna (SENNA CONCENTRATE) 8.6 mg tablet Take 1 tablet by mouth once daily.  Qty: 40 tablet, Refills: 0      traMADol (ULTRAM) 50 mg tablet Take 1 tablet (50 mg total) by mouth every 6 (six) hours as needed for Pain.  Qty: 40 tablet, Refills: 0    Associated Diagnoses: Primary osteoarthritis of right shoulder             I certify that this patient is confined to her home and needs intermittent skilled nursing care, physical therapy and occupational therapy.

## 2019-04-01 NOTE — INTERVAL H&P NOTE
The patient has been examined and the H&P has been reviewed:    I concur with the findings and no changes have occurred since H&P was written.    Anesthesia/Surgery risks, benefits and alternative options discussed and understood by patient/family.          Active Hospital Problems    Diagnosis  POA    Primary osteoarthritis of right shoulder [M19.011]  Yes      Resolved Hospital Problems   No resolved problems to display.

## 2019-04-01 NOTE — OP NOTE
[facility]  Surgery Department  Operative Note    SUMMARY     Date of Procedure: 4/1/2019     Procedure: Procedure(s) (LRB):  ARTHROPLASTY, SHOULDER (Right)     Surgeon(s) and Role:     * Sage Xie MD - Primary    Assisting:   ALLAN Pastrana    Pre-Operative Diagnosis:   1. Primary osteoarthritis of right shoulder [M19.011]  2. Rotator cuff tear  3. Biceps tendinopathy    Post-Operative Diagnosis: Post-Op Diagnosis Codes:  1. Primary osteoarthritis of right shoulder [M19.011]  2. Rotator cuff tear  3. Biceps tendinopathy    Anesthesia: General plus interscalene block with catheter    Technical Procedures Used:     DATE OF SURGERY: 4/1/2019      PREOPERATIVE DIAGNOSIS:   1. Degenerative joint disease, right shoulder.   2. Right shoulder rotator cuff tear  3. Right shoulder biceps tendinopathy    POSTOPERATIVE DIAGNOSIS:   1. Degenerative joint disease, right shoulder.   2. Right shoulder rotator cuff tear  3. Right shoulder biceps tendinopathy    PROCEDURE:   1. Right total shoulder replacement.   2. Right shoulder rotator cuff repair  3. Right shoulder biceps tenodesis    SURGEON: Sage Xie M.D.     ASSISTANTS:   1. Shermna Feliz M.D.  2. Bernice Rowley    COMPLICATIONS: None.     POSITION: Modified Beach chair.     ANESTHESIA: General plus left upper extremity interscalene block with catheter.     IMPLANT USED: [employer] size 12 press fit stem with 48 + 18 eccentric head and 52 mm all-polyethylene glenoid.     APPROACH: Deltopectoral     INTRA-OP FINDINGS:   1. Supraspinatus tear, amenable for repair.   2. Severe glenohumeral osteoarthritis  3. Glenoid bone stock adequate for all poly glenoid  4. No significant proximal migration or end stage cuff tear arthroplathy    INDICATIONS FOR PROCEDURE: The patient is a 68 y.o.-year-old female with a history of right shoulder arthritis and a small rotator cuff tear. Options including reverse and total shoulder  replacement versus hemiarthroplasty were discussed. She elected operative intervention to include shoulder replacement with rotator cuff repair.  All risks and benefits have been discussed, including the risk of loosening, fracture or need for further surgery, and she elected to proceed.     PROCEDURE IN DETAIL: The patient was brought in the room after undergoing a right upper extremity interscalene block with catheter.  After undergoing general anesthesia, she was placed in a well-padded beach chair positioner. Her right upper extremity was prepped and draped in usual sterile fashion including the use of Barrier Ioban devices and space suits, which were worn during the case.     Perioperative antibiotics, ancef, was given during the case, prior to the incision and redosed appropriately.     An 10 cm deltopectoral incision was made from the tip of the coracoid down to the insertion of the deltoid. After going through skin and subcutaneous tissues, excellent hemostasis was achieved. Blunt dissection was taken down to the deltopectoral interval. The cephalic vein was identified, mobilized and taken laterally, during the case. Blunt dissection to the deltopectoral interval was performed. Adhesions of the subdeltoid space were cleaned up. A deltoid retractor and medially a Dylan retractor were used for visualization. The soft tissue lateral to the conjoined tendon condyle of the clavipectoral fascia was cleaned off.     Biceps tenodesis: The biceps was identified, found in it's groove and was released for planned tenodesis to the upper border of the pec tendon.  This was freed up and released.  The upper 1 cm of the pec major tendon was released. The released biceps was then identified distally, pulled up and tagged with #1 vicryl suture into the overlying soft tissue at the level just below the subscap repair.  This was whipstitched in soft tissue to minimize distal retraction.    The subscapularis was identified.  The large three sisters were suture-ligated twice with #1 Vicryls.   The subscapularis had a tear which was identified.  Repair of this and the overlying torn supraspinatus tendon was planned in addition to a total shoulder replacement.    Then #2 Orthocord sutures were placed as tagging sutures into the torn retracted subscapularis.  Mobilization and releases confirmed adequate tissue for subscapularis repair following implant placement.  The overlying supraspinatus was also mobilized with tagging sutures and was judged to be amenable for repair.     Joint fluid was evacuated. The biceps tendon was identified and released, and the remnant was tenotomized. The soft tissue on the neck of the humerus was cleaned off with the Bovie device. A Dylan device was placed inferiorly to protect the axillary nerve during the entire portion of the case. A 360-degree subscapularis release for mobilization was performed, and a large loose body was cleaned out of the anterior interval. With external rotation, the posterior capsule was released directly off the humerus, for increased mobility and glenoid visualization. The overlying supraspinatus was protected during the case, had a tendinopathy, but was intact. Osteophytes were removed with a large Leksell rongeur.     Attention was turned to the humerus first. A starting reamer, followed by sequential reamers up to a size 12, were inserted. Once this was in place, the neck cut intermedullary jig was applied. A 30-degree retroverted cut was planned, which was in line with the normal anatomy. The cut was provisionally done, once the guide had been pinned in place. The reamer was removed, and the neck cut was completed. All excess bony osteophytes were removed.     The broach was used and sequentially up to a size 12, which was judged to be the correct size. The broach was left in place for stabilization of the humerus.     Retractors were taken out and the glenoid was exposed.  Multiple forked glenoid retractors were used for a complete exposure. The remnant in the biceps was removed. The labrum was removed in 360 degrees. Remaining osteophytes were removed. Despite the thin glenoid, the center location could be visualized well enough and the decision was made that placement of a glenoid component was reasonable with repair of the overlying cuff tear. The central guide hole was identified, and a guidewire was inserted. This was judged to be in a correct location. The reamer, followed by peripheral reamer, was used and a rim of bone was removed. The central reamer, followed by 3 peripheral peg holes for an anchor peg glenoid was planned. All areas were removed when a small amount of bleeding bone appeared from the glenoid.     A trial 52 was judged to be the correct size of our glenoid. A size 48 with 18 mm eccentric head was judged to be the correct size on the humerus. After trials were performed, the retractors were placed back in place. After pulsatile lavage, the peripheral holes were cemented, and bone was placed in the anchor peg central hole. This was impacted into position and held in place until the permanent component was completely cemented in the correct location. The trial broach was taken out, and the permanent size 12 stem and 48 mm x 18 mm eccentric head was put in good position. The head was completely covered. There was excellent range of motion with no evidence of impingement or instability after final implants were in place.     The wound was copiously irrigated with a pulsatile lavage.     Rotator cuff repair:  Subscapularis tendon that had been tagged was repaired with #2 Orthocord. Multiple passes were taken through lesser tuberosity bone and with a modified filiberto-walter stitch into the subscap tendon.  The previously placed tagging sutures were then used with a becker needle to reinforce the tendon repair in the the residual subscap stump.  Excellent mobilization and  repair was made with this technique. The rotator interval was closed with #2 Orthocord, with the arm in external rotation and adducted to prevent over-tightening. The overlying supraspinatus was visualized with a Dylan and tagging sutures were used here to repair directly to the overlying greater tuberosity in a transosseus manner. After final rotator cuff repair, full range of motion was achieved.     The deltopectoral interval was reapproximated with #1 Vicryl. Skin was closed with 0 Vicryl, 2-0 Vicryl and 4-0 running Monocryl. The wounds were covered with Mastisol, Steri-Strips, Xeroform, 4 x 4 fluffs and island dressing. The patient was placed in a sling for protection, was extubated in the room and transferred to the Recovery Room in stable condition, accompanied by her physician.     There were no complications in the case. I was present, scrubbed and did perform all critical portions of the case.     Postop plan for the patient is to follow our total shoulder repair protocol.      Sage Xie MD     Description of the Findings of the Procedure: above    Significant Surgical Tasks Conducted by the Assistant(s), if Applicable: none    Complications: No    Estimated Blood Loss (EBL): * No values recorded between 4/1/2019  8:36 AM and 4/1/2019 10:58 AM *           Implants:   Implant Name Type Inv. Item Serial No.  Lot No. LRB No. Used   PIN TEMPORARY - [id number]  PIN TEMPORARY  [employer] [id number] Right 1   PEG ANCH LARRY CRSLINK GLB 5200 - [id number]  PEG ANCH LARRY CRSLINK GLB 5200  [employer] [id number] Right 1   CEMENT BONE IMPLANT - [id number]  CEMENT BONE IMPLANT  [employer] [id number] Right 1   STEM HUM CLLRD STD SZ12 - [id number]  STEM HUM CLLRD STD SZ12  [employer] [id number] Right 1   STEM HUM MOD 135DEG SZ12 - [id number]  STEM HUM MOD 135DEG SZ12  [employer] [id number] Right 1   ECCENTRIC HUMERAL HEAD     [id number] Right 1       Specimens:   Specimen (12h ago, onward)    None                   Condition: Good    Disposition: PACU - hemodynamically stable.    Attestation: I was present and scrubbed for the key portions of the procedure.

## 2019-04-01 NOTE — ANESTHESIA PROCEDURE NOTES
Central Line    Diagnosis: OA shoulder  Doctor requesting consult: Rojas  Patient location during procedure: done in OR  Procedure end time: 4/1/2019 8:10 AM  Staffing  Anesthesiologist: Otoniel Dominguez MD  Performed: anesthesiologist   Anesthesiologist was present at the time of the procedure.  Preanesthetic Checklist  Completed: patient identified, site marked, surgical consent, pre-op evaluation, timeout performed, IV checked, risks and benefits discussed, monitors and equipment checked and anesthesia consent given  Indication   Indication: vascular access     Anesthesia   general anesthesia    Central Line   Skin Prep: skin prepped with ChloraPrep, skin prep agent completely dried prior to procedure  maximum sterile barriers used during central venous catheter insertion  hand hygiene performed prior to central venous catheter insertion  Location: left, internal jugular.   Catheter type: triple lumen  Catheter Size: 7 Fr  Inserted Catheter Length: 16 cm  Ultrasound: vascular probe with ultrasound  Vessel Caliber: medium, patent, compressibility normal  Needle advanced into vessel with real time Ultrasound guidance.  Guidewire confirmed in vessel.  Image recorded and saved.  Manometry: none  Insertion Attempts: 1   Securement:line sutured, chlorhexidine patch, sterile dressing applied and blood return through all ports    Post-Procedure   Adverse Events:none    Guidewire Guidewire removed intact. Guidewire removed intact, verified with nurse.

## 2019-04-01 NOTE — PLAN OF CARE
Met with patient at bedside to complete discharge planning assessment. Discharge plan is SNF - referrals forwarded via Health system to patient's preferred SNFs - awaiting PT/OT judah      04/01/19 3910   Discharge Assessment   Assessment Type Discharge Planning Assessment   Confirmed/corrected address and phone number on facesheet? Yes   Assessment information obtained from? Patient   Expected Length of Stay (days) 3   Communicated expected length of stay with patient/caregiver yes   Prior to hospitilization cognitive status: Alert/Oriented   Prior to hospitalization functional status: Independent   Current cognitive status: Alert/Oriented   Current Functional Status: Needs Assistance   Lives With alone   Able to Return to Prior Arrangements no   Is patient able to care for self after discharge? No   Patient's perception of discharge disposition skilled nursing facility   Readmission Within the Last 30 Days no previous admission in last 30 days   Patient currently being followed by outpatient case management? No   Patient currently receives any other outside agency services? No   Equipment Currently Used at Home none   Do you have any problems affording any of your prescribed medications? No   Is the patient taking medications as prescribed? yes   Does the patient have transportation home? Yes   Transportation Anticipated agency   Does the patient receive services at the [facility]? No   Discharge Plan A Skilled Nursing Facility   Patient/Family in Agreement with Plan yes

## 2019-04-01 NOTE — DISCHARGE INSTRUCTIONS
[address], [place]                                    [phone]             Postoperative Instructions for Shoulder Surgery               Your Surgery Included:   Open              Arthroscopic [] Instability Repair     [] Diagnostic   [x] Rotator Cuff Repair     [] Lysis of Adhesions / Manipulation [] Distal Clavicle Resection    [] Debridement [x] Biceps Tenodesis       [] Labrum  [] Rotator Cuff   [] Cartilage   [] Contracture Release    [] SLAP Repair   [] Fracture Fixation    [] Instability Repair  [] AC Joint Reconstruction      [] Rotator Cuff Repair [x] Joint Replacement     [] Subacromial Decompression / Bursectomy   [] Hemiarthroplasty  [x] Total Shoulder    [] Biceps Tenotomy / Tenodesis    [] Reverse Total Shoulder       [] Distal Clavicle Resection          [] Amniox Arthrocentesis    [] Contracture Release                 Call our office ([phone]) immediately if you experience any of the following:      Excessive bleeding or pus like drainage at the incision site      Uncontrollable pain not relieved by pain medication      Excessive swelling or redness at the incision site      Fever above 101.5 degrees not controlled with Tylenol or Motrin      Shortness of Breath      Any foul odor or blistering from the surgery site   FOR EMERGENCIES: If any unusual problems or difficulties occur, call our office at [phone], or page the  at [phone] who will direct your call appropriately   1.   Pain Management: A cold therapy cuff, pain medications, local injections, and in some cases, regional anesthesia injections are used to manage your post-operative pain. The decision to use each of these options is based on their risks and benefits.    Medications: You were given one or more of the following medication prescriptions before leaving the hospital. Have the prescriptions filled at a pharmacy on your way home and follow the instructions on the  bottles. If you need a refill, please call your pharmacy.     Narcotic Medication (usually Vicodin ES, Lortab, Percocet or Nucynta): Begin taking the medication before your shoulder starts to hurt. Some patients do not like to take any medication, but if you wait until your pain is severe before taking, you will be very uncomfortable for several hours waiting for the narcotic to work. Always take with food.    Nausea / Vomiting: For this issue, we prescribe Phenergan, use this medication as directed.    Cold Therapy: You may have been sent home with a dax [facility] cold therapy unit and wrap for your shoulder. Fill with ice and water to the indicated fill line and use throughout the day for the first two days and then as needed to help relieve pain and control swelling.     Regional Anesthesia Injections (Blocks): You may have been given a regional nerve block either before or after surgery. This may make your entire shoulder numb for 24-36 hours.                    2.   Diet: Eat a bland diet for the first day after surgery. Progress your diet as tolerated. Constipation may occur with Narcotic usage, contact our office if you are experiencing constipation.   3.   Activity: After you arrive at home, spend most of the first 24 hours resting in bed, on the couch, or in a reclining chair. After the first 24 hours at home, slowly increase your activity level based on your symptoms.   4.   Dressing Change: Keep dressing on until clinic visit. It is normal for some blood to be seen on the dressings. If you are concerned by the drainage or the appearance of your shoulder, please call one of the numbers listed below.   5.   Showering: Sponge bath only until clinic visit.   6.   Shoulder Sling (with/without Pillow attachment): You may have been sent home with a sling / pillow attachment holding your arm away from your body. Keep the sling on at all times.           [x] You need to wear the sling with pillow for 24 hours a day  for 6 weeks.   7.  Shoulder Exercises: Begin these exercises the first day after surgery in order to help you regain your motion and strength. You may do the following marked exercises for 2-5 mins five times a day:   [x] Shoulder shrugs - Shrug your shoulders up and down.   [] Pendulums - Bend forward allowing your arm to hang down in front of you. Gently swing your arm side-to-side and front to back.                                                                                                                               [] Passive abduction - Have a family member gently lift your arm away from your body bringing your elbow up to the level of your shoulder.                                                                                                                   [] Shoulder rotation - With your arm at your side, have a family member gently rotate your arm internally and externally.                                                                                                                  [x] Scapular retractions - (Squeeze shoulder blades together): Squeeze shoulder blades together while slightly pulling them down (do not shrug your shoulders upward); You can perform 10-15 reps, several times throughout the day, when seated at your desk, driving in the car, etc.                                                                                                                     [] Pulley exercises - Put a towel or long sleeve shirt over the top of a door. Stand facing the door. Use your good arm to gently pull your operative arm up in front of you.   [] Elbow motion - Straighten and bend your elbow.                                                                                                               [x] Ball squeezes - use ball attached to sling/pillow or soft (nerf) ball for  strengthening                                                                                                                  8.  Physical Therapy: Physical therapy is an essential component to your recovery from surgery. Your physical therapy will start in 2 weeks.   FIRST POSTOPERATIVE VISIT: As scheduled.

## 2019-04-01 NOTE — NURSING
Pt arrived to floor via stretcher with CRUZITO Stock and transferred to bed. IVF started, SCDs applied, oriented to room, call light placed within reach, bed low and locked, and family at bedside. No complaints of pain. Montiel noted draining clear yellow urine to gravity. Incisions noted to Right shoulder, CDI. Sling and polar care in place. No acute distress noted at this time. Will continue to monitor.

## 2019-04-01 NOTE — PLAN OF CARE
Problem: Occupational Therapy Goal  Goal: Occupational Therapy Goal  Goals to be met by:   4/15/19.    Patient will increase functional independence with ADLs by performing:    UE Dressing with Moderate Assistance.  LE Dressing with Stand-by Assistance.  Grooming while standing at sink with Stand-by Assistance.  Toileting from bedside commode with Stand-by Assistance for hygiene and clothing management.   Toilet transfer to bedside commode with Stand-by Assistance.    Outcome: Ongoing (interventions implemented as appropriate)  Initial OT eval/treat complete.  Recommend  OT.  Needs 3-in-1 commode.  Son able to provide assist while at home, but needs MD signature for [facility] paperwork approval.  To benefit from continued acute care OT services to increase independence in self-care/functional transfers.  OT to follow.

## 2019-04-01 NOTE — TRANSFER OF CARE
Anesthesia Transfer of Care Note    Patient: Poonam Alvarez    Procedure(s) Performed: Procedure(s) (LRB):  ARTHROPLASTY, SHOULDER (Right)    Patient location: PACU    Anesthesia Type: general    Transport from OR: Transported from OR on 2-3 L/min O2 by NC with adequate spontaneous ventilation    Post pain: adequate analgesia    Post assessment: no apparent anesthetic complications and tolerated procedure well    Post vital signs: stable    Level of consciousness: awake and alert    Nausea/Vomiting: no nausea/vomiting    Complications: none    Transfer of care protocol was followed      Last vitals:   Visit Vitals  /78 (BP Location: Left arm, Patient Position: Lying)   Pulse 81   Temp 36.7 °C (98 °F) (Oral)   Resp 16   Ht 6' (1.829 m)   Wt 97.5 kg (215 lb)   SpO2 96%   Breastfeeding? No   BMI 29.16 kg/m²

## 2019-04-01 NOTE — BRIEF OP NOTE
[facility]  Brief Operative Note    SUMMARY     Surgery Date: 4/1/2019     Surgeon(s) and Role:     * Sage Xie MD - Primary    Assisting Surgeon: None    Pre-op Diagnosis:  Primary osteoarthritis of right shoulder [M19.011]    Post-op Diagnosis:  Post-Op Diagnosis Codes:     * Primary osteoarthritis of right shoulder [M19.011]    Procedure(s) (LRB):  ARTHROPLASTY, SHOULDER (Right)    Anesthesia: General    Description of Procedure: see op note             Specimens:   Specimen (12h ago, onward)    None

## 2019-04-01 NOTE — ASSESSMENT & PLAN NOTE
68 y.o. female POD1 s/p R TSA    Pain control  PT/OT: NWB RUE; stay in sling at all times; no ROM  DVT PPx:  BID, FCDs at all times when not ambulating  Abx: postop Ancef  Labs: reviewed  Drain: none  Montiel: DC today    Dispo: f/u PT recs

## 2019-04-02 VITALS
TEMPERATURE: 99 F | HEART RATE: 83 BPM | WEIGHT: 214.94 LBS | OXYGEN SATURATION: 90 % | BODY MASS INDEX: 29.11 KG/M2 | RESPIRATION RATE: 18 BRPM | SYSTOLIC BLOOD PRESSURE: 130 MMHG | HEIGHT: 72 IN | DIASTOLIC BLOOD PRESSURE: 60 MMHG

## 2019-04-02 PROCEDURE — 97163 PT EVAL HIGH COMPLEX 45 MIN: CPT

## 2019-04-02 PROCEDURE — 97530 THERAPEUTIC ACTIVITIES: CPT

## 2019-04-02 PROCEDURE — 25000003 PHARM REV CODE 250: Performed by: STUDENT IN AN ORGANIZED HEALTH CARE EDUCATION/TRAINING PROGRAM

## 2019-04-02 PROCEDURE — 25000003 PHARM REV CODE 250: Performed by: PHYSICIAN ASSISTANT

## 2019-04-02 PROCEDURE — 97535 SELF CARE MNGMENT TRAINING: CPT

## 2019-04-02 RX ADMIN — MUPIROCIN 1 G: 20 OINTMENT TOPICAL at 08:04

## 2019-04-02 RX ADMIN — LEVOTHYROXINE SODIUM 137 MCG: 25 TABLET ORAL at 06:04

## 2019-04-02 RX ADMIN — FAMOTIDINE 20 MG: 20 TABLET, FILM COATED ORAL at 08:04

## 2019-04-02 RX ADMIN — STANDARDIZED SENNA CONCENTRATE AND DOCUSATE SODIUM 1 TABLET: 8.6; 5 TABLET, FILM COATED ORAL at 08:04

## 2019-04-02 RX ADMIN — ACETAMINOPHEN 1000 MG: 500 TABLET ORAL at 08:04

## 2019-04-02 RX ADMIN — POLYETHYLENE GLYCOL 3350 17 G: 17 POWDER, FOR SOLUTION ORAL at 08:04

## 2019-04-02 RX ADMIN — ASPIRIN 325 MG ORAL TABLET 325 MG: 325 PILL ORAL at 08:04

## 2019-04-02 NOTE — PT/OT/SLP PROGRESS
Occupational Therapy   Treatment    Name: Poonam Alvarez  MRN: [id number]  Admitting Diagnosis:  Primary osteoarthritis of right shoulder  1 Day Post-Op    Recommendations:     Discharge Recommendations: home with home health, home health OT, home health PT  Discharge Equipment Recommendations:  other (see comments)(3-in-1 commode already delivered to room)  Barriers to discharge:  Other (Comment)(current level of function)    Assessment:   Pt. Able to make slight fist today with RUE.  Verbalized instruction to make fist as able for there-ex modification until able to perform ball squeezes.  Required assist with UE sling/ABD brace adjustment and 2 buttons; able to drape button front shirt over RUE/back, thread LUE, and button buttons X 2 via single handed technique while seated.  Donned LB clothing threading BLE into underwear/pants and donning socks via cross leg tech in sitting; pulled clothing to waist in stance with SBA.  Progressing towards goals.  To benefit from continued acute care OT services to increase independence in self-care/functional transfers.  Continue POC.    Poonam Alvarez is a 68 y.o. female with a medical diagnosis of Primary osteoarthritis of right shoulder.  She presents with below deficits decreasing independence in self-care/functional transfers. Performance deficits affecting function are decreased upper extremity function, decreased ROM, impaired skin, orthopedic precautions, impaired coordination, weakness, impaired self care skills, decreased coordination.     Rehab Prognosis:  Good; patient would benefit from acute skilled OT services to address these deficits and reach maximum level of function.       Plan:     Patient to be seen daily to address the above listed problems via self-care/home management, therapeutic activities, therapeutic exercises  · Plan of Care Expires: 04/08/19  · Plan of Care Reviewed with: patient    Subjective     Pain/Comfort:  · Pain Rating 1: 0/10  · Pain  Rating Post-Intervention 1: 0/10    Objective:     Communicated with: Nursing prior to session.  Patient found up in chair with central line, peripheral IV, garza catheter upon OT entry to room.    General Precautions: Standard, fall   Orthopedic Precautions:RUE non weight bearing(no AROM RUE shoulder, ER, or ABD)   Braces: Shoulder abduction brace, UE Sling(do not remove sling)     Occupational Performance:     Functional Mobility/Transfers:  · Patient completed Sit <> Stand Transfer with stand by assistance and contact guard assistance  with  no assistive device      Activities of Daily Living:  · Upper Body Dressing: minimum assistance with 2 buttons of button front shirt; needed assist with adjusting sling and ABD pillow  · Lower Body Dressing: stand by assistance to pull clothing to waist; threaded BLE and donned socks seated in bedside chair      [facility] 6 Click ADL: 20    Treatment & Education:  Educated on ADL safety and there-ex..     Patient left up in chair with all lines intact, call button in reach and nursing notifiedEducation:      GOALS:   Multidisciplinary Problems     Occupational Therapy Goals        Problem: Occupational Therapy Goal    Goal Priority Disciplines Outcome Interventions   Occupational Therapy Goal     OT, PT/OT Ongoing (interventions implemented as appropriate)    Description:  Goals to be met by:   4/15/19.    Patient will increase functional independence with ADLs by performing:    UE Dressing with Moderate Assistance.  GOAL MET 4/2/19.  LE Dressing with Stand-by Assistance.  GOAL MET 4/2/19.  Grooming while standing at sink with Stand-by Assistance.  Toileting from bedside commode with Stand-by Assistance for hygiene and clothing management.   Toilet transfer to bedside commode with Stand-by Assistance.                       Time Tracking:     OT Date of Treatment: 04/02/19  OT Start Time: 1128  OT Stop Time: 1204  OT Total Time (min): 36 min    Billable Minutes:Self Care/Home  Management 36    Aida Christianson, OT  4/2/2019

## 2019-04-02 NOTE — PT/OT/SLP DISCHARGE
Occupational Therapy Discharge Summary    Poonam Alvarez  MRN: [id number]   Principal Problem: Primary osteoarthritis of right shoulder      Patient Discharged from acute Occupational Therapy on 4/2/19.  Please refer to prior OT note dated 4/2/19 for functional status.    Assessment:      Patient appropriate for care in another setting. Goals partially met.     Objective:     GOALS:   Multidisciplinary Problems     Occupational Therapy Goals        Problem: Occupational Therapy Goal    Goal Priority Disciplines Outcome Interventions   Occupational Therapy Goal     OT, PT/OT Ongoing (interventions implemented as appropriate)    Description:  Goals to be met by:   4/15/19.    Patient will increase functional independence with ADLs by performing:    UE Dressing with Moderate Assistance.  GOAL MET 4/2/19.  LE Dressing with Stand-by Assistance.  GOAL MET 4/2/19.  Grooming while standing at sink with Stand-by Assistance.  Toileting from bedside commode with Stand-by Assistance for hygiene and clothing management.   Toilet transfer to bedside commode with Stand-by Assistance.                       Reasons for Discontinuation of Therapy Services  Transfer to alternate level of care.      Plan:     Patient Discharged to: Home with Home Health Service    Aida Christianson OT  4/2/2019

## 2019-04-02 NOTE — PLAN OF CARE
Spoke with daughter Radha who requested patient be evaluated for IRF - spoke with Bijal from [facility] who reports MD does not feel like patient meets medical necessity - Discharge home planned for today - patient open to any home health agency that will accept her insurance - [facility] - Sonja from [facility] gave approval to pull bedside commode from our supply - [facility] to deliver to bedside

## 2019-04-02 NOTE — PLAN OF CARE
Patient accepted by [facility]      04/02/19 1032   Final Note   Assessment Type Final Discharge Note   Anticipated Discharge Disposition Home-Health   What phone number can be called within the next 1-3 days to see how you are doing after discharge? [phone]   Hospital Follow Up  Appt(s) scheduled? Yes   Discharge plans and expectations educations in teach back method with documentation complete? Yes   Right Care Referral Info   Post Acute Recommendation Home-care   Facility Name [facility]

## 2019-04-02 NOTE — PT/OT/SLP EVAL
Physical Therapy Evaluation and Treatment    Patient Name:  Poonam Alvarez   MRN:  [id number]    Recommendations:     Discharge Recommendations:  (pending assessment of transfers, gait training, stair training)  Discharge Equipment Recommendations: (pending further assessment, has yet to ambulate with PT or perform stair training. Noted OT recommendations for 3-1 commode)   Barriers to discharge: Has yet to perform transfers, standing, ambulation, stairs with PT    Assessment:     Poonam Alvarez is a 68 y.o. female admitted with a medical diagnosis of Primary osteoarthritis of right shoulder.  She presents with the following impairments/functional limitations:  impaired cardiopulmonary response to activity, decreased upper extremity function, impaired self care skills, impaired functional mobility affecting participation in transfers, household ambulation, stair negotiation, standing activity, ADLs, driving.    Patient evaluated by PT.      Pt initially required standby assist for bed mobility and sitting balance. After approximately 10 minutes of sitting activity including adjustment of R UE shoulder abduction brace and LE MMT, pt c/o worsening dizziness, slumped over to her L, decreased alertness, and loss of bladder control (urinary incontinence onto floor). She required total assist for back to supine and protection of R UE. Once in supine, improved alertness noted and vital signs monitoring as follows:     · SpO2: room air 90%  · Heart rate 65 bpm  · Blood pressure 105/61     Bedside nurse Alicja, charge nurse Carlos, and ortho navigator Angelica notified immediately. PT will continue to follow and progress as tolerated. It is recommended pt complete gait training and stair training prior to discharge from acute setting, however has yet to tolerate transfers, standing activity, or ambulation today.     Rehab Prognosis: Good; patient would benefit from acute skilled PT services to address these deficits and reach  "maximum level of function.    Recent Surgery: Procedure(s) (LRB):  ARTHROPLASTY, SHOULDER (Right) 1 Day Post-Op    Plan:     During this hospitalization, patient to be seen daily to address the identified rehab impairments via gait training, therapeutic activities, therapeutic exercises, neuromuscular re-education, wheelchair management/training and progress toward the following goals:    · Plan of Care Expires:  05/02/19    Subjective     Chief Complaint: feeling dizzy in sitting  Patient/Family Comments/goals: "my pressure has been dropping" Pt expressing concerns about her blood pressure, goals for normal blood pressure and resolution of symptoms. Once returned to bed after near syncopal episode, pt stated "let me try again!"  Pain/Comfort:  · Pain Rating 1: 0/10  · Pain Addressed 1: ("ON-Q" regional pain pump in place; settings found at 6mL/hr)  · Pain Rating Post-Intervention 1: 0/10    Patients cultural, spiritual, [employer] conflicts given the current situation: no    Living Environment:  · Per patient: Pt lives alone in a 3 story house with 5 steps to enter and unilateral handrail on R to ascend. Full flight to second story living arrangement (bedroom and bathroom) with unilateral hand rails on R to ascend.   · Pt has a tub/shower and a standard height toilet.   · Upon discharge, patient will have assistance from son who works full time, however may be able to assist if [facility] paperwork signed.  Prior level of function:  · Ambulation: Indep no AD  · ADL's: Indep no AD  · iADL's: Indep cooking, cleaning, driving; she works part-time for [employer] as a . He enjoys gardening   · Falls: denies  Equipment used at home:   · none    Objective:     Communicated with bedside nurse Alicja prior to session.  Patient found supine in bed with HOB elevated with central line(L neck at site of shoulder abduction brace strap; R shoulder "ON-Q")  upon PT entry to room.    General Precautions: Standard,     Orthopedic " Precautions:RUE non weight bearing(no abduction, external rotation, ROM to R shoulder)   Braces: Shoulder abduction brace     Exams:  Cognition:   Patient is oriented to name, , name of facility, month, year and reason for admission.  Pt follows approximately 100% of simple commands while alert initially, more difficulty with near syncopal episode during session.    Musculoskeletal:  Posture:    -       R UE guarding in sling  LE ROM/Strength: Did not assess hips due to near syncopal episodes during sitting MMT    Left  Muscle group Right   Did not assess Hip flexion Did not assess   5/5 Knee extension 5/5   5/5 Ankle dorsiflexion 5/5     Neuromuscular:  Sensation: Intact to light touch bilateral LEs. Diminished to R distal LE  Coordination: impaired finger/thumb opp R hand; (-) rapid alt movements bilateral LEs  Tone/Reflexes: Generally hypotonic R distal UE; able to perform partial finger flexion during ball squeeze  Balance: initially supervision unsupported sitting; near syncopal episode with L lateral and posterior lean requiring total assist for trunk control (nurse notified)  Visual-vestibular: No impairments identified with functional mobility. No formal testing performed.  Integument: R shoulder not observed with dressing and ON-Q catheter  Cardiopulmonary:  Edema: present R UE  Vital signs:   SpO2 on room air: 90%   Heart rate 65 bpm  Blood pressure: 105/51 lying at end session     Functional Mobility:  Bed Mobility:     Supine to Sit: SBA with HOB elevated to pt's L side (her preference)  Sit to Supine: total assist following near syncopal episode  Transfers:     Sit to Stand:  Deferred due to impaired alertness with loss of trunk control, bladder control, monitoring of vitals signs following near syncopal episode    Therapeutic Activities and Exercises:   Evaluation, mobility, and monitoring of vitals signs as above  Assist to adjust R shoulder abduction brace for proper fit  PT educated patient re:    Role of PT, PT plan of care   Safety with OOB mobility   Fit of R shoulder abduction brace  R UE precautions    AM-PAC 6 CLICK MOBILITY  Total Score:10     Patient left supine in bed with HOB elevated with all lines intact, call button in reach and bedside nurse, charge nurse and ortho navigator notified.    GOALS:   Multidisciplinary Problems     Physical Therapy Goals        Problem: Physical Therapy Goal    Goal Priority Disciplines Outcome Goal Variances Interventions   Physical Therapy Goal     PT, PT/OT Ongoing (interventions implemented as appropriate)     Description:  Goals to be met by: 19     Patient will increase functional independence with mobility by performin. Supine to sit with supervision.   2. Sit to supine with supervision.   3. Sit<>stand transfer with supervision with or without single point cane.   4. Gait > 150 feet with SBA with or without single point cane.   5. Ascend/descend 8 stairs with unilateral handrails with CGA with or without AD.                      History:     Past Medical History:   Diagnosis Date    AR (allergic rhinitis) 2012    Atrial flutter     ablation 2008    Cataract     Generalized headaches     GERD (gastroesophageal reflux disease) 2013    resolved    Grave's disease     s/p radioactive iodine, now hypothyroidism    Keloid cicatrix     Obesity     Osteoarthritis     shoulders, hands, knees    Positive PPD, treated     9 months of INH, completed 2010       Past Surgical History:   Procedure Laterality Date    ABLATION OF DYSRHYTHMIC FOCUS  10/24/2008    atrial flutter    ARTHROPLASTY, KNEE, TOTAL Right 3/27/2014    Performed [facility] at [facility] OR 2ND FLR    ARTHROPLASTY, SHOULDER Right 2019    [facility]    CATARACT EXTRACTION W/  INTRAOCULAR LENS IMPLANT Bilateral     COLONOSCOPY N/A 2015    Performed by Jose Ly MD at [facility] ENDO (4TH FLR)    EYE  SURGERY      cataract removal right eye    HERNIA REPAIR      INSERTION-IMPLANT,  MESH PLACEMENT-[id number] Left 8/4/2017    [facility] OR    KNEE ARTHROSCOPY W/ DEBRIDEMENT      right, 2005 and 2008    KNEE SURGERY  3-27-14    right TKA    REPAIR-HERNIA-INGUINAL-INITIAL (5 YRS+) OPEN - [id number] Left 8/4/2017    Per[facility] by Loli [facility] at [facility] OR       Time Tracking:     PT Received On: 04/02/19  PT Start Time: 0910     PT Stop Time: 0956  PT Total Time (min): 46 min     Billable Minutes: Evaluation 20 and Therapeutic Activity 26      Olga Sheehan, PT  04/02/2019

## 2019-04-02 NOTE — SUBJECTIVE & OBJECTIVE
Subjective:     Principal Problem:Primary osteoarthritis of right shoulder    Follow-up For: Procedure(s) (LRB):  ARTHROPLASTY, SHOULDER (Right)  Post-Operative Day: 1 Day Post-Op      Interval History:  Patient seen and examined. Doing well without new complains. Pain is well controlled. No fever, chills, nausea, vomit, diarrhea, shortness-of-breath, chest pain. Discussed treatment and discharge plans.    Objective:     Vitals:    19 0426 19 0831 19 0944 19 0957   BP: (!) 113/53 (!) 108/53 (!) 105/51 130/60   BP Location: Left arm      Patient Position: Lying  Lying    Pulse: 77 92 65 83   Resp: 18 18     Temp: 99.3 °F (37.4 °C)   99.3 °F (37.4 °C)   TempSrc: Oral      SpO2: 96% 95% (!) 90%    Weight:       Height:         Temp (48hrs), Av.5 °F (36.9 °C), Min:97.7 °F (36.5 °C), Max:99.3 °F (37.4 °C)      General    Constitutional: She is oriented to person, place, and time. No distress.   Eyes: Pupils are equal, round, and reactive to light.   Cardiovascular: Normal rate and intact distal pulses.    Pulmonary/Chest: Effort normal. No respiratory distress. She has no wheezes. She has no rales.   Abdominal: Soft.   Neurological: She is alert and oriented to person, place, and time.   Psychiatric: She has a normal mood and affect. Her behavior is normal. Judgment and thought content normal.         Right Shoulder Exam     Comments:  Dressings c/d/i. Pain catheter in place  SILT to m/r/u nerves but decreased in all. SILT to axillary nerve. Motor intact to ain/axillary nerve. 0/5 to pin/u nerves  Radial pulse 2+. Bcr.      Drain: no drain  Drain Output   0701 -  0700  In: 5010   Out: 2845     Significant Labs: All pertinent labs within the past 24 hours have been reviewed.

## 2019-04-02 NOTE — ASSESSMENT & PLAN NOTE
69 yo F w R shoulder oa s/p TSA POD1    -NWB RUE  -pt/ot for ambulation/adls   Needs to complete 1 flight of stairs prior to d/c  -pain controlled with oral pain meds and nerve catheter   Expect return of function after catheter removed  -ASA for dvt ppx    Disop: plan for d/c home with family with home health assistance.

## 2019-04-02 NOTE — HPI
68 y.o. Female Chronic right shoulder pain for about 1 year without TOYA.     She reports that the pain and weakness is worse with overhead activity. It also bothers her at night.  Is affecting ADLs. Xray showed OA. Plan for R TSA

## 2019-04-02 NOTE — PT/OT/SLP PROGRESS
"Physical Therapy Treatment and Discharge Summary    Patient Name:  Poonam Alvarez   MRN:  [id number]    Recommendations:     Discharge Recommendations:  home health PT, home health OT, home with home health   Discharge Equipment Recommendations: (has received 3-1 commode)   Barriers to discharge: Pt has completed stair training    Assessment:     Poonam Alvarez is a 68 y.o. female admitted with a medical diagnosis of Primary osteoarthritis of right shoulder.  She presents with the following impairments/functional limitations:  impaired cardiopulmonary response to activity, decreased upper extremity function, impaired self care skills, impaired functional mobilty affecting participation in transfers, household ambulation, stair negotiations, standing activity, ADLs.    Significant improvement follow up session today; increased alertness, no evidence of orthostatic hypotension/syncope; improved gait distance and quality, completed stair training.     Rehab Prognosis: Good; continue PT in home setting  Recent Surgery: Procedure(s) (LRB):  ARTHROPLASTY, SHOULDER (Right) 1 Day Post-Op    Plan:     During this hospitalization, patient to be seen daily to address the identified rehab impairments via gait training, therapeutic activities, therapeutic exercises, neuromuscular re-education, wheelchair management/training and progress toward the following goals:    · Plan of Care Expires:  05/02/19   · At time of documentation patient has discharged home with home health and daughter    Subjective     Chief Complaint: no complaints  Patient/Family Comments/goals: eager for discharge  Pain/Comfort:  · Pain Rating 1: 0/10  · Pain Addressed 1: ("ON-Q" regional pain pump in place; settings found at 6mL/hr)  · Pain Rating Post-Intervention 1: 0/10      Objective:     Communicated with bedside nurse and ortho navigator prior to session.  Patient found sitting up in bedside chair with shoulder abduction brace strap; R shoulder " ""ON-Q" upon PT entry to room. Daughter present    General Precautions: Standard  Orthopedic Precautions:RUE non weight bearing(no abduction, external rotation, ROM to R shoulder)   Braces: Shoulder abduction brace     Functional Mobility:  Bed Mobility:     Supine to Sit: did not occur  Sit to Supine: did not occur  Transfers:     Sit to Stand:  supervision with no AD. Verbal cues for technique.   Gait: Approximately 100 ft on level tile with no AD and supervision. Verbal cues for safety to guard R UE while in sling to avoid collision with door frames and corner.  · Stairs: Pt ascended/descended 4 with unilateral handrails with contact guard assist and reciprocal pattern. Daughter present for supervision.       AM-PAC 6 CLICK MOBILITY  Turning over in bed (including adjusting bedclothes, sheets and blankets)?: 3  Sitting down on and standing up from a chair with arms (e.g., wheelchair, bedside commode, etc.): 3  Moving from lying on back to sitting on the side of the bed?: 3  Moving to and from a bed to a chair (including a wheelchair)?: 3  Need to walk in hospital room?: 3  Climbing 3-5 steps with a railing?: 3  Basic Mobility Total Score: 18     Patient left sitting up in w/c with transport preparing for discharge with all lines intact, call button in reach, bedside nurse and ortho navigator notified and transport and daughter present.    Goals partially met as below.   GOALS:   Multidisciplinary Problems     Physical Therapy Goals        Problem: Physical Therapy Goal    Goal Priority Disciplines Outcome Goal Variances Interventions   Physical Therapy Goal     PT, PT/OT Ongoing (interventions implemented as appropriate)     Description:  Goals to be met by: 19     Patient will increase functional independence with mobility by performin. Supine to sit with supervision.   2. Sit to supine with supervision.   3. Sit<>stand transfer with supervision with or without single point cane. -- MET without " device  4. Gait > 150 feet with SBA with or without single point cane.   5. Ascend/descend 8 stairs with unilateral handrails with CGA with or without AD.                      Time Tracking:     PT Received On: 04/02/19  PT Start Time: 1432     PT Stop Time: 1446  PT Total Time (min): 14 min     Billable Minutes: Therapeutic Activity 14    Treatment Type: Treatment(Evaluation)  PT/PTA: PT     PTA Visit Number: 0     Olga Sheehan, PT  04/02/2019

## 2019-04-02 NOTE — PLAN OF CARE
Problem: Occupational Therapy Goal  Goal: Occupational Therapy Goal  Goals to be met by:   4/15/19.    Patient will increase functional independence with ADLs by performing:    UE Dressing with Moderate Assistance.  GOAL MET 4/2/19.  LE Dressing with Stand-by Assistance.  GOAL MET 4/2/19.  Grooming while standing at sink with Stand-by Assistance.  Toileting from bedside commode with Stand-by Assistance for hygiene and clothing management.   Toilet transfer to bedside commode with Stand-by Assistance.     Outcome: Ongoing (interventions implemented as appropriate)  Pt. Able to make slight fist today with RUE.  Verbalized instruction to make fist as able for there-ex modification until able to perform ball squeezes.  Required assist with UE sling/ABD brace adjustment and 2 buttons; able to drape button front shirt over RUE/back, thread LUE, and button buttons X 2 via single handed technique while seated.  Donned LB clothing threading BLE into underwear/pants and donning socks via cross leg tech in sitting; pulled clothing to waist in stance with SBA.  Progressing towards goals.  To benefit from continued acute care OT services to increase independence in self-care/functional transfers.  Continue POC.

## 2019-04-02 NOTE — PROGRESS NOTES
[facility]  Sports Medicine  Progress Note    Patient Name: Poonam Alvarez  MRN: [id number]  Admission Date: 2019  Hospital Length of Stay: 1 days  Attending Provider: Sage Xie MD    Subjective:     Principal Problem:Primary osteoarthritis of right shoulder    Follow-up For: Procedure(s) (LRB):  ARTHROPLASTY, SHOULDER (Right)  Post-Operative Day: 1 Day Post-Op      Interval History:  Patient seen and examined. Doing well without new complains. Pain is well controlled. No fever, chills, nausea, vomit, diarrhea, shortness-of-breath, chest pain. Discussed treatment and discharge plans.    Objective:     Vitals:    19 0426 19 0831 19 0944 19 0957   BP: (!) 113/53 (!) 108/53 (!) 105/51 130/60   BP Location: Left arm      Patient Position: Lying  Lying    Pulse: 77 92 65 83   Resp: 18 18     Temp: 99.3 °F (37.4 °C)   99.3 °F (37.4 °C)   TempSrc: Oral      SpO2: 96% 95% (!) 90%    Weight:       Height:         Temp (48hrs), Av.5 °F (36.9 °C), Min:97.7 °F (36.5 °C), Max:99.3 °F (37.4 °C)      General    Constitutional: She is oriented to person, place, and time. No distress.   Eyes: Pupils are equal, round, and reactive to light.   Cardiovascular: Normal rate and intact distal pulses.    Pulmonary/Chest: Effort normal. No respiratory distress. She has no wheezes. She has no rales.   Abdominal: Soft.   Neurological: She is alert and oriented to person, place, and time.   Psychiatric: She has a normal mood and affect. Her behavior is normal. Judgment and thought content normal.         Right Shoulder Exam     Comments:  Dressings c/d/i. Pain catheter in place  SILT to m/r/u nerves but decreased in all. SILT to axillary nerve. Motor intact to ain/axillary nerve. 0/5 to pin/u nerves  Radial pulse 2+. Bcr.      Drain: no drain  Drain Output   0701 -  0700  In: 5010   Out: 2845     Significant Labs: All pertinent labs within the past 24 hours have been  reviewed.    Assessment/Plan:     * Primary osteoarthritis of right shoulder  69 yo F w R shoulder oa s/p TSA POD1    -NWB RUE  -pt/ot for ambulation/adls   Needs to complete 1 flight of stairs prior to d/c  -pain controlled with oral pain meds and nerve catheter   Expect return of function after catheter removed  -ASA for dvt ppx    Disop: plan for d/c home with family with home health assistance.          Disposition: PT/OT, Pain control, DC when stable today or tomorrow    Johann Orr MD  Sports Medicine  [facility]

## 2019-04-02 NOTE — PLAN OF CARE
Problem: Adult Inpatient Plan of Care  Goal: Plan of Care Review  Outcome: Outcome(s) achieved Date Met: 04/02/19  Eager & in agreement w/ DC. VU of DC instructions--paperwork & prescriptions passed & explained. IV removed w/ cath tip intact, WNL. Verified HH & DME set up via [facility] & walker has arrived to pt room. [employer] care packed. To be DCd home w/ daughter-- will be escorted downstairs via  transport team once dressed, ready & ride arrives. Free from falls, injury, or skin breakdown this hospital admission.

## 2019-04-02 NOTE — DISCHARGE SUMMARY
[facility]  Orthopedics  Discharge Summary      Patient Name: Poonam Alvarez  MRN: [id number]  Admission Date: 4/1/2019  Hospital Length of Stay: 1 days  Discharge Date and Time:  04/02/2019 4:56 PM  Attending Physician: Sage Xie MD  Discharging Provider: Shay Alas MD  Primary Care Provider: Lindsey Bach MD    HPI: Presented for elective R TSA.    Procedure(s) (LRB):  ARTHROPLASTY, SHOULDER (Right)      Hospital Course: Uncomplicated.  DC home POD1 after cleared by PT.  ASA for DVT PPx.  NWB RUE, no ROM, sling at all times.  F/u 2 weeks for wound check.    Consults (From admission, onward)        Status Ordering Provider     Inpatient consult to SNF  Once     Provider:  (Not yet assigned)    Completed SHAY ALAS     Inpatient consult to SNF [place]  Once     Provider:  (Not yet assigned)    Completed SHAY ALAS     Inpatient consult to SNF [facility]  Once     Provider:  (Not yet assigned)    Completed SHAY ALAS     Inpatient consult to Social Work/Case Management  Once     Provider:  (Not yet assigned)    Completed SAMAN MEZA          Significant Diagnostic Studies: No pertinent studies.    Pending Diagnostic Studies:     None        Final Active Diagnoses:    Diagnosis Date Noted POA    PRINCIPAL PROBLEM:  Primary osteoarthritis of right shoulder [M19.011] 04/01/2019 Yes    Essential hypertension [I10] 04/30/2017 Yes    Hypokalemia [E87.6] 03/30/2014 Yes    Hypophosphatemia [E83.39] 03/30/2014 Yes    GERD (gastroesophageal reflux disease) [K21.9] 03/08/2013 Yes    AR (allergic rhinitis) [J30.9] 12/07/2012 Yes    Pseudophakia of both eyes [Z96.1] 08/17/2012 Not Applicable    Hypothyroidism, postablative [E89.0] 06/28/2012 Yes    Diplopia [H53.2] 05/05/2011 Yes    Posterior subcapsular polar senile cataract [H25.049] 01/26/2011 Yes    Radial styloid tenosynovitis [M65.4] 05/31/2010 Yes      Problems Resolved During this Admission:       Discharged Condition: stable    Disposition: Home-Health Care [facility]    Follow Up:  Follow-up Information     Angelica Mahan PA-C On 4/16/2019.    Specialty:  Sports Medicine  Contact information:  [address] [place] [id number] [phone]             [facility].    Specialties:  Home Health Services, Physical Therapy, Occupational Therapy  Why:  Home Care   Contact information:  [address]  [address]  [facility] [address]  [phone]             [facility]    Specialty:  DME Provider  Why:  bedside commode   Contact information:  [address]  [address]  [facility] [address]  [phone]                 Patient Instructions:      COMMODE FOR HOME USE     Order Specific Question Answer Comments   Type: Standard    Height: 6' (1.829 m)    Weight: 97.5 kg (214 lb 15.2 oz)    Does patient have medical equipment at home? none    Length of need (1-99 months): 99      Discharge diet   Order Comments: Eat a bland diet for the first day after surgery. Progress your diet as tolerated. Constipation may occur with Narcotic usage, contact our office if you are experiencing constipation.     Discharge instructions - Pain Management Information   Order Comments: Pain Management: A cold therapy cuff, pain medications, local injections, and in some cases, regional anesthesia injections are used to manage your post-operative pain. The decision to use each of these options is based on their risks and benefits.  Medications: You were given one or more of the following medication prescriptions before leaving the hospital. Have the prescriptions filled at a pharmacy on your way home and follow the instructions on the bottles. If you need a refill, please call your pharmacy.   Narcotic Medication (usually Vicodin ES, Lortab, Percocet or Nucynta): Begin taking the medication before your shoulder starts to hurt. Some patients do not like to take any medication, but if you wait until your  pain is severe before taking, you will be very uncomfortable for several hours waiting for the narcotic to work. Always take with food.  Nausea / Vomiting: For this issue, we prescribe Phenergan, use this medication as directed.  Cold Therapy: You may have been sent home with a [employer]® cold therapy unit and wrap for your shoulder. Fill with ice and water to the indicated fill line and use throughout the day for the first two days and then as needed to help relieve pain and control swelling.   Regional Anesthesia Injections (Blocks): You may have been given a regional nerve block either before or after surgery. This may make your entire shoulder numb for 24-36 hours.     Notify physician   Order Comments: Call the doctor's office immediately if you experience any of the following:  - Excessive bleeding or pus like drainage at the incision site  - Uncontrollable pain not relieved by pain medication  - Excessive swelling or redness at the incision site  - Fever above 101.5 degrees not controlled with Tylenol or Motrin  - Shortness of Breath  - Any foul odor or blistering from the surgery site  - Persistent nausea and vomiting or diarrhea  - Difficulty breathing or increased cough  - Severe persistent headache  - Persistent dizziness, light-headedness, or visual disturbances  - Increased confusion or weakness     2:  Activity   Order Comments: Exercises to be performed 5 times per day for 5 minutes each time; do not remove sling until follow up visit with doctor; no abduction, no external rotation, non weight bearing, and no active ROM on shoulder  Exercises:   Ball Squeezes: Use ball attached to sling/pillow or soft (nerf) ball for  strengthening     Showering - Aquacel   Order Comments: You may shower after 24 hours. Do not remove dressing, we will take it off at 2 week post op apt.     Medications:  Reconciled Home Medications:      Medication List      CHANGE how you take these medications    aspirin 325 MG  EC tablet  Commonly known as:  ECOTRIN  Take 1 tablet (325 mg total) by mouth 2 (two) times daily. Take an antacid with medication. for 42 doses  What changed:  Another medication with the same name was removed. Continue taking this medication, and follow the directions you see here.        CONTINUE taking these medications    acetaminophen-codeine 300-30mg 300-30 mg Tab  Commonly known as:  TYLENOL #3  Take 1 tablet by mouth every 6 (six) hours as needed.     b complex vitamins capsule  Take 1 capsule by mouth once daily.     CENTRUM SILVER Tab  Generic drug:  multivitamin-minerals-lutein  Take 1 tablet by mouth once daily.     coenzyme Q10 100 mg Tab  Take 1 tablet by mouth once daily.     fish oil-omega-3 fatty acids 300-1,000 mg capsule  Take 2 g by mouth once daily.     gabapentin 100 MG capsule  Commonly known as:  NEURONTIN  Take 3 capsules (300 mg total) by mouth every evening.     hydroCHLOROthiazide 12.5 MG Tab  Commonly known as:  HYDRODIURIL  TAKE 1/2 TO 1 TABLET BY MOUTH DAILY AS NEEDED     meloxicam 15 MG tablet  Commonly known as:  MOBIC  Take 1 tablet (15 mg total) by mouth once daily.     promethazine 25 MG tablet  Commonly known as:  PHENERGAN  Take 1 tablet (25 mg total) by mouth every 6 (six) hours as needed for Nausea.     senna 8.6 mg tablet  Commonly known as:  SENNA CONCENTRATE  Take 1 tablet by mouth once daily.     SYNTHROID 137 MCG Tab tablet  Generic drug:  levothyroxine  Take 1 tablet (137 mcg total) by mouth once daily.     traMADol 50 mg tablet  Commonly known as:  ULTRAM  Take 1 tablet (50 mg total) by mouth every 6 (six) hours as needed for Pain.     VIACTIV 500-100-40 mg-unit-mcg Chew  Generic drug:  calcium-vitamin D3-vitamin K  Take 1 tablet by mouth Daily.     VITAMIN D3 2,000 unit Cap  Generic drug:  cholecalciferol (vitamin D3)  Take 1 capsule by mouth once daily.            Sherman Alas MD  Orthopedics  [facility]

## 2019-04-02 NOTE — PLAN OF CARE
[facility]    HOME HEALTH ORDERS  FACE TO FACE ENCOUNTER    Patient Name: Poonam Alvarez  YOB: 1950    PCP: Lindsey Bach MD   [facility] Address[facility] [address] [facility] [address]  PCP Phone Number: [phone]  PCP Fax: [phone]    Encounter Date: 04/02/2019    Admit to Home Health    Diagnoses:  Active Hospital Problems    Diagnosis  POA    *Primary osteoarthritis of right shoulder [M19.011]  Yes    Essential hypertension [I10]  Yes    Hypokalemia [E87.6]  Yes    Hypophosphatemia [E83.39]  Yes    GERD (gastroesophageal reflux disease) [K21.9]  Yes    AR (allergic rhinitis) [J30.9]  Yes    Pseudophakia of both eyes [Z96.1]  Not Applicable    Hypothyroidism, postablative [E89.0]  Yes    Diplopia [H53.2]  Yes    Posterior subcapsular polar senile cataract [H25.049]  Yes    Radial styloid tenosynovitis [M65.4]  Yes      Resolved Hospital Problems   No resolved problems to display.       Future Appointments   Date Time Provider Department Center   4/16/2019 10:30 AM Angelica Mahan PA-C [facility]   4/18/2019  5:00 PM Tiera Carreon PT [facility]   5/14/2019 11:00 AM Sage Xie MD [facility]     Follow-up Information     Angelica Mahan PA-C On 4/16/2019.    Specialty:  Sports Medicine  Contact information:  [address] [place] [id number] [phone]                     I have seen and examined this patient face to face today. My clinical findings that support the need for the home health skilled services and home bound status are the following:  Weakness/numbness causing balance and gait disturbance due to Joint Replacement making it taxing to leave home.    Allergies:  Review of patient's allergies indicates:   Allergen Reactions    Hydrocodone-acetaminophen Other (See Comments)     Low blood pressure; doesn't want    Oxycodone-acetaminophen Other (See Comments)     Causes low blood pressure; doesn't want        Diet: regular diet    Activities: NWB RUE, No abduction, No external rotation, No AROM         Ball Squeezes TID for 5 mins each time, Do not remove Sling     Nursing:   SN to complete comprehensive assessment including routine vital signs. Instruct on disease process and s/s of complications to report to MD. Review/verify medication list sent home with the patient at time of discharge  and instruct patient/caregiver as needed. Frequency may be adjusted depending on start of care date.    Notify MD if SBP > 160 or < 90; DBP > 90 or < 50; HR > 120 or < 50; Temp > 101      CONSULTS:    Physical Therapy to evaluate and treat. Evaluate for home safety and equipment needs; Establish/upgrade home exercise program. Perform / instruct on therapeutic exercises, gait training, transfer training, and Range of Motion.  Occupational Therapy to evaluate and treat. Evaluate home environment for safety and equipment needs. Perform/Instruct on transfers, ADL training, ROM, and therapeutic exercises.  Aide to provide assistance with personal care, ADLs, and vital signs.    WOUND CARE ORDERS  Leave surgical dressing intact until follow up appt       I certify that this patient is confined to her home and needs intermittent skilled nursing care, physical therapy and occupational therapy.    ________________________________  Sage Xie MD

## 2019-04-02 NOTE — HOSPITAL COURSE
Patient tolerated the procedure well. Hgb post op 11.6. Completed OT and did well.   Home health order placed. Plan for d/c after completes stairs

## 2019-04-02 NOTE — PLAN OF CARE
Problem: Physical Therapy Goal  Goal: Physical Therapy Goal  Goals to be met by: 19     Patient will increase functional independence with mobility by performin. Supine to sit with supervision.   2. Sit to supine with supervision.   3. Sit<>stand transfer with supervision with or without single point cane.   4. Gait > 150 feet with SBA with or without single point cane.   5. Ascend/descend 8 stairs with unilateral handrails with CGA with or without AD.    Outcome: Ongoing (interventions implemented as appropriate)  Patient evaluated by PT.     Pt initially required standby assist for bed mobility and sitting balance. After approximately 10 minutes of sitting activity including adjustment of R UE shoulder abduction brace and LE MMT, pt c/o worsening dizziness, slumped over to her L, decreased alertness, and loss of bladder control (urinary incontinence onto floor). She required total assist for back to supine and protection of R UE. Once in supine, improved alertness noted and vital signs monitoring as follows:    · SpO2: room air 90%  · Heart rate 65 bpm  · Blood pressure 105/61    Bedside nurse Alicja, charge nurse Carlos, and ortho navigator Angelica notified immediately. PT will continue to follow and progress as tolerated. It is recommended pt complete gait training and stair training prior to discharge from acute setting, however has yet to tolerate transfers, standing activity, or ambulation today.

## 2019-04-03 ENCOUNTER — TELEPHONE (OUTPATIENT)
Dept: SPORTS MEDICINE | Facility: CLINIC | Age: 69
End: 2019-04-03

## 2019-04-03 NOTE — TELEPHONE ENCOUNTER
----- Message from Charley Alcazar sent at 4/3/2019 12:40 PM CDT -----  Contact: Self  Patient requesting for her post-op appt on 4/16 to be moved to one of the last appts of the day if possible. She has no ride until the end of the day. (Please move the appt on 5/14 to the end of day as well)    Patient requesting a call with the new time at [phone]

## 2019-04-03 NOTE — TELEPHONE ENCOUNTER
Patient called to notify us that she accidentally pulled her pain pump out. She states that she is not in pain as her home health nurse told her to alternate her pain medication every 3 hours. Told patient that is fine and to call us if she has any problems. Patient understood.

## 2019-04-04 DIAGNOSIS — G89.18 POST-OP PAIN: ICD-10-CM

## 2019-04-04 DIAGNOSIS — Z96.611 S/P SHOULDER REPLACEMENT, RIGHT: Primary | ICD-10-CM

## 2019-04-04 RX ORDER — OXYCODONE AND ACETAMINOPHEN 10; 325 MG/1; MG/1
1 TABLET ORAL EVERY 6 HOURS PRN
Qty: 28 TABLET | Refills: 0 | Status: SHIPPED | OUTPATIENT
Start: 2019-04-04 | End: 2019-10-11

## 2019-04-04 NOTE — TELEPHONE ENCOUNTER
----- Message from Kimi Amaya sent at 4/4/2019  8:06 AM CDT -----  Contact: Self  Pt is calling to speak with Staff regarding calling a prescription for pain into her Pharmacy.  She is requesting a returned call.    She can be reached at [phone].    Thank you.

## 2019-04-11 ENCOUNTER — TELEPHONE (OUTPATIENT)
Dept: SPORTS MEDICINE | Facility: CLINIC | Age: 69
End: 2019-04-11

## 2019-04-11 NOTE — TELEPHONE ENCOUNTER
Received [facility] forms to fill out for her  to care for her. Forms completed and faxed back to [facility] Source at [phone].    Lauren Kwoksdangelo [facility] Medicine

## 2019-04-16 ENCOUNTER — OFFICE VISIT (OUTPATIENT)
Dept: SPORTS MEDICINE | Facility: CLINIC | Age: 69
End: 2019-04-16
Payer: MEDICARE

## 2019-04-16 ENCOUNTER — HOSPITAL ENCOUNTER (OUTPATIENT)
Dept: RADIOLOGY | Facility: HOSPITAL | Age: 69
Discharge: HOME | End: 2019-04-16
Attending: PHYSICIAN ASSISTANT
Payer: MEDICARE

## 2019-04-16 VITALS
HEART RATE: 96 BPM | HEIGHT: 72 IN | WEIGHT: 214 LBS | DIASTOLIC BLOOD PRESSURE: 72 MMHG | BODY MASS INDEX: 28.99 KG/M2 | SYSTOLIC BLOOD PRESSURE: 109 MMHG

## 2019-04-16 DIAGNOSIS — Z96.611 STATUS POST REPLACEMENT OF RIGHT SHOULDER JOINT: Primary | ICD-10-CM

## 2019-04-16 DIAGNOSIS — M25.511 RIGHT SHOULDER PAIN, UNSPECIFIED CHRONICITY: ICD-10-CM

## 2019-04-16 PROCEDURE — 73030 XR SHOULDER COMPLETE 2 OR MORE VIEWS RIGHT: ICD-10-PCS | Mod: 26,RT,, | Performed by: RADIOLOGY

## 2019-04-16 PROCEDURE — 99999 PR PBB SHADOW E&M-EST. PATIENT-LVL IV: CPT | Mod: PBBFAC,,, | Performed by: PHYSICIAN ASSISTANT

## 2019-04-16 PROCEDURE — 99214 OFFICE O/P EST MOD 30 MIN: CPT | Mod: PBBFAC,25,PO | Performed by: PHYSICIAN ASSISTANT

## 2019-04-16 PROCEDURE — 99999 PR PBB SHADOW E&M-EST. PATIENT-LVL IV: ICD-10-PCS | Mod: PBBFAC,,, | Performed by: PHYSICIAN ASSISTANT

## 2019-04-16 PROCEDURE — 73030 X-RAY EXAM OF SHOULDER: CPT | Mod: TC,FY,PO,RT

## 2019-04-16 PROCEDURE — 73030 X-RAY EXAM OF SHOULDER: CPT | Mod: 26,RT,, | Performed by: RADIOLOGY

## 2019-04-16 PROCEDURE — 99024 POSTOP FOLLOW-UP VISIT: CPT | Mod: POP,,, | Performed by: PHYSICIAN ASSISTANT

## 2019-04-16 PROCEDURE — 99024 PR POST-OP FOLLOW-UP VISIT: ICD-10-PCS | Mod: POP,,, | Performed by: PHYSICIAN ASSISTANT

## 2019-04-16 RX ORDER — TRAMADOL HYDROCHLORIDE 50 MG/1
50 TABLET ORAL EVERY 6 HOURS PRN
Qty: 40 TABLET | Refills: 0 | Status: SHIPPED | OUTPATIENT
Start: 2019-04-16 | End: 2019-10-11

## 2019-04-16 RX ORDER — ACETAMINOPHEN AND CODEINE PHOSPHATE 300; 30 MG/1; MG/1
1 TABLET ORAL EVERY 6 HOURS PRN
Status: DISCONTINUED | OUTPATIENT
Start: 2019-04-16 | End: 2022-07-06

## 2019-04-16 NOTE — LETTER
[facility] Sports Medicine  [address]  [facility] [address]  Phone: [phone] April 16, 2019     Patient: Poonam Alvarez   YOB: 1950   Date of Visit: 4/16/2019       To Whom It May Concern:    Please allow patient to return to work on 5/1/19 with restrictions of the right upper extremity. Ok to sit at a computer and type, make phone calls and speak with patients in person.    If you have any questions or concerns, please don't hesitate to contact my office.    Sincerely,        Angelica Mahan PA-C

## 2019-04-16 NOTE — PROGRESS NOTES
CC: right shoulder post op 2 weeks    Pain well tolerated on pain medication  Sling in place  No issues reported    Date of Procedure: 4/1/2019      Procedure: Procedure(s) (LRB):  ARTHROPLASTY, SHOULDER (Right)      Surgeon(s) and Role:     * Sage Xie MD - Primary     Assisting:   ALLAN Pastrana     Pre-Operative Diagnosis:   1. Primary osteoarthritis of right shoulder [M19.011]  2. Rotator cuff tear  3. Biceps tendinopathy     Post-Operative Diagnosis: Post-Op Diagnosis Codes:  1. Primary osteoarthritis of right shoulder [M19.011]  2. Rotator cuff tear  3. Biceps tendinopathy     Anesthesia: General plus interscalene block with catheter     Technical Procedures Used:      DATE OF SURGERY: 4/1/2019      PREOPERATIVE DIAGNOSIS:   1. Degenerative joint disease, right shoulder.   2. Right shoulder rotator cuff tear  3. Right shoulder biceps tendinopathy     POSTOPERATIVE DIAGNOSIS:   1. Degenerative joint disease, right shoulder.   2. Right shoulder rotator cuff tear  3. Right shoulder biceps tendinopathy     PROCEDURE:   1. Right total shoulder replacement.   2. Right shoulder rotator cuff repair  3. Right shoulder biceps tenodesis     SURGEON: Sage Xie M.D.    Review of Systems   Constitution: Negative. Negative for chills, fever and night sweats.    Cardiovascular: Negative for chest pain and syncope.   Respiratory: Negative for cough and shortness of breath.    Gastrointestinal: Negative for abdominal pain and bowel incontinence.      PE:    There were no vitals taken for this visit.     right shoulder    Incision healed  No sign of infection  Swelling resolved  Compartments soft  Neurovascular status intact in extremity    Imaging Right shoulder XR:  FINDINGS:  Skeletal structures show no acute fracture or dislocation.  Postoperative findings of right shoulder arthroplasty are again identified with prostheses in satisfactory position and alignment.  Surrounding soft  tissues have a few small calcifications.  Previous air has resorbed.    Assessment:  2 weeks s/p right TSA    Plan:  1. Start PT   2. Pain medication as needed for PT; try to wean off for next visit  3. Return to clinic in 4 weeks for 6 week post op follow up

## 2019-04-18 ENCOUNTER — CLINICAL SUPPORT (OUTPATIENT)
Dept: REHABILITATION | Facility: HOSPITAL | Age: 69
End: 2019-04-18
Attending: ORTHOPAEDIC SURGERY
Payer: MEDICARE

## 2019-04-18 DIAGNOSIS — M25.511 CHRONIC RIGHT SHOULDER PAIN: Primary | ICD-10-CM

## 2019-04-18 DIAGNOSIS — G89.29 CHRONIC RIGHT SHOULDER PAIN: Primary | ICD-10-CM

## 2019-04-18 PROCEDURE — 97110 THERAPEUTIC EXERCISES: CPT

## 2019-04-18 PROCEDURE — 97161 PT EVAL LOW COMPLEX 20 MIN: CPT

## 2019-04-18 PROCEDURE — G8979 MOBILITY GOAL STATUS: HCPCS | Mod: CJ

## 2019-04-18 PROCEDURE — G8978 MOBILITY CURRENT STATUS: HCPCS | Mod: CL

## 2019-04-18 NOTE — PLAN OF CARE
[facility]  Physical Therapy Initial Evaluation    Name: Poonam Alvarez  Clinic Number: [id number]    Therapy Diagnosis:   Encounter Diagnosis   Name Primary?    Chronic right shoulder pain Yes     Physician: Sage Xie MD    Physician Orders: PT Eval and Treat   Medical Diagnosis: M19.011 (ICD-10-CM) - DJD of right shoulder  Date of Surgery: 4/1/19  Evaluation Date: 4/18/2019  Authorization Period Expiration: 3/14/20  Plan of Care Certification Period: 10/3/19  Visit # / Visits authorized: 1/ 1    Time In: 100 pm  Time Out: 150 pm  Total Billable Time: 50 minutes    Precautions: Standard  Postop plan for the patient is to follow our total shoulder repair protocol.     Subjective   Date of onset: 2005  History of current condition - Poonam reports: Pt reports initial injury in 2005 after she slipped and fell causing a hairline fracture in shoulder. Due to increased pain and dec function and independence with ADLs, underwent TSA. Reports being compliant with with surgeon's post op protocol and has not been taking any prescription pain medications.        Past Medical History:   Diagnosis Date    AR (allergic rhinitis) 12/7/2012    Atrial flutter     ablation October 2008    Cataract     Generalized headaches     GERD (gastroesophageal reflux disease) 03/08/2013    resolved    Grave's disease     s/p radioactive iodine, now hypothyroidism    Keloid cicatrix     Obesity     Osteoarthritis     shoulders, hands, knees    Positive PPD, treated     9 months of INH, completed 1/2010     Poonam Alvarez  has a past surgical history that includes Eye surgery; Knee arthroscopy w/ debridement; Ablation of dysrhythmic focus (10/24/2008); Knee surgery (3-27-14); Colonoscopy (N/A, 11/5/2015); Cataract extraction w/  intraocular lens implant (Bilateral); Hernia repair; and Arthroplasty of shoulder (Right, 4/1/2019).    Poonam has a current medication list which includes the  following prescription(s): acetaminophen-codeine 300-30mg, aspirin, b complex vitamins, calcium-vitamin d3-vitamin k, cholecalciferol (vitamin d3), fish oil-omega-3 fatty acids, gabapentin, hydrochlorothiazide, meloxicam, multivitamin-minerals-lutein, oxycodone-acetaminophen, promethazine, senna, synthroid, tramadol, and coenzyme q10, and the following Facility-Administered Medications: acetaminophen-codeine 300-30mg.    Review of patient's allergies indicates:   Allergen Reactions    Hydrocodone-acetaminophen Other (See Comments)     Low blood pressure; doesn't want    Oxycodone-acetaminophen Other (See Comments)     Causes low blood pressure; doesn't want  4/4/19 - patient reports she is not actually allergic to it (just doesn't like how it makes her feel)        Imaging, MRI studies:    Prior Therapy: Yes  Social History: Lives alone, three story home with bedroom on second floor. Able to live independently post op.  lives alone  Occupation: Retired  Prior Level of Function: Able to complete ADLs and IADLs I   Current Level of Function: Limited per protocol     Pain:  Current 0/10, worst 2/10, best 0/10   Location: right shoulder   Description: Tingling  Aggravating Factors: dressing, showering, using shoulder  Easing Factors: relaxation and rest    Pts goals: Return to prior level of function with UE.    Objective     Observation: patient appears stated  Posture: R shoulder elevation, slight protraction      Passive Range of Motion: Measured in degrees      Shoulder Left Right   Flexion 160 NA   Abduction 180 45   ER at 0 NA 10   ER at 90 90 NA   IR 45 50       Upper Extremity Strength      Shoulder Left   Shoulder flexion: 5/5   Shoulder Abduction: 5/5   Shoulder ER 5/5   Shoulder IR 5/5       Special Tests: not indicated post operative status        Palpation Elbow:  No tenderness noted    Palpation Shoulder:  TTP around incision site        CMS Impairment/Limitation/Restriction for FOTO Shoulder  Survey    Therapist reviewed FOTO scores for Poonam Alvarez on 4/18/2019.   FOTO documents entered into [email or web address] - see Media section.    Limitation Score: 71%  Category: Mobility    Current : CL = least 60% but < 80% impaired, limited or restricted  Goal: CJ = at least 20% but < 40% impaired, limited or restricted  Discharge: NA         TREATMENT   Treatment Time In: 130 pm  Treatment Time Out: 140 pm  Total Treatment time separate from Evaluation time:10 min    Poonam received therapeutic exercises to develop strength, endurance and ROM for 10 minutes including:  Scap Squeeze [id number]  Pendulums forward and back 30x   Pendulums side to side 30x  Passive Elbow Extension Supine 4x10 sec    Poonam received cold pack for 10 minutes to R shoulder.      Home Exercises and Patient Education Provided    Education provided re: HeP to Go    Written Home Exercises Provided: yes.  Exercises were reviewed and Poonam was able to demonstrate them prior to the end of the session.   Pt received a written copy of exercises to perform at home. Poonam demonstrated good  understanding of the education provided.     See media section for exercises given.   Assessment   Poonam is a 68 y.o. female referred to outpatient Physical Therapy with a medical diagnosis of chronic R shoulder pain and recent s/p R TSA. Pt presents with decreased R shoulder ROM and strength limiting her ability to perform functional ADLs.    Pt prognosis is Good.   Pt will benefit from skilled outpatient Physical Therapy to address the deficits stated above and in the chart below, provide pt/family education, and to maximize pt's level of independence.     Plan of care discussed with patient: Yes  Pt's spiritual, cultural and educational needs considered and patient is agreeable to the plan of care and goals as stated below:     Anticipated Barriers for therapy: none    Medical Necessity is demonstrated by the following  History  Co-morbidities and personal factors  that may impact the plan of care Co-morbidities:   obesity, atrial flutter    Personal Factors:   no deficits     moderate   Examination  Body Structures and Functions, activity limitations and participation restrictions that may impact the plan of care Body Regions:   upper extremities    Body Systems:    ROM  strength    Participation Restrictions:   none    Activity limitations:   Mobility  lifting and carrying objects    Self care  dressing    Domestic Life  no deficits    Interactions/Relationships  no deficits    Life Areas  no deficits    Community and Social Life  no deficits         low   Clinical Presentation stable and uncomplicated low   Decision Making/ Complexity Score: low     Goals:  Short Term Goals: (4 weeks)   - Pt will increase ROM to 100 deg flex and abd passively R shoulder  - Decrease Pain to 1/10 as worst with 1 hour of PT exercises  - Pt to self correct posture with minimal cues to avoid upper trap compensation  - Pt independent with HEP with progressions.     Long Term Goals (24 Weeks):  - Pt will increase ROM to WNL RUE for functional ADLs and dressing  - Pt will increase strength to 5/5 RUE to return to lifting and carrying >10 #  - Decrease Pain to 0/10 with 1 full day of normal activities  - Pt to return to 80% PLOF      Plan   Certification Period/Plan of care expiration: 4/18/2019 to 10/3/19.    Outpatient Physical Therapy 2 times weekly for 24 weeks to include the following interventions: Manual Therapy, Moist Heat/ Ice and Therapeutic Exercise.     Tiera Carreon, PT, DPT, COMT

## 2019-04-22 ENCOUNTER — CLINICAL SUPPORT (OUTPATIENT)
Dept: REHABILITATION | Facility: HOSPITAL | Age: 69
End: 2019-04-22
Attending: ORTHOPAEDIC SURGERY
Payer: MEDICARE

## 2019-04-22 DIAGNOSIS — G89.29 CHRONIC RIGHT SHOULDER PAIN: ICD-10-CM

## 2019-04-22 DIAGNOSIS — M25.511 CHRONIC RIGHT SHOULDER PAIN: ICD-10-CM

## 2019-04-22 PROCEDURE — 97110 THERAPEUTIC EXERCISES: CPT

## 2019-04-22 NOTE — PROGRESS NOTES
Physical Therapy Daily Note     Name: Poonam Alvarez  Clinic Number: [id number]  Diagnosis: No diagnosis found.  Physician: Sage Xie MD  Precautions: TSA protocol surgery: 4/1/19  Visit #: 2 of 20  PTA Visit #: 2  Time In: 5:00  Time Out: 5:54  Total Treatment Time 1:1: 44    Physician Orders: PT Eval and Treat   Medical Diagnosis: M19.011 (ICD-10-CM) - DJD of right shoulder  Date of Surgery: 4/1/19  Evaluation Date: 4/18/2019  Authorization Period Expiration: 3/14/20  Plan of Care Certification Period: 10/3/19        Subjective     Pt reports: I knee my R UE back.   Pain Scale: Poonam rates pain at R shoulder on a scale of 0-10 to be 0 currently.(pt did not verb number)    Objective     Poonam received individual therapeutic exercises to develop strength, endurance, ROM and posture for 24 minutes including:  Scap Squeeze [id number]  Pendulums forward and back 30x   Pendulums side to side 30x  Passive Elbow FL/Extension seated 4x10 sec  R Wrist UD/RD/FL/Ex 30x ea.     Add next session:  Pulley  Sub-max isometrics in neutral   4. Begin scapular isometrics and sub-maximal shoulder isometrics (in neutral)   5. Pulleys (flexion and abduction) - as long as greater than 90 degrees of PROM         Poonam received the following manual therapy techniques: Soft tissue Mobilization were applied to the: R shoulder for 20 minutes including:  PROM: FL to 100 degrees, gentle ER to 30 degree. IR to chest, abd to 45 degree (IR/ER in scap plane)    CP x 10 min to R shoulder    Written Home Exercises Provided: as per eval  Pt demo good understanding of the education provided. Poonam demonstrated good return demonstration of activities.     Education provided re: Gabriele watson  Poonam verbalized good understanding of education provided.   No spiritual or educational barriers to learning provided    Assessment     Patient amb into session w/o walter patel. Pt is on wk 3 and  pt was educated that she should still be wearing sling still as per protocol. Pt states it is uncomfortable to sleep w/ sling. Pt is going to contact MD to see instructions about sleeping w/ sling. ED pt on PROM VS AROM. Attempted pulley for shoulder FL but stopped 2* pt not able to flex to 90 degrees as well as not hike shoulder. PROM to 90 degrees. Some R shoulder discomfort w/ scapular ER to 30 degrees. Pt is guarded through out session 2* fear which she verbalized.Cont to advance as per protocol.   This is a 68 y.o. female referred to outpatient physical therapy and presents with a medical diagnosis of R shoulder pain and demonstrates limitations as described in the problem list. Pt prognosis is Good. Pt will continue to benefit from skilled outpatient physical therapy to address the deficits listed in the problem list, provide pt/family education and to maximize pt's level of independence in the home and community environment.     Goals as follows:  Short Term Goals: (4 weeks)   - Pt will increase ROM to 100 deg flex and abd passively R shoulder  - Decrease Pain to 1/10 as worst with 1 hour of PT exercises  - Pt to self correct posture with minimal cues to avoid upper trap compensation  - Pt independent with HEP with progressions.      Long Term Goals (24 Weeks):  - Pt will increase ROM to WNL RUE for functional ADLs and dressing  - Pt will increase strength to 5/5 RUE to return to lifting and carrying >10 #  - Decrease Pain to 0/10 with 1 full day of normal activities  - Pt to return to 80% PLOF       Plan     Continue with established Plan of Care towards PT goals.    Therapist: Ying Phillips, PTA  4/22/2019

## 2019-04-26 ENCOUNTER — TELEPHONE (OUTPATIENT)
Dept: ADMINISTRATIVE | Facility: CLINIC | Age: 69
End: 2019-04-26

## 2019-04-26 NOTE — TELEPHONE ENCOUNTER
Home Health SOC 04/03/2019 - 06/01/2019 with [facility] ([place]) - Dr. Sage Xie. Patient received SN and PT services.

## 2019-04-29 ENCOUNTER — CLINICAL SUPPORT (OUTPATIENT)
Dept: REHABILITATION | Facility: HOSPITAL | Age: 69
End: 2019-04-29
Attending: ORTHOPAEDIC SURGERY
Payer: MEDICARE

## 2019-04-29 DIAGNOSIS — M25.511 CHRONIC RIGHT SHOULDER PAIN: ICD-10-CM

## 2019-04-29 DIAGNOSIS — G89.29 CHRONIC RIGHT SHOULDER PAIN: ICD-10-CM

## 2019-04-29 PROCEDURE — 97140 MANUAL THERAPY 1/> REGIONS: CPT

## 2019-04-29 PROCEDURE — 97110 THERAPEUTIC EXERCISES: CPT

## 2019-04-29 RX ORDER — MELOXICAM 15 MG/1
TABLET ORAL
Qty: 90 TABLET | Refills: 0 | Status: SHIPPED | OUTPATIENT
Start: 2019-04-29 | End: 2019-07-08 | Stop reason: SDUPTHER

## 2019-04-29 NOTE — PROGRESS NOTES
"                                                    Physical Therapy Daily Note     Name: Poonam Alvarez  Clinic Number: [id number]  Diagnosis: No diagnosis found.  Physician: Sage Xie MD  Precautions: TSA protocol surgery: 4/1/19  Visit #: 3 of 20  PTA Visit #: 3  Time In: 4:00  Time Out: 4:53  Total Treatment Time 1:1: 23    Physician Orders: PT Eval and Treat   Medical Diagnosis: M19.011 (ICD-10-CM) - DJD of right shoulder  Date of Surgery: 4/1/19  Evaluation Date: 4/18/2019  Authorization Period Expiration: 3/14/20  Plan of Care Certification Period: 10/3/19        Subjective     Pt reports: feeling better  Pain Scale: Poonam rates pain at R shoulder on a scale of 0-10 to be 0 currently.(pt did not verb number)    Objective     Poonam received individual therapeutic exercises to develop strength, endurance, ROM and posture for 23 minutes including:  Scap Squeeze 30x5" sec hold  Pendulums forward and back 30x   Pendulums side to side 30x  Passive Elbow FL/Extension seated 4x10 sec  R Wrist UD/RD/FL/Ex 30x ea.  Pulley scaption x2'     Add next session:  Sub-max isometrics in neutral   4. Begin scapular isometrics and sub-maximal shoulder isometrics (in neutral)            Poonam received the following manual therapy techniques: Soft tissue Mobilization were applied to the: R shoulder for 20 minutes including:  PROM: FL to 100 degrees, gentle ER to 45 degree. IR to chest, abd to 90 degree (IR/ER in scap plane)    CP x 10 min to R shoulder    Written Home Exercises Provided: as per eval  Pt demo good understanding of the education provided. Poonam demonstrated good return demonstration of activities.     Education provided re: Gabriele watson  Poonam verbalized good understanding of education provided.   No spiritual or educational barriers to learning provided    Assessment     Patient amb into session w/walter patel. States shoulder has been feeling better and that she is compliant w/ HEP. Added pulley " today in scaption to work on shoulder PROM. Able to passively flex shoulder past 90 degrees. Guarded w/ passive ER. Cont to progress as per protocol.   This is a 68 y.o. female referred to outpatient physical therapy and presents with a medical diagnosis of R shoulder pain and demonstrates limitations as described in the problem list. Pt prognosis is Good. Pt will continue to benefit from skilled outpatient physical therapy to address the deficits listed in the problem list, provide pt/family education and to maximize pt's level of independence in the home and community environment.     Goals as follows:  Short Term Goals: (4 weeks)   - Pt will increase ROM to 100 deg flex and abd passively R shoulder  - Decrease Pain to 1/10 as worst with 1 hour of PT exercises  - Pt to self correct posture with minimal cues to avoid upper trap compensation  - Pt independent with HEP with progressions.      Long Term Goals (24 Weeks):  - Pt will increase ROM to WNL RUE for functional ADLs and dressing  - Pt will increase strength to 5/5 RUE to return to lifting and carrying >10 #  - Decrease Pain to 0/10 with 1 full day of normal activities  - Pt to return to 80% PLOF       Plan     Continue with established Plan of Care towards PT goals.    Therapist: Ying Phillips, PTA  4/29/2019

## 2019-05-06 ENCOUNTER — CLINICAL SUPPORT (OUTPATIENT)
Dept: REHABILITATION | Facility: HOSPITAL | Age: 69
End: 2019-05-06
Attending: ORTHOPAEDIC SURGERY
Payer: MEDICARE

## 2019-05-06 DIAGNOSIS — G89.29 CHRONIC RIGHT SHOULDER PAIN: ICD-10-CM

## 2019-05-06 DIAGNOSIS — M25.511 CHRONIC RIGHT SHOULDER PAIN: ICD-10-CM

## 2019-05-06 PROCEDURE — 97110 THERAPEUTIC EXERCISES: CPT

## 2019-05-06 NOTE — PROGRESS NOTES
"                                                    Physical Therapy Daily Note     Name: Poonam Alvarez  Clinic Number: [id number]  Diagnosis:   Encounter Diagnosis   Name Primary?    Chronic right shoulder pain      Physician: Sage Xie MD  Precautions: TSA protocol surgery: 4/1/19  Visit #: 4 of 20  PTA Visit #: 3  Time In: 5:05  Time Out: 6:00  Total Treatment Time 1:1: 23    Physician Orders: PT Eval and Treat   Medical Diagnosis: M19.011 (ICD-10-CM) - DJD of right shoulder  Date of Surgery: 4/1/19  Evaluation Date: 4/18/2019  Authorization Period Expiration: 3/14/20  Plan of Care Certification Period: 10/3/19        Subjective     Pt reports: feeling ok  Pain Scale: Poonam rates pain at R shoulder on a scale of 0-10 to be 0 currently.(pt did not verb number)    Objective     Poonam received individual therapeutic exercises to develop strength, endurance, ROM and posture for 35 minutes including:  Scap Squeeze 30x5" sec hold  Pendulums forward and back 30x   Pendulums side to side 30x   Elbow FL/Extension seated 4x10 sec #2  R Wrist UD/RD/FL/Ex 30x ea. #2  Pulley scaption x2'  UBE 1/1' PROM ( emphasized no push/pull w/ R UE)  wall submax iso FL,abd,IR,ER 10x 5" hold       Poonam received the following manual therapy techniques: Soft tissue Mobilization were applied to the: R shoulder for10 minutes including:  PROM: FL/abd, ER in scap plane as cortney    CP x 10 min to R shoulder    Written Home Exercises Provided: as per eval  Pt demo good understanding of the education provided. Poonam demonstrated good return demonstration of activities.     Education provided re: Gabriele watson  Poonam verbalized good understanding of education provided.   No spiritual or educational barriers to learning provided    Assessment     Pt was advanced today as per protocol. Pt states she felt throbbing in shoulder during reji. Pt does not take pain meds. Heavy VC to not push/pulls w/ R UE on UBE as well as to perform 50% or " less of pressure for wall iso. PROM is slowly progressing per pt cortney.    This is a 68 y.o. female referred to outpatient physical therapy and presents with a medical diagnosis of R shoulder pain and demonstrates limitations as described in the problem list. Pt prognosis is Good. Pt will continue to benefit from skilled outpatient physical therapy to address the deficits listed in the problem list, provide pt/family education and to maximize pt's level of independence in the home and community environment.     Goals as follows:  Short Term Goals: (4 weeks)   - Pt will increase ROM to 100 deg flex and abd passively R shoulder  - Decrease Pain to 1/10 as worst with 1 hour of PT exercises  - Pt to self correct posture with minimal cues to avoid upper trap compensation  - Pt independent with HEP with progressions.      Long Term Goals (24 Weeks):  - Pt will increase ROM to WNL RUE for functional ADLs and dressing  - Pt will increase strength to 5/5 RUE to return to lifting and carrying >10 #  - Decrease Pain to 0/10 with 1 full day of normal activities  - Pt to return to 80% PLOF       Plan     Continue with established Plan of Care towards PT goals.    Therapist: Ying Phillips, PTA  5/6/2019

## 2019-05-13 ENCOUNTER — CLINICAL SUPPORT (OUTPATIENT)
Dept: REHABILITATION | Facility: HOSPITAL | Age: 69
End: 2019-05-13
Attending: ORTHOPAEDIC SURGERY
Payer: MEDICARE

## 2019-05-13 DIAGNOSIS — M25.511 CHRONIC RIGHT SHOULDER PAIN: ICD-10-CM

## 2019-05-13 DIAGNOSIS — G89.29 CHRONIC RIGHT SHOULDER PAIN: ICD-10-CM

## 2019-05-13 PROCEDURE — 97110 THERAPEUTIC EXERCISES: CPT

## 2019-05-13 NOTE — PROGRESS NOTES
"                                                    Physical Therapy Daily Note     Name: Poonam Alvarez  Clinic Number: [id number]  Diagnosis:   Encounter Diagnosis   Name Primary?    Chronic right shoulder pain      Physician: Sage Xie MD  Precautions: TSA protocol surgery: 4/1/19  Visit #: 5 of 20  PTA Visit #: 4  Time In: 5:00  Time Out: 6:00  Total Treatment Time 1:1: 55    Physician Orders: PT Eval and Treat   Medical Diagnosis: M19.011 (ICD-10-CM) - DJD of right shoulder  Date of Surgery: 4/1/19  Evaluation Date: 4/18/2019  Authorization Period Expiration: 3/14/20  Plan of Care Certification Period: 10/3/19        Subjective     Pt reports: feeling ok  Pain Scale: Poonam rates pain at R shoulder on a scale of 0-10 to be 0 currently.(pt did not verb number)    Objective     Poonam received individual therapeutic exercises to develop strength, endurance, ROM and posture for 45 minutes including:  Scap Squeeze 30x5" sec hold  Pendulums forward and back 30x   Pendulums side to side 30x   Elbow FL 3x10 sec #2  Kasi scaption x3'  UBE 2/2' PROM ( emphasized no push/pull w/ R UE)  wall submax iso FL,abd,IR,ER 10x 5" hold   Rows YTB 2x5 5" hold  Shoulder ext YTB 3x5  Wand FL/ABD/ER 15x 5" hold  Wand HZ ADD 10x      Poonam received the following manual therapy techniques: Soft tissue Mobilization were applied to the: R shoulder for10 minutes including:  PROM: FL/abd, ER in scap plane as cortney    CP x 10 min to R shoulder    Written Home Exercises Provided: as per eval  Pt demo good understanding of the education provided. Poonam demonstrated good return demonstration of activities.     Education provided re: Gabriele watson  Poonam verbalized good understanding of education provided.   No spiritual or educational barriers to learning provided    Assessment     Pt was advanced today as per protocol. AAROM was added w/ wand as well as scap strengthening exs. R shoulder discomfort w/ AAROM. Able to passively FL " and ABD past 90 degrees. Pt still  Guarded at end range of PROM.  This is a 68 y.o. female referred to outpatient physical therapy and presents with a medical diagnosis of R shoulder pain and demonstrates limitations as described in the problem list. Pt prognosis is Good. Pt will continue to benefit from skilled outpatient physical therapy to address the deficits listed in the problem list, provide pt/family education and to maximize pt's level of independence in the home and community environment.     Goals as follows:  Short Term Goals: (4 weeks)   - Pt will increase ROM to 100 deg flex and abd passively R shoulder  - Decrease Pain to 1/10 as worst with 1 hour of PT exercises  - Pt to self correct posture with minimal cues to avoid upper trap compensation  - Pt independent with HEP with progressions.      Long Term Goals (24 Weeks):  - Pt will increase ROM to WNL RUE for functional ADLs and dressing  - Pt will increase strength to 5/5 RUE to return to lifting and carrying >10 #  - Decrease Pain to 0/10 with 1 full day of normal activities  - Pt to return to 80% PLOF       Plan     Continue with established Plan of Care towards PT goals.    Therapist: Ying Phillips, PTA  5/13/2019

## 2019-05-14 ENCOUNTER — HOSPITAL ENCOUNTER (OUTPATIENT)
Dept: RADIOLOGY | Facility: HOSPITAL | Age: 69
Discharge: HOME | End: 2019-05-14
Attending: ORTHOPAEDIC SURGERY
Payer: MEDICARE

## 2019-05-14 ENCOUNTER — OFFICE VISIT (OUTPATIENT)
Dept: SPORTS MEDICINE | Facility: CLINIC | Age: 69
End: 2019-05-14
Payer: MEDICARE

## 2019-05-14 ENCOUNTER — CLINICAL SUPPORT (OUTPATIENT)
Dept: REHABILITATION | Facility: HOSPITAL | Age: 69
End: 2019-05-14
Attending: ORTHOPAEDIC SURGERY
Payer: MEDICARE

## 2019-05-14 VITALS — BODY MASS INDEX: 28.99 KG/M2 | HEIGHT: 72 IN | WEIGHT: 214 LBS

## 2019-05-14 DIAGNOSIS — M25.511 RIGHT SHOULDER PAIN, UNSPECIFIED CHRONICITY: ICD-10-CM

## 2019-05-14 DIAGNOSIS — Z96.611 S/P SHOULDER REPLACEMENT, RIGHT: ICD-10-CM

## 2019-05-14 DIAGNOSIS — M25.511 CHRONIC RIGHT SHOULDER PAIN: ICD-10-CM

## 2019-05-14 DIAGNOSIS — M25.511 RIGHT SHOULDER PAIN, UNSPECIFIED CHRONICITY: Primary | ICD-10-CM

## 2019-05-14 DIAGNOSIS — G89.29 CHRONIC RIGHT SHOULDER PAIN: ICD-10-CM

## 2019-05-14 PROCEDURE — 99024 PR POST-OP FOLLOW-UP VISIT: ICD-10-PCS | Mod: POP,,, | Performed by: ORTHOPAEDIC SURGERY

## 2019-05-14 PROCEDURE — 99024 POSTOP FOLLOW-UP VISIT: CPT | Mod: POP,,, | Performed by: ORTHOPAEDIC SURGERY

## 2019-05-14 PROCEDURE — 73030 XR SHOULDER COMPLETE 2 OR MORE VIEWS RIGHT: ICD-10-PCS | Mod: 26,RT,, | Performed by: RADIOLOGY

## 2019-05-14 PROCEDURE — 97110 THERAPEUTIC EXERCISES: CPT

## 2019-05-14 PROCEDURE — 73030 X-RAY EXAM OF SHOULDER: CPT | Mod: TC,FY,PO,RT

## 2019-05-14 PROCEDURE — 99999 PR PBB SHADOW E&M-EST. PATIENT-LVL III: ICD-10-PCS | Mod: PBBFAC,,, | Performed by: ORTHOPAEDIC SURGERY

## 2019-05-14 PROCEDURE — 73030 X-RAY EXAM OF SHOULDER: CPT | Mod: 26,RT,, | Performed by: RADIOLOGY

## 2019-05-14 PROCEDURE — 99213 OFFICE O/P EST LOW 20 MIN: CPT | Mod: PBBFAC,25,PO | Performed by: ORTHOPAEDIC SURGERY

## 2019-05-14 PROCEDURE — 99999 PR PBB SHADOW E&M-EST. PATIENT-LVL III: CPT | Mod: PBBFAC,,, | Performed by: ORTHOPAEDIC SURGERY

## 2019-05-14 NOTE — PROGRESS NOTES
CC: Right shoulder post op 6 weeks    Poonam Alvarez is a patient of mine.  She returns for follow up evaluation of her right shoulder.  She has been compliant with sling.    DATE OF SURGERY: 4/1/2019      PREOPERATIVE DIAGNOSIS:   1. Degenerative joint disease, right shoulder.   2. Right shoulder rotator cuff tear  3. Right shoulder biceps tendinopathy     POSTOPERATIVE DIAGNOSIS:   1. Degenerative joint disease, right shoulder.   2. Right shoulder rotator cuff tear  3. Right shoulder biceps tendinopathy     PROCEDURE:   1. Right total shoulder replacement.   2. Right shoulder rotator cuff repair  3. Right shoulder biceps tenodesis     SURGEON: Sage Xie M.D.    Pain well tolerated on pain medication  Sling in place  No issues reported  Physical therapy at [facility] 2 x week    Review of Systems   Constitution: Negative. Negative for chills, fever and night sweats.    Cardiovascular: Negative for chest pain and syncope.   Respiratory: Negative for cough and shortness of breath.   Gastrointestinal: Negative for abdominal pain and bowel incontinence.     PE:  Vitals:    05/14/19 1349   Weight: 97.1 kg (214 lb)   Height: 6' (1.829 m)   PainSc: 0-No pain     Right shoulder  Incision healed  No sign of infection  Swelling resolved  Compartments soft  Neurovascular status intact in extremity    PROM  Forward elevation 90 degrees  External rotation 60 degrees    XRAY (5/14/19): Right shoulder arthroplasty identified.  The position and alignment is satisfactory and unchanged as compared to the previous study    Assessment:  6 weeks s/p right TSA    Plan:  1. Continue physical therapy  3. Follow up 6 weeks

## 2019-05-14 NOTE — PROGRESS NOTES
"                                                    Physical Therapy Daily Note     Name: Poonam Alvarez  [facility] Number: [id number]  Diagnosis:   Encounter Diagnosis   Name Primary?    Chronic right shoulder pain      Physician: Sage Xie MD  Precautions: TSA protocol surgery: 4/1/19  Visit #: 6 of 20  PTA Visit #: 5  Time In: 3:00  Time Out: 3:50  Total Treatment Time 1:1: 23    Physician Orders: PT Eval and Treat   Medical Diagnosis: M19.011 (ICD-10-CM) - DJD of right shoulder  Date of Surgery: 4/1/19  Evaluation Date: 4/18/2019  Authorization Period Expiration: 3/14/20  Plan of Care Certification Period: 10/3/19        Subjective     Pt reports: feeling ok  Pain Scale: Poonam rates pain at R shoulder on a scale of 0-10 to be 0 currently.(pt did not verb number)    Objective     Poonam received individual therapeutic exercises to develop strength, endurance, ROM and posture for 30 minutes including:  Pendulums forward and back 30x -NT  Pendulums side to side 30x-NT   Elbow FL 3x10 sec #2  Kasi scaption x3'  UBE 2/2' PROM ( emphasized no push/pull w/ R UE)  wall submax iso FL,abd,IR,ER 10x 5" hold   Rows YTB 2x10  Shoulder ext YTB 2x10  Wand FL/ABD/ER 10x 5" hold  Wand HZ ADD 10x      Poonam received the following manual therapy techniques: Soft tissue Mobilization were applied to the: R shoulder for10 minutes including:  PROM: FL/abd, ER in scap plane as cortney    CP x 10 min to R shoulder    Written Home Exercises Provided: as per eval  Pt demo good understanding of the education provided. Poonam demonstrated good return demonstration of activities.     Education provided re: Gabriele watson  Poonam verbalized good understanding of education provided.   No spiritual or educational barriers to learning provided    Assessment     Pt was advanced today as per protocol.Pt sore from MD visit today and taking x-rays. ROM FL and ABD has improved slightly from yesterday. ROM limited by pain. This is a 68 y.o. " female referred to outpatient physical therapy and presents with a medical diagnosis of R shoulder pain and demonstrates limitations as described in the problem list. Pt prognosis is Good. Pt will continue to benefit from skilled outpatient physical therapy to address the deficits listed in the problem list, provide pt/family education and to maximize pt's level of independence in the home and community environment.     Goals as follows:  Short Term Goals: (4 weeks)   - Pt will increase ROM to 100 deg flex and abd passively R shoulder  - Decrease Pain to 1/10 as worst with 1 hour of PT exercises  - Pt to self correct posture with minimal cues to avoid upper trap compensation  - Pt independent with HEP with progressions.      Long Term Goals (24 Weeks):  - Pt will increase ROM to WNL RUE for functional ADLs and dressing  - Pt will increase strength to 5/5 RUE to return to lifting and carrying >10 #  - Decrease Pain to 0/10 with 1 full day of normal activities  - Pt to return to 80% PLOF       Plan     Continue with established Plan of Care towards PT goals.    Therapist: Ying Phillips, PTA  5/14/2019

## 2019-05-17 DIAGNOSIS — M25.562 LEFT KNEE PAIN, UNSPECIFIED CHRONICITY: ICD-10-CM

## 2019-05-17 RX ORDER — GABAPENTIN 100 MG/1
CAPSULE ORAL
Qty: 90 CAPSULE | Refills: 0 | Status: SHIPPED | OUTPATIENT
Start: 2019-05-17 | End: 2019-07-08 | Stop reason: SDUPTHER

## 2019-05-20 ENCOUNTER — TELEPHONE (OUTPATIENT)
Dept: ORTHOPEDICS | Facility: CLINIC | Age: 69
End: 2019-05-20

## 2019-05-20 NOTE — TELEPHONE ENCOUNTER
Spoke with Ms.Antonio, and the pt stated that she would like to come in at the end of the day on Friday to have CSI in knee. I was able to add her on at 3:45pm. Ms.Antonio was very thankful.

## 2019-05-20 NOTE — TELEPHONE ENCOUNTER
----- Message from Carol Ann Judge sent at 5/20/2019 10:36 AM CDT -----  Contact: Self   Pt wants to schedule injection appt.     [phone]

## 2019-05-21 ENCOUNTER — CLINICAL SUPPORT (OUTPATIENT)
Dept: REHABILITATION | Facility: HOSPITAL | Age: 69
End: 2019-05-21
Attending: ORTHOPAEDIC SURGERY
Payer: MEDICARE

## 2019-05-21 DIAGNOSIS — G89.29 CHRONIC RIGHT SHOULDER PAIN: ICD-10-CM

## 2019-05-21 DIAGNOSIS — M25.511 CHRONIC RIGHT SHOULDER PAIN: ICD-10-CM

## 2019-05-21 PROCEDURE — 97110 THERAPEUTIC EXERCISES: CPT

## 2019-05-21 NOTE — PROGRESS NOTES
"                                                    Physical Therapy Daily Note     Name: Poonam Alvarez  Clinic Number: [id number]  Diagnosis:   Encounter Diagnosis   Name Primary?    Chronic right shoulder pain      Physician: Sage Xie MD  Precautions: TSA protocol surgery: 4/1/19  Visit #: 7 of 20  Time In: 5:00 pm  Time Out: 600 pm  Total Treatment Time 1:1:30    Physician Orders: PT Eval and Treat   Medical Diagnosis: M19.011 (ICD-10-CM) - DJD of right shoulder  Date of Surgery: 4/1/19  Evaluation Date: 4/18/2019  Authorization Period Expiration: 3/14/20  Plan of Care Certification Period: 10/3/19        Subjective     Pt reports: feeling better and is noticing improvements.  Pain Scale: Poonam rates pain at R shoulder on a scale of 0-10 to be 0 currently.    Objective   Measurements this visit: (5/21/19)  R shoulder PROM:  Flex: 140 deg  Abd: 120 deg  IR: 50 deg  ER: 45 deg    Poonam received individual therapeutic exercises to develop strength, endurance, ROM and posture for 40 minutes including:  Pulley flex/scaption x3' ea  UBE 3'/3 PROM (okay to push with RUE)  Step outs ytb 2x10 with 5 sec hold IR and ER  Rows OTB 2x15 with 5 sec hold  Shoulder ext YTB 2x10-np  Wand FL 2x10x 5" hold  Wand HZ ADD 10x-np      Poonam received the following manual therapy techniques: Soft tissue Mobilization were applied to the: R shoulder for10 minutes including:  PROM: FL/abd, ER in scap plane as cortney    CP x 10 min to R shoulder    Written Home Exercises Provided: as per eval  Pt demo good understanding of the education provided. Poonam demonstrated good return demonstration of activities.     Education provided re: Gabriele watson  Poonam verbalized good understanding of education provided.   No spiritual or educational barriers to learning provided    Assessment     Upon reassessment patient is making progress toward PROM goals. She continues to show improvement in strength each session, but does require " encouragement and motivation. Patient otherwise progressing well toward goals.    This is a 68 y.o. female referred to outpatient physical therapy and presents with a medical diagnosis of R shoulder pain and demonstrates limitations as described in the problem list. Pt prognosis is Good. Pt will continue to benefit from skilled outpatient physical therapy to address the deficits listed in the problem list, provide pt/family education and to maximize pt's level of independence in the home and community environment.     Goals as follows:  Short Term Goals: (4 weeks)   - Pt will increase ROM to 100 deg flex and abd passively R shoulder  - Decrease Pain to 1/10 as worst with 1 hour of PT exercises  - Pt to self correct posture with minimal cues to avoid upper trap compensation  - Pt independent with HEP with progressions.      Long Term Goals (24 Weeks):  - Pt will increase ROM to WNL RUE for functional ADLs and dressing  - Pt will increase strength to 5/5 RUE to return to lifting and carrying >10 #  - Decrease Pain to 0/10 with 1 full day of normal activities  - Pt to return to 80% PLOF       Plan     Continue with established Plan of Care towards PT goals.    Therapist: Tiera Carreon, PT  5/21/2019

## 2019-05-23 ENCOUNTER — CLINICAL SUPPORT (OUTPATIENT)
Dept: REHABILITATION | Facility: HOSPITAL | Age: 69
End: 2019-05-23
Attending: ORTHOPAEDIC SURGERY
Payer: MEDICARE

## 2019-05-23 DIAGNOSIS — M25.511 CHRONIC RIGHT SHOULDER PAIN: ICD-10-CM

## 2019-05-23 DIAGNOSIS — G89.29 CHRONIC RIGHT SHOULDER PAIN: ICD-10-CM

## 2019-05-23 PROCEDURE — 97110 THERAPEUTIC EXERCISES: CPT

## 2019-05-23 NOTE — PROGRESS NOTES
"                                                    Physical Therapy Daily Note     Name: Poonam Alvarez  [facility] Number: [id number]  Diagnosis:   Encounter Diagnosis   Name Primary?    Chronic right shoulder pain      Physician: Sage Xie MD  Precautions: TSA protocol surgery: 4/1/19  Visit #: 8 of 20  Time In: 5:00 pm  Time Out: 600 pm   Total Treatment Time 1:1: 40    Physician Orders: PT Eval and Treat   Medical Diagnosis: M19.011 (ICD-10-CM) - DJD of right shoulder  Date of Surgery: 4/1/19  Evaluation Date: 4/18/2019  Authorization Period Expiration: 3/14/20  Plan of Care Certification Period: 10/3/19        Subjective     Pt reports:no pain but more soreness in her shoulder.   Pain Scale: Poonam rates pain at R shoulder on a scale of 0-10 to be 3/10 currently.     Objective   Measurements this visit: (5/21/19)  R shoulder PROM:  Flex: 140 deg  Abd: 120 deg  IR: 50 deg  ER: 45 deg    Poonam received individual therapeutic exercises to develop strength, endurance, ROM and posture for 40 minutes including:  UBE x5' forward, 3' back  Pulleys 3'/3' scap and flex  Step outs gtb 2x10 with 5 sec hold IR and ER  Rows GTB 2x15 with 5 sec hold  Shoulder ext YTB 2x10-np  Wand FL, abd, ER 2x10x 5" hold  Wand HZ ADD 10x-np  SL R shoulder ER, abd, flex and hab 2x10 (in available ranges)      Poonam received the following manual therapy techniques: Soft tissue Mobilization were applied to the: R shoulder for 0 minutes including:  PROM: FL/abd, ER in scap plane as cortney    CP x 10 min to R shoulder    Written Home Exercises Provided: as per eval  Pt demo good understanding of the education provided. Poonam demonstrated good return demonstration of activities.     Education provided re:  Poonam verbalized good understanding of education provided.   No spiritual or educational barriers to learning provided    Assessment     Patient tolerated new SL exercises well, but was unable to achieve full AROM at end of sets. She " is making better progress this session and showed more motivation towards exercises. She is progressing well to goals for this stage of protocol.    This is a 68 y.o. female referred to outpatient physical therapy and presents with a medical diagnosis of R shoulder pain and demonstrates limitations as described in the problem list. Pt prognosis is Good. Pt will continue to benefit from skilled outpatient physical therapy to address the deficits listed in the problem list, provide pt/family education and to maximize pt's level of independence in the home and community environment.     Goals as follows:  Short Term Goals: (4 weeks)   - Pt will increase ROM to 100 deg flex and abd passively R shoulder (Progressing, not met)  - Decrease Pain to 1/10 as worst with 1 hour of PT exercises (Progressing, not met)  - Pt to self correct posture with minimal cues to avoid upper trap compensation (Progressing, not met)  - Pt independent with HEP with progressions.  (Progressing, not met)     Long Term Goals (24 Weeks):  - Pt will increase ROM to WNL RUE for functional ADLs and dressing (Progressing, not met)  - Pt will increase strength to 5/5 RUE to return to lifting and carrying >10 # (Progressing, not met)  - Decrease Pain to 0/10 with 1 full day of normal activities (Progressing, not met)  - Pt to return to 80% PLOF (Progressing, not met)       Plan     Continue with established Plan of Care towards PT goals progressing with AARANGELIA.     Therapist: Tiera Carreon, PT  5/23/2019

## 2019-05-28 ENCOUNTER — CLINICAL SUPPORT (OUTPATIENT)
Dept: REHABILITATION | Facility: HOSPITAL | Age: 69
End: 2019-05-28
Attending: ORTHOPAEDIC SURGERY
Payer: MEDICARE

## 2019-05-28 DIAGNOSIS — G89.29 CHRONIC RIGHT SHOULDER PAIN: ICD-10-CM

## 2019-05-28 DIAGNOSIS — M25.511 CHRONIC RIGHT SHOULDER PAIN: ICD-10-CM

## 2019-05-28 PROCEDURE — 97110 THERAPEUTIC EXERCISES: CPT

## 2019-05-28 NOTE — PROGRESS NOTES
"                                                    Physical Therapy Daily Note     Name: Poonam Alvarez  [facility] Number: [id number]  Diagnosis:   Encounter Diagnosis   Name Primary?    Chronic right shoulder pain      Physician: Sage Xie MD  Precautions: TSA protocol surgery: 4/1/19  Visit #: 9 of 20  Time In: 5:20 pm  Time Out: 6:08 pm   Total Treatment Time 1:1: 38    Physician Orders: PT Eval and Treat   Medical Diagnosis: M19.011 (ICD-10-CM) - DJD of right shoulder  Date of Surgery: 4/1/19  Evaluation Date: 4/18/2019  Authorization Period Expiration: 3/14/20  Plan of Care Certification Period: 10/3/19      Subjective     Pt reports:Shoulder feels sore because she has been using it more.   Pain Scale: Poonam rates pain at R shoulder on a scale of 0-10 to be 2/10 currently.     Objective   Measurements this visit: (5/21/19)  R shoulder PROM:  Flex: 140 deg  Abd: 120 deg  IR: 50 deg  ER: 45 deg    Poonam received individual therapeutic exercises to develop strength, endurance, ROM and posture for 38 minutes including:  UBE x4' forward, 4' back  Pulleys 3'/3' scap and flex np  Step outs gtb 2x10 with 5 sec hold IR and ER  Rows GTB 2x15 with 5 sec hold np  Shoulder ext YTB 2x10-np  Wand FL, abd, ER 2x10x 5" hold  Wand HZ ADD 10x-np  SL R shoulder ER, abd, flex and hab 2x10 (in available ranges)  Shoulder scaption exercise 30 x 2      Poonam received the following manual therapy techniques: Soft tissue Mobilization were applied to the: R shoulder for 0 minutes including:  PROM: FL/abd, ER in scap plane as cortney    CP x 10 min to R shoulder    Written Home Exercises Provided: as per eval  Pt demo good understanding of the education provided. Poonam demonstrated good return demonstration of activities.     Education provided re:  Poonam verbalized good understanding of education provided.   No spiritual or educational barriers to learning provided    Assessment     Patient arrived 20 minutes late today so " treatment limited, but tolerated treatment well today. Pt has decreased endurance during scaption exercise and required breaks between rounds of ten during exercise. She required additional breaks for IR/ER walkouts as well. She improved in shoulder flexion with the wand as well during this session showing improved AAROM in shoulder flexion. Continue to progress with endurance shoulder exercises.    This is a 68 y.o. female referred to outpatient physical therapy and presents with a medical diagnosis of R shoulder pain and demonstrates limitations as described in the problem list. Pt prognosis is Good. Pt will continue to benefit from skilled outpatient physical therapy to address the deficits listed in the problem list, provide pt/family education and to maximize pt's level of independence in the home and community environment.     Goals as follows:  Short Term Goals: (4 weeks)   - Pt will increase ROM to 100 deg flex and abd passively R shoulder (Progressing, not met)  - Decrease Pain to 1/10 as worst with 1 hour of PT exercises (Progressing, not met)  - Pt to self correct posture with minimal cues to avoid upper trap compensation (Progressing, not met)  - Pt independent with HEP with progressions.  (Progressing, not met)     Long Term Goals (24 Weeks):  - Pt will increase ROM to WNL RUE for functional ADLs and dressing (Progressing, not met)  - Pt will increase strength to 5/5 RUE to return to lifting and carrying >10 # (Progressing, not met)  - Decrease Pain to 0/10 with 1 full day of normal activities (Progressing, not met)  - Pt to return to 80% PLOF (Progressing, not met)       Plan     Continue with established Plan of Care towards PT goals progressing with AAROM.     Steven Lewis, SPT    I certify that I was present in the room directing the student in service delivery and guiding them using my skilled judgment. As the co-signing therapist I have reviewed the students documentation and am responsible  for the treatment, assessment, and plan.     Therapist: Tiera Carreon, PT  5/28/2019

## 2019-05-30 ENCOUNTER — CLINICAL SUPPORT (OUTPATIENT)
Dept: REHABILITATION | Facility: HOSPITAL | Age: 69
End: 2019-05-30
Attending: ORTHOPAEDIC SURGERY
Payer: MEDICARE

## 2019-05-30 DIAGNOSIS — M25.511 CHRONIC RIGHT SHOULDER PAIN: ICD-10-CM

## 2019-05-30 DIAGNOSIS — G89.29 CHRONIC RIGHT SHOULDER PAIN: ICD-10-CM

## 2019-05-30 PROCEDURE — 97110 THERAPEUTIC EXERCISES: CPT

## 2019-05-30 NOTE — PROGRESS NOTES
"                                                    Physical Therapy Daily Note     Name: Poonam Alvarez  [facility] Number: [id number]  Diagnosis:   Encounter Diagnosis   Name Primary?    Chronic right shoulder pain      Physician: Sage Xie MD  Precautions: TSA protocol surgery: 4/1/19  Visit #: 9 of 20  Time In: 5:20 pm  Time Out: 6:08 pm   Total Treatment Time 1:1: 38    Physician Orders: PT Eval and Treat   Medical Diagnosis: M19.011 (ICD-10-CM) - DJD of right shoulder  Date of Surgery: 4/1/19  Evaluation Date: 4/18/2019  Authorization Period Expiration: 3/14/20  Plan of Care Certification Period: 10/3/19      Subjective     Pt reports:Shoulder feels sore because she has been using it more.   Pain Scale: Poonam rates pain at R shoulder on a scale of 0-10 to be 2/10 currently.     Objective   Measurements this visit: (5/21/19)  R shoulder PROM:  Flex: 140 deg  Abd: 120 deg  IR: 50 deg  ER: 45 deg    Poonam received individual therapeutic exercises to develop strength, endurance, ROM and posture for 50 minutes including:  UBE x4' forward, 4' back  Pulleys 3'/3' scap and flex   Step outs gtb 2x10 with 5 sec hold IR and ER  Rows GTB 2x15 with 5 sec hold np  Shoulder ext YTB 2x10-np  Wand FL, abd, ER 2x10x 5" hold  Wand HZ ADD 10x-np  SL R shoulder ER, abd, flex and hab 2x10 (in available ranges)  Shoulder scaption exercise 30 x 2      Poonam received the following manual therapy techniques: Soft tissue Mobilization were applied to the: R shoulder for 10 minutes including:  PROM: FL/abd, ER in scap plane as cortney    CP x 10 min to R shoulder    Written Home Exercises Provided: as per eval  Pt demo good understanding of the education provided. Poonam demonstrated good return demonstration of activities.     Education provided re:  Poonam verbalized good understanding of education provided.   No spiritual or educational barriers to learning provided    Assessment     Patient showed more motivation this session " with exercises and is making improvements. Patient progressing well with functional ROM and is slowly improving passive range with ER.  Continue to progress with endurance shoulder exercises.    This is a 68 y.o. female referred to outpatient physical therapy and presents with a medical diagnosis of R shoulder pain and demonstrates limitations as described in the problem list. Pt prognosis is Good. Pt will continue to benefit from skilled outpatient physical therapy to address the deficits listed in the problem list, provide pt/family education and to maximize pt's level of independence in the home and community environment.     Goals as follows:  Short Term Goals: (4 weeks)   - Pt will increase ROM to 100 deg flex and abd passively R shoulder (Progressing, not met)  - Decrease Pain to 1/10 as worst with 1 hour of PT exercises (Progressing, not met)  - Pt to self correct posture with minimal cues to avoid upper trap compensation (Progressing, not met)  - Pt independent with HEP with progressions.  (Progressing, not met)     Long Term Goals (24 Weeks):  - Pt will increase ROM to WNL RUE for functional ADLs and dressing (Progressing, not met)  - Pt will increase strength to 5/5 RUE to return to lifting and carrying >10 # (Progressing, not met)  - Decrease Pain to 0/10 with 1 full day of normal activities (Progressing, not met)  - Pt to return to 80% PLOF (Progressing, not met)       Plan     Continue with established Plan of Care towards PT goals progressing with DIVYA Lewis, SPT    I certify that I was present in the room directing the student in service delivery and guiding them using my skilled judgment. As the co-signing therapist I have reviewed the students documentation and am responsible for the treatment, assessment, and plan.     Therapist: Tiera Carreon, PT  5/30/2019

## 2019-06-03 ENCOUNTER — CLINICAL SUPPORT (OUTPATIENT)
Dept: REHABILITATION | Facility: HOSPITAL | Age: 69
End: 2019-06-03
Attending: ORTHOPAEDIC SURGERY
Payer: MEDICARE

## 2019-06-03 DIAGNOSIS — G89.29 CHRONIC RIGHT SHOULDER PAIN: ICD-10-CM

## 2019-06-03 DIAGNOSIS — M25.511 CHRONIC RIGHT SHOULDER PAIN: ICD-10-CM

## 2019-06-03 PROCEDURE — 97110 THERAPEUTIC EXERCISES: CPT

## 2019-06-03 NOTE — PROGRESS NOTES
"                                                    Physical Therapy Daily Note     Name: Poonam Alvarez  [facility] Number: [id number]  Diagnosis:   Encounter Diagnosis   Name Primary?    Chronic right shoulder pain      Physician: Sage Xie MD  Precautions: TSA protocol surgery: 4/1/19  Visit #: 10 of 20  Time In: 5:00 pm  Time Out: 6:10 pm   Total Treatment Time 1:1: 25    Physician Orders: PT Eval and Treat   Medical Diagnosis: M19.011 (ICD-10-CM) - DJD of right shoulder  Date of Surgery: 4/1/19  Evaluation Date: 4/18/2019  Authorization Period Expiration: 3/14/20  Plan of Care Certification Period: 10/3/19      Subjective     Pt reports:no new complaints  Pain Scale: Poonam rates pain at R shoulder on a scale of 0-10 to be 2/10 currently.     Objective   Measurements this visit: (5/21/19)  R shoulder PROM:  Flex: 140 deg  Abd: 120 deg  IR: 50 deg  ER: 45 deg    Poonam received individual therapeutic exercises to develop strength, endurance, ROM and posture for 40 minutes including:  UBE x4' forward, 4' back  Pulleys 3'/3' scap and flex   Step outs gtb 2x10 with 5 sec hold IR and ER  Rows GTB 2x15 with 5 sec hold   Shoulder ext YTB 2x10-np  Wand FL, abd, ER 2x10x 5" hold #2  Wand HZ ADD 10x-np  SL R shoulder ER, abd, flex and hab 2x10 (in available ranges)-NT  Shoulder scaption exercise 2x15      Poonam received the following manual therapy techniques: Soft tissue Mobilization were applied to the: R shoulder for 10 minutes including:  PROM: FL/abd, ER in scap plane as cortney    CP x 10 min to R shoulder    Written Home Exercises Provided: as per eval  Pt demo good understanding of the education provided. Poonam demonstrated good return demonstration of activities.     Education provided re:  Poonam verbalized good understanding of education provided.   No spiritual or educational barriers to learning provided    Assessment     Pt still lacking R shoulder ROM mainly FL and ER. Guarded w/ PROM. Shoulder " soreness reported w/ wand ABD. Cont to advance per protocol    This is a 68 y.o. female referred to outpatient physical therapy and presents with a medical diagnosis of R shoulder pain and demonstrates limitations as described in the problem list. Pt prognosis is Good. Pt will continue to benefit from skilled outpatient physical therapy to address the deficits listed in the problem list, provide pt/family education and to maximize pt's level of independence in the home and community environment.     Goals as follows:  Short Term Goals: (4 weeks)   - Pt will increase ROM to 100 deg flex and abd passively R shoulder (Progressing, not met)  - Decrease Pain to 1/10 as worst with 1 hour of PT exercises (Progressing, not met)  - Pt to self correct posture with minimal cues to avoid upper trap compensation (Progressing, not met)  - Pt independent with HEP with progressions.  (Progressing, not met)     Long Term Goals (24 Weeks):  - Pt will increase ROM to WNL RUE for functional ADLs and dressing (Progressing, not met)  - Pt will increase strength to 5/5 RUE to return to lifting and carrying >10 # (Progressing, not met)  - Decrease Pain to 0/10 with 1 full day of normal activities (Progressing, not met)  - Pt to return to 80% PLOF (Progressing, not met)       Plan     Continue with established Plan of Care towards PT goals progressing with AAROM.       Therapist: Ying Phillips, MOE  6/3/2019

## 2019-06-06 ENCOUNTER — CLINICAL SUPPORT (OUTPATIENT)
Dept: REHABILITATION | Facility: HOSPITAL | Age: 69
End: 2019-06-06
Attending: ORTHOPAEDIC SURGERY
Payer: MEDICARE

## 2019-06-06 DIAGNOSIS — M25.511 CHRONIC RIGHT SHOULDER PAIN: ICD-10-CM

## 2019-06-06 DIAGNOSIS — G89.29 CHRONIC RIGHT SHOULDER PAIN: ICD-10-CM

## 2019-06-06 PROCEDURE — 97110 THERAPEUTIC EXERCISES: CPT

## 2019-06-06 NOTE — PROGRESS NOTES
"                                                    Physical Therapy Daily Note     Name: Poonam Alvarez  [facility] Number: [id number]  Diagnosis:   Encounter Diagnosis   Name Primary?    Chronic right shoulder pain      Physician: Sage Xie MD  Precautions: TSA protocol surgery: 4/1/19  Visit #: 11 of 20  Time In: 5:00 pm  Time Out: 6:00 pm   Total Treatment Time: 50 min    Physician Orders: PT Eval and Treat   Medical Diagnosis: M19.011 (ICD-10-CM) - DJD of right shoulder  Date of Surgery: 4/1/19  Evaluation Date: 4/18/2019  Authorization Period Expiration: 3/14/20  Plan of Care Certification Period: 10/3/19      Subjective     Pt reports:she was doing a lot of computer work and is having more soreness than pain.   Pain Scale: Poonam rates pain at R shoulder on a scale of 0-10 to be 3/10 currently.     Objective   Measurements this visit: (6/6/19)  R shoulder PROM:  Flex: 155 deg  Abd: 150 deg  IR: 70 deg  ER: 70 deg    Poonam received individual therapeutic exercises to develop strength, endurance, ROM and posture for 40 minutes including:  UBE x4' forward, 4' back  Pulleys 3'/3' scap and flex   Step outs gtb 2x10 with 5 sec hold IR and ER  Rows GTB 2x15 with 5 sec hold   Shoulder ext YTB 2x10-np  Wand FL, abd, ER 2x10x 5" hold #2-np  Wand HZ ADD 10x-np  SL R shoulder ER, abd, flex and hab 2x10 (in available ranges) 1#  Shoulder scaption exercise 2x15      Poonam received the following manual therapy techniques: Soft tissue Mobilization were applied to the: R shoulder for 10 minutes including:  PROM: FL/abd, ER in scap plane as cortney    CP x 10 min to R shoulder    Written Home Exercises Provided: as per eval  Pt demo good understanding of the education provided. Poonam demonstrated good return demonstration of activities.     Education provided re:  oPonam verbalized good understanding of education provided.   No spiritual or educational barriers to learning provided    Assessment     Upon reassessment " patient showing improved PROM, but remains hesitant with any manual therapy due to sensitivity to pain. Pt otherwise making good progress with strength gains.     This is a 68 y.o. female referred to outpatient physical therapy and presents with a medical diagnosis of R shoulder pain and demonstrates limitations as described in the problem list. Pt prognosis is Good. Pt will continue to benefit from skilled outpatient physical therapy to address the deficits listed in the problem list, provide pt/family education and to maximize pt's level of independence in the home and community environment.     Goals as follows:  Short Term Goals: (4 weeks)   - Pt will increase ROM to 100 deg flex and abd passively R shoulder (Progressing, not met)  - Decrease Pain to 1/10 as worst with 1 hour of PT exercises (Progressing, not met)  - Pt to self correct posture with minimal cues to avoid upper trap compensation (Progressing, not met)  - Pt independent with HEP with progressions.  (Progressing, not met)     Long Term Goals (24 Weeks):  - Pt will increase ROM to WNL RUE for functional ADLs and dressing (Progressing, not met)  - Pt will increase strength to 5/5 RUE to return to lifting and carrying >10 # (Progressing, not met)  - Decrease Pain to 0/10 with 1 full day of normal activities (Progressing, not met)  - Pt to return to 80% PLOF (Progressing, not met)       Plan     Continue with established Plan of Care towards PT goals progressing with AAROM.       Therapist: Tiera Carreon, PT  6/6/2019

## 2019-06-07 ENCOUNTER — OFFICE VISIT (OUTPATIENT)
Dept: ORTHOPEDICS | Facility: CLINIC | Age: 69
End: 2019-06-07
Payer: MEDICARE

## 2019-06-07 DIAGNOSIS — M17.12 PRIMARY OSTEOARTHRITIS OF LEFT KNEE: Primary | ICD-10-CM

## 2019-06-07 PROCEDURE — 20610 PR DRAIN/INJECT LARGE JOINT/BURSA: ICD-10-PCS | Mod: S$PBB,LT,, | Performed by: NURSE PRACTITIONER

## 2019-06-07 PROCEDURE — 99499 NO LOS: ICD-10-PCS | Mod: S$PBB,,, | Performed by: NURSE PRACTITIONER

## 2019-06-07 PROCEDURE — 20610 DRAIN/INJ JOINT/BURSA W/O US: CPT | Mod: PBBFAC | Performed by: NURSE PRACTITIONER

## 2019-06-07 PROCEDURE — 99999 PR PBB SHADOW E&M-EST. PATIENT-LVL II: ICD-10-PCS | Mod: PBBFAC,,, | Performed by: NURSE PRACTITIONER

## 2019-06-07 PROCEDURE — 20610 DRAIN/INJ JOINT/BURSA W/O US: CPT | Mod: S$PBB,LT,, | Performed by: NURSE PRACTITIONER

## 2019-06-07 PROCEDURE — 99212 OFFICE O/P EST SF 10 MIN: CPT | Mod: PBBFAC | Performed by: NURSE PRACTITIONER

## 2019-06-07 PROCEDURE — 99499 UNLISTED E&M SERVICE: CPT | Mod: S$PBB,,, | Performed by: NURSE PRACTITIONER

## 2019-06-07 PROCEDURE — 99999 PR PBB SHADOW E&M-EST. PATIENT-LVL II: CPT | Mod: PBBFAC,,, | Performed by: NURSE PRACTITIONER

## 2019-06-07 RX ORDER — TRIAMCINOLONE ACETONIDE 40 MG/ML
40 INJECTION, SUSPENSION INTRA-ARTICULAR; INTRAMUSCULAR
Status: COMPLETED | OUTPATIENT
Start: 2019-06-07 | End: 2019-06-07

## 2019-06-07 RX ADMIN — TRIAMCINOLONE ACETONIDE 40 MG: 40 INJECTION, SUSPENSION INTRA-ARTICULAR; INTRAMUSCULAR at 03:06

## 2019-06-07 NOTE — PROGRESS NOTES
Pt with left knee djd. She is not interested in knee replacement surgery. She has had a right knee replacement in the past and isn't ready to go through that again. She has gotten good relief from the gel injections, but she is having intermittent pain for which she would like to repeat a cortisone injection today.     Knee Injection Procedure Note    Diagnosis: left knee degenerative arthritis  Indications: left knee pain  Procedure Details: Verbal consent was obtained for the procedure. The injection site was identified and the skin was prepared with alcohol. The left knee was injected from an anterolateral approach with 1 ml of Kenalog and 4 ml Lidocaine under sterile technique using a 22 gauge needle. The needle was removed and the area cleansed and dressed.  Complications:  Patient tolerated the procedure well.    she was advised to rest the knee today, using ice and elevation as needed for comfort and swelling.Immediate relief of the knee pain may be short lived and secondary to the lidocaine. she may have an increase in discomfort tonight followed by steady improvement over the next several days. It may take 1-2 weeks following the injection to get the full benefit of the medication.

## 2019-06-11 ENCOUNTER — CLINICAL SUPPORT (OUTPATIENT)
Dept: REHABILITATION | Facility: HOSPITAL | Age: 69
End: 2019-06-11
Attending: ORTHOPAEDIC SURGERY
Payer: MEDICARE

## 2019-06-11 DIAGNOSIS — G89.29 CHRONIC RIGHT SHOULDER PAIN: ICD-10-CM

## 2019-06-11 DIAGNOSIS — M25.511 CHRONIC RIGHT SHOULDER PAIN: ICD-10-CM

## 2019-06-11 PROCEDURE — 97110 THERAPEUTIC EXERCISES: CPT

## 2019-06-11 NOTE — PROGRESS NOTES
Physical Therapy Daily Note     Name: Poonam Alvarez  [facility] Number: [id number]  Diagnosis:   Encounter Diagnosis   Name Primary?    Chronic right shoulder pain      Physician: Sage Xie MD  Precautions: TSA protocol surgery: 4/1/19  Visit #: 12 of 20  Time In: 5:00 pm  Time Out: 6:10 pm   Total Treatment Time: 30 min    Physician Orders: PT Eval and Treat   Medical Diagnosis: M19.011 (ICD-10-CM) - DJD of right shoulder  Date of Surgery: 4/1/19  Evaluation Date: 4/18/2019  Authorization Period Expiration: 3/14/20  Plan of Care Certification Period: 10/3/19      Subjective     Pt reports:she was able to put her arm behind her back.   Pain Scale: Poonam rates pain at R shoulder on a scale of 0-10 to be 3/10 currently.     Objective   Measurements this visit: (6/6/19)  R shoulder PROM:  Flex: 155 deg  Abd: 150 deg  IR: 70 deg  ER: 70 deg    Poonam received individual therapeutic exercises to develop strength, endurance, ROM and posture for 45 minutes including:  UBE x4' forward, 4' back  Pulleys 3'/3' scap and flex   Standing dowel flexion x15  Step outs gtb 2x10 with 5 sec hold IR and ER  Rows GTB 2x15 with 5 sec hold -np  Shoulder ext YTB 2x10-np  SL R shoulder ER, abd, flex and hab 2x10 (in available ranges) 1#-np  Shoulder scaption exercise 2x15-np      Poonam received the following manual therapy techniques: Soft tissue Mobilization were applied to the: R shoulder for 5 minutes including:  PROM: FL/abd, ER in scap plane as cortney    CP x 10 min to R shoulder    Written Home Exercises Provided: as per eval  Pt demo good understanding of the education provided. Poonam demonstrated good return demonstration of activities.     Education provided re:  Poonam verbalized good understanding of education provided.   No spiritual or educational barriers to learning provided    Assessment     Patient showing better tolerance to all exercises and is taking less rest  breaks between each exercises.  Pt otherwise making good progress with strength gains.     This is a 68 y.o. female referred to outpatient physical therapy and presents with a medical diagnosis of R shoulder pain and demonstrates limitations as described in the problem list. Pt prognosis is Good. Pt will continue to benefit from skilled outpatient physical therapy to address the deficits listed in the problem list, provide pt/family education and to maximize pt's level of independence in the home and community environment.     Goals as follows:  Short Term Goals: (4 weeks)   - Pt will increase ROM to 100 deg flex and abd passively R shoulder (Progressing, not met)  - Decrease Pain to 1/10 as worst with 1 hour of PT exercises (Progressing, not met)  - Pt to self correct posture with minimal cues to avoid upper trap compensation (Progressing, not met)  - Pt independent with HEP with progressions.  (Progressing, not met)     Long Term Goals (24 Weeks):  - Pt will increase ROM to WNL RUE for functional ADLs and dressing (Progressing, not met)  - Pt will increase strength to 5/5 RUE to return to lifting and carrying >10 # (Progressing, not met)  - Decrease Pain to 0/10 with 1 full day of normal activities (Progressing, not met)  - Pt to return to 80% PLOF (Progressing, not met)       Plan     Continue with established Plan of Care towards PT goals progressing with AAROM.       Therapist: Tiera Carreon, PT  6/11/2019

## 2019-06-18 ENCOUNTER — CLINICAL SUPPORT (OUTPATIENT)
Dept: REHABILITATION | Facility: HOSPITAL | Age: 69
End: 2019-06-18
Attending: ORTHOPAEDIC SURGERY
Payer: MEDICARE

## 2019-06-18 DIAGNOSIS — G89.29 CHRONIC RIGHT SHOULDER PAIN: ICD-10-CM

## 2019-06-18 DIAGNOSIS — M25.511 CHRONIC RIGHT SHOULDER PAIN: ICD-10-CM

## 2019-06-18 PROCEDURE — 97110 THERAPEUTIC EXERCISES: CPT

## 2019-06-18 NOTE — PROGRESS NOTES
Physical Therapy Daily Note     Name: Poonam Alvarez  [facility] Number: [id number]  Diagnosis:   Encounter Diagnosis   Name Primary?    Chronic right shoulder pain      Physician: Sage Xie MD  Precautions: TSA protocol surgery: 4/1/19  Visit #: 13 of 20  Time In: 5:00 pm  Time Out: 6:10 pm   Total Treatment Time: 30 min    Physician Orders: PT Eval and Treat   Medical Diagnosis: M19.011 (ICD-10-CM) - DJD of right shoulder  Date of Surgery: 4/1/19  Evaluation Date: 4/18/2019  Authorization Period Expiration: 3/14/20  Plan of Care Certification Period: 10/3/19      Subjective     Pt reports:she noticed a little pain the other day, but otherwise feels good.  Pain Scale: Poonam rates pain at R shoulder on a scale of 0-10 to be 3/10 currently.     Objective   Measurements this visit: (6/6/19)  R shoulder PROM:  Flex: 155 deg  Abd: 150 deg  IR: 70 deg  ER: 70 deg    Poonam received individual therapeutic exercises to develop strength, endurance, ROM and posture for 60 minutes including:  UBE x4' forward, 4' back  Pulleys 3'/3' scap and flex   Standing dowel flexion x15-np  Ball on wall roll up 2x10 with 5 sec hol  Step outs gtb 2x10 with 5 sec hold IR and ER  Rows GTB 2x15 with 5 sec hold -  Shoulder ext YTB 2x10-np  SL R shoulder ER, abd, flex and hab 2x10 (in available ranges) 1#-np  Shoulder scaption exercise 2x15-np      Poonam received the following manual therapy techniques: Soft tissue Mobilization were applied to the: R shoulder for 5 minutes including:  PROM: FL/abd, ER in scap plane as cortney    CP x 10 min to R shoulder    Written Home Exercises Provided: as per eval  Pt demo good understanding of the education provided. Poonam demonstrated good return demonstration of activities.     Education provided re:  Poonam verbalized good understanding of education provided.   No spiritual or educational barriers to learning provided    Assessment     Patient  demonstrated improved active and passive flex and abd ranges this session, but still remains limited with IR and ER.  Pt otherwise making good progress with strength gains.     This is a 68 y.o. female referred to outpatient physical therapy and presents with a medical diagnosis of R shoulder pain and demonstrates limitations as described in the problem list. Pt prognosis is Good. Pt will continue to benefit from skilled outpatient physical therapy to address the deficits listed in the problem list, provide pt/family education and to maximize pt's level of independence in the home and community environment.     Goals as follows:  Short Term Goals: (4 weeks)   - Pt will increase ROM to 100 deg flex and abd passively R shoulder ( met)  - Decrease Pain to 1/10 as worst with 1 hour of PT exercises ( met)  - Pt to self correct posture with minimal cues to avoid upper trap compensation (met)  - Pt independent with HEP with progressions.  (met)     Long Term Goals (24 Weeks):  - Pt will increase ROM to WNL RUE for functional ADLs and dressing (Progressing, not met)  - Pt will increase strength to 5/5 RUE to return to lifting and carrying >10 # (Progressing, not met)  - Decrease Pain to 0/10 with 1 full day of normal activities (Progressing, not met)  - Pt to return to 80% PLOF (Progressing, not met)       Plan     Continue with established Plan of Care towards PT goals progressing with AAROM.       Therapist: Tiera Carreon, PT  6/18/2019

## 2019-06-20 ENCOUNTER — CLINICAL SUPPORT (OUTPATIENT)
Dept: REHABILITATION | Facility: HOSPITAL | Age: 69
End: 2019-06-20
Attending: ORTHOPAEDIC SURGERY
Payer: MEDICARE

## 2019-06-20 DIAGNOSIS — G89.29 CHRONIC RIGHT SHOULDER PAIN: ICD-10-CM

## 2019-06-20 DIAGNOSIS — M25.511 CHRONIC RIGHT SHOULDER PAIN: ICD-10-CM

## 2019-06-20 PROCEDURE — 97110 THERAPEUTIC EXERCISES: CPT

## 2019-06-20 NOTE — PROGRESS NOTES
Physical Therapy Daily Note     Name: Poonam Alvarez  [facility] Number: [id number]  Diagnosis:   Encounter Diagnosis   Name Primary?    Chronic right shoulder pain      Physician: Sage Xie MD  Precautions: TSA protocol surgery: 4/1/19  Visit #: 13 of 20  Time In: 5:00 pm  Time Out: 6:10 pm   Total Treatment Time: 30 min    Physician Orders: PT Eval and Treat   Medical Diagnosis: M19.011 (ICD-10-CM) - DJD of right shoulder  Date of Surgery: 4/1/19  Evaluation Date: 4/18/2019  Authorization Period Expiration: 3/14/20  Plan of Care Certification Period: 10/3/19      Subjective     Pt reports:she has been trying to stretch on her own.  Pain Scale: Poonam rates pain at R shoulder on a scale of 0-10 to be 3/10 currently.     Objective   Measurements this visit: (6/6/19)  R shoulder PROM:  Flex: 155 deg  Abd: 150 deg  IR: 70 deg  ER: 70 deg    Poonam received individual therapeutic exercises to develop strength, endurance, ROM and posture for 55 minutes including:  UBE x4' forward, 4' back  Pulleys 3'/3' scap and flex   Standing dowel flexion x15-np  Ball on wall roll up 2x10 with 5 sec hol  Step outs gtb 2x10 with 5 sec hold IR (btb) and ER  Rows GTB 2x15 with 5 sec hold -  Shoulder ext gTB 2x10-np  SL R shoulder ER, abd, flex and hab 2x10 (in available ranges) 1#-np  Shoulder scaption exercise 2x15-np  Pec stretch in doorway 4x 15 sec   Supine ER stretch 2# 3x fatigue  Supine flex 2# 3x fatigue    Poonam received the following manual therapy techniques: Soft tissue Mobilization were applied to the: R shoulder for 5 minutes including:  PROM: FL/abd, ER in scap plane as cortney    CP x 10 min to R shoulder    Written Home Exercises Provided: as per eval  Pt demo good understanding of the education provided. Poonam demonstrated good return demonstration of activities.     Education provided re:  Poonam verbalized good understanding of education provided.   No  spiritual or educational barriers to learning provided    Assessment     Patient treatment focused more on stretching and how to add this into her home program. Patient was able to progress IR strengthening.  Pt otherwise making good progress with strength gains.     This is a 68 y.o. female referred to outpatient physical therapy and presents with a medical diagnosis of R shoulder pain and demonstrates limitations as described in the problem list. Pt prognosis is Good. Pt will continue to benefit from skilled outpatient physical therapy to address the deficits listed in the problem list, provide pt/family education and to maximize pt's level of independence in the home and community environment.     Goals as follows:  Short Term Goals: (4 weeks)   - Pt will increase ROM to 100 deg flex and abd passively R shoulder ( met)  - Decrease Pain to 1/10 as worst with 1 hour of PT exercises ( met)  - Pt to self correct posture with minimal cues to avoid upper trap compensation (met)  - Pt independent with HEP with progressions.  (met)     Long Term Goals (24 Weeks):  - Pt will increase ROM to WNL RUE for functional ADLs and dressing (Progressing, not met)  - Pt will increase strength to 5/5 RUE to return to lifting and carrying >10 # (Progressing, not met)  - Decrease Pain to 0/10 with 1 full day of normal activities (Progressing, not met)  - Pt to return to 80% PLOF (Progressing, not met)       Plan     Continue with established Plan of Care towards PT goals progressing with AAROM.       Therapist: Tiera Carreon, PT  6/20/2019

## 2019-06-24 ENCOUNTER — CLINICAL SUPPORT (OUTPATIENT)
Dept: REHABILITATION | Facility: HOSPITAL | Age: 69
End: 2019-06-24
Attending: ORTHOPAEDIC SURGERY
Payer: MEDICARE

## 2019-06-24 DIAGNOSIS — G89.29 CHRONIC RIGHT SHOULDER PAIN: ICD-10-CM

## 2019-06-24 DIAGNOSIS — M25.511 CHRONIC RIGHT SHOULDER PAIN: ICD-10-CM

## 2019-06-24 PROCEDURE — 97110 THERAPEUTIC EXERCISES: CPT

## 2019-06-24 NOTE — PROGRESS NOTES
Physical Therapy Daily Note     Name: Poonam Alvarez  Clinic Number: [id number]  Diagnosis:   No diagnosis found.  Physician: Sage Xie MD  Precautions: TSA protocol surgery: 4/1/19  Visit #: 14 of 20  Time In: 1645  Time Out: 1800   Total Treatment Time: 60 min    Physician Orders: PT Eval and Treat   Medical Diagnosis: M19.011 (ICD-10-CM) - DJD of right shoulder  Date of Surgery: 4/1/19  Evaluation Date: 4/18/2019  Authorization Period Expiration: 3/14/20  Plan of Care Certification Period: 10/3/19      Subjective     Pt reports:Still compliant with stretches.  Pain Scale: Poonam rates pain at R shoulder on a scale of 0-10 to be 0/10 currently.     Objective   Measurements this visit: (6/6/19)  R shoulder PROM:  Flex: 155 deg  Abd: 150 deg  IR: 70 deg  ER: 70 deg    Poonam received individual therapeutic exercises to develop strength, endurance, ROM and posture for 52 minutes including:    UBE x4' forward, 4' back  Pulleys 3'/3' scap and flex   Ball on wall roll up 2x10 with 5 sec hol  Step outs gtb 2x10 with 5 sec hold IR (btb) and ER  Rows GTB 2x15 with 5 sec hold -  Pec stretch in doorway 6x 15 sec   Supine ER stretch 2# 3x fatigue  Supine flex 2# 2x8    Standing dowel flexion x15-np  Shoulder ext gTB 2x10-np  SL R shoulder ER, abd, flex and hab 2x10 (in available ranges) 1#-np  Shoulder scaption exercise 2x15-np    Poonam received the following manual therapy techniques: Soft tissue Mobilization were applied to the: R shoulder for 8 minutes including:  PROM: FL/abd, ER in scap plane as cortney    CP x 10 min to R shoulder    Written Home Exercises Provided: as per eval  Pt demo good understanding of the education provided. Poonam demonstrated good return demonstration of activities.     Education provided re:  Poonam verbalized good understanding of education provided.   No spiritual or educational barriers to learning provided    Assessment     Pt cortney  tx well w/o adverse reaction. Cont to be restricted w/ shoulder ROM mainly ER. Challenged performing supine shoulder FL 2* decrease strength. Shoulder fatigue after session.     This is a 68 y.o. female referred to outpatient physical therapy and presents with a medical diagnosis of R shoulder pain and demonstrates limitations as described in the problem list. Pt prognosis is Good. Pt will continue to benefit from skilled outpatient physical therapy to address the deficits listed in the problem list, provide pt/family education and to maximize pt's level of independence in the home and community environment.     Goals as follows:  Short Term Goals: (4 weeks)   - Pt will increase ROM to 100 deg flex and abd passively R shoulder ( met)  - Decrease Pain to 1/10 as worst with 1 hour of PT exercises ( met)  - Pt to self correct posture with minimal cues to avoid upper trap compensation (met)  - Pt independent with HEP with progressions.  (met)     Long Term Goals (24 Weeks):  - Pt will increase ROM to WNL RUE for functional ADLs and dressing (Progressing, not met)  - Pt will increase strength to 5/5 RUE to return to lifting and carrying >10 # (Progressing, not met)  - Decrease Pain to 0/10 with 1 full day of normal activities (Progressing, not met)  - Pt to return to 80% PLOF (Progressing, not met)       Plan     Continue with established Plan of Care towards PT goals progressing with AAROM.       Therapist: Sudarshan Monteiro, SPTA  6/24/2019   Ying Phillips, PTA 6/24/19  I certify that I was present in the room directing the student in service delivery and guiding them using my skilled judgment. As the co-signing therapist I have reviewed the students documentation and am responsible for the treatment, assessment, and plan.

## 2019-06-25 ENCOUNTER — OFFICE VISIT (OUTPATIENT)
Dept: SPORTS MEDICINE | Facility: CLINIC | Age: 69
End: 2019-06-25
Payer: MEDICARE

## 2019-06-25 ENCOUNTER — HOSPITAL ENCOUNTER (OUTPATIENT)
Dept: RADIOLOGY | Facility: HOSPITAL | Age: 69
Discharge: HOME | End: 2019-06-25
Attending: ORTHOPAEDIC SURGERY
Payer: MEDICARE

## 2019-06-25 VITALS — BODY MASS INDEX: 28.99 KG/M2 | HEIGHT: 72 IN | WEIGHT: 214 LBS

## 2019-06-25 DIAGNOSIS — M25.511 RIGHT SHOULDER PAIN, UNSPECIFIED CHRONICITY: Primary | ICD-10-CM

## 2019-06-25 DIAGNOSIS — M25.511 RIGHT SHOULDER PAIN, UNSPECIFIED CHRONICITY: ICD-10-CM

## 2019-06-25 DIAGNOSIS — Z96.612 S/P SHOULDER REPLACEMENT, LEFT: ICD-10-CM

## 2019-06-25 PROCEDURE — 99024 PR POST-OP FOLLOW-UP VISIT: ICD-10-PCS | Mod: POP,,, | Performed by: ORTHOPAEDIC SURGERY

## 2019-06-25 PROCEDURE — 99024 POSTOP FOLLOW-UP VISIT: CPT | Mod: POP,,, | Performed by: ORTHOPAEDIC SURGERY

## 2019-06-25 PROCEDURE — 73030 X-RAY EXAM OF SHOULDER: CPT | Mod: TC,FY,PO,RT

## 2019-06-25 PROCEDURE — 73030 X-RAY EXAM OF SHOULDER: CPT | Mod: 26,RT,, | Performed by: RADIOLOGY

## 2019-06-25 PROCEDURE — 73030 XR SHOULDER COMPLETE 2 OR MORE VIEWS RIGHT: ICD-10-PCS | Mod: 26,RT,, | Performed by: RADIOLOGY

## 2019-06-25 PROCEDURE — 99999 PR PBB SHADOW E&M-EST. PATIENT-LVL III: ICD-10-PCS | Mod: PBBFAC,,, | Performed by: ORTHOPAEDIC SURGERY

## 2019-06-25 PROCEDURE — 99999 PR PBB SHADOW E&M-EST. PATIENT-LVL III: CPT | Mod: PBBFAC,,, | Performed by: ORTHOPAEDIC SURGERY

## 2019-06-25 PROCEDURE — 99213 OFFICE O/P EST LOW 20 MIN: CPT | Mod: PBBFAC,25,PO | Performed by: ORTHOPAEDIC SURGERY

## 2019-06-25 NOTE — PROGRESS NOTES
CC: Right shoulder post op 3 month    Poonam Alvarez returns for follow up evaluation of her right shoulder.  She continues to attend physical therapy at [facility].    DATE OF SURGERY: 4/1/2019      PREOPERATIVE DIAGNOSIS:   1. Degenerative joint disease, right shoulder.   2. Right shoulder rotator cuff tear  3. Right shoulder biceps tendinopathy     POSTOPERATIVE DIAGNOSIS:   1. Degenerative joint disease, right shoulder.   2. Right shoulder rotator cuff tear  3. Right shoulder biceps tendinopathy     PROCEDURE:   1. Right total shoulder replacement.   2. Right shoulder rotator cuff repair  3. Right shoulder biceps tenodesis     SURGEON: Sage Xie M.D.    Review of Systems   Constitution: Negative. Negative for chills, fever and night sweats.    Cardiovascular: Negative for chest pain and syncope.   Respiratory: Negative for cough and shortness of breath.   Gastrointestinal: Negative for abdominal pain and bowel incontinence.     PE:  Vitals:    06/25/19 1438   Weight: 97.1 kg (214 lb)   Height: 6' (1.829 m)   PainSc: 0-No pain     Right shoulder  Incision healed  No sign of infection  Swelling resolved  Compartments soft  Neurovascular status intact in extremity    PROM  Forward elevation 150 degrees  External rotation 65 degrees    XRAY (6/525/19): There is a TSA in place good alignment no complication.    Assessment:  3 months s/p right TSA    Plan:  1. Continue physical therapy  2. Cleared to return to work  3. Follow up 3 months

## 2019-06-25 NOTE — LETTER
[facility]  [facility]  [facility] [address]  Phone: [phone] June 25, 2019     Patient: Poonam Alvarez   YOB: 1950   Date of Visit: 6/25/2019       To Whom It May Concern:    Poonam Alvarez is cleared to return to work as of 6/25/19.  No lifting over 20lbs.    If you have any questions or concerns, please don't hesitate to contact my office.    Sincerely,    Sage Xie MD

## 2019-07-02 ENCOUNTER — CLINICAL SUPPORT (OUTPATIENT)
Dept: REHABILITATION | Facility: HOSPITAL | Age: 69
End: 2019-07-02
Attending: ORTHOPAEDIC SURGERY
Payer: MEDICARE

## 2019-07-02 DIAGNOSIS — G89.29 CHRONIC RIGHT SHOULDER PAIN: ICD-10-CM

## 2019-07-02 DIAGNOSIS — M25.511 CHRONIC RIGHT SHOULDER PAIN: ICD-10-CM

## 2019-07-02 PROCEDURE — 97110 THERAPEUTIC EXERCISES: CPT

## 2019-07-02 NOTE — PROGRESS NOTES
Physical Therapy Daily Note     Name: Poonam Alvarez  [facility] Number: [id number]  Diagnosis:   Encounter Diagnosis   Name Primary?    Chronic right shoulder pain      Physician: Sage Xie MD  Precautions: TSA protocol surgery: 4/1/19  Visit #: 15 of 20  Time In: 500 pm  Time Out: 600 pm  Total Treatment Time: 30 min    Physician Orders: PT Eval and Treat   Medical Diagnosis: M19.011 (ICD-10-CM) - DJD of right shoulder  Date of Surgery: 4/1/19  Evaluation Date: 4/18/2019  Authorization Period Expiration: 3/14/20  Plan of Care Certification Period: 10/3/19      Subjective     Pt reports:Still compliant with stretches and thinks it is getting better.  Pain Scale: Poonam rates pain at R shoulder on a scale of 0-10 to be 0/10 currently.     Objective   Measurements this visit: (6/6/19)  R shoulder PROM:  Flex: 155 deg  Abd: 150 deg  IR: 70 deg  ER: 70 deg    Poonam received individual therapeutic exercises to develop strength, endurance, ROM and posture for 50 minutes including:    UBE x4' forward, 4' back  Pulleys 3'/3' scap and flex   Ball on wall roll up 2x10 with 5 sec hol-np  Shoulder flexion on mirror with towel 2x10 with 3 sec hold  Step outs gtb 2x10 with 5 sec hold IR (gtb) and ER  Rows GTB 2x15 with 5 sec hold -  Pec stretch in doorway 3x 30 sec hold   Supine ER stretch 2# 3x fatigue-np  Supine flex 2# 2x8-np    Standing dowel flexion x15-np  Shoulder ext gTB 2x10-np  SL R shoulder ER, abd, flex and hab 2x10 (in available ranges) 1#-np  Shoulder scaption exercise 2x15-np    Poonam received the following manual therapy techniques: Soft tissue Mobilization were applied to the: R shoulder for 5 minutes including:  PROM: FL/abd, ER in scap plane as cortney    CP x 10 min to R shoulder    Written Home Exercises Provided: as per eval  Pt demo good understanding of the education provided. Poonam demonstrated good return demonstration of activities.      Education provided re:  Poonam verbalized good understanding of education provided.   No spiritual or educational barriers to learning provided    Assessment     Pt showing improved AAROM, but continues to be limited from progressing shoulder ER secondary to pain and increased muscle guarding. Patient making fair progress this session toward goals.     This is a 68 y.o. female referred to outpatient physical therapy and presents with a medical diagnosis of R shoulder pain and demonstrates limitations as described in the problem list. Pt prognosis is Good. Pt will continue to benefit from skilled outpatient physical therapy to address the deficits listed in the problem list, provide pt/family education and to maximize pt's level of independence in the home and community environment.     Goals as follows:  Short Term Goals: (4 weeks)   - Pt will increase ROM to 100 deg flex and abd passively R shoulder ( met)  - Decrease Pain to 1/10 as worst with 1 hour of PT exercises ( met)  - Pt to self correct posture with minimal cues to avoid upper trap compensation (met)  - Pt independent with HEP with progressions.  (met)     Long Term Goals (24 Weeks):  - Pt will increase ROM to WNL RUE for functional ADLs and dressing (Progressing, not met)  - Pt will increase strength to 5/5 RUE to return to lifting and carrying >10 # (Progressing, not met)  - Decrease Pain to 0/10 with 1 full day of normal activities (Progressing, not met)  - Pt to return to 80% PLOF (Progressing, not met)       Plan     Continue with established Plan of Care towards PT goals progressing with AAROM.       Therapist: Tiera Carreon, PT  7/2/2019

## 2019-07-08 DIAGNOSIS — M25.562 LEFT KNEE PAIN, UNSPECIFIED CHRONICITY: ICD-10-CM

## 2019-07-08 RX ORDER — MELOXICAM 15 MG/1
TABLET ORAL
Qty: 90 TABLET | Refills: 0 | Status: SHIPPED | OUTPATIENT
Start: 2019-07-08 | End: 2019-10-13 | Stop reason: SDUPTHER

## 2019-07-09 ENCOUNTER — CLINICAL SUPPORT (OUTPATIENT)
Dept: REHABILITATION | Facility: HOSPITAL | Age: 69
End: 2019-07-09
Attending: ORTHOPAEDIC SURGERY
Payer: MEDICARE

## 2019-07-09 DIAGNOSIS — G89.29 CHRONIC RIGHT SHOULDER PAIN: ICD-10-CM

## 2019-07-09 DIAGNOSIS — M25.511 CHRONIC RIGHT SHOULDER PAIN: ICD-10-CM

## 2019-07-09 PROCEDURE — 97110 THERAPEUTIC EXERCISES: CPT

## 2019-07-09 RX ORDER — GABAPENTIN 100 MG/1
CAPSULE ORAL
Qty: 90 CAPSULE | Refills: 0 | Status: SHIPPED | OUTPATIENT
Start: 2019-07-09 | End: 2019-12-23

## 2019-07-09 NOTE — PROGRESS NOTES
Physical Therapy Daily Note     Name: Poonam Alvarez  [facility] Number: [id number]  Diagnosis:   Encounter Diagnosis   Name Primary?    Chronic right shoulder pain      Physician: Sage Xie MD  Precautions: TSA protocol surgery: 4/1/19  Visit #: 16 of 20  Time In: 500 pm  Time Out: 605 pm   Total Treatment Time: 55 min    Physician Orders: PT Eval and Treat   Medical Diagnosis: M19.011 (ICD-10-CM) - DJD of right shoulder  Date of Surgery: 4/1/19  Evaluation Date: 4/18/2019  Authorization Period Expiration: 3/14/20  Plan of Care Certification Period: 10/3/19      Subjective     Pt reports:she is proud of her progress.  Pain Scale: Poonam rates pain at R shoulder on a scale of 0-10 to be 0/10 currently.     Objective   Measurements this visit: (6/6/19)  R shoulder PROM:  Flex: 155 deg  Abd: 150 deg  IR: 70 deg  ER: 70 deg    Poonam received individual therapeutic exercises to develop strength, endurance, ROM and posture for 55 minutes including:    UBE x4' forward, 4' back  Pulleys 3'/3' scap and flex   Ball on wall roll up 2x10 with 5 sec hol-np  Shoulder flexion on mirror with towel 2x10 with 3 sec hold  Step outs btb 2x10 with 5 sec hold IR and ER  Rows GTB 2x15 with 5 sec hold -  Pec stretch in doorway 3x 30 sec hold   Supine ER stretch 2# 3x fatigue-np  Supine flex 2# 2x8-np  Mirror shoulder abd 2x10  SL R shoulder ER, abd, flex and hab 2x10  1#  IR towel stretch 10 sec x5    Poonam received the following manual therapy techniques: Soft tissue Mobilization were applied to the: R shoulder for 0 minutes including:  PROM: FL/abd, ER in scap plane as cortney    CP x 10 min to R shoulder    Written Home Exercises Provided: as per eval  Pt demo good understanding of the education provided. Poonam demonstrated good return demonstration of activities.     Education provided re:  Poonam verbalized good understanding of education provided.   No spiritual or  educational barriers to learning provided    Assessment     Pt showing improved rotational ranges compared to previous visits indicating patient compliance at home. Patient progressing well toward goals.      This is a 68 y.o. female referred to outpatient physical therapy and presents with a medical diagnosis of R shoulder pain and demonstrates limitations as described in the problem list. Pt prognosis is Good. Pt will continue to benefit from skilled outpatient physical therapy to address the deficits listed in the problem list, provide pt/family education and to maximize pt's level of independence in the home and community environment.     Goals as follows:  Short Term Goals: (4 weeks)   - Pt will increase ROM to 100 deg flex and abd passively R shoulder ( met)  - Decrease Pain to 1/10 as worst with 1 hour of PT exercises ( met)  - Pt to self correct posture with minimal cues to avoid upper trap compensation (met)  - Pt independent with HEP with progressions.  (met)     Long Term Goals (24 Weeks):  - Pt will increase ROM to WNL RUE for functional ADLs and dressing (Progressing, not met)  - Pt will increase strength to 5/5 RUE to return to lifting and carrying >10 # (Progressing, not met)  - Decrease Pain to 0/10 with 1 full day of normal activities (Progressing, not met)  - Pt to return to 80% PLOF (Progressing, not met)       Plan     Continue with established Plan of Care towards PT goals progressing with AAROM.       Therapist: Tiera Carreon, PT  7/9/2019

## 2019-07-11 ENCOUNTER — CLINICAL SUPPORT (OUTPATIENT)
Dept: REHABILITATION | Facility: HOSPITAL | Age: 69
End: 2019-07-11
Attending: ORTHOPAEDIC SURGERY
Payer: MEDICARE

## 2019-07-11 ENCOUNTER — OFFICE VISIT (OUTPATIENT)
Dept: INTERNAL MEDICINE | Facility: CLINIC | Age: 69
End: 2019-07-11
Payer: MEDICARE

## 2019-07-11 VITALS
HEART RATE: 79 BPM | DIASTOLIC BLOOD PRESSURE: 70 MMHG | TEMPERATURE: 99 F | OXYGEN SATURATION: 96 % | SYSTOLIC BLOOD PRESSURE: 108 MMHG | HEIGHT: 72 IN | BODY MASS INDEX: 29.02 KG/M2

## 2019-07-11 DIAGNOSIS — L30.9 DERMATITIS, UNSPECIFIED: Primary | ICD-10-CM

## 2019-07-11 DIAGNOSIS — M25.511 CHRONIC RIGHT SHOULDER PAIN: ICD-10-CM

## 2019-07-11 DIAGNOSIS — G89.29 CHRONIC RIGHT SHOULDER PAIN: ICD-10-CM

## 2019-07-11 PROCEDURE — 97110 THERAPEUTIC EXERCISES: CPT

## 2019-07-11 PROCEDURE — 99213 PR OFFICE/OUTPT VISIT, EST, LEVL III, 20-29 MIN: ICD-10-PCS | Mod: S$PBB,,, | Performed by: NURSE PRACTITIONER

## 2019-07-11 PROCEDURE — 99999 PR PBB SHADOW E&M-EST. PATIENT-LVL III: ICD-10-PCS | Mod: PBBFAC,,, | Performed by: NURSE PRACTITIONER

## 2019-07-11 PROCEDURE — 99213 OFFICE O/P EST LOW 20 MIN: CPT | Mod: S$PBB,,, | Performed by: NURSE PRACTITIONER

## 2019-07-11 PROCEDURE — 99999 PR PBB SHADOW E&M-EST. PATIENT-LVL III: CPT | Mod: PBBFAC,,, | Performed by: NURSE PRACTITIONER

## 2019-07-11 PROCEDURE — 99213 OFFICE O/P EST LOW 20 MIN: CPT | Mod: PBBFAC | Performed by: NURSE PRACTITIONER

## 2019-07-11 RX ORDER — TRIAMCINOLONE ACETONIDE 5 MG/G
CREAM TOPICAL 2 TIMES DAILY
Qty: 15 G | Refills: 0 | Status: SHIPPED | OUTPATIENT
Start: 2019-07-11 | End: 2019-12-23

## 2019-07-11 NOTE — PATIENT INSTRUCTIONS
Cream and Antihistamine pill twice a day for 7 days    Cool showers, cold compresses as needed    Return to clinic if fever develops, pain, redness increases       Understanding Contact Dermatitis     A cool, moist compress can help reduce itching.     Contact dermatitis is a common type of skin rash. Its caused by something that touches the skin and makes it irritated and inflamed. It can occur on skin on any part of the body, such as the face, neck, hands, arms, and legs. Contact dermatitis is not spread from person to person.  Often, the reaction of contact dermatitis occurs 1 to 2 days after contact with the offending agent.  How to say it  DEON-tact [id number]   What causes contact dermatitis?  Its caused by something that irritates the skin, or that creates an allergic reaction on the skin. People can get contact dermatitis from many kinds of things. These include:  · Plant oils in poison ivy, oak, and sumac  · Chemicals in household , solvents, and glue  · Chemicals in makeup, soap, laundry detergent, perfume, acne cream, and hair products  · Certain medicines, such as neomycin, bacitracin, benzocaine, and thimerosal  · Metals such as nickel, found in some jewelry and watch bands   · The sticky material on the back of bandages and tape (adhesive)  · Things that can cause tiny breaks in the skin, such as wood, fiberglass, metal tools, and plant thorns  · Rubber latex in surgical gloves and other medical supplies  Dermatitis can also be caused by the skin being damp for long periods of time. This can happen from washing your hands too often, or working with wet materials.  Symptoms of contact dermatitis  Symptoms can include skin that is:  · Blistered  · Burning  · Cracked  · Dry  · Itchy  · Painful  · Red  · Rough, thickened, and leathery  · Swollen  · Warm  The blisters may ooze fluid and form crusts.  Treatment for contact dermatitis  Treatment is done to help relieve itching and reduce  inflammation. The rash should go away in a few days to a few weeks. Treatments include:  · Cool, moist compress. Use a clean damp cloth. Put it on the area for 20 to 30 minutes, 5 to 6 times a day for the first 3 days.  · Steroid cream or ointment. You can apply this medicine several times a day on clean skin.  · Oral corticosteroid. Your healthcare provider may prescribe this medicine if you have severe skin symptoms on a large part of your body.  Your healthcare provider may give you a steroid injection instead of pills.  · Oral antihistamine. This medicine can help reduce itching.  · Colloidal oatmeal bath. Soaking in water with colloidal oatmeal can help soothe skin.  · Plain cream, lotion, or ointment. Cream, lotion, or ointment without medicine can help to soothe and protect your skin.  Living with contact dermatitis  Talk with your healthcare provider about what may have caused your contact dermatitis. Patch testing may help you figure out what caused the rash so you can avoid further contact with it. Once you learn what caused your rash, make sure to avoid that substance. If your skin comes into contact with it again, make sure to wash your skin right away. If you cant avoid the substance, wear gloves or other protective clothing before you touch it. Or use a cream, lotion, or ointment to protect your skin.  When to call your healthcare provider  Call your healthcare provider right away if you have any of these:  · Fever of 100.4°F (38°C) or higher, or as directed  · Symptoms that dont get better, or get worse  · New symptoms   Date Last Reviewed: 5/1/2016  © 6962-9824 [employer]. [address] [place] [address], [place] [address]. All rights reserved. This information is not intended as a substitute for professional medical care. Always follow your healthcare professional's instructions.
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
## 2019-07-16 ENCOUNTER — CLINICAL SUPPORT (OUTPATIENT)
Dept: REHABILITATION | Facility: HOSPITAL | Age: 69
End: 2019-07-16
Attending: ORTHOPAEDIC SURGERY
Payer: MEDICARE

## 2019-07-16 DIAGNOSIS — G89.29 CHRONIC RIGHT SHOULDER PAIN: ICD-10-CM

## 2019-07-16 DIAGNOSIS — M25.511 CHRONIC RIGHT SHOULDER PAIN: ICD-10-CM

## 2019-07-16 PROCEDURE — 97110 THERAPEUTIC EXERCISES: CPT

## 2019-07-16 NOTE — PROGRESS NOTES
Physical Therapy Daily Note     Name: Poonam Alvarez  [facility] Number: [id number]  Diagnosis:   Encounter Diagnosis   Name Primary?    Chronic right shoulder pain      Physician: Sage Xie MD   Physician Orders: PT Eval and Treat   Medical Diagnosis: M19.011 (ICD-10-CM) - DJD of right shoulder  Date of Surgery: 4/1/19  Evaluation Date: 4/18/2019  Authorization Period Expiration: 3/14/20  Plan of Care Certification Period: 10/3/19    Visit #: 18 of 20  Time In: 500 pm  Time Out: 600 pm   Total Treatment Time: 30 min    Precautions: TSA protocol surgery: 4/1/19    Subjective     Pt reports:she is feeling better.  Pain Scale: Poonam rates pain at R shoulder on a scale of 0-10 to be 0/10 currently.     Objective   Measurements this visit: (6/6/19)  R shoulder PROM:  Flex: 155 deg  Abd: 150 deg  IR: 70 deg  ER: 70 deg    Poonam received individual therapeutic exercises to develop strength, endurance, ROM and posture for 50 minutes including:    UBE x4' forward, 4' back  Pulleys 3'/3' scap and flex   Ball on wall roll up 2x10 with 5 sec hol-np  Shoulder flexion on mirror with towel 2x10 with 3 sec hold-  Step outs btb 2x10 with 5 sec hold IR and ER-  Rows GTB 2x15 with 5 sec hold --np  Pec stretch in doorway 3x 45 sec hold   Supine ER stretch 2# 3x fatigue-np  Standing flex and abd 2# 2x10  Mirror shoulder abd 2x10  SL R shoulder ER, abd, flex and hab 2x10  1#-np  IR towel stretch 10 sec x5-np    Poonam received the following manual therapy techniques: Soft tissue Mobilization were applied to the: R shoulder for 5 minutes including:  PROM: FL/abd, ER in scap plane as cortney    CP x 10 min to R shoulder    Written Home Exercises Provided: as per eval  Pt demo good understanding of the education provided. Poonam demonstrated good return demonstration of activities.     Education provided re:  Poonam verbalized good understanding of education provided.   No spiritual  or educational barriers to learning provided    Assessment     Pt showing better ER with PROM this session and continues to demonstrate steady improvements. Patient progressing well toward goals.      This is a 68 y.o. female referred to outpatient physical therapy and presents with a medical diagnosis of R shoulder pain and demonstrates limitations as described in the problem list. Pt prognosis is Good. Pt will continue to benefit from skilled outpatient physical therapy to address the deficits listed in the problem list, provide pt/family education and to maximize pt's level of independence in the home and community environment.     Goals as follows:  Short Term Goals: (4 weeks)   - Pt will increase ROM to 100 deg flex and abd passively R shoulder ( met)  - Decrease Pain to 1/10 as worst with 1 hour of PT exercises ( met)  - Pt to self correct posture with minimal cues to avoid upper trap compensation (met)  - Pt independent with HEP with progressions.  (met)     Long Term Goals (24 Weeks):  - Pt will increase ROM to WNL RUE for functional ADLs and dressing (Progressing, not met)  - Pt will increase strength to 5/5 RUE to return to lifting and carrying >10 # (Progressing, not met)  - Decrease Pain to 0/10 with 1 full day of normal activities (Progressing, not met)  - Pt to return to 80% PLOF (Progressing, not met)       Plan     Continue with established Plan of Care towards PT goals progressing with AAROM.       Therapist: Tiera Carreon, PT  7/16/2019

## 2019-07-18 ENCOUNTER — CLINICAL SUPPORT (OUTPATIENT)
Dept: REHABILITATION | Facility: HOSPITAL | Age: 69
End: 2019-07-18
Attending: ORTHOPAEDIC SURGERY
Payer: MEDICARE

## 2019-07-18 DIAGNOSIS — G89.29 CHRONIC RIGHT SHOULDER PAIN: ICD-10-CM

## 2019-07-18 DIAGNOSIS — M25.511 CHRONIC RIGHT SHOULDER PAIN: ICD-10-CM

## 2019-07-18 PROCEDURE — 97110 THERAPEUTIC EXERCISES: CPT

## 2019-07-18 NOTE — PROGRESS NOTES
Physical Therapy Daily Note     Name: Poonam Alvarez  [facility] Number: [id number]  Diagnosis:   Encounter Diagnosis   Name Primary?    Chronic right shoulder pain      Physician: Sage Xie MD   Physician Orders: PT Eval and Treat   Medical Diagnosis: M19.011 (ICD-10-CM) - DJD of right shoulder  Date of Surgery: 4/1/19  Evaluation Date: 4/18/2019  Authorization Period Expiration: 3/14/20  Plan of Care Certification Period: 10/3/19    Visit #: 19 of 20  Time In: 500 pm  Time Out: 540 pm   Total Treatment Time: 30 min    Precautions: TSA protocol surgery: 4/1/19    Subjective     Pt reports:she is feeling sore secondary to mopping yesterday.  Pain Scale: Poonam rates pain at R shoulder on a scale of 0-10 to be 0/10 currently.     Objective   Measurements this visit: (6/6/19)  R shoulder PROM:  Flex: 155 deg  Abd: 150 deg  IR: 70 deg  ER: 70 deg    Poonam received individual therapeutic exercises to develop strength, endurance, ROM and posture for 30 minutes including:    UBE x5' forward, 5' back  Pulleys 3'/3' scap and flex   Ball on wall roll up 2x10 with 5 sec hol-np  Shoulder flexion on mirror with towel 2x10 with 3 sec hold-np  Step outs mtb 2x10 with 5 sec hold IR and ER-  Rows GTB 2x15 with 5 sec hold --np  Pec stretch in doorway 3x 45 sec hold   Supine ER stretch 2# 3x fatigue-np  Standing flex and abd 2# 2x10-np  Mirror shoulder abd 2x10-np  SL R shoulder ER, abd, flex and hab 2x10  1#-np  IR towel stretch 10 sec x5-np    Poonam received the following manual therapy techniques: Soft tissue Mobilization were applied to the: R shoulder for 0 minutes including:  PROM: FL/abd, ER in scap plane as cortney    CP x 10 min to R shoulder    Written Home Exercises Provided: as per eval  Pt demo good understanding of the education provided. Poonam demonstrated good return demonstration of activities.     Education provided re:  Poonam verbalized good understanding  of education provided.   No spiritual or educational barriers to learning provided    Assessment     Pt did not want to push her shoulder much this session secondary to increased soreness from household chores. She did however wish to increase resistance on bands for step outs.  Patient progressing well toward goals.      This is a 68 y.o. female referred to outpatient physical therapy and presents with a medical diagnosis of R shoulder pain and demonstrates limitations as described in the problem list. Pt prognosis is Good. Pt will continue to benefit from skilled outpatient physical therapy to address the deficits listed in the problem list, provide pt/family education and to maximize pt's level of independence in the home and community environment.     Goals as follows:  Short Term Goals: (4 weeks)   - Pt will increase ROM to 100 deg flex and abd passively R shoulder ( met)  - Decrease Pain to 1/10 as worst with 1 hour of PT exercises ( met)  - Pt to self correct posture with minimal cues to avoid upper trap compensation (met)  - Pt independent with HEP with progressions.  (met)     Long Term Goals (24 Weeks):  - Pt will increase ROM to WNL RUE for functional ADLs and dressing (Progressing, not met)  - Pt will increase strength to 5/5 RUE to return to lifting and carrying >10 # (Progressing, not met)  - Decrease Pain to 0/10 with 1 full day of normal activities (Progressing, not met)  - Pt to return to 80% PLOF (Progressing, not met)       Plan     Continue with established Plan of Care towards PT goals progressing with AROM of R shoulder and strengthening.      Therapist: Tiera Carreon, PT  7/18/2019

## 2019-07-23 ENCOUNTER — CLINICAL SUPPORT (OUTPATIENT)
Dept: REHABILITATION | Facility: HOSPITAL | Age: 69
End: 2019-07-23
Attending: ORTHOPAEDIC SURGERY
Payer: MEDICARE

## 2019-07-23 DIAGNOSIS — G89.29 CHRONIC RIGHT SHOULDER PAIN: ICD-10-CM

## 2019-07-23 DIAGNOSIS — M25.511 CHRONIC RIGHT SHOULDER PAIN: ICD-10-CM

## 2019-07-23 PROCEDURE — 97110 THERAPEUTIC EXERCISES: CPT

## 2019-07-23 NOTE — PROGRESS NOTES
Physical Therapy Daily Note     Name: Poonam Alvarez  Clinic Number: [id number]  Diagnosis:   Encounter Diagnosis   Name Primary?    Chronic right shoulder pain      Physician: Sage Xie MD   Physician Orders: PT Eval and Treat   Medical Diagnosis: M19.011 (ICD-10-CM) - DJD of right shoulder  Date of Surgery: 4/1/19  Evaluation Date: 4/18/2019  Authorization Period Expiration: 3/14/20  Plan of Care Certification Period: 10/3/19    Visit #: 20 of 20  Time In: 500 pm  Time Out: 610 pm   Total Treatment Time: 30 min    Precautions: TSA protocol surgery: 4/1/19    Subjective     Pt reports:she still has some soreness in her shoulder, but it is not limiting her from her normal ADLs.  Pain Scale: Poonam rates pain at R shoulder on a scale of 0-10 to be 0/10 currently.     Objective   Measurements this visit: (6/6/19)  R shoulder PROM:  Flex: 155 deg  Abd: 150 deg  IR: 70 deg  ER: 70 deg    Poonam received individual therapeutic exercises to develop strength, endurance, ROM and posture for 50 minutes including:    UBE x5' forward, 5' back  Pulleys 3'/3' scap and flex   Ball on wall roll up 2x10 with 5 sec hol-np  Shoulder flexion on mirror with towel 2x10 with 3 sec hold-np  Step outs btb 2x10 with 5 sec hold IR and ER-  Rows GTB 2x15 with 5 sec hold --np  Pec stretch in doorway 3x 1 min hold   Supine ER stretch x2 min  Standing flex, scap and abd 1# 3x10  Mirror shoulder abd 2x10-np  SL R shoulder ER, abd, flex and hab 2x10  1#-np  IR towel stretch 10 sec x5-np  Sleeper stretch 3x 30 sec    Poonam received the following manual therapy techniques: Soft tissue Mobilization were applied to the: R shoulder for 5 minutes including:  PROM: FL/abd, ER in scap plane as cortney    CP x 10 min to R shoulder    Written Home Exercises Provided: updated to include IR and ER stretching  Pt demo good understanding of the education provided. Poonam demonstrated good return  demonstration of activities.     Education provided re:  Poonam verbalized good understanding of education provided.   No spiritual or educational barriers to learning provided    Assessment     Pt is making progress on ER stretching, but still limited with IR. Patient continues to progress with increasing resistance each session.  Patient progressing well toward goals.      This is a 68 y.o. female referred to outpatient physical therapy and presents with a medical diagnosis of R shoulder pain and demonstrates limitations as described in the problem list. Pt prognosis is Good. Pt will continue to benefit from skilled outpatient physical therapy to address the deficits listed in the problem list, provide pt/family education and to maximize pt's level of independence in the home and community environment.     Goals as follows:  Short Term Goals: (4 weeks)   - Pt will increase ROM to 100 deg flex and abd passively R shoulder ( met)  - Decrease Pain to 1/10 as worst with 1 hour of PT exercises ( met)  - Pt to self correct posture with minimal cues to avoid upper trap compensation (met)  - Pt independent with HEP with progressions.  (met)     Long Term Goals (24 Weeks):  - Pt will increase ROM to WNL RUE for functional ADLs and dressing (Progressing, not met)  - Pt will increase strength to 5/5 RUE to return to lifting and carrying >10 # (Progressing, not met)  - Decrease Pain to 0/10 with 1 full day of normal activities (Progressing, not met)  - Pt to return to 80% PLOF (Progressing, not met)       Plan     Continue with established Plan of Care towards PT goals progressing with AROM of R shoulder and strengthening.      Therapist: Tiera Carreon, PT  7/23/2019

## 2019-07-25 ENCOUNTER — CLINICAL SUPPORT (OUTPATIENT)
Dept: REHABILITATION | Facility: HOSPITAL | Age: 69
End: 2019-07-25
Attending: ORTHOPAEDIC SURGERY
Payer: MEDICARE

## 2019-07-25 DIAGNOSIS — M25.511 CHRONIC RIGHT SHOULDER PAIN: ICD-10-CM

## 2019-07-25 DIAGNOSIS — G89.29 CHRONIC RIGHT SHOULDER PAIN: ICD-10-CM

## 2019-07-25 PROCEDURE — 97110 THERAPEUTIC EXERCISES: CPT

## 2019-07-25 NOTE — PROGRESS NOTES
Physical Therapy Daily Note     Name: Poonam Alvarez  [facility] Number: [id number]  Diagnosis:   Encounter Diagnosis   Name Primary?    Chronic right shoulder pain      Physician: Sage Xie MD   Physician Orders: PT Eval and Treat   Medical Diagnosis: M19.011 (ICD-10-CM) - DJD of right shoulder  Date of Surgery: 4/1/19  Evaluation Date: 4/18/2019  Authorization Period Expiration: 3/14/20  Plan of Care Certification Period: 10/3/19    Visit #: 2/10  Time In: 500 pm  Time Out: 550  pm   Total Treatment Time: 30 min    Precautions: TSA protocol surgery: 4/1/19    Subjective     Pt reports:she feels good.   Pain Scale: Poonam rates pain at R shoulder on a scale of 0-10 to be 0/10 currently.     Objective   Measurements this visit: (6/6/19)  R shoulder PROM:  Flex: 155 deg  Abd: 150 deg  IR: 70 deg  ER: 70 deg    Poonam received individual therapeutic exercises to develop strength, endurance, ROM and posture for 40 minutes including:    UBE x5' forward, 5' back  Pulleys 3'/3' scap and flex   Ball on wall roll up 2x10 with 5 sec hol-np  Shoulder flexion on mirror with towel 2x10 with 3 sec hold-np  Step outs btb 2x10 with 5 sec hold IR and ER-  Rows GTB 2x15 with 5 sec hold --np  Pec stretch in doorway 3x 1 min hold   Supine ER stretch x2 min-np  Standing flex, scap and abd 1# 3x10  Mirror shoulder abd 2x10-np  SL R shoulder ER, abd, flex and hab 2x10  1#-np  IR towel stretch 10 sec x5-np  Sleeper stretch 3x 30 sec-np    Poonam received the following manual therapy techniques: Soft tissue Mobilization were applied to the: R shoulder for 5 minutes including:  PROM: FL/abd, ER in scap plane as cortney    CP x 10 min to R shoulder    Written Home Exercises Provided: updated to include IR and ER stretching  Pt demo good understanding of the education provided. Poonam demonstrated good return demonstration of activities.     Education provided re:  Poonam verbalized good  understanding of education provided.   No spiritual or educational barriers to learning provided    Assessment     Pt was less motivated to progress this session secondary to fatigue.  Patient continues to progress with increasing resistance each session.  Patient progressing well toward goals.      This is a 68 y.o. female referred to outpatient physical therapy and presents with a medical diagnosis of R shoulder pain and demonstrates limitations as described in the problem list. Pt prognosis is Good. Pt will continue to benefit from skilled outpatient physical therapy to address the deficits listed in the problem list, provide pt/family education and to maximize pt's level of independence in the home and community environment.     Goals as follows:  Short Term Goals: (4 weeks)   - Pt will increase ROM to 100 deg flex and abd passively R shoulder ( met)  - Decrease Pain to 1/10 as worst with 1 hour of PT exercises ( met)  - Pt to self correct posture with minimal cues to avoid upper trap compensation (met)  - Pt independent with HEP with progressions.  (met)     Long Term Goals (24 Weeks):  - Pt will increase ROM to WNL RUE for functional ADLs and dressing (Progressing, not met)  - Pt will increase strength to 5/5 RUE to return to lifting and carrying >10 # (Progressing, not met)  - Decrease Pain to 0/10 with 1 full day of normal activities (Progressing, not met)  - Pt to return to 80% PLOF (Progressing, not met)       Plan     Continue with established Plan of Care towards PT goals progressing with AROM of R shoulder and strengthening.      Therapist: Tiera Carreon, PT  7/25/2019

## 2019-07-30 ENCOUNTER — CLINICAL SUPPORT (OUTPATIENT)
Dept: REHABILITATION | Facility: HOSPITAL | Age: 69
End: 2019-07-30
Attending: ORTHOPAEDIC SURGERY
Payer: MEDICARE

## 2019-07-30 DIAGNOSIS — M25.511 CHRONIC RIGHT SHOULDER PAIN: ICD-10-CM

## 2019-07-30 DIAGNOSIS — G89.29 CHRONIC RIGHT SHOULDER PAIN: ICD-10-CM

## 2019-07-30 PROCEDURE — 97140 MANUAL THERAPY 1/> REGIONS: CPT

## 2019-07-30 PROCEDURE — 97110 THERAPEUTIC EXERCISES: CPT

## 2019-08-06 ENCOUNTER — CLINICAL SUPPORT (OUTPATIENT)
Dept: REHABILITATION | Facility: HOSPITAL | Age: 69
End: 2019-08-06
Attending: ORTHOPAEDIC SURGERY
Payer: MEDICARE

## 2019-08-06 DIAGNOSIS — G89.29 CHRONIC RIGHT SHOULDER PAIN: ICD-10-CM

## 2019-08-06 DIAGNOSIS — M25.511 CHRONIC RIGHT SHOULDER PAIN: ICD-10-CM

## 2019-08-06 PROCEDURE — 97140 MANUAL THERAPY 1/> REGIONS: CPT

## 2019-08-06 PROCEDURE — 97110 THERAPEUTIC EXERCISES: CPT

## 2019-08-06 NOTE — PROGRESS NOTES
Physical Therapy Daily Note     Name: Poonam Alvarez  [facility] Number: [id number]  Diagnosis:   Encounter Diagnosis   Name Primary?    Chronic right shoulder pain      Physician: Sage Xie MD   Physician Orders: PT Eval and Treat   Medical Diagnosis: M19.011 (ICD-10-CM) - DJD of right shoulder  Date of Surgery: 4/1/19  Evaluation Date: 4/18/2019  Authorization Period Expiration: 3/14/20  Plan of Care Certification Period: 10/3/19    Visit #: 4/10  Time In: 450 pm  Time Out: 600  pm   Total Treatment Time: 55 min    Precautions: TSA protocol surgery: 4/1/19    Subjective     Pt reports:she feels good and continues to note improvements functionally.  Pain Scale: Poonam rates pain at R shoulder on a scale of 0-10 to be 0/10 currently.     Objective   Measurements this visit: (8/6/19)  R shoulder PROM:  Flex: 170 deg  Abd: 160 deg  IR: 70 deg  ER: 80 deg    Poonam received individual therapeutic exercises to develop strength, endurance, ROM and posture for 45 minutes including:    UBE x5' forward  Pulleys 5'/5' scap and flex   Ball on wall roll up 2x10 with 5 sec hol-np  Shoulder flexion on mirror with towel 2x10 with 3 sec hold-np  Step outs btb 2x10 with 5 sec hold IR and ER-np  Rows GTB 2x15 with 5 sec hold --np  Pec stretch in doorway 3x 1 min hold   Supine ER stretch x2 min-np  Standing flex, scap and abd 1# 3x10  Mirror shoulder abd 2x10-np  SL R shoulder ER, abd, flex and hab 2x10  1#-np  IR towel stretch 10 sec x5-np  Sleeper stretch 3x 30 sec-np    Poonam received the following manual therapy techniques: Soft tissue Mobilization were applied to the: R shoulder for 10 minutes including:  PROM: FL/abd, ER in scap plane as cortney    CP x 10 min to R shoulder    Written Home Exercises Provided: updated to include IR and ER stretching  Pt demo good understanding of the education provided. Poonam demonstrated good return demonstration of activities.      Education provided re:  Poonam verbalized good understanding of education provided.   No spiritual or educational barriers to learning provided    Assessment   Patient demonstrated improved PROM this session and continues to make progress this session. Patient demonstrates a good understanding of HEP to reach functional goals.       This is a 68 y.o. female referred to outpatient physical therapy and presents with a medical diagnosis of R shoulder pain and demonstrates limitations as described in the problem list. Pt prognosis is Good. Pt will continue to benefit from skilled outpatient physical therapy to address the deficits listed in the problem list, provide pt/family education and to maximize pt's level of independence in the home and community environment.     Goals as follows:  Short Term Goals: (4 weeks)   - Pt will increase ROM to 100 deg flex and abd passively R shoulder ( met)  - Decrease Pain to 1/10 as worst with 1 hour of PT exercises ( met)  - Pt to self correct posture with minimal cues to avoid upper trap compensation (met)  - Pt independent with HEP with progressions.  (met)     Long Term Goals (24 Weeks):  - Pt will increase ROM to WNL RUE for functional ADLs and dressing (Progressing, not met)  - Pt will increase strength to 5/5 RUE to return to lifting and carrying >10 # (Progressing, not met)  - Decrease Pain to 0/10 with 1 full day of normal activities (Progressing, not met)  - Pt to return to 80% PLOF (Progressing, not met)       Plan     Continue with established Plan of Care towards PT goals progressing with AROM of R shoulder and strengthening.      Therapist: Tiera Carreon, PT  8/6/2019

## 2019-08-08 ENCOUNTER — CLINICAL SUPPORT (OUTPATIENT)
Dept: REHABILITATION | Facility: HOSPITAL | Age: 69
End: 2019-08-08
Attending: ORTHOPAEDIC SURGERY
Payer: MEDICARE

## 2019-08-08 DIAGNOSIS — M25.511 CHRONIC RIGHT SHOULDER PAIN: ICD-10-CM

## 2019-08-08 DIAGNOSIS — G89.29 CHRONIC RIGHT SHOULDER PAIN: ICD-10-CM

## 2019-08-08 PROCEDURE — 97110 THERAPEUTIC EXERCISES: CPT

## 2019-08-08 NOTE — PROGRESS NOTES
Physical Therapy Daily Note     Name: Poonam Alvarez  [facility] Number: [id number]  Diagnosis:   Encounter Diagnosis   Name Primary?    Chronic right shoulder pain      Physician: Sage Xie MD   Physician Orders: PT Eval and Treat   Medical Diagnosis: M19.011 (ICD-10-CM) - DJD of right shoulder  Date of Surgery: 4/1/19  Evaluation Date: 4/18/2019  Authorization Period Expiration: 3/14/20  Plan of Care Certification Period: 10/3/19    Visit #: 5/10  Time In: 500 pm  Time Out: 600  pm   Total Treatment Time: 30 min    Precautions: TSA protocol surgery: 4/1/19    Subjective     Pt reports:she feels good and continues to note improvements functionally.  Pain Scale: Poonam rates pain at R shoulder on a scale of 0-10 to be 0/10 currently.     Objective   Measurements this visit: (8/6/19)  R shoulder PROM:  Flex: 170 deg  Abd: 160 deg  IR: 70 deg  ER: 80 deg    Poonam received individual therapeutic exercises to develop strength, endurance, ROM and posture for 45 minutes including:    UBE x5' forward  Pulleys 5'/5' scap and flex   Ball on wall roll up 2x10 with 5 sec hol-np  Shoulder flexion on mirror with towel 2x10 with 3 sec hold-np  Step outs btb 2x10 with 5 sec hold IR and ER-np  Rows GTB 2x15 with 5 sec hold --np  Pec stretch in doorway 3x 1 min hold   Supine ER stretch x2 min-np  Standing flex, scap and abd 1# 3x10  landmines yellow pole 3x10  SL R shoulder ER, abd, flex and hab 2x10  1#-np  IR towel stretch 10 sec x5-np  Sleeper stretch IR and ER 2# x2 min    Poonam received the following manual therapy techniques: Soft tissue Mobilization were applied to the: R shoulder for 10 minutes including:  PROM: FL/abd, ER in scap plane as cortney    CP x 10 min to R shoulder    Written Home Exercises Provided: updated to include IR and ER stretching  Pt demo good understanding of the education provided. Poonam demonstrated good return demonstration of activities.      Education provided re:  Poonam verbalized good understanding of education provided.   No spiritual or educational barriers to learning provided    Assessment   Patient showed appropriate fatigue with upper trap rotations this visit, which caused her to show increased muscle guarding with manual therapy. Patient demonstrates a good understanding of HEP to reach functional goals.       This is a 68 y.o. female referred to outpatient physical therapy and presents with a medical diagnosis of R shoulder pain and demonstrates limitations as described in the problem list. Pt prognosis is Good. Pt will continue to benefit from skilled outpatient physical therapy to address the deficits listed in the problem list, provide pt/family education and to maximize pt's level of independence in the home and community environment.     Goals as follows:  Short Term Goals: (4 weeks)   - Pt will increase ROM to 100 deg flex and abd passively R shoulder ( met)  - Decrease Pain to 1/10 as worst with 1 hour of PT exercises ( met)  - Pt to self correct posture with minimal cues to avoid upper trap compensation (met)  - Pt independent with HEP with progressions.  (met)     Long Term Goals (24 Weeks):  - Pt will increase ROM to WNL RUE for functional ADLs and dressing (Progressing, not met)  - Pt will increase strength to 5/5 RUE to return to lifting and carrying >10 # (Progressing, not met)  - Decrease Pain to 0/10 with 1 full day of normal activities (Progressing, not met)  - Pt to return to 80% PLOF (Progressing, not met)       Plan     Continue with established Plan of Care towards PT goals progressing with AROM of R shoulder and strengthening.      Therapist: Tirea Carreon, PT  8/8/2019

## 2019-08-09 ENCOUNTER — OFFICE VISIT (OUTPATIENT)
Dept: OPTOMETRY | Facility: CLINIC | Age: 69
End: 2019-08-09
Payer: MEDICARE

## 2019-08-09 DIAGNOSIS — H52.7 REFRACTIVE ERRORS: ICD-10-CM

## 2019-08-09 DIAGNOSIS — H26.493 POSTERIOR CAPSULAR OPACIFICATION NON VISUALLY SIGNIFICANT OF BOTH EYES: Primary | ICD-10-CM

## 2019-08-09 DIAGNOSIS — Z98.41 S/P BILATERAL CATARACT EXTRACTION: ICD-10-CM

## 2019-08-09 DIAGNOSIS — Z98.42 S/P BILATERAL CATARACT EXTRACTION: ICD-10-CM

## 2019-08-09 DIAGNOSIS — Z13.5 SCREENING FOR EYE CONDITION: ICD-10-CM

## 2019-08-09 PROCEDURE — 99213 OFFICE O/P EST LOW 20 MIN: CPT | Mod: PBBFAC | Performed by: OPTOMETRIST

## 2019-08-09 PROCEDURE — 92014 COMPRE OPH EXAM EST PT 1/>: CPT | Mod: S$PBB,,, | Performed by: OPTOMETRIST

## 2019-08-09 PROCEDURE — 92015 DETERMINE REFRACTIVE STATE: CPT | Mod: ,,, | Performed by: OPTOMETRIST

## 2019-08-09 PROCEDURE — 92015 PR REFRACTION: ICD-10-PCS | Mod: ,,, | Performed by: OPTOMETRIST

## 2019-08-09 PROCEDURE — 99999 PR PBB SHADOW E&M-EST. PATIENT-LVL III: CPT | Mod: PBBFAC,,, | Performed by: OPTOMETRIST

## 2019-08-09 PROCEDURE — 92014 PR EYE EXAM, EST PATIENT,COMPREHESV: ICD-10-PCS | Mod: S$PBB,,, | Performed by: OPTOMETRIST

## 2019-08-09 PROCEDURE — 99999 PR PBB SHADOW E&M-EST. PATIENT-LVL III: ICD-10-PCS | Mod: PBBFAC,,, | Performed by: OPTOMETRIST

## 2019-08-09 NOTE — PATIENT INSTRUCTIONS
S/p cataract surgery in each eye. Bilateral posterior chamber intraocular lens.  Mild clouding of intact posterior lens capsule in each eye.   Still no need for YAG laser posterior capsulotomy in either eye. Monitor.  Otherwise, good ocular health in each eye.  Slight residual refractive error in each eye, with good correctable visual acuity in each eye.  Stable refractive error in each eye.  New spectacle lens Rx issued for use as desired.   Okay to use OTC reading glasses if happy with near VA with those glasses and if happy with unaided distance VA.    Recheck in one year, or prior if any problems or bothersome decrease in VA in the interim.

## 2019-08-09 NOTE — PROGRESS NOTES
HPI     Concerns About Ocular Health      Additional comments: General eye examination and refraction.  Pt feels near vision has changed.   Wearing OTC reading glasses only.    Pt complains of night driving              Comments     Patient's age: 68 y.o. AA female   Occupation: Retired   Approximate date of last eye examination: 12/05/2016  Name of last eye doctor seen: Dr Daniels   [place]   Wears glasses? Yes, OTC +1.25     If yes, wears  Full-time or part-time?  Part-time   Present glasses are: Bifocal, SV Distance, SV Reading?  SV reading lenses   Approximate age of present glasses: no   Got new glasses following last exam, or subsequently?:  No    Any problem with VA with glasses?  No  Wears CLs?:  No   Headaches?  No   Eye pain/discomfort?  No                                                                                     Flashes?  No   Floaters?  No   Diplopia/Double vision?  No   Patient's Ocular History:          Any eye surgeries? Cataract SX OU          Any eye injury?  No          Any treatment for eye disease?  No   Family history of eye disease?     Parents + Cataracts   Significant patient medical history:         1. Diabetes?  No           2. HBP?  No               3. Other (describe):      ! OTC eyedrops currently using:  No   ! Prescription eye meds currently using:  No    ! Any history of allergy/adverse reaction to any eye meds used   previously?  No    ! Any history of allergy/adverse reaction to eyedrops used during prior   eye exam(s)? No    ! Any history of allergy/adverse reaction to Novacaine or similar meds?   No    ! Any history of allergy/adverse reaction to Epinephrine or similar meds?   No    ! Patient okay with use of anesthetic eyedrops to check eye pressure?    Yes        ! Patient okay with use of eyedrops to dilate pupils today?  Yes    !  Allergies/Medications/Medical History/Family History reviewed today?    Yes       PD =  73/69  Desired reading distance =  " 21.5"                                                                          Last edited by Dionte Daniels, OD on 8/9/2019 10:21 AM. (History)            Assessment /Plan     For exam results, see Encounter Report.    1. Posterior capsular opacification non visually significant of both eyes     2. S/P bilateral cataract extraction     3. Refractive errors     4. Screening for eye condition                    S/p cataract surgery in each eye. Bilateral posterior chamber intraocular lens.  Mild clouding of intact posterior lens capsule in each eye.   Still no need for YAG laser posterior capsulotomy in either eye. Monitor.  Otherwise, good ocular health in each eye.  Slight residual refractive error in each eye, with good correctable visual acuity in each eye.  Stable refractive error in each eye.  New spectacle lens Rx issued for use as desired.   Okay to use OTC reading glasses if happy with near VA with those glasses and if happy with unaided distance VA.    Recheck in one year, or prior if any problems or bothersome decrease in VA in the interim.                   "

## 2019-08-13 ENCOUNTER — CLINICAL SUPPORT (OUTPATIENT)
Dept: REHABILITATION | Facility: HOSPITAL | Age: 69
End: 2019-08-13
Attending: ORTHOPAEDIC SURGERY
Payer: MEDICARE

## 2019-08-13 ENCOUNTER — OFFICE VISIT (OUTPATIENT)
Dept: PODIATRY | Facility: CLINIC | Age: 69
End: 2019-08-13
Payer: MEDICARE

## 2019-08-13 VITALS
SYSTOLIC BLOOD PRESSURE: 119 MMHG | HEART RATE: 68 BPM | BODY MASS INDEX: 28.99 KG/M2 | WEIGHT: 214 LBS | DIASTOLIC BLOOD PRESSURE: 85 MMHG | HEIGHT: 72 IN

## 2019-08-13 DIAGNOSIS — G89.29 CHRONIC RIGHT SHOULDER PAIN: ICD-10-CM

## 2019-08-13 DIAGNOSIS — B35.3 TINEA PEDIS OF BOTH FEET: Primary | ICD-10-CM

## 2019-08-13 DIAGNOSIS — M25.511 CHRONIC RIGHT SHOULDER PAIN: ICD-10-CM

## 2019-08-13 PROCEDURE — 99999 PR PBB SHADOW E&M-EST. PATIENT-LVL IV: ICD-10-PCS | Mod: PBBFAC,,, | Performed by: PODIATRIST

## 2019-08-13 PROCEDURE — 99213 OFFICE O/P EST LOW 20 MIN: CPT | Mod: S$PBB,,, | Performed by: PODIATRIST

## 2019-08-13 PROCEDURE — 99213 PR OFFICE/OUTPT VISIT, EST, LEVL III, 20-29 MIN: ICD-10-PCS | Mod: S$PBB,,, | Performed by: PODIATRIST

## 2019-08-13 PROCEDURE — 99214 OFFICE O/P EST MOD 30 MIN: CPT | Mod: PBBFAC | Performed by: PODIATRIST

## 2019-08-13 PROCEDURE — 97110 THERAPEUTIC EXERCISES: CPT

## 2019-08-13 PROCEDURE — 99999 PR PBB SHADOW E&M-EST. PATIENT-LVL IV: CPT | Mod: PBBFAC,,, | Performed by: PODIATRIST

## 2019-08-13 RX ORDER — CLOTRIMAZOLE 1 %
CREAM (GRAM) TOPICAL 2 TIMES DAILY
Qty: 1 TUBE | Refills: 3 | Status: SHIPPED | OUTPATIENT
Start: 2019-08-13 | End: 2019-12-23

## 2019-08-13 NOTE — PROGRESS NOTES
Physical Therapy Daily Note     Name: Poonam Alvarez  Clinic Number: [id number]  Diagnosis:   Encounter Diagnosis   Name Primary?    Chronic right shoulder pain      Physician: Sage Xie MD   Physician Orders: PT Eval and Treat   Medical Diagnosis: M19.011 (ICD-10-CM) - DJD of right shoulder  Date of Surgery: 4/1/19  Evaluation Date: 4/18/2019  Authorization Period Expiration: 3/14/20  Plan of Care Certification Period: 10/3/19    Visit #: 6/10  Time In: 500 pm  Time Out: 605  pm  Total Treatment Time: 55 min    Precautions: TSA protocol surgery: 4/1/19    Subjective     Pt reports:she feels good and continues to note improvements functionally and states she is able to raise her hands in Buddhism now.  Pain Scale: Poonam rates pain at R shoulder on a scale of 0-10 to be 0/10 currently.     Objective   Measurements this visit: (8/13/19)  R shoulder PROM:  Flex: 170 deg  Abd: 160 deg  IR: 62 deg  ER: 85 deg    Poonam received individual therapeutic exercises to develop strength, endurance, ROM and posture for 50 minutes including:    UBE x5' forward/back ea  Pulleys 5'/5' scap and flex   Ball on wall roll up 2x10 with 5 sec hol-np  Shoulder flexion on mirror with towel 2x10 with 3 sec hold-np  Step outs btb 2x10 with 5 sec hold IR and ER-np  Rows GTB 2x15 with 5 sec hold --np  Pec stretch in doorway 3x 1 min hold   Supine ER stretch x2 min-np  Standing flex, scap and abd 2# 3x10  landmines yellow pole 3x10  SL R shoulder ER, abd, flex and hab 2x10  1#-np  IR towel stretch 10 sec x5-np  Sleeper stretch IR and ER 2# x2 min-np    Poonam received the following manual therapy techniques: Soft tissue Mobilization were applied to the: R shoulder for 5 minutes including:  PROM: FL/abd, ER in scap plane as cortney    CP x 10 min to R shoulder    Written Home Exercises Provided: updated to include IR and ER stretching  Pt demo good understanding of the education  provided. Poonam demonstrated good return demonstration of activities.     Education provided re:  Poonam verbalized good understanding of education provided.   No spiritual or educational barriers to learning provided    Assessment   Patient demonstrated improved ER ROM prior to exercise this session. Patient demonstrates a good understanding of HEP to reach functional goals.       This is a 68 y.o. female referred to outpatient physical therapy and presents with a medical diagnosis of R shoulder pain and demonstrates limitations as described in the problem list. Pt prognosis is Good. Pt will continue to benefit from skilled outpatient physical therapy to address the deficits listed in the problem list, provide pt/family education and to maximize pt's level of independence in the home and community environment.     Goals as follows:  Short Term Goals: (4 weeks)   - Pt will increase ROM to 100 deg flex and abd passively R shoulder ( met)  - Decrease Pain to 1/10 as worst with 1 hour of PT exercises ( met)  - Pt to self correct posture with minimal cues to avoid upper trap compensation (met)  - Pt independent with HEP with progressions.  (met)     Long Term Goals (24 Weeks):  - Pt will increase ROM to WNL RUE for functional ADLs and dressing (Progressing, not met)  - Pt will increase strength to 5/5 RUE to return to lifting and carrying >10 # (Progressing, not met)  - Decrease Pain to 0/10 with 1 full day of normal activities (Progressing, not met)  - Pt to return to 80% PLOF (Progressing, not met)       Plan     Continue with established Plan of Care towards PT goals progressing with AROM of R shoulder and strengthening.      Therapist: Tiera Carreon, PT  8/13/2019

## 2019-08-15 ENCOUNTER — CLINICAL SUPPORT (OUTPATIENT)
Dept: REHABILITATION | Facility: HOSPITAL | Age: 69
End: 2019-08-15
Attending: ORTHOPAEDIC SURGERY
Payer: MEDICARE

## 2019-08-15 DIAGNOSIS — M25.511 CHRONIC RIGHT SHOULDER PAIN: ICD-10-CM

## 2019-08-15 DIAGNOSIS — G89.29 CHRONIC RIGHT SHOULDER PAIN: ICD-10-CM

## 2019-08-15 PROCEDURE — 97110 THERAPEUTIC EXERCISES: CPT

## 2019-08-15 NOTE — PROGRESS NOTES
Physical Therapy Daily Note     Name: Poonam Alvarez  [facility] Number: [id number]  Diagnosis:   Encounter Diagnosis   Name Primary?    Chronic right shoulder pain      Physician: Sage Xie MD   Physician Orders: PT Eval and Treat   Medical Diagnosis: M19.011 (ICD-10-CM) - DJD of right shoulder  Date of Surgery: 4/1/19  Evaluation Date: 4/18/2019  Authorization Period Expiration: 3/14/20  Plan of Care Certification Period: 10/3/19    Visit #: 7/10  Time In: 525 pm  Time Out: 545 pm  Total Treatment Time: 20 min    Precautions: TSA protocol surgery: 4/1/19    Subjective     Pt reports:she feels good but is running late from work.  Pain Scale: Poonam rates pain at R shoulder on a scale of 0-10 to be 0/10 currently.     Objective   Measurements this visit: (8/13/19)  R shoulder PROM:  Flex: 170 deg  Abd: 160 deg  IR: 62 deg  ER: 85 deg    Poonam received individual therapeutic exercises to develop strength, endurance, ROM and posture for 20 minutes including:    UBE x5' forward/back ea  Pulleys 5'/5' scap and flex -np  Ball on wall roll up 2x10 with 5 sec hol-np  Shoulder flexion on mirror with towel 2x10 with 3 sec hold-np  Step outs btb 2x10 with 5 sec hold IR and ER-np  Rows GTB 2x15 with 5 sec hold --np  Pec stretch in doorway 3x 1 min hold   Supine ER stretch x2 min-np  Standing flex, scap and abd 2# 3x10-np  landmines yellow pole 3x10-np  SL R shoulder ER, abd, flex and hab 2x10  1#-np  IR towel stretch 10 sec x5-np  Sleeper stretch IR and ER 2# x2 min-np    Poonam received the following manual therapy techniques: Soft tissue Mobilization were applied to the: R shoulder for 0 minutes including:  PROM: FL/abd, ER in scap plane as cortney    CP x 10 min to R shoulder    Written Home Exercises Provided: updated to include IR and ER stretching  Pt demo good understanding of the education provided. Poonam demonstrated good return demonstration of activities.      Education provided re:  Poonam verbalized good understanding of education provided.   No spiritual or educational barriers to learning provided    Assessment   Patient treatment limited today secondary to running late due to work. Patient demonstrates a good understanding of HEP to reach functional goals.       This is a 68 y.o. female referred to outpatient physical therapy and presents with a medical diagnosis of R shoulder pain and demonstrates limitations as described in the problem list. Pt prognosis is Good. Pt will continue to benefit from skilled outpatient physical therapy to address the deficits listed in the problem list, provide pt/family education and to maximize pt's level of independence in the home and community environment.     Goals as follows:  Short Term Goals: (4 weeks)   - Pt will increase ROM to 100 deg flex and abd passively R shoulder ( met)  - Decrease Pain to 1/10 as worst with 1 hour of PT exercises ( met)  - Pt to self correct posture with minimal cues to avoid upper trap compensation (met)  - Pt independent with HEP with progressions.  (met)     Long Term Goals (24 Weeks):  - Pt will increase ROM to WNL RUE for functional ADLs and dressing (Progressing, not met)  - Pt will increase strength to 5/5 RUE to return to lifting and carrying >10 # (Progressing, not met)  - Decrease Pain to 0/10 with 1 full day of normal activities (Progressing, not met)  - Pt to return to 80% PLOF (Progressing, not met)       Plan     Continue with established Plan of Care towards PT goals progressing with AROM of R shoulder and strengthening.      Therapist: Tiera Carreon, PT  8/15/2019

## 2019-08-20 ENCOUNTER — CLINICAL SUPPORT (OUTPATIENT)
Dept: REHABILITATION | Facility: HOSPITAL | Age: 69
End: 2019-08-20
Attending: ORTHOPAEDIC SURGERY
Payer: MEDICARE

## 2019-08-20 DIAGNOSIS — G89.29 CHRONIC RIGHT SHOULDER PAIN: ICD-10-CM

## 2019-08-20 DIAGNOSIS — M25.511 CHRONIC RIGHT SHOULDER PAIN: ICD-10-CM

## 2019-08-20 PROCEDURE — 97140 MANUAL THERAPY 1/> REGIONS: CPT

## 2019-08-20 PROCEDURE — 97110 THERAPEUTIC EXERCISES: CPT

## 2019-08-20 NOTE — PROGRESS NOTES
Physical Therapy Daily Note     Name: Poonam Alvarez  [facility] Number: [id number]  Diagnosis:   Encounter Diagnosis   Name Primary?    Chronic right shoulder pain      Physician: Sage Xie MD   Physician Orders: PT Eval and Treat   Medical Diagnosis: M19.011 (ICD-10-CM) - DJD of right shoulder  Date of Surgery: 4/1/19  Evaluation Date: 4/18/2019  Authorization Period Expiration: 3/14/20  Plan of Care Certification Period: 10/3/19    Visit #: 8/10  Time In: 1:00 pm  Time Out: 210 pm  Total Treatment Time: 58 min    Precautions: TSA protocol surgery: 4/1/19    Subjective     Pt reports:she feels good but is running late from work.  Pain Scale: Poonam rates pain at R shoulder on a scale of 0-10 to be 0/10 currently.     Objective   Measurements this visit: (8/13/19)  R shoulder PROM:  Flex: 170 deg  Abd: 160 deg  IR: 62 deg  ER: 85 deg    Poonam received individual therapeutic exercises to develop strength, endurance, ROM and posture for 40 minutes including:    UBE x5' forward/back ea  Pulleys 5'/5' scap and flex -np  Ball on wall roll up 2x10 with 5 sec hold  Shoulder flexion on mirror with towel 2x10 with 3 sec hold-np  Step outs btb 2x10 with 5 sec hold IR and ER  Rows GTB 2x15 with 5 sec hold -np  Pec stretch in doorway 3x 1 min hold   Supine ER stretch x2 min-np  Standing flex, scap and abd 2# 3x10  landmines yellow pole 3x10  SL R shoulder ER, abd, flex and hab 2x10  1#-np  IR towel stretch 10 sec x5-np  Sleeper stretch IR and ER 2# x2 min    Poonam received the following manual therapy techniques: Soft tissue Mobilization were applied to the: R shoulder for 8 minutes including:  PROM: FL/abd, ER in scap plane as cortney    CP x 10 min to R shoulder    Written Home Exercises Provided: updated to include IR and ER stretching  Pt demo good understanding of the education provided. Poonam demonstrated good return demonstration of activities.     Education  provided re:  Poonam verbalized good understanding of education provided.   No spiritual or educational barriers to learning provided    Assessment   Pt cont to improve w/ R shoulder ROM and strength.       This is a 68 y.o. female referred to outpatient physical therapy and presents with a medical diagnosis of R shoulder pain and demonstrates limitations as described in the problem list. Pt prognosis is Good. Pt will continue to benefit from skilled outpatient physical therapy to address the deficits listed in the problem list, provide pt/family education and to maximize pt's level of independence in the home and community environment.     Goals as follows:  Short Term Goals: (4 weeks)   - Pt will increase ROM to 100 deg flex and abd passively R shoulder ( met)  - Decrease Pain to 1/10 as worst with 1 hour of PT exercises ( met)  - Pt to self correct posture with minimal cues to avoid upper trap compensation (met)  - Pt independent with HEP with progressions.  (met)     Long Term Goals (24 Weeks):  - Pt will increase ROM to WNL RUE for functional ADLs and dressing (Progressing, not met)  - Pt will increase strength to 5/5 RUE to return to lifting and carrying >10 # (Progressing, not met)  - Decrease Pain to 0/10 with 1 full day of normal activities (Progressing, not met)  - Pt to return to 80% PLOF (Progressing, not met)       Plan     Continue with established Plan of Care towards PT goals progressing with AROM of R shoulder and strengthening.      Therapist: Ying Phillips, PTA  8/20/2019

## 2019-08-20 NOTE — PROGRESS NOTES
"Subjective:      Patient ID: Poonam Alvarez is a 68 y.o. female.    Chief Complaint: Foot Problem    Pt presents today c/o itching, rash, and some blisters on both feet. Pt has been using a "cream" and nothing has helped.     Review of Systems   Constitution: Negative for chills, fever and malaise/fatigue.   HENT: Negative for hearing loss.    Cardiovascular: Negative for claudication.   Respiratory: Negative for shortness of breath.    Skin: Positive for dry skin, itching and rash. Negative for flushing.   Musculoskeletal: Negative for joint pain and myalgias.   Neurological: Negative for loss of balance, numbness, paresthesias and sensory change.   Psychiatric/Behavioral: Negative for altered mental status.           Objective:      Physical Exam   Cardiovascular:   Pulses:       Dorsalis pedis pulses are 2+ on the right side, and 2+ on the left side.        Posterior tibial pulses are 2+ on the right side, and 2+ on the left side.   No edema noted to b/L LEs   Musculoskeletal:   Adequate joint ROM noted to all lower extremity muscle groups with no pain or crepitation noted. Muscle strength is 5/5 in all groups bilaterally.     Feet:   Right Foot:   Protective Sensation: 5 sites tested. 5 sites sensed.   Left Foot:   Protective Sensation: 5 sites tested. 5 sites sensed.   Neurological:   Intact gross sensation noted to b/L LEs   Skin:   Scaling dryness in a moccasin distribution is noted to the bilateral lower extremities with associated erythema.                 Assessment:       Encounter Diagnosis   Name Primary?    Tinea pedis of both feet Yes         Plan:       Poonam was seen today for foot problem.    Diagnoses and all orders for this visit:    Tinea pedis of both feet    Other orders  -     clotrimazole (LOTRIMIN) 1 % cream; Apply topically 2 (two) times daily.      I counseled the patient on her conditions, their implications and medical management.      Pt advised that she has tinea on both of her " feet.   Rx lotrimin  Pt advised to keep feet clean and dry.   Call or return to clinic prn if these symptoms worsen or fail to improve as anticipated.    .

## 2019-08-22 ENCOUNTER — CLINICAL SUPPORT (OUTPATIENT)
Dept: REHABILITATION | Facility: HOSPITAL | Age: 69
End: 2019-08-22
Attending: ORTHOPAEDIC SURGERY
Payer: MEDICARE

## 2019-08-22 DIAGNOSIS — G89.29 CHRONIC RIGHT SHOULDER PAIN: ICD-10-CM

## 2019-08-22 DIAGNOSIS — M25.511 CHRONIC RIGHT SHOULDER PAIN: ICD-10-CM

## 2019-08-22 PROCEDURE — 97110 THERAPEUTIC EXERCISES: CPT

## 2019-08-22 NOTE — PROGRESS NOTES
Physical Therapy Daily Note     Name: Poonam Alvarez  [facility] Number: [id number]  Diagnosis:   Encounter Diagnosis   Name Primary?    Chronic right shoulder pain      Physician: Sage Xie MD   Physician Orders: PT Eval and Treat   Medical Diagnosis: M19.011 (ICD-10-CM) - DJD of right shoulder  Date of Surgery: 4/1/19  Evaluation Date: 4/18/2019  Authorization Period Expiration: 3/14/20  Plan of Care Certification Period: 10/3/19    Visit #: 9/10  Time In: 5:10 pm  Time Out: 605 pm  Total Treatment Time: 45 min    Precautions: TSA protocol surgery: 4/1/19    Subjective     Pt reports:she feels good today and feels stretched out.  Pain Scale: Poonam rates pain at R shoulder on a scale of 0-10 to be 0/10 currently.     Objective   Measurements this visit: (8/13/19)  R shoulder PROM:  Flex: 170 deg  Abd: 160 deg  IR: 62 deg  ER: 85 deg    Poonam received individual therapeutic exercises to develop strength, endurance, ROM and posture for 45 minutes including:    UBE x5' forward/back ea  Pulleys 5'/5' scap and flex   Ball on wall roll up 2x10 with 5 sec hold-np  Shoulder flexion on mirror with towel 2x10 with 3 sec hold-np  Step outs mtb 2x10 with 5 sec hold IR and ER  Rows GTB 2x15 with 5 sec hold -np  Pec stretch in doorway 3x 1 min hold   Supine ER stretch x2 min-np  Standing flex, scap and abd 2# 3x10  landmines yellow pole 3x10 -np  Arms overhead x3 laps walking  Open books 2# x10 with 5 sec hold in RSL  SL R shoulder ER, abd, flex and hab 2x10  1#-np  IR towel stretch 10 sec x5-np  Sleeper stretch IR and ER 2# x2 min    Poonam received the following manual therapy techniques: Soft tissue Mobilization were applied to the: R shoulder for 0 minutes including:  PROM: FL/abd, ER in scap plane as cortney    CP x 10 min to R shoulder    Written Home Exercises Provided: updated to include IR and ER stretching  Pt demo good understanding of the education provided.  Poonam demonstrated good return demonstration of activities.     Education provided re:  Poonam verbalized good understanding of education provided.   No spiritual or educational barriers to learning provided    Assessment   Patient showed difficulty with overhead movements and required assistance to keep arm elevated. Patient continues to progress each session toward functional goals.       This is a 68 y.o. female referred to outpatient physical therapy and presents with a medical diagnosis of R shoulder pain and demonstrates limitations as described in the problem list. Pt prognosis is Good. Pt will continue to benefit from skilled outpatient physical therapy to address the deficits listed in the problem list, provide pt/family education and to maximize pt's level of independence in the home and community environment.     Goals as follows:  Short Term Goals: (4 weeks)   - Pt will increase ROM to 100 deg flex and abd passively R shoulder ( met)  - Decrease Pain to 1/10 as worst with 1 hour of PT exercises ( met)  - Pt to self correct posture with minimal cues to avoid upper trap compensation (met)  - Pt independent with HEP with progressions.  (met)     Long Term Goals (24 Weeks):  - Pt will increase ROM to WNL RUE for functional ADLs and dressing (Progressing, not met)  - Pt will increase strength to 5/5 RUE to return to lifting and carrying >10 # (Progressing, not met)  - Decrease Pain to 0/10 with 1 full day of normal activities (Progressing, not met)  - Pt to return to 80% PLOF (Progressing, not met)       Plan     Continue with established Plan of Care towards PT goals progressing with AROM of R shoulder and strengthening.      Therapist: Tiera Carreon, PT  8/22/2019

## 2019-08-27 ENCOUNTER — CLINICAL SUPPORT (OUTPATIENT)
Dept: REHABILITATION | Facility: HOSPITAL | Age: 69
End: 2019-08-27
Attending: ORTHOPAEDIC SURGERY
Payer: MEDICARE

## 2019-08-27 DIAGNOSIS — G89.29 CHRONIC RIGHT SHOULDER PAIN: ICD-10-CM

## 2019-08-27 DIAGNOSIS — M25.511 CHRONIC RIGHT SHOULDER PAIN: ICD-10-CM

## 2019-08-27 PROCEDURE — 97110 THERAPEUTIC EXERCISES: CPT

## 2019-08-27 NOTE — PROGRESS NOTES
Physical Therapy Daily Note     Name: Poonam Alvarez  [facility] Number: [id number]  Diagnosis:   Encounter Diagnosis   Name Primary?    Chronic right shoulder pain      Physician: Sage Xie MD   Physician Orders: PT Eval and Treat   Medical Diagnosis: M19.011 (ICD-10-CM) - DJD of right shoulder  Date of Surgery: 4/1/19  Evaluation Date: 4/18/2019  Authorization Period Expiration: 3/14/20  Plan of Care Certification Period: 10/3/19    Visit #: 10/10  Time In: 1100 am  Time Out: 1155 am  Total Treatment Time: 30 min    Precautions: TSA protocol surgery: 4/1/19    Subjective     Pt reports:she is having to change her work schedule which may effect her therapy times.  Pain Scale: Poonam rates pain at R shoulder on a scale of 0-10 to be 0/10 currently.     Objective   Measurements this visit: (8/27/19)  R shoulder PROM:  Flex: 170 deg  Abd: 160 deg  IR: 62 deg  ER: 85 deg    Poonam received individual therapeutic exercises to develop strength, endurance, ROM and posture for 25 minutes including:    UBE x5' forward/back ea  Pulleys 5'/5' scap and flex   Ball on wall roll up 2x10 with 5 sec hold-np  Shoulder flexion on mirror with towel 2x10 with 3 sec hold-np  Step outs mtb 2x10 with 5 sec hold IR and ER  Rows GTB 2x15 with 5 sec hold -np  Pec stretch in doorway 3x 1 min hold -np  Supine ER stretch x2 min-np  Standing flex, scap and abd 2# 3x10-np  landmines yellow pole 3x10 -np  Arms overhead x3 laps walking-np  Open books 2# x10 with 5 sec hold in RSL-np  SL R shoulder ER, abd, flex and hab 2x10  1#-np  IR towel stretch 10 sec x5-np  Sleeper stretch IR and ER 2# x2 min-np    Poonam received the following manual therapy techniques: Soft tissue Mobilization were applied to the: R shoulder for 20 minutes including:  PROM: FL/abd, ER in scap plane as cortney    CP x 10 min to R shoulder    Written Home Exercises Provided: updated to include IR and ER stretching  Pt  river good understanding of the education provided. Poonam demonstrated good return demonstration of activities.     Education provided re:  Poonam verbalized good understanding of education provided.   No spiritual or educational barriers to learning provided    Assessment   Patient showed increased scapular posterior tilting with shoulder flexion and anterior tilting with IR limiting progressing of ROM. Patient continues to progress each session toward functional goals.       This is a 68 y.o. female referred to outpatient physical therapy and presents with a medical diagnosis of R shoulder pain and demonstrates limitations as described in the problem list. Pt prognosis is Good. Pt will continue to benefit from skilled outpatient physical therapy to address the deficits listed in the problem list, provide pt/family education and to maximize pt's level of independence in the home and community environment.     Goals as follows:  Short Term Goals: (4 weeks)   - Pt will increase ROM to 100 deg flex and abd passively R shoulder ( met)  - Decrease Pain to 1/10 as worst with 1 hour of PT exercises ( met)  - Pt to self correct posture with minimal cues to avoid upper trap compensation (met)  - Pt independent with HEP with progressions.  (met)     Long Term Goals (24 Weeks):  - Pt will increase ROM to WNL RUE for functional ADLs and dressing (Progressing, not met)  - Pt will increase strength to 5/5 RUE to return to lifting and carrying >10 # (Progressing, not met)  - Decrease Pain to 0/10 with 1 full day of normal activities (Progressing, not met)  - Pt to return to 80% PLOF (Progressing, not met)       Plan     Continue with established Plan of Care towards PT goals progressing with AROM of R shoulder and strengthening.      Therapist: Tiera Carreon, PT  8/27/2019

## 2019-09-05 ENCOUNTER — CLINICAL SUPPORT (OUTPATIENT)
Dept: REHABILITATION | Facility: HOSPITAL | Age: 69
End: 2019-09-05
Attending: ORTHOPAEDIC SURGERY
Payer: MEDICARE

## 2019-09-05 DIAGNOSIS — G89.29 CHRONIC RIGHT SHOULDER PAIN: ICD-10-CM

## 2019-09-05 DIAGNOSIS — M25.511 CHRONIC RIGHT SHOULDER PAIN: ICD-10-CM

## 2019-09-05 PROCEDURE — 97110 THERAPEUTIC EXERCISES: CPT

## 2019-09-05 PROCEDURE — 97140 MANUAL THERAPY 1/> REGIONS: CPT

## 2019-09-05 NOTE — PROGRESS NOTES
Physical Therapy Daily Note     Name: Poonam Alvarez  [facility] Number: [id number]  Diagnosis:   Encounter Diagnosis   Name Primary?    Chronic right shoulder pain      Physician: Sage Xie MD   Physician Orders: PT Eval and Treat   Medical Diagnosis: M19.011 (ICD-10-CM) - DJD of right shoulder  Date of Surgery: 4/1/19  Evaluation Date: 4/18/2019  Authorization Period Expiration: 3/14/20  Plan of Care Certification Period: 10/3/19    Visit #: 1/10  Time In: 515 pm  Time Out: 600 pm  Total Treatment Time: 35 min    Precautions: TSA protocol surgery: 4/1/19    Subjective     Pt reports:she is working hard at home with her exercises.  Pain Scale: Poonam rates pain at R shoulder on a scale of 0-10 to be 0/10 currently.     Objective   Measurements this visit: (9/5/19)  R shoulder PROM:  Flex: 172 deg  Abd: 180 deg  IR: 65 deg  ER: 85 deg    Poonam received individual therapeutic exercises to develop strength, endurance, ROM and posture for 25 minutes including:    UBE x5' forward/back ea  Pulleys 5'/5' scap and flex   Ball on wall roll up 2x10 with 5 sec hold-np  Shoulder flexion on mirror with towel 2x10 with 3 sec hold-np  Step outs mtb 2x10 with 5 sec hold IR and ER-np  Rows GTB 2x15 with 5 sec hold -np  Pec stretch in doorway 3x 1 min hold -np  Supine ER stretch x2 min-np  Standing flex, scap and abd 2# 3x10-np  landmines yellow pole 3x10 -np  Arms overhead x3 laps walking-np  Open books 2# x10 with 5 sec hold in RSL-np  SL R shoulder ER, abd, flex and hab 2x10  1#-np  IR towel stretch 10 sec x5-np  Sleeper stretch IR and ER 2# x2 min-np    Poonam received the following manual therapy techniques: Soft tissue Mobilization were applied to the: R shoulder for 10 minutes including:  PROM: FL/abd, ER in scap plane as cortney    CP x 10 min to R shoulder    Written Home Exercises Provided: updated to include IR and ER stretching  Pt demo good understanding of the  education provided. Poonam demonstrated good return demonstration of activities.     Education provided re:  Poonam verbalized good understanding of education provided.   No spiritual or educational barriers to learning provided    Assessment   Patient demonstrated improved PROM this session and now is only limited with IR.  Patient continues to progress each session toward functional goals.       This is a 68 y.o. female referred to outpatient physical therapy and presents with a medical diagnosis of R shoulder pain and demonstrates limitations as described in the problem list. Pt prognosis is Good. Pt will continue to benefit from skilled outpatient physical therapy to address the deficits listed in the problem list, provide pt/family education and to maximize pt's level of independence in the home and community environment.     Goals as follows:  Short Term Goals: (4 weeks)   - Pt will increase ROM to 100 deg flex and abd passively R shoulder ( met)  - Decrease Pain to 1/10 as worst with 1 hour of PT exercises ( met)  - Pt to self correct posture with minimal cues to avoid upper trap compensation (met)  - Pt independent with HEP with progressions.  (met)     Long Term Goals (24 Weeks):  - Pt will increase ROM to WNL RUE for functional ADLs and dressing (Progressing, not met)  - Pt will increase strength to 5/5 RUE to return to lifting and carrying >10 # (Progressing, not met)  - Decrease Pain to 0/10 with 1 full day of normal activities (Progressing, not met)  - Pt to return to 80% PLOF (Progressing, not met)       Plan     Continue with established Plan of Care towards PT goals progressing with AROM of R shoulder and strengthening.      Therapist: Tiera Carreon, PT  9/5/2019

## 2019-09-09 ENCOUNTER — TELEPHONE (OUTPATIENT)
Dept: ORTHOPEDICS | Facility: CLINIC | Age: 69
End: 2019-09-09

## 2019-09-09 ENCOUNTER — CLINICAL SUPPORT (OUTPATIENT)
Dept: REHABILITATION | Facility: HOSPITAL | Age: 69
End: 2019-09-09
Attending: ORTHOPAEDIC SURGERY
Payer: MEDICARE

## 2019-09-09 DIAGNOSIS — M25.511 CHRONIC RIGHT SHOULDER PAIN: ICD-10-CM

## 2019-09-09 DIAGNOSIS — G89.29 CHRONIC RIGHT SHOULDER PAIN: ICD-10-CM

## 2019-09-09 PROCEDURE — 97110 THERAPEUTIC EXERCISES: CPT

## 2019-09-09 NOTE — TELEPHONE ENCOUNTER
Spoke to pt and scheduled an appt for late afternoon at 4:15pm for an injection.----- Message from Srinivasa Holloway sent at 9/9/2019  3:53 PM CDT -----  Contact: Self/ [phone]  Patient would like a call back to speak with someone in office to see about coming in sooner for her right knee. I did let the patient know they next appt that open will be on 9/24/19 or 9/27/19.

## 2019-09-09 NOTE — PROGRESS NOTES
"                                                    Physical Therapy Daily Note     Name: Poonam Alvarez  Clinic Number: [id number]  Diagnosis:   Encounter Diagnosis   Name Primary?    Chronic right shoulder pain      Physician: Sage Xie MD   Physician Orders: PT Eval and Treat   Medical Diagnosis: M19.011 (ICD-10-CM) - DJD of right shoulder  Date of Surgery: 4/1/19  Evaluation Date: 4/18/2019  Authorization Period Expiration: 3/14/20  Plan of Care Certification Period: 10/3/19    Visit #: 2/10  Time In: 400 pm  Time Out: 5:10 pm  Total Treatment Time: 55 min    Precautions: TSA protocol surgery: 4/1/19    Subjective     Pt reports: she is working hard at home with her exercises.  Pain Scale: Poonam rates pain at R shoulder on a scale of 0-10 to be 0/10 currently.     Objective   Measurements this visit: (9/5/19)  R shoulder PROM:  Flex: 172 deg  Abd: 180 deg  IR: 65 deg  ER: 85 deg    Poonam received individual therapeutic exercises to develop strength, endurance, ROM and posture for 50 minutes including:    UBE x5' forward/back ea  Pulleys 5'/5' scap and flex   Stars on wall YTB 2x5  No moneys OTB 2x10  Wall slides w/ #1: 5" hold  Standing flex, scap and abd 2# 2x10  IR towel stretch 10 sec x5- NP  Sleeper stretch IR and ER 2# x2 min      Poonam received the following manual therapy techniques: Soft tissue Mobilization were applied to the: R shoulder for 5 minutes including:  PROM: FL/abd, ER in scap plane as cortney    CP x 10 min to R shoulder    Written Home Exercises Provided: updated to include IR and ER stretching  Pt demo good understanding of the education provided. Poonam demonstrated good return demonstration of activities.     Education provided re:  Poonam verbalized good understanding of education provided.   No spiritual or educational barriers to learning provided    Assessment   Pt still presents w/ decreased shoulder strength. ROM is slowly progressing. Pain at end range of PROM.    "   This is a 68 y.o. female referred to outpatient physical therapy and presents with a medical diagnosis of R shoulder pain and demonstrates limitations as described in the problem list. Pt prognosis is Good. Pt will continue to benefit from skilled outpatient physical therapy to address the deficits listed in the problem list, provide pt/family education and to maximize pt's level of independence in the home and community environment.     Goals as follows:  Short Term Goals: (4 weeks)   - Pt will increase ROM to 100 deg flex and abd passively R shoulder ( met)  - Decrease Pain to 1/10 as worst with 1 hour of PT exercises ( met)  - Pt to self correct posture with minimal cues to avoid upper trap compensation (met)  - Pt independent with HEP with progressions.  (met)     Long Term Goals (24 Weeks):  - Pt will increase ROM to WNL RUE for functional ADLs and dressing (Progressing, not met)  - Pt will increase strength to 5/5 RUE to return to lifting and carrying >10 # (Progressing, not met)  - Decrease Pain to 0/10 with 1 full day of normal activities (Progressing, not met)  - Pt to return to 80% PLOF (Progressing, not met)       Plan     Continue with established Plan of Care towards PT goals progressing with AROM of R shoulder and strengthening.      Therapist: Ying Phillips, PTA  9/9/2019

## 2019-09-10 ENCOUNTER — OFFICE VISIT (OUTPATIENT)
Dept: ORTHOPEDICS | Facility: CLINIC | Age: 69
End: 2019-09-10
Payer: MEDICARE

## 2019-09-10 VITALS
WEIGHT: 214.06 LBS | DIASTOLIC BLOOD PRESSURE: 72 MMHG | HEIGHT: 72 IN | HEART RATE: 84 BPM | BODY MASS INDEX: 28.99 KG/M2 | SYSTOLIC BLOOD PRESSURE: 117 MMHG

## 2019-09-10 DIAGNOSIS — M17.12 PRIMARY OSTEOARTHRITIS OF LEFT KNEE: Primary | ICD-10-CM

## 2019-09-10 PROCEDURE — 99999 PR PBB SHADOW E&M-EST. PATIENT-LVL III: ICD-10-PCS | Mod: PBBFAC,,, | Performed by: NURSE PRACTITIONER

## 2019-09-10 PROCEDURE — 20610 DRAIN/INJ JOINT/BURSA W/O US: CPT | Mod: S$PBB,LT,, | Performed by: NURSE PRACTITIONER

## 2019-09-10 PROCEDURE — 99999 PR PBB SHADOW E&M-EST. PATIENT-LVL III: CPT | Mod: PBBFAC,,, | Performed by: NURSE PRACTITIONER

## 2019-09-10 PROCEDURE — 20610 DRAIN/INJ JOINT/BURSA W/O US: CPT | Mod: PBBFAC | Performed by: NURSE PRACTITIONER

## 2019-09-10 PROCEDURE — 20610 PR DRAIN/INJECT LARGE JOINT/BURSA: ICD-10-PCS | Mod: S$PBB,LT,, | Performed by: NURSE PRACTITIONER

## 2019-09-10 PROCEDURE — 99213 PR OFFICE/OUTPT VISIT, EST, LEVL III, 20-29 MIN: ICD-10-PCS | Mod: S$PBB,25,, | Performed by: NURSE PRACTITIONER

## 2019-09-10 PROCEDURE — 99213 OFFICE O/P EST LOW 20 MIN: CPT | Mod: S$PBB,25,, | Performed by: NURSE PRACTITIONER

## 2019-09-10 PROCEDURE — 99213 OFFICE O/P EST LOW 20 MIN: CPT | Mod: PBBFAC | Performed by: NURSE PRACTITIONER

## 2019-09-10 RX ADMIN — TRIAMCINOLONE ACETONIDE 40 MG: 40 INJECTION, SUSPENSION INTRA-ARTICULAR; INTRAMUSCULAR at 10:09

## 2019-09-10 NOTE — PROGRESS NOTES
CC: left knee pain    HPI: Pt with history of left knee pain for the past several years. The pain is aching and medial and worse with activity. She has had a right knee replacement, but is not interested in a left knee replacement. She has had cortisone injections with good relief and would like to repeat that today. She is taking tylenol prn. She is ambulating without assistive device. There is not a limp.    ROS  General: denies fever and chills  Resp: no c/o sob  CVS: no c/o cp  MSK: c/o left knee pain which is aching and medial and worse with increased activity.    PE  General: AAOx3, pleasant and cooperative  Resp: respirations even and unlabored  MSK: left knee exam  0 degrees extension  120 degrees flexion  No warmth or erythema   - effusion    Assessment:  Left knee djd    Plan:  Cortisone injection left knee today  RICE  Tylenol prn  F/u as needed    Knee Injection Procedure Note    Diagnosis: left knee degenerative arthritis  Indications: left knee pain  Procedure Details: Verbal consent was obtained for the procedure. The injection site was identified and the skin was prepared with alcohol. The left knee was injected from an anterolateral approach with 1 ml of Kenalog and 4 ml Lidocaine under sterile technique using a 22 gauge needle. The needle was removed and the area cleansed and dressed.  Complications:  Patient tolerated the procedure well.    she was advised to rest the knee today, using ice and elevation as needed for comfort and swelling.Immediate relief of the knee pain may be short lived and secondary to the lidocaine. she may have an increase in discomfort tonight followed by steady improvement over the next several days. It may take 1-2 weeks following the injection to get the full benefit of the medication.

## 2019-09-12 ENCOUNTER — CLINICAL SUPPORT (OUTPATIENT)
Dept: REHABILITATION | Facility: HOSPITAL | Age: 69
End: 2019-09-12
Attending: ORTHOPAEDIC SURGERY
Payer: MEDICARE

## 2019-09-12 DIAGNOSIS — G89.29 CHRONIC RIGHT SHOULDER PAIN: ICD-10-CM

## 2019-09-12 DIAGNOSIS — M25.511 CHRONIC RIGHT SHOULDER PAIN: ICD-10-CM

## 2019-09-12 PROCEDURE — 97110 THERAPEUTIC EXERCISES: CPT

## 2019-09-12 PROCEDURE — 97140 MANUAL THERAPY 1/> REGIONS: CPT

## 2019-09-12 NOTE — PROGRESS NOTES
"                                                    Physical Therapy Daily Note     Name: Poonam Alvarez  Clinic Number: [id number]  Diagnosis:   Encounter Diagnosis   Name Primary?    Chronic right shoulder pain      Physician: Sage Xie MD   Physician Orders: PT Eval and Treat   Medical Diagnosis: M19.011 (ICD-10-CM) - DJD of right shoulder  Date of Surgery: 4/1/19  Evaluation Date: 4/18/2019  Authorization Period Expiration: 3/14/20  Plan of Care Certification Period: 10/3/19    Visit #: 4/10  Time In: 500 pm  Time Out: 545 pm  Total Treatment Time: 35 min    Precautions: TSA protocol surgery: 4/1/19    Subjective     Pt reports: she is working hard at home with her exercises.  Pain Scale: Poonam rates pain at R shoulder on a scale of 0-10 to be 0/10 currently.     Objective   Measurements this visit: (9/12/19)  R shoulder PROM:  Flex: 180 deg  Abd: 180 deg  IR: 65 deg  ER: 85 deg    Poonam received individual therapeutic exercises to develop strength, endurance, ROM and posture for 25 minutes including:  UBE x5' forward/back ea  Pulleys 5'/5' scap and flex   Stars on wall YTB 2x5  No moneys OTB 2x10-np  Wall slides w/ #1: 5" hold-np  Standing flex, scap and abd 2# 2x10  IR towel stretch 10 sec x5- NP  Sleeper stretch IR and ER 2# x2 min-np      Poonam received the following manual therapy techniques: Soft tissue Mobilization were applied to the: R shoulder for 10 minutes including:  PROM: FL/abd, ER in scap plane as cortney    CP x 10 min to R shoulder    Written Home Exercises Provided: updated to include IR and ER stretching  Pt demo good understanding of the education provided. Poonam demonstrated good return demonstration of activities.     Education provided re:  Poonam verbalized good understanding of education provided.   No spiritual or educational barriers to learning provided    Assessment   Pt continues to tolerate progressed passive motion into functional ranges better each visit. She is " improving scapular endurance and strengthening by tolerating increased repetitions and resistance.      This is a 68 y.o. female referred to outpatient physical therapy and presents with a medical diagnosis of R shoulder pain and demonstrates limitations as described in the problem list. Pt prognosis is Good. Pt will continue to benefit from skilled outpatient physical therapy to address the deficits listed in the problem list, provide pt/family education and to maximize pt's level of independence in the home and community environment.     Goals as follows:  Short Term Goals: (4 weeks)   - Pt will increase ROM to 100 deg flex and abd passively R shoulder ( met)  - Decrease Pain to 1/10 as worst with 1 hour of PT exercises ( met)  - Pt to self correct posture with minimal cues to avoid upper trap compensation (met)  - Pt independent with HEP with progressions.  (met)     Long Term Goals (24 Weeks):  - Pt will increase ROM to WNL RUE for functional ADLs and dressing (Progressing, not met)  - Pt will increase strength to 5/5 RUE to return to lifting and carrying >10 # (Progressing, not met)  - Decrease Pain to 0/10 with 1 full day of normal activities (Progressing, not met)  - Pt to return to 80% PLOF (Progressing, not met)       Plan     Continue with established Plan of Care towards PT goals progressing with AROM of R shoulder and strengthening.      Therapist: Tiera Carreon, PT  9/12/2019

## 2019-09-13 RX ORDER — TRIAMCINOLONE ACETONIDE 40 MG/ML
40 INJECTION, SUSPENSION INTRA-ARTICULAR; INTRAMUSCULAR
Status: COMPLETED | OUTPATIENT
Start: 2019-09-10 | End: 2019-09-10

## 2019-09-16 ENCOUNTER — CLINICAL SUPPORT (OUTPATIENT)
Dept: REHABILITATION | Facility: HOSPITAL | Age: 69
End: 2019-09-16
Attending: ORTHOPAEDIC SURGERY
Payer: MEDICARE

## 2019-09-16 DIAGNOSIS — M25.511 CHRONIC RIGHT SHOULDER PAIN: ICD-10-CM

## 2019-09-16 DIAGNOSIS — G89.29 CHRONIC RIGHT SHOULDER PAIN: ICD-10-CM

## 2019-09-16 PROCEDURE — 97110 THERAPEUTIC EXERCISES: CPT

## 2019-09-16 NOTE — PROGRESS NOTES
"                                                    Physical Therapy Daily Note     Name: Poonam Alvarez  Clinic Number: [id number]  Diagnosis:   Encounter Diagnosis   Name Primary?    Chronic right shoulder pain      Physician: Sage Xie MD   Physician Orders: PT Eval and Treat   Medical Diagnosis: M19.011 (ICD-10-CM) - DJD of right shoulder  Date of Surgery: 4/1/19  Evaluation Date: 4/18/2019  Authorization Period Expiration: 3/14/20  Plan of Care Certification Period: 10/3/19    Visit #: 5/10  Time In: 400 pm  Time Out: 5:14 pm  Total Treatment Time: 30 min    Precautions: TSA protocol surgery: 4/1/19    Subjective     Pt reports: still has discomfort R shoulder  Pain Scale: Poonam rates pain at R shoulder on a scale of 0-10 to be 0/10 currently.     Objective   Measurements this visit: (9/12/19)  R shoulder PROM:  Flex: 180 deg  Abd: 180 deg  IR: 65 deg  ER: 85 deg    Poonam received individual therapeutic exercises to develop strength, endurance, ROM and posture for 50 minutes including:  UBE x5' forward/back ea  Pulleys 5'/5' scap and flex   Stars on wall YTB 2x5  No moneys OTB 2x10-  Wall slides w/ #1: 5" hold-  Standing flex, scap and abd 2# 2x10  IR towel stretch 10 sec x5-NP  Sleeper stretch IR and ER 2# x2 min-np      Poonam received the following manual therapy techniques: Soft tissue Mobilization were applied to the: R shoulder for 10 minutes including:  PROM: FL/abd, ER in scap plane as cortney    CP x 10 min to R shoulder    Written Home Exercises Provided: updated to include IR and ER stretching  Pt demo good understanding of the education provided. Poonam demonstrated good return demonstration of activities.     Education provided re:  Poonam verbalized good understanding of education provided.   No spiritual or educational barriers to learning provided    Assessment   ROM and strength cont to improve.      This is a 68 y.o. female referred to outpatient physical therapy and presents with a " medical diagnosis of R shoulder pain and demonstrates limitations as described in the problem list. Pt prognosis is Good. Pt will continue to benefit from skilled outpatient physical therapy to address the deficits listed in the problem list, provide pt/family education and to maximize pt's level of independence in the home and community environment.     Goals as follows:  Short Term Goals: (4 weeks)   - Pt will increase ROM to 100 deg flex and abd passively R shoulder ( met)  - Decrease Pain to 1/10 as worst with 1 hour of PT exercises ( met)  - Pt to self correct posture with minimal cues to avoid upper trap compensation (met)  - Pt independent with HEP with progressions.  (met)     Long Term Goals (24 Weeks):  - Pt will increase ROM to WNL RUE for functional ADLs and dressing (Progressing, not met)  - Pt will increase strength to 5/5 RUE to return to lifting and carrying >10 # (Progressing, not met)  - Decrease Pain to 0/10 with 1 full day of normal activities (Progressing, not met)  - Pt to return to 80% PLOF (Progressing, not met)       Plan     Continue with established Plan of Care towards PT goals progressing with AROM of R shoulder and strengthening.      Therapist: Ying Phillips, PTA  9/16/2019

## 2019-09-19 ENCOUNTER — CLINICAL SUPPORT (OUTPATIENT)
Dept: REHABILITATION | Facility: HOSPITAL | Age: 69
End: 2019-09-19
Attending: ORTHOPAEDIC SURGERY
Payer: MEDICARE

## 2019-09-19 DIAGNOSIS — M25.511 CHRONIC RIGHT SHOULDER PAIN: ICD-10-CM

## 2019-09-19 DIAGNOSIS — G89.29 CHRONIC RIGHT SHOULDER PAIN: ICD-10-CM

## 2019-09-19 PROCEDURE — 97140 MANUAL THERAPY 1/> REGIONS: CPT

## 2019-09-19 PROCEDURE — 97110 THERAPEUTIC EXERCISES: CPT

## 2019-09-22 ENCOUNTER — PATIENT OUTREACH (OUTPATIENT)
Dept: ADMINISTRATIVE | Facility: OTHER | Age: 69
End: 2019-09-22

## 2019-09-24 ENCOUNTER — OFFICE VISIT (OUTPATIENT)
Dept: SPORTS MEDICINE | Facility: CLINIC | Age: 69
End: 2019-09-24
Payer: MEDICARE

## 2019-09-24 ENCOUNTER — HOSPITAL ENCOUNTER (OUTPATIENT)
Dept: RADIOLOGY | Facility: HOSPITAL | Age: 69
Discharge: HOME | End: 2019-09-24
Attending: ORTHOPAEDIC SURGERY
Payer: MEDICARE

## 2019-09-24 VITALS — BODY MASS INDEX: 28.99 KG/M2 | HEIGHT: 72 IN | WEIGHT: 214 LBS

## 2019-09-24 DIAGNOSIS — M25.511 RIGHT SHOULDER PAIN, UNSPECIFIED CHRONICITY: Primary | ICD-10-CM

## 2019-09-24 DIAGNOSIS — Z96.611 S/P SHOULDER REPLACEMENT, RIGHT: ICD-10-CM

## 2019-09-24 DIAGNOSIS — M25.511 RIGHT SHOULDER PAIN, UNSPECIFIED CHRONICITY: ICD-10-CM

## 2019-09-24 PROCEDURE — 99999 PR PBB SHADOW E&M-EST. PATIENT-LVL III: CPT | Mod: PBBFAC,,, | Performed by: ORTHOPAEDIC SURGERY

## 2019-09-24 PROCEDURE — 73030 X-RAY EXAM OF SHOULDER: CPT | Mod: TC,FY,PO,RT

## 2019-09-24 PROCEDURE — 99214 PR OFFICE/OUTPT VISIT, EST, LEVL IV, 30-39 MIN: ICD-10-PCS | Mod: S$PBB,,, | Performed by: ORTHOPAEDIC SURGERY

## 2019-09-24 PROCEDURE — 73030 XR SHOULDER COMPLETE 2 OR MORE VIEWS RIGHT: ICD-10-PCS | Mod: 26,RT,, | Performed by: RADIOLOGY

## 2019-09-24 PROCEDURE — 99999 PR PBB SHADOW E&M-EST. PATIENT-LVL III: ICD-10-PCS | Mod: PBBFAC,,, | Performed by: ORTHOPAEDIC SURGERY

## 2019-09-24 PROCEDURE — 99213 OFFICE O/P EST LOW 20 MIN: CPT | Mod: PBBFAC,25,PO | Performed by: ORTHOPAEDIC SURGERY

## 2019-09-24 PROCEDURE — 99214 OFFICE O/P EST MOD 30 MIN: CPT | Mod: S$PBB,,, | Performed by: ORTHOPAEDIC SURGERY

## 2019-09-24 PROCEDURE — 73030 X-RAY EXAM OF SHOULDER: CPT | Mod: 26,RT,, | Performed by: RADIOLOGY

## 2019-09-24 NOTE — PROGRESS NOTES
CC: Right shoulder post op 5 month    Poonam Alvarez returns for follow up evaluation of her right shoulder.  She continues to attend physical therapy at [facility].  No issues to report.    DATE OF SURGERY: 4/1/2019      PREOPERATIVE DIAGNOSIS:   1. Degenerative joint disease, right shoulder.   2. Right shoulder rotator cuff tear  3. Right shoulder biceps tendinopathy     POSTOPERATIVE DIAGNOSIS:   1. Degenerative joint disease, right shoulder.   2. Right shoulder rotator cuff tear  3. Right shoulder biceps tendinopathy     PROCEDURE:   1. Right total shoulder replacement.   2. Right shoulder rotator cuff repair  3. Right shoulder biceps tenodesis     SURGEON: Sage Xie M.D.    Review of Systems   Constitution: Negative. Negative for chills, fever and night sweats.    Cardiovascular: Negative for chest pain and syncope.   Respiratory: Negative for cough and shortness of breath.   Gastrointestinal: Negative for abdominal pain and bowel incontinence.     PE:  Vitals:    09/24/19 1357   Weight: 97.1 kg (214 lb)   Height: 6' (1.829 m)   PainSc: 0-No pain     Right shoulder  Incision healed  No sign of infection  Swelling resolved  Compartments soft  Neurovascular status intact in extremity    AROM  Forward elevation 150 degrees  External rotation 65 degrees    XRAY (9/24/19): Right shoulder arthroplasty identified.  The position and alignment is satisfactory and unchanged as compared to the previous study    Assessment:  5 months s/p right TSA    Plan:  1. Continue physical therapy for 1 more month  2. Follow up at 3 months

## 2019-09-24 NOTE — LETTER
[facility]  [facility]  [place] [address]  Phone: [phone] September 24, 2019     Patient: Poonam Alvarez   YOB: 1950   Date of Visit: 9/24/2019       To Whom It May Concern:    Poonam Alvarez is a patient of mine.  She is cleared to return to work without restrictions.    If you have any questions or concerns, please don't hesitate to contact my office.    Sincerely,    Sage Xie MD

## 2019-09-26 ENCOUNTER — CLINICAL SUPPORT (OUTPATIENT)
Dept: REHABILITATION | Facility: HOSPITAL | Age: 69
End: 2019-09-26
Attending: ORTHOPAEDIC SURGERY
Payer: MEDICARE

## 2019-09-26 DIAGNOSIS — G89.29 CHRONIC RIGHT SHOULDER PAIN: ICD-10-CM

## 2019-09-26 DIAGNOSIS — M25.511 CHRONIC RIGHT SHOULDER PAIN: ICD-10-CM

## 2019-09-26 PROCEDURE — 97110 THERAPEUTIC EXERCISES: CPT

## 2019-09-26 NOTE — PROGRESS NOTES
"                                                    Physical Therapy Daily Note     Name: Poonam Alvarez  [facility] Number: [id number]  Diagnosis:   Encounter Diagnosis   Name Primary?    Chronic right shoulder pain      Physician: Sage Xie MD   Physician Orders: PT Eval and Treat   Medical Diagnosis: M19.011 (ICD-10-CM) - DJD of right shoulder  Date of Surgery: 4/1/19  Evaluation Date: 4/18/2019  Authorization Period Expiration: 3/14/20  Plan of Care Certification Period: 10/3/19    Visit #: 7/10  Time In: 500 pm  Time Out: 550  Total Treatment Time: 30 min    Precautions: TSA protocol surgery: 4/1/19    Subjective     Pt reports: she had a great follow up with MD.  Pain Scale: Poonam rates pain at R shoulder on a scale of 0-10 to be 0/10 currently.     Objective   Measurements this visit: (9/26/19)  R shoulder PROM:  Flex: 180 deg  Abd: 180 deg  IR: 69 deg  ER: 85 deg    MMT: 5/5 RUE, except 4/5 flexion    Poonam received individual therapeutic exercises to develop strength, endurance, ROM and posture for 40 minutes including:  UBE x5' forward/back ea  Pulleys 5'/5' scap and flex   Stars on wall YTB 2x5-np  No moneys OTB 2x10-np  Wall slides w/ #1: 5" hold-np  Standing flex, scap and abd 2# 3x10-  OTB stand flexion 3x 10 R  IR towel stretch 10 sec x5-NP  Sleeper stretch IR and ER 2# x2 min-np      Poonam received the following manual therapy techniques: Soft tissue Mobilization were applied to the: R shoulder for 0 minutes including:  PROM: FL/abd, ER in scap plane as cortney  Prone IR with manual distraction by PT      CP x 10 min to R shoulder    Written Home Exercises Provided: updated to include IR and ER stretching  Pt demo good understanding of the education provided. Poonam demonstrated good return demonstration of activities.     Education provided re:  Poonam verbalized good understanding of education provided.   No spiritual or educational barriers to learning provided    Assessment   Patient " demonstrated good AROM and has improved RUE strength. Her only deficit is currently flexion and she demonstrated a good understanding of HEP to improve this strength.       This is a 68 y.o. female referred to outpatient physical therapy and presents with a medical diagnosis of R shoulder pain and demonstrates limitations as described in the problem list. Pt prognosis is Good. Pt will continue to benefit from skilled outpatient physical therapy to address the deficits listed in the problem list, provide pt/family education and to maximize pt's level of independence in the home and community environment.     Goals as follows:  Short Term Goals: (4 weeks)   - Pt will increase ROM to 100 deg flex and abd passively R shoulder ( met)  - Decrease Pain to 1/10 as worst with 1 hour of PT exercises ( met)  - Pt to self correct posture with minimal cues to avoid upper trap compensation (met)  - Pt independent with HEP with progressions.  (met)     Long Term Goals (24 Weeks):  - Pt will increase ROM to WNL RUE for functional ADLs and dressing ( met)  - Pt will increase strength to 5/5 RUE to return to lifting and carrying >10 # (Progressing, not met)  - Decrease Pain to 0/10 with 1 full day of normal activities ( met)  - Pt to return to 80% PLOF (Progressing, not met)       Plan     Continue with established Plan of Care towards PT goals progressing with AROM of R shoulder and strengthening.      Therapist: Tiera Carreon, PT  9/26/2019

## 2019-10-01 ENCOUNTER — CLINICAL SUPPORT (OUTPATIENT)
Dept: REHABILITATION | Facility: HOSPITAL | Age: 69
End: 2019-10-01
Attending: ORTHOPAEDIC SURGERY
Payer: MEDICARE

## 2019-10-01 DIAGNOSIS — M25.511 CHRONIC RIGHT SHOULDER PAIN: ICD-10-CM

## 2019-10-01 DIAGNOSIS — G89.29 CHRONIC RIGHT SHOULDER PAIN: ICD-10-CM

## 2019-10-01 PROCEDURE — 97110 THERAPEUTIC EXERCISES: CPT

## 2019-10-01 NOTE — PROGRESS NOTES
"                                                    Physical Therapy Daily Note     Name: Poonam Alvarez  Clinic Number: [id number]  Diagnosis:   Encounter Diagnosis   Name Primary?    Chronic right shoulder pain      Physician: Sage Xie MD   Physician Orders: PT Eval and Treat   Medical Diagnosis: M19.011 (ICD-10-CM) - DJD of right shoulder  Date of Surgery: 4/1/19  Evaluation Date: 4/18/2019  Authorization Period Expiration: 3/14/20  Plan of Care Certification Period: 10/3/19    Visit #: 8/10  Time In: 500 pm  Time Out: 550  Total Treatment Time: 40 min    Precautions: TSA protocol surgery: 4/1/19    Subjective     Pt reports: she continues to work on strengthening at home.  Pain Scale: Poonam rates pain at R shoulder on a scale of 0-10 to be 0/10 currently.     Objective   Measurements this visit: (9/26/19)  R shoulder PROM:  Flex: 180 deg  Abd: 180 deg  IR: 69 deg  ER: 85 deg    MMT: 5/5 RUE, except 4/5 flexion    Poonam received individual therapeutic exercises to develop strength, endurance, ROM and posture for 40 minutes including:  UBE x5' forward/back ea  Pulleys 5'/5' scap and flex   Stars on wall YTB 2x5-np  No moneys OTB 2x10-np  Wall slides w/ #1: 5" hold-np  Standing flex 3# 3x10-  OTB stand flexion 3x 10 B lift   IR towel stretch 10 sec x5-NP  Sleeper stretch IR and ER 2# x2 min-np      Poonam received the following manual therapy techniques: Soft tissue Mobilization were applied to the: R shoulder for 0 minutes including:  PROM: FL/abd, ER in scap plane as cortney  Prone IR with manual distraction by PT      CP x 10 min to R shoulder    Written Home Exercises Provided: updated to include IR and ER stretching  Pt demo good understanding of the education provided. Poonam demonstrated good return demonstration of activities.     Education provided re:  Poonam verbalized good understanding of education provided.   No spiritual or educational barriers to learning provided    Assessment   Patient " demonstrated good fatigue with strengthening exercises. Her only deficit is currently flexion and she demonstrated a good understanding of HEP to improve this strength.       This is a 68 y.o. female referred to outpatient physical therapy and presents with a medical diagnosis of R shoulder pain and demonstrates limitations as described in the problem list. Pt prognosis is Good. Pt will continue to benefit from skilled outpatient physical therapy to address the deficits listed in the problem list, provide pt/family education and to maximize pt's level of independence in the home and community environment.     Goals as follows:  Short Term Goals: (4 weeks)   - Pt will increase ROM to 100 deg flex and abd passively R shoulder ( met)  - Decrease Pain to 1/10 as worst with 1 hour of PT exercises ( met)  - Pt to self correct posture with minimal cues to avoid upper trap compensation (met)  - Pt independent with HEP with progressions.  (met)     Long Term Goals (24 Weeks):  - Pt will increase ROM to WNL RUE for functional ADLs and dressing ( met)  - Pt will increase strength to 5/5 RUE to return to lifting and carrying >10 # (Progressing, not met)  - Decrease Pain to 0/10 with 1 full day of normal activities ( met)  - Pt to return to 80% PLOF (Progressing, not met)       Plan     Continue with established Plan of Care towards PT goals progressing with AROM of R shoulder and strengthening.      Therapist: Tiera Carreon, PT  10/1/2019

## 2019-10-09 ENCOUNTER — CLINICAL SUPPORT (OUTPATIENT)
Dept: REHABILITATION | Facility: HOSPITAL | Age: 69
End: 2019-10-09
Attending: ORTHOPAEDIC SURGERY
Payer: MEDICARE

## 2019-10-09 DIAGNOSIS — M25.511 CHRONIC RIGHT SHOULDER PAIN: ICD-10-CM

## 2019-10-09 DIAGNOSIS — G89.29 CHRONIC RIGHT SHOULDER PAIN: ICD-10-CM

## 2019-10-09 PROCEDURE — 97110 THERAPEUTIC EXERCISES: CPT

## 2019-10-09 NOTE — PROGRESS NOTES
"                                                    Physical Therapy Daily Note     Name: Poonam Alvarez  [facility] Number: [id number]  Diagnosis:   Encounter Diagnosis   Name Primary?    Chronic right shoulder pain      Physician: Sage Xie MD   Physician Orders: PT Eval and Treat   Medical Diagnosis: M19.011 (ICD-10-CM) - DJD of right shoulder  Date of Surgery: 4/1/19  Evaluation Date: 4/18/2019  Authorization Period Expiration: 3/14/20  Plan of Care Certification Period: 10/3/19    Visit #: 9/10  Time In: 505 pm  Time Out: 5:56  Total Treatment Time: 23 min    Precautions: TSA protocol surgery: 4/1/19    Subjective     Pt reports: no complaints  Pain Scale: Poonam rates pain at R shoulder on a scale of 0-10 to be 0/10 currently.     Objective   Measurements this visit: (9/26/19)  R shoulder PROM:  Flex: 180 deg  Abd: 180 deg  IR: 69 deg  ER: 85 deg    MMT: 5/5 RUE, except 4/5 flexion    Poonam received individual therapeutic exercises to develop strength, endurance, ROM and posture for 41 minutes including:  UBE x5' forward/back ea  Pulleys 5'/5' scap and flex   Stars on wall YTB 2x5-np  No moneys OTB 2x10-np  Wall slides w/ #2: 5" hold  Standing flex 3# 3x10-  OTB stand flexion 3x 10 B lift   IR towel stretch 10 sec x5-NP  Sleeper stretch IR and ER 2# x2 min-np      Poonam received the following manual therapy techniques: Soft tissue Mobilization were applied to the: R shoulder for 0 minutes including:  PROM: FL/abd, ER in scap plane as cortney  Prone IR with manual distraction by PT      CP x 10 min to R shoulder    Written Home Exercises Provided: updated to include IR and ER stretching  Pt demo good understanding of the education provided. Poonam demonstrated good return demonstration of activities.     Education provided re:  Poonam verbalized good understanding of education provided.   No spiritual or educational barriers to learning provided    Assessment   Shoulder fatigue w/ FL ex. Still presents w/ " shoulder hiking w/ FL       This is a 68 y.o. female referred to outpatient physical therapy and presents with a medical diagnosis of R shoulder pain and demonstrates limitations as described in the problem list. Pt prognosis is Good. Pt will continue to benefit from skilled outpatient physical therapy to address the deficits listed in the problem list, provide pt/family education and to maximize pt's level of independence in the home and community environment.     Goals as follows:  Short Term Goals: (4 weeks)   - Pt will increase ROM to 100 deg flex and abd passively R shoulder ( met)  - Decrease Pain to 1/10 as worst with 1 hour of PT exercises ( met)  - Pt to self correct posture with minimal cues to avoid upper trap compensation (met)  - Pt independent with HEP with progressions.  (met)     Long Term Goals (24 Weeks):  - Pt will increase ROM to WNL RUE for functional ADLs and dressing ( met)  - Pt will increase strength to 5/5 RUE to return to lifting and carrying >10 # (Progressing, not met)  - Decrease Pain to 0/10 with 1 full day of normal activities ( met)  - Pt to return to 80% PLOF (Progressing, not met)       Plan     Continue with established Plan of Care towards PT goals progressing with AROM of R shoulder and strengthening.      Therapist: Ying Phillips, MOE  10/9/2019

## 2019-10-11 ENCOUNTER — OFFICE VISIT (OUTPATIENT)
Dept: INTERNAL MEDICINE | Facility: CLINIC | Age: 69
End: 2019-10-11
Payer: MEDICARE

## 2019-10-11 ENCOUNTER — CLINICAL SUPPORT (OUTPATIENT)
Dept: INTERNAL MEDICINE | Facility: CLINIC | Age: 69
End: 2019-10-11
Payer: MEDICARE

## 2019-10-11 VITALS
HEIGHT: 72 IN | WEIGHT: 215 LBS | TEMPERATURE: 98 F | BODY MASS INDEX: 29.12 KG/M2 | HEART RATE: 75 BPM | SYSTOLIC BLOOD PRESSURE: 116 MMHG | OXYGEN SATURATION: 96 % | DIASTOLIC BLOOD PRESSURE: 72 MMHG

## 2019-10-11 DIAGNOSIS — Z00.00 ROUTINE GENERAL MEDICAL EXAMINATION AT A HEALTH CARE FACILITY: Primary | ICD-10-CM

## 2019-10-11 DIAGNOSIS — R07.9 CHEST PAIN, UNSPECIFIED TYPE: ICD-10-CM

## 2019-10-11 PROCEDURE — 90662 IIV NO PRSV INCREASED AG IM: CPT | Mod: PBBFAC

## 2019-10-11 PROCEDURE — 99999 PR PBB SHADOW E&M-EST. PATIENT-LVL I: CPT | Mod: PBBFAC,,,

## 2019-10-11 PROCEDURE — 99999 PR PBB SHADOW E&M-EST. PATIENT-LVL I: ICD-10-PCS | Mod: PBBFAC,,,

## 2019-10-11 PROCEDURE — 99999 PR PBB SHADOW E&M-EST. PATIENT-LVL IV: CPT | Mod: PBBFAC,,, | Performed by: INTERNAL MEDICINE

## 2019-10-11 PROCEDURE — G0439 PPPS, SUBSEQ VISIT: HCPCS | Mod: ,,, | Performed by: INTERNAL MEDICINE

## 2019-10-11 PROCEDURE — G0439 PR MEDICARE ANNUAL WELLNESS SUBSEQUENT VISIT: ICD-10-PCS | Mod: ,,, | Performed by: INTERNAL MEDICINE

## 2019-10-11 PROCEDURE — 99211 OFF/OP EST MAY X REQ PHY/QHP: CPT | Mod: PBBFAC,27

## 2019-10-11 PROCEDURE — 99999 PR PBB SHADOW E&M-EST. PATIENT-LVL IV: ICD-10-PCS | Mod: PBBFAC,,, | Performed by: INTERNAL MEDICINE

## 2019-10-11 PROCEDURE — 99214 OFFICE O/P EST MOD 30 MIN: CPT | Mod: PBBFAC | Performed by: INTERNAL MEDICINE

## 2019-10-11 RX ORDER — FUROSEMIDE 20 MG/1
20 TABLET ORAL DAILY PRN
Qty: 30 TABLET | Refills: 3 | Status: SHIPPED | OUTPATIENT
Start: 2019-10-11 | End: 2019-12-23

## 2019-10-11 NOTE — PROGRESS NOTES
CHIEF COMPLAINT: Annual exam      HISTORY OF PRESENT ILLNESS: This is a 68-year-old woman who presents for above    Three nights ago at 3 am, she woke up with sharp left sided chest pain.  NO radiation of pain. She started to belch and pass gas and the chest pain resolved.  She has had more gas and belching lately. No nausea, vomiting, diaphroesis, shortness of breath.  This is the second time this has occurred in the last 2 weeks.  NO chest pain during the day.  She walks between 8,000 to 10,000 steps daily.  She has not had heartburn or reflux. She is not eating late.  She does not eat after 7 pm.  Constipation has been worse lately.  She does not feel gassy and bloated regularly.     She had right shoulder surgery on 19 with Dr Sage Xie.  Surgery went well. She is still doing therapy once a week for strengthening. She has full range of motion and no pain.      The hyperpigmentation of the skin of her face, neck and upper chest is improving. She has seen Pino Clay MD.  Her skin is improving.      BP has been doing well.  She is taking HCTZ 12.5 mg sporadically which does not help her swelling.  She would like a different diuretic to take as needed    Weight is stable.  SHe had lost 34 pounds on ideal protein. She has been off ideal protein for the last year and has gained 8 pounds off the diet. She has not gained any more weight. She had fluid retention on the ideal protein diet.      Knees are doing fine. Had a right knee replacement.     She takes mobic 15 mg once weekly when she goes to [facility]. Stomach is ok. She has not needed ranitidine as needed. .      She continues to take Levoxyl 137 mcg daily for hypothyroidism.       She has a history of positive PPD 2010 - took INH for 9 months, CXR  was fine. NO fever, chills, night sweats, weight loss.       Her  (she is  from him)  of lung cancer in 2015      She has chronic constipation.   She has a BM twice  week. She takes stool softner sporadically.  She will take dulcolax as needed. Constipation is worse on ideal protein     She had an incarcerated inguinal hernia repaired on 17      She is currently working part time for [employer] at [employer] - as a .      She is taking gabapentin 100 mg 2 in the morning and 1 at night for left knee pain - pain is better. She is getting off the gabapentin.      REVIEW OF SYSTEMS: She denies fevers, chills, night sweats, fatigue, visual change, hearing loss, sinus congestion, sore throat, chest pain, shortness of breath, nausea, vomiting, constipation, diarrhea, dysuria, hematuria, polydipsia, polyuria, joint pain, muscle pain, headaches, anxiety, depression, insomnia.          REVIEW OF SYSTEMS: She denies fevers, chills, night sweats, visual change, hearing loss, sinus congestion, sore throat, chest pain, shortness of breath, nausea, vomiting, constipation, diarrhea, dysuria, hematuria, polydipsia, polyuria, other joint pain, muscle pain, headaches, anxiety, depression, insomnia.           PAST MEDICAL HISTORY:   1. Atrial flutter, status post ablation in 2008.   2. Graves disease, status post radioactive iodine, now hypothyroid.   3. Hypertension.   4. History of headaches.   5. Osteoarthritis of the shoulders, hands and knees.   6. Status post arthroscopic surgery of the right knee 2008.   7. Status post arthroscopic surgery of the right knee in .   8. Tummy tuck 20 years ago.   9. Hemorrhoids removed.   10. Cataract removal bilaterally  11. Positive PPD diagnosed , completed 9 months of INH 1/10  12. GERD     SOCIAL HISTORY: She does not smoke. She drinks alcohol twice a year.   She is , has three children. She is a .     FAMILY HISTORY: Father  at age 83 from complications of pneumonia.   Mother  at age 98 from old age. Sister had a pulmonary embolus, another   sister had sarcoidosis. A  son has Crohn's disease.        MEDICATIONS AND ALLERGIES: Updated in EPIC.       PHYSICAL EXAMINATION:      GENERAL: She is alert, oriented, no apparent distress. Affect within normal limits.   Conjunctiva anicteric. Tympanic membranes clear. Oropharynx clear.   NECK: Supple.   RESPIRATORY: Effort normal. Lungs are clear to auscultation.   HEART: Regular rate and rhythm without murmurs, gallops or rubs.   No lower extremity edema.           ASSESSMENT AND PLAN:     Annual exam - discussed diet, exercise and safety issues.    1. Chest pain - stress echo. Coluld be GI in nature- gas x in evening. Work on constipation.   2. OA right shoulder - better after surgery  2. HTN-lasix as needed   3. OA knees - s/p injection in left knee - s/p right knee replacement - doing well.  4. ALlergic rhinitis -stable   5. Hypothyroidism - tsh stable  6. GERD - asx  7. Obestiy - doing well with diet, exercise and weight loss.   8. Positive ppd - cxr stable  9. Left inguinal hernia repair - doing well     Screening - mammogram 2/19. Bone density was normal 07/2008. Colonoscopy due 11/5/2015 - normal due 2025     I will see her back in 6 months, sooner if problems arise.   INfluenza   adacel 10/18  Pneumovax 12/12  Prevnar 11/15

## 2019-10-13 RX ORDER — MELOXICAM 15 MG/1
TABLET ORAL
Qty: 90 TABLET | Refills: 0 | Status: SHIPPED | OUTPATIENT
Start: 2019-10-13 | End: 2019-12-23

## 2019-10-17 ENCOUNTER — CLINICAL SUPPORT (OUTPATIENT)
Dept: REHABILITATION | Facility: HOSPITAL | Age: 69
End: 2019-10-17
Attending: ORTHOPAEDIC SURGERY
Payer: MEDICARE

## 2019-10-17 DIAGNOSIS — M25.511 CHRONIC RIGHT SHOULDER PAIN: ICD-10-CM

## 2019-10-17 DIAGNOSIS — G89.29 CHRONIC RIGHT SHOULDER PAIN: ICD-10-CM

## 2019-10-17 PROCEDURE — 97110 THERAPEUTIC EXERCISES: CPT

## 2019-10-17 NOTE — PROGRESS NOTES
"                                                    Physical Therapy Daily Note     Name: Poonam Alvarez  [facility] Number: [id number]  Diagnosis:   Encounter Diagnosis   Name Primary?    Chronic right shoulder pain      Physician: Sage Xie MD   Physician Orders: PT Eval and Treat   Medical Diagnosis: M19.011 (ICD-10-CM) - DJD of right shoulder  Date of Surgery: 4/1/19  Evaluation Date: 4/18/2019  Authorization Period Expiration: 3/14/20  Plan of Care Certification Period: 10/3/19    Visit #: 10/10  Time In: 505 pm  Time Out: 5:50  Total Treatment Time: 30 min    Precautions: TSA protocol surgery: 4/1/19    Subjective     Pt reports: no complaints an.d feels almost 100%  Pain Scale: Poonam rates pain at R shoulder on a scale of 0-10 to be 0/10 currently.     Objective   Measurements this visit: (10/17/19)  R shoulder PROM:  Flex: 180 deg  Abd: 180 deg  IR: 69 deg  ER: 85 deg    MMT: 5/5 RUE, except 4+/5 flexion    Poonam received individual therapeutic exercises to develop strength, endurance, ROM and posture for 45 minutes including:  UBE x5' forward/back ea  Pulleys 5'/5' scap and flex -np  Stars on wall YTB 2x5-np  No moneys OTB 2x10-np  Wall slides w/ #2: 5" hold-np  Standing flex 3# 3x10-  OTB stand flexion 3x 10 B lift   Ball roll up flexion with arm lifts 3x10 B  IR towel stretch 10 sec x5-NP  Sleeper stretch IR and ER 2# x2 min-np      Poonam received the following manual therapy techniques: Soft tissue Mobilization were applied to the: R shoulder for 0 minutes including:  PROM: FL/abd, ER in scap plane as cortney  Prone IR with manual distraction by PT      CP x 10 min to R shoulder-np    HEP review x5 min    Written Home Exercises Provided: updated to include IR and ER stretching  Pt demo good understanding of the education provided. Poonam demonstrated good return demonstration of activities.     Education provided re:  Poonam verbalized good understanding of education provided.   No spiritual or " educational barriers to learning provided    Assessment   Upon reassessment, patient has shown improved flexion strength by a 1/2 grade. She is close to discharge, but patient provided an updated HEP to determine independence with maintenance program. Possible discharge next session.      This is a 68 y.o. female referred to outpatient physical therapy and presents with a medical diagnosis of R shoulder pain and demonstrates limitations as described in the problem list. Pt prognosis is Good. Pt will continue to benefit from skilled outpatient physical therapy to address the deficits listed in the problem list, provide pt/family education and to maximize pt's level of independence in the home and community environment.     Goals as follows:  Short Term Goals: (4 weeks)   - Pt will increase ROM to 100 deg flex and abd passively R shoulder ( met)  - Decrease Pain to 1/10 as worst with 1 hour of PT exercises ( met)  - Pt to self correct posture with minimal cues to avoid upper trap compensation (met)  - Pt independent with HEP with progressions.  (met)     Long Term Goals (24 Weeks):  - Pt will increase ROM to WNL RUE for functional ADLs and dressing ( met)  - Pt will increase strength to 5/5 RUE to return to lifting and carrying >10 # (Progressing, not met)  - Decrease Pain to 0/10 with 1 full day of normal activities ( met)  - Pt to return to 80% PLOF (Progressing, not met)       Plan     Continue with established Plan of Care towards PT goals progressing with AROM of R shoulder and strengthening.      Therapist: Tiera Carreon, PT  10/17/2019

## 2019-10-18 ENCOUNTER — HOSPITAL ENCOUNTER (OUTPATIENT)
Dept: CARDIOLOGY | Facility: CLINIC | Age: 69
Discharge: HOME | End: 2019-10-18
Attending: INTERNAL MEDICINE
Payer: MEDICARE

## 2019-10-18 VITALS — WEIGHT: 210 LBS | HEIGHT: 71 IN | BODY MASS INDEX: 29.4 KG/M2

## 2019-10-18 DIAGNOSIS — R07.9 CHEST PAIN, UNSPECIFIED TYPE: ICD-10-CM

## 2019-10-18 LAB
ASCENDING AORTA: 3.35 CM
BSA FOR ECHO PROCEDURE: 2.18 M2
CV ECHO LV RWT: 0.36 CM
CV STRESS BASE HR: 64 BPM
DIASTOLIC BLOOD PRESSURE: 68 MMHG
DOP CALC LVOT AREA: 3.6 CM2
DOP CALC LVOT DIAMETER: 2.15 CM
DOP CALC LVOT PEAK VEL: 1.03 M/S
DOP CALC LVOT STROKE VOLUME: 90.9 CM3
DOP CALCLVOT PEAK VEL VTI: 25.05 CM
E WAVE DECELERATION TIME: 171.61 MSEC
E/A RATIO: 1.14
E/E' RATIO: 7.36 M/S
ECHO LV POSTERIOR WALL: 0.8 CM (ref 0.6–1.1)
FRACTIONAL SHORTENING: 27 % (ref 28–44)
INTERVENTRICULAR SEPTUM: 1.01 CM (ref 0.6–1.1)
IVRT: 0.09 MSEC
LA MAJOR: 6.13 CM
LA MINOR: 6.05 CM
LA WIDTH: 4.71 CM
LEFT ATRIUM SIZE: 3.06 CM
LEFT ATRIUM VOLUME INDEX: 34.6 ML/M2
LEFT ATRIUM VOLUME: 74.6 CM3
LEFT INTERNAL DIMENSION IN SYSTOLE: 3.25 CM (ref 2.1–4)
LEFT VENTRICLE DIASTOLIC VOLUME INDEX: 42.48 ML/M2
LEFT VENTRICLE DIASTOLIC VOLUME: 91.46 ML
LEFT VENTRICLE MASS INDEX: 62 G/M2
LEFT VENTRICLE SYSTOLIC VOLUME INDEX: 19.8 ML/M2
LEFT VENTRICLE SYSTOLIC VOLUME: 42.58 ML
LEFT VENTRICULAR INTERNAL DIMENSION IN DIASTOLE: 4.48 CM (ref 3.5–6)
LEFT VENTRICULAR MASS: 132.84 G
LV LATERAL E/E' RATIO: 7.67 M/S
LV SEPTAL E/E' RATIO: 7.08 M/S
MV PEAK A VEL: 0.81 M/S
MV PEAK E VEL: 0.92 M/S
OHS CV CPX 1 MINUTE RECOVERY HEART RATE: 114 BPM
OHS CV CPX 85 PERCENT MAX PREDICTED HEART RATE MALE: 123
OHS CV CPX ESTIMATED METS: 9
OHS CV CPX MAX PREDICTED HEART RATE: 144
OHS CV CPX PATIENT IS FEMALE: 1
OHS CV CPX PATIENT IS MALE: 0
OHS CV CPX PEAK DIASTOLIC BLOOD PRESSURE: 101 MMHG
OHS CV CPX PEAK HEAR RATE: 151 BPM
OHS CV CPX PEAK RATE PRESSURE PRODUCT: NORMAL
OHS CV CPX PEAK SYSTOLIC BLOOD PRESSURE: 188 MMHG
OHS CV CPX PERCENT MAX PREDICTED HEART RATE ACHIEVED: 105
OHS CV CPX RATE PRESSURE PRODUCT PRESENTING: 8192
PULM VEIN S/D RATIO: 1.26
PV PEAK D VEL: 0.47 M/S
PV PEAK S VEL: 0.59 M/S
RA MAJOR: 5.12 CM
RA WIDTH: 4.11 CM
RIGHT VENTRICULAR END-DIASTOLIC DIMENSION: 3.6 CM
RV TISSUE DOPPLER FREE WALL SYSTOLIC VELOCITY 1 (APICAL 4 CHAMBER VIEW): 13.05 CM/S
SINUS: 3.52 CM
STJ: 3.03 CM
STRESS ECHO POST EXERCISE DUR MIN: 5 MINUTES
STRESS ECHO POST EXERCISE DUR SEC: 48 SECONDS
SYSTOLIC BLOOD PRESSURE: 128 MMHG
TDI LATERAL: 0.12 M/S
TDI SEPTAL: 0.13 M/S
TDI: 0.13 M/S
TRICUSPID ANNULAR PLANE SYSTOLIC EXCURSION: 2.15 CM

## 2019-10-18 PROCEDURE — 93351 STRESS TTE COMPLETE: CPT | Mod: PBBFAC | Performed by: INTERNAL MEDICINE

## 2019-10-18 PROCEDURE — 93351 STRESS ECHO (CUPID ONLY): ICD-10-PCS | Mod: 26,S$PBB,, | Performed by: INTERNAL MEDICINE

## 2019-10-19 ENCOUNTER — TELEPHONE (OUTPATIENT)
Dept: INTERNAL MEDICINE | Facility: CLINIC | Age: 69
End: 2019-10-19

## 2019-10-19 NOTE — TELEPHONE ENCOUNTER
----- Message from Lindsey Bach MD sent at 10/18/2019  5:56 PM CDT -----  Please notify pt that her stress test is fine. Heart is fine  Lindsey Bach M.D.

## 2019-10-26 ENCOUNTER — OFFICE VISIT (OUTPATIENT)
Dept: URGENT CARE | Facility: CLINIC | Age: 69
End: 2019-10-26
Payer: MEDICARE

## 2019-10-26 VITALS
WEIGHT: 211.63 LBS | OXYGEN SATURATION: 98 % | TEMPERATURE: 99 F | HEIGHT: 71 IN | DIASTOLIC BLOOD PRESSURE: 63 MMHG | SYSTOLIC BLOOD PRESSURE: 129 MMHG | BODY MASS INDEX: 29.63 KG/M2 | HEART RATE: 90 BPM | RESPIRATION RATE: 20 BRPM

## 2019-10-26 DIAGNOSIS — R05.9 COUGH: ICD-10-CM

## 2019-10-26 DIAGNOSIS — S00.83XA CONTUSION OF FACE, INITIAL ENCOUNTER: ICD-10-CM

## 2019-10-26 DIAGNOSIS — T07.XXXA ABRASIONS OF MULTIPLE SITES: ICD-10-CM
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
## 2019-10-26 PROCEDURE — 99214 PR OFFICE/OUTPT VISIT, EST, LEVL IV, 30-39 MIN: ICD-10-PCS | Mod: S$GLB,,, | Performed by: PHYSICIAN ASSISTANT

## 2019-10-26 PROCEDURE — 99214 OFFICE O/P EST MOD 30 MIN: CPT | Mod: S$GLB,,, | Performed by: PHYSICIAN ASSISTANT

## 2019-10-26 PROCEDURE — 70150 XR FACIAL BONES 3 OR MORE VIEW: ICD-10-PCS | Mod: S$GLB,,, | Performed by: RADIOLOGY

## 2019-10-26 PROCEDURE — 70150 X-RAY EXAM OF FACIAL BONES: CPT | Mod: S$GLB,,, | Performed by: RADIOLOGY

## 2019-10-26 RX ORDER — MUPIROCIN 20 MG/G
OINTMENT TOPICAL
Qty: 22 G | Refills: 1 | Status: SHIPPED | OUTPATIENT
Start: 2019-10-26 | End: 2019-12-23

## 2019-10-26 RX ORDER — BENZONATATE 200 MG/1
200 CAPSULE ORAL 3 TIMES DAILY PRN
Qty: 30 CAPSULE | Refills: 0 | Status: SHIPPED | OUTPATIENT
Start: 2019-10-26 | End: 2019-11-05

## 2019-10-26 NOTE — PATIENT INSTRUCTIONS
- Rest.    - Drink plenty of fluids.    - Acetaminophen (tylenol) or Ibuprofen (advil,motrin) as directed as needed for fever/pain. Avoid tylenol if you have a history of liver disease. Do not take ibuprofen if you have a history of GI bleeding, kidney disease, or if you take blood thinners.     - Ice for 15-20 minutes at a time for the next 24-48 hours.      - wash wounds daily with soap and water.  Apply mupirocin ointment as directed.    - watch for signs of infection including increased redness, swelling, discharge, increased pain, red streaking, fever.  Seek medical attention immediately if these occur.    - see head injury precaution sheet.    - Take the tessalon (benzonatate) as needed as prescribed for cough.  This is safe to take with Mucinex.    - Follow up with your PCP or specialty clinic as directed in the next 1-2 weeks if not improved or as needed.  You can call [phone] to schedule an appointment with the appropriate provider.    - Go to the ER or seek medical attention immediately if you develop new or worsening symptoms.    - You must understand that you have received an Urgent Care treatment only and that you may be released before all of your medical problems are known or treated.   - You, the patient, will arrange for follow up care as instructed.   - If your condition worsens or fails to improve we recommend that you receive another evaluation at the ER immediately or contact your PCP to discuss your concerns or return here.           Facial Contusion  A contusion is another word for a bruise. It happens when small blood vessels break open and leak blood into the nearby area. A facial contusion can result from a bump, hit, or fall. This may happen during sports or an accident. Symptoms of a contusion often include changes in skin color (bruising), swelling, and pain.   The swelling from the contusion should decrease in a few days. Bruising and pain may take several weeks to go  away.   Home care  · If you have been prescribed medicines for pain, take them as directed.  · To help reduce swelling and pain, wrap a cold pack or bag of frozen peas in a thin towel. Put it on the injured area for up to 20 minutes. Do this a few times a day until the swelling goes down.   · If you have scrapes or cuts on your face requiring stiches or other closures, care for them as directed.  · For the next 24 hours (or longer if instructed):  ¨ Dont drink alcohol, or use sedatives or medicines that make you sleepy.  ¨ Dont drive or operate machinery.  ¨ Avoid doing anything strenuous. Dont lift or strain.  ¨ Do not return to sports or other activity that could result in another head injury.  Note about concussion  Because the injury was to your head, it is possible that a concussion (mild brain injury) could result. You don't have signs of a concussion at this time. But symptoms can show up later. Be alert for signs and symptoms of a concussion. Seek emergency medical care if any of these develop over the next hours to days:  · Headache  · Nausea or vomiting  · Dizziness  · Sensitivity to light or noise  · Unusual sleepiness or grogginess  · Trouble falling asleep  · Personality changes  · Vision changes  · Memory loss  · Confusion  · Trouble walking or clumsiness  · Loss of consciousness (even for a short time)  · Inability to be awakened   Follow-up care  Follow up with your healthcare provider or our staff as directed.  When to seek medical advice  Call your healthcare provider right away if any of these occur:  · Swelling or pain that gets worse, not better  · New swelling or pain  · Warmth or drainage from the swollen area or from cuts or scrapes  · Fluid drainage or bleeding from the nose or ears  · Fever of 100.4ºF (38ºC) or higher, or as directed by your healthcare provider  Date Last Reviewed: 5/7/2015  © 5539-6771 [employer]. [address] [place] [address], [place] [address]. All rights  reserved. This information is not intended as a substitute for professional medical care. Always follow your healthcare professional's instructions.        Abrasions  Abrasions are skin scrapes. Their treatment depends on how large and deep the abrasion is.  Home care  You may be prescribed an antibiotic cream or ointment to apply to the wound. This helps prevent infection. Follow instructions when using this medicine.  General care  · To care for the abrasion, do the following each day for as long as directed by your healthcare provider.  ¨ If you were given a bandage, change it once a day. If your bandage sticks to the wound, soak it in warm water until it loosens.  ¨ Wash the area with soap and warm water. You may do this in a sink or under a tub faucet or shower. Rinse off the soap. Then pat the area dry with a clean towel.  ¨ If antibiotic ointment or cream was prescribed, reapply it to the wound as directed. Cover the wound with a fresh nonstick bandage. If the bandage becomes wet or dirty, change it as soon as possible.  ¨ Some antibiotic ointments or cream can cause an allergic reaction or dermatitis. This may cause redness, itching and or hives. If this occurs, stop using the ointment immediately and wash off any remaining ointment. You may need to take some allergy medicine to relieve symptoms.  · You may use acetaminophen or ibuprofen to control pain unless another pain medicine was prescribed. Talk with your healthcare provider before using these medicines if you have chronic liver or kidney disease or ever had a stomach ulcer or GI bleeding. Dont use ibuprofen in children younger than six months old.  · Most skin wounds heal within 10 days. But an infection may occur even with treatment. So its important to watch the wound for signs of infection as listed below.  Follow-up care  Follow up with your healthcare provider, or as advised.  When to seek medical advice  Call your healthcare provider right  away if any of these occur:  · Fever of 100.4ºF (38ºC) or higher, or as directed by your healthcare provider  · Increasing pain, redness, swelling, or drainage from the wound  · Bleeding from the wound that does not stop after a few minutes of steady, firm pressure  · Decreased ability to move any body part near the wound  Date Last Reviewed: 3/3/2017  © 9838-8856 [employer]. [address] [place] [address], [place] [id number]. All rights reserved. This information is not intended as a substitute for professional medical care. Always follow your healthcare professional's instructions.        Head Injury (Adult)    You have a head injury. It does not appear serious at this time. But symptoms of a more serious problem, such as a mild brain injury (concussion) or bruising or bleeding in the brain, may appear later. For this reason, you or someone caring for you will need to watch for the symptoms listed below. Once youre home, also be sure to follow any care instructions youre given.  Home care  Watch for the following symptoms  Seek emergency medical care if you have any of these symptoms over the next hours to days:   · Headache  · Nausea or vomiting  · Dizziness  · Sensitivity to light or noise  · Unusual sleepiness or grogginess  · Trouble falling asleep  · Personality changes  · Vision changes  · Memory loss  · Confusion  · Trouble walking or clumsiness  · Loss of consciousness (even for a short time)  · Inability to be awakened  · Stiff neck  · Weakness or numbness in any part of the body  · Seizures  General care  · If you were prescribed medicines for pain, use them as directed. Note: Dont take other medicines for pain without talking to your provider first.  · To help reduce swelling and pain, apply a cold source to the injured area for up to 20 minutes at a time. Do this as often as directed. Use a cold pack or bag of ice wrapped in a thin towel. Never apply a cold source directly to the skin.  · If  you have cuts or scrapes as a result of your head injury, care for them as directed.  · For the next 24 hours (or longer, if instructed):  ¨ Dont drink alcohol or use sedatives or other medicines that make you sleepy.  ¨ Dont drive or operate machinery.  ¨ Dont do anything strenuous, such as heavy lifting or straining.  ¨ Limit tasks that require concentration. This includes reading, using a smartphone or computer, watching TV, and playing video games.  ¨ Dont return to sports or other activities that could result in another head injury.  Follow-up care  Follow up with [facility] in one week or as instructed.   When to seek medical advice  Call your healthcare provider right away if any of these occur:  · Pain doesnt get better or worsens  · New or increased swelling or bruising  · Fever of 100.4°F (38°C) or higher, or as directed by your provider  · Increased redness, warmth, drainage, or bleeding from the injured area  · Fluid drainage or bleeding from the nose or ears  · Any depression or bony abnormality in the injured area  Date Last Reviewed: 9/26/2015  © 9188-4965 [employer]. [address] [place] [id number]. All rights reserved. This information is not intended as a substitute for professional medical care. Always follow your healthcare professional's instructions.

## 2019-10-26 NOTE — PROGRESS NOTES
"Subjective:       Patient ID: oPonam Alvarez is a 68 y.o. female.    Vitals:  height is 5' 11" (1.803 m) and weight is 96 kg (211 lb 10.3 oz). Her temperature is 98.6 °F (37 °C). Her blood pressure is 129/63 and her pulse is 90. Her respiration is 20 and oxygen saturation is 98%.     Chief Complaint: Fall    Patient presents for evaluation after trip and fall that occurred one hour prior to arrival. She states she tripped over the base of an umbrella and fell forward on concrete and scraped her left knee, right wrist, chin, and right cheek. This was a mechanical fall. This was witnessed by multiple family members. Patient denies loss of consciousness or amnesia. She denies headache, dizziness, nausea, vomiting. She complains of some soreness to chin and cheek. She denies any severe wrist or knee pain, just some skin discomfort where they were scratched on the cement. She states that her chin just feels sore and is not actively painful. She states that the right cheek that hit the ground is a 2/10 pain, again, she says this is more of a soreness, and there is an abrasion here as well. She denies bleeding. She applied vaseline to the abrasions. She denies changes in vision, difficulty speaking or walking. Denies neck pain. She is not taking any anticoagulants or antiplatelets, she is no longer taking aspirin.  Patient also notes that she has had a cough for the past few days. Some associated congestion, she is requesting a cough suppressant. Denies chest pain, fever, dyspnea. Non productive cough.     Fall   The accident occurred 1 to 3 hours ago. The fall occurred while walking. She fell from a height of 1 to 2 ft. She landed on concrete. There was no blood loss. The point of impact was the face. The pain is at a severity of 3/10. The pain is mild. Pertinent negatives include no fever, headaches, loss of consciousness, nausea, numbness or vomiting. She has tried nothing for the symptoms. The treatment provided no " relief.       Constitution: Negative for chills, fatigue and fever.   HENT: Negative for congestion and sore throat.    Neck: Negative for neck pain, neck stiffness and painful lymph nodes.   Cardiovascular: Negative for chest trauma, chest pain and leg swelling.   Eyes: Negative for double vision and blurred vision.   Respiratory: Negative for cough and shortness of breath.    Gastrointestinal: Negative for nausea, vomiting and diarrhea.   Genitourinary: Negative for dysuria, frequency, urgency and history of kidney stones.   Musculoskeletal: Positive for trauma. Negative for joint pain, joint swelling, back pain, muscle cramps, muscle ache and history of spine disorder.   Skin: Positive for abrasion. Negative for color change, pale, rash and bruising.   Allergic/Immunologic: Negative for seasonal allergies.   Neurological: Negative for dizziness, history of vertigo, light-headedness, passing out, facial drooping, speech difficulty, coordination disturbances, loss of balance, headaches, history of migraines, disorientation, altered mental status, loss of consciousness, numbness, tingling, seizures and tremors.   Hematologic/Lymphatic: Negative for swollen lymph nodes.   Psychiatric/Behavioral: Negative for altered mental status, disorientation, confusion, agitation, nervous/anxious, sleep disturbance and depression. The patient is not nervous/anxious.        Objective:      Physical Exam   Constitutional: She is oriented to person, place, and time. She appears well-developed and well-nourished. She is cooperative.  Non-toxic appearance. She does not have a sickly appearance. She does not appear ill. No distress.   Pt very pleasant sitting comfortably in NAD.    HENT:   Head: Normocephalic and atraumatic. Head is without raccoon's eyes, without Garcia's sign, without abrasion, without contusion and without laceration.       Right Ear: Hearing, tympanic membrane, external ear and ear canal normal. No hemotympanum.    Left Ear: Hearing, tympanic membrane, external ear and ear canal normal. No hemotympanum.   Nose: Nose normal. No mucosal edema, rhinorrhea or nasal deformity. No epistaxis. Right sinus exhibits no maxillary sinus tenderness and no frontal sinus tenderness. Left sinus exhibits no maxillary sinus tenderness and no frontal sinus tenderness.   Mouth/Throat: Uvula is midline, oropharynx is clear and moist and mucous membranes are normal. No trismus in the jaw. Normal dentition. No uvula swelling. No oropharyngeal exudate, posterior oropharyngeal edema or posterior oropharyngeal erythema.   No evidence of basilar skull fracture. No scalp/skull ttp or abnormality.  No nasal rhinorrhea, septal hematoma, epistaxis or ttp.  No pain with movement of jaw, no TMJ ttp or abnormality.    Eyes: Pupils are equal, round, and reactive to light. Conjunctivae, EOM and lids are normal. Right eye exhibits no discharge. Left eye exhibits no discharge. No scleral icterus.   Neck: Trachea normal, normal range of motion, full passive range of motion without pain and phonation normal. Neck supple. No spinous process tenderness and no muscular tenderness present. No neck rigidity. No tracheal deviation, no edema and no erythema present.   Cardiovascular: Normal rate, regular rhythm, normal heart sounds, intact distal pulses and normal pulses.   Pulmonary/Chest: Effort normal and breath sounds normal. No accessory muscle usage or stridor. No tachypnea and no bradypnea. No respiratory distress. She has no decreased breath sounds. She has no wheezes. She has no rhonchi. She has no rales.   Abdominal: Soft. Normal appearance and bowel sounds are normal. She exhibits no shifting dullness, no distension, no abdominal bruit, no pulsatile midline mass and no mass. There is no tenderness.   Musculoskeletal: Normal range of motion. She exhibits no edema or deformity.        Arms:       Legs:  Neurological: She is alert and oriented to person, place,  and time. She has normal strength. She is not disoriented. She displays no atrophy and no tremor. No cranial nerve deficit or sensory deficit. She exhibits normal muscle tone. She displays a negative Romberg sign. She displays no seizure activity. Coordination and gait normal. GCS eye subscore is 4. GCS verbal subscore is 5. GCS motor subscore is 6.   Alert, oriented x 3. EOMI, PERRLA. Cranial nerves intact: facial expressions (smile, raising eyebrows, shutting eyes, pursed lips) symmetric. Shoulder shrug strength 5/5; sternocleidomastoid muscle strength 5/5 bilaterally. Jaw is midline without deviation. Tongue protrudes at midline without fasciculations.  Uvula rises at midline. Sensation to face in distribution of CN V1, V2, and V3 intact. Sensation to upper and lower extremities intact. Finger to nose, rapid rhythmic alternating movements, and heel to shin test are intact and smooth bilaterally. Patient ambulates unassisted without rigidity or ataxia. Romberg negative. Voice quality, comprehension, articulation, coherence assessed as appropriate.   Bilateral shoulders, elbows, wrists, knees exhibit full range of motion and 5/5 strength.   strength 5/5 bilaterally. Uvula rises at midline.       Skin: Skin is warm, dry, intact, not diaphoretic and not pale. Capillary refill takes less than 2 seconds. abrasion, burn, bruising and ecchymosis  Psychiatric: She has a normal mood and affect. Her speech is normal and behavior is normal. Judgment and thought content normal. Cognition and memory are normal.   Nursing note and vitals reviewed.          Xr Facial Bones 3 Or More View    Result Date: 10/26/2019  EXAMINATION: XR FACIAL BONES 3 OR MORE VIEW CLINICAL HISTORY: Injury, unspecified, initial encounter TECHNIQUE: Four views of the facial bones were performed. COMPARISON: Head CT 04/21/2007 FINDINGS: Dental hardware in place.  No displaced fracture, dislocation or destructive osseous process.  Bony nasal septum is  relatively midline and appears intact.  Paranasal sinuses and mastoid air cells appear grossly clear.  No subcutaneous emphysema or radiodense retained foreign body.  Age-related degenerative changes of the imaged mid to upper cervical spine most prominent at C6-7 level.     No displaced facial fracture identified. Electronically signed by: Henry Neely MD Date:    10/26/2019 Time:    17:54      Given head injury/ER precautions and patient expressed understanding.   Assessment:       1. Fall, initial encounter    2. Contusion of face, initial encounter    3. Abrasions of multiple sites    4. Cough        Plan:         Fall, initial encounter  -     XR FACIAL BONES 3 OR MORE VIEW; Future; Expected date: 10/26/2019    Contusion of face, initial encounter    Abrasions of multiple sites  -     mupirocin (BACTROBAN) 2 % ointment; Apply to affected area 3 times daily  Dispense: 22 g; Refill: 1    Cough  -     benzonatate (TESSALON) 200 MG capsule; Take 1 capsule (200 mg total) by mouth 3 (three) times daily as needed for Cough.  Dispense: 30 capsule; Refill: 0          Patient Instructions     - Rest.    - Drink plenty of fluids.    - Acetaminophen (tylenol) or Ibuprofen (advil,motrin) as directed as needed for fever/pain. Avoid tylenol if you have a history of liver disease. Do not take ibuprofen if you have a history of GI bleeding, kidney disease, or if you take blood thinners.     - Ice for 15-20 minutes at a time for the next 24-48 hours.      - wash wounds daily with soap and water.  Apply mupirocin ointment as directed.    - watch for signs of infection including increased redness, swelling, discharge, increased pain, red streaking, fever.  Seek medical attention immediately if these occur.    - see head injury precaution sheet.    - Take the tessalon (benzonatate) as needed as prescribed for cough.  This is safe to take with Mucinex.    - Follow up with your PCP or specialty clinic as directed in the next 1-2  weeks if not improved or as needed.  You can call [phone] to schedule an appointment with the appropriate provider.    - Go to the ER or seek medical attention immediately if you develop new or worsening symptoms.    - You must understand that you have received an Urgent Care treatment only and that you may be released before all of your medical problems are known or treated.   - You, the patient, will arrange for follow up care as instructed.   - If your condition worsens or fails to improve we recommend that you receive another evaluation at the ER immediately or contact your PCP to discuss your concerns or return here.           Facial Contusion  A contusion is another word for a bruise. It happens when small blood vessels break open and leak blood into the nearby area. A facial contusion can result from a bump, hit, or fall. This may happen during sports or an accident. Symptoms of a contusion often include changes in skin color (bruising), swelling, and pain.   The swelling from the contusion should decrease in a few days. Bruising and pain may take several weeks to go away.   Home care  · If you have been prescribed medicines for pain, take them as directed.  · To help reduce swelling and pain, wrap a cold pack or bag of frozen peas in a thin towel. Put it on the injured area for up to 20 minutes. Do this a few times a day until the swelling goes down.   · If you have scrapes or cuts on your face requiring stiches or other closures, care for them as directed.  · For the next 24 hours (or longer if instructed):  ¨ Dont drink alcohol, or use sedatives or medicines that make you sleepy.  ¨ Dont drive or operate machinery.  ¨ Avoid doing anything strenuous. Dont lift or strain.  ¨ Do not return to sports or other activity that could result in another head injury.  Note about concussion  Because the injury was to your head, it is possible that a concussion (mild brain injury) could result. You don't have  signs of a concussion at this time. But symptoms can show up later. Be alert for signs and symptoms of a concussion. Seek emergency medical care if any of these develop over the next hours to days:  · Headache  · Nausea or vomiting  · Dizziness  · Sensitivity to light or noise  · Unusual sleepiness or grogginess  · Trouble falling asleep  · Personality changes  · Vision changes  · Memory loss  · Confusion  · Trouble walking or clumsiness  · Loss of consciousness (even for a short time)  · Inability to be awakened   Follow-up care  Follow up with your healthcare provider or our staff as directed.  When to seek medical advice  Call your healthcare provider right away if any of these occur:  · Swelling or pain that gets worse, not better  · New swelling or pain  · Warmth or drainage from the swollen area or from cuts or scrapes  · Fluid drainage or bleeding from the nose or ears  · Fever of 100.4ºF (38ºC) or higher, or as directed by your healthcare provider  Date Last Reviewed: 5/7/2015  © 7871-8889 [employer]. [address] [place] [address]. All rights reserved. This information is not intended as a substitute for professional medical care. Always follow your healthcare professional's instructions.        Abrasions  Abrasions are skin scrapes. Their treatment depends on how large and deep the abrasion is.  Home care  You may be prescribed an antibiotic cream or ointment to apply to the wound. This helps prevent infection. Follow instructions when using this medicine.  General care  · To care for the abrasion, do the following each day for as long as directed by your healthcare provider.  ¨ If you were given a bandage, change it once a day. If your bandage sticks to the wound, soak it in warm water until it loosens.  ¨ Wash the area with soap and warm water. You may do this in a sink or under a tub faucet or shower. Rinse off the soap. Then pat the area dry with a clean towel.  ¨ If  antibiotic ointment or cream was prescribed, reapply it to the wound as directed. Cover the wound with a fresh nonstick bandage. If the bandage becomes wet or dirty, change it as soon as possible.  ¨ Some antibiotic ointments or cream can cause an allergic reaction or dermatitis. This may cause redness, itching and or hives. If this occurs, stop using the ointment immediately and wash off any remaining ointment. You may need to take some allergy medicine to relieve symptoms.  · You may use acetaminophen or ibuprofen to control pain unless another pain medicine was prescribed. Talk with your healthcare provider before using these medicines if you have chronic liver or kidney disease or ever had a stomach ulcer or GI bleeding. Dont use ibuprofen in children younger than six months old.  · Most skin wounds heal within 10 days. But an infection may occur even with treatment. So its important to watch the wound for signs of infection as listed below.  Follow-up care  Follow up with your healthcare provider, or as advised.  When to seek medical advice  Call your healthcare provider right away if any of these occur:  · Fever of 100.4ºF (38ºC) or higher, or as directed by your healthcare provider  · Increasing pain, redness, swelling, or drainage from the wound  · Bleeding from the wound that does not stop after a few minutes of steady, firm pressure  · Decreased ability to move any body part near the wound  Date Last Reviewed: 3/3/2017  © 8491-5253 [employer]. [address] [place] [address]. All rights reserved. This information is not intended as a substitute for professional medical care. Always follow your healthcare professional's instructions.        Head Injury (Adult)    You have a head injury. It does not appear serious at this time. But symptoms of a more serious problem, such as a mild brain injury (concussion) or bruising or bleeding in the brain, may appear later. For this reason,  you or someone caring for you will need to watch for the symptoms listed below. Once youre home, also be sure to follow any care instructions youre given.  Home care  Watch for the following symptoms  Seek emergency medical care if you have any of these symptoms over the next hours to days:   · Headache  · Nausea or vomiting  · Dizziness  · Sensitivity to light or noise  · Unusual sleepiness or grogginess  · Trouble falling asleep  · Personality changes  · Vision changes  · Memory loss  · Confusion  · Trouble walking or clumsiness  · Loss of consciousness (even for a short time)  · Inability to be awakened  · Stiff neck  · Weakness or numbness in any part of the body  · Seizures  General care  · If you were prescribed medicines for pain, use them as directed. Note: Dont take other medicines for pain without talking to your provider first.  · To help reduce swelling and pain, apply a cold source to the injured area for up to 20 minutes at a time. Do this as often as directed. Use a cold pack or bag of ice wrapped in a thin towel. Never apply a cold source directly to the skin.  · If you have cuts or scrapes as a result of your head injury, care for them as directed.  · For the next 24 hours (or longer, if instructed):  ¨ Dont drink alcohol or use sedatives or other medicines that make you sleepy.  ¨ Dont drive or operate machinery.  ¨ Dont do anything strenuous, such as heavy lifting or straining.  ¨ Limit tasks that require concentration. This includes reading, using a smartphone or computer, watching TV, and playing video games.  ¨ Dont return to sports or other activities that could result in another head injury.  Follow-up care  Follow up with [facility] in one week or as instructed.   When to seek medical advice  Call your healthcare provider right away if any of these occur:  · Pain doesnt get better or worsens  · New or increased swelling or bruising  · Fever of 100.4°F (38°C) or  higher, or as directed by your provider  · Increased redness, warmth, drainage, or bleeding from the injured area  · Fluid drainage or bleeding from the nose or ears  · Any depression or bony abnormality in the injured area  Date Last Reviewed: 9/26/2015  © 1486-8100 [employer]. [address] [place] [address]. All rights reserved. This information is not intended as a substitute for professional medical care. Always follow your healthcare professional's instructions.

## 2019-10-28 ENCOUNTER — TELEPHONE (OUTPATIENT)
Dept: INTERNAL MEDICINE | Facility: CLINIC | Age: 69
End: 2019-10-28

## 2019-10-29 ENCOUNTER — OFFICE VISIT (OUTPATIENT)
Dept: INTERNAL MEDICINE | Facility: CLINIC | Age: 69
End: 2019-10-29
Payer: MEDICARE

## 2019-10-29 VITALS
OXYGEN SATURATION: 98 % | HEART RATE: 70 BPM | WEIGHT: 211.63 LBS | DIASTOLIC BLOOD PRESSURE: 74 MMHG | TEMPERATURE: 98 F | SYSTOLIC BLOOD PRESSURE: 106 MMHG | HEIGHT: 71 IN | BODY MASS INDEX: 29.63 KG/M2

## 2019-10-29 DIAGNOSIS — I10 ESSENTIAL HYPERTENSION: Primary | ICD-10-CM

## 2019-10-29 DIAGNOSIS — K21.9 GASTROESOPHAGEAL REFLUX DISEASE WITHOUT ESOPHAGITIS: ICD-10-CM
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
## 2019-10-29 PROCEDURE — 99214 PR OFFICE/OUTPT VISIT, EST, LEVL IV, 30-39 MIN: ICD-10-PCS | Mod: S$PBB,,, | Performed by: INTERNAL MEDICINE

## 2019-10-29 PROCEDURE — 99214 OFFICE O/P EST MOD 30 MIN: CPT | Mod: PBBFAC | Performed by: INTERNAL MEDICINE

## 2019-10-29 PROCEDURE — 99999 PR PBB SHADOW E&M-EST. PATIENT-LVL IV: CPT | Mod: PBBFAC,,, | Performed by: INTERNAL MEDICINE

## 2019-10-29 PROCEDURE — 99214 OFFICE O/P EST MOD 30 MIN: CPT | Mod: S$PBB,,, | Performed by: INTERNAL MEDICINE

## 2019-10-29 PROCEDURE — 99999 PR PBB SHADOW E&M-EST. PATIENT-LVL IV: ICD-10-PCS | Mod: PBBFAC,,, | Performed by: INTERNAL MEDICINE

## 2019-10-29 RX ORDER — LEVOTHYROXINE SODIUM 137 UG/1
137 TABLET ORAL DAILY
Qty: 90 TABLET | Refills: 4 | Status: SHIPPED | OUTPATIENT
Start: 2019-10-29 | End: 2020-06-23

## 2019-10-29 NOTE — PROGRESS NOTES
CHIEF COMPLAINT:  fall     HISTORY OF PRESENT ILLNESS: This is a 68-year-old woman who presents due to a fall on 10/26/19.  She tripped over an unbrealla stand.  She fell forward and fell on both of her knees. She hit her right wrist, chin and right cheek on the ground. No loss of consciousness. She has not had headache, dizziness, nausea, vomiting. She went to urgent care on 10/26/19. Xrays of facial bones were fine. Knees are doing fine. She has bruises on her knees that do not hurt.     She has tenderness and swelling of the right side of the chin. She has a superficial abrasion on the right cheek.      Belching and gassiness has resolved     She had right shoulder surgery on 4/1/19 with Dr Sage iXe.  Surgery went well. She is still doing therapy once a week for strengthening. She has full range of motion and no pain. [facility] Stomach is ok. She has not needed ranitidine as needed. .     The hyperpigmentation of the skin of her face, neck and upper chest is improving. She has seen Pino Clay MD.  Her skin is improving.      BP has been doing well.  She is taking HCTZ 12.5 mg sporadically which does not help her swelling.  She would like a different diuretic to take as needed      She continues to take Levoxyl 137 mcg daily for hypothyroidism.       She has a history of positive PPD 01/2010 - took INH for 9 months, CXR 11/16 was fine. NO fever, chills, night sweats, weight loss.       She has chronic constipation.   She has a BM twice week. She takes stool softner sporadically.  She will take dulcolax as needed. Constipation is worse on ideal protein       She is currently working part time for [employer] at [employer] - as a .      REVIEW OF SYSTEMS: She denies fevers, chills, night sweats, fatigue, visual change, hearing loss, sinus congestion, sore throat, chest pain, shortness of breath, nausea, vomiting,  "constipation, diarrhea, dysuria, hematuria, polydipsia, polyuria, joint pain, muscle pain, headaches, anxiety, depression, insomnia.            PAST MEDICAL HISTORY:   1. Atrial flutter, status post ablation in 2008.   2. Graves disease, status post radioactive iodine, now hypothyroid.   3. Hypertension.   4. History of headaches.   5. Osteoarthritis of the shoulders, hands and knees.   6. Status post arthroscopic surgery of the right knee 2008.   7. Status post arthroscopic surgery of the right knee in .   8. Tummy tuck 20 years ago.   9. Hemorrhoids removed.   10. Cataract removal bilaterally  11. Positive PPD diagnosed , completed 9 months of INH 1/10  12. GERD     SOCIAL HISTORY: She does not smoke. She drinks alcohol twice a year.   She is , has three children. She is a .     FAMILY HISTORY: Father  at age 83 from complications of pneumonia.   Mother  at age 98 from old age. Sister had a pulmonary embolus, another   sister had sarcoidosis. A son has Crohn's disease.        MEDICATIONS AND ALLERGIES: Updated in EPIC.       PHYSICAL EXAMINATION:    /74 (BP Location: Right arm, Patient Position: Sitting, BP Method: Large (Manual))   Pulse 70   Temp 98.2 °F (36.8 °C) (Oral)   Ht 5' 11" (1.803 m)   Wt 96 kg (211 lb 10.3 oz)   SpO2 98%   BMI 29.52 kg/m²     GENERAL: She is alert, oriented, no apparent distress. Affect within normal limits.   Conjunctiva anicteric. Tympanic membranes clear. Oropharynx clear.  Tenderness to palpation over the right chin. No erythema or warmth  NECK: Supple.   RESPIRATORY: Effort normal. Lungs are clear to auscultation.   HEART: Regular rate and rhythm without murmurs, gallops or rubs.   No lower extremity edema.   Superficial bruises on knees  Superficial excoriation over the ight cheek          ASSESSMENT AND PLAN:      1. Fall - reassured. Chin should heal. Discussed fall precautions.  She declines physical therapy for " gait abnormality.   2. OA right shoulder - better after surgery  2. HTN-lasix as needed   3. OA knees - s/p injection in left knee - s/p right knee replacement - doing well.  4. ALlergic rhinitis -stable   5. Hypothyroidism - tsh stable  6. GERD - asx  7. Obestiy - doing well with diet, exercise and weight loss.   8. Positive ppd - cxr stable  9. Left inguinal hernia repair - doing well     Screening - mammogram 2/19. Bone density was normal 07/2008. Colonoscopy due 11/5/2015 - normal due 2025     I will see her back in 6 months, sooner if problems arise.   INfluenza   adacel 10/18  Pneumovax 12/12  Prevnar 11/15

## 2019-10-29 NOTE — TELEPHONE ENCOUNTER
----- Message from Carleen Bourne LPN sent at 10/28/2019  5:07 PM CDT -----  Contact: Patient [phone]      ----- Message -----  From: Rae Escalona MA  Sent: 10/28/2019   3:54 PM CDT  To: Nurse Bach        ----- Message -----  From: Canelo Plaza  Sent: 10/28/2019   3:44 PM CDT  To: [facility]OMES Staff    Patient would like to get medical advice.  Symptoms (please be specific):  Body aches from fall  How long has patient had these symptoms:  This weekend  Pharmacy name and phone #:  [id number] - [facility] [address] AT [facility] [address] [phone] (Phone) [phone] (Fax)    Comments: Patient stating fell this weekend, went to  now in pain, stating can not go to work do to aches, body is bruise. Right cheek and chin is also bruised, would like a call back to discuss.    Please call an advise  Thank you

## 2019-11-09 RX ORDER — HYDROCHLOROTHIAZIDE 12.5 MG/1
TABLET ORAL
Qty: 90 TABLET | Refills: 4 | Status: SHIPPED | OUTPATIENT
Start: 2019-11-09 | End: 2019-12-23

## 2019-11-19 ENCOUNTER — TELEPHONE (OUTPATIENT)
Dept: ORTHOPEDICS | Facility: CLINIC | Age: 69
End: 2019-11-19

## 2019-11-19 NOTE — TELEPHONE ENCOUNTER
----- Message from Elver Villalobos sent at 11/18/2019  5:01 PM CST -----  Contact: self [phone]  Patient would like to be seen sooner than the next available appointment. Please advise.

## 2019-12-16 ENCOUNTER — TELEPHONE (OUTPATIENT)
Dept: INTERNAL MEDICINE | Facility: CLINIC | Age: 69
End: 2019-12-16

## 2019-12-16 ENCOUNTER — PATIENT OUTREACH (OUTPATIENT)
Dept: ADMINISTRATIVE | Facility: OTHER | Age: 69
End: 2019-12-16

## 2019-12-16 NOTE — TELEPHONE ENCOUNTER
----- Message from Nikita Ravi sent at 12/16/2019  9:51 AM CST -----  Contact: [phone]  Patient requesting an appointment today with MD for back pain . Please call and advise, Thanks

## 2019-12-16 NOTE — TELEPHONE ENCOUNTER
Pt is stating that she is still having indigestion and back pain on the lower left side and requesting an appt with you asap. Please advise

## 2019-12-17 ENCOUNTER — OFFICE VISIT (OUTPATIENT)
Dept: INTERNAL MEDICINE | Facility: CLINIC | Age: 69
End: 2019-12-17
Payer: MEDICARE

## 2019-12-17 ENCOUNTER — HOSPITAL ENCOUNTER (OUTPATIENT)
Dept: RADIOLOGY | Facility: HOSPITAL | Age: 69
Discharge: HOME | End: 2019-12-17
Attending: NURSE PRACTITIONER
Payer: MEDICARE

## 2019-12-17 VITALS
SYSTOLIC BLOOD PRESSURE: 104 MMHG | BODY MASS INDEX: 29.52 KG/M2 | OXYGEN SATURATION: 99 % | HEART RATE: 71 BPM | HEIGHT: 71 IN | DIASTOLIC BLOOD PRESSURE: 68 MMHG

## 2019-12-17 DIAGNOSIS — M54.9 MID BACK PAIN ON LEFT SIDE: ICD-10-CM

## 2019-12-17 DIAGNOSIS — L30.9 DERMATITIS, UNSPECIFIED: ICD-10-CM

## 2019-12-17 DIAGNOSIS — R05.9 COUGH: Primary | ICD-10-CM

## 2019-12-17 DIAGNOSIS — R05.9 COUGH: ICD-10-CM

## 2019-12-17 DIAGNOSIS — R07.81 RIB PAIN: ICD-10-CM

## 2019-12-17 DIAGNOSIS — K21.9 GASTROESOPHAGEAL REFLUX DISEASE WITHOUT ESOPHAGITIS: ICD-10-CM

## 2019-12-17 PROCEDURE — 99213 OFFICE O/P EST LOW 20 MIN: CPT | Mod: S$PBB,,, | Performed by: NURSE PRACTITIONER

## 2019-12-17 PROCEDURE — 1159F PR MEDICATION LIST DOCUMENTED IN MEDICAL RECORD: ICD-10-PCS | Mod: ,,, | Performed by: NURSE PRACTITIONER

## 2019-12-17 PROCEDURE — 71046 XR CHEST PA AND LATERAL: ICD-10-PCS | Mod: 26,,, | Performed by: RADIOLOGY

## 2019-12-17 PROCEDURE — 1159F MED LIST DOCD IN RCRD: CPT | Mod: ,,, | Performed by: NURSE PRACTITIONER

## 2019-12-17 PROCEDURE — 99999 PR PBB SHADOW E&M-EST. PATIENT-LVL V: ICD-10-PCS | Mod: PBBFAC,,, | Performed by: NURSE PRACTITIONER

## 2019-12-17 PROCEDURE — 71100 XR RIBS 2 VIEW LEFT: ICD-10-PCS | Mod: 26,LT,, | Performed by: RADIOLOGY

## 2019-12-17 PROCEDURE — 71046 X-RAY EXAM CHEST 2 VIEWS: CPT | Mod: TC

## 2019-12-17 PROCEDURE — 71100 X-RAY EXAM RIBS UNI 2 VIEWS: CPT | Mod: TC,LT

## 2019-12-17 PROCEDURE — 99999 PR PBB SHADOW E&M-EST. PATIENT-LVL V: CPT | Mod: PBBFAC,,, | Performed by: NURSE PRACTITIONER

## 2019-12-17 PROCEDURE — 99215 OFFICE O/P EST HI 40 MIN: CPT | Mod: PBBFAC,25 | Performed by: NURSE PRACTITIONER

## 2019-12-17 PROCEDURE — 99213 PR OFFICE/OUTPT VISIT, EST, LEVL III, 20-29 MIN: ICD-10-PCS | Mod: S$PBB,,, | Performed by: NURSE PRACTITIONER

## 2019-12-17 PROCEDURE — 71046 X-RAY EXAM CHEST 2 VIEWS: CPT | Mod: 26,,, | Performed by: RADIOLOGY

## 2019-12-17 PROCEDURE — 71100 X-RAY EXAM RIBS UNI 2 VIEWS: CPT | Mod: 26,LT,, | Performed by: RADIOLOGY

## 2019-12-17 RX ORDER — TRIAMCINOLONE ACETONIDE 1 MG/G
OINTMENT TOPICAL 2 TIMES DAILY
Qty: 30 G | Refills: 1 | Status: SHIPPED | OUTPATIENT
Start: 2019-12-17 | End: 2020-06-26 | Stop reason: SDUPTHER

## 2019-12-17 RX ORDER — OMEPRAZOLE 40 MG/1
40 CAPSULE, DELAYED RELEASE ORAL DAILY
Qty: 30 CAPSULE | Refills: 0 | Status: SHIPPED | OUTPATIENT
Start: 2019-12-17 | End: 2019-12-23

## 2019-12-17 NOTE — PROGRESS NOTES
"INTERNAL MEDICINE URGENT CARE NOTE    CHIEF COMPLAINT     Chief Complaint   Patient presents with    Heartburn     saturday evening, still having L sided upper back pain        HPI     Poonam Alvarez is a 69 y.o. female with a-flutter, graves s/p radioactive iodine now hypothyroid, HTN, OA, and GERD who presents for an urgent visit today.    Here with c/o heartburn x 4 days - started Saturday night. Usually takes tums as needed for "gas". Saturday night was woke by belching and left upper back pain. Treating with tylenol#3.   Back pain - is comes nad goes. worse when sleeping. No relation to food. Associated with belching and indigestion.   Denies abd pain.   Takes meloxicam occasionally as needed. Also takes baby aspirin daily. No aleve or ibuprofen.       Past Medical History:  Past Medical History:   Diagnosis Date    AR (allergic rhinitis) 12/7/2012    Atrial flutter     ablation October 2008    Cataract     Generalized headaches     GERD (gastroesophageal reflux disease) 03/08/2013    resolved    Grave's disease     s/p radioactive iodine, now hypothyroidism    Keloid cicatrix     Obesity     Osteoarthritis     shoulders, hands, knees    Positive PPD, treated     9 months of INH, completed 1/2010       Home Medications:  Prior to Admission medications    Medication Sig Start Date End Date Taking? Authorizing Provider   acetaminophen-codeine 300-30mg (TYLENOL #3) 300-30 mg Tab Take 1 tablet by mouth every 6 (six) hours as needed. 3/26/19   Angelica Mahan PA-C   aspirin (ECOTRIN) 325 MG EC tablet Take 1 tablet (325 mg total) by mouth 2 (two) times daily. Take an antacid with medication. for 42 doses 3/26/19 4/23/19  Angelica Mahan PA-C   b complex vitamins capsule Take 1 capsule by mouth once daily.    Historical Provider, MD   calcium-vitamin D3-vitamin K (VIACTIV) 500-100-40 mg-unit-mcg Chew Take 1 tablet by mouth Daily. 5/21/12   Historical Provider, MD   cholecalciferol, vitamin D3, (VITAMIN D3) " 2,000 unit Cap Take 1 capsule by mouth once daily.    Historical Provider, MD   clotrimazole (LOTRIMIN) 1 % cream Apply topically 2 (two) times daily. 8/13/19   Nitesh Hussein DPM   fish oil-omega-3 fatty acids 300-1,000 mg capsule Take 2 g by mouth once daily.     Historical Provider, MD   furosemide (LASIX) 20 MG tablet Take 1 tablet (20 mg total) by mouth daily as needed. 10/11/19 10/10/20  Lindsey Bach MD   gabapentin (NEURONTIN) 100 MG capsule TAKE 3 CAPSULES(300 MG) BY MOUTH EVERY EVENING 7/9/19   Lexie Mcmanus NP   hydroCHLOROthiazide (HYDRODIURIL) 12.5 MG Tab TAKE 1/2 TO 1 TABLET BY MOUTH DAILY AS NEEDED 11/9/19   Lindsey Bach MD   meloxicam (MOBIC) 15 MG tablet TAKE 1 TABLET(15 MG) BY MOUTH EVERY DAY 10/13/19   Lindsey Bach MD   multivitamin-minerals-lutein (CENTRUM SILVER) Tab Take 1 tablet by mouth once daily.  5/21/12   Historical Provider, MD   mupirocin (BACTROBAN) 2 % ointment Apply to affected area 3 times daily 10/26/19   Nathaniel Adams PA-C   promethazine (PHENERGAN) 25 MG tablet Take 1 tablet (25 mg total) by mouth every 6 (six) hours as needed for Nausea. 3/26/19   Angelica Mahan PA-C   ranitidine (ZANTAC) 150 MG tablet Take 1 tablet (150 mg total) by mouth 2 (two) times daily. for 7 days 7/11/19 7/18/19  Amelia Chandra DNP   SYNTHROID 137 mcg Tab tablet Take 1 tablet (137 mcg total) by mouth once daily. 10/29/19   Lindsey Bach MD   triamcinolone acetonide 0.5% (KENALOG) 0.5 % Crea Apply topically 2 (two) times daily. 7/11/19   Amelia Chandra DNP   UBIDECARENONE (COENZYME Q10) 100 mg Tab Take 1 tablet by mouth once daily.    Historical Provider, MD       Review of Systems:  Review of Systems   Constitutional: Negative for chills and fever.   HENT: Negative for congestion, postnasal drip, rhinorrhea, sinus pressure and sinus pain.    Respiratory: Positive for cough. Negative for shortness of breath and wheezing.    Cardiovascular: Negative for chest pain,  "palpitations and leg swelling.   Gastrointestinal: Negative for abdominal pain, blood in stool, constipation, diarrhea, nausea and vomiting.        Heartburn    Musculoskeletal: Positive for back pain (subscapular ).   Skin: Negative for rash.       Health Maintainence:   Immunizations:  Health Maintenance       Date Due Completion Date    DEXA SCAN 12/12/1988 ---    Pneumococcal Vaccine (65+ Low/Medium Risk) (2 of 2 - PPSV23) 12/20/2017 11/30/2015    Shingles Vaccine (1 of 2) 07/11/2020 (Originally 12/12/1998) ---    Mammogram 02/18/2021 2/18/2019    Lipid Panel 10/30/2023 10/30/2018    Colonoscopy 11/05/2025 11/5/2015    TETANUS VACCINE 10/30/2028 10/30/2018           PHYSICAL EXAM     /68 (BP Location: Left arm, Patient Position: Sitting, BP Method: Large (Manual))   Pulse 71   Ht 5' 11" (1.803 m)   SpO2 99%   BMI 29.52 kg/m²     Physical Exam   Constitutional: She is oriented to person, place, and time. She appears well-developed and well-nourished.   HENT:   Head: Normocephalic.   Right Ear: External ear normal.   Left Ear: External ear normal.   Nose: Nose normal.   Mouth/Throat: Oropharynx is clear and moist. No oropharyngeal exudate.   Eyes: Pupils are equal, round, and reactive to light.   Neck: Neck supple. No JVD present. No tracheal deviation present. No thyromegaly present.   Cardiovascular: Normal rate, regular rhythm, normal heart sounds and intact distal pulses. Exam reveals no gallop and no friction rub.   No murmur heard.  Pulmonary/Chest: Effort normal and breath sounds normal. No respiratory distress. She has no wheezes. She has no rales.   Abdominal: Soft. Bowel sounds are normal. She exhibits no distension. There is no tenderness.   Musculoskeletal: Normal range of motion. She exhibits no edema or tenderness.   Lymphadenopathy:     She has no cervical adenopathy.   Neurological: She is alert and oriented to person, place, and time.   Skin: Skin is warm and dry. No rash noted. "   Psychiatric: She has a normal mood and affect. Her behavior is normal.   Vitals reviewed.      LABS     No results found for: LABA1C, HGBA1C  CMP  Sodium   Date Value Ref Range Status   03/23/2019 142 136 - 145 mmol/L Final     Potassium   Date Value Ref Range Status   03/23/2019 4.2 3.5 - 5.1 mmol/L Final     Chloride   Date Value Ref Range Status   03/23/2019 105 95 - 110 mmol/L Final     CO2   Date Value Ref Range Status   03/23/2019 27 23 - 29 mmol/L Final     Glucose   Date Value Ref Range Status   03/23/2019 69 (L) 70 - 110 mg/dL Final     BUN, Bld   Date Value Ref Range Status   03/23/2019 17 8 - 23 mg/dL Final     Creatinine   Date Value Ref Range Status   03/23/2019 0.8 0.5 - 1.4 mg/dL Final     Calcium   Date Value Ref Range Status   03/23/2019 10.1 8.7 - 10.5 mg/dL Final     Total Protein   Date Value Ref Range Status   03/23/2019 7.0 6.0 - 8.4 g/dL Final     Albumin   Date Value Ref Range Status   03/23/2019 3.7 3.5 - 5.2 g/dL Final     Total Bilirubin   Date Value Ref Range Status   03/23/2019 0.4 0.1 - 1.0 mg/dL Final     Comment:     For infants and newborns, interpretation of results should be based  on gestational age, weight and in agreement with clinical  observations.  Premature Infant recommended reference ranges:  Up to 24 hours.............<8.0 mg/dL  Up to 48 hours............<12.0 mg/dL  3-5 days..................<15.0 mg/dL  6-29 days.................<15.0 mg/dL       Alkaline Phosphatase   Date Value Ref Range Status   03/23/2019 81 55 - 135 U/L Final     AST   Date Value Ref Range Status   03/23/2019 23 10 - 40 U/L Final     ALT   Date Value Ref Range Status   03/23/2019 16 10 - 44 U/L Final     Anion Gap   Date Value Ref Range Status   03/23/2019 10 8 - 16 mmol/L Final     eGFR if    Date Value Ref Range Status   03/23/2019 >60 >60 mL/min/1.73 m^2 Final     eGFR if non    Date Value Ref Range Status   03/23/2019 >60 >60 mL/min/1.73 m^2 Final     Comment:      Calculation used to obtain the estimated glomerular filtration  rate (eGFR) is the CKD-EPI equation.        Lab Results   Component Value Date    WBC 4.22 03/23/2019    HGB 11.4 (L) 04/01/2019    HCT 36.0 (L) 04/01/2019    MCV 86 03/23/2019     03/23/2019     Lab Results   Component Value Date    CHOL 218 (H) 10/30/2018    CHOL 232 (H) 09/18/2017    CHOL 206 (H) 02/17/2017     Lab Results   Component Value Date    HDL 72 10/30/2018    HDL 66 09/18/2017    HDL 57 02/17/2017     Lab Results   Component Value Date    LDLCALC 135.6 10/30/2018    LDLCALC 147.4 09/18/2017    LDLCALC 128.2 02/17/2017     Lab Results   Component Value Date    TRIG 52 10/30/2018    TRIG 93 09/18/2017    TRIG 104 02/17/2017     Lab Results   Component Value Date    CHOLHDL 33.0 10/30/2018    CHOLHDL 28.4 09/18/2017    CHOLHDL 27.7 02/17/2017     Lab Results   Component Value Date    TSH 1.005 10/30/2018       ASSESSMENT/PLAN     Poonam Alvarez is a 69 y.o. female with  Past Medical History:   Diagnosis Date    AR (allergic rhinitis) 12/7/2012    Atrial flutter     ablation October 2008    Cataract     Generalized headaches     GERD (gastroesophageal reflux disease) 03/08/2013    resolved    Grave's disease     s/p radioactive iodine, now hypothyroidism    Keloid cicatrix     Obesity     Osteoarthritis     shoulders, hands, knees    Positive PPD, treated     9 months of INH, completed 1/2010     Cough- CXR normal. Rib series normal.   -     X-Ray Chest PA And Lateral; Future; Expected date: 12/17/2019  -     Cancel: XR Ribs Min 3 Views w/PA Chest Left; Future; Expected date: 12/17/2019    Gastroesophageal reflux disease without esophagitis- will start prilosec as directed. gerd diet   -     omeprazole (PRILOSEC) 40 MG capsule; Take 1 capsule (40 mg total) by mouth once daily.  Dispense: 30 capsule; Refill: 0    Mid back pain on left side- if rib series normal likely msk in nature.   -     Cancel: XR Ribs Min 3 Views w/PA  Chest Left; Future; Expected date: 12/17/2019    Rib pain  -     Cancel: XR Ribs Min 3 Views w/PA Chest Left; Future; Expected date: 12/17/2019    Dermatitis, unspecified  -     triamcinolone acetonide 0.1% (KENALOG) 0.1 % ointment; Apply topically 2 (two) times daily.  Dispense: 30 g; Refill: 1          Follow up with PCP     Patient education provided from [facility]. Patient was counseled on when and how to seek emergent care.       Earlene KARIMI, NEERAJ, FNP-c   Department of Internal Medicine - [facility]  3:48 PM

## 2019-12-17 NOTE — PATIENT INSTRUCTIONS
Stop meloxicam and ibuprofen (aleve, advil, motrin)   Chest and rib xrays today   Start omeprazole daily before breakfast   See GERD handout

## 2019-12-18 ENCOUNTER — TELEPHONE (OUTPATIENT)
Dept: INTERNAL MEDICINE | Facility: CLINIC | Age: 69
End: 2019-12-18

## 2019-12-18 NOTE — PROGRESS NOTES
CC: Right shoulder post op 8 month    Poonam Alvarez returns for follow up evaluation of her right shoulder.  She has finished physical therapy at [facility].  No issues to report.    DATE OF SURGERY: 4/1/2019      PREOPERATIVE DIAGNOSIS:   1. Degenerative joint disease, right shoulder.   2. Right shoulder rotator cuff tear  3. Right shoulder biceps tendinopathy     POSTOPERATIVE DIAGNOSIS:   1. Degenerative joint disease, right shoulder.   2. Right shoulder rotator cuff tear  3. Right shoulder biceps tendinopathy     PROCEDURE:   1. Right total shoulder replacement.   2. Right shoulder rotator cuff repair  3. Right shoulder biceps tenodesis     SURGEON: Sage Xie M.D.    Review of Systems   Constitution: Negative. Negative for chills, fever and night sweats.    Cardiovascular: Negative for chest pain and syncope.   Respiratory: Negative for cough and shortness of breath.   Gastrointestinal: Negative for abdominal pain and bowel incontinence.     PE:  There were no vitals filed for this visit.  Right shoulder  Incision healed  No sign of infection  Swelling resolved  Compartments soft  Neurovascular status intact in extremity    AROM  Forward elevation 150 degrees  External rotation 65 degrees    Assessment:  8 months s/p right TSA    Plan:  1. Follow up in 2 months at 1 year jairon from surgery.  New xrays upon return

## 2019-12-19 ENCOUNTER — HOSPITAL ENCOUNTER (OUTPATIENT)
Dept: RADIOLOGY | Facility: HOSPITAL | Age: 69
Discharge: HOME | End: 2019-12-19
Attending: ORTHOPAEDIC SURGERY
Payer: MEDICARE

## 2019-12-19 ENCOUNTER — OFFICE VISIT (OUTPATIENT)
Dept: SPORTS MEDICINE | Facility: CLINIC | Age: 69
End: 2019-12-19
Payer: MEDICARE

## 2019-12-19 DIAGNOSIS — Z96.611 S/P SHOULDER REPLACEMENT, RIGHT: Primary | ICD-10-CM

## 2019-12-19 DIAGNOSIS — Z96.611 S/P SHOULDER REPLACEMENT, RIGHT: ICD-10-CM

## 2019-12-19 PROCEDURE — 99212 OFFICE O/P EST SF 10 MIN: CPT | Mod: PBBFAC,25 | Performed by: ORTHOPAEDIC SURGERY

## 2019-12-19 PROCEDURE — 73030 XR SHOULDER COMPLETE 2 OR MORE VIEWS RIGHT: ICD-10-PCS | Mod: 26,RT,, | Performed by: RADIOLOGY

## 2019-12-19 PROCEDURE — 1159F MED LIST DOCD IN RCRD: CPT | Mod: ,,, | Performed by: ORTHOPAEDIC SURGERY

## 2019-12-19 PROCEDURE — 1126F AMNT PAIN NOTED NONE PRSNT: CPT | Mod: ,,, | Performed by: ORTHOPAEDIC SURGERY

## 2019-12-19 PROCEDURE — 1126F PR PAIN SEVERITY QUANTIFIED, NO PAIN PRESENT: ICD-10-PCS | Mod: ,,, | Performed by: ORTHOPAEDIC SURGERY

## 2019-12-19 PROCEDURE — 73030 X-RAY EXAM OF SHOULDER: CPT | Mod: 26,RT,, | Performed by: RADIOLOGY

## 2019-12-19 PROCEDURE — 1159F PR MEDICATION LIST DOCUMENTED IN MEDICAL RECORD: ICD-10-PCS | Mod: ,,, | Performed by: ORTHOPAEDIC SURGERY

## 2019-12-19 PROCEDURE — 99999 PR PBB SHADOW E&M-EST. PATIENT-LVL II: CPT | Mod: PBBFAC,,, | Performed by: ORTHOPAEDIC SURGERY

## 2019-12-19 PROCEDURE — 99214 PR OFFICE/OUTPT VISIT, EST, LEVL IV, 30-39 MIN: ICD-10-PCS | Mod: S$PBB,,, | Performed by: ORTHOPAEDIC SURGERY

## 2019-12-19 PROCEDURE — 99214 OFFICE O/P EST MOD 30 MIN: CPT | Mod: S$PBB,,, | Performed by: ORTHOPAEDIC SURGERY

## 2019-12-19 PROCEDURE — 73030 X-RAY EXAM OF SHOULDER: CPT | Mod: TC,RT

## 2019-12-19 PROCEDURE — 99999 PR PBB SHADOW E&M-EST. PATIENT-LVL II: ICD-10-PCS | Mod: PBBFAC,,, | Performed by: ORTHOPAEDIC SURGERY

## 2019-12-22 ENCOUNTER — PATIENT OUTREACH (OUTPATIENT)
Dept: ADMINISTRATIVE | Facility: OTHER | Age: 69
End: 2019-12-22

## 2019-12-23 ENCOUNTER — OFFICE VISIT (OUTPATIENT)
Dept: OBSTETRICS AND GYNECOLOGY | Facility: CLINIC | Age: 69
End: 2019-12-23
Payer: MEDICARE

## 2019-12-23 VITALS — SYSTOLIC BLOOD PRESSURE: 120 MMHG | DIASTOLIC BLOOD PRESSURE: 80 MMHG | HEIGHT: 71 IN | BODY MASS INDEX: 29.52 KG/M2

## 2019-12-23 DIAGNOSIS — Z01.419 WOMEN'S ANNUAL ROUTINE GYNECOLOGICAL EXAMINATION: Primary | ICD-10-CM

## 2019-12-23 DIAGNOSIS — M85.80 BONE LOSS: ICD-10-CM

## 2019-12-23 DIAGNOSIS — Z78.0 POST-MENOPAUSAL: ICD-10-CM

## 2019-12-23 DIAGNOSIS — Z11.51 SCREENING FOR HPV (HUMAN PAPILLOMAVIRUS): ICD-10-CM

## 2019-12-23 DIAGNOSIS — Z12.4 ENCOUNTER FOR PAPANICOLAOU SMEAR FOR CERVICAL CANCER SCREENING: ICD-10-CM

## 2019-12-23 DIAGNOSIS — Z12.31 ENCOUNTER FOR SCREENING MAMMOGRAM FOR BREAST CANCER: ICD-10-CM

## 2019-12-23 PROCEDURE — G0101 PR CA SCREEN;PELVIC/BREAST EXAM: ICD-10-PCS | Mod: S$PBB,,, | Performed by: NURSE PRACTITIONER

## 2019-12-23 PROCEDURE — 99999 PR PBB SHADOW E&M-EST. PATIENT-LVL III: ICD-10-PCS | Mod: PBBFAC,,, | Performed by: NURSE PRACTITIONER

## 2019-12-23 PROCEDURE — 88175 CYTOPATH C/V AUTO FLUID REDO: CPT

## 2019-12-23 PROCEDURE — G0101 CA SCREEN;PELVIC/BREAST EXAM: HCPCS | Mod: S$PBB,,, | Performed by: NURSE PRACTITIONER

## 2019-12-23 PROCEDURE — 99213 OFFICE O/P EST LOW 20 MIN: CPT | Mod: PBBFAC | Performed by: NURSE PRACTITIONER

## 2019-12-23 PROCEDURE — G0101 CA SCREEN;PELVIC/BREAST EXAM: HCPCS | Mod: PBBFAC | Performed by: NURSE PRACTITIONER

## 2019-12-23 PROCEDURE — 99999 PR PBB SHADOW E&M-EST. PATIENT-LVL III: CPT | Mod: PBBFAC,,, | Performed by: NURSE PRACTITIONER

## 2019-12-23 PROCEDURE — 87624 HPV HI-RISK TYP POOLED RSLT: CPT

## 2019-12-23 NOTE — PROGRESS NOTES
CC: Annual  HPI: Pt is a 69 y.o.  female who presents for routine annual exam. Her  is - passed in  of lung cancer. Her son is working on his pHD. She is postmenopausal. She does not want STD screening.  Denies any GYN complaints.  The patient participates in regular exercise: no.  The patient does not smoke.  The patient wears seatbelts.   Pt denies any domestic violence.  Screening mammo due in 2020.  She is in need of a bone density scan. Pt still desires pap screening.      FH:  Breast cancer: none  Colon cancer: none  Ovarian cancer: none  Endometrial cancer: none    ROS:  GENERAL: Feeling well overall. Denies fever or chills.   SKIN: Denies rash or lesions.   HEAD: Denies head injury or headache.   NODES: Denies enlarged lymph nodes.   CHEST: Denies chest pain or shortness of breath.   CARDIOVASCULAR: Denies palpitations or left sided chest pain.   ABDOMEN: No abdominal pain, constipation, diarrhea, nausea, vomiting or rectal bleeding.   URINARY: No dysuria, hematuria, or burning on urination.  REPRODUCTIVE: See HPI.   BREASTS: Denies pain, lumps, or nipple discharge.   HEMATOLOGIC: No easy bruisability or excessive bleeding.   MUSCULOSKELETAL: Denies joint pain or swelling.   NEUROLOGIC: Denies syncope or weakness.   PSYCHIATRIC: Denies depression, anxiety or mood swings.    PE:   APPEARANCE: Well nourished, well developed, Black or  female in no acute distress.  NODES: no cervical, supraclavicular, or inguinal lymphadenopathy  BREASTS: Symmetrical, no skin changes or visible lesions. No palpable masses, nipple discharge or adenopathy bilaterally.  ABDOMEN: Soft. No tenderness or masses. No distention. No hernias palpated. No CVA tenderness.  VULVA: No lesions. Normal external female genitalia.  URETHRAL MEATUS: Normal size and location, no lesions, no prolapse.  URETHRA: No masses, tenderness, or prolapse.  VAGINA: Atrophic. No lesions or lacerations noted. No abnormal  discharge present. No odor present.   CERVIX: No lesions or discharge. No cervical motion tenderness.   UTERUS: Normal size, regular shape, mobile, non-tender.  ADNEXA: No tenderness. No fullness or masses palpated in the adnexal regions.   ANUS PERINEUM: Normal.      Diagnosis:  1. Women's annual routine gynecological examination    2. Encounter for Papanicolaou smear for cervical cancer screening    3. Screening for HPV (human papillomavirus)    4. Encounter for screening mammogram for breast cancer    5. Post-menopausal    6. Bone loss        Plan:   Pap/ HPV  Mammo   Dexa  Discussed ACS guidelines no longer needing pap > 65- pt desires to continue pap smears     Orders Placed This Encounter    HPV High Risk Genotypes, PCR    Mammo Digital Screening Bilat    DXA Bone Density Spine And Hip    Liquid-Based Pap Smear, Screening       Patient was counseled today on the new ACS guidelines for cervical cytology screening as well as the current recommendations for breast cancer screening. She was counseled to follow up with her PCP for other routine health maintenance. Counseling session lasted approximately 10 minutes, and all her questions were answered.    Follow-up with me in 1 year for routine exam    NARDA Silverman

## 2019-12-30 ENCOUNTER — TELEPHONE (OUTPATIENT)
Dept: SPORTS MEDICINE | Facility: CLINIC | Age: 69
End: 2019-12-30

## 2019-12-30 NOTE — TELEPHONE ENCOUNTER
----- Message from Tiffany Melendrez sent at 12/30/2019  4:08 PM CST -----  Contact: [phone]  Pt is requesting a call back to get an appointment.Pt states she fell and hurt herself and would like to be seen soon as possible  Please call and advise        Thank you

## 2019-12-31 ENCOUNTER — OFFICE VISIT (OUTPATIENT)
Dept: SPORTS MEDICINE | Facility: CLINIC | Age: 69
End: 2019-12-31
Payer: MEDICARE

## 2019-12-31 ENCOUNTER — HOSPITAL ENCOUNTER (OUTPATIENT)
Dept: RADIOLOGY | Facility: HOSPITAL | Age: 69
Discharge: HOME | End: 2019-12-31
Attending: PHYSICIAN ASSISTANT
Payer: MEDICARE

## 2019-12-31 VITALS — WEIGHT: 211 LBS | HEIGHT: 71 IN | BODY MASS INDEX: 29.54 KG/M2

## 2019-12-31 DIAGNOSIS — M25.512 LEFT SHOULDER PAIN, UNSPECIFIED CHRONICITY: ICD-10-CM

## 2019-12-31 DIAGNOSIS — S46.812A TRAPEZIUS STRAIN, LEFT, INITIAL ENCOUNTER: ICD-10-CM

## 2019-12-31 DIAGNOSIS — M19.012 PRIMARY OSTEOARTHRITIS OF LEFT SHOULDER: ICD-10-CM

## 2019-12-31 DIAGNOSIS — M25.512 LEFT SHOULDER PAIN, UNSPECIFIED CHRONICITY: Primary | ICD-10-CM

## 2019-12-31 LAB
HPV HR 12 DNA SPEC QL NAA+PROBE: NEGATIVE
HPV16 AG SPEC QL: NEGATIVE
HPV18 DNA SPEC QL NAA+PROBE: NEGATIVE

## 2019-12-31 PROCEDURE — 99213 OFFICE O/P EST LOW 20 MIN: CPT | Mod: PBBFAC,25 | Performed by: PHYSICIAN ASSISTANT

## 2019-12-31 PROCEDURE — 1159F MED LIST DOCD IN RCRD: CPT | Mod: ,,, | Performed by: PHYSICIAN ASSISTANT

## 2019-12-31 PROCEDURE — 73030 X-RAY EXAM OF SHOULDER: CPT | Mod: TC,LT

## 2019-12-31 PROCEDURE — 1125F AMNT PAIN NOTED PAIN PRSNT: CPT | Mod: ,,, | Performed by: PHYSICIAN ASSISTANT

## 2019-12-31 PROCEDURE — 73030 XR SHOULDER COMPLETE 2 OR MORE VIEWS LEFT: ICD-10-PCS | Mod: 26,LT,, | Performed by: RADIOLOGY

## 2019-12-31 PROCEDURE — 99999 PR PBB SHADOW E&M-EST. PATIENT-LVL III: ICD-10-PCS | Mod: PBBFAC,,, | Performed by: PHYSICIAN ASSISTANT

## 2019-12-31 PROCEDURE — 1159F PR MEDICATION LIST DOCUMENTED IN MEDICAL RECORD: ICD-10-PCS | Mod: ,,, | Performed by: PHYSICIAN ASSISTANT

## 2019-12-31 PROCEDURE — 99999 PR PBB SHADOW E&M-EST. PATIENT-LVL III: CPT | Mod: PBBFAC,,, | Performed by: PHYSICIAN ASSISTANT

## 2019-12-31 PROCEDURE — 73030 X-RAY EXAM OF SHOULDER: CPT | Mod: 26,LT,, | Performed by: RADIOLOGY

## 2019-12-31 PROCEDURE — 99214 OFFICE O/P EST MOD 30 MIN: CPT | Mod: S$PBB,,, | Performed by: PHYSICIAN ASSISTANT

## 2019-12-31 PROCEDURE — 1125F PR PAIN SEVERITY QUANTIFIED, PAIN PRESENT: ICD-10-PCS | Mod: ,,, | Performed by: PHYSICIAN ASSISTANT

## 2019-12-31 PROCEDURE — 99214 PR OFFICE/OUTPT VISIT, EST, LEVL IV, 30-39 MIN: ICD-10-PCS | Mod: S$PBB,,, | Performed by: PHYSICIAN ASSISTANT

## 2019-12-31 RX ORDER — MELOXICAM 15 MG/1
15 TABLET ORAL DAILY
Qty: 30 TABLET | Refills: 2 | Status: SHIPPED | OUTPATIENT
Start: 2019-12-31 | End: 2022-12-13 | Stop reason: HOSPADM

## 2019-12-31 NOTE — PROGRESS NOTES
CC: LEFT shoulder pain     69 y.o. Female with a history of left shoulder pain who presents for evaluation. Patient is s/p RIGHT total shoulder replacement, RCR, and BT on 4/1/2019. Approximately 3 weeks ago, patient sustained a fall after tripping over an umbrella and landed on her left shoulder/side. She denies any head trauma or LOC. Since the fall, patient has experienced a constant, dull pain in the left posterior shoulder that is worsened with overhead movements. She denies any numbness, tingling, or radicular symptoms. Patient went to  a few days after the injury where she was instructed to treat conservatively with OTC NSAIDs and heat/cold therapy which has mildly alleviated her symptoms. She states her pain today is 2/10.    She reports that the pain and weakness is worse with overhead activity. It also bothers her at night.    Is affecting ADLs.  Pain is 4/10 at it's worst.      Past Medical History:   Diagnosis Date    AR (allergic rhinitis) 12/7/2012    Atrial flutter     ablation October 2008    Cataract     Generalized headaches     GERD (gastroesophageal reflux disease) 03/08/2013    resolved    Grave's disease     s/p radioactive iodine, now hypothyroidism    Keloid cicatrix     Obesity     Osteoarthritis     shoulders, hands, knees    Positive PPD, treated     9 months of INH, completed 1/2010       Past Surgical History:   Procedure Laterality Date    ABLATION OF DYSRHYTHMIC FOCUS  10/24/2008    atrial flutter    ARTHROPLASTY OF SHOULDER Right 4/1/2019    Procedure: ARTHROPLASTY, SHOULDER;  Surgeon: Sage Xie MD;  Location: [facility];  Service: Orthopedics;  Laterality: Right;  regional w/ catheter (q-ball)    CATARACT EXTRACTION W/  INTRAOCULAR LENS IMPLANT Bilateral     COLONOSCOPY N/A 11/5/2015    Procedure: COLONOSCOPY;  Surgeon: Jose Ly MD;  Location: [facility] ([address] [place] [address]);  Service: Endoscopy;  Laterality: N/A;  Last colonoscopy 2008 with Dr. Vieira; Pt  wanted this day    EYE SURGERY      cataract removal right eye    HERNIA REPAIR      KNEE ARTHROSCOPY W/ DEBRIDEMENT      right, 2005 and 2008    KNEE SURGERY  3-27-14    right TKA       Family History   Problem Relation Age of Onset    Diabetes Mother     Glaucoma Father     Cataracts Father     No Known Problems Sister     No Known Problems Son     No Known Problems Brother     No Known Problems Maternal Aunt     No Known Problems Maternal Uncle     No Known Problems Paternal Aunt     No Known Problems Paternal Uncle     No Known Problems Maternal Grandmother     No Known Problems Maternal Grandfather     No Known Problems Paternal Grandmother     No Known Problems Paternal Grandfather     Colon cancer Neg Hx     Ovarian cancer Neg Hx     Breast cancer Neg Hx          Current Outpatient Medications:     aspirin (ECOTRIN) 325 MG EC tablet, Take 1 tablet (325 mg total) by mouth 2 (two) times daily. Take an antacid with medication. for 42 doses, Disp: 42 tablet, Rfl: 0    b complex vitamins capsule, Take 1 capsule by mouth once daily., Disp: , Rfl:     calcium-vitamin D3-vitamin K (VIACTIV) 500-100-40 mg-unit-mcg Chew, Take 1 tablet by mouth Daily., Disp: , Rfl:     cholecalciferol, vitamin D3, (VITAMIN D3) 2,000 unit Cap, Take 1 capsule by mouth once daily., Disp: , Rfl:     fish oil-omega-3 fatty acids 300-1,000 mg capsule, Take 2 g by mouth once daily. , Disp: , Rfl:     multivitamin-minerals-lutein (CENTRUM SILVER) Tab, Take 1 tablet by mouth once daily. , Disp: , Rfl:     ranitidine (ZANTAC) 150 MG tablet, Take 1 tablet (150 mg total) by mouth 2 (two) times daily. for 7 days, Disp: 14 tablet, Rfl: 0    SYNTHROID 137 mcg Tab tablet, Take 1 tablet (137 mcg total) by mouth once daily., Disp: 90 tablet, Rfl: 4    triamcinolone acetonide 0.1% (KENALOG) 0.1 % ointment, Apply topically 2 (two) times daily., Disp: 30 g, Rfl: 1    UBIDECARENONE (COENZYME Q10) 100 mg Tab, Take 1 tablet by  mouth once daily., Disp: , Rfl:     Current Facility-Administered Medications:     acetaminophen-codeine 300-30mg per tablet 1 tablet, 1 tablet, Oral, Q6H PRN, Angelica Mahan PA-C    Review of patient's allergies indicates:   Allergen Reactions    Hydrocodone-acetaminophen Other (See Comments)     Low blood pressure; doesn't want    Oxycodone-acetaminophen Other (See Comments)     Causes low blood pressure; doesn't want  4/4/19 - patient reports she is not actually allergic to it (just doesn't like how it makes her feel)          REVIEW OF SYSTEMS:  Constitution: Negative. Negative for chills, fever and night sweats.   HENT: Negative for congestion and headaches.    Eyes: Negative for blurred vision, left vision loss and right vision loss.   Cardiovascular: Negative for chest pain and syncope.   Respiratory: Negative for cough and shortness of breath.    Endocrine: Negative for polydipsia, polyphagia and polyuria.   Hematologic/Lymphatic: Negative for bleeding problem. Does not bruise/bleed easily.   Skin: Negative for dry skin, itching and rash.   Musculoskeletal: Negative for falls.  Positive for left shoulder pain and muscle weakness.   Gastrointestinal: Negative for abdominal pain and bowel incontinence.   Genitourinary: Negative for bladder incontinence and nocturia.   Neurological: Negative for disturbances in coordination, loss of balance and seizures.   Psychiatric/Behavioral: Negative for depression. The patient does not have insomnia.    Allergic/Immunologic: Negative for hives and persistent infections.      PHYSICAL EXAMINATION:  Constitutional:  Well-developed, well-nourished. In no apparent distress  HENT:  Normocephalic, atraumatic  Eyes:  PERRL, EOM normal, conjunctivae normal  Neck:  Supple, ROM normal  Cardiovascular:  Intact distal pulses bilaterally, Regular rate by palpation of distal pulse, normal color and temperature. No concerning varicosities on affected side. Capillary refill < 2  seconds.  Pulmonary:  Effort normal, no respiratory distress, no wheezing appreciated  Neurological:  Alert and oriented x3, no sensory deficits to affected side, normal coordination  Skin:  Warm, dry, without rash or erythema  Psychiatric:  Mood and affect normal, behavior normal, linear thought content, appropriate judgement      LEFT Shoulder / Upper Extremity Exam    OBSERVATION:     Swelling  none  Deformity  none   Discoloration  none   Scapular winging none   Scars   none  Atrophy  none    TENDERNESS / CREPITUS (T/C):          T/C      T/C   Clavicle   -/-  SUPRAspinatus    -/-     AC Jt.    -/-  INFRAspinatus  -/-    SC Jt.    -/-  Deltoid    -/-      G. Tuberosity  -/-  LH BICEP groove  -/-   Acromion:  -/-  Midline Neck   -/-     Scapular Spine -/-  Trapezium   -/-   SMA Scapula  -/-  GH jt. line - post  -/-     Scapulothoracic  +/-         ROM: (* = with pain)  Right shoulder   Left shoulder        AROM (PROM)   AROM (PROM)   FE    165° (170°)     170° (175°)     ER at 0°    60°  (65°)    60°  (65°)   ER at 90° ABD  90°  (90°)    90°  (90°)   IR at 90°  ABD   NA  (40°)     NA  (40°)      IR (spine level)   T10     T8    STRENGTH: (* = with pain) Right shoulder   Left shoulder    SCAPTION   5/5    5/5    IR    5/5    5/5   ER    5/5    5/5   BICEPS   5/5    5/5   Deltoid    5/5    5/5     SIGNS:  Painful side       NEER   -    OHENRIS  neg    RODAS   -    SPEEDS  neg     DROP ARM   -   BELLY PRESS neg   Superior escape none    LIFT-OFF  neg   X-Body ADD    neg    MOVING VALGUS neg        STABILITY TESTING    Right shoulder   Left shoulder    Translation     Anterior  up face     up face    Posterior  up face    up face    Sulcus   < 10mm    < 10 mm     Signs   Apprehension   neg      neg       Relocation   no change     no change      Jerk test  neg     neg    EXTREMITY NEURO-VASCULAR EXAM:    Sensation grossly intact to light touch all dermatomal regions.    DTR 2+ Biceps, Triceps, BR and  Negative Jarreds sign   Grossly intact motor function at Elbow, Wrist and Hand   Distal pulses radial and ulnar 2+, brisk cap refill, symmetric.      NECK:  Painless FROM and spinous processes non-tender. Negative Spurlings sign.      OTHER FINDINGS:  scapular dyskinesia    XRAYS Left shoulder (12/31/19)  Xrays including AP, Outlet and Axillary Lateral of Left shoulder are ordered / images reviewed by me    FINDINGS:  Three views: There is mild DJD.  No fracture dislocation bone destruction seen.  No trauma seen.        ASSESSMENT:    1. Left shoulder pain   2.  Left shoulder DJD  3. Left trapezius strain    PLAN:      1. I made the decision to obtain old records of the patient including previous notes and imaging. New imaging was ordered today of the extremity or extremities evaluated. I independently reviewed and interpreted the radiographs and/or MRIs today as well as prior imaging.    2. Mobic 15 mg QD e-prescribed to patient's pharmacy. Instructed to hold all other NSAIDs while taking Mobic.     3. Instructed patient on HEP and other conservative measures such as ice/heat therapy.    4. RTC in April 2020 for 1 year f/u Right TSA    All questions were answered, patient will contact us for questions or concerns in the interim.

## 2020-01-10 LAB
FINAL PATHOLOGIC DIAGNOSIS: NORMAL
Lab: NORMAL

## 2020-01-13 ENCOUNTER — TELEPHONE (OUTPATIENT)
Dept: OBSTETRICS AND GYNECOLOGY | Facility: CLINIC | Age: 70
End: 2020-01-13

## 2020-01-30 ENCOUNTER — TELEPHONE (OUTPATIENT)
Dept: INTERNAL MEDICINE | Facility: CLINIC | Age: 70
End: 2020-01-30

## 2020-02-06 ENCOUNTER — TELEPHONE (OUTPATIENT)
Dept: INTERNAL MEDICINE | Facility: CLINIC | Age: 70
End: 2020-02-06

## 2020-02-06 NOTE — LETTER
February 6, 2020    Poonam Alvarez  [address] [facility] [address]       Date of Birth 1950      [facility]  [address]  [facility] [address]  Phone: [phone]  Fax: [phone] To Whom It May Concern:    Ms Alvarez has a history of a positive PPD.    Her chest xray on 12/17/19 was normal    If you have any questions or concerns, please don't hesitate to call.    Sincerely,        Lindsey Bach MD

## 2020-02-06 NOTE — TELEPHONE ENCOUNTER
----- Message from Ani Lizama sent at 2/6/2020  8:09 AM CST -----  Contact: Patient [phone]  Patient has not received copy of xray. Please fax to [phone]

## 2020-02-19 ENCOUNTER — HOSPITAL ENCOUNTER (OUTPATIENT)
Dept: RADIOLOGY | Facility: CLINIC | Age: 70
Discharge: HOME | End: 2020-02-19
Attending: NURSE PRACTITIONER
Payer: MEDICARE

## 2020-02-19 DIAGNOSIS — Z78.0 POST-MENOPAUSAL: ICD-10-CM

## 2020-02-19 DIAGNOSIS — M85.80 BONE LOSS: ICD-10-CM

## 2020-02-19 PROCEDURE — 77080 DXA BONE DENSITY AXIAL: CPT | Mod: TC

## 2020-02-19 PROCEDURE — 77080 DXA BONE DENSITY AXIAL: CPT | Mod: 26,,, | Performed by: INTERNAL MEDICINE

## 2020-02-19 PROCEDURE — 77080 DEXA BONE DENSITY SPINE HIP: ICD-10-PCS | Mod: 26,,, | Performed by: INTERNAL MEDICINE

## 2020-02-24 ENCOUNTER — TELEPHONE (OUTPATIENT)
Dept: ORTHOPEDICS | Facility: CLINIC | Age: 70
End: 2020-02-24

## 2020-02-24 NOTE — TELEPHONE ENCOUNTER
Spoke to patient scheduled appointment for 3/6 at 2pm for cortisone injection. Patient was agreeable.      ----- Message from Nimisha Fan sent at 2/24/2020  8:13 AM CST -----  Contact: pt @ [phone]  Asking to schedule an appt sooner than next available in [facility] (3/9) with Dr. Mcmanus, please call.

## 2020-03-02 ENCOUNTER — OFFICE VISIT (OUTPATIENT)
Dept: URGENT CARE | Facility: CLINIC | Age: 70
End: 2020-03-02
Payer: MEDICARE

## 2020-03-02 VITALS
HEART RATE: 83 BPM | SYSTOLIC BLOOD PRESSURE: 134 MMHG | HEIGHT: 71 IN | TEMPERATURE: 98 F | DIASTOLIC BLOOD PRESSURE: 81 MMHG | RESPIRATION RATE: 18 BRPM | OXYGEN SATURATION: 99 % | WEIGHT: 211 LBS | BODY MASS INDEX: 29.54 KG/M2
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
## 2020-03-02 PROCEDURE — 99213 OFFICE O/P EST LOW 20 MIN: CPT | Mod: S$GLB,,, | Performed by: PHYSICIAN ASSISTANT

## 2020-03-02 PROCEDURE — 99213 PR OFFICE/OUTPT VISIT, EST, LEVL III, 20-29 MIN: ICD-10-PCS | Mod: S$GLB,,, | Performed by: PHYSICIAN ASSISTANT

## 2020-03-02 RX ORDER — HYDROCHLOROTHIAZIDE 12.5 MG/1
TABLET ORAL
COMMUNITY
Start: 2019-12-26 | End: 2020-12-18
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
## 2020-03-05 ENCOUNTER — PATIENT OUTREACH (OUTPATIENT)
Dept: ADMINISTRATIVE | Facility: OTHER | Age: 70
End: 2020-03-05

## 2020-03-05 NOTE — PROGRESS NOTES
Chart reviewed.   Immunizations: updated  Orders placed: n/a  Upcoming appts to satisfy CAMILO topics: Mammogram 3/06

## 2020-03-06 ENCOUNTER — OFFICE VISIT (OUTPATIENT)
Dept: ORTHOPEDICS | Facility: CLINIC | Age: 70
End: 2020-03-06
Payer: MEDICARE

## 2020-03-06 ENCOUNTER — TELEPHONE (OUTPATIENT)
Dept: OBSTETRICS AND GYNECOLOGY | Facility: CLINIC | Age: 70
End: 2020-03-06

## 2020-03-06 VITALS
SYSTOLIC BLOOD PRESSURE: 107 MMHG | BODY MASS INDEX: 29.54 KG/M2 | DIASTOLIC BLOOD PRESSURE: 68 MMHG | HEIGHT: 71 IN | HEART RATE: 73 BPM | WEIGHT: 211 LBS

## 2020-03-06 DIAGNOSIS — M17.12 PRIMARY OSTEOARTHRITIS OF LEFT KNEE: Primary | ICD-10-CM

## 2020-03-06 PROCEDURE — 20610 DRAIN/INJ JOINT/BURSA W/O US: CPT | Mod: PBBFAC | Performed by: NURSE PRACTITIONER

## 2020-03-06 PROCEDURE — 99213 OFFICE O/P EST LOW 20 MIN: CPT | Mod: 25,S$PBB,, | Performed by: NURSE PRACTITIONER

## 2020-03-06 PROCEDURE — 20610 DRAIN/INJ JOINT/BURSA W/O US: CPT | Mod: S$PBB,LT,, | Performed by: NURSE PRACTITIONER

## 2020-03-06 PROCEDURE — 99213 OFFICE O/P EST LOW 20 MIN: CPT | Mod: PBBFAC | Performed by: NURSE PRACTITIONER

## 2020-03-06 PROCEDURE — 99213 PR OFFICE/OUTPT VISIT, EST, LEVL III, 20-29 MIN: ICD-10-PCS | Mod: 25,S$PBB,, | Performed by: NURSE PRACTITIONER

## 2020-03-06 PROCEDURE — 20610 PR DRAIN/INJECT LARGE JOINT/BURSA: ICD-10-PCS | Mod: S$PBB,LT,, | Performed by: NURSE PRACTITIONER

## 2020-03-06 PROCEDURE — 99999 PR PBB SHADOW E&M-EST. PATIENT-LVL III: ICD-10-PCS | Mod: PBBFAC,,, | Performed by: NURSE PRACTITIONER

## 2020-03-06 PROCEDURE — 99999 PR PBB SHADOW E&M-EST. PATIENT-LVL III: CPT | Mod: PBBFAC,,, | Performed by: NURSE PRACTITIONER

## 2020-03-06 RX ADMIN — TRIAMCINOLONE ACETONIDE 40 MG: 40 INJECTION, SUSPENSION INTRA-ARTICULAR; INTRAMUSCULAR at 03:03

## 2020-03-06 NOTE — PROGRESS NOTES
CC: Injections of the Left Knee      HPI: Pt with c/o left knee pain for the past few weeks. The pain is aching and medial. It is worse with increased activity. She takes nsaids with minimal relief. She had a right knee replacement and is not ready for a left knee replacement yet. She has had cortisone and gel injections with good relief in the past. She would like to have a cortisone injection today and she would like to have the gel injections once approved by insurance. 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 She is ambulating without assistive device. There is not a limp..    ROS  General: denies fever and chills  Resp: no c/o sob  CVS: no c/o cp  MSK: c/o left knee pain which is aching and medial and worse with increased activity    PE  General: AAOx3, pleasant and cooperative  Resp: respirations even and unlabored  MSK: left knee exam  0 degrees extension  120 degrees flexion  No warmth or erythema   - effusion  5/5 quad strength  + crepitus    Assessment:  Left knee djd    Plan:  Left knee cortisone injection today  euflexxa injections to start in 2 weeks, once insurance approval is obtained  RICE  F/u for gel injections    Knee Injection Procedure Note  Diagnosis: left knee degenerative arthritis  Indications: left knee pain  Procedure Details: Verbal consent was obtained for the procedure. The injection site was identified and the skin was prepared with alcohol. The left knee was injected from an anterolateral approach with 1 ml of Kenalog and 4 ml Lidocaine under sterile technique using a 22 gauge needle. The needle was removed and the area cleansed and dressed.  Complications:  Patient tolerated the procedure well.    she was advised to rest the knee today, using ice and elevation as needed for comfort and swelling.Immediate relief of the knee pain may be short lived and secondary to the lidocaine. she may have an increase in discomfort tonight followed by steady improvement  over the next several days. It may take 1-2 weeks following the injection to get the full benefit of the medication.

## 2020-03-07 NOTE — TELEPHONE ENCOUNTER
----- Message from Barbi Lemus sent at 2/5/2018 11:51 AM CST -----  Contact: self   Pt id requesting a call back regarding scheduling an appt for her Rt knee pains with Lexie sooner than 2/16th, which was the next available appt. [phone]   Patient adamantly refuses another vest-states she is leaving and the one she has is just fine.

## 2020-03-08 RX ORDER — TRIAMCINOLONE ACETONIDE 40 MG/ML
40 INJECTION, SUSPENSION INTRA-ARTICULAR; INTRAMUSCULAR
Status: COMPLETED | OUTPATIENT
Start: 2020-03-06 | End: 2020-03-06

## 2020-03-11 ENCOUNTER — TELEPHONE (OUTPATIENT)
Dept: INTERNAL MEDICINE | Facility: CLINIC | Age: 70
End: 2020-03-11

## 2020-03-11 RX ORDER — AMOXICILLIN 875 MG/1
875 TABLET, FILM COATED ORAL EVERY 12 HOURS
Qty: 20 TABLET | Refills: 0 | Status: SHIPPED | OUTPATIENT
Start: 2020-03-11 | End: 2020-03-21

## 2020-03-11 NOTE — TELEPHONE ENCOUNTER
----- Message from Ofelia Sandhu sent at 3/11/2020  9:47 AM CDT -----  Contact: [phone]  Patient is requesting a call from the regarding a cough she has.  Patient stated she tried Municinex but it is not working.    Please advise, thank you.
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
## 2020-03-20 ENCOUNTER — NURSE TRIAGE (OUTPATIENT)
Dept: ADMINISTRATIVE | Facility: CLINIC | Age: 70
End: 2020-03-20

## 2020-03-20 NOTE — TELEPHONE ENCOUNTER
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 Ms. Alvarez has cared for her patient for 2 years prior to death. Ms. Alvarez  is concerned for age and exposure to COVID-19. She has started to exhibit a productive cough over the past week that has progressively worsened despite taking amoxicillin and mucinex. Cough is associated with occasional mild SOB. She denies fever, chills, sweats.     Reason for Disposition   Cough with cold symptoms (e.g., runny nose, postnasal drip, throat clearing)   COVID-19 EXPOSURE within last 14 days AND NO cough, fever, or breathing difficulty AND exposed person is a healthcare worker who was NOT using all recommended personal protective equipment (i.e., a respirator-N95 mask, eye protection, gloves, and gown)    Additional Information   Negative: Severe difficulty breathing (e.g., struggling for each breath, speaks in single words)   Negative: Bluish (or gray) lips or face now   Negative: [1] Difficulty breathing AND [2] exposure to flames, smoke, or fumes   Negative: [1] Stridor AND [2] difficulty breathing   Negative: Sounds like a life-threatening emergency to the triager   Negative: [1] Previous asthma attacks AND [2] this feels like asthma attack   Negative: Dry (non-productive) cough (i.e., no sputum or minimal clear sputum)   Negative: Chest pain  (Exception: MILD central chest pain, present only when coughing)   Negative: Difficulty breathing   Negative: Patient sounds very sick or weak to the triager   Negative: Fever > 103 F (39.4 C)   Negative: [1] Coughed up blood AND [2] > 1 tablespoon (15 ml) (Exception: blood-tinged sputum)   Negative: [1] Fever > 101 F (38.3 C) AND [2] age > 60   Negative: [1] Fever > 100.0 F (37.8 C) AND [2] bedridden (e.g., nursing home patient, CVA, chronic illness, recovering from surgery)   Negative: [1] Fever > 100.0 F (37.8 C) AND [2] diabetes mellitus or weak immune  system (e.g., HIV positive, cancer chemo, splenectomy, chronic steroids)   Negative: Wheezing is present   Negative: SEVERE coughing spells (e.g., whooping sound after coughing, vomiting after coughing)   Negative: [1] Continuous (nonstop) coughing interferes with work or school AND [2] no improvement using cough treatment per Care Advice   Negative: Coughing up carl-colored (reddish-brown) sputum   Negative: Fever present > 3 days (72 hours)   Negative: [1] Fever returns after gone for over 24 hours AND [2] symptoms worse or not improved   Negative: [1] Using nasal washes and pain medicine > 24 hours AND [2] sinus pain (around cheekbone or eye) persists   Negative: Earache   Negative: [1] Known COPD or other severe lung disease (i.e., bronchiectasis, cystic fibrosis, lung surgery) AND [2] worsening symptoms (i.e., increased sputum purulence or amount, increased breathing difficulty   Negative: [1] Nasal discharge AND [2] present > 10 days   Negative: Cough has been present for > 3 weeks   Negative: [1] Coughed up blood-tinged sputum AND [2] more than once   Negative: Exposure to TB (Tuberculosis)   Negative: Severe difficulty breathing (e.g., struggling for each breath, speak in single words, bluish lips)   Negative: Sounds like a life-threatening emergency to the triager   Negative: Difficulty breathing (shortness of breath) occurs and onset > 14 days after COVID-19 EXPOSURE (Close Contact)   Negative: Cough occurs and onset > 14 days after COVID-19 EXPOSURE   Negative: Common cold symptoms and onset > 14 days after COVID-19 EXPOSURE   Negative: Difficulty breathing occurs within 14 days of COVID-19 EXPOSURE   Negative: Patient sounds very sick or weak to the triager   Negative: Fever or feeling feverish within 14 Days of COVID-19 EXPOSURE   Negative: Cough occurs within 14 days of COVID-19 EXPOSURE   Negative: Mild body aches, chills, diarrhea, headache, runny nose, or sore throat occur  within 14 days of COVID-19 EXPOSURE   Negative: Fever (or feeling feverish) or symptoms of lower respiratory illness (e.g., cough, difficulty breathing) AND TRAVEL FROM [place] (or other CDC identified high risk travel area) within last 14 days    Protocols used: COUGH - ACUTE [id number], CORONAVIRUS (COVID-19) EXPOSURE-A-OH

## 2020-03-23 ENCOUNTER — OFFICE VISIT (OUTPATIENT)
Dept: INTERNAL MEDICINE | Facility: CLINIC | Age: 70
End: 2020-03-23
Payer: MEDICARE

## 2020-03-23 VITALS — HEART RATE: 67 BPM | SYSTOLIC BLOOD PRESSURE: 110 MMHG | DIASTOLIC BLOOD PRESSURE: 80 MMHG | OXYGEN SATURATION: 98 %

## 2020-03-23 DIAGNOSIS — E89.0 HYPOTHYROIDISM, POSTABLATIVE: ICD-10-CM

## 2020-03-23 DIAGNOSIS — K21.9 GASTROESOPHAGEAL REFLUX DISEASE WITHOUT ESOPHAGITIS: ICD-10-CM

## 2020-03-23 DIAGNOSIS — I10 ESSENTIAL HYPERTENSION: ICD-10-CM

## 2020-03-23 DIAGNOSIS — J06.9 UPPER RESPIRATORY TRACT INFECTION, UNSPECIFIED TYPE: Primary | ICD-10-CM

## 2020-03-23 PROCEDURE — 99214 PR OFFICE/OUTPT VISIT, EST, LEVL IV, 30-39 MIN: ICD-10-PCS | Mod: S$PBB,,, | Performed by: INTERNAL MEDICINE

## 2020-03-23 PROCEDURE — 99999 PR PBB SHADOW E&M-EST. PATIENT-LVL II: ICD-10-PCS | Mod: PBBFAC,,, | Performed by: INTERNAL MEDICINE

## 2020-03-23 PROCEDURE — 99999 PR PBB SHADOW E&M-EST. PATIENT-LVL II: CPT | Mod: PBBFAC,,, | Performed by: INTERNAL MEDICINE

## 2020-03-23 PROCEDURE — 99214 OFFICE O/P EST MOD 30 MIN: CPT | Mod: S$PBB,,, | Performed by: INTERNAL MEDICINE

## 2020-03-23 PROCEDURE — 99212 OFFICE O/P EST SF 10 MIN: CPT | Mod: PBBFAC | Performed by: INTERNAL MEDICINE

## 2020-03-23 NOTE — PROGRESS NOTES
CHIEF COMPLAINT:  COVID 19 exposure     HISTORY OF PRESENT ILLNESS: This is a 69-year-old woman who presents due to COVID exposure.  She had an upper respiratory tract infection on 3/10/20. She is taking a course of amoxil 875 mg twice daily with mucinex every morning.  No fever, chills, body aches. She had a loose stool (she is on magnesium which is helping her stools).  Cough is much improved.  Cough is not keeping awake. She was exposed to COVID 19.  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 The woman was later found to be COVID 19 positive.  Another participant of the program is in the hospital in the ICU.  Mrs Alvarez's last day at the program was 3/12/20.  NO chest pain, shortness of breath, nausea, vomiting.      BP has been doing well.  She is taking HCTZ 12.5 mg sporadically which does not help her swelling.  She would like a different diuretic to take as needed      She continues to take Levoxyl 137 mcg daily for hypothyroidism.       She has a history of positive PPD 2010 - took INH for 9 months, CXR  was fine. NO fever, chills, night sweats, weight loss.       She has chronic constipation.   She has a BM twice week. She takes stool softner sporadically.  She will take dulcolax as needed. Constipation is worse on ideal protein       She is currently working part time for [employer] at [employer] - as a .            PAST MEDICAL HISTORY:   1. Atrial flutter, status post ablation in 2008.   2. Graves disease, status post radioactive iodine, now hypothyroid.   3. Hypertension.   4. History of headaches.   5. Osteoarthritis of the shoulders, hands and knees.   6. Status post arthroscopic surgery of the right knee 2008.   7. Status post arthroscopic surgery of the right knee in .   8. Tummy clarick 20 years ago.   9. Hemorrhoids removed.   10. Cataract removal bilaterally  11.  Positive PPD diagnosed , completed 9 months of INH 1/10  12. GERD     SOCIAL HISTORY: She does not smoke. She drinks alcohol twice a year.   She is , has three children. She is a .     FAMILY HISTORY: Father  at age 83 from complications of pneumonia.   Mother  at age 98 from old age. Sister had a pulmonary embolus, another   sister had sarcoidosis. A son has Crohn's disease.        MEDICATIONS AND ALLERGIES: Updated in EPIC.       PHYSICAL EXAMINATION:    /80   Pulse 67   SpO2 98%     GENERAL: She is alert, oriented, no apparent distress. Affect within normal limits.   Conjunctiva anicteric. Tympanic membranes clear. Oropharynx clear.    NECK: Supple.   RESPIRATORY: Effort normal. Lungs are clear to auscultation.   HEART: Regular rate and rhythm without murmurs, gallops or rubs.   No lower extremity edema.           ASSESSMENT AND PLAN:      1. URI - continue amoxil. Improving on her own.  DO not supsect COVID 19 at this point in time. Long discussion regarding symptoms.   2. OA right shoulder - better after surgery  2. HTN-lasix as needed   3. OA knees - s/p injection in left knee - s/p right knee replacement - doing well.  4. ALlergic rhinitis -stable   5. Hypothyroidism - tsh stable  6. GERD - asx  7. Obestiy - doing well with diet, exercise and weight loss.   8. Positive ppd - cxr stable  9. Left inguinal hernia repair - doing well     Screening - mammogram . Bone density was normal 2008. Colonoscopy due 2015 - normal due      I will see her back in 6 months, sooner if problems arise.   INfluenza   adacel 10/18  Pneumovax   Prevnar 11/15

## 2020-03-24 ENCOUNTER — TELEPHONE (OUTPATIENT)
Dept: ORTHOPEDICS | Facility: CLINIC | Age: 70
End: 2020-03-24

## 2020-03-24 NOTE — TELEPHONE ENCOUNTER
Called patient, unable to leave message as mailbox is not set up.     We are currently unable to give joint injections due to the minimal potential to lower the immune system and any risk that may cause. We apologize for the inconvience.   Can reschedule into May or we can hold off until things have settled down.

## 2020-03-27 ENCOUNTER — TELEPHONE (OUTPATIENT)
Dept: INTERNAL MEDICINE | Facility: CLINIC | Age: 70
End: 2020-03-27

## 2020-03-27 RX ORDER — PROMETHAZINE HYDROCHLORIDE AND DEXTROMETHORPHAN HYDROBROMIDE 6.25; 15 MG/5ML; MG/5ML
SYRUP ORAL
Qty: 240 ML | Refills: 0 | Status: SHIPPED | OUTPATIENT
Start: 2020-03-27 | End: 2020-06-26 | Stop reason: ALTCHOICE

## 2020-03-27 NOTE — TELEPHONE ENCOUNTER
----- Message from Canelo Plaza sent at 3/27/2020 10:09 AM CDT -----  Contact: Patient [phone]  Patient would like to get medical advice.  Symptoms (please be specific):  Heavy cough  How long has patient had these symptoms:  At night  Pharmacy name and phone #:  [facility] #[id number] - [place] [address] AT [facility] [address] [phone] (Phone) [phone] (Fax)      Comments: patient stating still has cough only at night would like something called in, call to inform.    Please call an advise  Thank you

## 2020-03-27 NOTE — TELEPHONE ENCOUNTER
Pt has severe cough at night. She states that she was taking Nyquil however it isn't working. Pt would like a prescribed good Rx called to pharmacy. Pt has finished her amoxil and mucinx. She still has a light green sputum when she coughs. Please advise.

## 2020-05-14 ENCOUNTER — LAB VISIT (OUTPATIENT)
Dept: PRIMARY CARE CLINIC | Facility: CLINIC | Age: 70
End: 2020-05-14
Payer: MEDICARE

## 2020-05-14 DIAGNOSIS — Z00.6 RESEARCH STUDY PATIENT: Primary | ICD-10-CM

## 2020-05-14 LAB — SARS-COV-2 IGG SERPLBLD QL IA.RAPID: NEGATIVE

## 2020-05-14 PROCEDURE — 86769 SARS-COV-2 COVID-19 ANTIBODY: CPT

## 2020-05-14 PROCEDURE — U0002 COVID-19 LAB TEST NON-CDC: HCPCS

## 2020-05-14 NOTE — RESEARCH
Date of Consent: 05/14/2020     Sponsor: [facility]    Study Title/IRB Number: Observational study of Sars-CoV2 Immunoglobulin G (IgG) seroprevalence among the [facility] population over time 2020.163  Principle Investigator: Erinn Falcon, PhD    Did the patient need translation services? No   name: N/A    Prior to the Informed Consent (IC) being signed, or any study protocol required data collection, testing, procedure, or intervention being performed, the following was done and/or discussed:   Patient was given a paper copy of the IC for review    Patient was given study FAQ   Purpose of the study and qualifications to participate    Study design and tests or procedures done at this visit   Confidentiality and HIPAA Authorization for Release of Medical Records for the research trial/ subject's rights/research related injury   Risk, Benefits, Alternative Treatments, Compensation and Costs   Participation in the research trial is voluntary and patient may withdraw at anytime   Contact information for study related questions    Patient verbalizes understanding of the above: Yes  Contact information for PI and IRB given to patient: Yes  Patient able to adequately summarize: the purpose of the study, the risks associated with the study, and all procedures, testing, and follow-ups associated with the study: Yes    The consent was discussed verbally with the patient and all questions were answered satisfactorily. Patient gave verbal consent for the Seroprevalence research study with an IRB approval date of 05/08/2020.      The Consent, Consent Witness and name of Clinical Research Coordinator consenting was captured and documented in REDCap.    All Inclusion and Exclusion Criteria reviewed, subject meets all Inclusion criteria and does not meet any Exclusion Criteria at this time.     Patient Eligibility was confirmed.    Patient responded to survey questions.    The following biospecimen  collection procedures were collected:    -Nasopharyngeal Swab Collection  -Blood collection

## 2020-05-18 LAB — SARS-COV-2 RNA RESP QL NAA+PROBE: NOT DETECTED

## 2020-05-21 ENCOUNTER — TELEPHONE (OUTPATIENT)
Dept: INFECTIOUS DISEASES | Facility: CLINIC | Age: 70
End: 2020-05-21

## 2020-05-21 NOTE — TELEPHONE ENCOUNTER
----- Message from Tre Meng MA sent at 5/19/2020 10:36 AM CDT -----  Contact: Pt [phone]       ----- Message -----  From: Payal Salinas  Sent: 5/19/2020  10:31 AM CDT  To: Kelli Hannah PA-C, #      Pt called to get Covid results from test taken on 05/14/20, pt doesn't have access to [facility] and would like a call back. Pt can be reached [phone], thanks

## 2020-06-19 ENCOUNTER — PES CALL (OUTPATIENT)
Dept: ADMINISTRATIVE | Facility: CLINIC | Age: 70
End: 2020-06-19

## 2020-06-26 ENCOUNTER — HOSPITAL ENCOUNTER (OUTPATIENT)
Dept: RADIOLOGY | Facility: HOSPITAL | Age: 70
Discharge: HOME | End: 2020-06-26
Attending: NURSE PRACTITIONER
Payer: MEDICARE

## 2020-06-26 ENCOUNTER — OFFICE VISIT (OUTPATIENT)
Dept: INTERNAL MEDICINE | Facility: CLINIC | Age: 70
End: 2020-06-26
Payer: MEDICARE

## 2020-06-26 VITALS
HEART RATE: 67 BPM | SYSTOLIC BLOOD PRESSURE: 116 MMHG | BODY MASS INDEX: 29.43 KG/M2 | DIASTOLIC BLOOD PRESSURE: 80 MMHG | OXYGEN SATURATION: 98 % | HEIGHT: 71 IN

## 2020-06-26 DIAGNOSIS — E89.0 HYPOTHYROIDISM, POSTABLATIVE: ICD-10-CM

## 2020-06-26 DIAGNOSIS — Z00.00 ENCOUNTER FOR PREVENTIVE HEALTH EXAMINATION: Primary | ICD-10-CM

## 2020-06-26 DIAGNOSIS — Z12.31 ENCOUNTER FOR SCREENING MAMMOGRAM FOR BREAST CANCER: ICD-10-CM

## 2020-06-26 DIAGNOSIS — I77.819 ECTATIC AORTA: ICD-10-CM

## 2020-06-26 DIAGNOSIS — Z86.39 HISTORY OF GRAVES' DISEASE: ICD-10-CM

## 2020-06-26 DIAGNOSIS — I10 ESSENTIAL HYPERTENSION: ICD-10-CM

## 2020-06-26 DIAGNOSIS — L30.9 DERMATITIS: ICD-10-CM

## 2020-06-26 DIAGNOSIS — K21.9 GASTROESOPHAGEAL REFLUX DISEASE WITHOUT ESOPHAGITIS: ICD-10-CM

## 2020-06-26 PROBLEM — I77.9 BILATERAL CAROTID ARTERY DISEASE: Status: ACTIVE | Noted: 2020-06-26

## 2020-06-26 PROCEDURE — 99999 PR PBB SHADOW E&M-EST. PATIENT-LVL IV: ICD-10-PCS | Mod: PBBFAC,,, | Performed by: NURSE PRACTITIONER

## 2020-06-26 PROCEDURE — 77067 SCR MAMMO BI INCL CAD: CPT | Mod: TC

## 2020-06-26 PROCEDURE — 77063 MAMMO DIGITAL SCREENING BILAT WITH TOMOSYNTHESIS_CAD: ICD-10-PCS | Mod: 26,,, | Performed by: RADIOLOGY

## 2020-06-26 PROCEDURE — 77063 BREAST TOMOSYNTHESIS BI: CPT | Mod: 26,,, | Performed by: RADIOLOGY

## 2020-06-26 PROCEDURE — 77067 SCR MAMMO BI INCL CAD: CPT | Mod: 26,,, | Performed by: RADIOLOGY

## 2020-06-26 PROCEDURE — 99999 PR PBB SHADOW E&M-EST. PATIENT-LVL IV: CPT | Mod: PBBFAC,,, | Performed by: NURSE PRACTITIONER

## 2020-06-26 PROCEDURE — 99214 OFFICE O/P EST MOD 30 MIN: CPT | Mod: PBBFAC | Performed by: NURSE PRACTITIONER

## 2020-06-26 PROCEDURE — 99213 PR OFFICE/OUTPT VISIT, EST, LEVL III, 20-29 MIN: ICD-10-PCS | Mod: S$GLB,,, | Performed by: NURSE PRACTITIONER

## 2020-06-26 PROCEDURE — 77067 MAMMO DIGITAL SCREENING BILAT WITH TOMOSYNTHESIS_CAD: ICD-10-PCS | Mod: 26,,, | Performed by: RADIOLOGY

## 2020-06-26 PROCEDURE — 99213 OFFICE O/P EST LOW 20 MIN: CPT | Mod: S$GLB,,, | Performed by: NURSE PRACTITIONER

## 2020-06-26 RX ORDER — ASPIRIN 81 MG/1
81 TABLET ORAL DAILY
COMMUNITY
End: 2022-12-13 | Stop reason: HOSPADM

## 2020-06-26 RX ORDER — TRIAMCINOLONE ACETONIDE 1 MG/G
CREAM TOPICAL 2 TIMES DAILY PRN
Qty: 80 G | Refills: 0 | Status: SHIPPED | OUTPATIENT
Start: 2020-06-26 | End: 2020-12-18

## 2020-06-26 NOTE — PATIENT INSTRUCTIONS
Counseling and Referral of Other Preventative  (Italic type indicates deductible and co-insurance are waived)    Patient Name: Poonam Alvarez  Today's Date: 6/26/2020    Health Maintenance       Date Due Completion Date    Pneumococcal Vaccine (65+ Low/Medium Risk) (2 of 2 - PPSV23) 12/20/2017 11/30/2015    Shingles Vaccine (1 of 2) 07/11/2020 (Originally 12/12/1998) ---    Mammogram 02/18/2021 2/18/2019    DEXA SCAN 02/19/2023 2/19/2020    Lipid Panel 10/30/2023 10/30/2018    Colorectal Cancer Screening 11/05/2025 11/5/2015    TETANUS VACCINE 10/30/2028 10/30/2018        No orders of the defined types were placed in this encounter.    The following information is provided to all patients.  This information is to help you find resources for any of the problems found today that may be affecting your health:                Living healthy guide: www.[facility].[place].gov      Understanding Diabetes: www.diabetes.org      Eating healthy: www.cdc.gov/healthyweight      CDC home safety checklist: www.cdc.gov/steadi/patient.html      Agency on Aging: www.goea.[place].[facility]      Alcoholics anonymous (AA): www.aa.org      Physical Activity: www.ravinder.nih.gov/jm7miro      Tobacco use: www.quitwithusla.org

## 2020-06-26 NOTE — PROGRESS NOTES
"Poonam Alvarez presented for a  Medicare AWV and comprehensive Health Risk Assessment today. The following components were reviewed and updated:    · Medical history  · Family History  · Social history  · Allergies and Current Medications  · Health Risk Assessment  · Health Maintenance  · Care Team     ** See Completed Assessments for Annual Wellness Visit within the encounter summary.**       The following assessments were completed:  · Living Situation  · CAGE  · Depression Screening  · Timed Get Up and Go  · Whisper Test  Cognitive Function Screening    ·   · Nutrition Screening  · ADL Screening  · PAQ Screening    Vitals:    06/26/20 1605   BP: 116/80   Pulse: 67   SpO2: 98%   Height: 5' 11" (1.803 m)     Body mass index is 29.43 kg/m².  Physical Exam  Vitals signs and nursing note reviewed.   Constitutional:       Appearance: She is well-developed.   HENT:      Head: Normocephalic and atraumatic.      Nose: Nose normal.   Eyes:      Conjunctiva/sclera: Conjunctivae normal.   Cardiovascular:      Rate and Rhythm: Normal rate and regular rhythm.      Heart sounds: Normal heart sounds. No murmur.   Pulmonary:      Effort: Pulmonary effort is normal.      Breath sounds: Normal breath sounds.   Skin:     General: Skin is warm and dry.      Findings: Rash present.   Neurological:      Mental Status: She is alert and oriented to person, place, and time.   Psychiatric:         Behavior: Behavior normal.         Thought Content: Thought content normal.         Judgment: Judgment normal.           Diagnoses and health risks identified today and associated recommendations/orders:    1. Encounter for preventive health examination  Assessment performed. Health maintenance updated. Chart review completed.  Discussed pneumococcal vaccine due.    2. Ectatic aorta-Noted on imaging 7/27/2015  Noted on imaging. Chronic.Stable. Followed by PCP.    3. Dermatitis  - triamcinolone acetonide 0.1% (KENALOG) 0.1 % cream; Apply topically " 2 (two) times daily as needed. Leg rash  Dispense: 80 g; Refill: 0    4. Essential hypertension  Chronic. Stable. Followed by PCP.    5. History of Graves' disease  Noted.    6. Hypothyroidism, postablative  Chronic. Stable. Followed by PCP.    7. Gastroesophageal reflux disease without esophagitis  Chronic. Stable. Followed by PCP.      Provided Poonam with a 5-10 year written screening schedule and personal prevention plan. Recommendations were developed using the USPSTF age appropriate recommendations. Education, counseling, and referrals were provided as needed. After Visit Summary printed and given to patient which includes a list of additional screenings\tests needed.    Follow up for Annual Wellness Visit in 1 year, ;sooner if problems.    JOSE Rucker

## 2020-06-29 ENCOUNTER — TELEPHONE (OUTPATIENT)
Dept: OBSTETRICS AND GYNECOLOGY | Facility: CLINIC | Age: 70
End: 2020-06-29

## 2020-07-09 ENCOUNTER — PATIENT OUTREACH (OUTPATIENT)
Dept: ADMINISTRATIVE | Facility: OTHER | Age: 70
End: 2020-07-09

## 2020-07-09 NOTE — PROGRESS NOTES
Chart reviewed.   Immunizations: Triggered Imm Registry     Orders placed: n/a  Upcoming appts to satisfy CAMILO topics: n/a

## 2020-07-15 DIAGNOSIS — Z71.89 COMPLEX CARE COORDINATION: ICD-10-CM

## 2020-08-07 ENCOUNTER — OFFICE VISIT (OUTPATIENT)
Dept: PODIATRY | Facility: CLINIC | Age: 70
End: 2020-08-07
Payer: MEDICARE

## 2020-08-07 VITALS
HEART RATE: 76 BPM | SYSTOLIC BLOOD PRESSURE: 138 MMHG | BODY MASS INDEX: 29.4 KG/M2 | WEIGHT: 210 LBS | DIASTOLIC BLOOD PRESSURE: 67 MMHG | HEIGHT: 71 IN | TEMPERATURE: 98 F

## 2020-08-07 DIAGNOSIS — B35.3 TINEA PEDIS OF BOTH FEET: Primary | ICD-10-CM

## 2020-08-07 PROCEDURE — 99999 PR PBB SHADOW E&M-EST. PATIENT-LVL IV: ICD-10-PCS | Mod: PBBFAC,,, | Performed by: PODIATRIST

## 2020-08-07 PROCEDURE — 99999 PR PBB SHADOW E&M-EST. PATIENT-LVL IV: CPT | Mod: PBBFAC,,, | Performed by: PODIATRIST

## 2020-08-07 PROCEDURE — 99214 OFFICE O/P EST MOD 30 MIN: CPT | Mod: PBBFAC,PN | Performed by: PODIATRIST

## 2020-08-07 PROCEDURE — 99213 OFFICE O/P EST LOW 20 MIN: CPT | Mod: S$PBB,,, | Performed by: PODIATRIST

## 2020-08-07 PROCEDURE — 99213 PR OFFICE/OUTPT VISIT, EST, LEVL III, 20-29 MIN: ICD-10-PCS | Mod: S$PBB,,, | Performed by: PODIATRIST

## 2020-08-07 RX ORDER — CLOTRIMAZOLE AND BETAMETHASONE DIPROPIONATE 10; .64 MG/G; MG/G
CREAM TOPICAL DAILY
Qty: 45 G | Refills: 2 | Status: SHIPPED | OUTPATIENT
Start: 2020-08-07 | End: 2020-12-18

## 2020-08-07 NOTE — PATIENT INSTRUCTIONS
Fungal Skin Infection (Tinea)  A fungal infection occurs when too much fungus grows on or in the body. Fungus normally lives on the skin in small amounts and does not cause harm. But when too much grows on the skin, it causes an infection. This is also known as tinea. Fungal skin infections are common and not usually serious.  The infection often starts as a small red area the size of a pea. The skin may turn dry and flaky. The area may itch. As the fungus grows, it spreads out in a red Hughes. Because of how it looks, fungal skin infection is often called ringworm, but it is not caused by a worm. Fungal skin infections can occur on many parts of the body. They can grow on the head, chest, arms, or legs. They can occur on the buttocks. On the feet, fungal infection is known as athletes foot. It causes itchy, sometimes painful sores between the toes and the bottom or sides of the feet. In the groin, the rash is called jock itch.  People with weak immune systems can get a fungal infection more easily. This includes people with diabetes or HIV, or who are being treated for cancer. In these cases, the fungal infection can spread and cause severe illness. Fungal infections are also more common in people who are overweight.  In most cases, treatment is done with antifungal cream or ointment. If the infection is on your scalp, you may take oral medicine. In some cases, a tiny piece of the skin (biopsy) may be taken. This is so it can be tested in a lab.  Common fungal infections are treated with creams on the skin or oral medicine.  Home care  Follow all instructions when using antifungal cream or ointment on your skin. Your healthcare provider may advise using cornstarch powder to keep your skin dry or petroleum jelly to provide a barrier.  General care:  · If you were prescribed an oral medicine, read the patient information. Talk with your healthcare provider about the risks and side effects.  · Let your skin dry  completely after bathing. Carefully dry your feet and between your toes.  · Dress in loose cotton clothing.  · Dont scratch the affected area. This can delay healing and may spread the infection. It can also cause a bacterial infection.  · Keep your skin clean, but dont wash the skin too much. This can irritate your skin.  · Keep in mind that it may take a week before the fungus starts to go away. It can take 2 to 4 weeks to fully clear. To prevent it from coming back, use the medicine until the rash is all gone.  Follow-up care  Follow up with your healthcare provider if the rash does not get better after 10 days of treatment. Also follow up if the rash spreads to other parts of your body.  When to seek medical advice  Call your healthcare provider right away if any of these occur:  · Fever of 100.4°F (38°C) or higher  · Redness or swelling that gets worse  · Pain that gets worse  · Foul-smelling fluid leaking from the skin  Date Last Reviewed: 11/1/2016  © 0920-1095 [employer]. [address] [place] [address], [place] [address]. All rights reserved. This information is not intended as a substitute for professional medical care. Always follow your healthcare professional's instructions.

## 2020-08-07 NOTE — PROGRESS NOTES
"Subjective:      Patient ID: Poonam Alvarez is a 69 y.o. female.    Chief Complaint: Foot Problem (Knot under Right foot) and Blister    Pt presents today c/o itching, rash, and some blisters ("knots) on both feet.  She does not recall acute trauma to the area.  She has tried not treatment yet.    She has pain with direct palpation.   She states that she cleans her shoes often.             Patient Active Problem List   Diagnosis    Posterior subcapsular polar senile cataract    Osteoarthritis    History of Graves' disease    Hypothyroidism, postablative    Right posterior subcapsular cataract - Right Eye    Diplopia    Other after-cataract, not obscuring vision    Radial styloid tenosynovitis    Status post cataract extraction and insertion of intraocular lens - Right Eye    Pseudophakia of both eyes    Other specified disorder of skin    AR (allergic rhinitis)    GERD (gastroesophageal reflux disease)    Left knee pain    Primary localized osteoarthrosis, lower leg    Knee pain, right    Pain in joint, lower leg    S/P total knee arthroplasty    Acute hypokalemia    Hypovolemia    Pre-syncope    Hypokalemia    Hypophosphatemia    Constipation    Anticoagulation monitoring, special range    Ankle edema    Right shoulder pain    Screening for colon cancer    Bilateral knee pain    Essential hypertension    Left inguinal hernia    Primary osteoarthritis of right shoulder    Ectatic aorta-Noted on imaging 7/27/2015           Current Outpatient Medications on File Prior to Visit   Medication Sig Dispense Refill    aspirin (ECOTRIN) 81 MG EC tablet Take 81 mg by mouth once daily.      b complex vitamins capsule Take 1 capsule by mouth once daily.      calcium-vitamin D3-vitamin K (VIACTIV) 500-100-40 mg-unit-mcg Chew Take 1 tablet by mouth Daily.      cholecalciferol, vitamin D3, (VITAMIN D3) 2,000 unit Cap Take 1 capsule by mouth once daily.      fish oil-omega-3 fatty acids " "300-1,000 mg capsule Take 2 g by mouth once daily.       hydroCHLOROthiazide (HYDRODIURIL) 12.5 MG Tab       meloxicam (MOBIC) 15 MG tablet Take 1 tablet (15 mg total) by mouth once daily. 30 tablet 2    multivitamin-minerals-lutein (CENTRUM SILVER) Tab Take 1 tablet by mouth once daily.       SYNTHROID 137 mcg Tab tablet TAKE 1 TABLET(137 MCG) BY MOUTH EVERY DAY 90 tablet 3    triamcinolone acetonide 0.1% (KENALOG) 0.1 % cream Apply topically 2 (two) times daily as needed. Leg rash 80 g 0    UBIDECARENONE (COENZYME Q10) 100 mg Tab Take 1 tablet by mouth once daily.       Current Facility-Administered Medications on File Prior to Visit   Medication Dose Route Frequency Provider Last Rate Last Dose    acetaminophen-codeine 300-30mg per tablet 1 tablet  1 tablet Oral Q6H PRN Angelica Mahan PA-C             Review of patient's allergies indicates:   Allergen Reactions    Hydrocodone-acetaminophen Other (See Comments)     Low blood pressure; doesn't want    Oxycodone-acetaminophen Other (See Comments)     Causes low blood pressure; doesn't want  4/4/19 - patient reports she is not actually allergic to it (just doesn't like how it makes her feel)           Review of Systems   Constitution: Negative for chills, fever and malaise/fatigue.   HENT: Negative for hearing loss.    Cardiovascular: Negative for claudication.   Respiratory: Negative for shortness of breath.    Skin: Positive for dry skin, itching and rash. Negative for flushing.   Musculoskeletal: Negative for joint pain and myalgias.   Neurological: Negative for loss of balance, numbness, paresthesias and sensory change.   Psychiatric/Behavioral: Negative for altered mental status.           Objective:        Vitals:    08/07/20 1052   BP: 138/67   Pulse: 76   Temp: 98 °F (36.7 °C)   Weight: 95.3 kg (210 lb)   Height: 5' 11" (1.803 m)         Physical Exam  Cardiovascular:      Pulses:           Dorsalis pedis pulses are 2+ on the right side and 2+ on the " left side.        Posterior tibial pulses are 2+ on the right side and 2+ on the left side.      Comments: 1+ edema bilateral lower extremities.   +varicosities      Musculoskeletal:      Comments: Adequate joint ROM noted to all lower extremity muscle groups with no pain or crepitation noted. Muscle strength is 5/5 in all groups bilaterally.     Feet:      Right foot:      Protective Sensation: 5 sites tested. 5 sites sensed.      Left foot:      Protective Sensation: 5 sites tested. 5 sites sensed.      Comments:       Skin:     Comments: Scaling dryness in a moccasin distribution is noted to the bilateral lower extremities.    Scattered vesicles on the plantar surface without pus or cellulitis or erythema.        Neurological:      Comments: Intact gross sensation noted to b/L LEs.  Negative Tinels.   Negative Mulders.   No sensorimotor.                   Assessment:       Encounter Diagnosis   Name Primary?    Tinea pedis of both feet Yes         Plan:       Poonam was seen today for foot problem and blister.    Diagnoses and all orders for this visit:    Tinea pedis of both feet  -     clotrimazole-betamethasone 1-0.05% (LOTRISONE) cream; Apply topically once daily.      I counseled the patient on her conditions, their implications and medical management.    Maintain good foot hygiene.  No foot soaks.     Lysol to shoes weekly.      Prescription for Lotrisone to be applied as directed.     Follow up in six weeks for follow up tinea.    .

## 2020-08-18 ENCOUNTER — PATIENT OUTREACH (OUTPATIENT)
Dept: ADMINISTRATIVE | Facility: OTHER | Age: 70
End: 2020-08-18

## 2020-08-18 NOTE — PROGRESS NOTES
Patient's chart was reviewed for overdue CAMILO topics.  Immunizations reconciled.    Orders placed:n/a  Tasked appts:n/a  Labs Linked:n/a

## 2020-08-19 ENCOUNTER — OFFICE VISIT (OUTPATIENT)
Dept: ORTHOPEDICS | Facility: CLINIC | Age: 70
End: 2020-08-19
Payer: MEDICARE

## 2020-08-19 DIAGNOSIS — M17.12 PRIMARY OSTEOARTHRITIS OF LEFT KNEE: Primary | ICD-10-CM

## 2020-08-19 PROCEDURE — 20610 DRAIN/INJ JOINT/BURSA W/O US: CPT | Mod: PBBFAC | Performed by: NURSE PRACTITIONER

## 2020-08-19 PROCEDURE — 99999 PR PBB SHADOW E&M-EST. PATIENT-LVL III: ICD-10-PCS | Mod: PBBFAC,,, | Performed by: NURSE PRACTITIONER

## 2020-08-19 PROCEDURE — 99999 PR PBB SHADOW E&M-EST. PATIENT-LVL III: CPT | Mod: PBBFAC,,, | Performed by: NURSE PRACTITIONER

## 2020-08-19 PROCEDURE — 20610 PR DRAIN/INJECT LARGE JOINT/BURSA: ICD-10-PCS | Mod: S$PBB,LT,, | Performed by: NURSE PRACTITIONER

## 2020-08-19 PROCEDURE — 20610 DRAIN/INJ JOINT/BURSA W/O US: CPT | Mod: S$PBB,LT,, | Performed by: NURSE PRACTITIONER

## 2020-08-19 PROCEDURE — 99213 OFFICE O/P EST LOW 20 MIN: CPT | Mod: S$PBB,25,, | Performed by: NURSE PRACTITIONER

## 2020-08-19 PROCEDURE — 99213 PR OFFICE/OUTPT VISIT, EST, LEVL III, 20-29 MIN: ICD-10-PCS | Mod: S$PBB,25,, | Performed by: NURSE PRACTITIONER

## 2020-08-19 PROCEDURE — 99213 OFFICE O/P EST LOW 20 MIN: CPT | Mod: PBBFAC | Performed by: NURSE PRACTITIONER

## 2020-08-19 RX ORDER — TRIAMCINOLONE ACETONIDE 40 MG/ML
40 INJECTION, SUSPENSION INTRA-ARTICULAR; INTRAMUSCULAR
Status: COMPLETED | OUTPATIENT
Start: 2020-08-19 | End: 2020-08-19

## 2020-08-19 RX ADMIN — TRIAMCINOLONE ACETONIDE 40 MG: 40 INJECTION, SUSPENSION INTRA-ARTICULAR; INTRAMUSCULAR at 02:08

## 2020-08-19 NOTE — PROGRESS NOTES
CC: Injections of the Left Knee      HPI: Pt with left knee pain for the past few weeks. She has known left knee djd, but is not interested in a left knee replacement. She has had a right knee replacement in the past. She is taking otc medication without relief. She has had cortisone injections in the past with good relief and would like to repeat that today. She is ambulating without assistive device. There is not a limp.    ROS  General: denies fever and chills  Resp: no c/o sob  CVS: no c/o cp  MSK: c/o left knee pain which is aching and global and worse with increased activity    PE  General: AAOx3, pleasant and cooperative  Resp: respirations even and unlabored  MSK: left knee exam  0 degrees extension  120 degrees flexion  No warmth or erythema   - effusion  + crepitus    Assessment:  Left knee djd    Plan:  Cortisone injection left knee today  RICE  nsaids prn  F/u as needed    Knee Injection Procedure Note  Diagnosis: left knee degenerative arthritis  Indications: left knee pain  Procedure Details: Verbal consent was obtained for the procedure. The injection site was identified and the skin was prepared with alcohol. The left knee was injected from an anterolateral approach with 1 ml of Kenalog and 4 ml Lidocaine under sterile technique using a 22 gauge needle. The needle was removed and the area cleansed and dressed.  Complications:  Patient tolerated the procedure well.    she was advised to rest the knee today, using ice and elevation as needed for comfort and swelling.Immediate relief of the knee pain may be short lived and secondary to the lidocaine. she may have an increase in discomfort tonight followed by steady improvement over the next several days. It may take 1-2 weeks following the injection to get the full benefit of the medication.

## 2020-09-02 ENCOUNTER — TELEPHONE (OUTPATIENT)
Dept: ORTHOPEDICS | Facility: CLINIC | Age: 70
End: 2020-09-02

## 2020-09-02 DIAGNOSIS — M54.30 SCIATICA, UNSPECIFIED LATERALITY: Primary | ICD-10-CM

## 2020-09-02 NOTE — TELEPHONE ENCOUNTER
----- Message from Chelsey Moncada MA sent at 9/2/2020 11:30 AM CDT -----  Contact: self  Spoke to patient, she advised on the R side of her surgery leg on her thigh, she feels there is a slight internal discomfort/pain. She advised it only hurts when she lays down at night. Does not bother her during the day or to walk. She is unsure if it is muscular but has been bothering her a few months.      Please advise   ----- Message -----  From: Darren Chandra  Sent: 9/2/2020  10:15 AM CDT  To: Marietta Borja    Poonam is asking for a call back in regards to questions she has about pain she has from surgery she had years ago     Contact [facility]- [phone]

## 2020-09-02 NOTE — TELEPHONE ENCOUNTER
Returned call to patient. Xray of the hips and pelvis ordered for thigh pain when she lays down at night.

## 2020-09-02 NOTE — PROGRESS NOTES
Pt has pain in her thigh when she lays down. She is requesting an MRI of her hip. I explained that we can start with an xray of the hips and pelvis and take it from there. She denies pain when walking and sitting.

## 2020-10-14 ENCOUNTER — PATIENT OUTREACH (OUTPATIENT)
Dept: ADMINISTRATIVE | Facility: OTHER | Age: 70
End: 2020-10-14

## 2020-10-14 RX ORDER — PROMETHAZINE HYDROCHLORIDE AND DEXTROMETHORPHAN HYDROBROMIDE 6.25; 15 MG/5ML; MG/5ML
SYRUP ORAL
Qty: 240 ML | Refills: 0 | Status: SHIPPED | OUTPATIENT
Start: 2020-10-14 | End: 2021-11-24

## 2020-10-14 NOTE — PROGRESS NOTES
Health Maintenance Due   Topic Date Due    Shingles Vaccine (1 of 2) 12/12/1998    Pneumococcal Vaccine (65+ Low/Medium Risk) (2 of 2 - PPSV23) 12/20/2017    Influenza Vaccine (1) 08/01/2020     Updates were requested from care everywhere.  Chart was reviewed for overdue Proactive [facility] Encounters (CAMILO) topics (CRS, Breast Cancer Screening, Eye exam)  Health Maintenance has been updated.  LINKS immunization registry triggered.  Immunizations were reconciled.

## 2020-10-14 NOTE — PROGRESS NOTES
Refill Routing Note   Medication(s) are not appropriate for processing by [facility] for the following reason(s):     - Outside of protocol    [facility] actions taken in this encounter: Route          Medication reconciliation completed: No   Automatic Epic Generated Protocol Data:        Requested Prescriptions   Pending Prescriptions Disp Refills    promethazine-dextromethorphan (PROMETHAZINE-DM) 6.25-15 mg/5 mL Syrp [Pharmacy Med Name: PROMETHAZINE DM SYRUP] 240 mL 0     Sig: TAKE 5 TO 10 ML BY MOUTH AT BEDTIME AS NEEDED FOR COUGH       There is no refill protocol information for this order           Appointments  past 12m or future 3m with PCP    Date Provider   Last Visit   3/23/2020 Lindsey Bach MD   Next Visit   Visit date not found Lindsey Bach MD   ED visits in past 90 days: 0        Note composed:10:02 PM 10/13/2020

## 2020-10-16 ENCOUNTER — OFFICE VISIT (OUTPATIENT)
Dept: PODIATRY | Facility: CLINIC | Age: 70
End: 2020-10-16
Payer: MEDICARE

## 2020-10-16 VITALS
SYSTOLIC BLOOD PRESSURE: 130 MMHG | BODY MASS INDEX: 29.42 KG/M2 | WEIGHT: 210.13 LBS | HEART RATE: 67 BPM | DIASTOLIC BLOOD PRESSURE: 63 MMHG | HEIGHT: 71 IN

## 2020-10-16 DIAGNOSIS — B35.3 TINEA PEDIS OF BOTH FEET: Primary | ICD-10-CM

## 2020-10-16 PROCEDURE — 99999 PR PBB SHADOW E&M-EST. PATIENT-LVL III: ICD-10-PCS | Mod: PBBFAC,,, | Performed by: PODIATRIST

## 2020-10-16 PROCEDURE — 99999 PR PBB SHADOW E&M-EST. PATIENT-LVL III: CPT | Mod: PBBFAC,,, | Performed by: PODIATRIST

## 2020-10-16 PROCEDURE — 99212 PR OFFICE/OUTPT VISIT, EST, LEVL II, 10-19 MIN: ICD-10-PCS | Mod: S$PBB,,, | Performed by: PODIATRIST

## 2020-10-16 PROCEDURE — 99213 OFFICE O/P EST LOW 20 MIN: CPT | Mod: PBBFAC,PN | Performed by: PODIATRIST

## 2020-10-16 PROCEDURE — 99212 OFFICE O/P EST SF 10 MIN: CPT | Mod: S$PBB,,, | Performed by: PODIATRIST

## 2020-10-16 NOTE — PROGRESS NOTES
"Subjective:      Patient ID: Poonam Alvarez is a 69 y.o. female.    Chief Complaint: Tinea Pedis (bilateral )    Poonam Alvarez is a 69 y.o. female , presents for follow up of plantar right foot itching, rash, and some blisters ("knots) on both feet.  She did not recall acute trauma to the area.    She has been using Lotrisone when she "remembers" to.  She reports significant improvement to the area.            Patient Active Problem List   Diagnosis    Posterior subcapsular polar senile cataract    Osteoarthritis    History of Graves' disease    Hypothyroidism, postablative    Right posterior subcapsular cataract - Right Eye    Diplopia    Other after-cataract, not obscuring vision    Radial styloid tenosynovitis    Status post cataract extraction and insertion of intraocular lens - Right Eye    Pseudophakia of both eyes    Other specified disorder of skin    AR (allergic rhinitis)    GERD (gastroesophageal reflux disease)    Left knee pain    Primary localized osteoarthrosis, lower leg    Knee pain, right    Pain in joint, lower leg    S/P total knee arthroplasty    Acute hypokalemia    Hypovolemia    Pre-syncope    Hypokalemia    Hypophosphatemia    Constipation    Anticoagulation monitoring, special range    Ankle edema    Right shoulder pain    Screening for colon cancer    Bilateral knee pain    Essential hypertension    Left inguinal hernia    Primary osteoarthritis of right shoulder    Ectatic aorta-Noted on imaging 7/27/2015           Current Outpatient Medications on File Prior to Visit   Medication Sig Dispense Refill    aspirin (ECOTRIN) 81 MG EC tablet Take 81 mg by mouth once daily.      b complex vitamins capsule Take 1 capsule by mouth once daily.      calcium-vitamin D3-vitamin K (VIACTIV) 500-100-40 mg-unit-mcg Chew Take 1 tablet by mouth Daily.      cholecalciferol, vitamin D3, (VITAMIN D3) 2,000 unit Cap Take 1 capsule by mouth once daily.      " clotrimazole-betamethasone 1-0.05% (LOTRISONE) cream Apply topically once daily. 45 g 2    fish oil-omega-3 fatty acids 300-1,000 mg capsule Take 2 g by mouth once daily.       hydroCHLOROthiazide (HYDRODIURIL) 12.5 MG Tab       meloxicam (MOBIC) 15 MG tablet Take 1 tablet (15 mg total) by mouth once daily. 30 tablet 2    multivitamin-minerals-lutein (CENTRUM SILVER) Tab Take 1 tablet by mouth once daily.       promethazine-dextromethorphan (PROMETHAZINE-DM) 6.25-15 mg/5 mL Syrp TAKE 5 TO 10 ML BY MOUTH AT BEDTIME AS NEEDED FOR COUGH 240 mL 0    SYNTHROID 137 mcg Tab tablet TAKE 1 TABLET(137 MCG) BY MOUTH EVERY DAY 90 tablet 3    triamcinolone acetonide 0.1% (KENALOG) 0.1 % cream Apply topically 2 (two) times daily as needed. Leg rash 80 g 0    UBIDECARENONE (COENZYME Q10) 100 mg Tab Take 1 tablet by mouth once daily.       Current Facility-Administered Medications on File Prior to Visit   Medication Dose Route Frequency Provider Last Rate Last Dose    acetaminophen-codeine 300-30mg per tablet 1 tablet  1 tablet Oral Q6H PRN Angelica Mahan PA-C             Review of patient's allergies indicates:   Allergen Reactions    Hydrocodone-acetaminophen Other (See Comments)     Low blood pressure; doesn't want    Oxycodone-acetaminophen Other (See Comments)     Causes low blood pressure; doesn't want  4/4/19 - patient reports she is not actually allergic to it (just doesn't like how it makes her feel)           Review of Systems   Constitution: Negative for chills, fever and malaise/fatigue.   HENT: Negative for hearing loss.    Cardiovascular: Negative for claudication.   Respiratory: Negative for shortness of breath.    Skin: Positive for dry skin, itching and rash. Negative for flushing.   Musculoskeletal: Negative for joint pain and myalgias.   Neurological: Negative for loss of balance, numbness, paresthesias and sensory change.   Psychiatric/Behavioral: Negative for altered mental status.          "  Objective:        Vitals:    10/16/20 1014   BP: 130/63   BP Location: Left arm   Patient Position: Sitting   BP Method: Medium (Automatic)   Pulse: 67   Weight: 95.3 kg (210 lb 1.6 oz)   Height: 5' 11" (1.803 m)         Physical Exam  Cardiovascular:      Pulses:           Dorsalis pedis pulses are 2+ on the right side and 2+ on the left side.        Posterior tibial pulses are 2+ on the right side and 2+ on the left side.      Comments: 1+ edema bilateral lower extremities.   +varicosities      Musculoskeletal:      Comments: Adequate joint ROM noted to all lower extremity muscle groups with no pain or crepitation noted. Muscle strength is 5/5 in all groups bilaterally.     Feet:      Right foot:      Protective Sensation: 5 sites tested. 5 sites sensed.      Left foot:      Protective Sensation: 5 sites tested. 5 sites sensed.      Comments:       Skin:     Comments:   Healed blistering vesicles on the bottom of bilateral feet.  There is no redness, no vesicles.  No open wounds.       Neurological:      Comments: Intact gross sensation noted to b/L LEs.  Negative Tinels.   Negative Mulders.   No sensorimotor.                   Assessment:       Encounter Diagnosis   Name Primary?    Tinea pedis of both feet Yes         Plan:       Poonam was seen today for tinea pedis.    Diagnoses and all orders for this visit:    Tinea pedis of both feet      I counseled the patient on her conditions, their implications and medical management.    Maintain good foot hygiene.  No foot soaks.     Lysol to shoes weekly.      Tinea pedis resolved.    Call or return to clinic prn if these symptoms worsen or fail to improve as anticipated.   .           "

## 2020-10-24 ENCOUNTER — IMMUNIZATION (OUTPATIENT)
Dept: INTERNAL MEDICINE | Facility: CLINIC | Age: 70
End: 2020-10-24
Payer: MEDICARE

## 2020-10-24 PROCEDURE — G0008 ADMIN INFLUENZA VIRUS VAC: HCPCS | Mod: PBBFAC

## 2020-10-24 PROCEDURE — 90694 VACC AIIV4 NO PRSRV 0.5ML IM: CPT | Mod: PBBFAC

## 2020-11-02 ENCOUNTER — HOSPITAL ENCOUNTER (OUTPATIENT)
Dept: RADIOLOGY | Facility: HOSPITAL | Age: 70
Discharge: HOME | End: 2020-11-02
Attending: NURSE PRACTITIONER
Payer: MEDICARE

## 2020-11-02 DIAGNOSIS — M54.30 SCIATICA, UNSPECIFIED LATERALITY: ICD-10-CM

## 2020-11-02 PROCEDURE — 73521 X-RAY EXAM HIPS BI 2 VIEWS: CPT | Mod: 26,,, | Performed by: RADIOLOGY

## 2020-11-02 PROCEDURE — 73521 X-RAY EXAM HIPS BI 2 VIEWS: CPT | Mod: TC

## 2020-11-02 PROCEDURE — 73521 XR HIPS BILATERAL 2 VIEW INCL AP PELVIS: ICD-10-PCS | Mod: 26,,, | Performed by: RADIOLOGY

## 2020-11-05 ENCOUNTER — TELEPHONE (OUTPATIENT)
Dept: ORTHOPEDICS | Facility: CLINIC | Age: 70
End: 2020-11-05

## 2020-11-05 NOTE — TELEPHONE ENCOUNTER
Called patient with xray results. She will f/u with back and spine as she reports that she has a problem with a slipped disk as well.

## 2020-11-11 ENCOUNTER — TELEPHONE (OUTPATIENT)
Dept: ORTHOPEDICS | Facility: CLINIC | Age: 70
End: 2020-11-11

## 2020-11-11 DIAGNOSIS — M54.9 DORSALGIA, UNSPECIFIED: ICD-10-CM

## 2020-11-11 NOTE — TELEPHONE ENCOUNTER
Spoke to patient, advised of MRI day and time. She was agreeable.     ----- Message from Lexie Mcmanus NP sent at 11/11/2020  2:08 PM CST -----  Contact: ROSALINDA FARIAS [[id number]]  Can you please schedule MRI lumbar spine  ----- Message -----  From: Callie Forrest  Sent: 11/11/2020   1:55 PM CST  To: [facility] Staff    Type: Patient Call Back    Who called:ROSALINDA FARIAS [[id number]]    What is the request in detail: Patient is requesting a call back. ROSALINDA FARIAS [[id number]] would like a referral created and sent over to DR. CERVANTES. ROSALINDA FARIAS [[id number]] states last time she was at the office NP stated that she should see him. Unfortunately, I was unable to schedule an appt with him due to dr. Cervantes not accepting new patients. She requests a call back from the office.  Please advise.    Can the clinic reply by [facility]? No    Best call back number: ..[phone]    Additional Information: N/A

## 2020-11-17 ENCOUNTER — HOSPITAL ENCOUNTER (OUTPATIENT)
Dept: RADIOLOGY | Facility: HOSPITAL | Age: 70
Discharge: HOME | End: 2020-11-17
Attending: NURSE PRACTITIONER
Payer: MEDICARE

## 2020-11-17 DIAGNOSIS — M54.9 DORSALGIA, UNSPECIFIED: ICD-10-CM

## 2020-11-17 PROCEDURE — 72148 MRI LUMBAR SPINE W/O DYE: CPT | Mod: 26,,, | Performed by: RADIOLOGY

## 2020-11-17 PROCEDURE — 72148 MRI LUMBAR SPINE W/O DYE: CPT | Mod: TC

## 2020-11-17 PROCEDURE — 72148 MRI LUMBAR SPINE WITHOUT CONTRAST: ICD-10-PCS | Mod: 26,,, | Performed by: RADIOLOGY

## 2020-11-18 ENCOUNTER — TELEPHONE (OUTPATIENT)
Dept: ORTHOPEDICS | Facility: CLINIC | Age: 70
End: 2020-11-18

## 2020-11-18 NOTE — TELEPHONE ENCOUNTER
----- Message from Nan Arenas PA-C sent at 11/18/2020 11:31 AM CST -----  Of course.  Kady will you please schedule her with us?  ----- Message -----  From: Lexie Mcmanus NP  Sent: 11/18/2020  10:57 AM CST  To: Nan Arenas PA-C    Is this someone that you would see? She's very nice, but if you rather me send her to [facility] or neurosurgery just let me know :-)  ----- Message -----  From: Interface, Rad Results In  Sent: 11/18/2020  10:43 AM CST  To: Lexie Mcmanus NP

## 2020-11-18 NOTE — TELEPHONE ENCOUNTER
Called patient and left a voicemail on her home phone letting her know that they will be calling from Nan Arenas's office to schedule a f/u regarding her MRI.

## 2020-11-18 NOTE — TELEPHONE ENCOUNTER
----- Message from Kimi Amaya sent at 11/18/2020  3:11 PM CST -----  Regarding: Call  Pt is returning a call to Ms. Mcmanus and request she reach out to her at [phone].    Thank you.

## 2020-11-18 NOTE — TELEPHONE ENCOUNTER
Returned call to patient and explained that her MRI results are back and she will be getting a call to schedule with Nan Arenas to discuss treatment options.

## 2020-11-19 ENCOUNTER — TELEPHONE (OUTPATIENT)
Dept: ORTHOPEDICS | Facility: CLINIC | Age: 70
End: 2020-11-19

## 2020-11-19 DIAGNOSIS — M51.36 DDD (DEGENERATIVE DISC DISEASE), LUMBAR: Primary | ICD-10-CM

## 2020-12-02 ENCOUNTER — HOSPITAL ENCOUNTER (EMERGENCY)
Facility: OTHER | Age: 70
Discharge: HOME | End: 2020-12-02
Attending: EMERGENCY MEDICINE
Payer: MEDICARE

## 2020-12-02 VITALS
HEART RATE: 71 BPM | BODY MASS INDEX: 27.09 KG/M2 | OXYGEN SATURATION: 97 % | HEIGHT: 72 IN | TEMPERATURE: 98 F | DIASTOLIC BLOOD PRESSURE: 78 MMHG | SYSTOLIC BLOOD PRESSURE: 137 MMHG | WEIGHT: 200 LBS | RESPIRATION RATE: 15 BRPM

## 2020-12-02 DIAGNOSIS — S00.83XA TRAUMATIC HEMATOMA OF FOREHEAD, INITIAL ENCOUNTER: Primary | ICD-10-CM

## 2020-12-02 DIAGNOSIS — R04.0 EPISTAXIS: ICD-10-CM

## 2020-12-02 DIAGNOSIS — S01.21XA LACERATION OF NOSE, INITIAL ENCOUNTER: ICD-10-CM

## 2020-12-02 PROCEDURE — 25000003 PHARM REV CODE 250: Performed by: EMERGENCY MEDICINE

## 2020-12-02 PROCEDURE — 99284 EMERGENCY DEPT VISIT MOD MDM: CPT | Mod: 25

## 2020-12-02 RX ORDER — MUPIROCIN 20 MG/G
1 OINTMENT TOPICAL
Status: COMPLETED | OUTPATIENT
Start: 2020-12-02 | End: 2020-12-02

## 2020-12-02 RX ADMIN — MUPIROCIN 22 G: 20 OINTMENT TOPICAL at 04:12

## 2020-12-02 NOTE — ED PROVIDER NOTES
Encounter Date: 12/2/2020    SCRIBE #1 NOTE: I, Beny Amaral, am scribing for, and in the presence of, Dr. Moura.       History     Chief Complaint   Patient presents with    Fall     was bending over in the closet, fell on her face, hit her face and nose on a tennis racket; + hematoma to left foreheam + bleeding to left nare; denies LOC; on baby ASA     Time seen by provider: 3:58 PM    This is a 69 y.o. female who presents s/p fall that occurred prior to arrival. The patient was reaching into her closet, when she bent forward and lost her balance. She fell hitting her face on on a tennis racket that was on the ground. She notes some swelling/pain to her left forehead and a nosebleed from the left nare. She denies any neck pain, back pain, LOC, gait problem, nausea, and vomiting.     The history is provided by the patient.     Review of patient's allergies indicates:   Allergen Reactions    Hydrocodone-acetaminophen Other (See Comments)     Low blood pressure; doesn't want    Oxycodone-acetaminophen Other (See Comments)     Causes low blood pressure; doesn't want  4/4/19 - patient reports she is not actually allergic to it (just doesn't like how it makes her feel)     Past Medical History:   Diagnosis Date    AR (allergic rhinitis) 12/7/2012    Atrial flutter     ablation October 2008    Cataract     Generalized headaches     GERD (gastroesophageal reflux disease) 03/08/2013    resolved    Grave's disease     s/p radioactive iodine, now hypothyroidism    Keloid cicatrix     Obesity     Osteoarthritis     shoulders, hands, knees    Positive PPD, treated     9 months of INH, completed 1/2010     Past Surgical History:   Procedure Laterality Date    ABLATION OF DYSRHYTHMIC FOCUS  10/24/2008    atrial flutter    ARTHROPLASTY OF SHOULDER Right 4/1/2019    Procedure: ARTHROPLASTY, SHOULDER;  Surgeon: Sage Xie MD;  Location: [facility];  Service: Orthopedics;  Laterality: Right;  regional w/  catheter (q-ball)    CATARACT EXTRACTION W/  INTRAOCULAR LENS IMPLANT Bilateral     COLONOSCOPY N/A 11/5/2015    Procedure: COLONOSCOPY;  Surgeon: Jose Ly MD;  Location: [facility] ([address] [place] [address]);  Service: Endoscopy;  Laterality: N/A;  Last colonoscopy 2008 with Dr. Vieira; Pt wanted this day    EYE SURGERY      cataract removal right eye    HERNIA REPAIR      KNEE ARTHROSCOPY W/ DEBRIDEMENT      right, 2005 and 2008    KNEE SURGERY  3-27-14    right TKA     Family History   Problem Relation Age of Onset    Arthritis Mother     Glaucoma Father     Cataracts Father     No Known Problems Sister     No Known Problems Son     Hepatitis Brother     No Known Problems Maternal Aunt     No Known Problems Maternal Uncle     No Known Problems Paternal Aunt     No Known Problems Paternal Uncle     No Known Problems Maternal Grandmother     No Known Problems Maternal Grandfather     No Known Problems Paternal Grandmother     No Known Problems Paternal Grandfather     No Known Problems Daughter     No Known Problems Son     Colon cancer Neg Hx     Ovarian cancer Neg Hx     Breast cancer Neg Hx      Social History     Tobacco Use    Smoking status: Never Smoker    Smokeless tobacco: Never Used   Substance Use Topics    Alcohol use: No    Drug use: No     Review of Systems   Constitutional: Negative for chills and fever.   HENT: Positive for nosebleeds. Negative for sore throat.         Positive for pain and swelling to the left forehead.   Respiratory: Negative for shortness of breath.    Cardiovascular: Negative for chest pain.   Gastrointestinal: Negative for nausea and vomiting.   Genitourinary: Negative for dysuria.   Musculoskeletal: Negative for back pain, gait problem and neck pain.   Skin: Negative for color change, rash and wound.   Neurological: Negative for syncope, weakness and headaches.   Hematological: Does not bruise/bleed easily.   All other systems reviewed and are  negative.      Physical Exam     Initial Vitals [12/02/20 1541]   BP Pulse Resp Temp SpO2   (!) 198/98 96 16 98.1 °F (36.7 °C) 97 %      MAP       --         Physical Exam    Nursing note and vitals reviewed.  Constitutional: She appears well-developed and well-nourished. No distress.   HENT:   Head: Normocephalic.   Right Ear: External ear normal.   Left Ear: External ear normal.   Contusion over the left forehead. 0.5 cm laceration of the left nare and 0.5 cm laceration to the skin overlying septum. No active bleeding. Blood in left posterior nostril without active bleeding.   Eyes: Conjunctivae and EOM are normal. Pupils are equal, round, and reactive to light. Right eye exhibits no discharge. Left eye exhibits no discharge. No scleral icterus.   Neck: Normal range of motion. Neck supple.   Cardiovascular: Normal rate, regular rhythm and normal heart sounds. Exam reveals no gallop and no friction rub.    No murmur heard.  Pulmonary/Chest: Breath sounds normal. No stridor. No respiratory distress. She has no wheezes. She has no rhonchi. She has no rales.   Abdominal: Soft. Bowel sounds are normal. She exhibits no distension and no mass. There is no abdominal tenderness. There is no rebound and no guarding.   Musculoskeletal: No edema.   Neurological: She is alert and oriented to person, place, and time. She has normal strength. No cranial nerve deficit or sensory deficit. GCS score is 15. GCS eye subscore is 4. GCS verbal subscore is 5. GCS motor subscore is 6.   Skin: Skin is warm and dry. Capillary refill takes less than 2 seconds.   Psychiatric: She has a normal mood and affect. Her behavior is normal. Judgment and thought content normal.         ED Course   Procedures  Labs Reviewed - No data to display       Imaging Results          CT Head Without Contrast (Final result)  Result time 12/02/20 16:31:31    Final result by Orestes Short MD (12/02/20 16:31:31)                 Impression:      Small  hematoma over the left frontal scalp.  No acute intracranial findings.      Electronically signed by: Orestes Short MD  Date:    12/02/2020  Time:    16:31             Narrative:    EXAMINATION:  CT HEAD WITHOUT CONTRAST    CLINICAL HISTORY:  Head trauma, minor (Age => 63y);    TECHNIQUE:  Low dose axial images were obtained through the head.  Coronal and sagittal reformations were also performed. Contrast was not administered.    COMPARISON:  04/21/2007    FINDINGS:  No intracranial blood products.  No extra-axial masses or collections.    No large territory vascular infarction.  Ventricles are within normal limits without evidence of hydrocephalus.  No significant mass or mass effect.  Gray-white matter differentiation appears appropriate.    Small hematoma over the left frontal scalp.  No evidence of fracture.  Mastoid air cells and paranasal sinuses are clear.  Globes and retrobulbar soft tissues are symmetric and intact.  Prior cataract surgery bilaterally.                                 Medical Decision Making:   History:   Old Medical Records: I decided to obtain old medical records.  Initial Assessment:   69 y.o female presents after a mechanical fall with left-sided epistaxis and head trauma, on aspirin. Plan ice, wound care, and CT head.   Differential Diagnosis:   Scalp contusion, concussion, laceration, skull fracture,diffuse axonal injury, countercoup injury, intracranial hemorrhage such as subdural hematoma, subarachnoid hemorrhage or epidural hematoma, cerebral contusion, soft tissue injury, paraspinal or spinal injury  Clinical Tests:   Radiological Study: Ordered and Reviewed            Scribe Attestation:   Scribe #1: I performed the above scribed service and the documentation accurately describes the services I performed. I attest to the accuracy of the note.    Attending Attestation:           Physician Attestation for Scribe:  Physician Attestation Statement for Scribe #1: I, Dr. Moura,  reviewed documentation, as scribed by Beny Amaral in my presence, and it is both accurate and complete.                           Clinical Impression:     ICD-10-CM ICD-9-CM   1. Traumatic hematoma of forehead, initial encounter  S00.83XA 920   2. Laceration of nose, initial encounter  S01.21XA 873.20   3. Epistaxis  R04.0 784.7                          ED Disposition Condition    Discharge Stable        ED Prescriptions     None        Follow-up Information    None                                      Erika Moura MD  12/02/20 3608

## 2020-12-02 NOTE — ED TRIAGE NOTES
"Pt AAOx4, presented to the ED from a fall. Pt reports "I fell in my closet onto a tennis racket and hit the L side of my forehead and my nose. My nose has been bleeding significantly and I knew I needed to get evaluated because I am on aspirin". Pt pain 6/10 and denies taking anything for pain before arrival. Pt denies SOB, chest pain, N/V/D, LOC, dizziness, blurry vision and/or fever/chills.  "

## 2020-12-04 ENCOUNTER — OFFICE VISIT (OUTPATIENT)
Dept: OTOLARYNGOLOGY | Facility: CLINIC | Age: 70
End: 2020-12-04
Payer: MEDICARE

## 2020-12-04 VITALS — HEART RATE: 75 BPM | SYSTOLIC BLOOD PRESSURE: 124 MMHG | DIASTOLIC BLOOD PRESSURE: 81 MMHG

## 2020-12-04 DIAGNOSIS — S09.93XD FACIAL INJURY, SUBSEQUENT ENCOUNTER: ICD-10-CM

## 2020-12-04 DIAGNOSIS — R04.0 EPISTAXIS: ICD-10-CM

## 2020-12-04 DIAGNOSIS — S01.21XD LACERATION OF NOSE, SUBSEQUENT ENCOUNTER: ICD-10-CM

## 2020-12-04 PROBLEM — S01.21XA NASAL LACERATION: Status: ACTIVE | Noted: 2020-12-04

## 2020-12-04 PROBLEM — S09.93XA FACIAL INJURY: Status: ACTIVE | Noted: 2020-12-04

## 2020-12-04 PROCEDURE — 99213 OFFICE O/P EST LOW 20 MIN: CPT | Mod: PBBFAC | Performed by: NURSE PRACTITIONER

## 2020-12-04 PROCEDURE — 99213 PR OFFICE/OUTPT VISIT, EST, LEVL III, 20-29 MIN: ICD-10-PCS | Mod: S$PBB,ICN,, | Performed by: NURSE PRACTITIONER

## 2020-12-04 PROCEDURE — 99999 PR PBB SHADOW E&M-EST. PATIENT-LVL III: ICD-10-PCS | Mod: PBBFAC,,, | Performed by: NURSE PRACTITIONER

## 2020-12-04 PROCEDURE — 99213 OFFICE O/P EST LOW 20 MIN: CPT | Mod: S$PBB,ICN,, | Performed by: NURSE PRACTITIONER

## 2020-12-04 PROCEDURE — 99999 PR PBB SHADOW E&M-EST. PATIENT-LVL III: CPT | Mod: PBBFAC,,, | Performed by: NURSE PRACTITIONER

## 2020-12-04 NOTE — PROGRESS NOTES
Subjective:       Patient ID: Poonam Alvarez is a 69 y.o. female.    Chief Complaint: consult/ laceration on nose    HPI     Poonam Alvarez is a 69 year old female who presents for a nasal laceration. She reports that she feel in her closet and hit her face on a tennis racket on 12/2. She had a nosebleed and nasal/facial pain. She went to the ED. CT scan revealed a small hematoma to her forehead, but no fractures. She was discharged home with Bacitracin ointment. She has not had any more nosebleeds. She has some nasal pain still, but this is improving. The bruising to her forehead is improving as well. No complaints.    Past Medical History:   Diagnosis Date    AR (allergic rhinitis) 12/7/2012    Atrial flutter     ablation October 2008    Cataract     Generalized headaches     GERD (gastroesophageal reflux disease) 03/08/2013    resolved    Grave's disease     s/p radioactive iodine, now hypothyroidism    Keloid cicatrix     Obesity     Osteoarthritis     shoulders, hands, knees    Positive PPD, treated     9 months of INH, completed 1/2010       Past Surgical History:   Procedure Laterality Date    ABLATION OF DYSRHYTHMIC FOCUS  10/24/2008    atrial flutter    ARTHROPLASTY OF SHOULDER Right 4/1/2019    Procedure: ARTHROPLASTY, SHOULDER;  Surgeon: Sage Xie MD;  Location: [facility];  Service: Orthopedics;  Laterality: Right;  regional w/ catheter (q-ball)    CATARACT EXTRACTION W/  INTRAOCULAR LENS IMPLANT Bilateral     COLONOSCOPY N/A 11/5/2015    Procedure: COLONOSCOPY;  Surgeon: Jose Ly MD;  Location: [address]);  Service: Endoscopy;  Laterality: N/A;  Last colonoscopy 2008 with Dr. Vieira; Pt wanted this day    EYE SURGERY      cataract removal right eye    HERNIA REPAIR      KNEE ARTHROSCOPY W/ DEBRIDEMENT      right, 2005 and 2008    KNEE SURGERY  3-27-14    right TKA         Current Outpatient Medications:     b complex vitamins capsule, Take 1 capsule by  mouth once daily., Disp: , Rfl:     calcium-vitamin D3-vitamin K (VIACTIV) 500-100-40 mg-unit-mcg Chew, Take 1 tablet by mouth Daily., Disp: , Rfl:     cholecalciferol, vitamin D3, (VITAMIN D3) 2,000 unit Cap, Take 1 capsule by mouth once daily., Disp: , Rfl:     clotrimazole-betamethasone 1-0.05% (LOTRISONE) cream, Apply topically once daily., Disp: 45 g, Rfl: 2    fish oil-omega-3 fatty acids 300-1,000 mg capsule, Take 2 g by mouth once daily. , Disp: , Rfl:     meloxicam (MOBIC) 15 MG tablet, Take 1 tablet (15 mg total) by mouth once daily., Disp: 30 tablet, Rfl: 2    multivitamin-minerals-lutein (CENTRUM SILVER) Tab, Take 1 tablet by mouth once daily. , Disp: , Rfl:     promethazine-dextromethorphan (PROMETHAZINE-DM) 6.25-15 mg/5 mL Syrp, TAKE 5 TO 10 ML BY MOUTH AT BEDTIME AS NEEDED FOR COUGH, Disp: 240 mL, Rfl: 0    SYNTHROID 137 mcg Tab tablet, TAKE 1 TABLET(137 MCG) BY MOUTH EVERY DAY, Disp: 90 tablet, Rfl: 3    triamcinolone acetonide 0.1% (KENALOG) 0.1 % cream, Apply topically 2 (two) times daily as needed. Leg rash, Disp: 80 g, Rfl: 0    UBIDECARENONE (COENZYME Q10) 100 mg Tab, Take 1 tablet by mouth once daily., Disp: , Rfl:     aspirin (ECOTRIN) 81 MG EC tablet, Take 81 mg by mouth once daily., Disp: , Rfl:     hydroCHLOROthiazide (HYDRODIURIL) 12.5 MG Tab, , Disp: , Rfl:     Current Facility-Administered Medications:     acetaminophen-codeine 300-30mg per tablet 1 tablet, 1 tablet, Oral, Q6H PRN, Angelica Mahan PA-C    Review of patient's allergies indicates:   Allergen Reactions    Hydrocodone-acetaminophen Other (See Comments)     Low blood pressure; doesn't want    Oxycodone-acetaminophen Other (See Comments)     Causes low blood pressure; doesn't want  4/4/19 - patient reports she is not actually allergic to it (just doesn't like how it makes her feel)       Social History     Socioeconomic History    Marital status:      Spouse name: Not on file    Number of children: Not on  file    Years of education: Not on file    Highest education level: Not on file   Occupational History    Occupation:      Employer: [employer]   Social Needs    Financial resource strain: Not on file    Food insecurity     Worry: Not on file     Inability: Not on file    Transportation needs     Medical: Not on file     Non-medical: Not on file   Tobacco Use    Smoking status: Never Smoker    Smokeless tobacco: Never Used   Substance and Sexual Activity    Alcohol use: No    Drug use: No    Sexual activity: Not Currently     Partners: Male     Birth control/protection: None   Lifestyle    Physical activity     Days per week: Not on file     Minutes per session: Not on file    Stress: Not on file   Relationships    Social connections     Talks on phone: Not on file     Gets together: Not on file     Attends Caodaism service: Not on file     Active member of club or organization: Not on file     Attends meetings of clubs or organizations: Not on file     Relationship status: Not on file   Other Topics Concern    Are you pregnant or think you may be? No    Breast-feeding No   Social History Narrative           Family History   Problem Relation Age of Onset    Arthritis Mother     Glaucoma Father     Cataracts Father     No Known Problems Sister     No Known Problems Son     Hepatitis Brother     No Known Problems Maternal Aunt     No Known Problems Maternal Uncle     No Known Problems Paternal Aunt     No Known Problems Paternal Uncle     No Known Problems Maternal Grandmother     No Known Problems Maternal Grandfather     No Known Problems Paternal Grandmother     No Known Problems Paternal Grandfather     No Known Problems Daughter     No Known Problems Son     Colon cancer Neg Hx     Ovarian cancer Neg Hx     Breast cancer Neg Hx          Review of Systems   Constitutional: Negative for appetite change, chills, diaphoresis, fatigue,  fever and unexpected weight change.   HENT: Negative for nasal congestion, dental problem, drooling, ear discharge, ear pain, facial swelling, hearing loss, mouth sores, nosebleeds, postnasal drip, rhinorrhea, sinus pressure/congestion, sneezing, sore throat, tinnitus, trouble swallowing and voice change.    Eyes: Negative for pain, discharge, redness and itching.   Respiratory: Negative for cough and shortness of breath.    Cardiovascular: Negative for chest pain.   Gastrointestinal: Negative for abdominal distention, abdominal pain, diarrhea, nausea and vomiting.   Endocrine: Negative for cold intolerance and heat intolerance.   Genitourinary: Negative for difficulty urinating.   Musculoskeletal: Negative for neck pain and neck stiffness.   Integumentary:  Negative for rash.   Neurological: Negative for dizziness, weakness and headaches.   Hematological: Negative for adenopathy.         Objective:      Physical Exam  Vitals signs reviewed.   Constitutional:       General: She is not in acute distress.     Appearance: She is well-developed. She is not ill-appearing or diaphoretic.   HENT:      Head: Normocephalic and atraumatic.      Jaw: No trismus.        Right Ear: Hearing and external ear normal.      Left Ear: Hearing and external ear normal.      Nose: Laceration present. No nasal deformity, mucosal edema or rhinorrhea.      Right Sinus: No maxillary sinus tenderness or frontal sinus tenderness.      Left Sinus: No maxillary sinus tenderness or frontal sinus tenderness.        Mouth/Throat:      Mouth: Mucous membranes are not pale, not dry and not cyanotic. No oral lesions.      Dentition: Normal dentition. Does not have dentures. No dental caries.      Pharynx: Uvula midline. No oropharyngeal exudate, posterior oropharyngeal erythema or uvula swelling.      Tonsils: No tonsillar abscesses.   Eyes:      General: No scleral icterus.        Right eye: No discharge.         Left eye: No discharge.       Conjunctiva/sclera: Conjunctivae normal.   Neck:      Comments:     Cardiovascular:      Rate and Rhythm: Normal rate.   Pulmonary:      Effort: Pulmonary effort is normal. No respiratory distress.      Breath sounds: No stridor.   Skin:     General: Skin is warm and dry.      Coloration: Skin is not pale.      Findings: No erythema or rash.   Neurological:      Mental Status: She is alert and oriented to person, place, and time.      Cranial Nerves: No cranial nerve deficit.   Psychiatric:         Behavior: Behavior normal. Behavior is cooperative.         Thought Content: Thought content normal.         Assessment:       1. Laceration of nose, initial encounter    2. Epistaxis        Plan:       Problem List Items Addressed This Visit     None      Visit Diagnoses     Laceration of nose, initial encounter        Epistaxis

## 2020-12-04 NOTE — LETTER
December 4, 2020      Erika Moura MD  [address] [place] [address]           [id number]  [address]  [facility] [address]  Phone: [phone]  Fax: [phone]          Patient: Poonam Alvarez   MR Number: [id number]   YOB: 1950   Date of Visit: 12/4/2020       Dear Dr. Erika Moura:    Thank you for referring Poonam Alvarez to me for evaluation. Attached you will find relevant portions of my assessment and plan of care.    If you have questions, please do not hesitate to call me. I look forward to following Poonam Alvarez along with you.    Sincerely,    Dayami Salinas, NP    Enclosure  CC:  No Recipients    If you would like to receive this communication electronically, please contact [email or web address] or [phone] to request more information on [employer] Link access.    For providers and/or their staff who would like to refer a patient to [facility], please contact us through our one-stop-shop provider referral line, [facility], at [phone].    If you feel you have received this communication in error or would no longer like to receive these types of communications, please e-mail [email or web address]

## 2020-12-04 NOTE — ASSESSMENT & PLAN NOTE
Laceration is healing well. Discussed using nasal saline and avoiding nose blowing until lacerations heals. Continue Bactroban. Questions answered. RTC prn.

## 2020-12-06 ENCOUNTER — PATIENT OUTREACH (OUTPATIENT)
Dept: ADMINISTRATIVE | Facility: OTHER | Age: 70
End: 2020-12-06

## 2020-12-08 ENCOUNTER — OFFICE VISIT (OUTPATIENT)
Dept: ORTHOPEDICS | Facility: CLINIC | Age: 70
End: 2020-12-08
Payer: MEDICARE

## 2020-12-08 ENCOUNTER — HOSPITAL ENCOUNTER (OUTPATIENT)
Dept: RADIOLOGY | Facility: HOSPITAL | Age: 70
Discharge: HOME | End: 2020-12-08
Attending: PHYSICIAN ASSISTANT
Payer: MEDICARE

## 2020-12-08 VITALS — BODY MASS INDEX: 27.12 KG/M2 | HEIGHT: 72 IN

## 2020-12-08 DIAGNOSIS — M48.061 SPINAL STENOSIS OF LUMBAR REGION, UNSPECIFIED WHETHER NEUROGENIC CLAUDICATION PRESENT: ICD-10-CM

## 2020-12-08 DIAGNOSIS — M43.16 SPONDYLOLISTHESIS OF LUMBAR REGION: ICD-10-CM

## 2020-12-08 DIAGNOSIS — M51.36 DDD (DEGENERATIVE DISC DISEASE), LUMBAR: ICD-10-CM

## 2020-12-08 DIAGNOSIS — M70.61 GREATER TROCHANTERIC BURSITIS OF RIGHT HIP: Primary | ICD-10-CM

## 2020-12-08 PROCEDURE — 99214 OFFICE O/P EST MOD 30 MIN: CPT | Mod: S$PBB,,, | Performed by: PHYSICIAN ASSISTANT

## 2020-12-08 PROCEDURE — 99213 OFFICE O/P EST LOW 20 MIN: CPT | Mod: PBBFAC,25 | Performed by: PHYSICIAN ASSISTANT

## 2020-12-08 PROCEDURE — 72120 X-RAY BEND ONLY L-S SPINE: CPT | Mod: TC

## 2020-12-08 PROCEDURE — 72100 XR LUMBAR SPINE AP AND LAT WITH FLEX/EXT: ICD-10-PCS | Mod: 26,,, | Performed by: RADIOLOGY

## 2020-12-08 PROCEDURE — 99999 PR PBB SHADOW E&M-EST. PATIENT-LVL III: CPT | Mod: PBBFAC,,, | Performed by: PHYSICIAN ASSISTANT

## 2020-12-08 PROCEDURE — 72120 XR LUMBAR SPINE AP AND LAT WITH FLEX/EXT: ICD-10-PCS | Mod: 26,,, | Performed by: RADIOLOGY

## 2020-12-08 PROCEDURE — 99214 PR OFFICE/OUTPT VISIT, EST, LEVL IV, 30-39 MIN: ICD-10-PCS | Mod: S$PBB,,, | Performed by: PHYSICIAN ASSISTANT

## 2020-12-08 PROCEDURE — 99999 PR PBB SHADOW E&M-EST. PATIENT-LVL III: ICD-10-PCS | Mod: PBBFAC,,, | Performed by: PHYSICIAN ASSISTANT

## 2020-12-08 PROCEDURE — 72100 X-RAY EXAM L-S SPINE 2/3 VWS: CPT | Mod: 26,,, | Performed by: RADIOLOGY

## 2020-12-08 PROCEDURE — 72120 X-RAY BEND ONLY L-S SPINE: CPT | Mod: 26,,, | Performed by: RADIOLOGY

## 2020-12-08 NOTE — PROGRESS NOTES
DATE: 12/8/2020  PATIENT: Poonam Alvarez    Supervising Physician: Otoniel Cuevas M.D.    CHIEF COMPLAINT: right leg pain    HISTORY:  Poonam Alvarez is a 69 y.o. female previously seen by Lexie, s/p right TKA here for initial evaluation of right lateral hip and leg pain to the knee (Back - 0, Leg - 0).  The pain in the right hip and lateral leg is what bothers her most.  She has been going to a chiropractor and says the pain is improved.  The pain has been present for several months. The patient describes the pain as aching.  The pain is worse with laying on her side and improved by laying on the left side. There is no associated numbness and tingling. There is no subjective weakness. Prior treatments have included a chiropractor, but no PT, ESIs or surgery.    The patient denies myelopathic symptoms such as handwriting changes or difficulty with buttons/coins/keys. Denies perineal paresthesias, bowel/bladder dysfunction.    PAST MEDICAL/SURGICAL HISTORY:  Past Medical History:   Diagnosis Date    AR (allergic rhinitis) 12/7/2012    Atrial flutter     ablation October 2008    Cataract     Generalized headaches     GERD (gastroesophageal reflux disease) 03/08/2013    resolved    Grave's disease     s/p radioactive iodine, now hypothyroidism    Keloid cicatrix     Obesity     Osteoarthritis     shoulders, hands, knees    Positive PPD, treated     9 months of INH, completed 1/2010     Past Surgical History:   Procedure Laterality Date    ABLATION OF DYSRHYTHMIC FOCUS  10/24/2008    atrial flutter    ARTHROPLASTY OF SHOULDER Right 4/1/2019    Procedure: ARTHROPLASTY, SHOULDER;  Surgeon: Sage Xie MD;  Location: [facility];  Service: Orthopedics;  Laterality: Right;  regional w/ catheter (q-ball)    CATARACT EXTRACTION W/  INTRAOCULAR LENS IMPLANT Bilateral     COLONOSCOPY N/A 11/5/2015    Procedure: COLONOSCOPY;  Surgeon: Jose Ly MD;  Location: [facility] ([address] [place] [address]);  Service:  Endoscopy;  Laterality: N/A;  Last colonoscopy 2008 with Dr. Vieira; Pt wanted this day    EYE SURGERY      cataract removal right eye    HERNIA REPAIR      KNEE ARTHROSCOPY W/ DEBRIDEMENT      right, 2005 and 2008    KNEE SURGERY  3-27-14    right TKA       Medications:   Current Outpatient Medications on File Prior to Visit   Medication Sig Dispense Refill    aspirin (ECOTRIN) 81 MG EC tablet Take 81 mg by mouth once daily.      b complex vitamins capsule Take 1 capsule by mouth once daily.      calcium-vitamin D3-vitamin K (VIACTIV) 500-100-40 mg-unit-mcg Chew Take 1 tablet by mouth Daily.      cholecalciferol, vitamin D3, (VITAMIN D3) 2,000 unit Cap Take 1 capsule by mouth once daily.      clotrimazole-betamethasone 1-0.05% (LOTRISONE) cream Apply topically once daily. 45 g 2    fish oil-omega-3 fatty acids 300-1,000 mg capsule Take 2 g by mouth once daily.       hydroCHLOROthiazide (HYDRODIURIL) 12.5 MG Tab       meloxicam (MOBIC) 15 MG tablet Take 1 tablet (15 mg total) by mouth once daily. 30 tablet 2    multivitamin-minerals-lutein (CENTRUM SILVER) Tab Take 1 tablet by mouth once daily.       promethazine-dextromethorphan (PROMETHAZINE-DM) 6.25-15 mg/5 mL Syrp TAKE 5 TO 10 ML BY MOUTH AT BEDTIME AS NEEDED FOR COUGH 240 mL 0    SYNTHROID 137 mcg Tab tablet TAKE 1 TABLET(137 MCG) BY MOUTH EVERY DAY 90 tablet 3    triamcinolone acetonide 0.1% (KENALOG) 0.1 % cream Apply topically 2 (two) times daily as needed. Leg rash 80 g 0    UBIDECARENONE (COENZYME Q10) 100 mg Tab Take 1 tablet by mouth once daily.       Current Facility-Administered Medications on File Prior to Visit   Medication Dose Route Frequency Provider Last Rate Last Dose    acetaminophen-codeine 300-30mg per tablet 1 tablet  1 tablet Oral Q6H PRN Angelica Mahan PA-C           Social History:   Social History     Socioeconomic History    Marital status:      Spouse name: Not on file    Number of children: Not on file     Years of education: Not on file    Highest education level: Not on file   Occupational History    Occupation:      Employer: [employer]   Social Needs    Financial resource strain: Not on file    Food insecurity     Worry: Not on file     Inability: Not on file    Transportation needs     Medical: Not on file     Non-medical: Not on file   Tobacco Use    Smoking status: Never Smoker    Smokeless tobacco: Never Used   Substance and Sexual Activity    Alcohol use: No    Drug use: No    Sexual activity: Not Currently     Partners: Male     Birth control/protection: None   Lifestyle    Physical activity     Days per week: Not on file     Minutes per session: Not on file    Stress: Not on file   Relationships    Social connections     Talks on phone: Not on file     Gets together: Not on file     Attends Yazidism service: Not on file     Active member of club or organization: Not on file     Attends meetings of clubs or organizations: Not on file     Relationship status: Not on file   Other Topics Concern    Are you pregnant or think you may be? No    Breast-feeding No   Social History Narrative           REVIEW OF SYSTEMS:  Constitution: Negative. Negative for chills, fever and night sweats.   Cardiovascular: Negative for chest pain and syncope.   Respiratory: Negative for cough and shortness of breath.   Gastrointestinal: See HPI. Negative for nausea/vomiting. Negative for abdominal pain.  Genitourinary: See HPI. Negative for discoloration or dysuria.  Skin: Negative for dry skin, itching and rash.   Hematologic/Lymphatic: Negative for bleeding problem. Does not bruise/bleed easily.   Musculoskeletal: Negative for falls and muscle weakness.   Neurological: See HPI. No seizures.   Endocrine: Negative for polydipsia, polyphagia and polyuria.   Allergic/Immunologic: Negative for hives and persistent infections.     EXAM:  Ht 6' (1.829 m)   BMI 27.12 kg/m²      General: The patient is a very pleasant 69 y.o. female in no apparent distress, the patient is oriented to person, place and time.  Psych: Normal mood and affect  HEENT: Vision grossly intact, hearing intact to the spoken word.  Lungs: Respirations unlabored.  Gait: Normal station and gait, no difficulty with toe or heel walk.   Skin: Dorsal lumbar skin negative for rashes, lesions, hairy patches and surgical scars. There is no lumbar tenderness to palpation.  Range of motion: Lumbar range of motion is acceptable.  Spinal Balance: Global saggital and coronal spinal balance acceptable, not significant for scoliosis and kyphosis.  Musculoskeletal: No pain with the range of motion of the bilateral hips. Mild right trochanteric tenderness to palpation.  Vascular: Bilateral lower extremities warm and well perfused, dorsalis pedis pulses 2+ bilaterally.  Neurological: Normal strength and tone in all major motor groups in the bilateral lower extremities. Normal sensation to light touch in the L2-S1 dermatomes bilaterally.  Deep tendon reflexes symmetric 2+ in the bilateral lower extremities.  Negative Babinski bilaterally. Straight leg raise negative bilaterally.    IMAGING:      Today I personally reviewed AP, Lat and Flex/Ex  upright L-spine films that demonstrate grade I anterolisthesis at L2/3 and L3/4. L4/5 and L5/S1 disc degeneration.    MRI lumbar spine demonstrates moderate L2/3 and L3/4 stenosis.     Body mass index is 27.12 kg/m².    No results found for: HGBA1C        ASSESSMENT/PLAN:    Poonam was seen today for low-back pain.    Diagnoses and all orders for this visit:    Greater trochanteric bursitis of right hip  -     Ambulatory referral/consult to Physical/Occupational Therapy; Future    Spondylolisthesis of lumbar region  -     Ambulatory referral/consult to Physical/Occupational Therapy; Future    DDD (degenerative disc disease), lumbar    Spinal stenosis of lumbar region, unspecified whether  neurogenic claudication present        Today we discussed at length all of the different treatment options including anti-inflammatories, acetaminophen, rest, ice, heat, physical therapy including strengthening and stretching exercises, home exercises, ROM, aerobic conditioning, aqua therapy, other modalities including ultrasound, massage, and dry needling, epidural steroid injections and finally surgical intervention.      The patient's pain is improving since she started seeing a chiropractor.  Referral for PT placed today.  Follow up as needed.

## 2020-12-08 NOTE — LETTER
December 8, 2020      Lexie Mcmanus, BOAZ  [address]  [facility] [address]           [id number]  [address]  [facility] [address]  Phone: [phone]          Patient: Poonam Alvarez   MR Number: [id number]   YOB: 1950   Date of Visit: 12/8/2020       Dear Lexie Mcmanus:    Thank you for referring Poonam Alvarez to me for evaluation. Attached you will find relevant portions of my assessment and plan of care.    If you have questions, please do not hesitate to call me. I look forward to following Poonam Alvarez along with you.    Sincerely,    Nan Arenas PA-C    Enclosure  CC:  No Recipients    If you would like to receive this communication electronically, please contact [email or web address] or [phone] to request more information on [employer] Link access.    For providers and/or their staff who would like to refer a patient to [facility], please contact us through our one-stop-shop provider referral line, [facility] , at [phone].    If you feel you have received this communication in error or would no longer like to receive these types of communications, please e-mail [email or web address]

## 2020-12-11 ENCOUNTER — CLINICAL SUPPORT (OUTPATIENT)
Dept: REHABILITATION | Facility: OTHER | Age: 70
End: 2020-12-11
Attending: PHYSICIAN ASSISTANT
Payer: MEDICARE

## 2020-12-11 DIAGNOSIS — R29.898 WEAKNESS OF BOTH HIPS: ICD-10-CM

## 2020-12-11 DIAGNOSIS — M43.16 SPONDYLOLISTHESIS OF LUMBAR REGION: ICD-10-CM

## 2020-12-11 DIAGNOSIS — R26.89 BALANCE PROBLEM: ICD-10-CM

## 2020-12-11 DIAGNOSIS — R19.8 ABDOMINAL WEAKNESS: ICD-10-CM

## 2020-12-11 DIAGNOSIS — M70.61 GREATER TROCHANTERIC BURSITIS OF RIGHT HIP: ICD-10-CM

## 2020-12-11 PROBLEM — R29.3 POSTURE ABNORMALITY: Status: ACTIVE | Noted: 2020-12-11

## 2020-12-11 PROCEDURE — 97110 THERAPEUTIC EXERCISES: CPT | Mod: PN

## 2020-12-11 PROCEDURE — 97161 PT EVAL LOW COMPLEX 20 MIN: CPT | Mod: PN

## 2020-12-11 NOTE — PROGRESS NOTES
[facility]  Physical Therapy Initial Evaluation    Date: 12/11/2020   Name: Poonam Alvarez  Clinic Number: [id number]    Therapy Diagnosis: No diagnosis found.  Physician: Nan Arenas PA-C    Physician Orders: {AMB PT KNEE ORDERS:12023} ***  Medical Diagnosis from Referral: ***  Evaluation Date: 12/11/2020  Authorization Period Expiration: ***  Plan of Care Expiration: ***  Visit # / Visits authorized: ***/ ***    Time In: ***  Time Out: ***  Total Appointment Time (timed & untimed codes): *** minutes    Precautions: {IP WOUND PRECAUTIONS OHS:21836}    Subjective   Date of onset: ***  History of current condition - Poonam reports: ***     Medical History:   Past Medical History:   Diagnosis Date    AR (allergic rhinitis) 12/7/2012    Atrial flutter     ablation October 2008    Cataract     Generalized headaches     GERD (gastroesophageal reflux disease) 03/08/2013    resolved    Grave's disease     s/p radioactive iodine, now hypothyroidism    Keloid cicatrix     Obesity     Osteoarthritis     shoulders, hands, knees    Positive PPD, treated     9 months of INH, completed 1/2010       Surgical History:   Poonam Alvarez  has a past surgical history that includes Eye surgery; Knee arthroscopy w/ debridement; Ablation of dysrhythmic focus (10/24/2008); Knee surgery (3-27-14); Colonoscopy (N/A, 11/5/2015); Cataract extraction w/  intraocular lens implant (Bilateral); Hernia repair; and Arthroplasty of shoulder (Right, 4/1/2019).    Medications:   Poonam has a current medication list which includes the following prescription(s): aspirin, b complex vitamins, calcium-vitamin d3-vitamin k, cholecalciferol (vitamin d3), clotrimazole-betamethasone 1-0.05%, fish oil-omega-3 fatty acids, hydrochlorothiazide, meloxicam, multivitamin-minerals-lutein, promethazine-dextromethorphan, synthroid, triamcinolone acetonide 0.1%, and coenzyme q10, and the following Facility-Administered  "Medications: acetaminophen-codeine 300-30mg.    Allergies:   Review of patient's allergies indicates:   Allergen Reactions    Hydrocodone-acetaminophen Other (See Comments)     Low blood pressure; doesn't want    Oxycodone-acetaminophen Other (See Comments)     Causes low blood pressure; doesn't want  4/4/19 - patient reports she is not actually allergic to it (just doesn't like how it makes her feel)        Imaging, {Mri/ctscan/bone scan:72395}: ***    Prior Therapy: ***  Social History: *** {LIVES WITH:60291}  Occupation: ***  Prior Level of Function: ***  Current Level of Function: ***    Pain:  Current {0-10:73117::"0"}/10, worst {0-10:20507::"0"}/10, best {0-10:20507::"0"}/10   Location: {RIGHT LEFT BILATERAL:52823} {LOCATION ON BODY:25407} {Pain Loc:65126}  Description: {Pain Description:45526}  Aggravating Factors: {Causes; Pain:45154}  Easing Factors: {Pain (activities that relieve):46643}    Patients goals: ***    Objective     ***      Limitation/Restriction for FOTO *** Survey    Therapist reviewed FOTO scores for Poonam Alvarez on 12/11/2020.   FOTO documents entered into [employer] - see Media section.    Limitation Score: ***%         TREATMENT   Treatment Time In: ***  Treatment Time Out: ***  Total Treatment time (time-based codes) separate from Evaluation: *** minutes    Poonam received therapeutic exercises to develop {AMB PT PROGRESS OBJECTIVE:33969} for *** minutes including:  ***    Poonam received the following manual therapy techniques: {AMB PT PROGRESS MANUAL THERAPY:05520} were applied to the: *** for *** minutes, including:  ***    Poonam participated in neuromuscular re-education activities to improve: {AMB PT PROGRESS NEURO RE-ED:38129} for *** minutes. The following activities were included:  ***    Poonam participated in dynamic functional therapeutic activities to improve functional performance for ***  minutes, including:  ***    Poonam participated in gait training to improve " "functional mobility and safety for ***  minutes, including:  ***    Poonam received the following direct contact modalities after being cleared for contraindications: {AMB PT PROGRESS DIRECT CONTACT MODES:37932}    Poonam received the following supervised modalities after being cleared for contradictions: {AMB PT SUPERVISED MODES:33792}    Poonam received hot pack for *** minutes to ***.    Poonam received cold pack for *** minutes to ***.    Home Exercises and Patient Education Provided    Education provided:   - ***    Written Home Exercises Provided: {Blank single:28265::"yes","Patient instructed to cont prior HEP"}.  Exercises were reviewed and Poonam was able to demonstrate them prior to the end of the session.  Poonam demonstrated {Desc; good/fair/poor:97522} understanding of the education provided.     See EMR under {Blank single:15727::"Media","Patient Instructions"} for exercises provided {Blank single:83958::"12/11/2020","prior visit"}.    Assessment   Poonam is a 69 y.o. female referred to outpatient Physical Therapy with a medical diagnosis of ***. Patient presents with ***    Patient prognosis is {REHAB PROGNOSIS OHS:76988}.   Patientt will benefit from skilled outpatient Physical Therapy to address the deficits stated above and in the chart below, provide patient /family education, and to maximize patientt's level of independence.     Plan of care discussed with patient: {YES:20243}  Patient's spiritual, cultural and educational needs considered and patient is agreeable to the plan of care and goals as stated below:     Anticipated Barriers for therapy: ***    Medical Necessity is demonstrated by the following  History  Co-morbidities and personal factors that may impact the plan of care Co-morbidities:   {Co-morbidities:95575}    Personal Factors:   {Personal Factors:92156}     {Desc; low/moderate/high:834317}   Examination  Body Structures and Functions, activity limitations and participation " "restrictions that may impact the plan of care Body Regions:   {Body Regions:71097}    Body Systems:    {Body Systems:10765}    Participation Restrictions:   ***    Activity limitations:   Learning and applying knowledge  {Learning and applying knowledge:36969}    General Tasks and Commands  {Gen tasks and commands:74845}    Communication  {Communication:42407}    Mobility  {Mobility:36254}    Self care  {Self Care:78390}    Domestic Life  {Domestic Life:39061}    Interactions/Relationships  {Interactions/Relationships:20221}    Life Areas  {Life Areas:66668}    Community and Social Life  {Community/Social Life:55643}         {Desc; low/moderate/high:221746}   Clinical Presentation {Clinical Presentation :86750} {Desc; low/moderate/high:955368}   Decision Making/ Complexity Score: {Desc; low/moderate/high:662640}     Goals:  Short Term Goals: *** weeks   ***    Long Term Goals: *** weeks   ***    Plan   Plan of care Certification: 12/11/2020 to ***.    Outpatient Physical Therapy {NUMBERS 1-5:52716} times weekly for {0-10:72434::"0"} weeks to include the following interventions: {TX PLAN:88076}.     Merary Tomas PT    "

## 2020-12-11 NOTE — PLAN OF CARE
[facility]  Physical Therapy Initial Evaluation    Name: Poonam Alvarez  Clinic Number: [id number]    Therapy Diagnosis:   Encounter Diagnoses   Name Primary?    Weakness of both hips     Abdominal weakness     Greater trochanteric bursitis of right hip     Spondylolisthesis of lumbar region     Balance problem      Physician: Nan Arenas PA-C    Physician Orders: PT Eval and Treat   Medical Diagnosis from Referral: M70.61 (ICD-10-CM) - Greater trochanteric bursitis of right hip M43.16 (ICD-10-CM) - Spondylolisthesis of lumbar region   Evaluation Date: 12/11/2020  Authorization Period Expiration: 12/08/2021   Plan of Care Expiration: 3/5/2021  Visit # / Visits authorized: 1/ 1    Time In: 10:30  Time Out: 11:20  Total Billable Time: 50 minutes    Precautions: Standard, Fall and atrial flutter    Subjective   Date of onset: about 3 months ago  History of current condition - Poonam reports:  right lateral hip and leg pain to the knee. Hx of R TKA in 2014. The back and hip pain has been present for several months, off and on. She's been seeing a chiropractor with improvements. She has one more appoitnment with the chiropractor and then she plans to stop going and just see PT to get stronger. She fell on 12/2/2020, and had imaging of head, back and hip. Notes she lost her balance while going through the closet, stooped over and she lost her balance, which is concerning. She feels less confident in her balance. Followed up with orthopedics on 12/8/2020. Pt denies falls except in 2019 (humerus fx) and last week. Denies other falls.    Pt reports R hip pain worse than LBP. Pain radiates lateral hip down to knee. Pt notes she has been having to go to the bathroom about 3x/night, which is new over the last few months. Reports she discussed this with PA and is trying to avoid drinking water past 6 pm.         Past Medical History:   Diagnosis Date    AR (allergic rhinitis) 12/7/2012     Atrial flutter     ablation October 2008    Cataract     Generalized headaches     GERD (gastroesophageal reflux disease) 03/08/2013    resolved    Grave's disease     s/p radioactive iodine, now hypothyroidism    Keloid cicatrix     Obesity     Osteoarthritis     shoulders, hands, knees    Positive PPD, treated     9 months of INH, completed 1/2010     Poonam Alvarez  has a past surgical history that includes Eye surgery; Knee arthroscopy w/ debridement; Ablation of dysrhythmic focus (10/24/2008); Knee surgery (3-27-14); Colonoscopy (N/A, 11/5/2015); Cataract extraction w/  intraocular lens implant (Bilateral); Hernia repair; and Arthroplasty of shoulder (Right, 4/1/2019).    Poonam has a current medication list which includes the following prescription(s): aspirin, b complex vitamins, calcium-vitamin d3-vitamin k, cholecalciferol (vitamin d3), clotrimazole-betamethasone 1-0.05%, fish oil-omega-3 fatty acids, hydrochlorothiazide, meloxicam, multivitamin-minerals-lutein, promethazine-dextromethorphan, synthroid, triamcinolone acetonide 0.1%, and coenzyme q10, and the following Facility-Administered Medications: acetaminophen-codeine 300-30mg.    Review of patient's allergies indicates:   Allergen Reactions    Hydrocodone-acetaminophen Other (See Comments)     Low blood pressure; doesn't want    Oxycodone-acetaminophen Other (See Comments)     Causes low blood pressure; doesn't want  4/4/19 - patient reports she is not actually allergic to it (just doesn't like how it makes her feel)        Imaging, bone scan films:     12/8/2020  XR LUMBAR SPINE AP AND LAT WITH FLEX/EXT     CLINICAL HISTORY:  Other intervertebral disc degeneration, lumbar region     FINDINGS:  Four views lumbar spine: There is grade 1 anterolisthesis of L4 on L5 and L3 on L4.  There is moderate DJD between L3 and S1.  There is mild DJD above this.  No fracture dislocation bone destruction seen.  No translational instability seen.  No  trauma seen.  No fracture dislocation bone destruction seen.     Impression:  No acute process seen.      EXAMINATION: 11/18/2020  MRI LUMBAR SPINE WITHOUT CONTRAST     CLINICAL HISTORY:  Back pain or radiculopathy, > 6 wks; Dorsalgia, unspecified     TECHNIQUE:  Multiplanar, multisequence MR images were acquired from the thoracolumbar junction to the sacrum without the administration of contrast.     COMPARISON:  CT renal stone study 03/15/2011     FINDINGS:  Alignment: Grade 1 anterolisthesis of L2 on L3 and L3 on L4     Vertebrae: Heterogeneous marrow signal.  Sub-endplate edema at L2-L3 with preserved vertebral body heights suggestive of inflammation and degenerative changes.  There is a small Schmorl's node at the L3 superior endplate.  There also mild degenerative endplate changes at L4-5.  No acute fracture or suspicious marrow replacement.     Discs: Multilevel disc desiccation and disc height loss, most severe L4-L5.     Cord: Normal.  Conus terminates at L1.     Degenerative findings:     T12-L1: No significant spinal canal or neural foraminal narrowing.     L1-L2: Circumferential disc bulge mild bilateral facet arthropathy.  No significant spinal canal or neural foraminal narrowing.     L2-L3: Circumferential disc bulge, ligamentum flavum buckling, and bilateral facet arthropathy with grade 1 anterolisthesis result in moderate spinal canal stenosis and mild bilateral neural foraminal narrowing.  Advanced facet joint arthropathy, noting joint hypertrophy, prominent synovial fluid, and surrounding inflammatory changes.     L3-L4: Circumferential disc bulge, ligamentum flavum buckling, and bilateral facet arthropathy with grade 1 anterolisthesis result in moderate spinal canal stenosis and mild bilateral neural foraminal narrowing.Advanced facet joint arthropathy, noting joint hypertrophy, prominent synovial fluid, and surrounding inflammatory changes.     L4-L5: Circumferential disc bulge and bilateral  facet arthropathy resulting in mild bilateral neural foraminal narrowing.  No spinal canal stenosis.     L5-S1: No significant spinal canal or neural foraminal narrowing.     The superior sacrum and sacroiliac joints are unremarkable.     Paraspinal muscles & soft tissues: Mild fatty atrophy of lower paraspinal muscles.     Impression:     Lumbar spondylosis, most prominent at L2-3 and L3-4, resulting in moderate spinal canal stenosis and mild neural foraminal narrowing, as above.     Prominent L2-3 and L3-4 facet joint arthropathy with grade I anterolisthesis, as above.      Prior Therapy: Yes, at this location for shoulder pain in 2019, hx of TKA 2014  Social History: Lives alone, three story home with bedroom on second floor.  Occupation: part time    Prior Level of Function: I with ADLs, active walking about an hour or so at once   Current Level of Function: over the last few months, she's not as active due to hip pain, I with ADLs, no AD. Unable to walk long distances, difficulty bike riding.    Pain:  Current 0/10, worst 10/10, best 0/10   Location: R lateral hip, B low back  Description: Aching  Aggravating Factors: Sitting, Laying and Walking  Easing Factors: chiropractor, heat, ice    Pts goals: Return to regular exercise, including walking and bike riding    Objective     Observation: Pt is alert and oriented, pleasant, good historian     Posture:  Seated, reclined in chair    Sit to Stand: functional knee valgus, excessive forward lean  Toe walking: unsteady, unable to lift fully  Heel walking: unsteady, req UE support    SLS:   - L: 4 seconds over 3 trials, unsteady   - R: 5 seconds, good stability, 3 trials    5X Sit to Stand: 30 s, narrow TRACIE and functional valgus, excessive forward trunk movement to assist    Lumbar Range of Motion:    Percent WFL Pain   Flexion  100%    discomfort R calf        Extension  10%    discomfort L low back        Left Side Bending  75%  stretch        Right  Side Bending  75%  stretch        Left rotation    75%         Right Rotation    75%              Lower Extremity MMT  MMT Left Right   Ankle Plantarflexion   Unable to lift heels fully off floor with good control  Unable to lift heels fully off floor with good control   Ankle Dorsiflexion   5/5   5/5   Knee Extension 4+/5 5/5   Knee Flexion 4/5 5/5   Hip Flexion  3+/5   4/5   Hip External Rotation  3-/5   3-/5   Hip Abduction   3-/5   3-/5   Hip Extension   Minimal clearance with B glute bridge   Minimal clearance with B glute bridge     Special Tests:  CHRISTINA: (-) B  FADIR: (-) B    Neuro Dynamic Testing:    Sciatic nerve:      SLR: R = -     L = -      Palpation: Pt denies TTP at R greater trochanter, glute med    Sensation: grossly intact BLE    Flexibility: pt demonstrates generally good flexibiliy   Hamstring: R = > 90; L = >90          CMS Impairment/Limitation/Restriction for FOTO Lumbar Survey    Therapist reviewed FOTO scores for Poonam Alvarez on 12/11/2020.   FOTO documents entered into [employer] - see Media section.    Limitation Score: 47%    Goal: 32%         TREATMENT   Treatment Time In: 11:10  Treatment Time Out: 11:20  Total Treatment time separate from Evaluation: 10 minutes    Poonam received therapeutic exercises to develop strength, endurance, ROM, flexibility, posture and core stabilization for 10 minutes including:  Demonstration and initiation of HEP, including  Single knee to chest  glute bridge (cues for PPT)  SL clamshell  LAQ  Attempted mini wall sit, not tolerated well due to weakness and knee pain      Home Exercises and Patient Education Provided    Education provided:   - exercise technique and rational    Written Home Exercises Provided: yes.  Exercises were reviewed and Poonam was able to demonstrate them prior to the end of the session.  Poonam demonstrated good  understanding of the education provided.     See EMR under Patient Instructions for exercises provided  12/11/2020.    Assessment   Poonam is a 69 y.o. female referred to outpatient Physical Therapy with a medical diagnosis of M70.61 (ICD-10-CM) - Greater trochanteric bursitis of right hip and M43.16 (ICD-10-CM) - Spondylolisthesis of lumbar region. Pt also presents with poor balance causing fall risk, as demonstrated by inability to maintain single leg stance and excessive time required for 5X Sit to Stand test. Pt presents with associated impairments including weakness BLE, decreased functional mobility, impaired balance, and decreased lumbar ROM. Pt responds well to flexion > extension, consistent with imaging report of stenosis. Pt is highly motivated and will benefit from skilled PT to address above impairments to allow pt to return to regular exercise and functional ambulation, as well as to decrease risk of falls.    Pt prognosis is Good.   Pt will benefit from skilled outpatient Physical Therapy to address the deficits stated above and in the chart below, provide pt/family education, and to maximize pt's level of independence.     Plan of care discussed with patient: Yes  Pt's spiritual, cultural and educational needs considered and patient is agreeable to the plan of care and goals as stated below:     Anticipated Barriers for therapy: none    Medical Necessity is demonstrated by the following  History  Co-morbidities and personal factors that may impact the plan of care Co-morbidities:   atrial flutter    Personal Factors:   age     low   Examination  Body Structures and Functions, activity limitations and participation restrictions that may impact the plan of care Body Regions:   back  lower extremities  trunk    Body Systems:    ROM  strength  balance  gait  motor control  motor learning    Participation Restrictions:    walking  Exercise  Sitting  sleeping    Activity limitations:   Learning and applying knowledge  no deficits    General Tasks and Commands  no deficits    Communication  no  deficits    Mobility  lifting and carrying objects  walking  moving around using equipment (WC)  using transportation (bus, train, plane, car)    Self care  caring for body parts (brushing teeth, shaving, grooming)  looking after one's health    Domestic Life  shopping  cooking  doing house work (cleaning house, washing dishes, laundry)    Interactions/Relationships  no deficits    Life Areas  no deficits    Community and Social Life  community life  recreation and leisure         moderate   Clinical Presentation stable and uncomplicated low   Decision Making/ Complexity Score: low     Goals:  Short Term Goals (4 Weeks):   1. Pt will report reduction in pain of the lumbar spine and LE to <=3/10 at worst for ease with ADL's  2. Pt will be able to perform B SLS for >= 10 s for decreased fall risk.  3. Pt will demonstrate hip abduction MMT >=3/5 for improved mechanics with walking.  4. Pt will be able to perform squat to chair height with good mechanics to improve body mechanics with functional sit to stands.    Long Term Goals (12 Weeks):   1. Pt will report being independent with HEP for maintenance of improvements gained during therapy sessions.  2.  Pt will demonstrate BLE MMT >=4/5 for improved tolerance to walking.  3. Pt will perform 5X Sit to Stand test in < 13s for decreased risk of falls.  4. Pt will be able to walk > 30 minutes with pain < 3/10 for ease with return to regular physical activity to improve cardiovascular health.  5. Pt to demonstrate improved functional ability with FOTO limitation <=32% disability.    Plan   Plan of care Certification: 12/11/2020 to 3/5/2021.    Outpatient Physical Therapy 2 times weekly for 12 weeks to include the following interventions: Cervical/Lumbar Traction, Electrical Stimulation  , Gait Training, Iontophoresis (with  ), Manual Therapy, Moist Heat/ Ice, Neuromuscular Re-ed, Patient Education, Self Care, Therapeutic Activites and Therapeutic Exercise.     Nohemi  Abel, PT

## 2020-12-15 ENCOUNTER — CLINICAL SUPPORT (OUTPATIENT)
Dept: REHABILITATION | Facility: OTHER | Age: 70
End: 2020-12-15
Attending: PHYSICIAN ASSISTANT
Payer: MEDICARE

## 2020-12-15 DIAGNOSIS — R19.8 ABDOMINAL WEAKNESS: ICD-10-CM

## 2020-12-15 DIAGNOSIS — R29.3 POSTURE ABNORMALITY: Primary | ICD-10-CM

## 2020-12-15 DIAGNOSIS — R26.89 BALANCE PROBLEM: ICD-10-CM

## 2020-12-15 DIAGNOSIS — R29.898 WEAKNESS OF BOTH HIPS: ICD-10-CM

## 2020-12-15 PROCEDURE — 97110 THERAPEUTIC EXERCISES: CPT | Mod: PN

## 2020-12-15 NOTE — PROGRESS NOTES
Physical Therapy Treatment Note     Name: Poonam Alvarez  Clinic Number: [id number]    Therapy Diagnosis:   Encounter Diagnoses   Name Primary?    Posture abnormality Yes    Weakness of both hips     Abdominal weakness     Balance problem      Physician: Nan Arenas PA-C    Visit Date: 12/15/2020    Physician Orders: PT Eval and Treat   Medical Diagnosis from Referral: M70.61 (ICD-10-CM) - Greater trochanteric bursitis of right hip M43.16 (ICD-10-CM) - Spondylolisthesis of lumbar region   Evaluation Date: 2020  Authorization Period Expiration: 2021   Plan of Care Expiration: 3/5/2021  Visit # / Visits authorized:  (further auth pending)      Time In: 3:50 pm  Time Out: 4:35  Total Billable Time: 45 minutes    Precautions: Standard, Fall and atrial flutter    Subjective     Pt reports: She's been doing the exercises at home. Pt notes muscle soreness in quadriceps.  She was compliant with home exercise program.  Response to previous treatment: eval  Functional change: none    Pain: 10  Location: R lateral hip     Objective     Poonam received therapeutic exercises to develop strength, endurance, ROM, flexibility, posture and core stabilization for 45 minutes includin min upright bike to increase blood flow to quadriceps   Single knee to chest x 2  glute bridge with 3s hold 10 x 2  TrA activation 10s x 5  Pelvic tilts x 10  TrA activation with alternating knee fallout 5 x 2  SL clamshell x 10 B (decreased ROM on R)  Shuttle B leg press 50 lbs x 15, 75 lbs x 15 x 2  Active hamstring stretch as rest between sets of shuttle press  Standing marches 10 x 3 for balance  Mini squats with UE support, cues to correct functional knee valgus and excessive toe out, not tolerated well      Poonam received the following manual therapy techniques: Joint mobilizations, Manual traction, Soft tissue Mobilization and Friction Massage were applied to the: LE for 00 minutes, including:      Home  Exercises Provided and Patient Education Provided     Education provided:   - Addition of knee fallout and marching to HEP for balance and core strengthening    Written Home Exercises Provided: yes.  Exercises were reviewed and Poonam was able to demonstrate them prior to the end of the session.  Poonam demonstrated good  understanding of the education provided.     See EMR under Patient Instructions for exercises provided 12/15/2020 and 12/11/2020.    Assessment     Pt tolerated treatment well including good carryover of technique with clamshell exercise. Pt continues to have significant difficulty perform mini-squats and sit to stand exercise.   Poonam is progressing well towards her goals.   Pt prognosis is Good.     Pt will continue to benefit from skilled outpatient physical therapy to address the deficits listed in the problem list box on initial evaluation, provide pt/family education and to maximize pt's level of independence in the home and community environment.     Pt's spiritual, cultural and educational needs considered and pt agreeable to plan of care and goals.     Anticipated barriers to physical therapy: none    Goals:  Short Term Goals (4 Weeks):   1. Pt will report reduction in pain of the lumbar spine and LE to <=3/10 at worst for ease with ADL's  2. Pt will be able to perform B SLS for >= 10 s for decreased fall risk.  3. Pt will demonstrate hip abduction MMT >=3/5 for improved mechanics with walking.  4. Pt will be able to perform squat to chair height with good mechanics to improve body mechanics with functional sit to stands.     Long Term Goals (12 Weeks):   1. Pt will report being independent with HEP for maintenance of improvements gained during therapy sessions.  2.  Pt will demonstrate BLE MMT >=4/5 for improved tolerance to walking.  3. Pt will perform 5X Sit to Stand test in < 13s for decreased risk of falls.  4. Pt will be able to walk > 30 minutes with pain < 3/10 for ease with  return to regular physical activity to improve cardiovascular health.  5. Pt to demonstrate improved functional ability with FOTO limitation <=32% disability.    Plan   Plan of care Certification: 12/11/2020 to 3/5/2021    Progress quadriceps and hip strengthening. Continue to work on balance.    Nohemi Roman, PT

## 2020-12-17 NOTE — TELEPHONE ENCOUNTER
No new care gaps identified.  Powered by [employer]. Reference number: [id number]. 12/17/2020 5:53:54 AM   CST

## 2020-12-18 ENCOUNTER — OFFICE VISIT (OUTPATIENT)
Dept: OBSTETRICS AND GYNECOLOGY | Facility: CLINIC | Age: 70
End: 2020-12-18
Payer: MEDICARE

## 2020-12-18 VITALS — BODY MASS INDEX: 27.12 KG/M2 | HEIGHT: 72 IN

## 2020-12-18 DIAGNOSIS — Z01.419 WOMEN'S ANNUAL ROUTINE GYNECOLOGICAL EXAMINATION: Primary | ICD-10-CM

## 2020-12-18 DIAGNOSIS — Z12.4 ENCOUNTER FOR PAPANICOLAOU SMEAR FOR CERVICAL CANCER SCREENING: ICD-10-CM

## 2020-12-18 DIAGNOSIS — Z12.31 ENCOUNTER FOR SCREENING MAMMOGRAM FOR BREAST CANCER: ICD-10-CM

## 2020-12-18 DIAGNOSIS — Z11.51 SCREENING FOR HPV (HUMAN PAPILLOMAVIRUS): ICD-10-CM

## 2020-12-18 PROCEDURE — G0101 CA SCREEN;PELVIC/BREAST EXAM: HCPCS | Mod: PBBFAC | Performed by: NURSE PRACTITIONER

## 2020-12-18 PROCEDURE — 87624 HPV HI-RISK TYP POOLED RSLT: CPT

## 2020-12-18 PROCEDURE — G0101 CA SCREEN;PELVIC/BREAST EXAM: HCPCS | Mod: S$PBB,,, | Performed by: NURSE PRACTITIONER

## 2020-12-18 PROCEDURE — 99213 OFFICE O/P EST LOW 20 MIN: CPT | Mod: PBBFAC,25 | Performed by: NURSE PRACTITIONER

## 2020-12-18 PROCEDURE — 99999 PR PBB SHADOW E&M-EST. PATIENT-LVL III: CPT | Mod: PBBFAC,,, | Performed by: NURSE PRACTITIONER

## 2020-12-18 PROCEDURE — 88142 CYTOPATH C/V THIN LAYER: CPT

## 2020-12-18 PROCEDURE — 99999 PR PBB SHADOW E&M-EST. PATIENT-LVL III: ICD-10-PCS | Mod: PBBFAC,,, | Performed by: NURSE PRACTITIONER

## 2020-12-18 PROCEDURE — G0101 PR CA SCREEN;PELVIC/BREAST EXAM: ICD-10-PCS | Mod: S$PBB,,, | Performed by: NURSE PRACTITIONER

## 2020-12-18 RX ORDER — HYDROCHLOROTHIAZIDE 12.5 MG/1
TABLET ORAL
Qty: 90 TABLET | Refills: 0 | Status: SHIPPED | OUTPATIENT
Start: 2020-12-18 | End: 2021-03-19

## 2020-12-18 NOTE — PROGRESS NOTES
CC: Annual  HPI: Pt is a 70 y.o.  female who presents for routine annual exam. She is working from home as a .  Her daughter is doing travel nursing - currently working in [place]. Son got his pHD. She is postmenopausal.  Denies any PMB.   The patient participates in regular exercise: yes.  The patient does not smoke.  The patient wears seatbelts.   Pt denies any domestic violence.  MMG is due in 6/21.  Colonoscopy is due in 2025.  DEXA due in 12/21.  She desires to continue annual pap screening.      FH:  Breast cancer: none  Colon cancer: none  Ovarian cancer: none  Endometrial cancer: none    ROS:  GENERAL: Feeling well overall. Denies fever or chills.   SKIN: Denies rash or lesions.   HEAD: Denies head injury or headache.   NODES: Denies enlarged lymph nodes.   CHEST: Denies chest pain or shortness of breath.   CARDIOVASCULAR: Denies palpitations or left sided chest pain.   ABDOMEN: No abdominal pain, constipation, diarrhea, nausea, vomiting or rectal bleeding.   URINARY: No dysuria, hematuria, or burning on urination.  REPRODUCTIVE: See HPI.   BREASTS: Denies pain, lumps, or nipple discharge.   HEMATOLOGIC: No easy bruisability or excessive bleeding.   MUSCULOSKELETAL: Denies joint pain or swelling.   NEUROLOGIC: Denies syncope or weakness.   PSYCHIATRIC: Denies depression, anxiety or mood swings.    PE:   APPEARANCE: Well nourished, well developed, Black or  female in no acute distress.  NODES: no cervical, supraclavicular, or inguinal lymphadenopathy  BREASTS: Symmetrical, no skin changes or visible lesions. No palpable masses, nipple discharge or adenopathy bilaterally.  ABDOMEN: Soft. No tenderness or masses. No distention. No hernias palpated. No CVA tenderness.  VULVA: No lesions. Normal external female genitalia.  URETHRAL MEATUS: Normal size and location, no lesions, no prolapse.  URETHRA: No masses, tenderness, or prolapse.  VAGINA: Atrophic. No lesions or lacerations noted. No  abnormal discharge present. No odor present.   CERVIX: No lesions or discharge. No cervical motion tenderness.   UTERUS: Normal size, regular shape, mobile, non-tender.  ADNEXA: No tenderness. No fullness or masses palpated in the adnexal regions.   ANUS PERINEUM: Normal.      Diagnosis:  1. Women's annual routine gynecological examination    2. Encounter for Papanicolaou smear for cervical cancer screening    3. Screening for HPV (human papillomavirus)    4. Encounter for screening mammogram for breast cancer        Plan:   Pap/ HPV  MMG in 6/21    Orders Placed This Encounter    HPV High Risk Genotypes, PCR    Mammo Digital Screening Bilat    Liquid-Based Pap Smear, Screening       Patient was counseled today on the new ACS guidelines for cervical cytology screening as well as the current recommendations for breast cancer screening. She was counseled to follow up with her PCP for other routine health maintenance. Counseling session lasted approximately 10 minutes, and all her questions were answered.    Follow-up with me in 1 year for routine exam    NARDA Silverman

## 2020-12-18 NOTE — TELEPHONE ENCOUNTER
Provider Staff:     Action is required for this patient.     Please schedule patient for Labs (CMP, LIPIDS, TSH)    Thanks!  [facility]     Appointments  past 12m or future 3m with PCP    Date Provider   Last Visit   3/23/2020 Lindsey Bach MD   Next Visit   12/28/2020 Lindsey Bach MD     Note composed:2:46 PM 12/18/2020

## 2020-12-28 ENCOUNTER — LAB VISIT (OUTPATIENT)
Dept: LAB | Facility: HOSPITAL | Age: 70
End: 2020-12-28
Attending: INTERNAL MEDICINE
Payer: MEDICARE

## 2020-12-28 ENCOUNTER — OFFICE VISIT (OUTPATIENT)
Dept: INTERNAL MEDICINE | Facility: CLINIC | Age: 70
End: 2020-12-28
Payer: MEDICARE

## 2020-12-28 VITALS — SYSTOLIC BLOOD PRESSURE: 102 MMHG | OXYGEN SATURATION: 99 % | DIASTOLIC BLOOD PRESSURE: 70 MMHG | HEART RATE: 68 BPM

## 2020-12-28 DIAGNOSIS — R55 SYNCOPE, UNSPECIFIED SYNCOPE TYPE: ICD-10-CM

## 2020-12-28 DIAGNOSIS — E53.8 VITAMIN B12 DEFICIENCY: ICD-10-CM

## 2020-12-28 DIAGNOSIS — Z00.00 ROUTINE GENERAL MEDICAL EXAMINATION AT A HEALTH CARE FACILITY: ICD-10-CM

## 2020-12-28 DIAGNOSIS — I10 ESSENTIAL HYPERTENSION: ICD-10-CM

## 2020-12-28 DIAGNOSIS — E55.9 VITAMIN D DEFICIENCY DISEASE: ICD-10-CM

## 2020-12-28 DIAGNOSIS — I10 ESSENTIAL HYPERTENSION: Primary | ICD-10-CM

## 2020-12-28 DIAGNOSIS — R76.11 POSITIVE PPD: ICD-10-CM

## 2020-12-28 DIAGNOSIS — R79.9 ABNORMAL BLOOD CHEMISTRY: ICD-10-CM

## 2020-12-28 LAB
25(OH)D3+25(OH)D2 SERPL-MCNC: 51 NG/ML (ref 30–96)
ALBUMIN SERPL BCP-MCNC: 4.1 G/DL (ref 3.5–5.2)
ALP SERPL-CCNC: 88 U/L (ref 55–135)
ALT SERPL W/O P-5'-P-CCNC: 23 U/L (ref 10–44)
ANION GAP SERPL CALC-SCNC: 14 MMOL/L (ref 8–16)
AST SERPL-CCNC: 27 U/L (ref 10–40)
BASOPHILS # BLD AUTO: 0.01 K/UL (ref 0–0.2)
BASOPHILS NFR BLD: 0.2 % (ref 0–1.9)
BILIRUB SERPL-MCNC: 0.4 MG/DL (ref 0.1–1)
BUN SERPL-MCNC: 16 MG/DL (ref 8–23)
CALCIUM SERPL-MCNC: 9.7 MG/DL (ref 8.7–10.5)
CHLORIDE SERPL-SCNC: 103 MMOL/L (ref 95–110)
CHOLEST SERPL-MCNC: 225 MG/DL (ref 120–199)
CHOLEST/HDLC SERPL: 2.9 {RATIO} (ref 2–5)
CK MB SERPL-MCNC: 2 NG/ML (ref 0.1–6.5)
CK MB SERPL-RTO: 1.4 % (ref 0–5)
CK SERPL-CCNC: 145 U/L (ref 20–180)
CK SERPL-CCNC: 145 U/L (ref 20–180)
CO2 SERPL-SCNC: 24 MMOL/L (ref 23–29)
CREAT SERPL-MCNC: 0.8 MG/DL (ref 0.5–1.4)
DIFFERENTIAL METHOD: ABNORMAL
EOSINOPHIL # BLD AUTO: 0.1 K/UL (ref 0–0.5)
EOSINOPHIL NFR BLD: 1.1 % (ref 0–8)
ERYTHROCYTE [DISTWIDTH] IN BLOOD BY AUTOMATED COUNT: 15.9 % (ref 11.5–14.5)
EST. GFR  (AFRICAN AMERICAN): >60 ML/MIN/1.73 M^2
EST. GFR  (NON AFRICAN AMERICAN): >60 ML/MIN/1.73 M^2
ESTIMATED AVG GLUCOSE: 105 MG/DL (ref 68–131)
GLUCOSE SERPL-MCNC: 89 MG/DL (ref 70–110)
HBA1C MFR BLD HPLC: 5.3 % (ref 4–5.6)
HCT VFR BLD AUTO: 40.9 % (ref 37–48.5)
HDLC SERPL-MCNC: 78 MG/DL (ref 40–75)
HDLC SERPL: 34.7 % (ref 20–50)
HGB BLD-MCNC: 12.2 G/DL (ref 12–16)
IMM GRANULOCYTES # BLD AUTO: 0.01 K/UL (ref 0–0.04)
IMM GRANULOCYTES NFR BLD AUTO: 0.2 % (ref 0–0.5)
LDLC SERPL CALC-MCNC: 135.2 MG/DL (ref 63–159)
LYMPHOCYTES # BLD AUTO: 1.9 K/UL (ref 1–4.8)
LYMPHOCYTES NFR BLD: 43.2 % (ref 18–48)
MAGNESIUM SERPL-MCNC: 2.2 MG/DL (ref 1.6–2.6)
MCH RBC QN AUTO: 25.8 PG (ref 27–31)
MCHC RBC AUTO-ENTMCNC: 29.8 G/DL (ref 32–36)
MCV RBC AUTO: 87 FL (ref 82–98)
MONOCYTES # BLD AUTO: 0.4 K/UL (ref 0.3–1)
MONOCYTES NFR BLD: 9.9 % (ref 4–15)
NEUTROPHILS # BLD AUTO: 2 K/UL (ref 1.8–7.7)
NEUTROPHILS NFR BLD: 45.4 % (ref 38–73)
NONHDLC SERPL-MCNC: 147 MG/DL
NRBC BLD-RTO: 0 /100 WBC
PLATELET # BLD AUTO: 237 K/UL (ref 150–350)
PMV BLD AUTO: 12.2 FL (ref 9.2–12.9)
POTASSIUM SERPL-SCNC: 3.7 MMOL/L (ref 3.5–5.1)
PROT SERPL-MCNC: 7.9 G/DL (ref 6–8.4)
RBC # BLD AUTO: 4.72 M/UL (ref 4–5.4)
SODIUM SERPL-SCNC: 141 MMOL/L (ref 136–145)
T4 FREE SERPL-MCNC: 1.16 NG/DL (ref 0.71–1.51)
TRIGL SERPL-MCNC: 59 MG/DL (ref 30–150)
TROPONIN I SERPL DL<=0.01 NG/ML-MCNC: 0.01 NG/ML (ref 0–0.03)
TSH SERPL DL<=0.005 MIU/L-ACNC: 0.34 UIU/ML (ref 0.4–4)
VIT B12 SERPL-MCNC: 665 PG/ML (ref 210–950)
WBC # BLD AUTO: 4.44 K/UL (ref 3.9–12.7)

## 2020-12-28 PROCEDURE — 99397 PR PREVENTIVE VISIT,EST,65 & OVER: ICD-10-PCS | Mod: S$PBB,,, | Performed by: INTERNAL MEDICINE

## 2020-12-28 PROCEDURE — 85025 COMPLETE CBC W/AUTO DIFF WBC: CPT

## 2020-12-28 PROCEDURE — 83036 HEMOGLOBIN GLYCOSYLATED A1C: CPT

## 2020-12-28 PROCEDURE — 80053 COMPREHEN METABOLIC PANEL: CPT

## 2020-12-28 PROCEDURE — 82553 CREATINE MB FRACTION: CPT

## 2020-12-28 PROCEDURE — 99999 PR PBB SHADOW E&M-EST. PATIENT-LVL IV: ICD-10-PCS | Mod: PBBFAC,,, | Performed by: INTERNAL MEDICINE

## 2020-12-28 PROCEDURE — 99214 OFFICE O/P EST MOD 30 MIN: CPT | Mod: PBBFAC | Performed by: INTERNAL MEDICINE

## 2020-12-28 PROCEDURE — 83735 ASSAY OF MAGNESIUM: CPT

## 2020-12-28 PROCEDURE — 82550 ASSAY OF CK (CPK): CPT

## 2020-12-28 PROCEDURE — 80061 LIPID PANEL: CPT

## 2020-12-28 PROCEDURE — 82306 VITAMIN D 25 HYDROXY: CPT

## 2020-12-28 PROCEDURE — 82607 VITAMIN B-12: CPT

## 2020-12-28 PROCEDURE — 84439 ASSAY OF FREE THYROXINE: CPT

## 2020-12-28 PROCEDURE — 84484 ASSAY OF TROPONIN QUANT: CPT

## 2020-12-28 PROCEDURE — 99999 PR PBB SHADOW E&M-EST. PATIENT-LVL IV: CPT | Mod: PBBFAC,,, | Performed by: INTERNAL MEDICINE

## 2020-12-28 PROCEDURE — 99397 PER PM REEVAL EST PAT 65+ YR: CPT | Mod: S$PBB,,, | Performed by: INTERNAL MEDICINE

## 2020-12-28 PROCEDURE — 84443 ASSAY THYROID STIM HORMONE: CPT

## 2020-12-28 RX ORDER — ACETAMINOPHEN 500 MG
TABLET ORAL
Qty: 1 EACH | Refills: 0 | OUTPATIENT
Start: 2020-12-28 | End: 2020-12-28 | Stop reason: SDUPTHER

## 2020-12-28 RX ORDER — LEVOTHYROXINE SODIUM 137 UG/1
TABLET ORAL
Qty: 90 TABLET | Refills: 3 | Status: SHIPPED | OUTPATIENT
Start: 2020-12-28 | End: 2021-04-23 | Stop reason: SDUPTHER

## 2020-12-28 RX ORDER — ACETAMINOPHEN 500 MG
TABLET ORAL
Qty: 1 EACH | Refills: 0 | Status: SHIPPED | OUTPATIENT
Start: 2020-12-28 | End: 2023-07-07

## 2020-12-28 NOTE — PROGRESS NOTES
CHIEF COMPLAINT: Annual exam      HISTORY OF PRESENT ILLNESS: This is a 70-year-old woman who presents for he annual exam    She passed out yesterday while sitting at the table with her sisters. She has just strained to have a BM and was sitting at the table. She got hot then dizzy then laid her head on the table. Her sisters state that she was out for 2 minutes. EMS was called. When EMS got there seh was awake. EKG and blood pressure were fine. No other symptoms. No chest pain, shortness of breath, palpitations.      BP has been doing well.  She used to take  HCTZ 12.5 mg sporadically. Has been taking HCTZ 12.5 mg daily the last 6 months.       She continues to take Levoxyl 137 mcg daily for hypothyroidism.       She has a history of positive PPD 01/2010 - took INH for 9 months, CXR 11/16 was fine. NO fever, chills, night sweats, weight loss.       She has chronic constipation.   She has a BM twice week. She takes stool softner sporadically.  She will take dulcolax as needed.        She is currently working part time for [employer] at [employer] - as a .        She takes meloxicam 15 mg daily on mondays. Joints are doing OK- up and down with activity and the weather    She has a cough at night - she will take the promethazine DM 1-2 times a month which helps.       PAST MEDICAL HISTORY:   1. Atrial flutter, status post ablation in October 2008.   2. Graves disease, status post radioactive iodine, now hypothyroid.   3. Hypertension.   4. History of headaches.   5. Osteoarthritis of the shoulders, hands and knees.   6. Status post arthroscopic surgery of the right knee February 2008.   7. Status post arthroscopic surgery of the right knee in 2005.   8. Tummy tuck 20 years ago.   9. Hemorrhoids removed.   10. Cataract removal bilaterally  11. Positive PPD diagnosed 4/09, completed 9 months of INH 1/10  12. GERD     SOCIAL HISTORY: She does not smoke. She drinks alcohol twice a year.    She is , has three children. She is a .     FAMILY HISTORY: Father  at age 83 from complications of pneumonia.   Mother  at age 98 from old age. Sister had a pulmonary embolus, another   sister had sarcoidosis. A son has Crohn's disease.        MEDICATIONS AND ALLERGIES: Updated in EPIC.       PHYSICAL EXAMINATION:    /70   Pulse 68   SpO2 99%     GENERAL: She is alert, oriented, no apparent distress. Affect within normal limits.   Conjunctiva anicteric. Tympanic membranes clear. Oropharynx clear.    NECK: Supple.   RESPIRATORY: Effort normal. Lungs are clear to auscultation.   HEART: Regular rate and rhythm without murmurs, gallops or rubs.   No lower extremity edema.     EKG done yesterday bu EMS was brought by patient - Reviewed and NSR with no change from 3/21/2019          ASSESSMENT AND PLAN:     Annual exam - discussed diet, exercise and safety issues.       1.  Syncopal episode- suspect over medicated with HCTZ and had a vasovagal episode from straining from constipation. Decrease HCTZ to 2 times a week. Push fluids. Labs today.   2. OA right shoulder - bas above  lasix as needed   3. OA knees - s/p injection in left knee - s/p right knee replacement - doing well.  4. ALlergic rhinitis -stable   5. Hypothyroidism - tsh   6. GERD - asx  7. Obestiy - doing well with diet, exercise and weight loss.   8. Positive ppd - cxr stable 2019  9. Left inguinal hernia repair - doing well     Screening - mammogram . Bone density was normal 2008. Colonoscopy due 2015 - normal due      I will see her back in 6 months, sooner if problems arise.   INfluenza   adacel 10/18  Pneumovax   Prevnar 11/15

## 2020-12-29 ENCOUNTER — TELEPHONE (OUTPATIENT)
Dept: INTERNAL MEDICINE | Facility: CLINIC | Age: 70
End: 2020-12-29

## 2020-12-29 ENCOUNTER — OFFICE VISIT (OUTPATIENT)
Dept: OPTOMETRY | Facility: CLINIC | Age: 70
End: 2020-12-29
Payer: MEDICARE

## 2020-12-29 DIAGNOSIS — H26.493 POSTERIOR CAPSULAR OPACIFICATION NON VISUALLY SIGNIFICANT OF BOTH EYES: Primary | ICD-10-CM

## 2020-12-29 DIAGNOSIS — Z13.5 SCREENING FOR EYE CONDITION: ICD-10-CM

## 2020-12-29 DIAGNOSIS — H52.7 REFRACTIVE ERRORS: ICD-10-CM

## 2020-12-29 DIAGNOSIS — Z98.41 S/P BILATERAL CATARACT EXTRACTION: ICD-10-CM

## 2020-12-29 DIAGNOSIS — Z96.1 PSEUDOPHAKIA OF BOTH EYES: ICD-10-CM

## 2020-12-29 DIAGNOSIS — Z98.42 S/P BILATERAL CATARACT EXTRACTION: ICD-10-CM

## 2020-12-29 PROCEDURE — 99999 PR PBB SHADOW E&M-EST. PATIENT-LVL II: ICD-10-PCS | Mod: PBBFAC,,, | Performed by: OPTOMETRIST

## 2020-12-29 PROCEDURE — 92014 COMPRE OPH EXAM EST PT 1/>: CPT | Mod: S$PBB,,, | Performed by: OPTOMETRIST

## 2020-12-29 PROCEDURE — 92015 DETERMINE REFRACTIVE STATE: CPT | Mod: ,,, | Performed by: OPTOMETRIST

## 2020-12-29 PROCEDURE — 99212 OFFICE O/P EST SF 10 MIN: CPT | Mod: PBBFAC | Performed by: OPTOMETRIST

## 2020-12-29 PROCEDURE — 92015 PR REFRACTION: ICD-10-PCS | Mod: ,,, | Performed by: OPTOMETRIST

## 2020-12-29 PROCEDURE — 92014 PR EYE EXAM, EST PATIENT,COMPREHESV: ICD-10-PCS | Mod: S$PBB,,, | Performed by: OPTOMETRIST

## 2020-12-29 PROCEDURE — 99999 PR PBB SHADOW E&M-EST. PATIENT-LVL II: CPT | Mod: PBBFAC,,, | Performed by: OPTOMETRIST

## 2020-12-30 ENCOUNTER — CLINICAL SUPPORT (OUTPATIENT)
Dept: REHABILITATION | Facility: OTHER | Age: 70
End: 2020-12-30
Attending: PHYSICIAN ASSISTANT
Payer: MEDICARE

## 2020-12-30 DIAGNOSIS — R26.89 BALANCE PROBLEM: ICD-10-CM

## 2020-12-30 DIAGNOSIS — R19.8 ABDOMINAL WEAKNESS: ICD-10-CM

## 2020-12-30 DIAGNOSIS — R29.898 WEAKNESS OF BOTH HIPS: ICD-10-CM

## 2020-12-30 DIAGNOSIS — R29.3 POSTURE ABNORMALITY: ICD-10-CM

## 2020-12-30 PROCEDURE — 97110 THERAPEUTIC EXERCISES: CPT | Mod: PN

## 2020-12-30 NOTE — PATIENT INSTRUCTIONS
Access Code: [email or web address]   URL: [email or web address]   Date: 12/30/2020   Prepared by: Nelly Hurt     Exercises  Supine Posterior Pelvic Tilt - 20 reps - 5 hold - 1x daily  Supine Transversus Abdominis Bracing - Hands on Stomach - 1 sets - 20 reps - 5 second hold  Seated Hamstring Stretch - 3 reps - 1 sets - 30 seconds hold  Supine ITB Stretch with Strap - 3 reps - 1 sets - 30 seconds hold - 1x daily  Sidelying Hip Abduction - 10 reps - 2 sets - 1x daily

## 2020-12-30 NOTE — PROGRESS NOTES
Physical Therapy Treatment Note     Name: Poonam Alvarez  Clinic Number: [id number]    Therapy Diagnosis:   Encounter Diagnoses   Name Primary?    Posture abnormality     Weakness of both hips     Abdominal weakness     Balance problem      Physician: Nan Arenas PA-C    Visit Date: 2020    Physician Orders: PT Eval and Treat   Medical Diagnosis from Referral: M70.61 (ICD-10-CM) - Greater trochanteric bursitis of right hip M43.16 (ICD-10-CM) - Spondylolisthesis of lumbar region   Evaluation Date: 2020  Authorization Period Expiration: 2021   Plan of Care Expiration: 3/5/2021  Visit # / Visits authorized: 3/ 1 (further auth pending)      Time In: 3:15 pm  Time Out: 4:00 pm  Total Billable Time: 45 minutes    Precautions: Standard, Fall and atrial flutter    Subjective     Pt reports:  Continues to have hip pain most noticeable with resting. Patient notes she fell the other day (2020) and notes that her low blood pressure contributed to her fall - she was dizzy and light headed - she notes taking a very low dose diuretic for some slight swelling and that is what caused it.     She was compliant with home exercise program.  Response to previous treatment: eval  Functional change: none    Pain: 2/10  Location: R lateral hip     Objective     Poonam received therapeutic exercises to develop strength, endurance, ROM, flexibility, posture and core stabilization for 45 minutes includin min upright bike to increase blood flow to quadriceps   Single knee to chest 2x 30 seconds  Pelvic tilts 20x 3 second holds  Bridge with PPT and glute squeze 2x 10 and 3 second hold  Single knee fall out with OTB 10x 5 second hold each  SL clamshell with OTB 2x 10  SL abduction x10 each  ITB stretch 2x 20 seconds  Seated hamstring stretch 2x 20 seconds    Not performed today:  TrA activation 10s x 5  TrA activation with alternating knee fallout 5 x 2    Shuttle B leg press 50 lbs x 15, 75 lbs x 15  x 2  Active hamstring stretch as rest between sets of shuttle press  Standing marches 10 x 3 for balance  Mini squats with UE support, cues to correct functional knee valgus and excessive toe out, not tolerated well      Poonam received the following manual therapy techniques: Joint mobilizations, Manual traction, Soft tissue Mobilization and Friction Massage were applied to the: LE for 00 minutes, including:      Home Exercises Provided and Patient Education Provided     Education provided:   - exercise purpose and form/instruction, updated HEP; notifying clinician of adverse symptoms including but not limited to dizziness and lightheadedness    Written Home Exercises Provided: yes.  Exercises were reviewed and Poonam was able to demonstrate them prior to the end of the session.  Poonam demonstrated good  understanding of the education provided.     See EMR under Patient Instructions for exercises provided prior visit and today.    Assessment     Patient had good tolerance to therapy today.  Added progressions to bridge exercise as well as SL abduction for hip and core strengthening.  Denied increase in pain or symptoms with exercises.  Cued for core engagement with hamstring stretch and Single knee fall outs with OTB.  Added new exercises as noted in patient instructions. Continue with stretching and strengthening for improved pain levels and functional mobility.     Poonam is progressing well towards her goals.   Pt prognosis is Good.     Pt will continue to benefit from skilled outpatient physical therapy to address the deficits listed in the problem list box on initial evaluation, provide pt/family education and to maximize pt's level of independence in the home and community environment.     Pt's spiritual, cultural and educational needs considered and pt agreeable to plan of care and goals.     Anticipated barriers to physical therapy: none    Goals:  Short Term Goals (4 Weeks):   1. Pt will report reduction  in pain of the lumbar spine and LE to <=3/10 at worst for ease with ADL's  2. Pt will be able to perform B SLS for >= 10 s for decreased fall risk.  3. Pt will demonstrate hip abduction MMT >=3/5 for improved mechanics with walking.  4. Pt will be able to perform squat to chair height with good mechanics to improve body mechanics with functional sit to stands.     Long Term Goals (12 Weeks):   1. Pt will report being independent with HEP for maintenance of improvements gained during therapy sessions.  2.  Pt will demonstrate BLE MMT >=4/5 for improved tolerance to walking.  3. Pt will perform 5X Sit to Stand test in < 13s for decreased risk of falls.  4. Pt will be able to walk > 30 minutes with pain < 3/10 for ease with return to regular physical activity to improve cardiovascular health.  5. Pt to demonstrate improved functional ability with FOTO limitation <=32% disability.    Plan   Plan of care Certification: 12/11/2020 to 3/5/2021    Progress quadriceps and hip strengthening. Continue to work on balance.    Nelly Hurt, PT

## 2021-01-05 ENCOUNTER — CLINICAL SUPPORT (OUTPATIENT)
Dept: REHABILITATION | Facility: OTHER | Age: 71
End: 2021-01-05
Attending: PHYSICIAN ASSISTANT
Payer: MEDICARE

## 2021-01-05 DIAGNOSIS — R19.8 ABDOMINAL WEAKNESS: ICD-10-CM

## 2021-01-05 DIAGNOSIS — R26.89 BALANCE PROBLEM: ICD-10-CM

## 2021-01-05 DIAGNOSIS — R29.3 POSTURE ABNORMALITY: Primary | ICD-10-CM

## 2021-01-05 DIAGNOSIS — R29.898 WEAKNESS OF BOTH HIPS: ICD-10-CM

## 2021-01-05 PROCEDURE — 97110 THERAPEUTIC EXERCISES: CPT | Mod: PN

## 2021-01-05 PROCEDURE — 97112 NEUROMUSCULAR REEDUCATION: CPT | Mod: PN

## 2021-01-08 ENCOUNTER — CLINICAL SUPPORT (OUTPATIENT)
Dept: REHABILITATION | Facility: OTHER | Age: 71
End: 2021-01-08
Attending: PHYSICIAN ASSISTANT
Payer: MEDICARE

## 2021-01-08 DIAGNOSIS — R19.8 ABDOMINAL WEAKNESS: ICD-10-CM

## 2021-01-08 DIAGNOSIS — R29.898 WEAKNESS OF BOTH HIPS: ICD-10-CM

## 2021-01-08 DIAGNOSIS — R26.89 BALANCE PROBLEM: ICD-10-CM

## 2021-01-08 DIAGNOSIS — R29.3 POSTURE ABNORMALITY: Primary | ICD-10-CM

## 2021-01-08 PROCEDURE — 97112 NEUROMUSCULAR REEDUCATION: CPT | Mod: PN

## 2021-01-08 PROCEDURE — 97110 THERAPEUTIC EXERCISES: CPT | Mod: PN

## 2021-01-12 ENCOUNTER — CLINICAL SUPPORT (OUTPATIENT)
Dept: REHABILITATION | Facility: OTHER | Age: 71
End: 2021-01-12
Attending: PHYSICIAN ASSISTANT
Payer: MEDICARE

## 2021-01-12 DIAGNOSIS — R26.89 BALANCE PROBLEM: ICD-10-CM

## 2021-01-12 DIAGNOSIS — R19.8 ABDOMINAL WEAKNESS: ICD-10-CM

## 2021-01-12 DIAGNOSIS — R29.3 POSTURE ABNORMALITY: Primary | ICD-10-CM

## 2021-01-12 DIAGNOSIS — R29.898 WEAKNESS OF BOTH HIPS: ICD-10-CM

## 2021-01-12 PROCEDURE — 97110 THERAPEUTIC EXERCISES: CPT | Mod: PN

## 2021-01-15 ENCOUNTER — CLINICAL SUPPORT (OUTPATIENT)
Dept: REHABILITATION | Facility: OTHER | Age: 71
End: 2021-01-15
Attending: PHYSICIAN ASSISTANT
Payer: MEDICARE

## 2021-01-15 DIAGNOSIS — R19.8 ABDOMINAL WEAKNESS: ICD-10-CM

## 2021-01-15 DIAGNOSIS — R26.89 BALANCE PROBLEM: ICD-10-CM

## 2021-01-15 DIAGNOSIS — R29.3 POSTURE ABNORMALITY: Primary | ICD-10-CM

## 2021-01-15 DIAGNOSIS — R29.898 WEAKNESS OF BOTH HIPS: ICD-10-CM

## 2021-01-15 PROCEDURE — 97112 NEUROMUSCULAR REEDUCATION: CPT | Mod: PN

## 2021-01-15 PROCEDURE — 97110 THERAPEUTIC EXERCISES: CPT | Mod: PN

## 2021-01-19 ENCOUNTER — CLINICAL SUPPORT (OUTPATIENT)
Dept: REHABILITATION | Facility: OTHER | Age: 71
End: 2021-01-19
Attending: PHYSICIAN ASSISTANT
Payer: MEDICARE

## 2021-01-19 DIAGNOSIS — R29.898 WEAKNESS OF BOTH HIPS: ICD-10-CM

## 2021-01-19 DIAGNOSIS — R26.89 BALANCE PROBLEM: ICD-10-CM

## 2021-01-19 DIAGNOSIS — R29.3 POSTURE ABNORMALITY: Primary | ICD-10-CM

## 2021-01-19 DIAGNOSIS — R19.8 ABDOMINAL WEAKNESS: ICD-10-CM

## 2021-01-19 PROCEDURE — 97110 THERAPEUTIC EXERCISES: CPT | Mod: PN

## 2021-01-19 PROCEDURE — 97112 NEUROMUSCULAR REEDUCATION: CPT | Mod: PN

## 2021-01-22 ENCOUNTER — CLINICAL SUPPORT (OUTPATIENT)
Dept: REHABILITATION | Facility: OTHER | Age: 71
End: 2021-01-22
Attending: PHYSICIAN ASSISTANT
Payer: MEDICARE

## 2021-01-22 DIAGNOSIS — R26.89 BALANCE PROBLEM: ICD-10-CM

## 2021-01-22 DIAGNOSIS — R29.3 POSTURE ABNORMALITY: Primary | ICD-10-CM

## 2021-01-22 DIAGNOSIS — R19.8 ABDOMINAL WEAKNESS: ICD-10-CM

## 2021-01-22 DIAGNOSIS — R29.898 WEAKNESS OF BOTH HIPS: ICD-10-CM

## 2021-01-22 PROCEDURE — 97110 THERAPEUTIC EXERCISES: CPT | Mod: PN

## 2021-01-22 PROCEDURE — 97112 NEUROMUSCULAR REEDUCATION: CPT | Mod: PN

## 2021-01-27 ENCOUNTER — TELEPHONE (OUTPATIENT)
Dept: OBSTETRICS AND GYNECOLOGY | Facility: CLINIC | Age: 71
End: 2021-01-27

## 2021-01-27 LAB
FINAL PATHOLOGIC DIAGNOSIS: NORMAL
Lab: NORMAL

## 2021-02-23 ENCOUNTER — CLINICAL SUPPORT (OUTPATIENT)
Dept: REHABILITATION | Facility: OTHER | Age: 71
End: 2021-02-23
Attending: PHYSICIAN ASSISTANT
Payer: MEDICARE

## 2021-02-23 DIAGNOSIS — R19.8 ABDOMINAL WEAKNESS: ICD-10-CM

## 2021-02-23 DIAGNOSIS — R29.898 WEAKNESS OF BOTH HIPS: ICD-10-CM

## 2021-02-23 DIAGNOSIS — R26.89 BALANCE PROBLEM: ICD-10-CM

## 2021-02-23 DIAGNOSIS — R29.3 POSTURE ABNORMALITY: Primary | ICD-10-CM

## 2021-02-23 PROCEDURE — 97110 THERAPEUTIC EXERCISES: CPT | Mod: PN

## 2021-02-26 ENCOUNTER — CLINICAL SUPPORT (OUTPATIENT)
Dept: REHABILITATION | Facility: OTHER | Age: 71
End: 2021-02-26
Attending: PHYSICIAN ASSISTANT
Payer: MEDICARE

## 2021-02-26 DIAGNOSIS — R26.89 BALANCE PROBLEM: ICD-10-CM

## 2021-02-26 DIAGNOSIS — R29.898 WEAKNESS OF BOTH HIPS: ICD-10-CM

## 2021-02-26 DIAGNOSIS — R29.3 POSTURE ABNORMALITY: ICD-10-CM

## 2021-02-26 DIAGNOSIS — R19.8 ABDOMINAL WEAKNESS: ICD-10-CM

## 2021-02-26 PROCEDURE — 97112 NEUROMUSCULAR REEDUCATION: CPT | Mod: PN

## 2021-02-26 PROCEDURE — 97110 THERAPEUTIC EXERCISES: CPT | Mod: PN

## 2021-03-02 ENCOUNTER — CLINICAL SUPPORT (OUTPATIENT)
Dept: REHABILITATION | Facility: OTHER | Age: 71
End: 2021-03-02
Attending: PHYSICIAN ASSISTANT
Payer: MEDICARE

## 2021-03-02 DIAGNOSIS — R29.898 WEAKNESS OF BOTH HIPS: ICD-10-CM

## 2021-03-02 DIAGNOSIS — R26.89 BALANCE PROBLEM: ICD-10-CM

## 2021-03-02 DIAGNOSIS — R29.3 POSTURE ABNORMALITY: Primary | ICD-10-CM

## 2021-03-02 DIAGNOSIS — R19.8 ABDOMINAL WEAKNESS: ICD-10-CM

## 2021-03-02 PROCEDURE — 97530 THERAPEUTIC ACTIVITIES: CPT | Mod: PN

## 2021-03-02 PROCEDURE — 97110 THERAPEUTIC EXERCISES: CPT | Mod: PN

## 2021-03-05 ENCOUNTER — CLINICAL SUPPORT (OUTPATIENT)
Dept: REHABILITATION | Facility: OTHER | Age: 71
End: 2021-03-05
Attending: PHYSICIAN ASSISTANT
Payer: MEDICARE

## 2021-03-05 DIAGNOSIS — R29.898 WEAKNESS OF BOTH HIPS: ICD-10-CM

## 2021-03-05 DIAGNOSIS — R19.8 ABDOMINAL WEAKNESS: ICD-10-CM

## 2021-03-05 DIAGNOSIS — R26.89 BALANCE PROBLEM: ICD-10-CM

## 2021-03-05 DIAGNOSIS — R29.3 POSTURE ABNORMALITY: ICD-10-CM

## 2021-03-05 PROCEDURE — 97112 NEUROMUSCULAR REEDUCATION: CPT | Mod: PN

## 2021-03-05 PROCEDURE — 97110 THERAPEUTIC EXERCISES: CPT | Mod: PN

## 2021-03-09 ENCOUNTER — CLINICAL SUPPORT (OUTPATIENT)
Dept: REHABILITATION | Facility: OTHER | Age: 71
End: 2021-03-09
Attending: PHYSICIAN ASSISTANT
Payer: MEDICARE

## 2021-03-09 DIAGNOSIS — R26.89 BALANCE PROBLEM: ICD-10-CM

## 2021-03-09 DIAGNOSIS — R29.898 WEAKNESS OF BOTH HIPS: ICD-10-CM

## 2021-03-09 DIAGNOSIS — R29.3 POSTURE ABNORMALITY: ICD-10-CM

## 2021-03-09 DIAGNOSIS — R19.8 ABDOMINAL WEAKNESS: ICD-10-CM

## 2021-03-09 PROCEDURE — 97110 THERAPEUTIC EXERCISES: CPT | Mod: PN

## 2021-03-09 PROCEDURE — 97530 THERAPEUTIC ACTIVITIES: CPT | Mod: PN

## 2021-03-12 ENCOUNTER — CLINICAL SUPPORT (OUTPATIENT)
Dept: REHABILITATION | Facility: OTHER | Age: 71
End: 2021-03-12
Attending: PHYSICIAN ASSISTANT
Payer: MEDICARE

## 2021-03-12 DIAGNOSIS — R29.3 POSTURE ABNORMALITY: Primary | ICD-10-CM

## 2021-03-12 DIAGNOSIS — R26.89 BALANCE PROBLEM: ICD-10-CM

## 2021-03-12 DIAGNOSIS — R19.8 ABDOMINAL WEAKNESS: ICD-10-CM

## 2021-03-12 DIAGNOSIS — R29.898 WEAKNESS OF BOTH HIPS: ICD-10-CM

## 2021-03-12 PROCEDURE — 97110 THERAPEUTIC EXERCISES: CPT | Mod: PN

## 2021-03-19 ENCOUNTER — CLINICAL SUPPORT (OUTPATIENT)
Dept: REHABILITATION | Facility: OTHER | Age: 71
End: 2021-03-19
Attending: PHYSICIAN ASSISTANT
Payer: MEDICARE

## 2021-03-19 DIAGNOSIS — R29.898 WEAKNESS OF BOTH HIPS: ICD-10-CM

## 2021-03-19 DIAGNOSIS — R19.8 ABDOMINAL WEAKNESS: ICD-10-CM

## 2021-03-19 DIAGNOSIS — R26.89 BALANCE PROBLEM: ICD-10-CM

## 2021-03-19 DIAGNOSIS — R29.3 POSTURE ABNORMALITY: ICD-10-CM

## 2021-03-19 PROCEDURE — 97110 THERAPEUTIC EXERCISES: CPT | Mod: PN

## 2021-03-19 RX ORDER — HYDROCHLOROTHIAZIDE 12.5 MG/1
TABLET ORAL
Qty: 90 TABLET | Refills: 3 | Status: SHIPPED | OUTPATIENT
Start: 2021-03-19 | End: 2022-03-22

## 2021-03-26 ENCOUNTER — CLINICAL SUPPORT (OUTPATIENT)
Dept: REHABILITATION | Facility: OTHER | Age: 71
End: 2021-03-26
Attending: PHYSICIAN ASSISTANT
Payer: MEDICARE

## 2021-03-26 DIAGNOSIS — R29.3 POSTURE ABNORMALITY: ICD-10-CM

## 2021-03-26 DIAGNOSIS — R19.8 ABDOMINAL WEAKNESS: ICD-10-CM

## 2021-03-26 DIAGNOSIS — R29.898 WEAKNESS OF BOTH HIPS: ICD-10-CM

## 2021-03-26 DIAGNOSIS — R26.89 BALANCE PROBLEM: ICD-10-CM

## 2021-03-26 PROCEDURE — 97110 THERAPEUTIC EXERCISES: CPT | Mod: PN

## 2021-05-13 ENCOUNTER — PES CALL (OUTPATIENT)
Dept: ADMINISTRATIVE | Facility: CLINIC | Age: 71
End: 2021-05-13

## 2021-06-15 DIAGNOSIS — E89.0 HYPOTHYROIDISM, POSTABLATIVE: Primary | ICD-10-CM

## 2021-06-16 RX ORDER — LEVOTHYROXINE SODIUM 137 UG/1
TABLET ORAL
Qty: 90 TABLET | Refills: 1 | Status: SHIPPED | OUTPATIENT
Start: 2021-06-16 | End: 2021-10-22 | Stop reason: SDUPTHER

## 2021-06-23 NOTE — PROGRESS NOTES
"                                                    Physical Therapy Daily Note     Name: Poonam Alvarez  Clinic Number: [id number]  Diagnosis:   Encounter Diagnosis   Name Primary?    Chronic right shoulder pain      Physician: Sage Xie MD   Physician Orders: PT Eval and Treat   Medical Diagnosis: M19.011 (ICD-10-CM) - DJD of right shoulder  Date of Surgery: 4/1/19  Evaluation Date: 4/18/2019  Authorization Period Expiration: 3/14/20  Plan of Care Certification Period: 10/3/19    Visit #: 6/10  Time In: 500 pm  Time Out: 550  Total Treatment Time: 40 min    Precautions: TSA protocol surgery: 4/1/19    Subjective     Pt reports: she is feeling tired today.  Pain Scale: Poonam rates pain at R shoulder on a scale of 0-10 to be 0/10 currently.     Objective   Measurements this visit: (9/19/19)  R shoulder PROM:  Flex: 180 deg  Abd: 180 deg  IR: 69 deg  ER: 85 deg    Poonam received individual therapeutic exercises to develop strength, endurance, ROM and posture for 30 minutes including:  UBE x5' forward/back ea  Pulleys 5'/5' scap and flex   Stars on wall YTB 2x5  No moneys OTB 2x10-np  Wall slides w/ #1: 5" hold-  Standing flex, scap and abd 2# 2x10-np  IR towel stretch 10 sec x5-NP  Sleeper stretch IR and ER 2# x2 min-np      Poonam received the following manual therapy techniques: Soft tissue Mobilization were applied to the: R shoulder for 10 minutes including:  PROM: FL/abd, ER in scap plane as cortney  Prone IR with manual distraction by PT      CP x 10 min to R shoulder    Written Home Exercises Provided: updated to include IR and ER stretching  Pt demo good understanding of the education provided. Poonam demonstrated good return demonstration of activities.     Education provided re:  Poonam verbalized good understanding of education provided.   No spiritual or educational barriers to learning provided    Assessment   Patient continues to show improvement with PROM with cuing for proper breathing and " relaxation techniques to decrease muscle guarding. She was more fatigued this session, so treatment focused on ROM.      This is a 68 y.o. female referred to outpatient physical therapy and presents with a medical diagnosis of R shoulder pain and demonstrates limitations as described in the problem list. Pt prognosis is Good. Pt will continue to benefit from skilled outpatient physical therapy to address the deficits listed in the problem list, provide pt/family education and to maximize pt's level of independence in the home and community environment.     Goals as follows:  Short Term Goals: (4 weeks)   - Pt will increase ROM to 100 deg flex and abd passively R shoulder ( met)  - Decrease Pain to 1/10 as worst with 1 hour of PT exercises ( met)  - Pt to self correct posture with minimal cues to avoid upper trap compensation (met)  - Pt independent with HEP with progressions.  (met)     Long Term Goals (24 Weeks):  - Pt will increase ROM to WNL RUE for functional ADLs and dressing (Progressing, not met)  - Pt will increase strength to 5/5 RUE to return to lifting and carrying >10 # (Progressing, not met)  - Decrease Pain to 0/10 with 1 full day of normal activities (Progressing, not met)  - Pt to return to 80% PLOF (Progressing, not met)       Plan     Continue with established Plan of Care towards PT goals progressing with AROM of R shoulder and strengthening.      Therapist: Tiera Carreon, PT  9/19/2019    Female

## 2021-07-17 ENCOUNTER — OFFICE VISIT (OUTPATIENT)
Dept: URGENT CARE | Facility: CLINIC | Age: 71
End: 2021-07-17
Payer: MEDICARE

## 2021-07-17 VITALS
DIASTOLIC BLOOD PRESSURE: 77 MMHG | OXYGEN SATURATION: 96 % | HEIGHT: 72 IN | WEIGHT: 200 LBS | BODY MASS INDEX: 27.09 KG/M2 | RESPIRATION RATE: 18 BRPM | SYSTOLIC BLOOD PRESSURE: 134 MMHG | HEART RATE: 72 BPM | TEMPERATURE: 98 F

## 2021-07-17 DIAGNOSIS — R05.9 COUGH: ICD-10-CM

## 2021-07-17 DIAGNOSIS — J98.8 VIRAL RESPIRATORY ILLNESS: Primary | ICD-10-CM

## 2021-07-17 DIAGNOSIS — J02.9 SORE THROAT: ICD-10-CM

## 2021-07-17 DIAGNOSIS — B97.89 VIRAL RESPIRATORY ILLNESS: Primary | ICD-10-CM

## 2021-07-17 LAB
CTP QC/QA: YES
SARS-COV-2 RDRP RESP QL NAA+PROBE: NEGATIVE

## 2021-07-17 PROCEDURE — 99214 PR OFFICE/OUTPT VISIT, EST, LEVL IV, 30-39 MIN: ICD-10-PCS | Mod: S$GLB,,, | Performed by: NURSE PRACTITIONER

## 2021-07-17 PROCEDURE — U0002: ICD-10-PCS | Mod: QW,CR,S$GLB, | Performed by: NURSE PRACTITIONER

## 2021-07-17 PROCEDURE — U0002 COVID-19 LAB TEST NON-CDC: HCPCS | Mod: QW,CR,S$GLB, | Performed by: NURSE PRACTITIONER

## 2021-07-17 PROCEDURE — 99214 OFFICE O/P EST MOD 30 MIN: CPT | Mod: S$GLB,,, | Performed by: NURSE PRACTITIONER

## 2021-07-17 RX ORDER — PROMETHAZINE HYDROCHLORIDE AND DEXTROMETHORPHAN HYDROBROMIDE 6.25; 15 MG/5ML; MG/5ML
2.5 SYRUP ORAL NIGHTLY PRN
Qty: 120 ML | Refills: 0 | Status: SHIPPED | OUTPATIENT
Start: 2021-07-17 | End: 2021-11-18 | Stop reason: SDUPTHER

## 2021-09-20 ENCOUNTER — PES CALL (OUTPATIENT)
Dept: ADMINISTRATIVE | Facility: CLINIC | Age: 71
End: 2021-09-20

## 2021-10-21 ENCOUNTER — OFFICE VISIT (OUTPATIENT)
Dept: PODIATRY | Facility: CLINIC | Age: 71
End: 2021-10-21
Payer: MEDICARE

## 2021-10-21 VITALS
DIASTOLIC BLOOD PRESSURE: 75 MMHG | BODY MASS INDEX: 27.08 KG/M2 | SYSTOLIC BLOOD PRESSURE: 140 MMHG | HEIGHT: 72 IN | WEIGHT: 199.94 LBS | HEART RATE: 67 BPM

## 2021-10-21 DIAGNOSIS — B35.3 TINEA PEDIS OF LEFT FOOT: Primary | ICD-10-CM

## 2021-10-21 DIAGNOSIS — B35.1 DERMATOPHYTOSIS OF NAIL: ICD-10-CM

## 2021-10-21 PROCEDURE — 99999 PR PBB SHADOW E&M-EST. PATIENT-LVL III: CPT | Mod: PBBFAC,,, | Performed by: PODIATRIST

## 2021-10-21 PROCEDURE — 99999 PR PBB SHADOW E&M-EST. PATIENT-LVL III: ICD-10-PCS | Mod: PBBFAC,,, | Performed by: PODIATRIST

## 2021-10-21 PROCEDURE — 99213 PR OFFICE/OUTPT VISIT, EST, LEVL III, 20-29 MIN: ICD-10-PCS | Mod: S$PBB,,, | Performed by: PODIATRIST

## 2021-10-21 PROCEDURE — 99213 OFFICE O/P EST LOW 20 MIN: CPT | Mod: S$PBB,,, | Performed by: PODIATRIST

## 2021-10-21 PROCEDURE — 99213 OFFICE O/P EST LOW 20 MIN: CPT | Mod: PBBFAC,PN | Performed by: PODIATRIST

## 2021-10-22 ENCOUNTER — OFFICE VISIT (OUTPATIENT)
Dept: INTERNAL MEDICINE | Facility: CLINIC | Age: 71
End: 2021-10-22
Payer: MEDICARE

## 2021-10-22 ENCOUNTER — IMMUNIZATION (OUTPATIENT)
Dept: INTERNAL MEDICINE | Facility: CLINIC | Age: 71
End: 2021-10-22
Payer: MEDICARE

## 2021-10-22 ENCOUNTER — LAB VISIT (OUTPATIENT)
Dept: LAB | Facility: HOSPITAL | Age: 71
End: 2021-10-22
Attending: INTERNAL MEDICINE
Payer: MEDICARE

## 2021-10-22 VITALS
HEIGHT: 72 IN | DIASTOLIC BLOOD PRESSURE: 80 MMHG | OXYGEN SATURATION: 99 % | SYSTOLIC BLOOD PRESSURE: 112 MMHG | BODY MASS INDEX: 27.12 KG/M2 | HEART RATE: 64 BPM

## 2021-10-22 DIAGNOSIS — E53.8 VITAMIN B12 DEFICIENCY: ICD-10-CM

## 2021-10-22 DIAGNOSIS — I10 ESSENTIAL HYPERTENSION: ICD-10-CM

## 2021-10-22 DIAGNOSIS — Z12.31 SCREENING MAMMOGRAM, ENCOUNTER FOR: ICD-10-CM

## 2021-10-22 DIAGNOSIS — R79.9 ABNORMAL BLOOD CHEMISTRY: ICD-10-CM

## 2021-10-22 DIAGNOSIS — E55.9 VITAMIN D DEFICIENCY DISEASE: ICD-10-CM

## 2021-10-22 DIAGNOSIS — E89.0 HYPOTHYROIDISM, POSTABLATIVE: ICD-10-CM

## 2021-10-22 DIAGNOSIS — Z00.00 ROUTINE GENERAL MEDICAL EXAMINATION AT A HEALTH CARE FACILITY: Primary | ICD-10-CM

## 2021-10-22 LAB
25(OH)D3+25(OH)D2 SERPL-MCNC: 60 NG/ML (ref 30–96)
ALBUMIN SERPL BCP-MCNC: 4.1 G/DL (ref 3.5–5.2)
ALP SERPL-CCNC: 88 U/L (ref 55–135)
ALT SERPL W/O P-5'-P-CCNC: 20 U/L (ref 10–44)
ANION GAP SERPL CALC-SCNC: 15 MMOL/L (ref 8–16)
AST SERPL-CCNC: 27 U/L (ref 10–40)
BASOPHILS # BLD AUTO: 0.05 K/UL (ref 0–0.2)
BASOPHILS NFR BLD: 1.2 % (ref 0–1.9)
BILIRUB SERPL-MCNC: 0.5 MG/DL (ref 0.1–1)
BUN SERPL-MCNC: 14 MG/DL (ref 8–23)
CALCIUM SERPL-MCNC: 10.1 MG/DL (ref 8.7–10.5)
CHLORIDE SERPL-SCNC: 105 MMOL/L (ref 95–110)
CHOLEST SERPL-MCNC: 210 MG/DL (ref 120–199)
CHOLEST/HDLC SERPL: 2.9 {RATIO} (ref 2–5)
CO2 SERPL-SCNC: 24 MMOL/L (ref 23–29)
CREAT SERPL-MCNC: 0.8 MG/DL (ref 0.5–1.4)
DIFFERENTIAL METHOD: ABNORMAL
EOSINOPHIL # BLD AUTO: 0.1 K/UL (ref 0–0.5)
EOSINOPHIL NFR BLD: 1.6 % (ref 0–8)
ERYTHROCYTE [DISTWIDTH] IN BLOOD BY AUTOMATED COUNT: 16.4 % (ref 11.5–14.5)
EST. GFR  (AFRICAN AMERICAN): >60 ML/MIN/1.73 M^2
EST. GFR  (NON AFRICAN AMERICAN): >60 ML/MIN/1.73 M^2
ESTIMATED AVG GLUCOSE: 108 MG/DL (ref 68–131)
FERRITIN SERPL-MCNC: 131 NG/ML (ref 20–300)
GLUCOSE SERPL-MCNC: 79 MG/DL (ref 70–110)
HBA1C MFR BLD: 5.4 % (ref 4–5.6)
HCT VFR BLD AUTO: 38.3 % (ref 37–48.5)
HDLC SERPL-MCNC: 73 MG/DL (ref 40–75)
HDLC SERPL: 34.8 % (ref 20–50)
HGB BLD-MCNC: 11.9 G/DL (ref 12–16)
IMM GRANULOCYTES # BLD AUTO: 0.01 K/UL (ref 0–0.04)
IMM GRANULOCYTES NFR BLD AUTO: 0.2 % (ref 0–0.5)
LDLC SERPL CALC-MCNC: 126.6 MG/DL (ref 63–159)
LYMPHOCYTES # BLD AUTO: 1.9 K/UL (ref 1–4.8)
LYMPHOCYTES NFR BLD: 45.2 % (ref 18–48)
MCH RBC QN AUTO: 25.9 PG (ref 27–31)
MCHC RBC AUTO-ENTMCNC: 31.1 G/DL (ref 32–36)
MCV RBC AUTO: 83 FL (ref 82–98)
MONOCYTES # BLD AUTO: 0.5 K/UL (ref 0.3–1)
MONOCYTES NFR BLD: 11.7 % (ref 4–15)
NEUTROPHILS # BLD AUTO: 1.7 K/UL (ref 1.8–7.7)
NEUTROPHILS NFR BLD: 40.1 % (ref 38–73)
NONHDLC SERPL-MCNC: 137 MG/DL
NRBC BLD-RTO: 0 /100 WBC
PLATELET # BLD AUTO: 229 K/UL (ref 150–450)
PMV BLD AUTO: 11.7 FL (ref 9.2–12.9)
POTASSIUM SERPL-SCNC: 4.3 MMOL/L (ref 3.5–5.1)
PROT SERPL-MCNC: 7.2 G/DL (ref 6–8.4)
RBC # BLD AUTO: 4.59 M/UL (ref 4–5.4)
SODIUM SERPL-SCNC: 144 MMOL/L (ref 136–145)
T4 FREE SERPL-MCNC: 1.23 NG/DL (ref 0.71–1.51)
TRIGL SERPL-MCNC: 52 MG/DL (ref 30–150)
TSH SERPL DL<=0.005 MIU/L-ACNC: 0.1 UIU/ML (ref 0.4–4)
VIT B12 SERPL-MCNC: 697 PG/ML (ref 210–950)
WBC # BLD AUTO: 4.29 K/UL (ref 3.9–12.7)

## 2021-10-22 PROCEDURE — 36415 COLL VENOUS BLD VENIPUNCTURE: CPT | Performed by: INTERNAL MEDICINE

## 2021-10-22 PROCEDURE — 99999 PR PBB SHADOW E&M-EST. PATIENT-LVL IV: CPT | Mod: PBBFAC,,, | Performed by: INTERNAL MEDICINE

## 2021-10-22 PROCEDURE — 99214 PR OFFICE/OUTPT VISIT, EST, LEVL IV, 30-39 MIN: ICD-10-PCS | Mod: S$PBB,,, | Performed by: INTERNAL MEDICINE

## 2021-10-22 PROCEDURE — 83036 HEMOGLOBIN GLYCOSYLATED A1C: CPT | Performed by: INTERNAL MEDICINE

## 2021-10-22 PROCEDURE — 84443 ASSAY THYROID STIM HORMONE: CPT | Performed by: INTERNAL MEDICINE

## 2021-10-22 PROCEDURE — 80053 COMPREHEN METABOLIC PANEL: CPT | Performed by: INTERNAL MEDICINE

## 2021-10-22 PROCEDURE — 90694 VACC AIIV4 NO PRSRV 0.5ML IM: CPT | Mod: PBBFAC

## 2021-10-22 PROCEDURE — 82728 ASSAY OF FERRITIN: CPT | Performed by: INTERNAL MEDICINE

## 2021-10-22 PROCEDURE — 80061 LIPID PANEL: CPT | Performed by: INTERNAL MEDICINE

## 2021-10-22 PROCEDURE — 99999 PR PBB SHADOW E&M-EST. PATIENT-LVL IV: ICD-10-PCS | Mod: PBBFAC,,, | Performed by: INTERNAL MEDICINE

## 2021-10-22 PROCEDURE — 99214 OFFICE O/P EST MOD 30 MIN: CPT | Mod: S$PBB,,, | Performed by: INTERNAL MEDICINE

## 2021-10-22 PROCEDURE — 82607 VITAMIN B-12: CPT | Performed by: INTERNAL MEDICINE

## 2021-10-22 PROCEDURE — 82306 VITAMIN D 25 HYDROXY: CPT | Performed by: INTERNAL MEDICINE

## 2021-10-22 PROCEDURE — G0008 ADMIN INFLUENZA VIRUS VAC: HCPCS | Mod: PBBFAC

## 2021-10-22 PROCEDURE — 85025 COMPLETE CBC W/AUTO DIFF WBC: CPT | Performed by: INTERNAL MEDICINE

## 2021-10-22 PROCEDURE — 84439 ASSAY OF FREE THYROXINE: CPT | Performed by: INTERNAL MEDICINE

## 2021-10-22 PROCEDURE — 99214 OFFICE O/P EST MOD 30 MIN: CPT | Mod: PBBFAC,25 | Performed by: INTERNAL MEDICINE

## 2021-10-22 RX ORDER — LEVOTHYROXINE SODIUM 137 UG/1
TABLET ORAL
Qty: 90 TABLET | Refills: 4 | Status: SHIPPED | OUTPATIENT
Start: 2021-10-22 | End: 2022-03-22

## 2021-10-29 ENCOUNTER — TELEPHONE (OUTPATIENT)
Dept: OBSTETRICS AND GYNECOLOGY | Facility: CLINIC | Age: 71
End: 2021-10-29
Payer: MEDICARE

## 2021-11-08 ENCOUNTER — TELEPHONE (OUTPATIENT)
Dept: INTERNAL MEDICINE | Facility: CLINIC | Age: 71
End: 2021-11-08
Payer: MEDICARE

## 2021-11-17 ENCOUNTER — TELEPHONE (OUTPATIENT)
Dept: INTERNAL MEDICINE | Facility: CLINIC | Age: 71
End: 2021-11-17
Payer: MEDICARE

## 2021-11-18 ENCOUNTER — HOSPITAL ENCOUNTER (OUTPATIENT)
Dept: RADIOLOGY | Facility: HOSPITAL | Age: 71
Discharge: HOME | End: 2021-11-18
Attending: INTERNAL MEDICINE
Payer: MEDICARE

## 2021-11-18 ENCOUNTER — OFFICE VISIT (OUTPATIENT)
Dept: INTERNAL MEDICINE | Facility: CLINIC | Age: 71
End: 2021-11-18
Payer: MEDICARE

## 2021-11-18 VITALS — WEIGHT: 199 LBS | HEIGHT: 72 IN | BODY MASS INDEX: 26.95 KG/M2

## 2021-11-18 VITALS
BODY MASS INDEX: 27.12 KG/M2 | DIASTOLIC BLOOD PRESSURE: 62 MMHG | HEIGHT: 72 IN | SYSTOLIC BLOOD PRESSURE: 98 MMHG | HEART RATE: 84 BPM | OXYGEN SATURATION: 98 %

## 2021-11-18 DIAGNOSIS — Z12.31 SCREENING MAMMOGRAM, ENCOUNTER FOR: ICD-10-CM

## 2021-11-18 DIAGNOSIS — E89.0 HYPOTHYROIDISM, POSTABLATIVE: ICD-10-CM

## 2021-11-18 DIAGNOSIS — Z00.00 ENCOUNTER FOR PREVENTIVE HEALTH EXAMINATION: Primary | ICD-10-CM

## 2021-11-18 DIAGNOSIS — I10 ESSENTIAL HYPERTENSION: ICD-10-CM

## 2021-11-18 DIAGNOSIS — M19.90 OSTEOARTHRITIS, UNSPECIFIED OSTEOARTHRITIS TYPE, UNSPECIFIED SITE: ICD-10-CM

## 2021-11-18 DIAGNOSIS — I77.819 ECTATIC AORTA: ICD-10-CM

## 2021-11-18 DIAGNOSIS — R05.9 COUGH: ICD-10-CM

## 2021-11-18 DIAGNOSIS — K59.00 CONSTIPATION, UNSPECIFIED CONSTIPATION TYPE: ICD-10-CM

## 2021-11-18 PROCEDURE — 99999 PR PBB SHADOW E&M-EST. PATIENT-LVL III: CPT | Mod: PBBFAC,,, | Performed by: NURSE PRACTITIONER

## 2021-11-18 PROCEDURE — 77063 MAMMO DIGITAL SCREENING BILAT WITH TOMO: ICD-10-PCS | Mod: 26,,, | Performed by: RADIOLOGY

## 2021-11-18 PROCEDURE — 77063 BREAST TOMOSYNTHESIS BI: CPT | Mod: 26,,, | Performed by: RADIOLOGY

## 2021-11-18 PROCEDURE — 99213 OFFICE O/P EST LOW 20 MIN: CPT | Mod: PBBFAC | Performed by: NURSE PRACTITIONER

## 2021-11-18 PROCEDURE — G0439 PPPS, SUBSEQ VISIT: HCPCS | Mod: ,,, | Performed by: NURSE PRACTITIONER

## 2021-11-18 PROCEDURE — 77067 SCR MAMMO BI INCL CAD: CPT | Mod: 26,,, | Performed by: RADIOLOGY

## 2021-11-18 PROCEDURE — 99999 PR PBB SHADOW E&M-EST. PATIENT-LVL III: ICD-10-PCS | Mod: PBBFAC,,, | Performed by: NURSE PRACTITIONER

## 2021-11-18 PROCEDURE — 77067 MAMMO DIGITAL SCREENING BILAT WITH TOMO: ICD-10-PCS | Mod: 26,,, | Performed by: RADIOLOGY

## 2021-11-18 PROCEDURE — G0439 PR MEDICARE ANNUAL WELLNESS SUBSEQUENT VISIT: ICD-10-PCS | Mod: ,,, | Performed by: NURSE PRACTITIONER

## 2021-11-18 PROCEDURE — 77067 SCR MAMMO BI INCL CAD: CPT | Mod: TC

## 2021-11-18 RX ORDER — PROMETHAZINE HYDROCHLORIDE AND DEXTROMETHORPHAN HYDROBROMIDE 6.25; 15 MG/5ML; MG/5ML
2.5 SYRUP ORAL NIGHTLY PRN
Qty: 120 ML | Refills: 0 | Status: SHIPPED | OUTPATIENT
Start: 2021-11-18 | End: 2022-12-02

## 2021-11-18 RX ORDER — BUTYROSPERMUM PARKII(SHEA BUTTER), SIMMONDSIA CHINENSIS (JOJOBA) SEED OIL, ALOE BARBADENSIS LEAF EXTRACT .01; 1; 3.5 G/100G; G/100G; G/100G
LIQUID TOPICAL
COMMUNITY
End: 2022-11-22 | Stop reason: CLARIF

## 2021-11-24 ENCOUNTER — TELEPHONE (OUTPATIENT)
Dept: INTERNAL MEDICINE | Facility: CLINIC | Age: 71
End: 2021-11-24
Payer: MEDICARE

## 2021-11-30 ENCOUNTER — PATIENT OUTREACH (OUTPATIENT)
Dept: ADMINISTRATIVE | Facility: OTHER | Age: 71
End: 2021-11-30
Payer: MEDICARE

## 2021-12-01 ENCOUNTER — OFFICE VISIT (OUTPATIENT)
Dept: OBSTETRICS AND GYNECOLOGY | Facility: CLINIC | Age: 71
End: 2021-12-01
Attending: OBSTETRICS & GYNECOLOGY
Payer: MEDICARE

## 2021-12-01 VITALS — BODY MASS INDEX: 26.99 KG/M2 | DIASTOLIC BLOOD PRESSURE: 87 MMHG | HEIGHT: 72 IN | SYSTOLIC BLOOD PRESSURE: 129 MMHG

## 2021-12-01 DIAGNOSIS — Z01.419 WELL WOMAN EXAM: Primary | ICD-10-CM

## 2021-12-01 PROCEDURE — G0101 CA SCREEN;PELVIC/BREAST EXAM: HCPCS | Mod: S$PBB,,, | Performed by: OBSTETRICS & GYNECOLOGY

## 2021-12-01 PROCEDURE — 99999 PR PBB SHADOW E&M-EST. PATIENT-LVL III: ICD-10-PCS | Mod: PBBFAC,,, | Performed by: OBSTETRICS & GYNECOLOGY

## 2021-12-01 PROCEDURE — 99999 PR PBB SHADOW E&M-EST. PATIENT-LVL III: CPT | Mod: PBBFAC,,, | Performed by: OBSTETRICS & GYNECOLOGY

## 2021-12-01 PROCEDURE — G0101 PR CA SCREEN;PELVIC/BREAST EXAM: ICD-10-PCS | Mod: S$PBB,,, | Performed by: OBSTETRICS & GYNECOLOGY

## 2021-12-01 PROCEDURE — 99213 OFFICE O/P EST LOW 20 MIN: CPT | Mod: PBBFAC,25 | Performed by: OBSTETRICS & GYNECOLOGY

## 2021-12-01 PROCEDURE — G0101 CA SCREEN;PELVIC/BREAST EXAM: HCPCS | Mod: PBBFAC | Performed by: OBSTETRICS & GYNECOLOGY

## 2022-01-12 ENCOUNTER — OFFICE VISIT (OUTPATIENT)
Dept: URGENT CARE | Facility: CLINIC | Age: 72
End: 2022-01-12
Payer: MEDICARE

## 2022-01-12 VITALS
WEIGHT: 199 LBS | OXYGEN SATURATION: 99 % | HEIGHT: 72 IN | TEMPERATURE: 98 F | RESPIRATION RATE: 16 BRPM | SYSTOLIC BLOOD PRESSURE: 135 MMHG | DIASTOLIC BLOOD PRESSURE: 66 MMHG | BODY MASS INDEX: 26.95 KG/M2 | HEART RATE: 70 BPM

## 2022-01-12 DIAGNOSIS — Z20.822 ENCOUNTER FOR LABORATORY TESTING FOR COVID-19 VIRUS: ICD-10-CM

## 2022-01-12 DIAGNOSIS — J06.9 UPPER RESPIRATORY TRACT INFECTION, UNSPECIFIED TYPE: Primary | ICD-10-CM

## 2022-01-12 LAB
CTP QC/QA: YES
SARS-COV-2 RDRP RESP QL NAA+PROBE: NEGATIVE

## 2022-01-12 PROCEDURE — 99214 OFFICE O/P EST MOD 30 MIN: CPT | Mod: S$GLB,,, | Performed by: EMERGENCY MEDICINE

## 2022-01-12 PROCEDURE — 99214 PR OFFICE/OUTPT VISIT, EST, LEVL IV, 30-39 MIN: ICD-10-PCS | Mod: S$GLB,,, | Performed by: EMERGENCY MEDICINE

## 2022-01-12 PROCEDURE — U0002: ICD-10-PCS | Mod: QW,CR,S$GLB, | Performed by: EMERGENCY MEDICINE

## 2022-01-12 PROCEDURE — U0002 COVID-19 LAB TEST NON-CDC: HCPCS | Mod: QW,CR,S$GLB, | Performed by: EMERGENCY MEDICINE

## 2022-01-12 NOTE — PROGRESS NOTES
Subjective:       Patient ID: Poonam Alvarez is a 71 y.o. female.    Vitals:  height is 6' (1.829 m) and weight is 90.3 kg (199 lb). Her temperature is 97.8 °F (36.6 °C). Her blood pressure is 135/66 and her pulse is 70. Her respiration is 16 and oxygen saturation is 99%.     Chief Complaint: Cough    Patients was exposed to covid co workers have it and now she has started coughing    Cough  This is a new problem. The current episode started today. The problem has been unchanged. Associated symptoms include nasal congestion and postnasal drip. Pertinent negatives include no chills, fever, myalgias or rash. The treatment provided no relief.       Constitution: Negative for chills and fever.   HENT: Positive for postnasal drip. Negative for congestion.    Respiratory: Positive for cough. Negative for sputum production.    Gastrointestinal: Negative for abdominal pain, nausea, vomiting, constipation and diarrhea.   Genitourinary: Negative for dysuria, frequency and urgency.   Musculoskeletal: Negative for muscle cramps and muscle ache.   Skin: Negative for rash and erythema.       Objective:      Physical Exam   Constitutional: She is oriented to person, place, and time. She appears well-developed.   HENT:   Head: Normocephalic and atraumatic. Head is without abrasion, without contusion and without laceration.   Ears:   Right Ear: External ear normal.   Left Ear: External ear normal.   Oropharyngeal exam not performed due to risk of viral transmission during global pandemic-- risks outweigh benefits of exam          Comments: Oropharyngeal exam not performed due to risk of viral transmission during global pandemic-- risks outweigh benefits of exam      Eyes: Conjunctivae, EOM and lids are normal. Pupils are equal, round, and reactive to light.   Neck: Trachea normal and phonation normal. Neck supple.   Cardiovascular: Normal rate, regular rhythm and normal heart sounds.   Pulmonary/Chest: Effort normal and breath  sounds normal. No stridor. No respiratory distress.   Musculoskeletal: Normal range of motion.         General: Normal range of motion.   Neurological: She is alert and oriented to person, place, and time.   Skin: Skin is warm, dry, intact and no rash. Capillary refill takes less than 2 seconds. No abrasion, No burn, No bruising, No erythema and No ecchymosis   Psychiatric: Her speech is normal and behavior is normal. Judgment and thought content normal.   Nursing note and vitals reviewed.        Assessment:       1. Upper respiratory tract infection, unspecified type    2. Encounter for laboratory testing for COVID-19 virus          Plan:         Upper respiratory tract infection, unspecified type    Encounter for laboratory testing for COVID-19 virus  -     POCT COVID-19 Rapid Screening                 Patient Instructions     Zyrtec, Claritin, or Allegra OTC as directed for the next 7 days    Tylenol 500mg 2 tabs by mouth every 8 hours  Motrin 200mg 2 tabs by mouth every 8 hours  Alternate Tylenol and Motrin every 4hours    Mucinex or Robitussin OTC as directed for cough    Sudafed as directed for runny nose and congestion    Hot liquids with natural honey for cough, congestion, and sore throat    Viral Upper Respiratory Illness (Adult)  You have a viral upper respiratory illness (URI), which is another term for the common cold. This illness is contagious during the first few days. It is spread through the air by coughing and sneezing. It may also be spread by direct contact (touching the sick person and then touching your own eyes, nose, or mouth). Frequent handwashing will decrease risk of spread. Most viral illnesses go away within 7 to 10 days with rest and simple home remedies. Sometimes the illness may last for several weeks. Antibiotics will not kill a virus, and they are generally not prescribed for this condition.    Home care  · If symptoms are severe, rest at home for the first 2 to 3 days. When you  resume activity, don't let yourself get too tired.  · Avoid being exposed to cigarette smoke (yours or others).  · You may use acetaminophen or ibuprofen to control pain and fever, unless another medicine was prescribed. (Note: If you have chronic liver or kidney disease, have ever had a stomach ulcer or gastrointestinal bleeding, or are taking blood-thinning medicines, talk with your healthcare provider before using these medicines.) Aspirin should never be given to anyone under 18 years of age who is ill with a viral infection or fever. It may cause severe liver or brain damage.  · Your appetite may be poor, so a light diet is fine. Avoid dehydration by drinking 6 to 8 glasses of fluids per day (water, soft drinks, juices, tea, or soup). Extra fluids will help loosen secretions in the nose and lungs.  · Over-the-counter cold medicines will not shorten the length of time youre sick, but they may be helpful for the following symptoms: cough, sore throat, and nasal and sinus congestion. (Note: Do not use decongestants if you have high blood pressure.)  Follow-up care  Follow up with your healthcare provider, or as advised.  When to seek medical advice  Call your healthcare provider right away if any of these occur:  · Cough with lots of colored sputum (mucus)  · Severe headache; face, neck, or ear pain  · Difficulty swallowing due to throat pain  · Fever of 100.4°F (38°C)  Call 911, or get immediate medical care  Call emergency services right away if any of these occur:  · Chest pain, shortness of breath, wheezing, or difficulty breathing  · Coughing up blood  · Inability to swallow due to throat pain  Date Last Reviewed: 9/13/2015 © 2000-2017 [employer]. [address] [place] [id number]. All rights reserved. This information is not intended as a substitute for professional medical care. Always follow your healthcare professional's instructions.

## 2022-01-12 NOTE — PATIENT INSTRUCTIONS
Zyrtec, Claritin, or Allegra OTC as directed for the next 7 days    Tylenol 500mg 2 tabs by mouth every 8 hours  Motrin 200mg 2 tabs by mouth every 8 hours  Alternate Tylenol and Motrin every 4hours    Mucinex or Robitussin OTC as directed for cough    Sudafed as directed for runny nose and congestion    Hot liquids with natural honey for cough, congestion, and sore throat    Viral Upper Respiratory Illness (Adult)  You have a viral upper respiratory illness (URI), which is another term for the common cold. This illness is contagious during the first few days. It is spread through the air by coughing and sneezing. It may also be spread by direct contact (touching the sick person and then touching your own eyes, nose, or mouth). Frequent handwashing will decrease risk of spread. Most viral illnesses go away within 7 to 10 days with rest and simple home remedies. Sometimes the illness may last for several weeks. Antibiotics will not kill a virus, and they are generally not prescribed for this condition.    Home care  · If symptoms are severe, rest at home for the first 2 to 3 days. When you resume activity, don't let yourself get too tired.  · Avoid being exposed to cigarette smoke (yours or others).  · You may use acetaminophen or ibuprofen to control pain and fever, unless another medicine was prescribed. (Note: If you have chronic liver or kidney disease, have ever had a stomach ulcer or gastrointestinal bleeding, or are taking blood-thinning medicines, talk with your healthcare provider before using these medicines.) Aspirin should never be given to anyone under 18 years of age who is ill with a viral infection or fever. It may cause severe liver or brain damage.  · Your appetite may be poor, so a light diet is fine. Avoid dehydration by drinking 6 to 8 glasses of fluids per day (water, soft drinks, juices, tea, or soup). Extra fluids will help loosen secretions in the nose and lungs.  · Over-the-counter cold  medicines will not shorten the length of time youre sick, but they may be helpful for the following symptoms: cough, sore throat, and nasal and sinus congestion. (Note: Do not use decongestants if you have high blood pressure.)  Follow-up care  Follow up with your healthcare provider, or as advised.  When to seek medical advice  Call your healthcare provider right away if any of these occur:  · Cough with lots of colored sputum (mucus)  · Severe headache; face, neck, or ear pain  · Difficulty swallowing due to throat pain  · Fever of 100.4°F (38°C)  Call 911, or get immediate medical care  Call emergency services right away if any of these occur:  · Chest pain, shortness of breath, wheezing, or difficulty breathing  · Coughing up blood  · Inability to swallow due to throat pain  Date Last Reviewed: 9/13/2015  © 6454-7088 [employer]. [address] [place] [address], [place] [address]. All rights reserved. This information is not intended as a substitute for professional medical care. Always follow your healthcare professional's instructions.

## 2022-01-18 ENCOUNTER — OFFICE VISIT (OUTPATIENT)
Dept: INTERNAL MEDICINE | Facility: CLINIC | Age: 72
End: 2022-01-18
Payer: MEDICARE

## 2022-01-18 ENCOUNTER — TELEPHONE (OUTPATIENT)
Dept: INTERNAL MEDICINE | Facility: CLINIC | Age: 72
End: 2022-01-18
Payer: MEDICARE

## 2022-01-18 VITALS
WEIGHT: 199.06 LBS | SYSTOLIC BLOOD PRESSURE: 114 MMHG | BODY MASS INDEX: 27 KG/M2 | HEART RATE: 76 BPM | DIASTOLIC BLOOD PRESSURE: 80 MMHG | OXYGEN SATURATION: 100 %

## 2022-01-18 DIAGNOSIS — R10.9 FLANK PAIN: Primary | ICD-10-CM

## 2022-01-18 PROCEDURE — 99999 PR PBB SHADOW E&M-EST. PATIENT-LVL IV: CPT | Mod: PBBFAC,,, | Performed by: PHYSICIAN ASSISTANT

## 2022-01-18 PROCEDURE — 99213 PR OFFICE/OUTPT VISIT, EST, LEVL III, 20-29 MIN: ICD-10-PCS | Mod: S$PBB,,, | Performed by: PHYSICIAN ASSISTANT

## 2022-01-18 PROCEDURE — 99999 PR PBB SHADOW E&M-EST. PATIENT-LVL IV: ICD-10-PCS | Mod: PBBFAC,,, | Performed by: PHYSICIAN ASSISTANT

## 2022-01-18 PROCEDURE — 99214 OFFICE O/P EST MOD 30 MIN: CPT | Mod: PBBFAC | Performed by: PHYSICIAN ASSISTANT

## 2022-01-18 PROCEDURE — 99213 OFFICE O/P EST LOW 20 MIN: CPT | Mod: S$PBB,,, | Performed by: PHYSICIAN ASSISTANT

## 2022-01-18 RX ORDER — FAMOTIDINE 20 MG/1
20 TABLET, FILM COATED ORAL 2 TIMES DAILY
Qty: 60 TABLET | Refills: 0 | Status: SHIPPED | OUTPATIENT
Start: 2022-01-18 | End: 2022-11-15

## 2022-01-18 RX ORDER — VALACYCLOVIR HYDROCHLORIDE 1 G/1
1000 TABLET, FILM COATED ORAL 3 TIMES DAILY
Qty: 21 TABLET | Refills: 0 | Status: SHIPPED | OUTPATIENT
Start: 2022-01-18 | End: 2022-07-06

## 2022-01-18 NOTE — PROGRESS NOTES
"INTERNAL MEDICINE URGENT VISIT NOTE    CHIEF COMPLAINT     Chief Complaint   Patient presents with    Chest Pain       HPI     Poonam Alvarez is a 71 y.o. female who presents for an urgent visit today.    PCP is Dr. Bach, patient is new to me.     Patient presents with complaints of "strange discomfort" to the left rib line. No overlying skin changes reported by the patient. No associated SOB. Symptoms started yesterday and have been more frequent today. No fever, chills, nausea, vomiting she does report a cough that started about two-three days ago. She was tested for COVID and is negative. She is vaccinated for COVID.       Remote history of atrial flutter - s/p ablation Oct 2008. Taking ASA 81 daily. She denies SOB. She has remained COVID free. She is vaccinated apart for PNA and Shingles. No history of Shingles.         Past Medical History:  Past Medical History:   Diagnosis Date    AR (allergic rhinitis) 12/7/2012    Atrial flutter     ablation October 2008    Cataract     Generalized headaches     GERD (gastroesophageal reflux disease) 03/08/2013    resolved    Grave's disease     s/p radioactive iodine, now hypothyroidism    Keloid cicatrix     Obesity     Osteoarthritis     shoulders, hands, knees    Positive PPD, treated     9 months of INH, completed 1/2010       Home Medications:  Prior to Admission medications    Medication Sig Start Date End Date Taking? Authorizing Provider   aspirin (ECOTRIN) 81 MG EC tablet Take 81 mg by mouth once daily.   Yes Historical Provider   b complex vitamins capsule Take 1 capsule by mouth once daily.   Yes Historical Provider   cholecalciferol, vitamin D3, (VITAMIN D3) 50 mcg (2,000 unit) Cap Take 1 capsule by mouth once daily.   Yes Historical Provider   hydroCHLOROthiazide (HYDRODIURIL) 12.5 MG Tab TAKE 1/2 TO 1 TABLET BY MOUTH DAILY AS NEEDED 3/19/21  Yes Lindsey Bach MD   magnesium citrate 100 mg Cap Take by mouth.   Yes Historical Provider "   meloxicam (MOBIC) 15 MG tablet Take 1 tablet (15 mg total) by mouth once daily. 12/31/19  Yes Shyam Bautista PA-C   multivitamin-minerals-lutein Tab Take 1 tablet by mouth once daily.  5/21/12  Yes Historical Provider   SYNTHROID 137 mcg Tab tablet TAKE 1 TABLET(137 MCG) BY MOUTH EVERY DAY 10/22/21  Yes Lindsey Bach MD   UBIDECARENONE (COENZYME Q10) 100 mg Tab Take 1 tablet by mouth once daily.   Yes Historical Provider   blood pressure monitor (BLOOD PRESSURE KIT) Kit Use as directed  Patient not taking: No sig reported 12/28/20   Lindsey Bach MD   promethazine-dextromethorphan (PROMETHAZINE-DM) 6.25-15 mg/5 mL Syrp Take 2.5 mLs by mouth nightly as needed (cough supression).  Patient not taking: No sig reported 11/18/21   Surekha Braxton MD       Review of Systems:  Review of Systems   Constitutional: Negative for chills and fever.   HENT: Negative for sore throat and trouble swallowing.    Eyes: Negative for visual disturbance.   Respiratory: Negative for cough and shortness of breath.    Cardiovascular: Negative for chest pain.   Gastrointestinal: Negative for abdominal pain, constipation, diarrhea, nausea and vomiting.   Genitourinary: Negative for dysuria and flank pain.   Musculoskeletal: Negative for back pain, neck pain and neck stiffness.   Skin: Negative for rash.   Neurological: Negative for dizziness, syncope, weakness and headaches.   Psychiatric/Behavioral: Negative for confusion.       Health Maintainence:   Immunizations:  Health Maintenance       Date Due Completion Date    Shingles Vaccine (1 of 2) Never done ---    Pneumococcal Vaccines (Age 65+) (2 of 2 - PPSV23) 12/20/2017 11/30/2015    Mammogram 11/18/2022 11/18/2021    DEXA SCAN 02/19/2023 2/19/2020    Colorectal Cancer Screening 11/05/2025 11/5/2015    Lipid Panel 10/22/2026 10/22/2021    TETANUS VACCINE 10/30/2028 10/30/2018           PHYSICAL EXAM     /80 (BP Location: Right arm, Patient Position: Sitting)   Pulse  76   Wt 90.3 kg (199 lb 1.2 oz)   SpO2 100%   BMI 27.00 kg/m²     Physical Exam  Vitals and nursing note reviewed.   Constitutional:       Appearance: Normal appearance.      Comments: Healthy appearing female in NAD or apparent pain. She makes good eye contact, speaks in clear full sentences and ambulates with ease.      HENT:      Head: Normocephalic and atraumatic.      Nose: Nose normal.      Mouth/Throat:      Pharynx: Oropharynx is clear.   Eyes:      Conjunctiva/sclera: Conjunctivae normal.   Cardiovascular:      Rate and Rhythm: Normal rate and regular rhythm.      Pulses: Normal pulses.   Pulmonary:      Effort: No respiratory distress.   Abdominal:      Tenderness: There is no abdominal tenderness.   Musculoskeletal:         General: Normal range of motion.      Cervical back: No rigidity.   Skin:     General: Skin is warm and dry.      Capillary Refill: Capillary refill takes less than 2 seconds.      Findings: No rash.   Neurological:      General: No focal deficit present.      Mental Status: She is alert.      Gait: Gait normal.   Psychiatric:         Mood and Affect: Mood normal.         LABS     Lab Results   Component Value Date    HGBA1C 5.4 10/22/2021     CMP  Sodium   Date Value Ref Range Status   10/22/2021 144 136 - 145 mmol/L Final     Potassium   Date Value Ref Range Status   10/22/2021 4.3 3.5 - 5.1 mmol/L Final     Chloride   Date Value Ref Range Status   10/22/2021 105 95 - 110 mmol/L Final     CO2   Date Value Ref Range Status   10/22/2021 24 23 - 29 mmol/L Final     Glucose   Date Value Ref Range Status   10/22/2021 79 70 - 110 mg/dL Final     BUN   Date Value Ref Range Status   10/22/2021 14 8 - 23 mg/dL Final     Creatinine   Date Value Ref Range Status   10/22/2021 0.8 0.5 - 1.4 mg/dL Final     Calcium   Date Value Ref Range Status   10/22/2021 10.1 8.7 - 10.5 mg/dL Final     Total Protein   Date Value Ref Range Status   10/22/2021 7.2 6.0 - 8.4 g/dL Final     Albumin   Date Value  Ref Range Status   10/22/2021 4.1 3.5 - 5.2 g/dL Final     Total Bilirubin   Date Value Ref Range Status   10/22/2021 0.5 0.1 - 1.0 mg/dL Final     Comment:     For infants and newborns, interpretation of results should be based  on gestational age, weight and in agreement with clinical  observations.    Premature Infant recommended reference ranges:  Up to 24 hours.............<8.0 mg/dL  Up to 48 hours............<12.0 mg/dL  3-5 days..................<15.0 mg/dL  6-29 days.................<15.0 mg/dL       Alkaline Phosphatase   Date Value Ref Range Status   10/22/2021 88 55 - 135 U/L Final     AST   Date Value Ref Range Status   10/22/2021 27 10 - 40 U/L Final     ALT   Date Value Ref Range Status   10/22/2021 20 10 - 44 U/L Final     Anion Gap   Date Value Ref Range Status   10/22/2021 15 8 - 16 mmol/L Final     eGFR if    Date Value Ref Range Status   10/22/2021 >60.0 >60 mL/min/1.73 m^2 Final     eGFR if non    Date Value Ref Range Status   10/22/2021 >60.0 >60 mL/min/1.73 m^2 Final     Comment:     Calculation used to obtain the estimated glomerular filtration  rate (eGFR) is the CKD-EPI equation.        Lab Results   Component Value Date    WBC 4.29 10/22/2021    HGB 11.9 (L) 10/22/2021    HCT 38.3 10/22/2021    MCV 83 10/22/2021     10/22/2021     Lab Results   Component Value Date    CHOL 210 (H) 10/22/2021    CHOL 225 (H) 12/28/2020    CHOL 218 (H) 10/30/2018     Lab Results   Component Value Date    HDL 73 10/22/2021    HDL 78 (H) 12/28/2020    HDL 72 10/30/2018     Lab Results   Component Value Date    LDLCALC 126.6 10/22/2021    LDLCALC 135.2 12/28/2020    LDLCALC 135.6 10/30/2018     Lab Results   Component Value Date    TRIG 52 10/22/2021    TRIG 59 12/28/2020    TRIG 52 10/30/2018     Lab Results   Component Value Date    CHOLHDL 34.8 10/22/2021    CHOLHDL 34.7 12/28/2020    CHOLHDL 33.0 10/30/2018     Lab Results   Component Value Date    TSH 0.099 (L)  10/22/2021       ASSESSMENT/PLAN     Poonam Alvarez is a 71 y.o. female     Poonam was seen today for left sided flank pain -concern that this could be early shingles though there is no cutaneous rash appreciated. Will start pepcid and valtrex empirically. Pt is aware of ED prompts.     Diagnoses and all orders for this visit:    Flank pain    Other orders  -     famotidine (PEPCID) 20 MG tablet; Take 1 tablet (20 mg total) by mouth 2 (two) times daily. (Patient not taking: Reported on 2/2/2022)  -     valACYclovir (VALTREX) 1000 MG tablet; Take 1 tablet (1,000 mg total) by mouth 3 (three) times daily. for 7 days

## 2022-01-18 NOTE — TELEPHONE ENCOUNTER
Spoke with pt, she states that she was able to get an apt with an Urgent Care provider for pain in chest. Pt was diagnosed with indigestion. Pt states that she will call back if the pain comes back.

## 2022-01-18 NOTE — TELEPHONE ENCOUNTER
----- Message from Tiffanie De Luna sent at 1/18/2022 12:08 PM CST -----  Contact: [phone]  Patient would like to speak to the nurse in regards to a personal matter. Please advise

## 2022-01-31 ENCOUNTER — TELEPHONE (OUTPATIENT)
Dept: ORTHOPEDICS | Facility: CLINIC | Age: 72
End: 2022-01-31
Payer: MEDICARE

## 2022-01-31 NOTE — TELEPHONE ENCOUNTER
----- Message from Eric Mccall sent at 1/31/2022  8:26 AM CST -----  Regarding: Sooner appt  Contact: PT  Pt requesting call back for sooner appt for 2 knots and swelling on her left middle finger.    Pt @ [phone]

## 2022-01-31 NOTE — TELEPHONE ENCOUNTER
Spoke to pt and scheduled an appt tomorrow at  with , pt verbalized understanding.----- Message from Eric Mccall sent at 1/31/2022  8:26 AM CST -----  Regarding: Sooner appt  Contact: PT  Pt requesting call back for sooner appt for 2 knots and swelling on her left middle finger.    Pt @ [phone]

## 2022-02-02 ENCOUNTER — OFFICE VISIT (OUTPATIENT)
Dept: OPTOMETRY | Facility: CLINIC | Age: 72
End: 2022-02-02
Payer: MEDICARE

## 2022-02-02 DIAGNOSIS — H26.492 POSTERIOR CAPSULAR OPACIFICATION NON VISUALLY SIGNIFICANT OF LEFT EYE: ICD-10-CM

## 2022-02-02 DIAGNOSIS — Z98.42 S/P BILATERAL CATARACT EXTRACTION: ICD-10-CM

## 2022-02-02 DIAGNOSIS — Z96.1 PSEUDOPHAKIA OF BOTH EYES: ICD-10-CM

## 2022-02-02 DIAGNOSIS — H53.8 BLURRED VISION, RIGHT EYE: ICD-10-CM

## 2022-02-02 DIAGNOSIS — H26.491 POSTERIOR CAPSULAR OPACIFICATION OF RIGHT EYE, OBSCURING VISION: Primary | ICD-10-CM

## 2022-02-02 DIAGNOSIS — Z98.41 S/P BILATERAL CATARACT EXTRACTION: ICD-10-CM

## 2022-02-02 PROCEDURE — 99214 OFFICE O/P EST MOD 30 MIN: CPT | Mod: PBBFAC | Performed by: OPTOMETRIST

## 2022-02-02 PROCEDURE — 92012 PR EYE EXAM, EST PATIENT,INTERMED: ICD-10-PCS | Mod: S$PBB,,, | Performed by: OPTOMETRIST

## 2022-02-02 PROCEDURE — 99999 PR PBB SHADOW E&M-EST. PATIENT-LVL IV: ICD-10-PCS | Mod: PBBFAC,,, | Performed by: OPTOMETRIST

## 2022-02-02 PROCEDURE — 92012 INTRM OPH EXAM EST PATIENT: CPT | Mod: S$PBB,,, | Performed by: OPTOMETRIST

## 2022-02-02 PROCEDURE — 99999 PR PBB SHADOW E&M-EST. PATIENT-LVL IV: CPT | Mod: PBBFAC,,, | Performed by: OPTOMETRIST

## 2022-02-02 NOTE — PATIENT INSTRUCTIONS
S/p bilateral cataract surgery with bilateral posterior chamber intraocular lens.  Slit lamp examination reveals bilateral posterior capsular opacification, more advanced in the right eye.  Ms. Alvarez aware of bothersome decrease in visual acuity in the right eye.    Defer refraction.  Refer to Dr Maldonado for evaluation of need for YAG laser posterior capsulotomy in the right eye.   Return to me (Dr. Daniels) approximately two weeks following YAG laser posterior capsulotomy in the right eye (if done) for refraction and new spectacle lens Rx.

## 2022-02-03 NOTE — PROGRESS NOTES
"HPI     eye examination       Additional comments: Overdue general eye examination and refraction.  Notes decreased VA in the right eye today              Comments     Patient's age: 71 y.o. AA female   Occupation: Retired   Approximate date of last eye examination: 12/29/2020  Name of last eye doctor seen: Dr Daniels   City/State: [facility]   Wears glasses? Yes, OTC glasses for reading.     If yes, wears  Full-time or part-time?  Part-time   Present glasses are: Bifocal, SV Distance, SV Reading?  SV reading lenses   Approximate age of present glasses: n/a  Got new glasses following last exam, or subsequently?:  No      Wears CLs?:  No   Headaches?  No   Eye pain/discomfort?  No                                                                                     Flashes?  No .   Floaters?  No   Diplopia/Double vision?  No   Patient's Ocular History:          Any eye surgeries? Cataract SX OU          Any eye injury?  No          Any treatment for eye disease?  No   Family history of eye disease?     Parents + Cataracts   Significant patient medical history:         1. Diabetes?  No           2. HBP?  No              3.  H/o of hyperthyroid with Graves Disease.   S/P radiation Tx   many years ago.  Now takes thyroid supplement    OTC eyedrops currently using:  No   Prescription eye meds currently using:  No   Any history of allergy/adverse reaction to any eye meds used previously?    No   Any history of allergy/adverse reaction to eyedrops used during prior eye   exam(s)? No   Any history of allergy/adverse reaction to Novacaine or similar meds? No   Any history of allergy/adverse reaction to Epinephrine or similar meds? No     Patient okay with use of anesthetic eyedrops to check eye pressure?  Yes         Patient okay with use of eyedrops to dilate pupils today?  Yes   Allergies/Medications/Medical History/Family History reviewed today?  Yes       PD =  73/69   Reading distance = 21.5"           Last edited by Dionte GOMES " Frances, OD on 2/2/2022  2:14 PM. (History)            Assessment /Plan     For exam results, see Encounter Report.    1. Posterior capsular opacification of right eye, obscuring vision     2. Posterior capsular opacification non visually significant of left eye     3. Blurred vision, right eye     4. S/P bilateral cataract extraction     5. Pseudophakia of both eyes                    S/p bilateral cataract surgery with bilateral posterior chamber intraocular lens.  Slit lamp examination reveals bilateral posterior capsular opacification, more advanced in the right eye.  Ms. Alvarez aware of bothersome decrease in visual acuity in the right eye.    Defer refraction.  Refer to Dr Maldonado for evaluation of need for YAG laser posterior capsulotomy in the right eye.   Return to me (Dr. Daniels) approximately two weeks following YAG laser posterior capsulotomy in the right eye (if done) for refraction and new spectacle lens Rx.

## 2022-02-07 DIAGNOSIS — R07.9 CHEST PAIN, UNSPECIFIED TYPE: Primary | ICD-10-CM

## 2022-02-08 ENCOUNTER — TELEPHONE (OUTPATIENT)
Dept: INTERNAL MEDICINE | Facility: CLINIC | Age: 72
End: 2022-02-08
Payer: MEDICARE

## 2022-02-08 NOTE — TELEPHONE ENCOUNTER
Patient was informed that EKG placement was placed due to chief complaint of chest pains per previous encounter.

## 2022-02-08 NOTE — TELEPHONE ENCOUNTER
----- Message from Sepideh Galicia sent at 2/8/2022  3:42 PM CST -----  Contact: Pt Mobile  [phone]  Patient would like a call back in regards to her wanting to know why you ordered a EKG for her please?

## 2022-02-10 ENCOUNTER — TELEPHONE (OUTPATIENT)
Dept: INTERNAL MEDICINE | Facility: CLINIC | Age: 72
End: 2022-02-10
Payer: MEDICARE

## 2022-02-10 NOTE — TELEPHONE ENCOUNTER
----- Message from Bonnie Eaton sent at 2/10/2022  9:56 AM CST -----  Contact: [phone]  Patient would like to get medical advice.  Patient is scheduled for an EKG on 2/11/22 and she would like to know why? Please call and advise.

## 2022-02-10 NOTE — TELEPHONE ENCOUNTER
Someone told me that her EKG order needed to be re ordered when I was covering for Yuni Proctor PA-C. According to her note I assume the order was placed due to history and chest pain. Otherwise I have no additional information, but Yuni Proctor will return next week.

## 2022-02-10 NOTE — TELEPHONE ENCOUNTER
Pt would like to know what she needs another EKG. Pt states that she was not given an explanation or advised why, just that she needed to have it. Pt would like pcp or Dr. Boland to explain.

## 2022-02-11 ENCOUNTER — HOSPITAL ENCOUNTER (OUTPATIENT)
Dept: CARDIOLOGY | Facility: OTHER | Age: 72
Discharge: HOME | End: 2022-02-11
Attending: INTERNAL MEDICINE
Payer: MEDICARE

## 2022-02-11 DIAGNOSIS — R07.9 CHEST PAIN, UNSPECIFIED TYPE: ICD-10-CM

## 2022-02-11 PROCEDURE — 93010 EKG 12-LEAD: ICD-10-PCS | Mod: ,,, | Performed by: INTERNAL MEDICINE

## 2022-02-11 PROCEDURE — 93005 ELECTROCARDIOGRAM TRACING: CPT

## 2022-02-11 PROCEDURE — 93010 ELECTROCARDIOGRAM REPORT: CPT | Mod: ,,, | Performed by: INTERNAL MEDICINE

## 2022-02-11 NOTE — TELEPHONE ENCOUNTER
It seems that the EKG was ordered due to her chest pain that she had during her visit oin 1/18/22.    Is she still having chest pain?

## 2022-02-11 NOTE — TELEPHONE ENCOUNTER
Called pt and relayed Dr. Smallwood's message. Also asked pt per Dr. Bach if she's still having chest pain? Pt states it wasn't chest pain as much as a tingling in her chest.     She's on her way to have the EKG now.   Pt's concern was that she saw MG Frye on 1/18, but then she got a call re: her EKG needing to be scheduled a couple of weeks after and was concerned something happened that she didn't know about. Apologized and unfortunately can't offer further explanation.     Pt amenable and states she appreciates the call.

## 2022-02-17 DIAGNOSIS — R52 PAIN: Primary | ICD-10-CM

## 2022-02-22 ENCOUNTER — PATIENT OUTREACH (OUTPATIENT)
Dept: ADMINISTRATIVE | Facility: OTHER | Age: 72
End: 2022-02-22
Payer: MEDICARE

## 2022-02-22 ENCOUNTER — TELEPHONE (OUTPATIENT)
Dept: ORTHOPEDICS | Facility: CLINIC | Age: 72
End: 2022-02-22
Payer: MEDICARE

## 2022-02-23 ENCOUNTER — OFFICE VISIT (OUTPATIENT)
Dept: ORTHOPEDICS | Facility: CLINIC | Age: 72
End: 2022-02-23
Payer: MEDICARE

## 2022-02-23 ENCOUNTER — HOSPITAL ENCOUNTER (OUTPATIENT)
Dept: RADIOLOGY | Facility: OTHER | Age: 72
Discharge: HOME | End: 2022-02-23
Attending: PHYSICIAN ASSISTANT
Payer: MEDICARE

## 2022-02-23 VITALS — BODY MASS INDEX: 26.95 KG/M2 | HEIGHT: 72 IN | WEIGHT: 199 LBS

## 2022-02-23 DIAGNOSIS — R22.32 LOCALIZED SWELLING, MASS AND LUMP, LEFT UPPER LIMB: ICD-10-CM

## 2022-02-23 DIAGNOSIS — M67.449 GANGLION CYST OF FINGER: Primary | ICD-10-CM

## 2022-02-23 DIAGNOSIS — R52 PAIN: ICD-10-CM

## 2022-02-23 PROCEDURE — 99213 PR OFFICE/OUTPT VISIT, EST, LEVL III, 20-29 MIN: ICD-10-PCS | Mod: S$PBB,ICN,, | Performed by: PHYSICIAN ASSISTANT

## 2022-02-23 PROCEDURE — 99213 OFFICE O/P EST LOW 20 MIN: CPT | Mod: S$PBB,ICN,, | Performed by: PHYSICIAN ASSISTANT

## 2022-02-23 PROCEDURE — 73130 X-RAY EXAM OF HAND: CPT | Mod: 26,LT,, | Performed by: RADIOLOGY

## 2022-02-23 PROCEDURE — 73130 XR HAND COMPLETE 3 VIEW LEFT: ICD-10-PCS | Mod: 26,LT,, | Performed by: RADIOLOGY

## 2022-02-23 PROCEDURE — 99999 PR PBB SHADOW E&M-EST. PATIENT-LVL III: ICD-10-PCS | Mod: PBBFAC,,, | Performed by: PHYSICIAN ASSISTANT

## 2022-02-23 PROCEDURE — 99999 PR PBB SHADOW E&M-EST. PATIENT-LVL III: CPT | Mod: PBBFAC,,, | Performed by: PHYSICIAN ASSISTANT

## 2022-02-23 PROCEDURE — 99213 OFFICE O/P EST LOW 20 MIN: CPT | Mod: PBBFAC | Performed by: PHYSICIAN ASSISTANT

## 2022-02-23 PROCEDURE — 73130 X-RAY EXAM OF HAND: CPT | Mod: TC,FY,LT

## 2022-02-23 NOTE — PROGRESS NOTES
HPI     DLS: 2/02/2022 With Dr. Daniels  DLS: 8/17/2012 With Dr. Maldonado     Pt here for consideration of YAG CAP (( ? OD vs OS)  per Dr. Daniels;  Pt states she's been noticing decrease in vision in OS.     Meds:  No GTTS    Last edited by Cassie Maldonado MD on 2/24/2022  5:45 PM. (History)            Assessment /Plan     For exam results, see Encounter Report.    Posterior capsular opacification of left eye, obscuring vision  -     Ambulatory referral/consult to Ophthalmology    PCO (posterior capsular opacification), bilateral    Epiretinal membrane (ERM) of left eye  -     Posterior Segment OCT Retina-Both eyes    Pseudophakia of both eyes      Pt referred back by Frances for consideraation of yag cap - he wrote od // but it is the left eye with a mild decrease in vision - suspect he meant left     VA 20/20 od // 20/40 os (without correction)  Minimal PCO on slit lamp exam ou  OCT macula - nl ou     PC IOL OD - Holly 7/11/2012   PC IOL OS - John - 2004     H/O graves disease    Use to see Dr Lopez - stable x years     F/U 6 months with AR/MR /BAT ou (Dr Daniels will be retired)    Hold off on yag cap os for now - minimal PCO - would be difficult to focus laser

## 2022-02-23 NOTE — PROGRESS NOTES
Subjective:      Patient ID: Poonam Alvarez is a 71 y.o. female.    Chief Complaint: Pain and Swelling of the Left Hand      HPI  Poonam Alvarez is a  71 y.o. female presenting today for evaluation of the left long finger. She reports two small adjacent masses over the volar proximal phalanx of the long finger. Onset 3 wks ago. She denies pain or triggering. She reports these areas do fluctuate in size to a small degree.       Review of patient's allergies indicates:   Allergen Reactions    Hydrocodone-acetaminophen Other (See Comments)     Low blood pressure; doesn't want    Oxycodone-acetaminophen Other (See Comments)     Causes low blood pressure; doesn't want  4/4/19 - patient reports she is not actually allergic to it (just doesn't like how it makes her feel)         Current Outpatient Medications   Medication Sig Dispense Refill    aspirin (ECOTRIN) 81 MG EC tablet Take 81 mg by mouth once daily.      b complex vitamins capsule Take 1 capsule by mouth once daily.      cholecalciferol, vitamin D3, (VITAMIN D3) 50 mcg (2,000 unit) Cap Take 1 capsule by mouth once daily.      hydroCHLOROthiazide (HYDRODIURIL) 12.5 MG Tab TAKE 1/2 TO 1 TABLET BY MOUTH DAILY AS NEEDED 90 tablet 3    magnesium citrate 100 mg Cap Take by mouth.      multivitamin-minerals-lutein Tab Take 1 tablet by mouth once daily.       SYNTHROID 137 mcg Tab tablet TAKE 1 TABLET(137 MCG) BY MOUTH EVERY DAY 90 tablet 4    UBIDECARENONE (COENZYME Q10) 100 mg Tab Take 1 tablet by mouth once daily.      blood pressure monitor (BLOOD PRESSURE KIT) Kit Use as directed (Patient not taking: No sig reported) 1 each 0    famotidine (PEPCID) 20 MG tablet Take 1 tablet (20 mg total) by mouth 2 (two) times daily. (Patient not taking: Reported on 2/2/2022) 60 tablet 0    meloxicam (MOBIC) 15 MG tablet Take 1 tablet (15 mg total) by mouth once daily. (Patient not taking: No sig reported) 30 tablet 2    promethazine-dextromethorphan  (PROMETHAZINE-DM) 6.25-15 mg/5 mL Syrp Take 2.5 mLs by mouth nightly as needed (cough supression). (Patient not taking: No sig reported) 120 mL 0    valACYclovir (VALTREX) 1000 MG tablet Take 1 tablet (1,000 mg total) by mouth 3 (three) times daily. for 7 days 21 tablet 0     Current Facility-Administered Medications   Medication Dose Route Frequency Provider Last Rate Last Admin    acetaminophen-codeine 300-30mg per tablet 1 tablet  1 tablet Oral Q6H PRN Angelica Mahan PA-C           Past Medical History:   Diagnosis Date    AR (allergic rhinitis) 12/7/2012    Atrial flutter     ablation October 2008    Cataract     Generalized headaches     GERD (gastroesophageal reflux disease) 03/08/2013    resolved    Grave's disease     s/p radioactive iodine, now hypothyroidism    Keloid cicatrix     Obesity     Osteoarthritis     shoulders, hands, knees    Positive PPD, treated     9 months of INH, completed 1/2010       Past Surgical History:   Procedure Laterality Date    ABLATION OF DYSRHYTHMIC FOCUS  10/24/2008    atrial flutter    ARTHROPLASTY OF SHOULDER Right 4/1/2019    Procedure: ARTHROPLASTY, SHOULDER;  Surgeon: Sage Xie MD;  Location: [facility];  Service: Orthopedics;  Laterality: Right;  regional w/ catheter (q-ball)    CATARACT EXTRACTION W/  INTRAOCULAR LENS IMPLANT Bilateral     COLONOSCOPY N/A 11/5/2015    Procedure: COLONOSCOPY;  Surgeon: Jose Ly MD;  Location: [address]);  Service: Endoscopy;  Laterality: N/A;  Last colonoscopy 2008 with Dr. Vieira; Pt wanted this day    EYE SURGERY      cataract removal right eye    HERNIA REPAIR      KNEE ARTHROSCOPY W/ DEBRIDEMENT      right, 2005 and 2008    KNEE SURGERY  3-27-14    right TKA       Review of Systems:  Constitutional: Negative for chills and fever.   Respiratory: Negative for cough and shortness of breath.    Gastrointestinal: Negative for nausea and vomiting.   Skin: Negative for rash.   Neurological:  Negative for dizziness and headaches.   Psychiatric/Behavioral: Negative for depression.   MSK as in HPI       OBJECTIVE:     PHYSICAL EXAM:  Ht 6' (1.829 m)   Wt 90.3 kg (199 lb)   BMI 26.99 kg/m²     GEN:  NAD, well-developed, well-groomed.  NEURO: Awake, alert, and oriented. Normal attention and concentration.    PSYCH: Normal mood and affect. Behavior is normal.  HEENT: No cervical lymphadenopathy noted.  CARDIOVASCULAR: Radial pulses 2+ bilaterally. No LE edema noted.  PULMONARY: Breath sounds normal. No respiratory distress.  SKIN: Intact, no rashes.      MSK:   LUE:  Good active ROM of the wrist and fingers. Left long finger with two small adjacent masses over the volar P1 consistent with likely cysts, non tender. No triggering. AIN/PIN/Radial/Median/Ulnar Nerves assessed in isolation without deficit. Radial & Ulnar arteries palpated 2+. Capillary Refill <3s.      RADIOGRAPHS:  Xray left hand 2/23/22   Impression:   No acute osseous abnormality  Comments: I have personally reviewed the imaging and I agree with the above radiologist's report.    ASSESSMENT/PLAN:       ICD-10-CM ICD-9-CM   1. Ganglion cyst of finger  M67.449 727.43   2. Localized swelling, mass and lump, left upper limb   R22.32 782.2       Orders Placed This Encounter    US Extremity Non Vascular Complete Left     Plan:   Options discussed pt wishes to proceed with US to further eval discussed likely cyst will confirm with US   She will call following US to review results       The patient indicates understanding of these issues and agrees to the plan.    Maryjo Mayers PA-C  [facility]   [facility]  [place], [place]

## 2022-02-24 ENCOUNTER — OFFICE VISIT (OUTPATIENT)
Dept: OPHTHALMOLOGY | Facility: CLINIC | Age: 72
End: 2022-02-24
Payer: MEDICARE

## 2022-02-24 ENCOUNTER — HOSPITAL ENCOUNTER (OUTPATIENT)
Dept: RADIOLOGY | Facility: OTHER | Age: 72
Discharge: HOME | End: 2022-02-24
Attending: PHYSICIAN ASSISTANT
Payer: MEDICARE

## 2022-02-24 DIAGNOSIS — H35.372 EPIRETINAL MEMBRANE (ERM) OF LEFT EYE: ICD-10-CM

## 2022-02-24 DIAGNOSIS — Z96.1 PSEUDOPHAKIA OF BOTH EYES: ICD-10-CM

## 2022-02-24 DIAGNOSIS — H26.492 POSTERIOR CAPSULAR OPACIFICATION OF LEFT EYE, OBSCURING VISION: Primary | ICD-10-CM

## 2022-02-24 DIAGNOSIS — R22.32 LOCALIZED SWELLING, MASS AND LUMP, LEFT UPPER LIMB: ICD-10-CM

## 2022-02-24 DIAGNOSIS — M67.449 GANGLION CYST OF FINGER: ICD-10-CM

## 2022-02-24 DIAGNOSIS — H26.493 PCO (POSTERIOR CAPSULAR OPACIFICATION), BILATERAL: ICD-10-CM

## 2022-02-24 PROCEDURE — 76881 US COMPL JOINT R-T W/IMG: CPT | Mod: 26,LT,, | Performed by: RADIOLOGY

## 2022-02-24 PROCEDURE — 99212 OFFICE O/P EST SF 10 MIN: CPT | Mod: PBBFAC,25 | Performed by: OPHTHALMOLOGY

## 2022-02-24 PROCEDURE — 76881 US EXTREMITY NON VASCULAR COMPLETE LEFT: ICD-10-PCS | Mod: 26,LT,, | Performed by: RADIOLOGY

## 2022-02-24 PROCEDURE — 99999 PR PBB SHADOW E&M-EST. PATIENT-LVL II: CPT | Mod: PBBFAC,,, | Performed by: OPHTHALMOLOGY

## 2022-02-24 PROCEDURE — 76881 US COMPL JOINT R-T W/IMG: CPT | Mod: TC,LT

## 2022-02-24 PROCEDURE — 92134 CPTRZ OPH DX IMG PST SGM RTA: CPT | Mod: PBBFAC | Performed by: OPHTHALMOLOGY

## 2022-02-24 PROCEDURE — 92004 COMPRE OPH EXAM NEW PT 1/>: CPT | Mod: S$PBB,,, | Performed by: OPHTHALMOLOGY

## 2022-02-24 PROCEDURE — 92004 PR EYE EXAM, NEW PATIENT,COMPREHESV: ICD-10-PCS | Mod: S$PBB,,, | Performed by: OPHTHALMOLOGY

## 2022-02-24 PROCEDURE — 92134 POSTERIOR SEGMENT OCT RETINA (OCULAR COHERENCE TOMOGRAPHY)-BOTH EYES: ICD-10-PCS | Mod: 26,S$PBB,, | Performed by: OPHTHALMOLOGY

## 2022-02-24 PROCEDURE — 99999 PR PBB SHADOW E&M-EST. PATIENT-LVL II: ICD-10-PCS | Mod: PBBFAC,,, | Performed by: OPHTHALMOLOGY

## 2022-02-24 NOTE — Clinical Note
You sent her to me for consideration of a yag cap od // but I think you meant os - anyway the PCO is mild and I think it would be hard to focus the laser on it. I will check her back in 6 months - I will monitor it and do yag when I can visually appreciate more PCO

## 2022-03-03 ENCOUNTER — TELEPHONE (OUTPATIENT)
Dept: ORTHOPEDICS | Facility: CLINIC | Age: 72
End: 2022-03-03
Payer: MEDICARE

## 2022-03-03 NOTE — TELEPHONE ENCOUNTER
Spoke with informed her that her ultrasound shows cysts in the area of concern   She may continue to monitor   If symptoms persist or worsen please let us know.  Pt voiced understanding and call ended.

## 2022-03-03 NOTE — TELEPHONE ENCOUNTER
----- Message from José Miguel Blue sent at 3/3/2022  3:19 PM CST -----   Name of Who is Calling:     What is the request in detail:  patient request call back in reference to  discuss mri results Please contact to further discuss and advise      Can the clinic reply by [facility]:     What [facility]  [phone]

## 2022-03-22 ENCOUNTER — TELEPHONE (OUTPATIENT)
Dept: ORTHOPEDICS | Facility: CLINIC | Age: 72
End: 2022-03-22
Payer: MEDICARE

## 2022-03-22 DIAGNOSIS — Z96.651 H/O TOTAL KNEE REPLACEMENT, RIGHT: Primary | ICD-10-CM

## 2022-03-22 NOTE — TELEPHONE ENCOUNTER
Spoke with pt, scheduled her xrays. She was pleased and had no further questions.    ----- Message from Julia Payne RN sent at 3/21/2022  5:13 PM CDT -----  Contact: Pt    ----- Message -----  From: Shea Lenz  Sent: 3/21/2022   3:46 PM CDT  To: [facility] Staff    Type:  Patient Requesting Referral    Who Called: Pt    Does the patient already have the specialty appointment scheduled?: no    If yes, what is the date of that appointment?:     Referral to What Specialty: Radiology    Reason for Referral: xray right knee    Does the patient want the referral with a specific physician?:     [facility]    Patient Requesting a Response?:yes    Would the patient rather a call back or a response via [facility]?  Call back    Best Call Back Number:[phone]    Additional Information:  Pt called to schedule appt for xray per Dr Schneider orders but no order in the system

## 2022-03-25 ENCOUNTER — HOSPITAL ENCOUNTER (OUTPATIENT)
Dept: RADIOLOGY | Facility: HOSPITAL | Age: 72
Discharge: HOME | End: 2022-03-25
Attending: NURSE PRACTITIONER
Payer: MEDICARE

## 2022-03-25 ENCOUNTER — TELEPHONE (OUTPATIENT)
Dept: ORTHOPEDICS | Facility: CLINIC | Age: 72
End: 2022-03-25
Payer: MEDICARE

## 2022-03-25 DIAGNOSIS — Z96.651 H/O TOTAL KNEE REPLACEMENT, RIGHT: ICD-10-CM

## 2022-03-25 PROCEDURE — 73560 X-RAY EXAM OF KNEE 1 OR 2: CPT | Mod: 59,TC,LT

## 2022-03-25 PROCEDURE — 73560 XR KNEE ORTHO RIGHT: ICD-10-PCS | Mod: 26,LT,, | Performed by: RADIOLOGY

## 2022-03-25 PROCEDURE — 73562 X-RAY EXAM OF KNEE 3: CPT | Mod: TC,RT

## 2022-03-25 PROCEDURE — 73560 X-RAY EXAM OF KNEE 1 OR 2: CPT | Mod: 26,LT,, | Performed by: RADIOLOGY

## 2022-03-25 PROCEDURE — 73562 X-RAY EXAM OF KNEE 3: CPT | Mod: 26,RT,, | Performed by: RADIOLOGY

## 2022-03-25 PROCEDURE — 73562 XR KNEE ORTHO RIGHT: ICD-10-PCS | Mod: 26,RT,, | Performed by: RADIOLOGY

## 2022-03-25 NOTE — PROGRESS NOTES
Pt wants to see Dr. Schneider on 8/17 to discuss left knee replacement. Schedule not open yet. Will schedule once august is open. She wants to have her surgery in September.

## 2022-03-25 NOTE — TELEPHONE ENCOUNTER
Called patient and discussed xray results. She reports that she is having more pain in the right knee lately. She has advanced arthritic changes in the left knee and she has been walking in the park for exercise. I advised her that she really needs to think about getting the left knee replaced in light of the changes since 2019. She is going to come in to see Dr. Schneider in August for knee replacement on the left in September.

## 2022-06-24 ENCOUNTER — TELEPHONE (OUTPATIENT)
Dept: INTERNAL MEDICINE | Facility: CLINIC | Age: 72
End: 2022-06-24
Payer: MEDICARE

## 2022-06-24 NOTE — TELEPHONE ENCOUNTER
----- Message from Richard Smith sent at 6/24/2022 10:35 AM CDT -----  Contact: @ [phone]  Patient calling to schedule an annual [employer], carol ann call

## 2022-06-27 ENCOUNTER — TELEPHONE (OUTPATIENT)
Dept: INTERNAL MEDICINE | Facility: CLINIC | Age: 72
End: 2022-06-27
Payer: MEDICARE

## 2022-06-27 NOTE — TELEPHONE ENCOUNTER
Pt  insurance will not pay for synthroid 0.137mg, however they will cover Levothyroxine sodium, Levoxyl, and  Unithroid. Please submit another rx.

## 2022-06-28 NOTE — TELEPHONE ENCOUNTER
Please let pt know that insurance will not pay for brand Syntrhoid. She could purchase b rand Synthroid or get generic.  I am fine with generic as long as the pharmacy does not change her generic brand

## 2022-06-28 NOTE — TELEPHONE ENCOUNTER
----- Message from Anamika Caldwell sent at 6/28/2022  7:48 AM CDT -----  Contact: [phone] Patient  Patient is returning a phone call.  Who left a message for the patient: Lev Mccall LPN   Does patient know what this is regarding:  insurance will not pay for brand Synthroid  Would you like a call back, or a response through your [facility] portal?:  call back  Comments:

## 2022-06-28 NOTE — TELEPHONE ENCOUNTER
Called and spoke to pt. Pt states that she cannot take the generic brand. Pt states that Dr Orestes Lemus, who is retired, told her to never take generic. Pt states that the PCP just needs to make it medically necessary and her insurance will pay for it. Pt states that she had problems when she took the generic form.

## 2022-06-28 NOTE — TELEPHONE ENCOUNTER
----- Message from Molly Florian sent at 6/28/2022  9:01 AM CDT -----  Contact: [phone]  Patient is returning a phone call.  Who left a message for the patient: Megan Marin MA     Does patient know what this is regarding:  yes   Would you like a call back, or a response through your [facility] portal?:   call back   Comments:

## 2022-06-28 NOTE — TELEPHONE ENCOUNTER
----- Message from Ani Lizama sent at 6/28/2022  3:42 PM CDT -----  Regarding: PA needed  Contact: Phyllis Lawler [phone]  Patients insurance is requiring  a prior authorization for named medication Synthroid.  Please call and advise.      [id number] - [place], [place] - [address] AT [facility] [address]  [facility]  [facility] [address]  Phone: [phone] Fax: [phone]

## 2022-07-06 ENCOUNTER — LAB VISIT (OUTPATIENT)
Dept: LAB | Facility: HOSPITAL | Age: 72
End: 2022-07-06
Attending: INTERNAL MEDICINE
Payer: MEDICARE

## 2022-07-06 ENCOUNTER — OFFICE VISIT (OUTPATIENT)
Dept: INTERNAL MEDICINE | Facility: CLINIC | Age: 72
End: 2022-07-06
Payer: MEDICARE

## 2022-07-06 VITALS
HEIGHT: 72 IN | HEART RATE: 71 BPM | SYSTOLIC BLOOD PRESSURE: 120 MMHG | OXYGEN SATURATION: 99 % | BODY MASS INDEX: 26.99 KG/M2 | DIASTOLIC BLOOD PRESSURE: 80 MMHG

## 2022-07-06 DIAGNOSIS — E53.8 VITAMIN B12 DEFICIENCY: ICD-10-CM

## 2022-07-06 DIAGNOSIS — E55.9 VITAMIN D DEFICIENCY DISEASE: ICD-10-CM

## 2022-07-06 DIAGNOSIS — R79.9 ABNORMAL BLOOD CHEMISTRY: ICD-10-CM

## 2022-07-06 DIAGNOSIS — I10 ESSENTIAL HYPERTENSION: ICD-10-CM

## 2022-07-06 DIAGNOSIS — Z00.00 ROUTINE GENERAL MEDICAL EXAMINATION AT A HEALTH CARE FACILITY: Primary | ICD-10-CM

## 2022-07-06 DIAGNOSIS — Z12.31 SCREENING MAMMOGRAM FOR BREAST CANCER: ICD-10-CM

## 2022-07-06 LAB
25(OH)D3+25(OH)D2 SERPL-MCNC: 54 NG/ML (ref 30–96)
ALBUMIN SERPL BCP-MCNC: 3.9 G/DL (ref 3.5–5.2)
ALP SERPL-CCNC: 81 U/L (ref 55–135)
ALT SERPL W/O P-5'-P-CCNC: 18 U/L (ref 10–44)
ANION GAP SERPL CALC-SCNC: 10 MMOL/L (ref 8–16)
AST SERPL-CCNC: 26 U/L (ref 10–40)
BASOPHILS # BLD AUTO: 0.03 K/UL (ref 0–0.2)
BASOPHILS NFR BLD: 0.8 % (ref 0–1.9)
BILIRUB SERPL-MCNC: 0.4 MG/DL (ref 0.1–1)
BUN SERPL-MCNC: 13 MG/DL (ref 8–23)
CALCIUM SERPL-MCNC: 9.9 MG/DL (ref 8.7–10.5)
CHLORIDE SERPL-SCNC: 104 MMOL/L (ref 95–110)
CHOLEST SERPL-MCNC: 202 MG/DL (ref 120–199)
CHOLEST/HDLC SERPL: 3.2 {RATIO} (ref 2–5)
CO2 SERPL-SCNC: 26 MMOL/L (ref 23–29)
CREAT SERPL-MCNC: 0.9 MG/DL (ref 0.5–1.4)
DIFFERENTIAL METHOD: ABNORMAL
EOSINOPHIL # BLD AUTO: 0.1 K/UL (ref 0–0.5)
EOSINOPHIL NFR BLD: 2.8 % (ref 0–8)
ERYTHROCYTE [DISTWIDTH] IN BLOOD BY AUTOMATED COUNT: 16.1 % (ref 11.5–14.5)
EST. GFR  (AFRICAN AMERICAN): >60 ML/MIN/1.73 M^2
EST. GFR  (NON AFRICAN AMERICAN): >60 ML/MIN/1.73 M^2
ESTIMATED AVG GLUCOSE: 108 MG/DL (ref 68–131)
FERRITIN SERPL-MCNC: 118 NG/ML (ref 20–300)
GLUCOSE SERPL-MCNC: 89 MG/DL (ref 70–110)
HBA1C MFR BLD: 5.4 % (ref 4–5.6)
HCT VFR BLD AUTO: 39.5 % (ref 37–48.5)
HDLC SERPL-MCNC: 64 MG/DL (ref 40–75)
HDLC SERPL: 31.7 % (ref 20–50)
HGB BLD-MCNC: 12.1 G/DL (ref 12–16)
IMM GRANULOCYTES # BLD AUTO: 0.01 K/UL (ref 0–0.04)
IMM GRANULOCYTES NFR BLD AUTO: 0.3 % (ref 0–0.5)
LDLC SERPL CALC-MCNC: 123.6 MG/DL (ref 63–159)
LYMPHOCYTES # BLD AUTO: 1.7 K/UL (ref 1–4.8)
LYMPHOCYTES NFR BLD: 42.5 % (ref 18–48)
MCH RBC QN AUTO: 26.2 PG (ref 27–31)
MCHC RBC AUTO-ENTMCNC: 30.6 G/DL (ref 32–36)
MCV RBC AUTO: 86 FL (ref 82–98)
MONOCYTES # BLD AUTO: 0.3 K/UL (ref 0.3–1)
MONOCYTES NFR BLD: 8.7 % (ref 4–15)
NEUTROPHILS # BLD AUTO: 1.8 K/UL (ref 1.8–7.7)
NEUTROPHILS NFR BLD: 44.9 % (ref 38–73)
NONHDLC SERPL-MCNC: 138 MG/DL
NRBC BLD-RTO: 0 /100 WBC
PLATELET # BLD AUTO: 254 K/UL (ref 150–450)
PMV BLD AUTO: 11.2 FL (ref 9.2–12.9)
POTASSIUM SERPL-SCNC: 4.1 MMOL/L (ref 3.5–5.1)
PROT SERPL-MCNC: 7.6 G/DL (ref 6–8.4)
RBC # BLD AUTO: 4.62 M/UL (ref 4–5.4)
SODIUM SERPL-SCNC: 140 MMOL/L (ref 136–145)
T4 FREE SERPL-MCNC: 1.06 NG/DL (ref 0.71–1.51)
TRIGL SERPL-MCNC: 72 MG/DL (ref 30–150)
TSH SERPL DL<=0.005 MIU/L-ACNC: 0.22 UIU/ML (ref 0.4–4)
VIT B12 SERPL-MCNC: 630 PG/ML (ref 210–950)
WBC # BLD AUTO: 3.91 K/UL (ref 3.9–12.7)

## 2022-07-06 PROCEDURE — 99397 PER PM REEVAL EST PAT 65+ YR: CPT | Mod: S$PBB,GZ,, | Performed by: INTERNAL MEDICINE

## 2022-07-06 PROCEDURE — 99397 PR PREVENTIVE VISIT,EST,65 & OVER: ICD-10-PCS | Mod: S$PBB,GZ,, | Performed by: INTERNAL MEDICINE

## 2022-07-06 PROCEDURE — 84443 ASSAY THYROID STIM HORMONE: CPT | Performed by: INTERNAL MEDICINE

## 2022-07-06 PROCEDURE — 82728 ASSAY OF FERRITIN: CPT | Performed by: INTERNAL MEDICINE

## 2022-07-06 PROCEDURE — 85025 COMPLETE CBC W/AUTO DIFF WBC: CPT | Performed by: INTERNAL MEDICINE

## 2022-07-06 PROCEDURE — 99213 OFFICE O/P EST LOW 20 MIN: CPT | Mod: PBBFAC | Performed by: INTERNAL MEDICINE

## 2022-07-06 PROCEDURE — 36415 COLL VENOUS BLD VENIPUNCTURE: CPT | Performed by: INTERNAL MEDICINE

## 2022-07-06 PROCEDURE — 82607 VITAMIN B-12: CPT | Performed by: INTERNAL MEDICINE

## 2022-07-06 PROCEDURE — 80053 COMPREHEN METABOLIC PANEL: CPT | Performed by: INTERNAL MEDICINE

## 2022-07-06 PROCEDURE — 99999 PR PBB SHADOW E&M-EST. PATIENT-LVL III: CPT | Mod: PBBFAC,,, | Performed by: INTERNAL MEDICINE

## 2022-07-06 PROCEDURE — 84439 ASSAY OF FREE THYROXINE: CPT | Performed by: INTERNAL MEDICINE

## 2022-07-06 PROCEDURE — 80061 LIPID PANEL: CPT | Performed by: INTERNAL MEDICINE

## 2022-07-06 PROCEDURE — 82306 VITAMIN D 25 HYDROXY: CPT | Performed by: INTERNAL MEDICINE

## 2022-07-06 PROCEDURE — 99999 PR PBB SHADOW E&M-EST. PATIENT-LVL III: ICD-10-PCS | Mod: PBBFAC,,, | Performed by: INTERNAL MEDICINE

## 2022-07-06 PROCEDURE — 83036 HEMOGLOBIN GLYCOSYLATED A1C: CPT | Performed by: INTERNAL MEDICINE

## 2022-07-06 NOTE — PROGRESS NOTES
CHIEF COMPLAINT: Annual exam      HISTORY OF PRESENT ILLNESS: This is a 71-year-old woman who presents for he annual exam    She has a cough at night that wakes her up. The cough is sporadic.  Dry cough.  She is not awake long.  She is able to go back to sleep.  Usually occurs once.  She has some perspiration around her neck sporadically.  NO apparent heart burn.  No sinus congestion and post nasal drip. SHe is drinking more water.  She sleeps on her side at night.      No more dizziness or syncopal episodes.  She takes HCTZ 12.5 mg once daily 6 days a week (skips one day a week). No swelling.     She has leg cramps about once a month - starts in feet and moves up.     Energy level has been down a little.       She continues to take Synthroid 137 mcg daily for hypothyroidism.       She has a history of positive PPD 01/2010 - took INH for 9 months, CXR 11/16 was fine. NO fever, chills, night sweats, weight loss.       She has chronic constipation.   She is taking milk of magnesia - cherry - 3-4 times a week which keeps her regular       She is currently working part time for [employer] at [employer] - as a .        She takes meloxicam 15 mg daily once a week. Joints are doing OK- up and down with activity and the weather     She has a cough at night - she will take the promethazine DM 1-2 times a month which helps.     She was bit by an insect a month ago - the area is improved. The skin is still thick.       PAST MEDICAL HISTORY:   1. Atrial flutter, status post ablation in October 2008.   2. Graves disease, status post radioactive iodine, now hypothyroid.   3. Hypertension.   4. History of headaches.   5. Osteoarthritis of the shoulders, hands and knees.   6. Status post arthroscopic surgery of the right knee February 2008.   7. Status post arthroscopic surgery of the right knee in 2005.   8. Tummy maribeth 20 years ago.   9. Hemorrhoids removed.   10. Cataract removal bilaterally  11.  Positive PPD diagnosed , completed 9 months of INH 1/10  12. GERD     SOCIAL HISTORY: She does not smoke. She drinks alcohol twice a year.   She is , has three children. She is a .     FAMILY HISTORY: Father  at age 83 from complications of pneumonia.   Mother  at age 98 from old age. Sister had a pulmonary embolus, another   sister had sarcoidosis. A son has Crohn's disease.        MEDICATIONS AND ALLERGIES: Updated in EPIC.       PHYSICAL EXAMINATION:   /80 (BP Location: Right arm, Patient Position: Sitting)   Pulse 71   Ht 6' (1.829 m)   SpO2 99%   BMI 26.99 kg/m²        GENERAL: She is alert, oriented, no apparent distress. Affect within normal limits.   Conjunctiva anicteric. Tympanic membranes clear. Oropharynx clear.    NECK: Supple.   RESPIRATORY: Effort normal. Lungs are clear to auscultation.   HEART: Regular rate and rhythm without murmurs, gallops or rubs.   No lower extremity edema.     Thickness on the lateral aspect of the right lower leg above the ankle. No erythema or warmth         ASSESSMENT AND PLAN:      Annual exam - discussed diet, exercise and safety issues.        1. OA knees - s/p injection in left knee - s/p right knee replacement - doing well. Will have left knee done at the end of the year.   4. ALlergic rhinitis -stable   5. Hypothyroidism - tsh   6. GERD - asx  7. Obestiy - doing well with diet, exercise and weight loss.   8. Positive ppd - cxr stable 2019  9. Left inguinal hernia repair - doing well     Screening - mammogram 2021. Bone density was normal 2020  Colonoscopy due 2015 - normal due    Normal pap Dec 2020.  I will see her back in 6 months, sooner if problems arise.

## 2022-07-07 ENCOUNTER — TELEPHONE (OUTPATIENT)
Dept: INTERNAL MEDICINE | Facility: CLINIC | Age: 72
End: 2022-07-07
Payer: MEDICARE

## 2022-07-07 NOTE — TELEPHONE ENCOUNTER
Called and spoke to pt. Relayed following message from PCP:    Your blood work is fine   Your blood count, blood sugar, kidney function, liver function, cholesterol, thyroid function, vitamin D and B12 levels are fine   COntinue current medications     Pt verbalized understanding.

## 2022-07-07 NOTE — TELEPHONE ENCOUNTER
----- Message from Lindsey Bach MD sent at 7/6/2022  8:54 PM CDT -----  Please ntoify pt  Your blood work is fine  Your blood count, blood sugar, kidney function, liver function, cholesterol, thyroid function, vitamin D and B12 levels are fine  COntinue current medications  Lindsey Bach M.D.

## 2022-07-08 ENCOUNTER — OFFICE VISIT (OUTPATIENT)
Dept: URGENT CARE | Facility: CLINIC | Age: 72
End: 2022-07-08
Payer: MEDICARE

## 2022-07-08 VITALS
OXYGEN SATURATION: 97 % | DIASTOLIC BLOOD PRESSURE: 81 MMHG | RESPIRATION RATE: 16 BRPM | WEIGHT: 199 LBS | HEIGHT: 72 IN | BODY MASS INDEX: 26.95 KG/M2 | SYSTOLIC BLOOD PRESSURE: 130 MMHG | TEMPERATURE: 98 F | HEART RATE: 65 BPM

## 2022-07-08 DIAGNOSIS — R05.9 COUGH: Primary | ICD-10-CM

## 2022-07-08 DIAGNOSIS — J06.9 UPPER RESPIRATORY TRACT INFECTION, UNSPECIFIED TYPE: ICD-10-CM

## 2022-07-08 LAB
CTP QC/QA: YES
SARS-COV-2 RDRP RESP QL NAA+PROBE: NEGATIVE

## 2022-07-08 PROCEDURE — 99214 PR OFFICE/OUTPT VISIT, EST, LEVL IV, 30-39 MIN: ICD-10-PCS | Mod: S$GLB,,, | Performed by: EMERGENCY MEDICINE

## 2022-07-08 PROCEDURE — U0002 COVID-19 LAB TEST NON-CDC: HCPCS | Mod: QW,CR,S$GLB, | Performed by: EMERGENCY MEDICINE

## 2022-07-08 PROCEDURE — U0002: ICD-10-PCS | Mod: QW,CR,S$GLB, | Performed by: EMERGENCY MEDICINE

## 2022-07-08 PROCEDURE — 99214 OFFICE O/P EST MOD 30 MIN: CPT | Mod: S$GLB,,, | Performed by: EMERGENCY MEDICINE

## 2022-07-08 RX ORDER — PROMETHAZINE HYDROCHLORIDE AND DEXTROMETHORPHAN HYDROBROMIDE 6.25; 15 MG/5ML; MG/5ML
5 SYRUP ORAL EVERY 6 HOURS PRN
Qty: 180 ML | Refills: 0 | Status: SHIPPED | OUTPATIENT
Start: 2022-07-08 | End: 2022-07-18

## 2022-07-08 NOTE — PATIENT INSTRUCTIONS
Zyrtec, Claritin, or Allegra OTC as directed for the next 7 days    Tylenol 500mg 2 tabs by mouth every 8 hours  Max = 8 tabs per day    Coricidin OTC as directed for runny nose and congestion      Hot liquids with natural honey for cough, congestion, and sore throat    Viral Upper Respiratory Illness (Adult)  You have a viral upper respiratory illness (URI), which is another term for the common cold. This illness is contagious during the first few days. It is spread through the air by coughing and sneezing. It may also be spread by direct contact (touching the sick person and then touching your own eyes, nose, or mouth). Frequent handwashing will decrease risk of spread. Most viral illnesses go away within 7 to 10 days with rest and simple home remedies. Sometimes the illness may last for several weeks. Antibiotics will not kill a virus, and they are generally not prescribed for this condition.    Home care  If symptoms are severe, rest at home for the first 2 to 3 days. When you resume activity, don't let yourself get too tired.  Avoid being exposed to cigarette smoke (yours or others).  You may use acetaminophen or ibuprofen to control pain and fever, unless another medicine was prescribed. (Note: If you have chronic liver or kidney disease, have ever had a stomach ulcer or gastrointestinal bleeding, or are taking blood-thinning medicines, talk with your healthcare provider before using these medicines.) Aspirin should never be given to anyone under 18 years of age who is ill with a viral infection or fever. It may cause severe liver or brain damage.  Your appetite may be poor, so a light diet is fine. Avoid dehydration by drinking 6 to 8 glasses of fluids per day (water, soft drinks, juices, tea, or soup). Extra fluids will help loosen secretions in the nose and lungs.  Over-the-counter cold medicines will not shorten the length of time youre sick, but they may be helpful for the following symptoms: cough,  sore throat, and nasal and sinus congestion. (Note: Do not use decongestants if you have high blood pressure.)  Follow-up care  Follow up with your healthcare provider, or as advised.  When to seek medical advice  Call your healthcare provider right away if any of these occur:  Cough with lots of colored sputum (mucus)  Severe headache; face, neck, or ear pain  Difficulty swallowing due to throat pain  Fever of 100.4°F (38°C)  Call 911, or get immediate medical care  Call emergency services right away if any of these occur:  Chest pain, shortness of breath, wheezing, or difficulty breathing  Coughing up blood  Inability to swallow due to throat pain  Date Last Reviewed: 9/13/2015  © 7817-4686 [employer]. [address] [place] [address], [place] [address]. All rights reserved. This information is not intended as a substitute for professional medical care. Always follow your healthcare professional's instructions.

## 2022-07-08 NOTE — PROGRESS NOTES
Subjective:       Patient ID: Poonam Alvarez is a 71 y.o. female.    Vitals:  height is 6' (1.829 m) and weight is 90.3 kg (199 lb). Her temperature is 98.4 °F (36.9 °C). Her blood pressure is 130/81 and her pulse is 65. Her respiration is 16 and oxygen saturation is 97%.     Chief Complaint: COVID-19 Concerns    Patient reports COVID exposure and cough for the last 3 days.     Cough  This is a new problem. The current episode started in the past 7 days. The cough is non-productive. Pertinent negatives include no chills, fever, myalgias or rash. Nothing aggravates the symptoms. She has tried nothing for the symptoms.       Constitution: Negative for chills and fever.   HENT: Negative for congestion.    Respiratory: Positive for cough. Negative for sputum production.    Gastrointestinal: Negative for abdominal pain, nausea, vomiting, constipation and diarrhea.   Genitourinary: Negative for dysuria, frequency and urgency.   Musculoskeletal: Negative for muscle cramps and muscle ache.   Skin: Negative for rash and erythema.       Objective:      Physical Exam   Constitutional: She is oriented to person, place, and time. She appears well-developed.   HENT:   Head: Normocephalic and atraumatic. Head is without abrasion, without contusion and without laceration.   Ears:   Right Ear: External ear normal.   Left Ear: External ear normal.   Oropharyngeal exam not performed due to risk of viral transmission during global pandemic-- risks outweigh benefits of exam          Comments: Oropharyngeal exam not performed due to risk of viral transmission during global pandemic-- risks outweigh benefits of exam      Eyes: Conjunctivae, EOM and lids are normal. Pupils are equal, round, and reactive to light.   Neck: Trachea normal and phonation normal. Neck supple.   Cardiovascular: Normal rate, regular rhythm and normal heart sounds.   Pulmonary/Chest: Effort normal and breath sounds normal. No stridor. No respiratory distress.    Musculoskeletal: Normal range of motion.         General: Normal range of motion.   Neurological: She is alert and oriented to person, place, and time.   Skin: Skin is warm, dry, intact and no rash. Capillary refill takes less than 2 seconds. No abrasion, No burn, No bruising, No erythema and No ecchymosis   Psychiatric: Her speech is normal and behavior is normal. Judgment and thought content normal.   Nursing note and vitals reviewed.        Assessment:       1. Cough    2. Upper respiratory tract infection, unspecified type          Plan:         Cough  -     POCT COVID-19 Rapid Screening    Upper respiratory tract infection, unspecified type    Other orders  -     promethazine-dextromethorphan (PROMETHAZINE-DM) 6.25-15 mg/5 mL Syrp; Take 5 mLs by mouth every 6 (six) hours as needed (cough or nausea).  Dispense: 180 mL; Refill: 0                  Patient Instructions     Zyrtec, Claritin, or Allegra OTC as directed for the next 7 days    Tylenol 500mg 2 tabs by mouth every 8 hours  Max = 8 tabs per day    Coricidin OTC as directed for runny nose and congestion      Hot liquids with natural honey for cough, congestion, and sore throat    Viral Upper Respiratory Illness (Adult)  You have a viral upper respiratory illness (URI), which is another term for the common cold. This illness is contagious during the first few days. It is spread through the air by coughing and sneezing. It may also be spread by direct contact (touching the sick person and then touching your own eyes, nose, or mouth). Frequent handwashing will decrease risk of spread. Most viral illnesses go away within 7 to 10 days with rest and simple home remedies. Sometimes the illness may last for several weeks. Antibiotics will not kill a virus, and they are generally not prescribed for this condition.    Home care  · If symptoms are severe, rest at home for the first 2 to 3 days. When you resume activity, don't let yourself get too tired.  · Avoid  being exposed to cigarette smoke (yours or others).  · You may use acetaminophen or ibuprofen to control pain and fever, unless another medicine was prescribed. (Note: If you have chronic liver or kidney disease, have ever had a stomach ulcer or gastrointestinal bleeding, or are taking blood-thinning medicines, talk with your healthcare provider before using these medicines.) Aspirin should never be given to anyone under 18 years of age who is ill with a viral infection or fever. It may cause severe liver or brain damage.  · Your appetite may be poor, so a light diet is fine. Avoid dehydration by drinking 6 to 8 glasses of fluids per day (water, soft drinks, juices, tea, or soup). Extra fluids will help loosen secretions in the nose and lungs.  · Over-the-counter cold medicines will not shorten the length of time youre sick, but they may be helpful for the following symptoms: cough, sore throat, and nasal and sinus congestion. (Note: Do not use decongestants if you have high blood pressure.)  Follow-up care  Follow up with your healthcare provider, or as advised.  When to seek medical advice  Call your healthcare provider right away if any of these occur:  · Cough with lots of colored sputum (mucus)  · Severe headache; face, neck, or ear pain  · Difficulty swallowing due to throat pain  · Fever of 100.4°F (38°C)  Call 911, or get immediate medical care  Call emergency services right away if any of these occur:  · Chest pain, shortness of breath, wheezing, or difficulty breathing  · Coughing up blood  · Inability to swallow due to throat pain  Date Last Reviewed: 9/13/2015 © 2000-2017 [employer]. [address] [place] [address], [place] [address]. All rights reserved. This information is not intended as a substitute for professional medical care. Always follow your healthcare professional's instructions.

## 2022-08-07 NOTE — PROGRESS NOTES
HPI     DLS: 2/24/2022    Pt here for 6 Month Check;  PT states no eye pain some times her OD would itch.    Meds;  No GTTS        Last edited by Kathy Gómez on 8/12/2022  9:23 AM. (History)              Assessment /Plan     For exam results, see Encounter Report.    Posterior capsular opacification of left eye, obscuring vision    PCO (posterior capsular opacification), bilateral    Epiretinal membrane (ERM) of left eye    Pseudophakia of both eyes          Pt referred back by Frances for consideraation of yag cap - he wrote od // but it is the left eye with a mild decrease in vision - suspect he meant left     VA 20/20 od // 20/40 os (without correction)  Minimal PCO on slit lamp exam ou  OCT macula - nl ou     PC IOL OD - [facility] 7/11/2012  PC IOL OS - Lopez - 2004     H/O graves disease    Use to see Dr Lopez - stable x years      Hold off on yag cap os for now - minimal PCO - would be difficult to focus laser      F/U 6 months with Dr Daniels - old pt of his - he can re-consult prn if the PCO worsens and needs a yag

## 2022-08-12 ENCOUNTER — OFFICE VISIT (OUTPATIENT)
Dept: OPHTHALMOLOGY | Facility: CLINIC | Age: 72
End: 2022-08-12
Payer: MEDICARE

## 2022-08-12 DIAGNOSIS — Z96.1 PSEUDOPHAKIA OF BOTH EYES: ICD-10-CM

## 2022-08-12 DIAGNOSIS — H26.492 POSTERIOR CAPSULAR OPACIFICATION OF LEFT EYE, OBSCURING VISION: Primary | ICD-10-CM

## 2022-08-12 DIAGNOSIS — H35.372 EPIRETINAL MEMBRANE (ERM) OF LEFT EYE: ICD-10-CM

## 2022-08-12 DIAGNOSIS — H26.493 PCO (POSTERIOR CAPSULAR OPACIFICATION), BILATERAL: ICD-10-CM

## 2022-08-12 PROCEDURE — 92014 PR EYE EXAM, EST PATIENT,COMPREHESV: ICD-10-PCS | Mod: S$PBB,,, | Performed by: OPHTHALMOLOGY

## 2022-08-12 PROCEDURE — 99999 PR PBB SHADOW E&M-EST. PATIENT-LVL III: CPT | Mod: PBBFAC,,, | Performed by: OPHTHALMOLOGY

## 2022-08-12 PROCEDURE — 99999 PR PBB SHADOW E&M-EST. PATIENT-LVL III: ICD-10-PCS | Mod: PBBFAC,,, | Performed by: OPHTHALMOLOGY

## 2022-08-12 PROCEDURE — 92014 COMPRE OPH EXAM EST PT 1/>: CPT | Mod: S$PBB,,, | Performed by: OPHTHALMOLOGY

## 2022-08-12 PROCEDURE — 99213 OFFICE O/P EST LOW 20 MIN: CPT | Mod: PBBFAC | Performed by: OPHTHALMOLOGY

## 2022-08-12 NOTE — Clinical Note
Old pt of yours - can you see her in about 6 months - she is stable - we are monitoring for PCO - mild so far

## 2022-08-15 ENCOUNTER — TELEPHONE (OUTPATIENT)
Dept: SPORTS MEDICINE | Facility: CLINIC | Age: 72
End: 2022-08-15
Payer: MEDICARE

## 2022-08-15 NOTE — TELEPHONE ENCOUNTER
Return patient's call. Could not LVM.   ----- Message from Jemima Mccall sent at 8/15/2022  5:02 PM CDT -----  Regarding: pt Advice  Contact: pt @ [phone]  Pt is requesting a call back regarding information about Dr. Xie and if he does knee surgery. Please call to discuss further

## 2022-08-16 ENCOUNTER — HOSPITAL ENCOUNTER (OUTPATIENT)
Dept: RADIOLOGY | Facility: HOSPITAL | Age: 72
Discharge: HOME | End: 2022-08-16
Attending: INTERNAL MEDICINE
Payer: MEDICARE

## 2022-08-16 ENCOUNTER — TELEPHONE (OUTPATIENT)
Dept: INTERNAL MEDICINE | Facility: CLINIC | Age: 72
End: 2022-08-16
Payer: MEDICARE

## 2022-08-16 DIAGNOSIS — M79.604 PAIN OF RIGHT LOWER EXTREMITY: ICD-10-CM

## 2022-08-16 DIAGNOSIS — M79.604 PAIN OF RIGHT LOWER EXTREMITY: Primary | ICD-10-CM

## 2022-08-16 PROCEDURE — 73590 XR TIBIA FIBULA 2 VIEW RIGHT: ICD-10-PCS | Mod: 26,RT,, | Performed by: RADIOLOGY

## 2022-08-16 PROCEDURE — 73590 X-RAY EXAM OF LOWER LEG: CPT | Mod: TC,RT

## 2022-08-16 PROCEDURE — 73590 X-RAY EXAM OF LOWER LEG: CPT | Mod: 26,RT,, | Performed by: RADIOLOGY

## 2022-08-16 RX ORDER — DOXYCYCLINE 100 MG/1
100 CAPSULE ORAL EVERY 12 HOURS
Qty: 20 CAPSULE | Refills: 0 | Status: SHIPPED | OUTPATIENT
Start: 2022-08-16 | End: 2022-08-26

## 2022-08-16 RX ORDER — DOXYCYCLINE HYCLATE 100 MG
100 TABLET ORAL 2 TIMES DAILY
Qty: 20 TABLET | Refills: 0 | Status: SHIPPED | OUTPATIENT
Start: 2022-08-16 | End: 2022-08-16

## 2022-08-16 NOTE — TELEPHONE ENCOUNTER
----- Message from Bonnie Eaton sent at 8/16/2022  7:58 AM CDT -----  Contact: [phone]  Patient is calling for Medical Advice regarding: Patient was seen 7/6/22 and she would like a call back to discuss the spot on her right side of her leg area, she would like an x ray of it, please call to discuss this matter.

## 2022-08-16 NOTE — TELEPHONE ENCOUNTER
Lets do an xray of the right lower leg  I want her to start on some antibiotics - to see if it helps the area - I sent a prescription of doxycycline 100 mg twice daily for 10 days to [facility] on [address] and [address]. If no improvement after antibiotics, then will do an ultrasound of the areaw

## 2022-08-16 NOTE — TELEPHONE ENCOUNTER
Pt leg is swollen and warm to touch around ankle area. Pt states that she showed PCP this area at last visit however now she is requesting an xray or mri of her lower leg. Pt states that PCP examined the area at last visit.

## 2022-08-30 DIAGNOSIS — R52 PAIN: Primary | ICD-10-CM

## 2022-08-31 ENCOUNTER — OFFICE VISIT (OUTPATIENT)
Dept: ORTHOPEDICS | Facility: CLINIC | Age: 72
End: 2022-08-31
Payer: MEDICARE

## 2022-08-31 ENCOUNTER — HOSPITAL ENCOUNTER (OUTPATIENT)
Dept: RADIOLOGY | Facility: HOSPITAL | Age: 72
Discharge: HOME | End: 2022-08-31
Attending: ORTHOPAEDIC SURGERY
Payer: MEDICARE

## 2022-08-31 VITALS — BODY MASS INDEX: 26.96 KG/M2 | HEIGHT: 72 IN | WEIGHT: 199.06 LBS

## 2022-08-31 DIAGNOSIS — M17.12 PRIMARY OSTEOARTHRITIS OF LEFT KNEE: Primary | ICD-10-CM

## 2022-08-31 DIAGNOSIS — R52 PAIN: ICD-10-CM

## 2022-08-31 PROCEDURE — 73560 X-RAY EXAM OF KNEE 1 OR 2: CPT | Mod: 59,TC,RT

## 2022-08-31 PROCEDURE — 73562 XR KNEE ORTHO LEFT: ICD-10-PCS | Mod: 26,LT,, | Performed by: RADIOLOGY

## 2022-08-31 PROCEDURE — 73562 X-RAY EXAM OF KNEE 3: CPT | Mod: 26,LT,, | Performed by: RADIOLOGY

## 2022-08-31 PROCEDURE — 99999 PR PBB SHADOW E&M-EST. PATIENT-LVL III: CPT | Mod: PBBFAC,,, | Performed by: ORTHOPAEDIC SURGERY

## 2022-08-31 PROCEDURE — 99999 PR PBB SHADOW E&M-EST. PATIENT-LVL III: ICD-10-PCS | Mod: PBBFAC,,, | Performed by: ORTHOPAEDIC SURGERY

## 2022-08-31 PROCEDURE — 99213 OFFICE O/P EST LOW 20 MIN: CPT | Mod: PBBFAC | Performed by: ORTHOPAEDIC SURGERY

## 2022-08-31 PROCEDURE — 73560 X-RAY EXAM OF KNEE 1 OR 2: CPT | Mod: 26,RT,, | Performed by: RADIOLOGY

## 2022-08-31 PROCEDURE — 73560 XR KNEE ORTHO LEFT: ICD-10-PCS | Mod: 26,RT,, | Performed by: RADIOLOGY

## 2022-08-31 PROCEDURE — 99214 OFFICE O/P EST MOD 30 MIN: CPT | Mod: S$PBB,,, | Performed by: ORTHOPAEDIC SURGERY

## 2022-08-31 PROCEDURE — 99214 PR OFFICE/OUTPT VISIT, EST, LEVL IV, 30-39 MIN: ICD-10-PCS | Mod: S$PBB,,, | Performed by: ORTHOPAEDIC SURGERY

## 2022-08-31 NOTE — PROGRESS NOTES
Subjective:      Patient ID: Poonam Alvarez is a 71 y.o. female.    Chief Complaint: Pain of the Left Knee    HPI  Poonam Alvarez has left knee pain. The symptoms have worsened to the point where it is interfering with her activities of daily living.  She has difficulty with stairs, getting dressed and getting in an out of a car.  . The pain is located in the global aspect of the knee.  There  is not radiation. There is associated stiffness.   There is not catching and locking. The pain is described as achy. The pain is aggravated by standing, walking, and sitting.  It is alleviated by nothing consistently.  Her history, medications and problem list were reviewed.  Past Medical History:   Diagnosis Date    AR (allergic rhinitis) 12/7/2012    Atrial flutter     ablation October 2008    Cataract     Generalized headaches     GERD (gastroesophageal reflux disease) 03/08/2013    resolved    Grave's disease     s/p radioactive iodine, now hypothyroidism    Keloid cicatrix     Obesity     Osteoarthritis     shoulders, hands, knees    Positive PPD, treated     9 months of INH, completed 1/2010     Current Outpatient Medications on File Prior to Visit   Medication Sig Dispense Refill    aspirin (ECOTRIN) 81 MG EC tablet Take 81 mg by mouth once daily.      b complex vitamins capsule Take 1 capsule by mouth once daily.      blood pressure monitor (BLOOD PRESSURE KIT) Kit Use as directed 1 each 0    cholecalciferol, vitamin D3, (VITAMIN D3) 50 mcg (2,000 unit) Cap Take 1 capsule by mouth once daily.      hydroCHLOROthiazide (HYDRODIURIL) 12.5 MG Tab TAKE 1/2 TO 1 TABLET BY MOUTH DAILY AS NEEDED 90 tablet 3    magnesium citrate 100 mg Cap Take by mouth.      meloxicam (MOBIC) 15 MG tablet Take 1 tablet (15 mg total) by mouth once daily. 30 tablet 2    multivitamin-minerals-lutein Tab Take 1 tablet by mouth once daily.       promethazine-dextromethorphan (PROMETHAZINE-DM) 6.25-15 mg/5 mL Syrp Take 2.5 mLs by mouth nightly  as needed (cough supression). 120 mL 0    SYNTHROID 137 mcg Tab tablet TAKE 1 TABLET(137 MCG) BY MOUTH EVERY DAY 90 tablet 4    UBIDECARENONE (COENZYME Q10) 100 mg Tab Take 1 tablet by mouth once daily.      famotidine (PEPCID) 20 MG tablet Take 1 tablet (20 mg total) by mouth 2 (two) times daily. (Patient not taking: Reported on 2/2/2022) 60 tablet 0     No current facility-administered medications on file prior to visit.       Review of Systems   Constitutional: Negative for chills, fever and night sweats.   HENT:  Negative for hearing loss.    Eyes:  Negative for blurred vision and double vision.   Cardiovascular:  Negative for chest pain, claudication and leg swelling.   Respiratory:  Negative for shortness of breath.    Endocrine: Negative for polydipsia, polyphagia and polyuria.   Hematologic/Lymphatic: Negative for adenopathy and bleeding problem. Does not bruise/bleed easily.   Skin:  Negative for poor wound healing.   Musculoskeletal:  Positive for joint pain.   Gastrointestinal:  Negative for diarrhea and heartburn.   Genitourinary:  Negative for bladder incontinence.   Neurological:  Negative for focal weakness, headaches, numbness, paresthesias and sensory change.   Psychiatric/Behavioral:  The patient is not nervous/anxious.    Allergic/Immunologic: Negative for persistent infections.       Objective:      Body mass index is 27 kg/m².  Vitals:    08/31/22 1333   Weight: 90.3 kg (199 lb 1.2 oz)   Height: 6' (1.829 m)           General    Constitutional: She is oriented to person, place, and time. She appears well-developed and well-nourished.   HENT:   Head: Normocephalic and atraumatic.   Eyes: EOM are normal.   Cardiovascular:  Normal rate.            Pulmonary/Chest: Effort normal.   Neurological: She is alert and oriented to person, place, and time.   Psychiatric: She has a normal mood and affect. Her behavior is normal.     General Musculoskeletal Exam   Gait: abnormal and antalgic       Right Knee  Exam     Inspection   Erythema: absent  Scars: present  Swelling: absent  Effusion: absent  Deformity: absent  Bruising: absent    Tenderness   The patient is experiencing no tenderness.     Range of Motion   Extension:  0   Flexion:  130     Tests   Ligament Examination   Lachman: normal (-1 to 2mm)   MCL - Valgus: normal (0 to 2mm)  LCL - Varus: normal  Patella   Passive Patellar Tilt: neutral    Other   Sensation: normal    Left Knee Exam     Inspection   Erythema: absent  Scars: absent  Swelling: present  Effusion: absent  Deformity: present (valgus)  Bruising: absent    Tenderness   The patient tender to palpation of the medial joint line and lateral joint line.    Crepitus   The patient has crepitus of the patella.    Range of Motion   Extension:  0   Flexion:  110     Tests   Stability   Lachman: normal (-1 to 2mm)   MCL - Valgus: normal (0 to 2mm)  LCL - Varus: normal (0 to 2mm)  Patella   Passive Patellar Tilt: neutral    Other   Sensation: normal    Muscle Strength   Right Lower Extremity   Hip Abduction: 5/5   Quadriceps:  5/5   Hamstrin/5   Left Lower Extremity   Hip Abduction: 5/5   Quadriceps:  5/5   Hamstrin/5     Reflexes     Right Side   Quadriceps:  2+    Vascular Exam     Right Pulses  Dorsalis Pedis:      2+          Left Pulses  Dorsalis Pedis:      2+          Edema  Right Lower Leg: absent  Left Lower Leg: absent    Radiographs taken today and reviewed by me demonstrate severe arthritic change of the left KNEE(s).There  is not bone destruction.  There is not a fracture. The lateral compartment is most involved.  There is a valgus deformity.  The changes are tricompartmental.          Assessment:       Encounter Diagnosis   Name Primary?    Primary osteoarthritis of left knee Yes          Plan:       Poonam was seen today for pain.    Diagnoses and all orders for this visit:    Primary osteoarthritis of left knee  I discussed the [employer] recall with the patient.  She is aware of the  reasons behind the recall and the possible risks associated with it.     Treatment options were discussed. The surgical process of left knee replacement was discussed in detail with the patient including a detailed discussion of the procedure itself (including visual model, x-ray review, and literature review). We discussed options including implant type and why I believe the implant selected is a good match for the patient's needs. The patient expressed understanding and agrees to proceed with the planned surgeryThe typical perioperative and post-operative course was discussed and perioperative risks were discussed to the patient's satisfaction.  Risks and complications discussed included but were not limited to the risks of anesthetic complications, infection, bleeding, wound healing complications, stiffness, aseptic loosening, instability, limb length inequality, neurologic dysfunction including numbness and weakness, additional surgery,  DVT, pulmonary embolism, perioperative medical risks (cardiac, pulmonary, renal, neurologic), and death and the patient elects to proceed.  The patient should get medically cleared and attend the joint seminar.    ASA for DVT prophylaxis    Iovera    23 hour stay

## 2022-10-06 ENCOUNTER — PATIENT MESSAGE (OUTPATIENT)
Dept: ORTHOPEDICS | Facility: CLINIC | Age: 72
End: 2022-10-06
Payer: MEDICARE

## 2022-10-06 DIAGNOSIS — M17.12 PRIMARY OSTEOARTHRITIS OF LEFT KNEE: Primary | ICD-10-CM

## 2022-10-06 DIAGNOSIS — G89.29 CHRONIC PAIN OF LEFT KNEE: ICD-10-CM

## 2022-10-06 DIAGNOSIS — M25.562 CHRONIC PAIN OF LEFT KNEE: ICD-10-CM

## 2022-10-07 ENCOUNTER — PATIENT MESSAGE (OUTPATIENT)
Dept: ADMINISTRATIVE | Facility: OTHER | Age: 72
End: 2022-10-07
Payer: MEDICARE

## 2022-10-07 ENCOUNTER — PATIENT MESSAGE (OUTPATIENT)
Dept: SPORTS MEDICINE | Facility: CLINIC | Age: 72
End: 2022-10-07
Payer: MEDICARE

## 2022-10-07 ENCOUNTER — TELEPHONE (OUTPATIENT)
Dept: SPORTS MEDICINE | Facility: CLINIC | Age: 72
End: 2022-10-07
Payer: MEDICARE

## 2022-10-07 NOTE — TELEPHONE ENCOUNTER
Called and left voicemail om home phone, cell does not have voicemail set up. Message was left for patient to contact Dr. Acosta's office in regards to referral sent from Dr. Schneider for [facility].

## 2022-10-09 ENCOUNTER — PATIENT MESSAGE (OUTPATIENT)
Dept: ADMINISTRATIVE | Facility: OTHER | Age: 72
End: 2022-10-09
Payer: MEDICARE

## 2022-10-15 ENCOUNTER — PES CALL (OUTPATIENT)
Dept: ADMINISTRATIVE | Facility: CLINIC | Age: 72
End: 2022-10-15
Payer: MEDICARE

## 2022-11-04 ENCOUNTER — TELEPHONE (OUTPATIENT)
Dept: ORTHOPEDICS | Facility: CLINIC | Age: 72
End: 2022-11-04
Payer: MEDICARE

## 2022-11-04 NOTE — TELEPHONE ENCOUNTER
Staff called pt and gave her the fmla department number to contact them to get her paperwork taken care of pt verbalized understanding and had no further questions or concerns for staff        ----- Message from Lexie Mcmanus NP sent at 11/4/2022  2:06 PM CDT -----  Regarding: FW: Patient Call Back  Contact: Pt  This is [facility] patient, please contact this patient and give her the info to send in her short term/FMLA paperwork and make sure that she knows not to send to us since we don't complete it.  Thanks   Lexie  ----- Message -----  From: Bridget Escalona, Patient Care Assistant  Sent: 11/4/2022   1:47 PM CDT  To: [facility] Staff  Subject: Patient Call Back                                Pt is requesting a call back in regards to getting documentation completed for FMLA. Pt states she would like to know when she can get this paperwork into the office and she would like to pick it up within that same week. Pt is requesting to speak with staff in regards to this matter.      Pt @ [phone]

## 2022-11-07 ENCOUNTER — PATIENT MESSAGE (OUTPATIENT)
Dept: ADMINISTRATIVE | Facility: OTHER | Age: 72
End: 2022-11-07
Payer: MEDICARE

## 2022-11-15 ENCOUNTER — OFFICE VISIT (OUTPATIENT)
Dept: URGENT CARE | Facility: CLINIC | Age: 72
End: 2022-11-15
Payer: MEDICARE

## 2022-11-15 VITALS
HEIGHT: 72 IN | HEART RATE: 76 BPM | SYSTOLIC BLOOD PRESSURE: 132 MMHG | WEIGHT: 214 LBS | DIASTOLIC BLOOD PRESSURE: 74 MMHG | OXYGEN SATURATION: 97 % | BODY MASS INDEX: 28.99 KG/M2 | RESPIRATION RATE: 16 BRPM

## 2022-11-15 DIAGNOSIS — J98.4 PNEUMONITIS: ICD-10-CM

## 2022-11-15 DIAGNOSIS — R05.9 COUGH, UNSPECIFIED TYPE: Primary | ICD-10-CM

## 2022-11-15 LAB
CTP QC/QA: YES
CTP QC/QA: YES
POC MOLECULAR INFLUENZA A AGN: NEGATIVE
POC MOLECULAR INFLUENZA B AGN: NEGATIVE
SARS-COV-2 AG RESP QL IA.RAPID: NEGATIVE

## 2022-11-15 PROCEDURE — 99214 OFFICE O/P EST MOD 30 MIN: CPT | Mod: S$GLB,,, | Performed by: FAMILY MEDICINE

## 2022-11-15 PROCEDURE — 87811 SARS CORONAVIRUS 2 ANTIGEN POCT, MANUAL READ: ICD-10-PCS | Mod: QW,CR,S$GLB, | Performed by: FAMILY MEDICINE

## 2022-11-15 PROCEDURE — 87502 INFLUENZA DNA AMP PROBE: CPT | Mod: QW,S$GLB,, | Performed by: FAMILY MEDICINE

## 2022-11-15 PROCEDURE — 87811 SARS-COV-2 COVID19 W/OPTIC: CPT | Mod: QW,CR,S$GLB, | Performed by: FAMILY MEDICINE

## 2022-11-15 PROCEDURE — 87502 POCT INFLUENZA A/B MOLECULAR: ICD-10-PCS | Mod: QW,S$GLB,, | Performed by: FAMILY MEDICINE

## 2022-11-15 PROCEDURE — 99214 PR OFFICE/OUTPT VISIT, EST, LEVL IV, 30-39 MIN: ICD-10-PCS | Mod: S$GLB,,, | Performed by: FAMILY MEDICINE

## 2022-11-15 RX ORDER — DOXYCYCLINE HYCLATE 100 MG
100 TABLET ORAL 2 TIMES DAILY
Qty: 20 TABLET | Refills: 0 | Status: SHIPPED | OUTPATIENT
Start: 2022-11-15 | End: 2022-12-02

## 2022-11-15 RX ORDER — BENZONATATE 200 MG/1
200 CAPSULE ORAL 3 TIMES DAILY PRN
Qty: 20 CAPSULE | Refills: 0 | Status: SHIPPED | OUTPATIENT
Start: 2022-11-15 | End: 2022-11-25

## 2022-11-15 NOTE — PROGRESS NOTES
Subjective:       Patient ID: Poonam Alvarez is a 71 y.o. female.    Vitals:  vitals were not taken for this visit.     Chief Complaint: No chief complaint on file.    This is a 71 y.o. female who presents today with a chief complaint of cough. Chest tightness from coughing. Symptoms started last night.       Cough  This is a new problem. The current episode started yesterday. The problem has been unchanged. The cough is Non-productive. Pertinent negatives include no sore throat. Nothing aggravates the symptoms. She has tried nothing for the symptoms. The treatment provided no relief.     HENT:  Negative for sore throat.    Respiratory:  Positive for cough.      Objective:      Physical Exam   Constitutional: She is oriented to person, place, and time. She does not appear ill. No distress. normal  HENT:   Head: Normocephalic and atraumatic.   Ears:   Right Ear: Tympanic membrane, external ear and ear canal normal.   Left Ear: Tympanic membrane, external ear and ear canal normal.   Nose: Nose normal. No rhinorrhea or congestion.   Mouth/Throat: Mucous membranes are moist. No oropharyngeal exudate or posterior oropharyngeal erythema. Oropharynx is clear.   Eyes: Conjunctivae are normal. Pupils are equal, round, and reactive to light. Extraocular movement intact   Neck: Neck supple. No neck rigidity present.   Cardiovascular: Normal rate, regular rhythm, normal heart sounds and normal pulses.   No murmur heard.Exam reveals no gallop.   Pulmonary/Chest: Effort normal and breath sounds normal. No stridor. No respiratory distress. She has no wheezes. She has no rhonchi.   Abdominal: Normal appearance and bowel sounds are normal. She exhibits no distension. Soft. flat abdomen There is no abdominal tenderness.   Musculoskeletal: Normal range of motion.         General: No swelling or tenderness. Normal range of motion.      Cervical back: She exhibits no tenderness.   Neurological: no focal deficit. She is alert, oriented  to person, place, and time and at baseline.   Skin: Skin is warm and dry. Capillary refill takes less than 2 seconds. jaundice  Psychiatric: Her behavior is normal. Mood, judgment and thought content normal.       Assessment:Plan:     1. Cough, unspecified type  - SARS Coronavirus 2 Antigen, POCT Manual Read  - POCT Influenza A/B MOLECULAR  - benzonatate (TESSALON) 200 MG capsule; Take 1 capsule (200 mg total) by mouth 3 (three) times daily as needed for Cough.  Dispense: 20 capsule; Refill: 0    2. Pneumonitis  - doxycycline (VIBRA-TABS) 100 MG tablet; Take 1 tablet (100 mg total) by mouth 2 (two) times daily.  Dispense: 20 tablet; Refill: 0     All labs discussed with patient.  She will return for flu shot when she is well.

## 2022-11-22 ENCOUNTER — TELEPHONE (OUTPATIENT)
Dept: PREADMISSION TESTING | Facility: HOSPITAL | Age: 72
End: 2022-11-22
Payer: MEDICARE

## 2022-11-22 DIAGNOSIS — Z01.818 PRE-OP TESTING: Primary | ICD-10-CM

## 2022-11-22 DIAGNOSIS — M79.609 PAIN IN EXTREMITY, UNSPECIFIED EXTREMITY: ICD-10-CM

## 2022-11-22 DIAGNOSIS — E03.9 HYPOTHYROIDISM, UNSPECIFIED TYPE: ICD-10-CM

## 2022-11-22 RX ORDER — ADHESIVE BANDAGE
30 BANDAGE TOPICAL DAILY PRN
COMMUNITY
End: 2024-01-17

## 2022-11-22 NOTE — ANESTHESIA PAT ROS NOTE
11/22/2022  Poonam Alvarez is a 71 y.o., female.      Pre-op Assessment          Review of Systems  Anesthesia Hx:  No problems with previous Anesthesia   History of prior surgery of interest to airway management or planning:  Previous anesthesia: General 04/01/2019 ARTHROPLASTY, SHOULDER (Right: Shoulder) with general anesthesia.  Procedure performed at an [facility].      Airway issues documented on chart review include videolaryngoscope used     Denies Family Hx of Anesthesia complications.    Denies Personal Hx of Anesthesia complications.                    Social:  Non-Smoker, Social Alcohol Use       Hematology/Oncology:    Oncology Normal    -- Anemia:                                  EENT/Dental:   Eyes: Visual Impairment   Has Bilateral and S/P Extraction - Bilateral Catarract                  Cardiovascular:     Hypertension       Denies Angina.         HX AFLUTTER TREATED W ABLATION 2008     Functional Capacity good / => 4 METS                         Pulmonary:       Denies Shortness of breath. Recent URI  RECENT URI TREATED W DOXYCYCLINE--RESOLVED    HX POSITIVE PPD S/P INH COMPLETED (2010)               Renal/:  Renal/ Normal                 Hepatic/GI:     GERD   ACID REFLUX RESOLVED          Musculoskeletal:     Joint Disease:  Arthritis, Osteoarthritis PRIMARY OSTEOARTHRITIS LEFT KNEE    R TKA 2014    R TSA 2019         Neurological:  Neurology Normal          HX OF HEADACHES--RESOLVED    Osteoarthritis                           Endocrine:   Hypothyroidism  HX GRAVES DISEASE S/P RADIOACTIVE IODINE        Dermatological:  Skin Normal    Psych:  Psychiatric Normal                  Physical Exam  General: Well nourished, Cooperative, Alert and Oriented    Airway:  Mouth Opening: Normal  Tongue: Normal  Neck ROM: Normal ROM    Dental:  Intact        Anesthesia Assessment:  Preoperative EQUATION    Planned Procedure: Procedure(s) (LRB):  ARTHROPLASTY, KNEE, TOTAL (Left)  Requested Anesthesia Type:Regional  Surgeon: Gordon Schneider MD  Service: Orthopedics  Known or anticipated Date of Surgery:12/13/2022    Surgeon notes: reviewed    Electronic QUestionnaire Assessment completed via nurse interview with patient.        Triage considerations:     The patient has no apparent active cardiac condition (No unstable coronary Syndrome such as severe unstable angina or recent [<1 month] myocardial infarction, decompensated CHF, severe valvular   disease or significant arrhythmia)    Previous anesthesia records:GETA and No problems  4/1/19   ARTHROPLASTY, SHOULDER (Right: Shoulder)   Airway/Jaw/Neck:  Airway Findings: Mouth Opening: Normal Tongue: Normal  General Airway Assessment: Adult  Mallampati: I  TM Distance: Normal, at least 6 cm  Jaw/Neck Findings:  Neck ROM: Normal ROM     Method of Intubation: Gerbre Inserted by: CRNA Airway Device: Endotracheal Tube Mask Ventilation: Easy Intubated: Postinduction Blade: Dylan #3 Airway Device Size: 7.5 Style: Cuffed Cuff Inflation: Minimal occlusive pressure Placement Verified By: Auscultation;Capnometry Grade: Grade I Complicating Factors: None Findings Post-Intubation: Positive EtCO2;Bilateral breath sounds;Atraumatic/Condition of teeth unchanged Depth of Insertion (cm): 21 Secured at: Lips Complications: None Breath Sounds: Equal Bilateral Insertion attempts (enter comment if more than 2 attempts): 1      Last PCP note: 3-6 months ago , within [facility] , Dr. Sara Fernandez  Subspecialty notes: Ortho    Other important co-morbidities: GERD, HTN, Hypothyroid, and Graves Disease (s/p radioactive iodine), Positive PPD (tx x 9 mos INH, complete in 2010), h/o Afib (ablation 2008), RTKA 2014 Dr. Schneider       Tests already available:  Available tests,  within [facility] .   7/6/22 TSH, T4, CBC, CMP, A1C, Vit D, Vit B12, Ferritin  2/11/22  EKG  12/8/20 Lumbar XR  10/18/19 Stress Echo  1/12/18 Cardiac Scoring  12/17/19 Chest XR            Instructions given. (See in Nurse's note)    Optimization:  Anesthesia Preop Clinic Assessment  Indicated Will Schedule POC    Medical Opinion Indicated       Sub-specialist consult indicated:   [facility] Dr. Albrecht      Plan:    Testing:  BMP, EKG, Hematology Profile, PT/INR, and TSH   Pre-anesthesia  visit       Visit focus: possible regional anesthesia and/or nerve block      Consultation:IM Perioperative Hospitalist     Patient  has previously scheduled Medical Appointment: 11/30    Navigation: Tests Scheduled.              Consults scheduled.             Results will be tracked by Preop Clinic.          11/30/2022:   Patient is optimized     11/30/2022- 19 15      Examined with a female medical student     Clinically she did not have any hypotension     TSH is mildly low but closer to normal with a normal free T4   She does not have any overt suggestions of hyperthyroidism  Lab tests from today are acceptable for surgery     Lower extremity venous ultrasound scan showed-No evidence of deep venous thrombosis in either lower extremity     EKG from today personally reviewed reportedly showed  Normal sinus rhythm   Normal ECG   When compared with ECG of 11-FEB-2022 12:27,   Premature atrial complexes are no longer Present     Pre op work up acceptable for surgery      Watch BP     Had ? Mosquito bite Rt lower leg early part of the year - no acute changes      Checked for over-the-counter medication    Jannette Albrecht MD  Internal Medicine  [facility]   Cell Phone- [phone]

## 2022-11-22 NOTE — TELEPHONE ENCOUNTER
----- Message from Kelli Florian RN sent at 11/22/2022 10:55 AM CST -----  (12/13) Please schedule EKG and labs (TSH, CBC, BMP, PT/INR).  Thanks,  Kelli

## 2022-11-23 ENCOUNTER — PATIENT MESSAGE (OUTPATIENT)
Dept: ADMINISTRATIVE | Facility: OTHER | Age: 72
End: 2022-11-23
Payer: MEDICARE

## 2022-11-25 ENCOUNTER — HOSPITAL ENCOUNTER (OUTPATIENT)
Dept: RADIOLOGY | Facility: HOSPITAL | Age: 72
Discharge: HOME | End: 2022-11-25
Attending: INTERNAL MEDICINE
Payer: MEDICARE

## 2022-11-25 VITALS — HEIGHT: 72 IN | BODY MASS INDEX: 28.44 KG/M2 | WEIGHT: 210 LBS

## 2022-11-25 DIAGNOSIS — Z12.31 SCREENING MAMMOGRAM FOR BREAST CANCER: ICD-10-CM

## 2022-11-25 PROCEDURE — 77063 BREAST TOMOSYNTHESIS BI: CPT | Mod: 26,,, | Performed by: RADIOLOGY

## 2022-11-25 PROCEDURE — 77067 SCR MAMMO BI INCL CAD: CPT | Mod: 26,,, | Performed by: RADIOLOGY

## 2022-11-25 PROCEDURE — 77063 MAMMO DIGITAL SCREENING BILAT WITH TOMO: ICD-10-PCS | Mod: 26,,, | Performed by: RADIOLOGY

## 2022-11-25 PROCEDURE — 77067 SCR MAMMO BI INCL CAD: CPT | Mod: TC

## 2022-11-25 PROCEDURE — 77067 MAMMO DIGITAL SCREENING BILAT WITH TOMO: ICD-10-PCS | Mod: 26,,, | Performed by: RADIOLOGY

## 2022-11-25 PROCEDURE — 77063 BREAST TOMOSYNTHESIS BI: CPT | Mod: TC

## 2022-11-28 ENCOUNTER — PATIENT MESSAGE (OUTPATIENT)
Dept: ADMINISTRATIVE | Facility: OTHER | Age: 72
End: 2022-11-28
Payer: MEDICARE

## 2022-11-29 ENCOUNTER — PATIENT MESSAGE (OUTPATIENT)
Dept: INTERNAL MEDICINE | Facility: CLINIC | Age: 72
End: 2022-11-29
Payer: MEDICARE

## 2022-11-30 ENCOUNTER — PATIENT MESSAGE (OUTPATIENT)
Dept: ADMINISTRATIVE | Facility: OTHER | Age: 72
End: 2022-11-30
Payer: MEDICARE

## 2022-11-30 ENCOUNTER — HOSPITAL ENCOUNTER (OUTPATIENT)
Dept: CARDIOLOGY | Facility: CLINIC | Age: 72
Discharge: HOME | End: 2022-11-30
Payer: MEDICARE

## 2022-11-30 ENCOUNTER — OFFICE VISIT (OUTPATIENT)
Dept: ORTHOPEDICS | Facility: CLINIC | Age: 72
End: 2022-11-30
Payer: MEDICARE

## 2022-11-30 ENCOUNTER — HOSPITAL ENCOUNTER (OUTPATIENT)
Dept: RADIOLOGY | Facility: HOSPITAL | Age: 72
Discharge: HOME | End: 2022-11-30
Attending: NURSE PRACTITIONER | Admitting: ORTHOPAEDIC SURGERY
Payer: MEDICARE

## 2022-11-30 ENCOUNTER — HOSPITAL ENCOUNTER (OUTPATIENT)
Dept: PREADMISSION TESTING | Facility: HOSPITAL | Age: 72
Discharge: HOME | End: 2022-11-30
Attending: ORTHOPAEDIC SURGERY | Admitting: ORTHOPAEDIC SURGERY
Payer: MEDICARE

## 2022-11-30 ENCOUNTER — HOSPITAL ENCOUNTER (OUTPATIENT)
Dept: RADIOLOGY | Facility: OTHER | Age: 72
Discharge: HOME | End: 2022-11-30
Attending: HOSPITALIST | Admitting: ORTHOPAEDIC SURGERY
Payer: MEDICARE

## 2022-11-30 ENCOUNTER — OFFICE VISIT (OUTPATIENT)
Dept: INTERNAL MEDICINE | Facility: CLINIC | Age: 72
End: 2022-11-30
Payer: MEDICARE

## 2022-11-30 VITALS — OXYGEN SATURATION: 98 % | HEIGHT: 72 IN | BODY MASS INDEX: 28.48 KG/M2 | TEMPERATURE: 99 F | HEART RATE: 78 BPM

## 2022-11-30 VITALS — HEIGHT: 72 IN | WEIGHT: 210 LBS | BODY MASS INDEX: 28.44 KG/M2

## 2022-11-30 DIAGNOSIS — K21.9 GASTROESOPHAGEAL REFLUX DISEASE, UNSPECIFIED WHETHER ESOPHAGITIS PRESENT: ICD-10-CM

## 2022-11-30 DIAGNOSIS — R63.5 WEIGHT GAIN: ICD-10-CM

## 2022-11-30 DIAGNOSIS — L91.0 KELOID: ICD-10-CM

## 2022-11-30 DIAGNOSIS — M17.12 PRIMARY OSTEOARTHRITIS OF LEFT KNEE: Primary | ICD-10-CM

## 2022-11-30 DIAGNOSIS — I10 ESSENTIAL HYPERTENSION: ICD-10-CM

## 2022-11-30 DIAGNOSIS — I95.9 HYPOTENSION, UNSPECIFIED HYPOTENSION TYPE: ICD-10-CM

## 2022-11-30 DIAGNOSIS — Z86.79 HISTORY OF ATRIAL FLUTTER: ICD-10-CM

## 2022-11-30 DIAGNOSIS — Z01.818 PREOP EXAMINATION: Primary | ICD-10-CM

## 2022-11-30 DIAGNOSIS — Z96.1 PSEUDOPHAKIA OF BOTH EYES: ICD-10-CM

## 2022-11-30 DIAGNOSIS — M25.473 ANKLE EDEMA: ICD-10-CM

## 2022-11-30 DIAGNOSIS — E89.0 HYPOTHYROIDISM, POSTABLATIVE: ICD-10-CM

## 2022-11-30 DIAGNOSIS — R60.9 EDEMA, UNSPECIFIED TYPE: ICD-10-CM

## 2022-11-30 DIAGNOSIS — Z82.49 FAMILY HISTORY OF THROMBOSIS: ICD-10-CM

## 2022-11-30 DIAGNOSIS — Z01.818 PRE-OP TESTING: ICD-10-CM

## 2022-11-30 DIAGNOSIS — M17.12 PRIMARY OSTEOARTHRITIS OF LEFT KNEE: ICD-10-CM

## 2022-11-30 PROBLEM — R29.898 WEAKNESS OF BOTH HIPS: Status: RESOLVED | Noted: 2020-12-11 | Resolved: 2022-11-30

## 2022-11-30 PROBLEM — S01.21XA NASAL LACERATION: Status: RESOLVED | Noted: 2020-12-04 | Resolved: 2022-11-30

## 2022-11-30 PROBLEM — S09.93XA FACIAL INJURY: Status: RESOLVED | Noted: 2020-12-04 | Resolved: 2022-11-30

## 2022-11-30 PROBLEM — K40.90 LEFT INGUINAL HERNIA: Status: RESOLVED | Noted: 2017-08-04 | Resolved: 2022-11-30

## 2022-11-30 PROBLEM — R19.8 ABDOMINAL WEAKNESS: Status: RESOLVED | Noted: 2020-12-11 | Resolved: 2022-11-30

## 2022-11-30 PROBLEM — R05.9 COUGH: Status: RESOLVED | Noted: 2022-11-15 | Resolved: 2022-11-30

## 2022-11-30 PROBLEM — J98.4 PNEUMONITIS: Status: RESOLVED | Noted: 2022-11-15 | Resolved: 2022-11-30

## 2022-11-30 PROBLEM — M19.011 PRIMARY OSTEOARTHRITIS OF RIGHT SHOULDER: Status: RESOLVED | Noted: 2019-04-01 | Resolved: 2022-11-30

## 2022-11-30 PROCEDURE — 99999 PR PBB SHADOW E&M-EST. PATIENT-LVL III: CPT | Mod: PBBFAC,,, | Performed by: NURSE PRACTITIONER

## 2022-11-30 PROCEDURE — 93010 EKG 12-LEAD: ICD-10-PCS | Mod: S$PBB,,, | Performed by: INTERNAL MEDICINE

## 2022-11-30 PROCEDURE — 99999 PR PBB SHADOW E&M-EST. PATIENT-LVL III: ICD-10-PCS | Mod: PBBFAC,,, | Performed by: HOSPITALIST

## 2022-11-30 PROCEDURE — 93970 US LOWER EXTREMITY VEINS BILATERAL: ICD-10-PCS | Mod: 26,,, | Performed by: RADIOLOGY

## 2022-11-30 PROCEDURE — 99213 OFFICE O/P EST LOW 20 MIN: CPT | Mod: PBBFAC,25,27 | Performed by: NURSE PRACTITIONER

## 2022-11-30 PROCEDURE — 93005 ELECTROCARDIOGRAM TRACING: CPT | Mod: PBBFAC | Performed by: INTERNAL MEDICINE

## 2022-11-30 PROCEDURE — 93010 ELECTROCARDIOGRAM REPORT: CPT | Mod: S$PBB,,, | Performed by: INTERNAL MEDICINE

## 2022-11-30 PROCEDURE — 99213 OFFICE O/P EST LOW 20 MIN: CPT | Mod: PBBFAC,25 | Performed by: HOSPITALIST

## 2022-11-30 PROCEDURE — 73560 X-RAY EXAM OF KNEE 1 OR 2: CPT | Mod: 26,RT,, | Performed by: RADIOLOGY

## 2022-11-30 PROCEDURE — 73562 X-RAY EXAM OF KNEE 3: CPT | Mod: TC,LT

## 2022-11-30 PROCEDURE — 99999 PR PBB SHADOW E&M-EST. PATIENT-LVL III: ICD-10-PCS | Mod: PBBFAC,,, | Performed by: NURSE PRACTITIONER

## 2022-11-30 PROCEDURE — 99214 PR OFFICE/OUTPT VISIT, EST, LEVL IV, 30-39 MIN: ICD-10-PCS | Mod: S$PBB,,, | Performed by: HOSPITALIST

## 2022-11-30 PROCEDURE — 73560 XR KNEE ORTHO LEFT: ICD-10-PCS | Mod: 26,RT,, | Performed by: RADIOLOGY

## 2022-11-30 PROCEDURE — 73560 X-RAY EXAM OF KNEE 1 OR 2: CPT | Mod: TC,RT

## 2022-11-30 PROCEDURE — 73562 XR KNEE ORTHO LEFT: ICD-10-PCS | Mod: 26,LT,, | Performed by: RADIOLOGY

## 2022-11-30 PROCEDURE — 99214 OFFICE O/P EST MOD 30 MIN: CPT | Mod: S$PBB,,, | Performed by: NURSE PRACTITIONER

## 2022-11-30 PROCEDURE — 99214 OFFICE O/P EST MOD 30 MIN: CPT | Mod: S$PBB,,, | Performed by: HOSPITALIST

## 2022-11-30 PROCEDURE — 73562 X-RAY EXAM OF KNEE 3: CPT | Mod: 26,LT,, | Performed by: RADIOLOGY

## 2022-11-30 PROCEDURE — 99214 PR OFFICE/OUTPT VISIT, EST, LEVL IV, 30-39 MIN: ICD-10-PCS | Mod: S$PBB,,, | Performed by: NURSE PRACTITIONER

## 2022-11-30 PROCEDURE — 99999 PR PBB SHADOW E&M-EST. PATIENT-LVL III: CPT | Mod: PBBFAC,,, | Performed by: HOSPITALIST

## 2022-11-30 PROCEDURE — 93970 EXTREMITY STUDY: CPT | Mod: 26,,, | Performed by: RADIOLOGY

## 2022-11-30 PROCEDURE — 93970 EXTREMITY STUDY: CPT | Mod: TC

## 2022-11-30 RX ORDER — NAPROXEN SODIUM 220 MG
220 TABLET ORAL DAILY PRN
COMMUNITY
End: 2022-12-13 | Stop reason: HOSPADM

## 2022-11-30 NOTE — HPI
History of present illness- I had the pleasure of meeting this pleasant 71 y.o. lady in the pre op clinic prior to her elective Orthopedic surgery. The patient is new to me .   I have offered to have her family member on the phone during the consultation process       I have obtained the history by speaking to the patient and by reviewing the electronic health records.    Events leading up to surgery / History of presenting illness -    She has been troubled with moderate-severe  Left knee pain for 1 year  .   Pain increases with activity and decreases with resting  She also notices her pain increased when she is taking stairs   She also has pain at night time   She also notices clicking of the left knee  She feels that her left knee is almost about to give out  She has not had any falls from the left knee giving out.    She had no previous left knee surgery    Relevant health conditions of significance for the perioperative period/ History of presenting illness -    Subjectively describes health as' very good    Health conditions of significance for the perioperative period       - Hypothyroid   - Weight   - Hypotension   - Edema   - Acid reflux   -history of atrial fibrillation    She works as a  part-time for [employer]  She works with patients With dementia  She lives by herself in a 3 level house   She has 3 steps to get into the ground level   Her bedroom is at ground level and she has a bedroom at the 1st level also   She has 3 children   2 of them live locally and 1 in [place]   Her daughter is a nurse who is going to help her after surgery     She lost her  in 2015 and she went back to work 3 years later   After retiring in 2014

## 2022-11-30 NOTE — ASSESSMENT & PLAN NOTE
GERD Symptoms Belching , lower chest discomfort  Does not sound Cardiac   Tips to control reflux discussed     . I suggest aspiration precautions    Contributors for her reflux     Anti-inflammatory medication- -meloxicam, Aleve  Coffee   Peppermint    Thankfully she is not trouble with acid reflux anymore

## 2022-11-30 NOTE — PROGRESS NOTES
[facility]  Progress Note    Patient Name: Poonam Alvarez  MRN: [id number]  Date of Evaluation- 12/12/2022  PCP- Lindsey Bach MD    Future cases for Poonam Alvarez [[id number]]       Case ID Status Date Time Alberto Procedure Provider Location    [id number] [facility] 12/13/2022  7:00  ARTHROPLASTY, KNEE, TOTAL Gordon Schneider MD [[id number]] [facility] OR            HPI:  History of present illness- I had the pleasure of meeting this pleasant 71 y.o. lady in the pre op clinic prior to her elective Orthopedic surgery. The patient is new to me .   I have offered to have her family member on the phone during the consultation process       I have obtained the history by speaking to the patient and by reviewing the electronic health records.    Events leading up to surgery / History of presenting illness -    She has been troubled with moderate-severe  Left knee pain for 1 year  .   Pain increases with activity and decreases with resting  She also notices her pain increased when she is taking stairs   She also has pain at night time   She also notices clicking of the left knee  She feels that her left knee is almost about to give out  She has not had any falls from the left knee giving out.    She had no previous left knee surgery    Relevant health conditions of significance for the perioperative period/ History of presenting illness -    Subjectively describes health as' very good    Health conditions of significance for the perioperative period       - Hypothyroid   - Weight   - Hypotension   - Edema   - Acid reflux   -history of atrial fibrillation    She works as a  part-time for [employer]  She works with patients With dementia  She lives by herself in a 3 level house   She has 3 steps to get into the ground level   Her bedroom is at ground level and she has a bedroom at the 1st level also   She has 3 children   2 of them live locally and 1 in [place]   Her daughter is a nurse who is  going to help her after surgery     She lost her  in 2015 and she went back to work 3 years later   After retiring in 2014                     Subjective/ Objective:     Chief complaint-Preoperative evaluation, Perioperative Medical management, complication reduction plan     Active cardiac conditions- none    Revised cardiac risk index predictors- none    Functional capacity -Examples of physical activity grocery, doctors appointments  house work and can take 1 flight of stairs----- She can undertake all the above activities without  chest pain,chest tightness, ,dizziness,lightheadedness making her exercise tolerance more,   than 4 Mets.   Winded on exertion, attributes to weight gain  She is not known to have lung problems and never smoked tobacco  Review of Systems   Constitutional:  Negative for chills and fever.          Weight gain from reduced activity   HENT:          STOPBANG score  1/ 8    Age over 50        Eyes:         No new visual changes   Respiratory:          Occasional clear phlegm   She has recovered from the  respiratory unless she had about a few weeks ago    No Hemoptysis   Cardiovascular:         As noted   Gastrointestinal:         No overt GI/ blood losses  Bowel movements- Regular   Endocrine:        Prednisone use > 20 mg daily for 3 weeks- None   Genitourinary:  Negative for dysuria.        No urinary hesitancy    Musculoskeletal:         As above      Skin:  Negative for rash.   Neurological:  Negative for syncope.        No unilateral weakness   Hematological:         Current use of Anticoagulants  None  I suggested holding Aleve week before surgery   Psychiatric/Behavioral:          No Depression,Anxiety     No vascular stenting  No past medical history pertinent negatives.        No anesthesia, bleeding, cardiac , PONV problems with previous surgeries/procedures.  Medications and Allergies reviewed in epic.     FH- No anesthesia,bleeding / venous thrombosis ,  heart disease  in family    She has longevity in her family members    Physical Exam  Pulse 78, temperature 98.9 °F (37.2 °C), temperature source Oral, height 6' (1.829 m), SpO2 98 %.      Physical Exam  Constitutional- Vitals - Body mass index is 28.48 kg/m².,   Vitals:    11/30/22 0825   Pulse: 78   Temp: 98.9 °F (37.2 °C)     General appearance-Conscious,Coherent  Eyes- No conjunctival icterus,pupils  round , reactive to light , and  bilateral intra ocular lenses  ENT-Oral cavity- moist  and lower partial denture    , Hearing grossly normal   Neck- No thyromegaly ,Trachea -central, No jugular venous distension,   No Carotid Bruit   Cardiovascular -Heart Sounds- Normal   , No gallop rhythm   Respiratory - Normal Respiratory Effort, Normal breath sounds, crepitations bases,  no wheeze , and  no forced expiratory wheeze    Peripheral pitting pedal edema-- none , no calf pain   Gastrointestinal -Soft abdomen, No palpable masses, Non Tender,Liver,Spleen not palpable. No-- free fluid and shifting dullness  Musculoskeletal- No finger Clubbing. Strength grossly normal   Lymphatic-No Palpable cervical, axillary,Inguinal lymphadenopathy   Psychiatric - normal effect,Orientation  Rt Dorsalis pedis pulses-palpable    Lt Dorsalis pedis pulses- palpable   Rt Posterior tibial pulses -palpable   Left posterior tibial pulses -palpable   Miscellaneous -  Surgical scarLower abdomen Rt knee   and  no renal bruit   Investigations  Lab and Imaging have been reviewed in epic.    Review of Medicine tests    EKG- I had independently reviewed the EKG from--2/11/2022  It was reported to be showing     Sinus bradycardia with Premature atrial complexes   Cannot rule out Septal infarct ,age undetermined  Review of clinical lab tests:  Lab Results   Component Value Date    CREATININE 0.9 07/06/2022    HGB 12.1 07/06/2022     07/06/2022 2019   The test was stopped because the patient experienced fatigue.  Arrhythmias during stress: rare PVCs,  The  EKG portion of this study is negative for myocardial ischemia.  Normal left ventricular systolic function. The estimated ejection fraction is 63%  No wall motion abnormalities.  Normal LV diastolic function.  Mild left atrial enlargement.  Normal right ventricular systolic function.  Mild right atrial enlargement.  The stress echo portion of this study is negative for myocardial ischemia.           Review of old records- Was done and information gathered regards to events leading to surgery and health conditions of significance in the perioperative period.        Preoperative cardiac risk assessment-  The patient does not have any active cardiac conditions . Revised cardiac risk index predictors- 0---.Functional capacity is more than 4 Mets. She will be undergoing a Orthopedic procedure that carries a Moderate Risk risk     Risk of a major Cardiac event ( Defined as death, myocardial infarction, or cardiac arrest at 30 days after noncardiac surgery), based on RCRI score     -3.9%         No further cardiac work up is indicated prior to proceeding with the surgery     Orders Placed This Encounter    US Lower Extremity Veins Bilateral       American Society of Anesthesiologists Physical status classification ( ASA ) class: 2     Postoperative pulmonary complication risk assessment:        Vandana Respiratory failure index- percentage risk of respiratory failure: 0.5 %     Assessment/Plan:     Pseudophakia of both eyes  She is doing good with vision through both eyes    Essential hypertension  She has no history of stroke or TIA or coronary artery disease or suggestions of lower extremity claudication    She is on aspirin 81 mg by mouth once a day  She is not known to have circulation problem he is taking aspirin for preventive    Risk , benefits of ASA use in the perioperative period discussed  After discussing the risks and benefits of aspirin use she chose to stay on aspirin around the time of  surgery    Hypothyroidism, postablative  On replacement   She skips Synthroid on Mondays  Her most recent thyroid function tests showed mildly low TSH  She currently does not have any overt hypo or hyper thyroid symptoms    Suggested spacing Thyroxine replacement with food, other Medication    Hypothyroidism- I suggest continuation of synthroid replacement in the perioperative period        Constipation  She feels that she is hydrating herself well and passes light colored urine   She is taking milk of magnesia couple times a week to keep her bowels regulated    Suggested working on bowel movements in preparation for surgery   Constipation- I suggest giving laxative regimen as opioid use,reduced ambulation  can increase the constipation     No recent change in bowel habit  She is up-to-date with colonoscopy    GERD (gastroesophageal reflux disease)  GERD Symptoms Belching , lower chest discomfort  Does not sound Cardiac   Tips to control reflux discussed     . I suggest aspiration precautions    Contributors for her reflux     Anti-inflammatory medication- -meloxicam, Aleve  Coffee   Peppermint    Thankfully she is not trouble with acid reflux anymore     Ankle edema  Rt ankle > Left ankle   Longstanding  She notices that her swellings are most in the evening time and least in the morning time  Her history suggests venous insufficiency    Her Wells criteria suggests low probability for deep vein thrombosis   After discussing with her I have requested a lower extremity venous ultrasound scan which she wanted to have to exclude    Contributors    -salt intake to help with low blood pressure  Anti-inflammatory medicines        Not known to have     Heart failure   Liver failure   Kidney failure     Edema- I suggested avoidance of   NSAID's, unless advised or ordered  and suggested Limb elevation and mireya hose use      Hypotension  In the year 2020 she was at her house and bend down in her closet and she had fallen   Was  noted to have Low BP    She is not known to have elevated blood pressure and at that time she was taking hydrochlorothiazide for peripheral edema on a daily basis   She was hydrated at that time or possibly less hydrated    Her history suggests postural hypotension    She had not had any problems since then    I suggested staying hydrated    Her history is not suggestive of adrenal insufficiency at that time  She currently does not have any postural dizziness  No diarrhea    History of atrial flutter  Had Ablation   2008  No recurrence     Weight gain  She has gained weight from reduced activity and she hopes to lose about 30 lb      Weight related conditions     Known to have    Acid reflux   Osteoarthritis  No urinary incontinence    Not troubled with / Not known to have     HTN  Type 2 Diabetes   Hyperlipidemia   Sleep apnea   Fatty liver       Encouraged weight loss    I have encouraged her that she will likely lose the weight with increased mobility after joint replacement surgery    Keloid  I suggest that the perioperative team be aware of this so that appropriate preventive care can be taken     Family history of thrombosis  Sister in her   mid 50 s  The risk factors that her sister had had for thrombosis is that she was on birth control and she had a long trip from [place] to  [place]    The patient not the family members has no history of blood clot   Her sister's blood clot since 2 provoked   Her family history does not suggest thrombophilia          Preventive perioperative care    Thromboembolic prophylaxis:  Her risk factors for thrombosis include surgical procedure and age.I suggest  thromboembolic prophylaxis ( mechanical/pharmacological, weighing the risk benefits of pharmacological agent use considering jay procedural bleeding )  during the perioperative period.I suggested being active in the post operative period.   The patient is a candidate for extended DVT prophylaxis      Postoperative  pulmonary complication prophylaxis-Risk factors for post operative pulmonary complications include age over 65 years- I suggest incentive spirometry use, early ambulation, and end tidal carbon dioxide monitoring  , oral care , head end of bed elevation      Renal complication prophylaxis-- . I suggest keeping her well hydrated  in the perioperative period.    Surgical site Infection Prophylaxis-I  suggest appropriate antibiotic for Prophylaxis against Surgical site infections  No reported Staph infection  Skin antibacterial discussed         In view of regional anesthesia  the patient  is at risk of postoperative urinary retention.  I suggest avoidance / minimizing the of  Benzodiazepines,Anticholinergic medication,antihistamines ( Benadryl) , if possible in the perioperative period. I suggest using the minimum possible use of opioids for the minimum period of time in the perioperative period. Benadryl avoidance suggested      This visit was focused on Preoperative evaluation, Perioperative Medical management, complication reduction plans. I suggest that the patient follows up with primary care or relevant sub specialists for ongoing health care.    I appreciate the opportunity to be involved in this patients care. Please feel free to contact me if there were any questions about this consultation.    Patient is optimized    Patient/ care giver/ Family member was instructed to call and update me about any changes to health,  medication, office visits ,testing out side of the [facility] operative care center , hospitalizations between now and surgery      Jannette Albrecht MD  Internal Medicine  [facility]   Cell Phone- [phone]    History of COVID - no   COVID vaccination status -5    COVID screening     No fever   No cough   No SOB  No sore throat   No loss of taste or smell   No muscle aches   No nausea, vomiting , diarrhea -  ---  1/30/2022- 19 15     Examined with a female medical  student    Clinically she did not have any hypotension    TSH is mildly low but closer to normal with a normal free T4   She does not have any overt suggestions of hyperthyroidism  Lab tests from today are acceptable for surgery    Lower extremity venous ultrasound scan showed-No evidence of deep venous thrombosis in either lower extremity    EKG from today personally reviewed reportedly showed  Normal sinus rhythm   Normal ECG   When compared with ECG of 11-FEB-2022 12:27,   Premature atrial complexes are no longer Present    Pre op work up acceptable for surgery     Watch BP    Had ? Mosquito bote Rt lower leg early part of the year - no acute changes     Checked for over-the-counter medication  --  12/12/2022- 18 45     Called to follow up , spoke to her to address any concerns with the up coming surgery or any questions on Medication instructions -  Doing well ,No changes to Medication, Health -  Trying to cut back on coffee   Discussed the medication for the morning of surgery  No heart palpitations   Took Emergen-C this AM- suggested to hold for surgery

## 2022-11-30 NOTE — ASSESSMENT & PLAN NOTE
Rt ankle > Left ankle   Longstanding  She notices that her swellings are most in the evening time and least in the morning time  Her history suggests venous insufficiency    Her Wells criteria suggests low probability for deep vein thrombosis   After discussing with her I have requested a lower extremity venous ultrasound scan which she wanted to have to exclude    Contributors    -salt intake to help with low blood pressure  Anti-inflammatory medicines        Not known to have     Heart failure   Liver failure   Kidney failure     Edema- I suggested avoidance of   NSAID's, unless advised or ordered  and suggested Limb elevation and mireya hose use

## 2022-11-30 NOTE — ASSESSMENT & PLAN NOTE
She has gained weight from reduced activity and she hopes to lose about 30 lb      Weight related conditions     Known to have    Acid reflux   Osteoarthritis  No urinary incontinence    Not troubled with / Not known to have     HTN  Type 2 Diabetes   Hyperlipidemia   Sleep apnea   Fatty liver       Encouraged weight loss    I have encouraged her that she will likely lose the weight with increased mobility after joint replacement surgery

## 2022-11-30 NOTE — OUTPATIENT SUBJECTIVE & OBJECTIVE
Outpatient Subjective & Objective     Chief complaint-Preoperative evaluation, Perioperative Medical management, complication reduction plan     Active cardiac conditions- none    Revised cardiac risk index predictors- none    Functional capacity -Examples of physical activity grocery, doctors appointments  house work and can take 1 flight of stairs----- She can undertake all the above activities without  chest pain,chest tightness, ,dizziness,lightheadedness making her exercise tolerance more,   than 4 Mets.   Winded on exertion, attributes to weight gain  She is not known to have lung problems and never smoked tobacco  Review of Systems   Constitutional:  Negative for chills and fever.          Weight gain from reduced activity   HENT:          STOPBANG score  1/ 8    Age over 50        Eyes:         No new visual changes   Respiratory:          Occasional clear phlegm   She has recovered from the  respiratory unless she had about a few weeks ago    No Hemoptysis   Cardiovascular:         As noted   Gastrointestinal:         No overt GI/ blood losses  Bowel movements- Regular   Endocrine:        Prednisone use > 20 mg daily for 3 weeks- None   Genitourinary:  Negative for dysuria.        No urinary hesitancy    Musculoskeletal:         As above      Skin:  Negative for rash.   Neurological:  Negative for syncope.        No unilateral weakness   Hematological:         Current use of Anticoagulants  None  I suggested holding Aleve week before surgery   Psychiatric/Behavioral:          No Depression,Anxiety     No vascular stenting  No past medical history pertinent negatives.        No anesthesia, bleeding, cardiac , PONV problems with previous surgeries/procedures.  Medications and Allergies reviewed in epic.     FH- No anesthesia,bleeding / venous thrombosis ,  heart disease in family    She has longevity in her family members    Physical Exam  Pulse 78, temperature 98.9 °F (37.2 °C), temperature source Oral,  height 6' (1.829 m), SpO2 98 %.      Physical Exam  Constitutional- Vitals - Body mass index is 28.48 kg/m².,   Vitals:    11/30/22 0825   Pulse: 78   Temp: 98.9 °F (37.2 °C)     General appearance-Conscious,Coherent  Eyes- No conjunctival icterus,pupils  round , reactive to light , and  bilateral intra ocular lenses  ENT-Oral cavity- moist  and lower partial denture    , Hearing grossly normal   Neck- No thyromegaly ,Trachea -central, No jugular venous distension,   No Carotid Bruit   Cardiovascular -Heart Sounds- Normal   , No gallop rhythm   Respiratory - Normal Respiratory Effort, Normal breath sounds, crepitations bases,  no wheeze , and  no forced expiratory wheeze    Peripheral pitting pedal edema-- none , no calf pain   Gastrointestinal -Soft abdomen, No palpable masses, Non Tender,Liver,Spleen not palpable. No-- free fluid and shifting dullness  Musculoskeletal- No finger Clubbing. Strength grossly normal   Lymphatic-No Palpable cervical, axillary,Inguinal lymphadenopathy   Psychiatric - normal effect,Orientation  Rt Dorsalis pedis pulses-palpable    Lt Dorsalis pedis pulses- palpable   Rt Posterior tibial pulses -palpable   Left posterior tibial pulses -palpable   Miscellaneous -  Surgical scarLower abdomen Rt knee   and  no renal bruit   Investigations  Lab and Imaging have been reviewed in [facility].    Review of Medicine tests    EKG- I had independently reviewed the EKG from--2/11/2022  It was reported to be showing     Sinus bradycardia with Premature atrial complexes   Cannot rule out Septal infarct ,age undetermined  Review of clinical lab tests:  Lab Results   Component Value Date    CREATININE 0.9 07/06/2022    HGB 12.1 07/06/2022     07/06/2022     2019   The test was stopped because the patient experienced fatigue.  Arrhythmias during stress: rare PVCs,  The EKG portion of this study is negative for myocardial ischemia.  Normal left ventricular systolic function. The estimated ejection  fraction is 63%  No wall motion abnormalities.  Normal LV diastolic function.  Mild left atrial enlargement.  Normal right ventricular systolic function.  Mild right atrial enlargement.  The stress echo portion of this study is negative for myocardial ischemia.           Review of old records- Was done and information gathered regards to events leading to surgery and health conditions of significance in the perioperative period.    Outpatient Subjective & Objective

## 2022-11-30 NOTE — ASSESSMENT & PLAN NOTE
In the year 2020 she was at her house and bend down in her closet and she had fallen   Was noted to have Low BP    She is not known to have elevated blood pressure and at that time she was taking hydrochlorothiazide for peripheral edema on a daily basis   She was hydrated at that time or possibly less hydrated    Her history suggests postural hypotension    She had not had any problems since then    I suggested staying hydrated    Her history is not suggestive of adrenal insufficiency at that time  She currently does not have any postural dizziness  No diarrhea

## 2022-11-30 NOTE — DISCHARGE INSTRUCTIONS
Your surgery has been scheduled for:_____12/13/22_____________________________________    You should report to:  ____[facility], located on the [place] side of the first floor of the           [facility] ([phone])  ____[facility], located on the [facility] side of the            Second floor of the [facility] ([phone])  ____3rd Floor [facility] located on the [facility] side of the [facility] [phone]  __X__[facility]: located at [address] [place] [place] [address]. [address].     Please Note   Tell your doctor if you take Aspirin, products containing Aspirin, herbal medications  or blood thinners, such as Coumadin, Ticlid, or Plavix.  (Consult your provider regarding holding or stopping before surgery).  Arrange for someone to drive you home following surgery.  You will not be allowed to leave the surgical facility alone or drive yourself home following sedation and anesthesia.    Before Surgery  Stop taking all herbal medications, vitamins, and supplements 7 days prior to surgery  No Motrin/Advil (Ibuprofen) 7 days before surgery  No Aleve (Naproxen) 7 days before surgery  Stop Taking Asprin, products containing Asprin _____days before surgery; Patient elects to remain on aspirin.  Stop taking blood thinners_______days before surgery  No Goody's/BC  Powder 7 days before surgery  Refrain from drinking alcoholic beverages for 24hours before and after surgery  Stop or limit smoking __7_______days before surgery  You may take Tylenol for pain    Night before Surgery  Do not eat or drink after midnight  Take a shower or bath (shower is recommended).  Bathe with Hibiclens soap or an antibacterial soap from the neck down.  If not supplied by your surgeon, hibiclens soap will need to be purchased over the counter in pharmacy.  Rinse soap off thoroughly.  Shampoo your hair with  your regular shampoo    The Day of Surgery  Take another bath or shower with hibiclens or any antibacterial soap, to reduce the chance of infection.  Take heart and blood pressure medications with a small sip of water, as advised by the perioperative team.  Do not take fluid pills  You may brush your teeth and rinse your mouth, but do not swall any additional water.   Do not apply perfumes, powder, body lotions or deodorant on the day of surgery.  Nail polish should be removed.  Do not wear makeup or moisturizer  Wear comfortable clothes, such as a button front shirt and loose fitting pants.  Leave all jewelry, including body piercings, and valuables at home.    Bring any devices you will neeed after surgery such as crutches or canes.  If you have sleep apnea, please bring your CPAP machine  In the event that your physical condition changes including the onset of a cold or respiratory illness, or if you have to delay or cancel your surgery, please notify your surgeon.       Anesthesia: Regional Anesthesia    Youre scheduled for surgery. During surgery, youll receive medicine called anesthesia to keep you comfortable and pain-free. Your surgeon has decided that youll receive regional anesthesia. This sheet tells you what to expect with this type of anesthesia.  What is regional anesthesia?  Regional anesthesia numbs one region of your body. The anesthesia may be given around nerves or into veins in your arms, neck, or legs (nerve block or Rey block). Or it may be sent into the spinal fluid (spinal anesthesia) or into the space just outside the spinal fluid (epidural anesthesia). You may also be given sedatives to help you relax.  Nerve block or Rey block  A small area of the body, such as an arm or leg, can be numbed using a nerve block or Rey block.  Nerve block. During a nerve block, your skin is numbed. A needle is then inserted near nerves that serve the area to be numbed. Anesthetic is sent through the  needle.  IV regional or Rey block. For this type of block, an IV line is put into a vein. The blood flow to the area to be numbed is blocked for a short time. Anesthetic is sent through the IV.  Spinal anesthesia  Spinal anesthesia numbs your body from about the waist down.  Anesthetic is injected into the spinal fluid. This is a substance that surrounds the spinal cord in your spinal column. The anesthetic blocks pain traveling from the body to the brain.  To receive the anesthetic, your skin is numbed at the injection site on your back.  A needle is then inserted into the spinal space. Anesthetic is sent into the spinal fluid through the needle.  Epidural anesthesia  Epidural anesthesia is most commonly used during childbirth and may also be used after surgical procedures of the chest, belly, and legs.  Anesthetic is injected into the epidural space. This is just outside the dural sac which contains the spinal fluid.  To receive the anesthetic, your skin is numbed at the injection site on your back.  A needle is then inserted into the epidural space. Anesthetic is sent into the epidural space through the needle.  A small flexible catheter may be attached to the needle and left in place. This allows for continuous injections or infusions of anesthetic.  Anesthesia tools and medicines that might be near you during your procedure  Local anesthetic. This medicine is given through a needle numbs one region of your body.  Electrocardiography leads (electrodes). These are used to record your heart rate and rhythm.  Blood pressure cuff. A cuff is placed on your arm to keep track of your blood pressure.  Pulse oximeter. This small clip is placed on the end of the finger. It measures your blood oxygen level.  Sedatives. These medicines may be given through an IV. They help to relax you and keep you comfortable. You may stay awake or sleep lightly.  Oxygen. You may be given oxygen through a facemask.  Risks and possible  complications  Regional anesthesia carries some risks. These include:  Nausea and vomiting  Headache  Backache  Decreased blood pressure  Allergic reaction to the anesthetic  Ongoing numbness (rare)  Irregular heartbeat (rare)  Cardiac arrest (rare)   Date Last Reviewed: 12/1/2016  © 6635-1007 [employer]. [address] [place] [id number]. All rights reserved. This information is not intended as a substitute for professional medical care. Always follow your healthcare professional's instructions.

## 2022-11-30 NOTE — ASSESSMENT & PLAN NOTE
She has no history of stroke or TIA or coronary artery disease or suggestions of lower extremity claudication    She is on aspirin 81 mg by mouth once a day  She is not known to have circulation problem he is taking aspirin for preventive    Risk , benefits of ASA use in the perioperative period discussed  After discussing the risks and benefits of aspirin use she chose to stay on aspirin around the time of surgery

## 2022-11-30 NOTE — ASSESSMENT & PLAN NOTE
She feels that she is hydrating herself well and passes light colored urine   She is taking milk of magnesia couple times a week to keep her bowels regulated    Suggested working on bowel movements in preparation for surgery   Constipation- I suggest giving laxative regimen as opioid use,reduced ambulation  can increase the constipation     No recent change in bowel habit  She is up-to-date with colonoscopy

## 2022-11-30 NOTE — ASSESSMENT & PLAN NOTE
Sister in her   mid 50 s  The risk factors that her sister had had for thrombosis is that she was on birth control and she had a long trip from [place] to  [place]    The patient not the family members has no history of blood clot   Her sister's blood clot since 2 provoked   Her family history does not suggest thrombophilia

## 2022-11-30 NOTE — ASSESSMENT & PLAN NOTE
On replacement   She skips Synthroid on Mondays  Her most recent thyroid function tests showed mildly low TSH  She currently does not have any overt hypo or hyper thyroid symptoms    Suggested spacing Thyroxine replacement with food, other Medication    Hypothyroidism- I suggest continuation of synthroid replacement in the perioperative period

## 2022-12-02 ENCOUNTER — PROCEDURE VISIT (OUTPATIENT)
Dept: SPORTS MEDICINE | Facility: CLINIC | Age: 72
End: 2022-12-02
Payer: MEDICARE

## 2022-12-02 VITALS — DIASTOLIC BLOOD PRESSURE: 70 MMHG | SYSTOLIC BLOOD PRESSURE: 119 MMHG | HEART RATE: 75 BPM

## 2022-12-02 DIAGNOSIS — M25.562 CHRONIC PAIN OF LEFT KNEE: Primary | ICD-10-CM

## 2022-12-02 DIAGNOSIS — G89.29 CHRONIC PAIN OF LEFT KNEE: Primary | ICD-10-CM

## 2022-12-02 PROCEDURE — 64640 PR DESTRUCT BY NEURO AGENT; OTHER PERIPH NERVE: ICD-10-PCS | Mod: S$PBB,LT,, | Performed by: STUDENT IN AN ORGANIZED HEALTH CARE EDUCATION/TRAINING PROGRAM

## 2022-12-02 PROCEDURE — 64640 INJECTION TREATMENT OF NERVE: CPT | Mod: S$PBB,LT,, | Performed by: STUDENT IN AN ORGANIZED HEALTH CARE EDUCATION/TRAINING PROGRAM

## 2022-12-02 PROCEDURE — 99499 NO LOS: ICD-10-PCS | Mod: S$PBB,,, | Performed by: STUDENT IN AN ORGANIZED HEALTH CARE EDUCATION/TRAINING PROGRAM

## 2022-12-02 PROCEDURE — 99499 UNLISTED E&M SERVICE: CPT | Mod: S$PBB,,, | Performed by: STUDENT IN AN ORGANIZED HEALTH CARE EDUCATION/TRAINING PROGRAM

## 2022-12-02 PROCEDURE — 64640 INJECTION TREATMENT OF NERVE: CPT | Mod: PBBFAC | Performed by: STUDENT IN AN ORGANIZED HEALTH CARE EDUCATION/TRAINING PROGRAM

## 2022-12-02 NOTE — PROCEDURES
Procedures CC: left knee pain    71 y.o. Female presents today for Iovera procedure for left knee pain.    INFORMED CONSENT: I verify that I personally obtained Poonam Alvarez's consent which was signed and uploaded into [facility].     PAIN SCORE:  Pre-procedure:  0/10  Gait: wnl    Inspection/Palpation:   +Skin warm and dry without open wounds or erythema  -Rubor   -Calor    Procedure Note Iovera:    DATE OF PROCEDURE:  12/02/2022    PREOPERATIVE DIAGNOSIS: left knee pain.     POSTOPERATIVE DIAGNOSIS: left knee pain.     PROCEDURE: Iovera treatment of anterior femoral cutaneous nerve, Lateral femoral cut nerve, and superior and inferior branches of infrapatellar saphenous nerve using at least 4+ different punctures to treat all 4 nerves. (cpt 64640 x4)    COMPLICATIONS: None.     IMPLANTS:  None    ESTIMATED BLOOD LOSS:  < 5cc    SPECIMENS REMOVED:  None    ANESTHESIA: 20cc Local xylocaine with epinephrine    INDICATIONS FOR PROCEDURE: This is a 71 y.o. female with longstanding knee pain. They have failed non operative management including injections.I discussed a new treatment therapy called Iovera, which is cryotherapy, to provide symptomatic relief along the sensory distribution of the infrapatellar tendon branch of the saphenous nerve  and AFCN. The patient elected to move forward with this  We did discuss the fact that this is a fairly novel procedure and there is very limited scientific data around this.  However, it FDA approved.  The patient was given patient information and literature to review prior to the procedure as well.  Based on this, the patient agreed to move forward with doing the procedure.    Time was spent discussing the cryoanalgesia procedure Iovera with the patient.  Risks and benefits were also discussed with patient.  X-rays were reviewed to use as a diagram to explain procedure. A detailed description of landmarks and placement of anesthetic used during procedure were also explained to  patient.  Contraindications for procedure were discussed with patient.    CONSENT:  Verbal and written consent were obtained from the patient.     PROCEDURE:    A surgical time out was performed, including verification of patient ID, procedure to be performed, site and side, availability of information and equipment, review of safety issues, and the patients agreement and consent.  The patient was placed supine on the exam table and the proximal medial aspect of the left tibia and anterior aspect of distal femur was prepped with sterile Chlorhexidine and alcohol.  A line was drawn extending approximately 5 cm medial to inferior pole of the patella distally to a point approximately 5 cm medial to the tibial tubercle.  A second line was drawn in a medial to lateral direction the width of the patella approximately 7 cm proximal to the patella. We then infiltrated the skin with lidocaine with epinephrine along both lines using a 25g needle. We then introduced the Iovera device along these lines and this device penetrated the skin, creating cryotherapy to the superior and inferior branches of the infrapatellar saphenous nerve, a third treatment to the anterior femoral cutaneous nerve, and fourth LFCN. 7 punctures of the skin were made to treat the superior and inferior branches of the ISN and another 7 punctures were made to treat the AFCN and LFCN. There were a total of 4 nerves treated with iovera. The patient tolerated the procedure well with no problems.    PAIN SCORES:  Pre-procedure:  0/10  Post-procedure:  0/10  Gait: wnl      ASSESSMENT:      ICD-10-CM ICD-9-CM   1. Chronic pain of left knee  M25.562 719.46    G89.29 338.29         PLAN:    All questions were answered to the best of my ability and all concerns were addressed at this time.    This note is dictated using the M*Modal Fluency Direct word recognition program. There are word recognition mistakes that are occasionally missed on review.

## 2022-12-04 RX ORDER — TALC
6 POWDER (GRAM) TOPICAL NIGHTLY PRN
Status: CANCELLED | OUTPATIENT
Start: 2022-12-04

## 2022-12-04 RX ORDER — BISACODYL 10 MG
10 SUPPOSITORY, RECTAL RECTAL EVERY 12 HOURS PRN
Status: CANCELLED | OUTPATIENT
Start: 2022-12-04

## 2022-12-04 RX ORDER — MUPIROCIN 20 MG/G
1 OINTMENT TOPICAL 2 TIMES DAILY
Status: CANCELLED | OUTPATIENT
Start: 2022-12-04 | End: 2022-12-09

## 2022-12-04 RX ORDER — AMOXICILLIN 250 MG
1 CAPSULE ORAL 2 TIMES DAILY
Status: CANCELLED | OUTPATIENT
Start: 2022-12-04

## 2022-12-04 RX ORDER — METHOCARBAMOL 750 MG/1
750 TABLET, FILM COATED ORAL 3 TIMES DAILY
Status: CANCELLED | OUTPATIENT
Start: 2022-12-04

## 2022-12-04 RX ORDER — PREGABALIN 75 MG/1
75 CAPSULE ORAL NIGHTLY
Status: CANCELLED | OUTPATIENT
Start: 2022-12-04

## 2022-12-04 RX ORDER — MIDAZOLAM HYDROCHLORIDE 1 MG/ML
4 INJECTION INTRAMUSCULAR; INTRAVENOUS
Status: CANCELLED | OUTPATIENT
Start: 2022-12-04 | End: 2022-12-05

## 2022-12-04 RX ORDER — OXYCODONE HYDROCHLORIDE 5 MG/1
5 TABLET ORAL
Status: CANCELLED | OUTPATIENT
Start: 2022-12-04

## 2022-12-04 RX ORDER — OXYCODONE HYDROCHLORIDE 5 MG/1
10 TABLET ORAL
Status: CANCELLED | OUTPATIENT
Start: 2022-12-04

## 2022-12-04 RX ORDER — POLYETHYLENE GLYCOL 3350 17 G/17G
17 POWDER, FOR SOLUTION ORAL DAILY
Status: CANCELLED | OUTPATIENT
Start: 2022-12-04

## 2022-12-04 RX ORDER — MUPIROCIN 20 MG/G
1 OINTMENT TOPICAL
Status: CANCELLED | OUTPATIENT
Start: 2022-12-04

## 2022-12-04 RX ORDER — PROCHLORPERAZINE EDISYLATE 5 MG/ML
5 INJECTION INTRAMUSCULAR; INTRAVENOUS EVERY 6 HOURS PRN
Status: CANCELLED | OUTPATIENT
Start: 2022-12-04

## 2022-12-04 RX ORDER — ONDANSETRON 2 MG/ML
4 INJECTION INTRAMUSCULAR; INTRAVENOUS EVERY 8 HOURS PRN
Status: CANCELLED | OUTPATIENT
Start: 2022-12-04

## 2022-12-04 RX ORDER — CELECOXIB 200 MG/1
400 CAPSULE ORAL ONCE
Status: CANCELLED | OUTPATIENT
Start: 2022-12-04 | End: 2022-12-04

## 2022-12-04 RX ORDER — SODIUM CHLORIDE 9 MG/ML
INJECTION, SOLUTION INTRAVENOUS CONTINUOUS
Status: CANCELLED | OUTPATIENT
Start: 2022-12-04 | End: 2022-12-05

## 2022-12-04 RX ORDER — CELECOXIB 200 MG/1
200 CAPSULE ORAL DAILY
Status: CANCELLED | OUTPATIENT
Start: 2022-12-04

## 2022-12-04 RX ORDER — SODIUM CHLORIDE 0.9 % (FLUSH) 0.9 %
10 SYRINGE (ML) INJECTION
Status: CANCELLED | OUTPATIENT
Start: 2022-12-04

## 2022-12-04 RX ORDER — SODIUM CHLORIDE 9 MG/ML
INJECTION, SOLUTION INTRAVENOUS
Status: CANCELLED | OUTPATIENT
Start: 2022-12-04

## 2022-12-04 RX ORDER — FENTANYL CITRATE 50 UG/ML
100 INJECTION, SOLUTION INTRAMUSCULAR; INTRAVENOUS
Status: CANCELLED | OUTPATIENT
Start: 2022-12-04 | End: 2022-12-05

## 2022-12-04 RX ORDER — FENTANYL CITRATE 50 UG/ML
25 INJECTION, SOLUTION INTRAMUSCULAR; INTRAVENOUS EVERY 5 MIN PRN
Status: CANCELLED | OUTPATIENT
Start: 2022-12-04

## 2022-12-04 RX ORDER — PREGABALIN 75 MG/1
75 CAPSULE ORAL
Status: CANCELLED | OUTPATIENT
Start: 2022-12-04

## 2022-12-04 RX ORDER — ASPIRIN 81 MG/1
81 TABLET ORAL 2 TIMES DAILY
Status: CANCELLED | OUTPATIENT
Start: 2022-12-04

## 2022-12-04 RX ORDER — NALOXONE HCL 0.4 MG/ML
0.02 VIAL (ML) INJECTION
Status: CANCELLED | OUTPATIENT
Start: 2022-12-04

## 2022-12-04 RX ORDER — ACETAMINOPHEN 500 MG
1000 TABLET ORAL EVERY 6 HOURS
Status: CANCELLED | OUTPATIENT
Start: 2022-12-04

## 2022-12-04 RX ORDER — FAMOTIDINE 20 MG/1
20 TABLET, FILM COATED ORAL 2 TIMES DAILY
Status: CANCELLED | OUTPATIENT
Start: 2022-12-04

## 2022-12-04 RX ORDER — LIDOCAINE HYDROCHLORIDE 10 MG/ML
1 INJECTION, SOLUTION EPIDURAL; INFILTRATION; INTRACAUDAL; PERINEURAL
Status: CANCELLED | OUTPATIENT
Start: 2022-12-04

## 2022-12-04 RX ORDER — MORPHINE SULFATE 2 MG/ML
2 INJECTION, SOLUTION INTRAMUSCULAR; INTRAVENOUS
Status: CANCELLED | OUTPATIENT
Start: 2022-12-04

## 2022-12-04 RX ORDER — ACETAMINOPHEN 500 MG
1000 TABLET ORAL
Status: CANCELLED | OUTPATIENT
Start: 2022-12-04

## 2022-12-04 NOTE — H&P
CC:  Left knee pain    Poonam Alvarez is a 71 y.o. female with history of Left knee pain. Pain is worse with activity and weight bearing.  Patient has experienced interference of activities of daily living due to decreased range of motion and an increase in joint pain and swelling.  Patient has failed non-operative treatment including NSAIDs, corticosteroid injections, viscosupplement injections, and activity modification.  Poonam Alvarez currently ambulates independently.     Relevant medical conditions of significance in perioperative period:  HTN- on medication managed by pcp  Hypothyroidism (postablative)- on medication managed by pcp      Past Medical History:   Diagnosis Date    AR (allergic rhinitis) 12/07/2012    Atrial flutter     ablation October 2008    Cataract     Generalized headaches     GERD (gastroesophageal reflux disease) 03/08/2013    resolved    Grave's disease     s/p radioactive iodine, now hypothyroidism    Keloid cicatrix     Obesity     Osteoarthritis     shoulders, hands, knees    Positive PPD, treated     9 months of INH, completed 1/2010       Past Surgical History:   Procedure Laterality Date    ABLATION OF DYSRHYTHMIC FOCUS  10/24/2008    atrial flutter    ARTHROPLASTY OF SHOULDER Right 04/01/2019    Procedure: ARTHROPLASTY, SHOULDER;  Surgeon: Sage Xie MD;  Location: [facility];  Service: Orthopedics;  Laterality: Right;  regional w/ catheter (q-ball)    CATARACT EXTRACTION W/  INTRAOCULAR LENS IMPLANT Bilateral     COLONOSCOPY N/A 11/05/2015    Procedure: COLONOSCOPY;  Surgeon: Jose Ly MD;  Location: [address]);  Service: Endoscopy;  Laterality: N/A;  Last colonoscopy 2008 with Dr. Vieira; Pt wanted this day    EYE SURGERY      cataract removal right eye    HERNIA REPAIR      JOINT REPLACEMENT      KNEE ARTHROSCOPY W/ DEBRIDEMENT      right, 2005 and 2008    KNEE SURGERY  03/27/2014    right TKA       Family History   Problem Relation Age of Onset     Arthritis Mother     Arthritis Father     Glaucoma Father     Cataracts Father     Hepatitis Brother     No Known Problems Daughter     No Known Problems Son     No Known Problems Son     No Known Problems Maternal Aunt     No Known Problems Maternal Uncle     No Known Problems Paternal Aunt     No Known Problems Paternal Uncle     No Known Problems Maternal Grandmother     No Known Problems Maternal Grandfather     No Known Problems Paternal Grandmother     No Known Problems Paternal Grandfather     Colon cancer Neg Hx     Ovarian cancer Neg Hx     Breast cancer Neg Hx        Review of patient's allergies indicates:   Allergen Reactions    Hydrocodone-acetaminophen Other (See Comments)     Low blood pressure; doesn't want    Oxycodone-acetaminophen Other (See Comments)     Causes low blood pressure; doesn't want  4/4/19 - patient reports she is not actually allergic to it (just doesn't like how it makes her feel)         Current Outpatient Medications:     aspirin (ECOTRIN) 81 MG EC tablet, Take 81 mg by mouth once daily., Disp: , Rfl:     blood pressure monitor (BLOOD PRESSURE KIT) Kit, Use as directed, Disp: 1 each, Rfl: 0    cholecalciferol, vitamin D3, (VITAMIN D3) 50 mcg (2,000 unit) Cap, Take 1 capsule by mouth once daily., Disp: , Rfl:     hydroCHLOROthiazide (HYDRODIURIL) 12.5 MG Tab, TAKE 1/2 TO 1 TABLET BY MOUTH DAILY AS NEEDED, Disp: 90 tablet, Rfl: 3    magnesium hydroxide 400 mg/5 ml (MILK OF MAGNESIA) 400 mg/5 mL Susp, Take 30 mLs by mouth daily as needed., Disp: , Rfl:     meloxicam (MOBIC) 15 MG tablet, Take 1 tablet (15 mg total) by mouth once daily. (Patient taking differently: Take 15 mg by mouth daily as needed for Pain.), Disp: 30 tablet, Rfl: 2    multivitamin-minerals-lutein Tab, Take 1 tablet by mouth once daily. , Disp: , Rfl:     naproxen sodium (ANAPROX) 220 MG tablet, Take 220 mg by mouth daily as needed., Disp: , Rfl:     SYNTHROID 137 mcg Tab tablet, TAKE 1 TABLET(137 MCG) BY MOUTH  EVERY DAY (Patient taking differently: TAKE 1 TABLET(137 MCG) BY MOUTH EVERY DAY EXCEPT MONDAY), Disp: 90 tablet, Rfl: 4    Review of Systems:  Constitutional: no fever or chills  Eyes: no visual changes  ENT: no nasal congestion or sore throat  Respiratory: no cough or shortness of breath  Cardiovascular: no chest pain or palpitations  Gastrointestinal: no nausea or vomiting, tolerating diet  Genitourinary: no hematuria or dysuria  Integument/Breast: no rash or pruritis  Hematologic/Lymphatic: no easy bruising or lymphadenopathy  Musculoskeletal: positive for knee pain  Neurological: no seizures or tremors  Behavioral/Psych: no auditory or visual hallucinations  Endocrine: no heat or cold intolerance    PE:  Ht 6' (1.829 m)   Wt 95.3 kg (210 lb)   BMI 28.48 kg/m²   General: Pleasant, cooperative, NAD   Gait: antalgic  HEENT: NCAT, sclera nonicteric   Lungs: Respirations clear bilaterally; equal and unlabored.   CV: S1S2; 2+ bilateral upper and lower extremity pulses.   Skin: Intact throughout with no rashes, erythema, or lesions  Extremities: No LE edema,  no erythema or warmth of the skin in either lower extremity.    Left knee exam:  Knee Range of Motion:normal, pain with passive range of motion  Effusion:none  Condition of skin:intact  Location of tenderness:Medial joint line   Strength:normal  Stability: stable to testing    Hip Examination: painless PROM of hip     Radiographs: Radiographs reveal advanced degenerative changes including subchondral cyst formation, subchondral sclerosis, osteophyte formation, joint space narrowing.     Knee Alignment: normal    Diagnosis: Primary osteoarthritis Left knee    Plan: Left total knee arthroplasty    Due to the serious nature of total joint infection and the high prevalence of community acquired MRSA, vancomycin will be used perioperatively.

## 2022-12-04 NOTE — PROGRESS NOTES
Poonam Alvarez is a 71 y.o. year old here today for pre surgery optimization visit  in preparation for a Left total knee arthroplasty to be performed by Dr. Schneider on 12/13/2022.  she was last seen and treated in the clinic on 8/31/2022. she will be medically optimized by the pre op center. There has been no significant change in medical status since last visit. No fever, chills, malaise, or unexplained weight change.      Allergies, Medications, past medical and surgical history reviewed. Pt states that she doesn't want oxycodone because it causes low blood pressure. She agrees to hydrocodone although that is also listed as a risk for low blood pressure. She would like to take tylenol with codeine, but I explained that it likely won't be enough to manage her pain and allow her to do her physical therapy.    Focused examination performed.    Patient declined to see surgeon today. All questions answered. Patient encouraged to call with questions. Contact information given.     Pre, jay, and post operative procedures and expectations discussed. Goals of successful surgery reviewed and include manageable pain levels, surgical site free of infection, medication management, and ambulation with PT/OT assistance. Healthy weight management discussed with patient and caregiver who were receptive to eduction of healthy diet and activity. No other necessary lifestyle changes identified. Educated patient about signs and symptoms of infection, medication management, anticoagulation therapy, risk of tobacco and alcohol use, and self-care to promote healing. Surgical guide given for future reference. Hibiclens given to patient with instructions. All questions were answered.     Poonam Alvarez verbalized an understanding to the education and goals. Patient has displayed readiness to engage in care and is ready to proceed with surgery.  Patient reports her family is able and ready to provide assistance at home after  discharge.    Surgical and blood consents signed.    Poonam Alvarez will contact us if there are any questions, concerns, or changes in medical status prior to surgery.       Joint class: 11/10    Patient has discussed discharge planning with surgeon. Patient will be discharged to home following surgery.   patient will be scheduled with Home Health during hospitalization. Pt discussed with Dr. Schneider and she will start with 2 weeks of HH prior to going to outpatient PT.    30 minutes of time was spent on patient education, review of records, templating, H&P, , appointment scheduling and optimizing patient for surgery.

## 2022-12-04 NOTE — H&P (VIEW-ONLY)
CC:  Left knee pain    Poonam Alvarez is a 71 y.o. female with history of Left knee pain. Pain is worse with activity and weight bearing.  Patient has experienced interference of activities of daily living due to decreased range of motion and an increase in joint pain and swelling.  Patient has failed non-operative treatment including NSAIDs, corticosteroid injections, viscosupplement injections, and activity modification.  Poonam Alvarez currently ambulates independently.     Relevant medical conditions of significance in perioperative period:  HTN- on medication managed by pcp  Hypothyroidism (postablative)- on medication managed by pcp      Past Medical History:   Diagnosis Date    AR (allergic rhinitis) 12/07/2012    Atrial flutter     ablation October 2008    Cataract     Generalized headaches     GERD (gastroesophageal reflux disease) 03/08/2013    resolved    Grave's disease     s/p radioactive iodine, now hypothyroidism    Keloid cicatrix     Obesity     Osteoarthritis     shoulders, hands, knees    Positive PPD, treated     9 months of INH, completed 1/2010       Past Surgical History:   Procedure Laterality Date    ABLATION OF DYSRHYTHMIC FOCUS  10/24/2008    atrial flutter    ARTHROPLASTY OF SHOULDER Right 04/01/2019    Procedure: ARTHROPLASTY, SHOULDER;  Surgeon: Sage Xie MD;  Location: [facility];  Service: Orthopedics;  Laterality: Right;  regional w/ catheter (q-ball)    CATARACT EXTRACTION W/  INTRAOCULAR LENS IMPLANT Bilateral     COLONOSCOPY N/A 11/05/2015    Procedure: COLONOSCOPY;  Surgeon: Jose Ly MD;  Location: [address]);  Service: Endoscopy;  Laterality: N/A;  Last colonoscopy 2008 with Dr. Vieira; Pt wanted this day    EYE SURGERY      cataract removal right eye    HERNIA REPAIR      JOINT REPLACEMENT      KNEE ARTHROSCOPY W/ DEBRIDEMENT      right, 2005 and 2008    KNEE SURGERY  03/27/2014    right TKA       Family History   Problem Relation Age of Onset     Arthritis Mother     Arthritis Father     Glaucoma Father     Cataracts Father     Hepatitis Brother     No Known Problems Daughter     No Known Problems Son     No Known Problems Son     No Known Problems Maternal Aunt     No Known Problems Maternal Uncle     No Known Problems Paternal Aunt     No Known Problems Paternal Uncle     No Known Problems Maternal Grandmother     No Known Problems Maternal Grandfather     No Known Problems Paternal Grandmother     No Known Problems Paternal Grandfather     Colon cancer Neg Hx     Ovarian cancer Neg Hx     Breast cancer Neg Hx        Review of patient's allergies indicates:   Allergen Reactions    Hydrocodone-acetaminophen Other (See Comments)     Low blood pressure; doesn't want    Oxycodone-acetaminophen Other (See Comments)     Causes low blood pressure; doesn't want  4/4/19 - patient reports she is not actually allergic to it (just doesn't like how it makes her feel)         Current Outpatient Medications:     aspirin (ECOTRIN) 81 MG EC tablet, Take 81 mg by mouth once daily., Disp: , Rfl:     blood pressure monitor (BLOOD PRESSURE KIT) Kit, Use as directed, Disp: 1 each, Rfl: 0    cholecalciferol, vitamin D3, (VITAMIN D3) 50 mcg (2,000 unit) Cap, Take 1 capsule by mouth once daily., Disp: , Rfl:     hydroCHLOROthiazide (HYDRODIURIL) 12.5 MG Tab, TAKE 1/2 TO 1 TABLET BY MOUTH DAILY AS NEEDED, Disp: 90 tablet, Rfl: 3    magnesium hydroxide 400 mg/5 ml (MILK OF MAGNESIA) 400 mg/5 mL Susp, Take 30 mLs by mouth daily as needed., Disp: , Rfl:     meloxicam (MOBIC) 15 MG tablet, Take 1 tablet (15 mg total) by mouth once daily. (Patient taking differently: Take 15 mg by mouth daily as needed for Pain.), Disp: 30 tablet, Rfl: 2    multivitamin-minerals-lutein Tab, Take 1 tablet by mouth once daily. , Disp: , Rfl:     naproxen sodium (ANAPROX) 220 MG tablet, Take 220 mg by mouth daily as needed., Disp: , Rfl:     SYNTHROID 137 mcg Tab tablet, TAKE 1 TABLET(137 MCG) BY MOUTH  EVERY DAY (Patient taking differently: TAKE 1 TABLET(137 MCG) BY MOUTH EVERY DAY EXCEPT MONDAY), Disp: 90 tablet, Rfl: 4    Review of Systems:  Constitutional: no fever or chills  Eyes: no visual changes  ENT: no nasal congestion or sore throat  Respiratory: no cough or shortness of breath  Cardiovascular: no chest pain or palpitations  Gastrointestinal: no nausea or vomiting, tolerating diet  Genitourinary: no hematuria or dysuria  Integument/Breast: no rash or pruritis  Hematologic/Lymphatic: no easy bruising or lymphadenopathy  Musculoskeletal: positive for knee pain  Neurological: no seizures or tremors  Behavioral/Psych: no auditory or visual hallucinations  Endocrine: no heat or cold intolerance    PE:  Ht 6' (1.829 m)   Wt 95.3 kg (210 lb)   BMI 28.48 kg/m²   General: Pleasant, cooperative, NAD   Gait: antalgic  HEENT: NCAT, sclera nonicteric   Lungs: Respirations clear bilaterally; equal and unlabored.   CV: S1S2; 2+ bilateral upper and lower extremity pulses.   Skin: Intact throughout with no rashes, erythema, or lesions  Extremities: No LE edema,  no erythema or warmth of the skin in either lower extremity.    Left knee exam:  Knee Range of Motion:normal, pain with passive range of motion  Effusion:none  Condition of skin:intact  Location of tenderness:Medial joint line   Strength:normal  Stability: stable to testing    Hip Examination: painless PROM of hip     Radiographs: Radiographs reveal advanced degenerative changes including subchondral cyst formation, subchondral sclerosis, osteophyte formation, joint space narrowing.     Knee Alignment: normal    Diagnosis: Primary osteoarthritis Left knee    Plan: Left total knee arthroplasty    Due to the serious nature of total joint infection and the high prevalence of community acquired MRSA, vancomycin will be used perioperatively.

## 2022-12-05 ENCOUNTER — PATIENT MESSAGE (OUTPATIENT)
Dept: ADMINISTRATIVE | Facility: OTHER | Age: 72
End: 2022-12-05
Payer: MEDICARE

## 2022-12-08 ENCOUNTER — TELEPHONE (OUTPATIENT)
Dept: ADMINISTRATIVE | Facility: CLINIC | Age: 72
End: 2022-12-08
Payer: MEDICARE

## 2022-12-09 ENCOUNTER — CLINICAL SUPPORT (OUTPATIENT)
Dept: REHABILITATION | Facility: HOSPITAL | Age: 72
End: 2022-12-09
Attending: ORTHOPAEDIC SURGERY
Payer: MEDICARE

## 2022-12-09 DIAGNOSIS — M17.12 PRIMARY OSTEOARTHRITIS OF LEFT KNEE: ICD-10-CM

## 2022-12-09 DIAGNOSIS — R29.898 DECREASED STRENGTH OF LOWER EXTREMITY: ICD-10-CM

## 2022-12-09 DIAGNOSIS — M25.662 DECREASED RANGE OF MOTION (ROM) OF LEFT KNEE: ICD-10-CM

## 2022-12-09 DIAGNOSIS — R26.89 IMPAIRED GAIT AND MOBILITY: ICD-10-CM

## 2022-12-09 PROCEDURE — 97161 PT EVAL LOW COMPLEX 20 MIN: CPT

## 2022-12-09 PROCEDURE — 97110 THERAPEUTIC EXERCISES: CPT

## 2022-12-09 NOTE — PLAN OF CARE
[facility]   Physical Therapy Initial Evaluation     Date: 12/9/2022   Name: Poonam Alvarez  Clinic Number: [id number]    Therapy Diagnosis:   Encounter Diagnoses   Name Primary?    Primary osteoarthritis of left knee     Decreased range of motion (ROM) of left knee     Impaired gait and mobility     Decreased strength of lower extremity      Physician: Gordon Schneider MD    Physician Orders: PT Eval and Treat   Medical Diagnosis from Referral: M17.12 (ICD-10-CM) - Primary osteoarthritis of left knee  Evaluation Date: 12/9/2022  Authorization Period Expiration: 10/6/2023  Plan of Care Expiration: 12/13/2022  Progress Note Due: 10th visit  Visit # / Visits authorized: 1/ 1   FOTO: 1/1    Precautions: Standard     Time In: 10:00 am  Time Out: 10:45 am  Total Appointment Time (timed & untimed codes): 45 minutes      SUBJECTIVE     Date of onset: chronic    History of current condition - Poonam reports: she will have a left total knee arthroplasty on 12/13/2022. Patient reports she had a previous R TKA in 2014 and and R TSA in 2019. Patient reports she has support system for after her L TKA.     Falls: no    Imaging, please see imaging    Prior Therapy: yes  Social History:  lives alone, 4 steps to enter, 2 story home; daughter is planning to stay with her for a week after surgery   Occupation:    Prior Level of Function: independent  Current Level of Function: difficulty and pain with knee flexion, pain and difficulty walking long distances and climbing stairs.     Pain:  Current 0/10, worst 10/10, best 0/10   Location: left knee   Description: Aching, Burning, and Throbbing  Aggravating Factors: Walking and Getting out of bed/chair  Easing Factors: pain medication    Patients goals: travel to [place] in the Spring 2023, return to the gym     Medical History:   Past Medical History:   Diagnosis Date    AR (allergic rhinitis) 12/07/2012    Atrial flutter     ablation October  "2008    Cataract     Generalized headaches     GERD (gastroesophageal reflux disease) 03/08/2013    resolved    Grave's disease     s/p radioactive iodine, now hypothyroidism    Keloid cicatrix     Obesity     Osteoarthritis     shoulders, hands, knees    Positive PPD, treated     9 months of INH, completed 1/2010       Surgical History:   Poonam Alvarez  has a past surgical history that includes Eye surgery; Knee arthroscopy w/ debridement; Ablation of dysrhythmic focus (10/24/2008); Knee surgery (03/27/2014); Colonoscopy (N/A, 11/05/2015); Hernia repair; Arthroplasty of shoulder (Right, 04/01/2019); Cataract extraction w/  intraocular lens implant (Bilateral); and Joint replacement.    Medications:   Poonam has a current medication list which includes the following prescription(s): aspirin, blood pressure monitor, cholecalciferol (vitamin d3), hydrochlorothiazide, magnesium hydroxide 400 mg/5 ml, meloxicam, multivitamin-minerals-lutein, naproxen sodium, and synthroid.    Allergies:   Review of patient's allergies indicates:   Allergen Reactions    Hydrocodone-acetaminophen Other (See Comments)     Low blood pressure; doesn't want    Oxycodone-acetaminophen Other (See Comments)     Causes low blood pressure; doesn't want  4/4/19 - patient reports she is not actually allergic to it (just doesn't like how it makes her feel)          OBJECTIVE     Palpation: poor left patella mobility; audible crepitus with flexion<>extension    Active range of motion:  Right knee: 0-105   Left knee: 0-95    Passive range of motion:  Right knee: 0-108  Left knee: 0-95      Lower extremity manual muscle test Right Left Pain/dysfunction with movement   Hip flexion 4-/5 4-/5    Hip extension 3+/5 3+/5    Hip abduction 4-/5 3+/5    Knee flexion 4-/5 4-/5 Pain in R knee   Knee extension 4+/5 4-/5 Pain in R knee     Timed Up and Go:  9 seconds indepent    30" Chair Stand: 9 with upper extremity support; audible crepitus    Gait Analysis: " independent ambulation; forward trunk lean    Impairment/Limitation/Restriction for KOOS JR. Score on FOTO Knee Survey    Therapist reviewed KOOS scores for Poonam Alvarez on 12/9/2022.   KOOS documents entered into EPIC - see Media section.    Limitation Score: 44.9%    Limitation for Total FOTO Knee Survey  Limitation Score: 66%         TREATMENT     Total Treatment time (time-based codes) separate from Evaluation: 10 minutes      Poonam received the treatments listed below:      therapeutic exercises to develop strength, endurance, ROM, and flexibility for 10 minutes including:  Quad sets with towel under knee 5 second, 15x  Short arc qauds with small bolster 5 seconds, 15x  Long arc quads 5 seconds, 10x - discontinue due to anterior knee pain    PATIENT EDUCATION AND HOME EXERCISES     Education provided:   - reviewed HEP    Written Home Exercises Provided: yes. Exercises were reviewed and Poonam was able to demonstrate them prior to the end of the session.  Poonam demonstrated good  understanding of the education provided. See EMR under Patient Instructions for exercises provided during therapy sessions.    ASSESSMENT     oPonam is a 71 y.o. female referred to outpatient Physical Therapy with a medical diagnosis of Primary osteoarthritis of left knee. Patient presents to initial evaluation for prehabiltation in anticipation of L total knee arthroplasty scheduled for 12/13/2022. Patient presents with chronic L knee pain, reduced strength and poor motor control of L quadriceps, and reduced overall lower extremity strength. Patient would benefit from skilled outpatient physical therapy to address quadriceps motor control and lower extremity strength deficits so that she may return to full independence with all household and personal ADL's, and improve overall functional mobility prior to surgery. Due to patient's surgery scheduled on 12/13/2022, therapy to be held until evaluation post operatively.       Patient  prognosis is Good.   Patient will benefit from skilled outpatient Physical Therapy to address the deficits stated above and in the chart below, provide patient /family education, and to maximize patientt's level of independence.     Plan of care discussed with patient: Yes  Patient's spiritual, cultural and educational needs considered and patient is agreeable to the plan of care and goals as stated below:     Anticipated Barriers for therapy: none    Medical Necessity is demonstrated by the following  History  Co-morbidities and personal factors that may impact the plan of care Co-morbidities:   R total knee arthroplasty, R total shoulder arthroplasty    Personal Factors:   no deficits     low   Examination  Body Structures and Functions, activity limitations and participation restrictions that may impact the plan of care Body Regions:   lower extremities    Body Systems:    gross symmetry  ROM  strength  balance  gait  transfers  motor control    Participation Restrictions:   none    Activity limitations:   Learning and applying knowledge  no deficits    General Tasks and Commands  no deficits    Communication  no deficits    Mobility  walking  driving (bike, car, motorcycle)    Self care  no deficits    Domestic Life  no deficits    Interactions/Relationships  no deficits    Life Areas  no deficits    Community and Social Life  no deficits         low   Clinical Presentation stable and uncomplicated low   Decision Making/ Complexity Score: low     Goals:  Long Term Goals (3 Weeks):   1. Pt will be independent with HEP to supplement physical therapy treatment in improving functional status.   2. Pt will demonstrate improved impaired lower extremity strength by 1/2 grade to promote functional mobility.   3. Patient will require <2 verbal and tactile cue to engage quadricep without compensation to demonstrate improved quadriceps control and awareness.       PLAN   Plan of care Certification: 12/9/2022 to  12/13/2023.    Outpatient Physical Therapy on hold until post op due surgery scheduled for 12/13/2022. Updated plan of care next visit.    Barbi Barrera, PT      I CERTIFY THE NEED FOR THESE SERVICES FURNISHED UNDER THIS PLAN OF TREATMENT AND WHILE UNDER MY CARE   Physician's comments:     Physician's Signature: ___________________________________________________

## 2022-12-12 ENCOUNTER — PATIENT MESSAGE (OUTPATIENT)
Dept: ADMINISTRATIVE | Facility: OTHER | Age: 72
End: 2022-12-12
Payer: MEDICARE

## 2022-12-12 ENCOUNTER — TELEPHONE (OUTPATIENT)
Dept: ORTHOPEDICS | Facility: CLINIC | Age: 72
End: 2022-12-12
Payer: MEDICARE

## 2022-12-12 ENCOUNTER — ANESTHESIA EVENT (OUTPATIENT)
Dept: SURGERY | Facility: HOSPITAL | Age: 72
End: 2022-12-12
Payer: MEDICARE

## 2022-12-12 NOTE — TELEPHONE ENCOUNTER
----- Message from Barbi Guido sent at 12/12/2022  2:25 PM CST -----  Contact: Pt  Pt is returning missed phone call from provider staff. Pt stated it may be in regards to instructions and arrival time for surgery on tomorrow. Pt is requesting  a callback.       Confirmed contact below:   Contact Name:Poonam Alvarez  Phone Number: [phone]

## 2022-12-12 NOTE — TELEPHONE ENCOUNTER
Contacted the patient to inform her of tomorrow's arrival procedure time. She is to arrive at 1030 to the [place] location.

## 2022-12-13 ENCOUNTER — ANESTHESIA (OUTPATIENT)
Dept: SURGERY | Facility: HOSPITAL | Age: 72
End: 2022-12-13
Payer: MEDICARE

## 2022-12-13 ENCOUNTER — HOSPITAL ENCOUNTER (OUTPATIENT)
Facility: HOSPITAL | Age: 72
Discharge: HOME | End: 2022-12-14
Attending: ORTHOPAEDIC SURGERY | Admitting: ORTHOPAEDIC SURGERY
Payer: MEDICARE

## 2022-12-13 DIAGNOSIS — M17.12 PRIMARY OSTEOARTHRITIS OF LEFT KNEE: ICD-10-CM

## 2022-12-13 PROCEDURE — 71000033 HC RECOVERY, INTIAL HOUR: Performed by: ORTHOPAEDIC SURGERY

## 2022-12-13 PROCEDURE — 27100025 HC TUBING, SET FLUID WARMER: Performed by: ANESTHESIOLOGY

## 2022-12-13 PROCEDURE — 25000003 PHARM REV CODE 250: Performed by: NURSE PRACTITIONER

## 2022-12-13 PROCEDURE — 25000003 PHARM REV CODE 250: Performed by: ANESTHESIOLOGY

## 2022-12-13 PROCEDURE — 27447 PR TOTAL KNEE ARTHROPLASTY: ICD-10-PCS | Mod: AS,LT,, | Performed by: PHYSICIAN ASSISTANT

## 2022-12-13 PROCEDURE — 63600175 PHARM REV CODE 636 W HCPCS: Performed by: NURSE ANESTHETIST, CERTIFIED REGISTERED

## 2022-12-13 PROCEDURE — 97165 OT EVAL LOW COMPLEX 30 MIN: CPT

## 2022-12-13 PROCEDURE — D9220A PRA ANESTHESIA: Mod: ANES,,, | Performed by: ANESTHESIOLOGY

## 2022-12-13 PROCEDURE — D9220A PRA ANESTHESIA: ICD-10-PCS | Mod: ANES,,, | Performed by: ANESTHESIOLOGY

## 2022-12-13 PROCEDURE — 64448 LEFT ADDUCTOR CANAL CATHETER: ICD-10-PCS | Mod: 59,LT,, | Performed by: ANESTHESIOLOGY

## 2022-12-13 PROCEDURE — 37000009 HC ANESTHESIA EA ADD 15 MINS: Performed by: ORTHOPAEDIC SURGERY

## 2022-12-13 PROCEDURE — 36000710: Performed by: ORTHOPAEDIC SURGERY

## 2022-12-13 PROCEDURE — 99900035 HC TECH TIME PER 15 MIN (STAT)

## 2022-12-13 PROCEDURE — 63600175 PHARM REV CODE 636 W HCPCS: Performed by: STUDENT IN AN ORGANIZED HEALTH CARE EDUCATION/TRAINING PROGRAM

## 2022-12-13 PROCEDURE — 63600175 PHARM REV CODE 636 W HCPCS: Performed by: NURSE PRACTITIONER

## 2022-12-13 PROCEDURE — D9220A PRA ANESTHESIA: ICD-10-PCS | Mod: CRNA,,, | Performed by: NURSE ANESTHETIST, CERTIFIED REGISTERED

## 2022-12-13 PROCEDURE — 76942 ECHO GUIDE FOR BIOPSY: CPT | Performed by: ANESTHESIOLOGY

## 2022-12-13 PROCEDURE — 76942 LEFT ADDUCTOR CANAL CATHETER: ICD-10-PCS | Mod: 26,,, | Performed by: ANESTHESIOLOGY

## 2022-12-13 PROCEDURE — 64448 NJX AA&/STRD FEM NRV NFS IMG: CPT | Mod: 59,LT,, | Performed by: ANESTHESIOLOGY

## 2022-12-13 PROCEDURE — 71000039 HC RECOVERY, EACH ADD'L HOUR: Performed by: ORTHOPAEDIC SURGERY

## 2022-12-13 PROCEDURE — 25000003 PHARM REV CODE 250: Performed by: NURSE ANESTHETIST, CERTIFIED REGISTERED

## 2022-12-13 PROCEDURE — 27447 PR TOTAL KNEE ARTHROPLASTY: ICD-10-PCS | Mod: LT,,, | Performed by: ORTHOPAEDIC SURGERY

## 2022-12-13 PROCEDURE — 27447 TOTAL KNEE ARTHROPLASTY: CPT | Mod: LT,,, | Performed by: ORTHOPAEDIC SURGERY

## 2022-12-13 PROCEDURE — C1713 ANCHOR/SCREW BN/BN,TIS/BN: HCPCS | Performed by: ORTHOPAEDIC SURGERY

## 2022-12-13 PROCEDURE — 37000008 HC ANESTHESIA 1ST 15 MINUTES: Performed by: ORTHOPAEDIC SURGERY

## 2022-12-13 PROCEDURE — D9220A PRA ANESTHESIA: Mod: CRNA,,, | Performed by: NURSE ANESTHETIST, CERTIFIED REGISTERED

## 2022-12-13 PROCEDURE — 27447 TOTAL KNEE ARTHROPLASTY: CPT | Mod: AS,LT,, | Performed by: PHYSICIAN ASSISTANT

## 2022-12-13 PROCEDURE — 94799 UNLISTED PULMONARY SVC/PX: CPT

## 2022-12-13 PROCEDURE — C1776 JOINT DEVICE (IMPLANTABLE): HCPCS | Performed by: ORTHOPAEDIC SURGERY

## 2022-12-13 PROCEDURE — 27201423 OPTIME MED/SURG SUP & DEVICES STERILE SUPPLY: Performed by: ORTHOPAEDIC SURGERY

## 2022-12-13 PROCEDURE — 97535 SELF CARE MNGMENT TRAINING: CPT

## 2022-12-13 PROCEDURE — 36000711: Performed by: ORTHOPAEDIC SURGERY

## 2022-12-13 PROCEDURE — 63600175 PHARM REV CODE 636 W HCPCS: Performed by: ANESTHESIOLOGY

## 2022-12-13 PROCEDURE — 94761 N-INVAS EAR/PLS OXIMETRY MLT: CPT

## 2022-12-13 DEVICE — INSERT TIBIAL POST SZ 5 9MM: Type: IMPLANTABLE DEVICE | Site: KNEE | Status: FUNCTIONAL

## 2022-12-13 DEVICE — PATELLA TRIATHLON 33X9 SYMTRC: Type: IMPLANTABLE DEVICE | Site: KNEE | Status: FUNCTIONAL

## 2022-12-13 DEVICE — CEMENT BONE SURG SMPLX P RADPQ: Type: IMPLANTABLE DEVICE | Site: KNEE | Status: FUNCTIONAL

## 2022-12-13 DEVICE — COMP FEM POST STAB CEM SZ 5 LT: Type: IMPLANTABLE DEVICE | Site: KNEE | Status: FUNCTIONAL

## 2022-12-13 DEVICE — PRIMARY TIBIAL BASE 5.: Type: IMPLANTABLE DEVICE | Site: KNEE | Status: FUNCTIONAL

## 2022-12-13 RX ORDER — PROPOFOL 10 MG/ML
VIAL (ML) INTRAVENOUS CONTINUOUS PRN
Status: DISCONTINUED | OUTPATIENT
Start: 2022-12-13 | End: 2022-12-13

## 2022-12-13 RX ORDER — MUPIROCIN 20 MG/G
1 OINTMENT TOPICAL
Status: COMPLETED | OUTPATIENT
Start: 2022-12-13 | End: 2022-12-13

## 2022-12-13 RX ORDER — LIDOCAINE HYDROCHLORIDE 10 MG/ML
1 INJECTION, SOLUTION EPIDURAL; INFILTRATION; INTRACAUDAL; PERINEURAL
Status: DISCONTINUED | OUTPATIENT
Start: 2022-12-13 | End: 2022-12-13 | Stop reason: HOSPADM

## 2022-12-13 RX ORDER — OXYCODONE HYDROCHLORIDE 5 MG/1
5 TABLET ORAL
Status: DISCONTINUED | OUTPATIENT
Start: 2022-12-13 | End: 2022-12-14 | Stop reason: HOSPADM

## 2022-12-13 RX ORDER — ROPIVACAINE HYDROCHLORIDE 5 MG/ML
INJECTION, SOLUTION EPIDURAL; INFILTRATION; PERINEURAL
Status: COMPLETED | OUTPATIENT
Start: 2022-12-13 | End: 2022-12-13

## 2022-12-13 RX ORDER — OXYCODONE HYDROCHLORIDE 10 MG/1
10 TABLET ORAL
Status: DISCONTINUED | OUTPATIENT
Start: 2022-12-13 | End: 2022-12-14 | Stop reason: HOSPADM

## 2022-12-13 RX ORDER — ROPIVACAINE HYDROCHLORIDE 2 MG/ML
INJECTION, SOLUTION EPIDURAL; INFILTRATION; PERINEURAL CONTINUOUS
Status: DISCONTINUED | OUTPATIENT
Start: 2022-12-13 | End: 2022-12-14 | Stop reason: HOSPADM

## 2022-12-13 RX ORDER — MORPHINE SULFATE 2 MG/ML
2 INJECTION, SOLUTION INTRAMUSCULAR; INTRAVENOUS
Status: DISCONTINUED | OUTPATIENT
Start: 2022-12-13 | End: 2022-12-14 | Stop reason: HOSPADM

## 2022-12-13 RX ORDER — LIDOCAINE HYDROCHLORIDE 20 MG/ML
INJECTION INTRAVENOUS
Status: DISCONTINUED | OUTPATIENT
Start: 2022-12-13 | End: 2022-12-13

## 2022-12-13 RX ORDER — ONDANSETRON 2 MG/ML
4 INJECTION INTRAMUSCULAR; INTRAVENOUS EVERY 8 HOURS PRN
Status: DISCONTINUED | OUTPATIENT
Start: 2022-12-13 | End: 2022-12-14 | Stop reason: HOSPADM

## 2022-12-13 RX ORDER — HYDROCHLOROTHIAZIDE 12.5 MG/1
12.5 TABLET ORAL DAILY
Status: DISCONTINUED | OUTPATIENT
Start: 2022-12-14 | End: 2022-12-14 | Stop reason: HOSPADM

## 2022-12-13 RX ORDER — METHOCARBAMOL 750 MG/1
750 TABLET, FILM COATED ORAL 3 TIMES DAILY PRN
Qty: 30 TABLET | Refills: 0 | Status: SHIPPED | OUTPATIENT
Start: 2022-12-13 | End: 2023-02-03

## 2022-12-13 RX ORDER — PREGABALIN 75 MG/1
75 CAPSULE ORAL NIGHTLY
Status: DISCONTINUED | OUTPATIENT
Start: 2022-12-13 | End: 2022-12-14 | Stop reason: HOSPADM

## 2022-12-13 RX ORDER — FAMOTIDINE 10 MG/ML
INJECTION INTRAVENOUS
Status: DISCONTINUED | OUTPATIENT
Start: 2022-12-13 | End: 2022-12-13

## 2022-12-13 RX ORDER — SODIUM CHLORIDE 9 MG/ML
INJECTION, SOLUTION INTRAVENOUS CONTINUOUS
Status: DISCONTINUED | OUTPATIENT
Start: 2022-12-13 | End: 2022-12-14 | Stop reason: HOSPADM

## 2022-12-13 RX ORDER — HYDROCODONE BITARTRATE AND ACETAMINOPHEN 10; 325 MG/1; MG/1
1 TABLET ORAL EVERY 6 HOURS PRN
Qty: 50 TABLET | Refills: 0 | Status: SHIPPED | OUTPATIENT
Start: 2022-12-13 | End: 2023-02-03

## 2022-12-13 RX ORDER — METHOCARBAMOL 750 MG/1
750 TABLET, FILM COATED ORAL 4 TIMES DAILY
Status: DISCONTINUED | OUTPATIENT
Start: 2022-12-13 | End: 2022-12-14 | Stop reason: HOSPADM

## 2022-12-13 RX ORDER — ASPIRIN 81 MG/1
81 TABLET ORAL 2 TIMES DAILY
Status: DISCONTINUED | OUTPATIENT
Start: 2022-12-14 | End: 2022-12-14 | Stop reason: HOSPADM

## 2022-12-13 RX ORDER — SODIUM CHLORIDE 0.9 % (FLUSH) 0.9 %
10 SYRINGE (ML) INJECTION
Status: DISCONTINUED | OUTPATIENT
Start: 2022-12-13 | End: 2022-12-13 | Stop reason: HOSPADM

## 2022-12-13 RX ORDER — CARBOXYMETHYLCELLULOSE SODIUM 10 MG/ML
GEL OPHTHALMIC
Status: DISCONTINUED | OUTPATIENT
Start: 2022-12-13 | End: 2022-12-13

## 2022-12-13 RX ORDER — KETAMINE HCL IN 0.9 % NACL 50 MG/5 ML
SYRINGE (ML) INTRAVENOUS
Status: DISCONTINUED | OUTPATIENT
Start: 2022-12-13 | End: 2022-12-13

## 2022-12-13 RX ORDER — CELECOXIB 200 MG/1
200 CAPSULE ORAL DAILY
Qty: 30 CAPSULE | Refills: 0 | Status: SHIPPED | OUTPATIENT
Start: 2022-12-13 | End: 2023-01-13

## 2022-12-13 RX ORDER — DEXAMETHASONE SODIUM PHOSPHATE 4 MG/ML
INJECTION, SOLUTION INTRA-ARTICULAR; INTRALESIONAL; INTRAMUSCULAR; INTRAVENOUS; SOFT TISSUE
Status: DISCONTINUED | OUTPATIENT
Start: 2022-12-13 | End: 2022-12-13

## 2022-12-13 RX ORDER — AMOXICILLIN 250 MG
1 CAPSULE ORAL 2 TIMES DAILY
Status: DISCONTINUED | OUTPATIENT
Start: 2022-12-13 | End: 2022-12-14 | Stop reason: HOSPADM

## 2022-12-13 RX ORDER — NALOXONE HCL 0.4 MG/ML
0.02 VIAL (ML) INJECTION
Status: DISCONTINUED | OUTPATIENT
Start: 2022-12-13 | End: 2022-12-14 | Stop reason: HOSPADM

## 2022-12-13 RX ORDER — SODIUM CHLORIDE 9 MG/ML
INJECTION, SOLUTION INTRAVENOUS
Status: COMPLETED | OUTPATIENT
Start: 2022-12-13 | End: 2022-12-13

## 2022-12-13 RX ORDER — ACETAMINOPHEN 500 MG
1000 TABLET ORAL EVERY 6 HOURS
Status: DISCONTINUED | OUTPATIENT
Start: 2022-12-13 | End: 2022-12-14 | Stop reason: HOSPADM

## 2022-12-13 RX ORDER — ASPIRIN 81 MG/1
81 TABLET ORAL 2 TIMES DAILY
Qty: 60 TABLET | Refills: 0 | Status: SHIPPED | OUTPATIENT
Start: 2022-12-13 | End: 2024-02-28

## 2022-12-13 RX ORDER — PROPOFOL 10 MG/ML
VIAL (ML) INTRAVENOUS
Status: DISCONTINUED | OUTPATIENT
Start: 2022-12-13 | End: 2022-12-13

## 2022-12-13 RX ORDER — POLYETHYLENE GLYCOL 3350 17 G/17G
17 POWDER, FOR SOLUTION ORAL DAILY
Status: DISCONTINUED | OUTPATIENT
Start: 2022-12-14 | End: 2022-12-14 | Stop reason: HOSPADM

## 2022-12-13 RX ORDER — METHOCARBAMOL 750 MG/1
750 TABLET, FILM COATED ORAL 3 TIMES DAILY
Status: DISCONTINUED | OUTPATIENT
Start: 2022-12-13 | End: 2022-12-13

## 2022-12-13 RX ORDER — PREGABALIN 75 MG/1
75 CAPSULE ORAL
Status: COMPLETED | OUTPATIENT
Start: 2022-12-13 | End: 2022-12-13

## 2022-12-13 RX ORDER — FAMOTIDINE 20 MG/1
20 TABLET, FILM COATED ORAL 2 TIMES DAILY
Status: DISCONTINUED | OUTPATIENT
Start: 2022-12-13 | End: 2022-12-14 | Stop reason: HOSPADM

## 2022-12-13 RX ORDER — TALC
6 POWDER (GRAM) TOPICAL NIGHTLY PRN
Status: DISCONTINUED | OUTPATIENT
Start: 2022-12-13 | End: 2022-12-13 | Stop reason: HOSPADM

## 2022-12-13 RX ORDER — MIDAZOLAM HYDROCHLORIDE 1 MG/ML
4 INJECTION INTRAMUSCULAR; INTRAVENOUS
Status: DISCONTINUED | OUTPATIENT
Start: 2022-12-13 | End: 2022-12-13 | Stop reason: HOSPADM

## 2022-12-13 RX ORDER — FENTANYL CITRATE 50 UG/ML
25 INJECTION, SOLUTION INTRAMUSCULAR; INTRAVENOUS EVERY 5 MIN PRN
Status: DISCONTINUED | OUTPATIENT
Start: 2022-12-13 | End: 2022-12-13 | Stop reason: HOSPADM

## 2022-12-13 RX ORDER — CELECOXIB 200 MG/1
400 CAPSULE ORAL ONCE
Status: COMPLETED | OUTPATIENT
Start: 2022-12-13 | End: 2022-12-13

## 2022-12-13 RX ORDER — DOCUSATE SODIUM 100 MG/1
100 CAPSULE, LIQUID FILLED ORAL 2 TIMES DAILY PRN
Qty: 60 CAPSULE | Refills: 0 | Status: SHIPPED | OUTPATIENT
Start: 2022-12-13 | End: 2023-02-03

## 2022-12-13 RX ORDER — PROCHLORPERAZINE EDISYLATE 5 MG/ML
5 INJECTION INTRAMUSCULAR; INTRAVENOUS EVERY 6 HOURS PRN
Status: DISCONTINUED | OUTPATIENT
Start: 2022-12-13 | End: 2022-12-14 | Stop reason: HOSPADM

## 2022-12-13 RX ORDER — ACETAMINOPHEN 500 MG
1000 TABLET ORAL
Status: COMPLETED | OUTPATIENT
Start: 2022-12-13 | End: 2022-12-13

## 2022-12-13 RX ORDER — SODIUM CHLORIDE 0.9 % (FLUSH) 0.9 %
3 SYRINGE (ML) INJECTION
Status: DISCONTINUED | OUTPATIENT
Start: 2022-12-13 | End: 2022-12-13 | Stop reason: HOSPADM

## 2022-12-13 RX ORDER — MUPIROCIN 20 MG/G
1 OINTMENT TOPICAL 2 TIMES DAILY
Status: DISCONTINUED | OUTPATIENT
Start: 2022-12-13 | End: 2022-12-14 | Stop reason: HOSPADM

## 2022-12-13 RX ORDER — BISACODYL 10 MG
10 SUPPOSITORY, RECTAL RECTAL EVERY 12 HOURS PRN
Status: DISCONTINUED | OUTPATIENT
Start: 2022-12-13 | End: 2022-12-14 | Stop reason: HOSPADM

## 2022-12-13 RX ORDER — FENTANYL CITRATE 50 UG/ML
100 INJECTION, SOLUTION INTRAMUSCULAR; INTRAVENOUS
Status: DISCONTINUED | OUTPATIENT
Start: 2022-12-13 | End: 2022-12-13 | Stop reason: HOSPADM

## 2022-12-13 RX ADMIN — SODIUM CHLORIDE: 9 INJECTION, SOLUTION INTRAVENOUS at 03:12

## 2022-12-13 RX ADMIN — SODIUM CHLORIDE: 0.9 INJECTION, SOLUTION INTRAVENOUS at 10:12

## 2022-12-13 RX ADMIN — LIDOCAINE HYDROCHLORIDE 50 MG: 20 INJECTION INTRAVENOUS at 12:12

## 2022-12-13 RX ADMIN — PREGABALIN 75 MG: 75 CAPSULE ORAL at 10:12

## 2022-12-13 RX ADMIN — DEXAMETHASONE SODIUM PHOSPHATE 8 MG: 4 INJECTION, SOLUTION INTRAMUSCULAR; INTRAVENOUS at 12:12

## 2022-12-13 RX ADMIN — METHOCARBAMOL 750 MG: 750 TABLET ORAL at 03:12

## 2022-12-13 RX ADMIN — MUPIROCIN 1 G: 20 OINTMENT TOPICAL at 10:12

## 2022-12-13 RX ADMIN — METHOCARBAMOL 750 MG: 750 TABLET ORAL at 06:12

## 2022-12-13 RX ADMIN — VANCOMYCIN HYDROCHLORIDE 1500 MG: 1.5 INJECTION, POWDER, LYOPHILIZED, FOR SOLUTION INTRAVENOUS at 10:12

## 2022-12-13 RX ADMIN — ONDANSETRON 4 MG: 2 INJECTION INTRAMUSCULAR; INTRAVENOUS at 04:12

## 2022-12-13 RX ADMIN — OXYCODONE 5 MG: 5 TABLET ORAL at 03:12

## 2022-12-13 RX ADMIN — OXYCODONE 5 MG: 5 TABLET ORAL at 11:12

## 2022-12-13 RX ADMIN — PROPOFOL 25 MG: 10 INJECTION, EMULSION INTRAVENOUS at 12:12

## 2022-12-13 RX ADMIN — ROPIVACAINE HYDROCHLORIDE 10 ML: 5 INJECTION EPIDURAL; INFILTRATION; PERINEURAL at 12:12

## 2022-12-13 RX ADMIN — SODIUM CHLORIDE, SODIUM GLUCONATE, SODIUM ACETATE, POTASSIUM CHLORIDE, MAGNESIUM CHLORIDE, SODIUM PHOSPHATE, DIBASIC, AND POTASSIUM PHOSPHATE: .53; .5; .37; .037; .03; .012; .00082 INJECTION, SOLUTION INTRAVENOUS at 12:12

## 2022-12-13 RX ADMIN — ACETAMINOPHEN 1000 MG: 500 TABLET ORAL at 06:12

## 2022-12-13 RX ADMIN — MIDAZOLAM 2 MG: 1 INJECTION INTRAMUSCULAR; INTRAVENOUS at 11:12

## 2022-12-13 RX ADMIN — FENTANYL CITRATE 25 MCG: 50 INJECTION, SOLUTION INTRAMUSCULAR; INTRAVENOUS at 12:12

## 2022-12-13 RX ADMIN — ACETAMINOPHEN 1000 MG: 500 TABLET ORAL at 11:12

## 2022-12-13 RX ADMIN — TRANEXAMIC ACID 1000 MG: 100 INJECTION, SOLUTION INTRAVENOUS at 12:12

## 2022-12-13 RX ADMIN — CEFAZOLIN 2 G: 2 INJECTION, POWDER, FOR SOLUTION INTRAMUSCULAR; INTRAVENOUS at 12:12

## 2022-12-13 RX ADMIN — CARBOXYMETHYLCELLULOSE SODIUM 4 DROP: 10 GEL OPHTHALMIC at 12:12

## 2022-12-13 RX ADMIN — Medication: at 03:12

## 2022-12-13 RX ADMIN — SODIUM CHLORIDE: 9 INJECTION, SOLUTION INTRAVENOUS at 05:12

## 2022-12-13 RX ADMIN — TRANEXAMIC ACID 1000 MG: 100 INJECTION, SOLUTION INTRAVENOUS at 02:12

## 2022-12-13 RX ADMIN — FAMOTIDINE 20 MG: 10 INJECTION, SOLUTION INTRAVENOUS at 12:12

## 2022-12-13 RX ADMIN — ACETAMINOPHEN 1000 MG: 500 TABLET ORAL at 10:12

## 2022-12-13 RX ADMIN — CELECOXIB 400 MG: 200 CAPSULE ORAL at 10:12

## 2022-12-13 RX ADMIN — METHOCARBAMOL 750 MG: 750 TABLET ORAL at 08:12

## 2022-12-13 RX ADMIN — MUPIROCIN 1 G: 20 OINTMENT TOPICAL at 08:12

## 2022-12-13 RX ADMIN — FAMOTIDINE 20 MG: 20 TABLET ORAL at 08:12

## 2022-12-13 RX ADMIN — SODIUM CHLORIDE: 9 INJECTION, SOLUTION INTRAVENOUS at 10:12

## 2022-12-13 RX ADMIN — MEPIVACAINE HYDROCHLORIDE 4 ML: 15 INJECTION, SOLUTION EPIDURAL; INFILTRATION at 12:12

## 2022-12-13 RX ADMIN — PROPOFOL 100 MCG/KG/MIN: 10 INJECTION, EMULSION INTRAVENOUS at 12:12

## 2022-12-13 RX ADMIN — SODIUM CHLORIDE: 9 INJECTION, SOLUTION INTRAVENOUS at 11:12

## 2022-12-13 RX ADMIN — CEFAZOLIN 2 G: 2 INJECTION, POWDER, FOR SOLUTION INTRAMUSCULAR; INTRAVENOUS at 08:12

## 2022-12-13 RX ADMIN — Medication 20 MG: at 12:12

## 2022-12-13 RX ADMIN — SENNOSIDES AND DOCUSATE SODIUM 1 TABLET: 50; 8.6 TABLET ORAL at 08:12

## 2022-12-13 RX ADMIN — PREGABALIN 75 MG: 75 CAPSULE ORAL at 08:12

## 2022-12-13 RX ADMIN — SODIUM CHLORIDE, SODIUM GLUCONATE, SODIUM ACETATE, POTASSIUM CHLORIDE, MAGNESIUM CHLORIDE, SODIUM PHOSPHATE, DIBASIC, AND POTASSIUM PHOSPHATE: .53; .5; .37; .037; .03; .012; .00082 INJECTION, SOLUTION INTRAVENOUS at 02:12

## 2022-12-13 RX ADMIN — MEPIVACAINE HYDROCHLORIDE 4 ML: 15 INJECTION, SOLUTION EPIDURAL; INFILTRATION at 01:12

## 2022-12-13 NOTE — PLAN OF CARE
OT evaluation completed, POC established as appropriate.    Problem: Occupational Therapy  Goal: Occupational Therapy Goal  Description: Goals set on 12/13, with expiration date 12/16:  Patient will increase functional independence with ADLs by performing:    Bed mobility with [place]  Grooming while standing at sink with [place]  UB Dressing with [place].  LB Dressing with Supervision.  Toileting from toilet with [place] for hygiene and clothing management.   Functional mobility of household and community distance with Mod-[place] and AD as needed  Pt will demonstrate understanding of education provided regarding energy conservation and task modification through teach-back method.      Outcome: Ongoing, Progressing

## 2022-12-13 NOTE — NURSING TRANSFER
Nursing Transfer Note      12/13/2022     Reason patient is being transferred: Post op care complete    Transfer To: Recovery [address]    Transfer via bed    Transfer with IV fluids, IS, patient belongings, chart    Transported by RN and RN    Medicines sent: IV fluids    Any special needs or follow-up needed: PT/OT    Chart send with patient: Yes    Notified: daughter, son, report called to CRUZITO Pulido

## 2022-12-13 NOTE — ANESTHESIA PROCEDURE NOTES
Left adductor canal catheter    Patient location during procedure: pre-op   Block not for primary anesthetic.  Reason for block: at surgeon's request and post-op pain management   Post-op Pain Location: left knee pain   Start time: 12/13/2022 12:00 PM  Timeout: 12/13/2022 11:57 AM   End time: 12/13/2022 12:08 PM    Staffing  Authorizing Provider: Rae Keen MD  Performing Provider: Rae Keen MD    Preanesthetic Checklist  Completed: patient identified, IV checked, site marked, risks and benefits discussed, surgical consent, monitors and equipment checked, pre-op evaluation and timeout performed  Peripheral Block  Patient position: supine  Prep: ChloraPrep and site prepped and draped  Patient monitoring: heart rate, cardiac monitor, continuous pulse ox, continuous capnometry and frequent blood pressure checks  Block type: adductor canal  Laterality: left  Injection technique: continuous  Needle  Needle type: Tuohy   Needle gauge: 17 G  Needle length: 3.5 in  Needle localization: anatomical landmarks and ultrasound guidance  Catheter type: spring wound  Catheter size: 19 G  Test dose: lidocaine 1.5% with Epi 1-to-200,000 and negative   -ultrasound image captured on disc.  Assessment  Injection assessment: negative aspiration, negative parasthesia and local visualized surrounding nerve  Paresthesia pain: none  Heart rate change: no  Slow fractionated injection: yes    Medications:    Medications: ropivacaine (NAROPIN) injection 0.5% - Perineural   10 mL - 12/13/2022 12:08:00 PM    Additional Notes  VSS.  [facility] RN monitoring vitals throughout procedure.  Patient tolerated procedure well.     20  cc of 0.25% ropiv with 1/300k epi used as local

## 2022-12-13 NOTE — PLAN OF CARE
Pre-op complete. Waiting on anesthesia consent. No family present. Belongings will be placed in a locker. Bed locked in lowest position with call bell within reach.

## 2022-12-13 NOTE — OP NOTE
DATE OF PROCEDURE: 12/13/22  PREOPERATIVE DIAGNOSIS: Arthritis, Left knee.   POSTOPERATIVE DIAGNOSIS: Arthritis, Left knee.   PROCEDURES PERFORMED: Left total knee arthroplasty.   SURGEON: Gordon Schneider M.D.   ASSISTANT: Dinah Fowler PA-C (due to the fact that there was no available   qualified resident, Physician's Assistant Dinah Fowler acted as first   assistant during this case).    ANESTHESIA: Regional.   COMPLICATIONS: None.   COUNTS: Correct.   DISPOSITION: Recovery Room, stable.   SPECIMENS: Bone and cartilage.   FINDINGS: Tricompartmental degenerative change.   FLUIDS: 2000 mL.   BLOOD LOSS: Less than 50 mL.   IMPLANTS: Colin Triathlon, size 5 Left posterior stabilized   cemented femoral component with a 5 cemented tibial tray, a 33 mm 3-peg   patella and a size 5, 9 mm posterior stabilized polyethylene insert.   INDICATIONS FOR PROCEDURE: Poonam Alvarez is a 72-year-old female who has   severe arthritis in the Left knee . She is having continued pain in the   Left knee and did not respond to nonoperative measures, decided to undergo   Left knee replacement. She is aware of reasonable treatment options as   well as risks and benefits. She did not respond to NSAIDS or injections. She received a course or pre-operative physical therapy.  PROCEDURE IN DETAIL: After appropriate consent was obtained, the patient   Was brought in the Operating Room, anesthesia was administered. She received   antibiotic prophylaxis. Cast   padding and tourniquet was applied to the proximal Left thigh. Left  lower extremity was prepped and draped in usual sterile fashion. A tme out ws called.  All team members participated.  The proper sight and side was confirmed.  No concerns were expressed. Limb was   elevated, tourniquet was inflated. The knee was flexed. An incision was   made from the tibial tubercle just proximal to the superior pole of the   patella. It was taken down through the skin and retinacular. A medial    parapatellar arthrotomy was performed followed by a standard medial   release. Patella was measured and found to   be 24 mm thick, 9 mm of bone removed. The patella was sized and found to be a 33.  The 3-peg holes were drilled.  Knee was flexed.  Patellar fat pad was partially excised. ACL was resected.   Femoral punch was used to make the guide hole for the femoral drill. The   distal cutting guide was set at 6 degrees valgus left.  A standard distal femoral cut was made.We were pleased with the alignment and quality of the cut.  The PCL retractor was used to bring   the tibia into the field. Meniscal remnants were resected. The   extramedullary tibial guide was pinned in place using the lateral side, as most   defective, 2 mm bone was taken off of this. We checked the alignment in   both planes both before and after making the cut. We were satisfied with the   alignment of the cut and the amount of bone removed.The femur was then  sized, found to be a size 5. A size 5 cutting guide was pinned in 3   degrees of external rotation. This was based off the posterior condyle,   checked the transepicondylar axis. Anterior, posterior and chamfer cuts   were made. The box guide was pinned in position. Femoral box cut was   made. Bone fragments were removed. Lamina spreaders were   then used to open up posteriorly. The PCL was resected and some soft   tissue debris was removed.  A 9 mm spacer block gave excellent flexion-extension gap balance.   I was also satisfied with the medial and lateral stability. At this   point, the femoral trial was placed. The tibia was sized, found to be a   Size 5. A size 5 tibial trial was pinned in appropriate alignment and   rotation. This was based on the medial one third of the tibial tubercle. A keel hole was punched and a   trial reduction was performed with a size 5, 9 mm insert. She  achieved full   extension. There was no recurvatum. She easily had 130 degrees of flexion.   The  femoral component ranged symmetrically in the tibial tray. There was   no lift off. There was no instability of full extension, 30 degrees of   flexion, mid flexion and full flexion. The knee was brought through range of motion. The patella trial was placed. The   patella tracked extremely well. Therefore, at this point, we were   satisfied with knee range of motion and stability, component position,   sizing and alignment as well as patella tracking. All trial components   were removed. Bone was prepared for cementing by pulsatile lavage and   drying. Components were cemented into place, femur followed by tibia.   Meticulous care was taken to remove excess cement. Tibial insert was   firmly seated in the tibial tray. The knee was inspected. There was no   loose body, foreign body or soft tissue interposition. Knee was then   reduced, brought into extension. Patella button was cemented in place.   Excess cement was removed. The wound was irrigated with a dilute sterile betadine solution which was left in place for three minutes, then irrigated again with saline. The arthrotomy was closed with #1 Vicryl. Once the   arthrotomy was closed, the knee was brought through range of motion, stable   as previously described. Patella tracked very well. Retinacular was   closed with 0 Vicryl, subcutaneous layer with 2-0 Vicryl, skin with   Monocrryl and dermabond. Sterile dressing was applied. Tourniquet was let down.  She was   transferred to a stretcher, brought to Recovery Room in stable condition,   tolerated the procedure well, no known complications. A gram of IV TXA was given prior to incision and at closure

## 2022-12-13 NOTE — ANESTHESIA PROCEDURE NOTES
Spinal    Diagnosis: left knee pain  Patient location during procedure: OR  Start time: 12/13/2022 12:30 PM  Timeout: 12/13/2022 12:27 PM  End time: 12/13/2022 12:40 PM    Staffing  Authorizing Provider: Rae Keen MD  Performing Provider: Rae Keen MD    Preanesthetic Checklist  Completed: patient identified, IV checked, site marked, risks and benefits discussed, surgical consent, monitors and equipment checked, pre-op evaluation and timeout performed  Spinal Block  Patient position: sitting  Prep: ChloraPrep  Patient monitoring: heart rate and frequent blood pressure checks  Approach: midline  Location: L2-3  Injection technique: single shot  Needle  Needle type: Stefan   Needle gauge: 25 G  Needle length: 5 in  Needle localization: anatomical landmarks  Assessment  Sensory level: T8   Dermatomal levels determined by pinch or prick  Ease of block: moderate  Patient's tolerance of the procedure: comfortable throughout block  Additional Notes  Difficulty placing spinal with 3.5 inch Stefan - used 17 G 3.5 inch Tuohy to find epidural space and then used 5 inch spinal needle to inject medication    Medications:    Medications: mepivacaine (CARBOCAINE) injection 15 mg/mL (1.5%) - Other   4 mL - 12/13/2022 12:40:00 PM

## 2022-12-13 NOTE — CARE UPDATE
Patient seen and examined at bedside. She is POD0 from left TKA. She is comfortable and reports minimal pain. Vital signs are stable. Dressings are c/d/i with FCD in place.     LLE: 5/5 strength with ankle DF/PF and great toe flexion/extension.  Sensation to light touch intact throughout lower extremity. Palpable DP pulse.     Labs: reviewed  Imaging: reviewed     Plan:  -- Continue with current pain control regimen  -- PT/OT. WBAT LLE  -- DVT prophylaxis: ASA 81mg BID, FCD's to be worn at all times   -- Continue to monitor; will re-assess in AM

## 2022-12-13 NOTE — TRANSFER OF CARE
Anesthesia Transfer of Care Note    Patient: Poonam Alvarez    Procedure(s) Performed: Procedure(s) (LRB):  ARTHROPLASTY, KNEE, TOTAL (Left)    Patient location: PACU    Anesthesia Type: spinal    Transport from OR: Transported from OR on room air with adequate spontaneous ventilation    Post pain: adequate analgesia    Post assessment: no apparent anesthetic complications    Post vital signs: stable    Level of consciousness: awake    Nausea/Vomiting: no nausea/vomiting    Complications: none    Transfer of care protocol was followed      Last vitals:   Visit Vitals  BP (!) 117/58   Pulse 76   Temp 36.2 °C (97.2 °F) (Temporal)   Resp 18   Ht 6' (1.829 m)   Wt 97.1 kg (214 lb)   SpO2 98%   Breastfeeding No   BMI 29.02 kg/m²

## 2022-12-13 NOTE — PLAN OF CARE
Upon placing patient on Purwick device, noticed bed is wet as well as surgical dressing from patient voiding. Pad placed under patient. Soiled cast padding and ace wrap removed and disposed of. Aquacel dry and intact. New cast padding and ace wrap applied. Patient tolerated well. Notified recovery suites, RN.

## 2022-12-13 NOTE — PT/OT/SLP PROGRESS
Physical Therapy      Patient Name:  Poonam Alvarez   MRN:  [id number]    Patient not seen today secondary to: Pt worked with OT and felt nauseated and proceeded to vomit. Will follow-up 12/14/22 for initial evaluation.    12/13/2022

## 2022-12-13 NOTE — INTERVAL H&P NOTE
Poonam Marinson was interviewed, examined and the H and P reviewed.  There has been no interval change in her History and Physical.

## 2022-12-13 NOTE — ANESTHESIA PREPROCEDURE EVALUATION
12/13/2022  Poonam Alvarez is a 72 y.o., female.    Pre-operative evaluation for Procedure(s) (LRB):  ARTHROPLASTY, KNEE, TOTAL (Left)    Poonam Alvarez is a 72 y.o. female     Patient Active Problem List   Diagnosis    Osteoarthritis    History of Graves' disease    Hypothyroidism, postablative    Radial styloid tenosynovitis    Pseudophakia of both eyes    Other specified disorder of skin    GERD (gastroesophageal reflux disease)    Left knee pain    Primary localized osteoarthrosis, lower leg    Knee pain, right    Pain in joint, lower leg    S/P total knee arthroplasty    Pre-syncope    Constipation    Ankle edema    Screening for colon cancer    Bilateral knee pain    Essential hypertension    Ectatic aorta-Noted on imaging 7/27/2015    Posture abnormality    Balance problem    Hypotension    History of atrial flutter    Weight gain    Keloid    Family history of thrombosis    Decreased range of motion (ROM) of left knee    Impaired gait and mobility    Decreased strength of lower extremity       Review of patient's allergies indicates:   Allergen Reactions    Hydrocodone-acetaminophen Other (See Comments)     Low blood pressure; doesn't want    Oxycodone-acetaminophen Other (See Comments)     Causes low blood pressure; doesn't want  4/4/19 - patient reports she is not actually allergic to it (just doesn't like how it makes her feel)       No current facility-administered medications on file prior to encounter.     Current Outpatient Medications on File Prior to Encounter   Medication Sig Dispense Refill    cholecalciferol, vitamin D3, (VITAMIN D3) 50 mcg (2,000 unit) Cap Take 1 capsule by mouth once daily.      hydroCHLOROthiazide (HYDRODIURIL) 12.5 MG Tab TAKE 1/2 TO 1 TABLET BY MOUTH DAILY AS NEEDED 90 tablet 3    magnesium hydroxide 400 mg/5 ml (MILK OF MAGNESIA)  400 mg/5 mL Susp Take 30 mLs by mouth daily as needed.      multivitamin-minerals-lutein Tab Take 1 tablet by mouth once daily.       SYNTHROID 137 mcg Tab tablet TAKE 1 TABLET(137 MCG) BY MOUTH EVERY DAY (Patient taking differently: TAKE 1 TABLET(137 MCG) BY MOUTH EVERY DAY EXCEPT MONDAY) 90 tablet 4    blood pressure monitor (BLOOD PRESSURE KIT) Kit Use as directed 1 each 0       Past Surgical History:   Procedure Laterality Date    ABLATION OF DYSRHYTHMIC FOCUS  10/24/2008    atrial flutter    ARTHROPLASTY OF SHOULDER Right 04/01/2019    Procedure: ARTHROPLASTY, SHOULDER;  Surgeon: Sage Xie MD;  Location: [facility];  Service: Orthopedics;  Laterality: Right;  regional w/ catheter (q-ball)    CATARACT EXTRACTION W/  INTRAOCULAR LENS IMPLANT Bilateral     COLONOSCOPY N/A 11/05/2015    Procedure: COLONOSCOPY;  Surgeon: Jose Ly MD;  Location: [facility] (4TH FLR);  Service: Endoscopy;  Laterality: N/A;  Last colonoscopy 2008 with Dr. Vieira; Pt wanted this day    EYE SURGERY      cataract removal right eye    HERNIA REPAIR      JOINT REPLACEMENT      KNEE ARTHROSCOPY W/ DEBRIDEMENT      right, 2005 and 2008    KNEE SURGERY  03/27/2014    right TKA         CBC:  Lab Results   Component Value Date    WBC 4.67 11/30/2022    RBC 4.69 11/30/2022    HGB 11.9 (L) 11/30/2022    HCT 39.7 11/30/2022     11/30/2022    MCV 85 11/30/2022    MCH 25.4 (L) 11/30/2022    MCHC 30.0 (L) 11/30/2022       CMP:   Lab Results   Component Value Date     11/30/2022    K 4.3 11/30/2022     11/30/2022    CO2 31 (H) 11/30/2022    BUN 14 11/30/2022    CREATININE 0.8 11/30/2022    GLU 87 11/30/2022    CALCIUM 9.8 11/30/2022    ALBUMIN 3.9 07/06/2022    PROT 7.6 07/06/2022    ALKPHOS 81 07/06/2022    ALT 18 07/06/2022    AST 26 07/06/2022    BILITOT 0.4 07/06/2022       INR:  Lab Results   Component Value Date    INR 1.0 11/30/2022         Diagnostic Studies:      EKG:   Results for orders placed  or performed during the hospital encounter of 11/30/22   EKG 12-lead    Collection Time: 11/30/22 12:35 PM    Narrative    Test Reason : Z01.818,    Vent. Rate : 063 BPM     Atrial Rate : 063 BPM     P-R Int : 174 ms          QRS Dur : 086 ms      QT Int : 420 ms       P-R-T Axes : 071 -25 054 degrees     QTc Int : 429 ms    Normal sinus rhythm  Normal ECG  When compared with ECG of 11-FEB-2022 12:27,  Premature atrial complexes are no longer Present  Confirmed by Viviana Oliver MD ([id number]) on 11/30/2022 5:09:33 PM    Referred By: JES HOPKINS           Confirmed By:Viviana Oliver MD        2D Echo:  No results found. However, due to the size of the patient record, not all encounters were searched. Please check Results Review for a complete set of results.    Stress Test:   No results found for this or any previous visit.      Pre-op Vitals [12/13/22 1029]   BP Pulse Resp Temp SpO2   (!) 142/93 87 16 36.2 °C (97.2 °F) 100 %      Height Weight BMI (Calculated)     6' 214 lb 29         Pre-op Vitals [12/13/22 1029]   BP Pulse Resp Temp SpO2   (!) 142/93 87 16 36.2 °C (97.2 °F) 100 %      Height Weight BMI (Calculated)     6' 214 lb 29            Pre-op Assessment    I have reviewed the Patient Summary Reports.     I have reviewed the Nursing Notes.       Review of Systems  Anesthesia Hx:  No problems with previous Anesthesia  History of prior surgery of interest to airway management or planning: Previous anesthesia: General 04/01/2019 ARTHROPLASTY, SHOULDER (Right: Shoulder) with general anesthesia.  Procedure performed at an [facility]. Airway issues documented on chart review include videolaryngoscope used  Denies Family Hx of Anesthesia complications.   Denies Personal Hx of Anesthesia complications.   Social:  Non-Smoker, Social Alcohol Use    Hematology/Oncology:     Oncology Normal    -- Anemia:   EENT/Dental:   Eyes: Visual Impairment Has Bilateral and S/P Extraction - Bilateral Catarract   Cardiovascular:    Hypertension  Denies Angina. HX AFLUTTER TREATED W ABLATION 2008   Functional Capacity good / => 4 METS    Pulmonary:   Denies Shortness of breath. Recent URI RECENT URI TREATED W DOXYCYCLINE--RESOLVED    HX POSITIVE PPD S/P INH COMPLETED (2010)   Renal/:  Renal/ Normal     Hepatic/GI:   GERD ACID REFLUX RESOLVED   Musculoskeletal:  Joint Disease:  Arthritis, Osteoarthritis PRIMARY OSTEOARTHRITIS LEFT KNEE    R TKA 2014    R TSA 2019   Neurological:  Neurology Normal HX OF HEADACHES--RESOLVED Osteoarthritis    Endocrine:   Hypothyroidism HX GRAVES DISEASE S/P RADIOACTIVE IODINE   Dermatological:  Skin Normal    Psych:  Psychiatric Normal           Physical Exam  General: Well nourished, Cooperative, Alert and Oriented    Airway:  Mouth Opening: Normal  Tongue: Normal  Neck ROM: Normal ROM    Dental:  Intact          Anesthesia Assessment: Preoperative EQUATION    Planned Procedure: Procedure(s) (LRB):  ARTHROPLASTY, KNEE, TOTAL (Left)  Requested Anesthesia Type:Regional  Surgeon: Gordon Schneider MD  Service: Orthopedics  Known or anticipated Date of Surgery:12/13/2022    Surgeon notes: reviewed    Electronic QUestionnaire Assessment completed via nurse interview with patient.        Triage considerations:     The patient has no apparent active cardiac condition (No unstable coronary Syndrome such as severe unstable angina or recent [<1 month] myocardial infarction, decompensated CHF, severe valvular   disease or significant arrhythmia)    Previous anesthesia records:GETA and No problems  4/1/19    ARTHROPLASTY, SHOULDER (Right: Shoulder)   Airway/Jaw/Neck:  Airway Findings: Mouth Opening: Normal Tongue: Normal  General Airway Assessment: Adult  Mallampati: I  TM Distance: Normal, at least 6 cm  Jaw/Neck Findings:  Neck ROM: Normal ROM     Method of Intubation: Gerber Inserted by: CRNA Airway Device: Endotracheal Tube Mask Ventilation: Easy Intubated: Postinduction Blade: Dylan #3 Airway Device Size: 7.5  Style: Cuffed Cuff Inflation: Minimal occlusive pressure Placement Verified By: Auscultation;Capnometry Grade: Grade I Complicating Factors: None Findings Post-Intubation: Positive EtCO2;Bilateral breath sounds;Atraumatic/Condition of teeth unchanged Depth of Insertion (cm): 21 Secured at: Lips Complications: None Breath Sounds: Equal Bilateral Insertion attempts (enter comment if more than 2 attempts): 1      Last PCP note: 3-6 months ago , within [facility] , Dr. Lindsey Bach  Subspecialty notes: Ortho    Other important co-morbidities: GERD, HTN, Hypothyroid, and Graves Disease (s/p radioactive iodine), Positive PPD (tx x 9 mos INH, complete in 2010), h/o Afib (ablation 2008), RTKA 2014 Dr. Schneider       Tests already available:  Available tests,  within [facility] .   7/6/22 TSH, T4, CBC, CMP, A1C, Vit D, Vit B12, Ferritin  2/11/22 EKG  12/8/20 Lumbar XR  10/18/19 Stress Echo  1/12/18 Cardiac Scoring  12/17/19 Chest XR            Instructions given. (See in Nurse's note)    Optimization:  Anesthesia Preop Clinic Assessment  Indicated Will Schedule POC    Medical Opinion Indicated       Sub-specialist consult indicated:   [facility] Dr. Albrecht      Plan:    Testing:  BMP, EKG, Hematology Profile, PT/INR, and TSH   Pre-anesthesia  visit       Visit focus: possible regional anesthesia and/or nerve block      Consultation:IM Perioperative Hospitalist     Patient  has previously scheduled Medical Appointment: 11/30    Navigation: Tests Scheduled.              Consults scheduled.             Results will be tracked by Preop Clinic.          11/30/2022:   Patient is optimized     11/30/2022- 19 15      Examined with a female medical student     Clinically she did not have any hypotension     TSH is mildly low but closer to normal with a normal free T4   She does not have any overt suggestions of hyperthyroidism  Lab tests from today are acceptable for surgery     Lower extremity venous ultrasound scan showed-No  evidence of deep venous thrombosis in either lower extremity     EKG from today personally reviewed reportedly showed  Normal sinus rhythm   Normal ECG   When compared with ECG of 11-FEB-2022 12:27,   Premature atrial complexes are no longer Present     Pre op work up acceptable for surgery      Watch BP     Had ? Mosquito bite Rt lower leg early part of the year - no acute changes      Checked for over-the-counter medication    Jannette Albrecht MD  Internal Medicine  [facility]   Cell Phone- [phone]      Anesthesia Plan  Type of Anesthesia, risks & benefits discussed:    Anesthesia Type: Gen ETT, Regional, CSE  Intra-op Monitoring Plan: Standard ASA Monitors  Post Op Pain Control Plan: multimodal analgesia, IV/PO Opioids PRN and peripheral nerve block  Induction:  IV  Informed Consent: Informed consent signed with the Patient and all parties understand the risks and agree with anesthesia plan.  All questions answered.   ASA Score: 2    Ready For Surgery From Anesthesia Perspective.       .

## 2022-12-13 NOTE — PT/OT/SLP EVAL
"Occupational Therapy   Evaluation and Treatment    Name: Poonam Alvarez  MRN: [id number]  Admitting Diagnosis: <principal problem not specified>  Recent Surgery: Procedure(s) (LRB):  ARTHROPLASTY, KNEE, TOTAL (Left) Day of Surgery    Recommendations:     Discharge Recommendations: home  Discharge Equipment Recommendations:  bedside commode, walker, rolling (to be delivered)  Barriers to discharge:  None    Assessment:     Poonam Alvarez is a 72 y.o. female with a medical diagnosis of <principal problem not specified>.  She presents with s/p Left TKA. Patient reports previous Right TKA in 2014. Performance deficits affecting function: gait instability, decreased lower extremity function, orthopedic precautions, pain, decreased safety awareness, impaired balance.      Patient completed bed mobility, sit>stand transfers, functional mobility of household distance, and toilet transfer this date. Education provided on RW management and technique. Patient with episode of urinary incontinence, expressed difficulty controlling it at this time. Patient also limited by increased reports of nausea, emesis bag provided and nurse to bedside to provide medication as appropriate. Patient expressed dizziness with prolonged sitting on toilet, short transfer completed to chair and reclined, nurse notified.    Rehab Prognosis: Fair; patient would benefit from acute skilled OT services to address these deficits and reach maximum level of function.       Plan:     Patient to be seen daily to address the above listed problems via self-care/home management, therapeutic activities, therapeutic exercises  Plan of Care Expires: 12/16/22  Plan of Care Reviewed with: patient, daughter    Subjective   "I really want to shower."   "I didn't feel like this last time!"    Chief Complaint: nausea  Patient/Family Comments/goals: to return home at [facility]    Occupational Profile:  Living Environment: lives alone in 3SH 4 FISH (uncertain of side of rail) " bed/bath 1st floor walk-in shower and tub/shower combo (primary)  Previous level of function: Independent  Roles and Routines: Drives, works part time as   Equipment Used at Home: none  Assistance upon Discharge: son and daughter    Pain/Comfort:  Pain Rating 1: 0/10  Location - Side 1: Left  Location - Orientation 1: generalized  Location 1: knee  Pain Addressed 1: Pre-medicate for activity, Reposition, Distraction, Cessation of Activity  Pain Rating Post-Intervention 1: other (see comments) (patient did not rate)    Patients cultural, spiritual, Mandaen conflicts given the current situation: no    Objective:     Communicated with: Nurse prior to session.  Patient found HOB elevated with cryotherapy, FCD, peripheral IV upon OT entry to room.    General Precautions: Standard, fall  Orthopedic Precautions: N/A  Braces: N/A  Respiratory Status: Room air    Occupational Performance:    Bed Mobility:    Patient completed Rolling/Turning to Right with contact guard assistance; HOB elevated  Patient completed Scooting with contact guard assistance  Patient completed Supine to Sit with contact guard assistance; HOB elevated    Functional Mobility/Transfers:  Patient completed Sit <> Stand Transfer with contact guard assistance  with  rolling walker ; x 2 trials (EOB and toilet level)  Patient completed Toilet Transfer Step Transfer technique with contact guard assistance with  rolling walker  Functional Mobility: Patient completed functional mobility of household distance ~30ft with CGA using RW    Activities of Daily Living:  Bathing: supervision to wipe BLE following incontinence  Upper Body Dressing: minimum assistance to doff/don gowns  Lower Body Dressing: maximal assistance to doff/don clean socks  Toileting: independence to perform pericare at toilet level    Cognitive/Visual Perceptual:  Cognitive/Psychosocial Skills:     -       Oriented to: Person, Place, Time, and Situation   -       Follows  Commands/attention:Follows two-step commands  -       Communication: clear/fluent  -       Memory: No Deficits noted  -       Safety awareness/insight to disability: impaired   -       Mood/Affect/Coping skills/emotional control: Appropriate to situation  Visual/Perceptual:      -Intact      Physical Exam:  Balance:    -       Good static stance  Upper Extremity Range of Motion:     -       Right Upper Extremity: WFL  -       Left Upper Extremity: WFL  Upper Extremity Strength:    -       Right Upper Extremity: WFL  -       Left Upper Extremity: WFL   Strength:    -       Right Upper Extremity: WFL  -       Left Upper Extremity: WFL  Fine Motor Coordination:    -       Intact  Gross motor coordination:   WFL    AMPAC 6 Click ADL:  AMPAC Total Score: 23    Treatment & Education:  -Pt education on OT role and POC.  -Importance of E/OOB activity with staff assistance, emphasis on daily participation  -Safety during functional transfer and mobility ensured  -Patient provided with education on importance of Bilateral UB/LB integration during functional tasks for improvement in functional performance.   -Education provided/reviewed, questions answered within OT scope of practice.   -Patient demonstrates understanding and learning this date.         Patient left up in chair with all lines intact, call button in reach, nurse notified, and daughter present    GOALS:   Multidisciplinary Problems       Occupational Therapy Goals          Problem: Occupational Therapy    Goal Priority Disciplines Outcome Interventions   Occupational Therapy Goal     OT, PT/OT Ongoing, Progressing    Description: Goals set on 12/13, with expiration date 12/16:  Patient will increase functional independence with ADLs by performing:    Bed mobility with [place]  Grooming while standing at sink with [place]  UB Dressing with [place].  LB Dressing with Supervision.  Toileting from toilet with [place] for hygiene and clothing  management.   Functional mobility of household and community distance with Mod-[place] and AD as needed  Pt will demonstrate understanding of education provided regarding energy conservation and task modification through teach-back method.                           History:     Past Medical History:   Diagnosis Date    AR (allergic rhinitis) 12/07/2012    Atrial flutter     ablation October 2008    Cataract     Generalized headaches     GERD (gastroesophageal reflux disease) 03/08/2013    resolved    Grave's disease     s/p radioactive iodine, now hypothyroidism    Keloid cicatrix     Obesity     Osteoarthritis     shoulders, hands, knees    Positive PPD, treated     9 months of INH, completed 1/2010         Past Surgical History:   Procedure Laterality Date    ABLATION OF DYSRHYTHMIC FOCUS  10/24/2008    atrial flutter    ARTHROPLASTY OF SHOULDER Right 04/01/2019    Procedure: ARTHROPLASTY, SHOULDER;  Surgeon: Sage Xie MD;  Location: [facility];  Service: Orthopedics;  Laterality: Right;  regional w/ catheter (q-ball)    CATARACT EXTRACTION W/  INTRAOCULAR LENS IMPLANT Bilateral     COLONOSCOPY N/A 11/05/2015    Procedure: COLONOSCOPY;  Surgeon: Jose Ly MD;  Location: [facility] ([address] [place] [address]);  Service: Endoscopy;  Laterality: N/A;  Last colonoscopy 2008 with Dr. Vieira; Pt wanted this day    EYE SURGERY      cataract removal right eye    HERNIA REPAIR      JOINT REPLACEMENT      KNEE ARTHROSCOPY W/ DEBRIDEMENT      right, 2005 and 2008    KNEE SURGERY  03/27/2014    right TKA       Time Tracking:     OT Date of Treatment: 12/13/22  OT Start Time: 1620  OT Stop Time: 1657  OT Total Time (min): 37 min    Billable Minutes:Evaluation 12  Self Care/Home Management 25    12/13/2022

## 2022-12-13 NOTE — OPERATIVE NOTE ADDENDUM
Certification of Assistant at Surgery       Surgery Date: 12/13/2022     Participating Surgeons:  Surgeon(s) and Role:     * Gordon Schneider MD - Primary    Procedures:  Procedure(s) (LRB):  ARTHROPLASTY, KNEE, TOTAL (Left)    Assistant Surgeon's Certification of Necessity:  I understand that section 1842 (b) (6) (d) of the Social Security Act generally prohibits Medicare Part B reasonable charge payment for the services of assistants at surgery in teaching hospitals when qualified residents are available to furnish such services. I certify that the services for which payment is claimed were medically necessary, and that no qualified resident was available to perform the services. I further understand that these services are subject to post-payment review by the Medicare carrier.      Dinah Fowler PA-C    12/13/2022  3:08 PM

## 2022-12-14 VITALS
DIASTOLIC BLOOD PRESSURE: 79 MMHG | SYSTOLIC BLOOD PRESSURE: 141 MMHG | BODY MASS INDEX: 28.99 KG/M2 | OXYGEN SATURATION: 98 % | TEMPERATURE: 98 F | WEIGHT: 214 LBS | RESPIRATION RATE: 18 BRPM | HEIGHT: 72 IN | HEART RATE: 55 BPM

## 2022-12-14 PROCEDURE — 25000003 PHARM REV CODE 250: Performed by: ANESTHESIOLOGY

## 2022-12-14 PROCEDURE — 97535 SELF CARE MNGMENT TRAINING: CPT

## 2022-12-14 PROCEDURE — 97116 GAIT TRAINING THERAPY: CPT

## 2022-12-14 PROCEDURE — 63600175 PHARM REV CODE 636 W HCPCS: Performed by: NURSE PRACTITIONER

## 2022-12-14 PROCEDURE — 25000003 PHARM REV CODE 250: Performed by: NURSE PRACTITIONER

## 2022-12-14 PROCEDURE — 25000003 PHARM REV CODE 250: Performed by: PHYSICIAN ASSISTANT

## 2022-12-14 PROCEDURE — 99212 OFFICE O/P EST SF 10 MIN: CPT | Mod: ,,, | Performed by: ANESTHESIOLOGY

## 2022-12-14 PROCEDURE — 97161 PT EVAL LOW COMPLEX 20 MIN: CPT

## 2022-12-14 PROCEDURE — 94761 N-INVAS EAR/PLS OXIMETRY MLT: CPT

## 2022-12-14 PROCEDURE — 99900035 HC TECH TIME PER 15 MIN (STAT)

## 2022-12-14 PROCEDURE — 99212 PR OFFICE/OUTPT VISIT, EST, LEVL II, 10-19 MIN: ICD-10-PCS | Mod: ,,, | Performed by: ANESTHESIOLOGY

## 2022-12-14 RX ORDER — CELECOXIB 200 MG/1
200 CAPSULE ORAL DAILY
Status: DISCONTINUED | OUTPATIENT
Start: 2022-12-14 | End: 2022-12-14 | Stop reason: HOSPADM

## 2022-12-14 RX ADMIN — HYDROCHLOROTHIAZIDE 12.5 MG: 12.5 TABLET ORAL at 08:12

## 2022-12-14 RX ADMIN — ASPIRIN 81 MG: 81 TABLET, COATED ORAL at 02:12

## 2022-12-14 RX ADMIN — OXYCODONE 5 MG: 5 TABLET ORAL at 08:12

## 2022-12-14 RX ADMIN — ASPIRIN 81 MG: 81 TABLET, COATED ORAL at 08:12

## 2022-12-14 RX ADMIN — CEFAZOLIN 2 G: 2 INJECTION, POWDER, FOR SOLUTION INTRAMUSCULAR; INTRAVENOUS at 04:12

## 2022-12-14 RX ADMIN — FAMOTIDINE 20 MG: 20 TABLET ORAL at 08:12

## 2022-12-14 RX ADMIN — METHOCARBAMOL 750 MG: 750 TABLET ORAL at 08:12

## 2022-12-14 RX ADMIN — ACETAMINOPHEN 1000 MG: 500 TABLET ORAL at 06:12

## 2022-12-14 RX ADMIN — SENNOSIDES AND DOCUSATE SODIUM 1 TABLET: 50; 8.6 TABLET ORAL at 08:12

## 2022-12-14 RX ADMIN — CELECOXIB 200 MG: 200 CAPSULE ORAL at 08:12

## 2022-12-14 RX ADMIN — POLYETHYLENE GLYCOL 3350 17 G: 17 POWDER, FOR SOLUTION ORAL at 08:12

## 2022-12-14 RX ADMIN — MUPIROCIN 1 G: 20 OINTMENT TOPICAL at 08:12

## 2022-12-14 NOTE — ANESTHESIA POST-OP PAIN MANAGEMENT
Acute Pain Service and Perioperative Surgical Home Progress Note    HPI  Poonam Alvarez is a 72 y.o., female,     Interval history  No overnight events    Surgery:  Procedure(s) (LRB):  ARTHROPLASTY, KNEE, TOTAL (Left)    Post Op Day #: 1    Catheter type: Perineural Adductor Canal    Infusion type: Ropivacaine 0.2%, with a 10cc automatic bolus every 3 hours, combined with a 5 cc patient controlled bolus available [id number]    Problem List:    Active Hospital Problems    Diagnosis  POA    S/P total knee arthroplasty [Z96.659]  Not Applicable      Resolved Hospital Problems   No resolved problems to display.       Subjective:       General appearance of alert, oriented, no complaints   Pain with rest: 3    Faces   Pain with movement: 5    Faces   Side Effects    1. Pruritis No    2. Nausea No    3. Motor Blockade No, 0=Ability to raise lower extremities off bed    4. Sedation No, 1=awake and alert    Schedule Medications:    acetaminophen  1,000 mg Oral Q6H    aspirin  81 mg Oral BID    celecoxib  200 mg Oral Daily    famotidine  20 mg Oral BID    hydroCHLOROthiazide  12.5 mg Oral Daily    levothyroxine  137 mcg Oral Before breakfast    methocarbamoL  750 mg Oral QID    mupirocin  1 g Nasal BID    polyethylene glycol  17 g Oral Daily    pregabalin  75 mg Oral QHS    senna-docusate 8.6-50 mg  1 tablet Oral BID        Continuous Infusions:   sodium chloride 0.9% 150 mL/hr at 12/13/22 2310    ropivacaine (PF) 2 mg/ml (0.2%)          PRN Medications:  bisacodyL, morphine, naloxone, ondansetron, oxyCODONE, oxyCODONE, prochlorperazine       Antibiotics:  Antibiotics (From admission, onward)    Start     Stop Route Frequency Ordered    12/13/22 2100  mupirocin 2 % ointment 1 g         12/18 2059 Nasl 2 times daily 12/13/22 1639             Objective:     Catheter site clean, dry, intact          Vital Signs (Most Recent):  Temp: 35.7 °C (96.2 °F) (12/14/22 0430)  Pulse: 73 (12/14/22 0706)  Resp: 18 (12/14/22  0706)  BP: 122/60 (12/14/22 0430)  SpO2: 100 % (12/14/22 0706) Vital Signs Range (Last 24H):  Temp:  [35.7 °C (96.2 °F)-36.3 °C (97.3 °F)]   Pulse:  [56-87]   Resp:  [13-39]   BP: (117-185)/()   SpO2:  [97 %-100 %]          I & O (Last 24H):    Intake/Output Summary (Last 24 hours) at 12/14/2022 0756  Last data filed at 12/13/2022 2305  Gross per 24 hour   Intake 2060 ml   Output 2450 ml   Net -390 ml       Physical Exam:    GA: Alert, comfortable, no acute distress.   Pulmonary: Clear to auscultation. Normal RR.    Cardiac: regular rate and rhythm.      Laboratory: reviewed in chart  CBC: No results for input(s): WBC, RBC, HGB, HCT, PLT, MCV, MCH, MCHC in the last 72 hours.    BMP: No results for input(s): NA, K, CO2, CL, BUN, CREATININE, GLU, MG, PHOS, CALCIUM in the last 72 hours.    No results for input(s): PT, INR, PROTIME, APTT in the last 72 hours.          Assessment:         Pain control adequate    Plan:     1) Pain: Adductor canal perineural catheter in place and infusing. Dressing in tact.  Multimodal pain regimen ordered which includes acetaminophen, celecoxib, pregabalin, and prn oxycodone.  Will continue to monitor. [facility]   2)HTN: Continue HCTZ   3)Hypothyroidism: Continue medications   4) FEN/GI: Tolerating regular diet.     5) Dispo: Pt working well with PT/OT. Case management and SW following along for setting up home health and physical therapy. Plan to discharge home this am.          Evaluator Lashawn Marino MD

## 2022-12-14 NOTE — PLAN OF CARE
Patient has achieved all established goals and is appropriate for discharge home from acute skilled OT services.    Problem: Occupational Therapy  Goal: Occupational Therapy Goal  Description: Goals set on 12/13, with expiration date 12/16:  Patient will increase functional independence with ADLs by performing:    Bed mobility with [place]  Grooming while standing at sink with [place]  UB Dressing with [place].  LB Dressing with Supervision.  Toileting from toilet with [place] for hygiene and clothing management.   Functional mobility of household and community distance with Mod-[place] and AD as needed  Pt will demonstrate understanding of education provided regarding energy conservation and task modification through teach-back method.      Outcome: Met

## 2022-12-14 NOTE — ANESTHESIA POSTPROCEDURE EVALUATION
Anesthesia Post Evaluation    Patient: Poonam Alvarez    Procedure(s) Performed: Procedure(s) (LRB):  ARTHROPLASTY, KNEE, TOTAL (Left)    Final Anesthesia Type: spinal      Patient location during evaluation: floor  Patient participation: Yes- Able to Participate  Level of consciousness: awake and alert  Post-procedure vital signs: reviewed and stable  Pain management: adequate  Airway patency: patent    PONV status at discharge: No PONV  Anesthetic complications: no      Cardiovascular status: blood pressure returned to baseline  Respiratory status: unassisted  Hydration status: euvolemic  Follow-up not needed.          Vitals Value Taken Time   /65 12/14/22 0828   Temp 36.3 °C (97.3 °F) 12/14/22 0828   Pulse 73 12/14/22 0828   Resp 18 12/14/22 0828   SpO2 97 % 12/14/22 0828         Event Time   Out of Recovery 16:27:24         Pain/Heber Score: Pain Rating Prior to Med Admin: 0 (12/14/2022  8:23 AM)  Pain Rating Post Med Admin: 2 (12/14/2022 12:09 AM)  Heber Score: 10 (12/14/2022  4:52 AM)

## 2022-12-14 NOTE — PLAN OF CARE
Problem: Pain Acute  Goal: Acceptable Pain Control and Functional Ability  Intervention: Prevent or Manage Pain  Flowsheets (Taken 12/14/2022 1508)  Sensory Stimulation Regulation:   care clustered   lighting decreased   quiet environment promoted  Medication Review/Management: medications reviewed     Problem: Pain Acute  Goal: Acceptable Pain Control and Functional Ability  Intervention: Optimize Psychosocial Wellbeing  Flowsheets (Taken 12/14/2022 1508)  Supportive Measures:   active listening utilized   positive reinforcement provided     Problem: Adjustment to Surgery (Knee Arthroplasty)  Goal: Optimal Coping  Intervention: Support Psychosocial Response to Surgery and Mobility Changes  Flowsheets (Taken 12/14/2022 1508)  Supportive Measures:   active listening utilized   positive reinforcement provided     Problem: Bleeding (Knee Arthroplasty)  Goal: Absence of Bleeding  Intervention: Monitor and Manage Bleeding  Flowsheets (Taken 12/14/2022 1508)  Bleeding Management: dressing monitored     Problem: Infection (Knee Arthroplasty)  Goal: Absence of Infection Signs and Symptoms  Intervention: Prevent or Manage Infection  Flowsheets (Taken 12/14/2022 1508)  Infection Management: aseptic technique maintained     Problem: Pain (Knee Arthroplasty)  Goal: Acceptable Pain Control  Intervention: Prevent or Manage Pain  Flowsheets (Taken 12/14/2022 1508)  Pain Management Interventions:   pain pump in use   care clustered   cold applied     Problem: Fall Injury Risk  Goal: Absence of Fall and Fall-Related Injury  Intervention: Promote Injury-Free Environment  Flowsheets (Taken 12/14/2022 1508)  Safety Promotion/Fall Prevention:   assistive device/personal item within reach   Fall Risk reviewed with patient/family   lighting adjusted   nonskid shoes/socks when out of bed   instructed to call staff for mobility

## 2022-12-14 NOTE — PLAN OF CARE
Problem: Adult Inpatient Plan of Care  Goal: Absence of Hospital-Acquired Illness or Injury  Outcome: Ongoing, Progressing  Intervention: Identify and Manage Fall Risk  Flowsheets (Taken 12/14/2022 0325)  Safety Promotion/Fall Prevention:   assistive device/personal item within reach   Fall Risk reviewed with patient/family   medications reviewed   side rails raised x 2   instructed to call staff for mobility  Intervention: Prevent and Manage VTE (Venous Thromboembolism) Risk  Flowsheets (Taken 12/14/2022 0325)  Activity Management: Ambulated to bathroom - L4  VTE Prevention/Management:   intravenous hydration   fluids promoted   remove, assess skin, and reapply foot pump   dorsiflexion/plantar flexion performed     Problem: Pain Acute  Goal: Acceptable Pain Control and Functional Ability  Outcome: Ongoing, Progressing  Intervention: Prevent or Manage Pain  Flowsheets (Taken 12/14/2022 0325)  Bowel Elimination Promotion:   adequate fluid intake promoted   privacy promoted  Medication Review/Management: medications reviewed

## 2022-12-14 NOTE — PT/OT/SLP EVAL
Physical Therapy Evaluation and Discharge Note    Patient Name:  Poonam Alvarez   MRN:  [id number]    Recommendations:     Discharge Recommendations: outpatient PT  Discharge Equipment Recommendations: bedside commode, walker, rolling   Barriers to discharge: None    Assessment:     Poonam Alvarez is a 72 y.o. female admitted with a medical diagnosis of <principal problem not specified>. Patient tolerated PT session well. Patient ambulated 2x 150ft with RW and supervision . No LOB or SOB noted. Patient ascended/descended 4 step with right hand rail and supervision. Patient performed L LE therex 10 reps. Patient has an OPPT appointment scheduled. Patient ready to discharge home from PT standpoint.  At this time, patient is functioning at level of function safe to return home and does not require further acute PT services.     Recent Surgery: Procedure(s) (LRB):  ARTHROPLASTY, KNEE, TOTAL (Left) 1 Day Post-Op    Plan:     During this hospitalization, patient does not require further acute PT services.  Please re-consult if situation changes.      Subjective     Chief Complaint: pain and stiffness in L knee  Patient/Family Comments/goals: patient reports that she is a part time . Patient reports receiving PT for around 6 months for her last total knee in 2009.  Pain/Comfort:  Pain Rating 1: 0/10  Location - Side 1: Left  Location - Orientation 1: generalized  Location 1: knee  Pain Addressed 1: Pre-medicate for activity, Reposition, Distraction    Patients cultural, spiritual, Gnosticism conflicts given the current situation: no    Living Environment:  Patient lives alone in a 2SH with 4 FISH with a hand rail on the right. Patient will have assistance from daughter for initial week following surgery.  Prior to admission, patients level of function was independent with ADLs.  Equipment used at home: none.  Upon discharge, patient will have assistance from daughter.    Objective:     Communicated with nurse  prior to session.  Patient found up in chair with cryotherapy, FCD, perineural catheter upon PT entry to room.    General Precautions: Standard, fall    Orthopedic Precautions:LLE weight bearing as tolerated   Braces: N/A  Respiratory Status: Room air    Exams:  Cognitive Exam:  Patient is oriented to Person, Place, Time, and Situation  RLE ROM: WNL  RLE Strength: WFL  LLE ROM: WFL  LLE Strength: WFL    Functional Mobility:  Bed Mobility:     Supine to Sit: independence  Sit to Supine: independence  Transfers:     Sit to Stand:  supervision with rolling walker  Gait: Patient ambulated 2x 150ft with Rolling Walker and supervision  using 3-point gait and reciprocal gait. Patient demonstrated decreased dominic, decreased step length, decreased swing-to-stance ratio, and decreased weight-shifting ability during gait due to pain, decreased ROM, and decreased strength.   Stairs:  Pt ascended/descended 4 stair(s) with No Assistive Device with right handrail with Supervision or Set-up Assistance.     Patient educated in and performed L LE exercises 10 reps for ankle pumps, quad set, glute set, SAQ over bolster, heel slides, hip abd/add, and LAQ. Patient unable to perform SLR due to pain and weakness.    AM-PAC 6 CLICK MOBILITY  Total Score:22       Treatment and Education:  Patient educated in:  -PT role and POC  -safety with transfers including hand placement  -gait sequencing and RW management  -OOB activity to maximize recovery including ambulating at home to prevent DVT   -car transfer  -stair training  -HEP for therex at home with handout provided      AM-PAC 6 CLICK MOBILITY  Total Score:22     Patient left up in chair with all lines intact, call button in reach, and nurse notified.    GOALS:   Multidisciplinary Problems       Physical Therapy Goals       Not on file                    History:     Past Medical History:   Diagnosis Date    AR (allergic rhinitis) 12/07/2012    Atrial flutter     ablation October 2008     Cataract     Generalized headaches     GERD (gastroesophageal reflux disease) 03/08/2013    resolved    Grave's disease     s/p radioactive iodine, now hypothyroidism    Keloid cicatrix     Obesity     Osteoarthritis     shoulders, hands, knees    Positive PPD, treated     9 months of INH, completed 1/2010       Past Surgical History:   Procedure Laterality Date    ABLATION OF DYSRHYTHMIC FOCUS  10/24/2008    atrial flutter    ARTHROPLASTY OF SHOULDER Right 04/01/2019    Procedure: ARTHROPLASTY, SHOULDER;  Surgeon: Sage Xie MD;  Location: [facility];  Service: Orthopedics;  Laterality: Right;  regional w/ catheter (q-ball)    CATARACT EXTRACTION W/  INTRAOCULAR LENS IMPLANT Bilateral     COLONOSCOPY N/A 11/05/2015    Procedure: COLONOSCOPY;  Surgeon: Jose Ly MD;  Location: [facility] ([address] [place] [address]);  Service: Endoscopy;  Laterality: N/A;  Last colonoscopy 2008 with Dr. Vieira; Pt wanted this day    EYE SURGERY      cataract removal right eye    HERNIA REPAIR      JOINT REPLACEMENT      KNEE ARTHROSCOPY W/ DEBRIDEMENT      right, 2005 and 2008    KNEE SURGERY  03/27/2014    right TKA       Time Tracking:     PT Received On: 12/14/22  PT Start Time: 0925     PT Stop Time: 0952  PT Total Time (min): 27 min     Billable Minutes: Evaluation 10 and Gait Training 17      12/14/2022

## 2022-12-14 NOTE — PLAN OF CARE
Pt discharged from unit.  Pt aaox4, vss, no s/s of distress noted.  Dressing to left knee remains cdi.  IV removed from right hand w/ catheter intact.  Discharge instructions given to pt and she verbalized understanding.  Home meds and f/u appts reviewed as well.  Nimbus Pump teaching done w/ pt and pt's daughter, Radha (an RN) via telephone.  Meds and delivered to bedside prior to discharge.  Pt left unit via w/c and was accompanied to front of hospital to meet ride.  All personal belongings sent with pt.

## 2022-12-14 NOTE — ASSESSMENT & PLAN NOTE
72 y.o. female POD1 s/p left TKA    Pain control: multimodal  PT/OT: WBAT LLE  DVT PPx: ASA 81 BID, FCDs at all times when not ambulating  Abx: postop Ancef  Labs: none  Drain: none  Montiel: none    Dispo: f/u PT recs, dc home today

## 2022-12-14 NOTE — PT/OT/SLP PROGRESS
"Occupational Therapy   Treatment and Discharge    Name: Poonam Alvarez  MRN: [id number]  Admitting Diagnosis:  <principal problem not specified>  1 Day Post-Op    Recommendations:     Discharge Recommendations: home  Discharge Equipment Recommendations:  bedside commode, walker, rolling  Barriers to discharge:  None    Assessment:     Poonam Alvarez is a 72 y.o. female with a medical diagnosis of <principal problem not specified>.  She presents with s/p Left TKA. Performance deficits affecting function are decreased ROM, orthopedic precautions, decreased safety awareness.     Patient eager and motivated to maintain independence, requests to complete all tasks independently. Education provided on safety and importance of staff presence for POD 1, agreeable to allow therapist at this time. Patient completed sit>stand transfers, functional mobility of household distance, grooming/hygiene tasks and dressing tasks. Education reviewed on LB dressing techniques for post surgery. Education provided on RW management and technique for safety.    Rehab Prognosis:  Good; patient would benefit from acute skilled OT services to address these deficits and reach maximum level of function.       Plan:     Patient is appropriate for discharge home from acute skilled OT services.    Subjective   "I want to remain independent! You know I can do this."    Pain/Comfort:  Pain Rating 1: 0/10  Location - Side 1: Left  Location - Orientation 1: generalized  Location 1: knee  Pain Addressed 1: Pre-medicate for activity, Cessation of Activity  Pain Rating Post-Intervention 1: 0/10    Objective:     Communicated with: Nurse prior to session.  Patient found sitting edge of bed with cryotherapy, FCD, peripheral IV upon OT entry to room.    General Precautions: Standard, fall    Orthopedic Precautions:N/A  Braces: N/A  Respiratory Status: Room air     Occupational Performance:     Bed Mobility:    Not assessed     Functional " Mobility/Transfers:  Patient completed Sit <> Stand Transfer with modified independence  with  rolling walker x 5 trials (EOB and toilet level)  Patient completed Toilet Transfer Step Transfer technique with modified independence with  rolling walker  Functional Mobility: Patient completed functional mobility of household distance ~20ft with Mod-Sunflower using RW    Activities of Daily Living:  Bathing: independence to perform partial body wipe off in stance at sink  Upper Body Dressing: independence to doff gown; don bra, and button up shirt  Lower Body Dressing: supervision to doff socks; don camisole (patient steps into), underwear, skirt and shoes  Completed seated on toilet level      [facility] 6 Click ADL: 24    Treatment & Education:  -Pt education on OT role and POC.  -Importance of E/OOB activity with staff assistance, emphasis on daily participation  -Education provided on RW management and technique for safety  -Education provided on LB dressing techniques for post surgery  -Safety during functional transfer and mobility ensured  -Patient provided with education on importance of Bilateral UB/LB integration during functional tasks for improvement in functional performance.   -Education provided/reviewed, questions answered within OT scope of practice.   -Patient demonstrates understanding and learning this date.           Patient left up in chair with all lines intact, call button in reach, and nurse notified    GOALS:   Multidisciplinary Problems       Occupational Therapy Goals       Not on file              Multidisciplinary Problems (Resolved)          Problem: Occupational Therapy    Goal Priority Disciplines Outcome Interventions   Occupational Therapy Goal   (Resolved)     OT, PT/OT Met    Description: Goals set on 12/13, with expiration date 12/16:  Patient will increase functional independence with ADLs by performing:    Bed mobility with Sunflower  Grooming while standing at sink with  Jarratt  UB Dressing with Jarratt.  LB Dressing with Supervision.  Toileting from toilet with Jarratt for hygiene and clothing management.   Functional mobility of household and community distance with Mod-Jarratt and AD as needed  Pt will demonstrate understanding of education provided regarding energy conservation and task modification through teach-back method.                           Time Tracking:     OT Date of Treatment: 12/14/22  OT Start Time: 0829  OT Stop Time: 0901  OT Total Time (min): 32 min    Billable Minutes:Self Care/Home Management 32    OT/MEGGAN: OT          12/14/2022

## 2022-12-14 NOTE — PLAN OF CARE
12/13/22 2049   STACY Message   Medicare Outpatient and Observation Notification regarding financial responsibility Given to patient/caregiver   Date STACY was signed 12/13/22   Time STACY was signed 2049

## 2022-12-14 NOTE — SUBJECTIVE & OBJECTIVE
Principal Problem:Same    Principal Orthopedic Problem: s/p left TKA    Interval History: Pt seen and examined at bedside. VERONICA. She reports pain is controlled. Plans to work with PT today.       Review of patient's allergies indicates:   Allergen Reactions    Hydrocodone-acetaminophen Other (See Comments)     Low blood pressure; doesn't want    Oxycodone-acetaminophen Other (See Comments)     Causes low blood pressure; doesn't want  4/4/19 - patient reports she is not actually allergic to it (just doesn't like how it makes her feel)       Current Facility-Administered Medications   Medication    0.9%  NaCl infusion    acetaminophen tablet 1,000 mg    aspirin EC tablet 81 mg    bisacodyL suppository 10 mg    celecoxib capsule 200 mg    famotidine tablet 20 mg    hydroCHLOROthiazide tablet 12.5 mg    levothyroxine tablet 137 mcg    methocarbamoL tablet 750 mg    morphine injection 2 mg    mupirocin 2 % ointment 1 g    naloxone 0.4 mg/mL injection 0.02 mg    ondansetron injection 4 mg    oxyCODONE immediate release tablet 5 mg    oxyCODONE immediate release tablet Tab 10 mg    polyethylene glycol packet 17 g    pregabalin capsule 75 mg    prochlorperazine injection Soln 5 mg    ropivacaine 0.2% [facility] PainPRO Pump infusion 500 ML    senna-docusate 8.6-50 mg per tablet 1 tablet     Objective:     Vital Signs (Most Recent):  Temp: 96.2 °F (35.7 °C) (12/14/22 0430)  Pulse: 71 (12/14/22 0430)  Resp: 20 (12/14/22 0430)  BP: 122/60 (12/14/22 0430)  SpO2: 99 % (12/14/22 0430) Vital Signs (24h Range):  Temp:  [96.2 °F (35.7 °C)-97.3 °F (36.3 °C)] 96.2 °F (35.7 °C)  Pulse:  [56-87] 71  Resp:  [13-39] 20  SpO2:  [97 %-100 %] 99 %  BP: (117-185)/() 122/60     Weight: 97.1 kg (214 lb)  Height: 6' (182.9 cm)  Body mass index is 29.02 kg/m².      Intake/Output Summary (Last 24 hours) at 12/14/2022 0637  Last data filed at 12/13/2022 2305  Gross per 24 hour   Intake 2060 ml   Output 2450 ml   Net -390 ml       Ortho/SPM  Exam    AAOx4  NAD  Reg rate  No increased WOB  Dressing c/d/i  Polar ice in place  SILT T/SP/DP/Jean/Sa  Motor intact T/SP/DP  WWP extremities  FCDs in place and functioning      Significant Labs: All pertinent labs within the past 24 hours have been reviewed.    Significant Imaging: I have reviewed and interpreted all pertinent imaging results/findings.

## 2022-12-14 NOTE — PLAN OF CARE
Patient tolerated PT session well. Patient ambulated 2x 150ft with RW and supervision . No LOB or SOB noted. Patient ascended/descended 4 step with right hand rail and supervision. Patient performed L LE therex 10 reps. Patient has an OPPT appointment scheduled. Patient ready to discharge home from PT standpoint.  At this time, patient is functioning at level of function safe to return home and does not require further acute PT services.  Problem: Physical Therapy  Goal: Physical Therapy Goal  Outcome: Met

## 2022-12-14 NOTE — PROGRESS NOTES
[facility])  Orthopedics  Progress Note    Patient Name: Poonam Alvarez  MRN: [id number]  Admission Date: 12/13/2022  Hospital Length of Stay: 0 days  Attending Provider: Gordon Schneider MD  Primary Care Provider: Lindsey Bach MD  Follow-up For: Procedure(s) (LRB):  ARTHROPLASTY, KNEE, TOTAL (Left)    Post-Operative Day: 1 Day Post-Op  Subjective:     Principal Problem:Same    Principal Orthopedic Problem: s/p left TKA    Interval History: Pt seen and examined at bedside. VERONICA. She reports pain is controlled. Plans to work with PT today.       Review of patient's allergies indicates:   Allergen Reactions    Hydrocodone-acetaminophen Other (See Comments)     Low blood pressure; doesn't want    Oxycodone-acetaminophen Other (See Comments)     Causes low blood pressure; doesn't want  4/4/19 - patient reports she is not actually allergic to it (just doesn't like how it makes her feel)       Current Facility-Administered Medications   Medication    0.9%  NaCl infusion    acetaminophen tablet 1,000 mg    aspirin EC tablet 81 mg    bisacodyL suppository 10 mg    celecoxib capsule 200 mg    famotidine tablet 20 mg    hydroCHLOROthiazide tablet 12.5 mg    levothyroxine tablet 137 mcg    methocarbamoL tablet 750 mg    morphine injection 2 mg    mupirocin 2 % ointment 1 g    naloxone 0.4 mg/mL injection 0.02 mg    ondansetron injection 4 mg    oxyCODONE immediate release tablet 5 mg    oxyCODONE immediate release tablet Tab 10 mg    polyethylene glycol packet 17 g    pregabalin capsule 75 mg    prochlorperazine injection Soln 5 mg    ropivacaine 0.2% [facility] PainPRO Pump infusion 500 ML    senna-docusate 8.6-50 mg per tablet 1 tablet     Objective:     Vital Signs (Most Recent):  Temp: 96.2 °F (35.7 °C) (12/14/22 0430)  Pulse: 71 (12/14/22 0430)  Resp: 20 (12/14/22 0430)  BP: 122/60 (12/14/22 0430)  SpO2: 99 % (12/14/22 0430) Vital Signs (24h Range):  Temp:  [96.2 °F (35.7  °C)-97.3 °F (36.3 °C)] 96.2 °F (35.7 °C)  Pulse:  [56-87] 71  Resp:  [13-39] 20  SpO2:  [97 %-100 %] 99 %  BP: (117-185)/() 122/60     Weight: 97.1 kg (214 lb)  Height: 6' (182.9 cm)  Body mass index is 29.02 kg/m².      Intake/Output Summary (Last 24 hours) at 12/14/2022 0637  Last data filed at 12/13/2022 2305  Gross per 24 hour   Intake 2060 ml   Output 2450 ml   Net -390 ml       Ortho/SPM Exam    AAOx4  NAD  Reg rate  No increased WOB  Dressing c/d/i  Polar ice in place  SILT T/SP/DP/Jean/Sa  Motor intact T/SP/DP  WWP extremities  FCDs in place and functioning      Significant Labs: All pertinent labs within the past 24 hours have been reviewed.    Significant Imaging: I have reviewed and interpreted all pertinent imaging results/findings.    Assessment/Plan:     S/P total knee arthroplasty  72 y.o. female POD1 s/p left TKA    Pain control: multimodal  PT/OT: WBAT LLE  DVT PPx: ASA 81 BID, FCDs at all times when not ambulating  Abx: postop Ancef  Labs: none  Drain: none  Montiel: none    Dispo: f/u PT recs, dc home today            Rohan Jaquez MD  Orthopedics  [place] [facility] ([facility])

## 2022-12-14 NOTE — NURSING
[facility] Pump teaching done w/ patient & patient's daughterRadha via telephone.  Instructed to remove on day 5,  12/18 at 12 noon or when pump alarms infusion complete. Demonstrated and explained how to remove catheter.Pt and pt's daughter verbalized understanding.  All questions answered. [facility] Pump contract signed, home care instxn pamphlet, home care supplies provided to patient, placed in discharge folder. Encouraged to contact anesthesia using # on brochure with any questions/concerns re: pain, side effects.. Instructed to call [facility] for any pump related issues. Informed that pt/fmly will receive follow up calls.

## 2022-12-14 NOTE — DISCHARGE SUMMARY
[facility])  Orthopedics  Discharge Summary      Patient Name: Poonam Alvarez  MRN: [id number]  Admission Date: 12/13/2022  Hospital Length of Stay: 0 days  Discharge Date and Time:  12/14/2022 1:25 PM  Attending Physician: Gordon Schneider MD   Discharging Provider: Rohan Jaquez MD  Primary Care Provider: Lindsey Bach MD    HPI: CC:  Left knee pain     Poonam Alvarez is a 71 y.o. female with history of Left knee pain. Pain is worse with activity and weight bearing.  Patient has experienced interference of activities of daily living due to decreased range of motion and an increase in joint pain and swelling.  Patient has failed non-operative treatment including NSAIDs, corticosteroid injections, viscosupplement injections, and activity modification.  Poonam Alvarez currently ambulates independently.      Relevant medical conditions of significance in perioperative period:  HTN- on medication managed by pcp  Hypothyroidism (postablative)- on medication managed by pcp    Procedure(s) (LRB):  ARTHROPLASTY, KNEE, TOTAL (Left)      Hospital Course: Patient presented for above procedure.  Tolerated it well and was discharged home POD1 after voiding, tolerating diet, ambulating, pain controlled.  Discharge instructions, follow-up appointment, and med rec are below.      Consults (From admission, onward)          Status Ordering Provider     Inpatient consult to Social Work  Once        Provider:  (Not yet assigned)    Acknowledged SHARONDA RIVERA     Inpatient consult to Pain Management  Once        Provider:  (Not yet assigned)    SHARONDA Bro     Inpatient consult to Respiratory Care  Once        Provider:  (Not yet assigned)    SHARONDA Bro            Significant Diagnostic Studies: Labs: All labs within the past 24 hours have been reviewed    Pending Diagnostic Studies:       None          Final Active Diagnoses:    Diagnosis Date Noted POA    S/P  total knee arthroplasty [Z96.659] 03/28/2014 Not Applicable      Problems Resolved During this Admission:      Discharged Condition: good    Disposition: Home or Self Care    Follow Up:   Follow-up Information       [facility] 5th Fl Follow up in 2 week(s).    Specialty: Orthopedics  Contact information:  [address]  [facility] [address] [phone]  Additional information:  [facility] - Main Building, 5th Floor   Please park in [address] and take [address] elevator                         Patient Instructions:      Activity as tolerated     Sponge bath only until clinic visit     Keep surgical extremity elevated     Lifting restrictions   Order Comments: No strenuous exercise or lifting of > 10 lbs     Weight bearing as tolerated     No driving, operating heavy equipment or signing legal documents while taking pain medication     Leave dressing on - Keep it clean, dry, and intact until clinic visit   Order Comments: Do not remove surgical dressing for 2 weeks post-op. This will be done only by MD at initial post-op visit. If dressing is completely saturated, replace with identical dressing - silver-impregnated hydrocolloid dressing.     Do not get dressings wet. Do not shower.     If dressing continues to be saturated or there are signs of infection, please call Ortho Clinic [phone] for further instructions and to make appt to be seen.     Call MD for:  temperature >100.4     Call MD for:  persistent nausea and vomiting     Call MD for:  severe uncontrolled pain     Call MD for:  difficulty breathing, headache or visual disturbances     Call MD for:  redness, tenderness, or signs of infection (pain, swelling, redness, odor or green/yellow discharge around incision site)     Call MD for:  hives     Call MD for:  persistent dizziness or light-headedness     Call MD for:  extreme fatigue     Medications:  Reconciled Home Medications:      Medication  List        START taking these medications      celecoxib 200 MG capsule  Commonly known as: CeleBREX  Take 1 capsule (200 mg total) by mouth once daily.     docusate sodium 100 MG capsule  Commonly known as: COLACE  Take 1 capsule (100 mg total) by mouth 2 (two) times daily as needed for Constipation.     HYDROcodone-acetaminophen  mg per tablet  Commonly known as: NORCO  Take 1 tablet by mouth every 6 (six) hours as needed for Pain.     methocarbamoL 750 MG Tab  Commonly known as: ROBAXIN  Take 1 tablet (750 mg total) by mouth 3 (three) times daily as needed (muscle spasms).            CHANGE how you take these medications      aspirin 81 MG EC tablet  Commonly known as: ECOTRIN  Take 1 tablet (81 mg total) by mouth 2 (two) times daily.  What changed: when to take this     SYNTHROID 137 MCG Tab tablet  Generic drug: levothyroxine  TAKE 1 TABLET(137 MCG) BY MOUTH EVERY DAY  What changed: See the new instructions.            CONTINUE taking these medications      blood pressure monitor Kit  Commonly known as: BLOOD PRESSURE KIT  Use as directed     cholecalciferol (vitamin D3) 50 mcg (2,000 unit) Cap capsule  Commonly known as: VITAMIN D3  Take 1 capsule by mouth once daily.     EMERGEN-C ORAL  Take 1,000 mg by mouth once daily.     hydroCHLOROthiazide 12.5 MG Tab  Commonly known as: HYDRODIURIL  TAKE 1/2 TO 1 TABLET BY MOUTH DAILY AS NEEDED     magnesium hydroxide 400 mg/5 ml 400 mg/5 mL Susp  Commonly known as: MILK OF MAGNESIA  Take 30 mLs by mouth daily as needed.     multivitamin-minerals-lutein Tab  Take 1 tablet by mouth once daily.            STOP taking these medications      meloxicam 15 MG tablet  Commonly known as: MOBIC     naproxen sodium 220 MG tablet  Commonly known as: ANAPROX              Rohan Jaquez MD  Orthopedics  [facility])

## 2022-12-15 ENCOUNTER — CLINICAL SUPPORT (OUTPATIENT)
Dept: ORTHOPEDICS | Facility: CLINIC | Age: 72
End: 2022-12-15
Payer: MEDICARE

## 2022-12-15 ENCOUNTER — CLINICAL SUPPORT (OUTPATIENT)
Dept: REHABILITATION | Facility: HOSPITAL | Age: 72
End: 2022-12-15
Attending: ORTHOPAEDIC SURGERY
Payer: MEDICARE

## 2022-12-15 DIAGNOSIS — M17.12 PRIMARY OSTEOARTHRITIS OF LEFT KNEE: ICD-10-CM

## 2022-12-15 DIAGNOSIS — M25.662 DECREASED RANGE OF MOTION (ROM) OF LEFT KNEE: Primary | ICD-10-CM

## 2022-12-15 DIAGNOSIS — R29.898 DECREASED STRENGTH OF LOWER EXTREMITY: ICD-10-CM

## 2022-12-15 DIAGNOSIS — R26.89 IMPAIRED GAIT AND MOBILITY: ICD-10-CM

## 2022-12-15 DIAGNOSIS — M25.562 ACUTE PAIN OF LEFT KNEE: ICD-10-CM

## 2022-12-15 DIAGNOSIS — Z96.659 STATUS POST KNEE REPLACEMENT, UNSPECIFIED LATERALITY: Primary | ICD-10-CM

## 2022-12-15 PROCEDURE — 97162 PT EVAL MOD COMPLEX 30 MIN: CPT

## 2022-12-15 PROCEDURE — 99499 UNLISTED E&M SERVICE: CPT | Mod: 95,,, | Performed by: ORTHOPAEDIC SURGERY

## 2022-12-15 PROCEDURE — 97110 THERAPEUTIC EXERCISES: CPT

## 2022-12-15 PROCEDURE — 99499 NO LOS: ICD-10-PCS | Mod: 95,,, | Performed by: ORTHOPAEDIC SURGERY

## 2022-12-15 NOTE — PLAN OF CARE
[facility])  Discharge Final Note    Primary Care Provider: Lindsey Bach MD    Expected Discharge Date: 12/14/2022    Final Discharge Note (most recent)       Final Note - 12/15/22 1231          Final Note    Assessment Type Final Discharge Note     Anticipated Discharge Disposition Home or Self Care     What phone number can be called within the next 1-3 days to see how you are doing after discharge? --   [phone]                    Important Message from Medicare      Future Appointments   Date Time Provider Department Center   12/19/2022  3:00 PM Sonja Lindsay, PTA [facility]   12/21/2022  3:00 PM Sonja Lindsay, PTA [facility]   12/23/2022  2:00 PM Barbi Scorsone, PT [facility]   12/27/2022  9:45 AM Lexie Cowiche, NP [facility] [address]   12/27/2022  3:00 PM Sonja Lindsay, PTA [facility]   12/28/2022  3:00 PM Sonja Lindsay, PTA [facility]   12/30/2022  3:00 PM Sonja Lindsay, PTA [facility]   1/3/2023  3:00 PM Barbi Scorsone, PT [facility]   1/4/2023  3:00 PM Jackie Frias, PTA [facility]   1/6/2023  3:00 PM Sonja Lindsay, PTA [facility]   1/9/2023  3:00 PM Sonja Lindsay, PTA [facility]   1/11/2023  3:00 PM Sonja Lindsay, PTA [facility]   1/13/2023  9:00 AM ABHIJIT [facility]   1/13/2023  3:00 PM Sonja Lindsay, PTA [facility] [place]   1/17/2023  3:00 PM Barbi Scorsone, PT [facility]   1/19/2023  3:00 PM Sonja Lindsay, PTA [facility] [place]   1/24/2023  3:00 PM Sonja Lindsay, PTA [facility] [place]   1/26/2023  3:00 PM Barbi Barrera, PT [facility] [place]     Patient discharged home to care of family on 12/14/22. Outpatient PT to begin on 12/15/22 @ 12PM.       Contact Info       [facility]   Specialty: Orthopedics    [address]  [facility] [address]   Phone:  [phone]       Next Steps: Follow up in 2 week(s)

## 2022-12-15 NOTE — PROGRESS NOTES
See evaluation in POC for goals and assessment     Eval Date: 12/16/2022    Lexie Wiggins, PT, DPT, CLT

## 2022-12-16 NOTE — PROGRESS NOTES
Established Patient - Audio Only Telehealth Visit     The patient location is: in car  The chief complaint leading to consultation is: post-op call  Visit type: Virtual visit with audio only (telephone)  Total time spent with patient: 6 min       The reason for the audio only service rather than synchronous audio and video virtual visit was related to technical difficulties or patient preference/necessity.     Each patient to whom I provide medical services by telemedicine is:  (1) informed of the relationship between the physician and patient and the respective role of any other health care provider with respect to management of the patient; and (2) notified that they may decline to receive medical services by telemedicine and may withdraw from such care at any time. Patient verbally consented to receive this service via voice-only telephone call.       HPI: s/p knee     Assessment and plan:  see below    I called the patient today regarding her surgery with Dr. Gordon Schneider. The patient had a left TKA on 12/13.     Pain Scale: 0 / 10    Any issues with Fever: No.    Any issues with medications (specifically DVT prophylaxis): No. ASA 81 BID    Any issues with bowel movements:  Passing latanya: No.                                                                 Urination: No.                                                                 Constipation: No. Milk of Magnesia    Completing at home exercises: Yes:     Any concerns regarding their dressing/bandage:  No.    Patient confirmed first OP-PT appointment:  [facility] on  12/15 at 12 noon.     Any other concerns: Yes:  pt reports vomiting yesterday, but is resolved. Encouraged fluids and to call if symptoms worsen.        The patient was informed that if they have any urgent issues with their bandage, medications or any other health concerns regarding their surgery to call the 24/7 Orthopedic Post-op Hot Line at [phone]. The patient was reminded that if they  have any chest pain or shortness of breath to call 911 or go to the ER.    The patient verbalized understanding and does not have any other questions                                    This service was not originating from a related E/M service provided within the previous 7 days nor will  to an E/M service or procedure within the next 24 hours or my soonest available appointment.  Prevailing standard of care was able to be met in this audio-only visit.

## 2022-12-16 NOTE — PROGRESS NOTES
[facility]   Physical Therapy Treatment Note     Name: Poonam Alvarez  Clinic Number: [id number]    Therapy Diagnosis:   Encounter Diagnoses   Name Primary?    Decreased range of motion (ROM) of left knee Yes    Decreased strength of lower extremity     Acute pain of left knee     Impaired gait and mobility      Physician: Gordon Schneider MD    Visit Date: 12/19/2022    Physician Orders: PT Eval and Treat   Medical Diagnosis from Referral: M17.12 (ICD-10-CM) - Primary osteoarthritis of left knee   Evaluation Date: 12/15/2022  Authorization Period Expiration: 2/9/2023   Plan of Care Expiration: 1/27/2023  Visit # / Visits authorized: 1 / 20 + eval    PTA Visit #: 1 / 5     Time In: 1500  Time Out: 1610  Total Treatment Time: 70 minutes  Total Billable Time: 60 minutes (4 TE)    Precautions: Standard    SUBJECTIVE     Patient reports: that she has been compliant with her HEP. States that she has a lot of swelling, but is not elevating at home. She has been working on moving her own knee cap.  She was compliant with home exercise program.  Response to previous treatment: good; no adverse reaction  Functional change: ambulated into clinic with RW    Pain: 0/10, currently  Location: Left knee    Patient goals: get back to walking in the park, riding a bike, being able to be more active     OBJECTIVE     Objective Measures updated at progress report unless specified.   L TKA 12/13/2022  2 week follow up 12/27/2022    Treatment     Poonam received the treatments listed below:      THERAPEUTIC EXERCISES to develop strength, endurance, ROM, flexibility, posture, and core stabilization for 60 minutes including:    Quad sets with towel under knee: 5 second hold, 20 reps  Quad sets with heel prop; 5 second hold, 20 reps  Short arc quads with medium bolster; 5 second hold, 10 reps  Long sitting gastroc/HS stretch; 30 second hold, 4 reps  Long arc quads at EOM; 3 second hold, 10 reps  SLR; unable  to perform  Heel slides; increase in soreness reported in knee, deferred today    cold pack for 10 minutes to Left LE with LE elevated on wedge.     Patient Education and Home Exercises     Home Exercises Provided and Patient Education Provided     Education provided:   - Temporary compression issued in the form of edemawear size L, donned from foot to thigh. Patient educated to remove at night or if painful.  - Continued compliance with HEP  - Elevation in combination with ice to help reduce swelling    Written Home Exercises Provided: Patient instructed to cont prior HEP. Exercises were reviewed and Poonam was able to demonstrate them prior to the end of the session.  Poonam demonstrated good  understanding of the education provided. See EMR under Patient Instructions for exercises provided during therapy sessions    ASSESSMENT     Ms. Levin presents for her first follow up after initial eval. She is s/p 6 days L TKA ambulating with a RW. Demonstrates fair quadriceps activation. Increased fatigue reported with short arc and long arc quads, unable to perform more than 10 reps. Unable to perform straight leg raises today; attempted therapist assist however patient noted increased soreness. Deferred SLR and heel slides today per patient request.   Poonam Is progressing well towards her goals.   Pt prognosis is Good.     Pt will continue to benefit from skilled outpatient physical therapy to address the deficits listed in the problem list box on initial evaluation, provide pt/family education and to maximize pt's level of independence in the home and community environment.     Pt's spiritual, cultural and educational needs considered and pt agreeable to plan of care and goals.     Anticipated barriers to physical therapy: none    Goals:   Short Term Goals (3 Weeks):   1. Patient will be independent with home exercise program to supplement physical therapy treatment in improving functional status.  2. Patient will  improve Llower extremity strength to at least 75% of R lower extremity strength as measured via MicroFet handheld dynamometer to improve strength for closed chain tasks.   3. Patient will improve L knee active range of motion to 0-90 degrees to promote increased ease of sit<>stand transfers.  4. Patient will perform timed up and go with less restrictive assistive device in < 20 seconds to improve gait speed and safety with community ambulation.     Long Term Goals (6 Weeks):   1. Patient will improve L lower extremity strength to at least 90% of Rlower extremity strength as measured via MicroFet handheld dynamometer to improve strength for closed chain tasks.   2. Patient will improve the total FOTO Knee Survey Score to </= 30% limited to demonstrate increased perceived functional mobility.  3. Patient will perform timed up and go with least restrictive assistive device in < 13.5 seconds to improve gait speed and safety with community ambulation.  4. Patient will perform at least 14 sit to stands in 30 seconds without UE support to demonstrate increased functional strength.   5. Patient will improve Lknee active range of motion to 0-120 degrees to promote higher level transfers and transitions without limitation.     PLAN     Plan of Care Certification: 12/15/2022 to 1/27/2023.     Outpatient Physical Therapy 4 times weekly for 6 weeks to include the following interventions: Gait Training, Manual Therapy, Moist Heat/ Ice, Neuromuscular Re-ed, Orthotic Management and Training, Patient Education, Therapeutic Activites and Therapeutic Exercise, Instrument Assisted Soft Tissue Mobilization (IASTM), Kinesiotape.    Sonja Lindsay, PTA

## 2022-12-16 NOTE — PLAN OF CARE
[facility]  Physical Therapy Initial Evaluation    Name: Poonam Alvarez  Clinic Number: [id number]    Therapy Diagnosis:   Encounter Diagnoses   Name Primary?    Primary osteoarthritis of left knee     Decreased range of motion (ROM) of left knee Yes    Decreased strength of lower extremity     Acute pain of left knee     Impaired gait and mobility      Physician: Gordon Schneider MD    Physician Orders: PT Eval and Treat   Medical Diagnosis from Referral: M17.12 (ICD-10-CM) - Primary osteoarthritis of left knee   Evaluation Date: 12/15/2022  Authorization Period Expiration: 10/6/2023  Plan of Care Expiration: 1/27/2023  Visit # / Visits authorized: 1/1  FOTO: 1/10    Time In: 12:00pm   Time Out: 1:00pm   Total Billable Time: 60 minutes    Precautions: Standard, Standard    Subjective     Date of surgery: 12/13/2022    History of current condition - Poonam reports: she had a left total knee arthroplasty. She has been taking her medications as directed. She had some nausea yesterday but it is better today and her team is aware. She has her daughter and son assisting her at home and has had no issues with navigating her home. She had one R TKA in 2014. She hasn't been doing any exercises since surgery except for ankle DF/ PF.      Past Medical History:   Diagnosis Date    AR (allergic rhinitis) 12/07/2012    Atrial flutter     ablation October 2008    Cataract     Generalized headaches     GERD (gastroesophageal reflux disease) 03/08/2013    resolved    Grave's disease     s/p radioactive iodine, now hypothyroidism    Keloid cicatrix     Obesity     Osteoarthritis     shoulders, hands, knees    Positive PPD, treated     9 months of INH, completed 1/2010     Poonam Alvarez  has a past surgical history that includes Eye surgery; Knee arthroscopy w/ debridement; Ablation of dysrhythmic focus (10/24/2008); Knee surgery (03/27/2014); Colonoscopy (N/A, 11/05/2015); Hernia repair;  Arthroplasty of shoulder (Right, 04/01/2019); Cataract extraction w/  intraocular lens implant (Bilateral); Joint replacement; and Total knee arthroplasty (Left, 12/13/2022).    Poonam has a current medication list which includes the following prescription(s): ascorbic acid/multivit-min, aspirin, blood pressure monitor, celecoxib, cholecalciferol (vitamin d3), docusate sodium, hydrochlorothiazide, hydrocodone-acetaminophen, magnesium hydroxide 400 mg/5 ml, methocarbamol, multivitamin-minerals-lutein, and synthroid.    Review of patient's allergies indicates:   Allergen Reactions    Hydrocodone-acetaminophen Other (See Comments)     Low blood pressure; doesn't want    Oxycodone-acetaminophen Other (See Comments)     Causes low blood pressure; doesn't want  4/4/19 - patient reports she is not actually allergic to it (just doesn't like how it makes her feel)        Imaging,       FINDINGS:  Interval findings of left total knee procedure, satisfactory alignment and position of tibial and femoral hardware.  No acute fractures.  Postsurgical soft tissue changes.     Impression:     As above.    Prior Therapy: had prehab  Social History:   Occupation: Part time . Unsure if she is going back to work after her surgery   Prior Level of Function: Independent   Current Level of Function: Using walker, not driving 2    Pain:  Current 7/10, worst 10/10, best 0/10   Location: behind and in front of the knee   Description: sharp, aching, excruciating   Aggravating Factors: walking, standing up, sitting back down, lifting the leg  Easing Factors: pain pump, medication, rest    Patient's goals:get back to walking in the park, riding a bike, being able to be more active     Objective     Posture: rounded shoulders, hunched posture in standing over walker   Palpation:    Tender around incision site     Active range of motion:  Right knee: 0-110  Left knee: lacking 6-70 (able to perform more knee flexion with seated heel  "slides      Lower extremity srength with MicroFET handheld dynamometer Right Left Pain/dysfunction with movement   (approx 4 sec hold w/ max contraction)   Hip flexion 8.2 kg  Not able to lift against resistance     Hip abduction 11.0 kg  8.3 kg     Quadriceps 8.4 kg  4.5 kg     Hamstrings 9.8 kg  4.5 kg       Timed Up and Go:  42 seconds (with assistive device)    30" Chair Stand: 6 with upper extremity support    Gait Analysis: Modified independent with rolling walker: decreased foot clearance on L, lack of terminal knee extension on L     Impairment/Limitation/Restriction for FOTO Knee Survey    Therapist reviewed FOTO scores for Poonam Alvarez on 12/15/2022.   FOTO documents entered into [employer] - see Media section.    Limitation Score: not performed pt declined      TREATMENT       Total Treatment time separate from Evaluation: 30 minutes    Poonam received therapeutic exercises to develop strength, endurance, ROM, and flexibility for 30 minutes including:    Quad sets: 5"x10  Short arc quads: x10   Straight leg raise: x5   Long arc quads: x10   Seated calf stretch  Seated heel slides   Seated heel prop: 3'   Poonam received cold pack to L knee for 10 minutes to decrease pain and swelling.    Home Exercises and Patient Education Provided:    Education provided:   - Findings; prognosis and plan of care  - Home exercise program  - Modality options  - Therapist contact information    Written Home Exercises Provided: Yes.  Exercises were reviewed and Poonam was able to demonstrate them prior to the end of the session.  Poonam demonstrated good  understanding of the education provided.     See electronic medical record under Notes-Patient Instructions for exercises provided 12/15/2022.    Assessment     Poonam is a 72 y.o. female referred to outpatient Physical Therapy with a medical diagnosis of M17.12 (ICD-10-CM) - Primary osteoarthritis of left knee . Patient presents to initial evaluation post-op day 2 with " decreased knee ROM. Knee pain, LE weakness, decreased functional mobility, and decreased activity tolerance.    Pt's skin is intact with no redness, feverish skin, or calf tenderness. Confirmed pt has TKA hotline as well as clinic contact info. Pt reports no issues with pain pump and has been taking her medications as instructed. Pt reports no issues with using AD or navigating her home. Patient demonstrates decreased lower extremity strength, poor left quadriceps motor control and strength, difficulty ambulating short and long distances, and reduced functional mobility. Pt was unable to exert hip flexion force against dynaometer but was able to improve active hip flexion later on in session. Patient would benefit from skilled outpatient physical therapy to address motor control, strength and range of motion deficits so that she may return to full independence with all household and personal ADL's, and improve overall functional mobility, reduce risk for falls and meet all social and recreational needs.      Patient prognosis is Good.   Patient will benefit from skilled outpatient physical therapy to address the deficits stated above and in the chart below, provide pt/family education, and to maximize pt's level of independence.     Plan of care discussed with patient: Yes  Pt's spiritual, cultural and educational needs considered and patient is agreeable to the plan of care and goals as stated below:     Anticipated barriers for therapy: none     Medical necessity is demonstrated by the following  History  Co-morbidities and personal factors that may impact the plan of care Co-morbidities:   advanced age, high BMI, and grave's disease    Personal Factors:   no deficits     high   Examination  Body Structures and Functions, activity limitations and participation restrictions that may impact the plan of care Body Regions:   lower extremities    Body Systems:    gross symmetry  ROM  strength  gross coordinated  movement  balance  gait  transfers  transitions  motor control  motor learning  edema    Participation Restrictions:   Traveling     Activity limitations:   Learning and applying knowledge  no deficits    General Tasks and Commands  no deficits    Communication  no deficits    Mobility  lifting and carrying objects  walking  driving (bike, car, motorcycle)    Self care  no deficits    Domestic Life  no deficits    Interactions/Relationships  no deficits    Life Areas  no deficits    Community and Social Life  no deficits         moderate   Clinical Presentation evolving clinical presentation with changing clinical characteristics moderate   Decision Making/ Complexity Score: moderate     Short Term Goals (3 Weeks):   1. Patient will be independent with home exercise program to supplement physical therapy treatment in improving functional status.  2. Patient will improve Llower extremity strength to at least 75% of R lower extremity strength as measured via MicroFet handheld dynamometer to improve strength for closed chain tasks.   3. Patient will improve L knee active range of motion to 0-90 degrees to promote increased ease of sit<>stand transfers.  4. Patient will perform timed up and go with less restrictive assistive device in < 20 seconds to improve gait speed and safety with community ambulation.     Long Term Goals (6 Weeks):   1. Patient will improve L lower extremity strength to at least 90% of Rlower extremity strength as measured via MicroFet handheld dynamometer to improve strength for closed chain tasks.   2. Patient will improve the total FOTO Knee Survey Score to </= 30% limited to demonstrate increased perceived functional mobility.  3. Patient will perform timed up and go with least restrictive assistive device in < 13.5 seconds to improve gait speed and safety with community ambulation.  4. Patient will perform at least 14 sit to stands in 30 seconds without UE support to demonstrate increased  "functional strength.   5. Patient will improve Lknee active range of motion to 0-120 degrees to promote higher level transfers and transitions without limitation.     Timed Up and Go Cutoff Scores:        30" Chair Stand Cutoff Scores:          Plan     Plan of care certification: 12/15/2022 to 1/27/2023.    Outpatient Physical Therapy 4 times weekly for 6 weeks to include the following interventions: Gait Training, Manual Therapy, Moist Heat/ Ice, Neuromuscular Re-ed, Orthotic Management and Training, Patient Education, Therapeutic Activites and Therapeutic Exercise, Instrument Assisted Soft Tissue Mobilization (IASTM), Kinesiotape    Lexie Wiggins, PT, DPT     "

## 2022-12-17 NOTE — ANESTHESIA POST-OP PAIN MANAGEMENT
12/17/2022    Called and spoke to patient about [employer] [facility].  Pain well controlled.  Denies tinnitus, metallic taste in mouth, weakness in extremity.  Denies erythema, warmth, tenderness, bleeding at site of catheter entry.  Dressing c/d/i.  All questions answered.  Encouraged them to call the provided number if any issues arise.  Nimbus to be discontinued 1 days from today.    Feroz Bellamy MD  Anesthesia CA-3/PGY-4  Pager: [phone]

## 2022-12-19 ENCOUNTER — CLINICAL SUPPORT (OUTPATIENT)
Dept: REHABILITATION | Facility: HOSPITAL | Age: 72
End: 2022-12-19
Payer: MEDICARE

## 2022-12-19 DIAGNOSIS — M25.662 DECREASED RANGE OF MOTION (ROM) OF LEFT KNEE: Primary | ICD-10-CM

## 2022-12-19 DIAGNOSIS — R26.89 IMPAIRED GAIT AND MOBILITY: ICD-10-CM

## 2022-12-19 DIAGNOSIS — M25.562 ACUTE PAIN OF LEFT KNEE: ICD-10-CM

## 2022-12-19 DIAGNOSIS — R29.898 DECREASED STRENGTH OF LOWER EXTREMITY: ICD-10-CM

## 2022-12-19 PROCEDURE — 97110 THERAPEUTIC EXERCISES: CPT | Mod: CQ

## 2022-12-21 ENCOUNTER — CLINICAL SUPPORT (OUTPATIENT)
Dept: REHABILITATION | Facility: HOSPITAL | Age: 72
End: 2022-12-21
Payer: MEDICARE

## 2022-12-21 DIAGNOSIS — R26.89 IMPAIRED GAIT AND MOBILITY: ICD-10-CM

## 2022-12-21 DIAGNOSIS — M25.562 ACUTE PAIN OF LEFT KNEE: ICD-10-CM

## 2022-12-21 DIAGNOSIS — R29.898 DECREASED STRENGTH OF LOWER EXTREMITY: ICD-10-CM

## 2022-12-21 DIAGNOSIS — M25.662 DECREASED RANGE OF MOTION (ROM) OF LEFT KNEE: Primary | ICD-10-CM

## 2022-12-21 PROCEDURE — 97110 THERAPEUTIC EXERCISES: CPT | Mod: CQ

## 2022-12-21 NOTE — PROGRESS NOTES
[facility]   Physical Therapy Treatment Note     Name: Poonam Alvarez  Clinic Number: [id number]    Therapy Diagnosis:   Encounter Diagnoses   Name Primary?    Decreased range of motion (ROM) of left knee Yes    Decreased strength of lower extremity     Acute pain of left knee     Impaired gait and mobility      Physician: Gordon Schneider MD    Visit Date: 12/21/2022    Physician Orders: PT Eval and Treat   Medical Diagnosis from Referral: M17.12 (ICD-10-CM) - Primary osteoarthritis of left knee   Evaluation Date: 12/15/2022  Authorization Period Expiration: 2/9/2023   Plan of Care Expiration: 1/27/2023  Visit # / Visits authorized: 2 / 20 + eval    PTA Visit #: 2 / 5     Time In: 1300  Time Out: 1415  Total Treatment Time: 75 minutes  Total Billable Time: 30 minutes (2 TE)    Precautions: Standard    SUBJECTIVE     Patient reports: that she is having increased swelling in her leg and ankle today. She reports having some pain after last visit, but she was ok and took a nap when she got home.  She was compliant with home exercise program.  Response to previous treatment: good; no adverse reaction  Functional change: ambulated into clinic with RW    Pain: 0/10, currently  Location: Left knee    Patient goals: get back to walking in the park, riding a bike, being able to be more active     OBJECTIVE     Objective Measures updated at progress report unless specified.   L TKA 12/13/2022  2 week follow up 12/27/2022 12/21/2022; 71 degrees AAROM Flexion    Treatment     Poonam received the treatments listed below:      THERAPEUTIC EXERCISES to develop strength, endurance, ROM, flexibility, posture, and core stabilization for 65 minutes including:    Quad sets with towel under knee: 5 second hold, 30 reps  Quad sets with heel prop; 5 second hold, 20 reps  Short arc quads with medium bolster; 5 second hold, 20 reps  Long sitting gastroc/HS stretch; 30 second hold, 4 reps  Long arc quads at  EOM; 3 second hold, 6 reps (unable to complete more than 6 reps, increase in knee pain)  SLR with therapist assist; 2 x 5  Heel slides; 10 second hold, 5 reps with therapist assist    cold pack for 10 minutes to Left LE with LE elevated on wedge.     Patient Education and Home Exercises     Home Exercises Provided and Patient Education Provided     Education provided:   - Temporary compression issued in the form of edemawear size L, donned from foot to thigh. Patient educated to remove at night or if painful.  - Continued compliance with HEP  - Elevation in combination with ice to help reduce swelling  - ROM expectations    Written Home Exercises Provided: Patient instructed to cont prior HEP. Exercises were reviewed and Poonam was able to demonstrate them prior to the end of the session.  Poonam demonstrated good  understanding of the education provided. See EMR under Patient Instructions for exercises provided during therapy sessions    ASSESSMENT     Ms. Levin is s/p 1 week and 1 day from L TKA. She demonstrated improved tolerance to exercises performed, however has discomfort and difficulty with heel slides. Therapist assist provided to help achieve maximal potential. She measured at 71 degrees today. We discussed range of motion expectations and restrictions that may occur secondary to swelling and pain. She verbalized understanding.   Poonam Is progressing well towards her goals.   Pt prognosis is Good.     Pt will continue to benefit from skilled outpatient physical therapy to address the deficits listed in the problem list box on initial evaluation, provide pt/family education and to maximize pt's level of independence in the home and community environment.     Pt's spiritual, cultural and educational needs considered and pt agreeable to plan of care and goals.     Anticipated barriers to physical therapy: none    Goals:   Short Term Goals (3 Weeks):   1. Patient will be independent with home exercise  program to supplement physical therapy treatment in improving functional status.  2. Patient will improve Llower extremity strength to at least 75% of R lower extremity strength as measured via MicroFet handheld dynamometer to improve strength for closed chain tasks.   3. Patient will improve L knee active range of motion to 0-90 degrees to promote increased ease of sit<>stand transfers.  4. Patient will perform timed up and go with less restrictive assistive device in < 20 seconds to improve gait speed and safety with community ambulation.     Long Term Goals (6 Weeks):   1. Patient will improve L lower extremity strength to at least 90% of Rlower extremity strength as measured via MicroFet handheld dynamometer to improve strength for closed chain tasks.   2. Patient will improve the total FOTO Knee Survey Score to </= 30% limited to demonstrate increased perceived functional mobility.  3. Patient will perform timed up and go with least restrictive assistive device in < 13.5 seconds to improve gait speed and safety with community ambulation.  4. Patient will perform at least 14 sit to stands in 30 seconds without UE support to demonstrate increased functional strength.   5. Patient will improve Lknee active range of motion to 0-120 degrees to promote higher level transfers and transitions without limitation.     PLAN     Plan of Care Certification: 12/15/2022 to 1/27/2023.     Outpatient Physical Therapy 4 times weekly for 6 weeks to include the following interventions: Gait Training, Manual Therapy, Moist Heat/ Ice, Neuromuscular Re-ed, Orthotic Management and Training, Patient Education, Therapeutic Activites and Therapeutic Exercise, Instrument Assisted Soft Tissue Mobilization (IASTM), Kinesiotape.    Sonja Lindsay, PTA

## 2022-12-22 ENCOUNTER — CLINICAL SUPPORT (OUTPATIENT)
Dept: REHABILITATION | Facility: HOSPITAL | Age: 72
End: 2022-12-22
Payer: MEDICARE

## 2022-12-22 DIAGNOSIS — Z96.659 STATUS POST KNEE REPLACEMENT, UNSPECIFIED LATERALITY: Primary | ICD-10-CM

## 2022-12-22 DIAGNOSIS — R29.898 DECREASED STRENGTH OF LOWER EXTREMITY: ICD-10-CM

## 2022-12-22 DIAGNOSIS — M25.662 DECREASED RANGE OF MOTION (ROM) OF LEFT KNEE: Primary | ICD-10-CM

## 2022-12-22 DIAGNOSIS — R26.89 IMPAIRED GAIT AND MOBILITY: ICD-10-CM

## 2022-12-22 DIAGNOSIS — M25.562 ACUTE PAIN OF LEFT KNEE: ICD-10-CM

## 2022-12-22 PROCEDURE — 97110 THERAPEUTIC EXERCISES: CPT | Mod: CQ

## 2022-12-22 NOTE — PROGRESS NOTES
[facility]   Physical Therapy Treatment Note     Name: Poonam Alvarez  Clinic Number: [id number]    Therapy Diagnosis:   Encounter Diagnoses   Name Primary?    Decreased range of motion (ROM) of left knee Yes    Decreased strength of lower extremity     Acute pain of left knee     Impaired gait and mobility      Physician: Gordon Schneider MD    Visit Date: 12/22/2022    Physician Orders: PT Eval and Treat   Medical Diagnosis from Referral: M17.12 (ICD-10-CM) - Primary osteoarthritis of left knee   Evaluation Date: 12/15/2022  Authorization Period Expiration: 2/9/2023   Plan of Care Expiration: 1/27/2023  Visit # / Visits authorized: 3 / 20 + eval    PTA Visit #: 3 / 5     Time In: 1255  Time Out: 1400  Total Treatment Time: 65 minutes  Total Billable Time: 30 minutes (2 TE)    Precautions: Standard    SUBJECTIVE     Patient reports: that she feels the swelling is still present in her ankle, but it's not as bad. She would like to be taught how to use a cane because she wants to use one this weekend. Has been working on seated heel slides at home.  She was compliant with home exercise program.  Response to previous treatment: good; no adverse reaction  Functional change: ambulated into clinic with RW    Pain: 0/10, currently  Location: Left knee    Patient goals: get back to walking in the park, riding a bike, being able to be more active     OBJECTIVE     Objective Measures updated at progress report unless specified.   L TKA 12/13/2022  2 week follow up 12/27/2022 12/21/2022; 71 degrees AAROM Flexion    Treatment     Poonam received the treatments listed below:      THERAPEUTIC EXERCISES to develop strength, endurance, ROM, flexibility, posture, and core stabilization for 60 minutes including:  Quad sets with towel under knee: 5 second hold, 30 reps  Quad sets with heel prop; 5 second hold, 20 reps  Short arc quads with medium bolster; 5 second hold, 20 reps + green strap for  TKE  Long sitting gastroc/HS stretch; 30 second hold, 4 reps  Long arc quads at EOM; 3 second hold, 10 reps (unable to complete more than 6 reps, increase in knee pain)  SLR with therapist assist; 2 x 5 - NT  Heel slides; 10 second hold, 5 reps with therapist assist - NT  Gait training with SPC    MANUAL THERAPY TECHNIQUES including Joint mobilizations were applied to Left knee for 5 minutes.   Grade II-III Patella Mobs  Tibiofemoral distraction in sitting    cold pack for 00 minutes to Left LE with LE elevated on wedge.     Patient Education and Home Exercises     Home Exercises Provided and Patient Education Provided     Education provided:   - Temporary compression issued in the form of edemawear size L, donned from foot to thigh. Edemawear size S for ankle swelling. Patient educated to remove at night or if painful.  - Continued compliance with HEP  - Elevation in combination with ice to help reduce swelling  - ROM expectations    Written Home Exercises Provided: Patient instructed to cont prior HEP. Exercises were reviewed and Poonam was able to demonstrate them prior to the end of the session.  Poonam demonstrated good  understanding of the education provided. See EMR under Patient Instructions for exercises provided during therapy sessions    ASSESSMENT     Patient demonstrates fair tolerance to exercises performed. She reports pain with long arc quads, SLR's and heel slides. Several standing rest breaks required during time sitting on edge of mat; she reported feeling uncomfortable likely due to dependent positioning and increase in blood flow. Good response to manual care today reporting some local tenderness along patella with mobs. Unable to perform SLR and heel slides today secondary to patient report of pain with exercise. We discussed range of motion expectations and importance of performing HEP during her time at home over the holidays.    Edemawear size small was provided for Left ankle swelling.  Educated her on removal of Edemawear at night for sleeping. Gait training with SPC performed today. Cueing for proper gait sequence provided. Educated on appropriate timeline of progression and use of assistive devices. She verbalized understanding.  Poonam Is progressing well towards her goals.   Pt prognosis is Good.     Pt will continue to benefit from skilled outpatient physical therapy to address the deficits listed in the problem list box on initial evaluation, provide pt/family education and to maximize pt's level of independence in the home and community environment.     Pt's spiritual, cultural and educational needs considered and pt agreeable to plan of care and goals.     Anticipated barriers to physical therapy: none    Goals:   Short Term Goals (3 Weeks):   1. Patient will be independent with home exercise program to supplement physical therapy treatment in improving functional status. - Appropriate, ongoing  2. Patient will improve Llower extremity strength to at least 75% of R lower extremity strength as measured via MicroFet handheld dynamometer to improve strength for closed chain tasks. - Appropriate, ongoing  3. Patient will improve L knee active range of motion to 0-90 degrees to promote increased ease of sit<>stand transfers. - Appropriate, ongoing  4. Patient will perform timed up and go with less restrictive assistive device in < 20 seconds to improve gait speed and safety with community ambulation. - Appropriate, ongoing     Long Term Goals (6 Weeks):   1. Patient will improve L lower extremity strength to at least 90% of Rlower extremity strength as measured via MicroFet handheld dynamometer to improve strength for closed chain tasks. - Appropriate, ongoing  2. Patient will improve the total FOTO Knee Survey Score to </= 30% limited to demonstrate increased perceived functional mobility. - Appropriate, ongoing  3. Patient will perform timed up and go with least restrictive assistive device  in < 13.5 seconds to improve gait speed and safety with community ambulation. - Appropriate, ongoing  4. Patient will perform at least 14 sit to stands in 30 seconds without UE support to demonstrate increased functional strength. - Appropriate, ongoing  5. Patient will improve Lknee active range of motion to 0-120 degrees to promote higher level transfers and transitions without limitation. - Appropriate, ongoing    PLAN     Plan of Care Certification: 12/15/2022 to 1/27/2023.     Outpatient Physical Therapy 4 times weekly for 6 weeks to include the following interventions: Gait Training, Manual Therapy, Moist Heat/ Ice, Neuromuscular Re-ed, Orthotic Management and Training, Patient Education, Therapeutic Activites and Therapeutic Exercise, Instrument Assisted Soft Tissue Mobilization (IASTM), Kinesiotape.    Sonja Lindsay, PTA

## 2022-12-27 ENCOUNTER — TELEPHONE (OUTPATIENT)
Dept: ORTHOPEDICS | Facility: CLINIC | Age: 72
End: 2022-12-27
Payer: MEDICARE

## 2022-12-27 ENCOUNTER — CLINICAL SUPPORT (OUTPATIENT)
Dept: REHABILITATION | Facility: HOSPITAL | Age: 72
End: 2022-12-27
Payer: MEDICARE

## 2022-12-27 ENCOUNTER — OFFICE VISIT (OUTPATIENT)
Dept: ORTHOPEDICS | Facility: CLINIC | Age: 72
End: 2022-12-27
Payer: MEDICARE

## 2022-12-27 DIAGNOSIS — R29.898 DECREASED STRENGTH OF LOWER EXTREMITY: ICD-10-CM

## 2022-12-27 DIAGNOSIS — R05.9 COUGH: ICD-10-CM

## 2022-12-27 DIAGNOSIS — Z96.652 STATUS POST LEFT KNEE REPLACEMENT: Primary | ICD-10-CM

## 2022-12-27 DIAGNOSIS — R26.89 IMPAIRED GAIT AND MOBILITY: ICD-10-CM

## 2022-12-27 DIAGNOSIS — Z96.659 STATUS POST KNEE REPLACEMENT, UNSPECIFIED LATERALITY: Primary | ICD-10-CM

## 2022-12-27 DIAGNOSIS — M25.662 DECREASED RANGE OF MOTION (ROM) OF LEFT KNEE: Primary | ICD-10-CM

## 2022-12-27 DIAGNOSIS — M25.562 ACUTE PAIN OF LEFT KNEE: ICD-10-CM

## 2022-12-27 PROCEDURE — 99024 POSTOP FOLLOW-UP VISIT: CPT | Mod: POP,,, | Performed by: NURSE PRACTITIONER

## 2022-12-27 PROCEDURE — 99024 PR POST-OP FOLLOW-UP VISIT: ICD-10-PCS | Mod: POP,,, | Performed by: NURSE PRACTITIONER

## 2022-12-27 PROCEDURE — 97110 THERAPEUTIC EXERCISES: CPT

## 2022-12-27 PROCEDURE — 99999 PR PBB SHADOW E&M-EST. PATIENT-LVL III: CPT | Mod: PBBFAC,,, | Performed by: NURSE PRACTITIONER

## 2022-12-27 PROCEDURE — 99213 OFFICE O/P EST LOW 20 MIN: CPT | Mod: PBBFAC | Performed by: NURSE PRACTITIONER

## 2022-12-27 PROCEDURE — 99999 PR PBB SHADOW E&M-EST. PATIENT-LVL III: ICD-10-PCS | Mod: PBBFAC,,, | Performed by: NURSE PRACTITIONER

## 2022-12-27 PROCEDURE — 97140 MANUAL THERAPY 1/> REGIONS: CPT

## 2022-12-27 RX ORDER — PROMETHAZINE HYDROCHLORIDE AND DEXTROMETHORPHAN HYDROBROMIDE 6.25; 15 MG/5ML; MG/5ML
2.5 SYRUP ORAL NIGHTLY PRN
Qty: 120 ML | Refills: 0 | Status: SHIPPED | OUTPATIENT
Start: 2022-12-27 | End: 2023-07-07 | Stop reason: SDUPTHER

## 2022-12-27 NOTE — PROGRESS NOTES
Poonam Alvarez presents for initial post-operative visit following a left total knee arthroplasty performed by Dr. Schneider on 12/13/2022.      Exam:   There were no vitals taken for this visit.   Ambulating well with assistive device.  Incision is clean and dry without drainage or erythema.   ROM:0-90    Initial post-operative radiographs reviewed today revealing a well fixed and aligned prosthesis.    A/P:  2 weeks s/p left total knee replacement  - The patient was advised to keep the incision clean and dry for the next 24 hours after which she may wash the area with antibacterial soap in the shower. Will not submerge until the incision is completely healed.   - Outpatient PT ongoing at the [place] location  - Continue aspirin for 1 month post op  - Pain medication refill not needed  - Follow up in 4 weeks with new x-rays. Pt will call clinic with problems/concerns.

## 2022-12-27 NOTE — PROGRESS NOTES
[facility]   Physical Therapy Treatment Note     Name: Poonam Alvarez  Clinic Number: [id number]    Therapy Diagnosis:   Encounter Diagnoses   Name Primary?    Decreased range of motion (ROM) of left knee Yes    Decreased strength of lower extremity     Acute pain of left knee     Impaired gait and mobility      Physician: Gordon Schneider MD    Visit Date: 12/27/2022    Physician Orders: PT Eval and Treat   Medical Diagnosis from Referral: M17.12 (ICD-10-CM) - Primary osteoarthritis of left knee   Evaluation Date: 12/15/2022  Authorization Period Expiration: 2/9/2023   Plan of Care Expiration: 1/27/2023  Visit # / Visits authorized: 4 / 20 + eval    PTA Visit #: 0 / 5     Time In: 3:00 pm  Time Out: 3:59 pm  Total Treatment Time: 59 minutes  Total Billable Time: 59 minutes     Precautions: Standard    SUBJECTIVE     Patient reports: that she has been working on her exercises.  She was compliant with home exercise program.  Response to previous treatment: good; no adverse reaction  Functional change: ambulated into clinic with RW    Pain: 0/10, currently  Location: Left knee    Patient goals: get back to walking in the park, riding a bike, being able to be more active     OBJECTIVE     Objective Measures updated at progress report unless specified.   L TKA 12/13/2022  2 week follow up 12/27/2022 12/21/2022; 71 degrees AAROM Flexion    12/27/2022: 84 degrees AAROM flexion, 0 degrees extension    Treatment     Poonam received the treatments listed below:      THERAPEUTIC EXERCISES to develop strength, endurance, ROM, flexibility, posture, and core stabilization for 49 minutes including:  Aerobic Activity: Recumbent bike for AAROM flexion: half revolutions 5 minutes  Quad sets with towel under knee: 5 second hold, 30 reps  Heel slides: 10 seconds, 10x with strap  Quad sets with heel prop; 5 second hold, 20 reps  Short arc quads with small bolster; 5 second hold, 30 reps  Gait training  with SPC with cane adjusted for appropriate height and 4 point cane facing outward: 100 feet     Not today:  Long sitting gastroc/HS stretch; 30 second hold, 4 reps  Long arc quads at EOM; 3 second hold, 10 reps (unable to complete more than 6 reps, increase in knee pain)  SLR with therapist assist; 2 x 5 - NT  Heel slides; 10 second hold, 5 reps with therapist assist - NT      MANUAL THERAPY TECHNIQUES including Joint mobilizations were applied to Left knee for 10 minutes.   Grade II-III Patella Mobs  Tibiofemoral distraction in sitting  Seated tibiofemoral distraction + PROM flexion     cold pack for 00 minutes to Left LE with LE elevated on wedge.     Patient Education and Home Exercises     Home Exercises Provided and Patient Education Provided     Education provided:   - Temporary compression issued in the form of edemawear size L, donned from foot to thigh. Edemawear size S for ankle swelling. Patient educated to remove at night or if painful.  - Continued compliance with HEP  - Elevation in combination with ice to help reduce swelling  - ROM expectations    Written Home Exercises Provided: Patient instructed to cont prior HEP. Exercises were reviewed and Poonam was able to demonstrate them prior to the end of the session.  Poonam demonstrated good  understanding of the education provided. See EMR under Patient Instructions for exercises provided during therapy sessions    ASSESSMENT     Demonstrates improved tolerance for flexion range of motion; demonstrates 84 degrees AAROM flexion at this time. Flexion range of motion limited secondary to patient tolerance and post op swelling. Demonstrates good left quadriceps motor control per phase of rehab.  Poonam Is progressing well towards her goals.   Pt prognosis is Good.     Pt will continue to benefit from skilled outpatient physical therapy to address the deficits listed in the problem list box on initial evaluation, provide pt/family education and to maximize  pt's level of independence in the home and community environment.     Pt's spiritual, cultural and educational needs considered and pt agreeable to plan of care and goals.     Anticipated barriers to physical therapy: none    Goals:   Short Term Goals (3 Weeks):   1. Patient will be independent with home exercise program to supplement physical therapy treatment in improving functional status. - Appropriate, ongoing  2. Patient will improve Llower extremity strength to at least 75% of R lower extremity strength as measured via MicroFet handheld dynamometer to improve strength for closed chain tasks. - Appropriate, ongoing  3. Patient will improve L knee active range of motion to 0-90 degrees to promote increased ease of sit<>stand transfers. - Appropriate, ongoing  4. Patient will perform timed up and go with less restrictive assistive device in < 20 seconds to improve gait speed and safety with community ambulation. - Appropriate, ongoing     Long Term Goals (6 Weeks):   1. Patient will improve L lower extremity strength to at least 90% of Rlower extremity strength as measured via MicroFet handheld dynamometer to improve strength for closed chain tasks. - Appropriate, ongoing  2. Patient will improve the total FOTO Knee Survey Score to </= 30% limited to demonstrate increased perceived functional mobility. - Appropriate, ongoing  3. Patient will perform timed up and go with least restrictive assistive device in < 13.5 seconds to improve gait speed and safety with community ambulation. - Appropriate, ongoing  4. Patient will perform at least 14 sit to stands in 30 seconds without UE support to demonstrate increased functional strength. - Appropriate, ongoing  5. Patient will improve Lknee active range of motion to 0-120 degrees to promote higher level transfers and transitions without limitation. - Appropriate, ongoing    PLAN     Plan of Care Certification: 12/15/2022 to 1/27/2023.     Outpatient Physical Therapy 4  times weekly for 6 weeks to include the following interventions: Gait Training, Manual Therapy, Moist Heat/ Ice, Neuromuscular Re-ed, Orthotic Management and Training, Patient Education, Therapeutic Activites and Therapeutic Exercise, Instrument Assisted Soft Tissue Mobilization (IASTM), Kinesiotape.    Barbi Barrera, PT

## 2022-12-27 NOTE — TELEPHONE ENCOUNTER
----- Message from Arleth Zavala sent at 12/27/2022 12:42 PM CST -----  Regarding: Advice  Contact: @ [phone]  Pt is calling to see if the doctor advise her to the the following celecoxib (CELEBREX) 200 MG capsule for her swelling she can not remember please call and adv @ [phone]

## 2022-12-28 ENCOUNTER — CLINICAL SUPPORT (OUTPATIENT)
Dept: REHABILITATION | Facility: HOSPITAL | Age: 72
End: 2022-12-28
Payer: MEDICARE

## 2022-12-28 DIAGNOSIS — R26.89 IMPAIRED GAIT AND MOBILITY: ICD-10-CM

## 2022-12-28 DIAGNOSIS — R29.898 DECREASED STRENGTH OF LOWER EXTREMITY: ICD-10-CM

## 2022-12-28 DIAGNOSIS — M25.562 ACUTE PAIN OF LEFT KNEE: ICD-10-CM

## 2022-12-28 DIAGNOSIS — M25.662 DECREASED RANGE OF MOTION (ROM) OF LEFT KNEE: Primary | ICD-10-CM

## 2022-12-28 PROCEDURE — 97110 THERAPEUTIC EXERCISES: CPT | Mod: CQ

## 2022-12-28 NOTE — PROGRESS NOTES
[facility]   Physical Therapy Treatment Note     Name: Poonam Alvarez  Clinic Number: [id number]    Therapy Diagnosis:   Encounter Diagnoses   Name Primary?    Decreased range of motion (ROM) of left knee Yes    Decreased strength of lower extremity     Acute pain of left knee     Impaired gait and mobility      Physician: Gordon Schneider MD    Visit Date: 12/28/2022    Physician Orders: PT Eval and Treat   Medical Diagnosis from Referral: M17.12 (ICD-10-CM) - Primary osteoarthritis of left knee   Evaluation Date: 12/15/2022  Authorization Period Expiration: 2/9/2023   Plan of Care Expiration: 1/27/2023  Visit # / Visits authorized: 5 / 20 + eval    PTA Visit #: 1 / 5     Time In: 1500  Time Out: 1600  Total Treatment Time: 60 minutes  Total Billable Time: 55 minutes (4 TE)    Precautions: Standard    SUBJECTIVE     Patient reports: that she feels sore today. States that she is still waiting on her cane from the MD.  She was compliant with home exercise program.  Response to previous treatment: good; no adverse reaction  Functional change: ambulated into clinic with RW    Pain: 0/10, currently  Location: Left knee    Patient goals: get back to walking in the park, riding a bike, being able to be more active     OBJECTIVE     Objective Measures updated at progress report unless specified.   L TKA 12/13/2022  6 week follow up 1/24/2023 12/21/2022; 71 degrees AAROM Flexion  12/27/2022: 84 degrees AAROM flexion, 0 degrees extension    Treatment     Poonam received the treatments listed below:      THERAPEUTIC EXERCISES to develop strength, endurance, ROM, flexibility, posture, and core stabilization for 45 minutes including:  Aerobic Activity: Recumbent Bike for AAROM flexion at half revolutions for 5 minutes at seat 18  Quad sets with towel under knee: 5 second hold, 30 reps  Heel slides: 10 seconds, 10x with strap  Quad sets with heel prop; 5 second hold, 20 reps  Short arc quads  with small bolster; 5 second hold, 30 reps  Recumbent Bike post exercise at seat 18, full revolutions for 5 minutes     Not today:  Long sitting gastroc/HS stretch; 30 second hold, 4 reps  Long arc quads at EOM; 3 second hold, 10 reps (unable to complete more than 6 reps, increase in knee pain)  SLR with therapist assist; 2 x 5     MANUAL THERAPY TECHNIQUES including Joint mobilizations were applied to Left knee for 10 minutes.   - Grade II-III Patella Mobs  - Tibiofemoral distraction in sitting  - Seated tibiofemoral distraction + PROM flexion     cold pack for 5 minutes to Left LE with LE elevated on wedge.     Patient Education and Home Exercises     Home Exercises Provided and Patient Education Provided     Education provided:   - Temporary compression issued in the form of edemawear size L, donned from foot to thigh. Edemawear size S for ankle swelling. Patient educated to remove at night or if painful.  - Continued compliance with HEP  - Elevation in combination with ice to help reduce swelling  - ROM expectations    Written Home Exercises Provided: Patient instructed to cont prior HEP. Exercises were reviewed and Poonam was able to demonstrate them prior to the end of the session.  Poonam demonstrated good  understanding of the education provided. See EMR under Patient Instructions for exercises provided during therapy sessions    ASSESSMENT     Tolerated exercises fairly well. Able to complete full revolutions on recumbent bike by end of session. Achieved 87 degrees of active assistive knee flexion ROM.  Poonam Is progressing well towards her goals.   Pt prognosis is Good.     Pt will continue to benefit from skilled outpatient physical therapy to address the deficits listed in the problem list box on initial evaluation, provide pt/family education and to maximize pt's level of independence in the home and community environment.     Pt's spiritual, cultural and educational needs considered and pt agreeable  to plan of care and goals.     Anticipated barriers to physical therapy: none    Goals:   Short Term Goals (3 Weeks):   1. Patient will be independent with home exercise program to supplement physical therapy treatment in improving functional status. - Appropriate, ongoing  2. Patient will improve Llower extremity strength to at least 75% of R lower extremity strength as measured via MicroFet handheld dynamometer to improve strength for closed chain tasks. - Appropriate, ongoing  3. Patient will improve L knee active range of motion to 0-90 degrees to promote increased ease of sit<>stand transfers. - Appropriate, ongoing  4. Patient will perform timed up and go with less restrictive assistive device in < 20 seconds to improve gait speed and safety with community ambulation. - Appropriate, ongoing     Long Term Goals (6 Weeks):   1. Patient will improve L lower extremity strength to at least 90% of Rlower extremity strength as measured via MicroFet handheld dynamometer to improve strength for closed chain tasks. - Appropriate, ongoing  2. Patient will improve the total FOTO Knee Survey Score to </= 30% limited to demonstrate increased perceived functional mobility. - Appropriate, ongoing  3. Patient will perform timed up and go with least restrictive assistive device in < 13.5 seconds to improve gait speed and safety with community ambulation. - Appropriate, ongoing  4. Patient will perform at least 14 sit to stands in 30 seconds without UE support to demonstrate increased functional strength. - Appropriate, ongoing  5. Patient will improve Lknee active range of motion to 0-120 degrees to promote higher level transfers and transitions without limitation. - Appropriate, ongoing    PLAN     Plan of Care Certification: 12/15/2022 to 1/27/2023.     Outpatient Physical Therapy 4 times weekly for 6 weeks to include the following interventions: Gait Training, Manual Therapy, Moist Heat/ Ice, Neuromuscular Re-ed, Orthotic  Management and Training, Patient Education, Therapeutic Activites and Therapeutic Exercise, Instrument Assisted Soft Tissue Mobilization (IASTM), Kinesiotape.    Sonja Lindsay, PTA

## 2022-12-30 ENCOUNTER — CLINICAL SUPPORT (OUTPATIENT)
Dept: REHABILITATION | Facility: HOSPITAL | Age: 72
End: 2022-12-30
Payer: MEDICARE

## 2022-12-30 ENCOUNTER — TELEPHONE (OUTPATIENT)
Dept: ORTHOPEDICS | Facility: CLINIC | Age: 72
End: 2022-12-30
Payer: MEDICARE

## 2022-12-30 DIAGNOSIS — R29.898 DECREASED STRENGTH OF LOWER EXTREMITY: ICD-10-CM

## 2022-12-30 DIAGNOSIS — R26.89 IMPAIRED GAIT AND MOBILITY: ICD-10-CM

## 2022-12-30 DIAGNOSIS — M25.662 DECREASED RANGE OF MOTION (ROM) OF LEFT KNEE: Primary | ICD-10-CM

## 2022-12-30 DIAGNOSIS — M25.562 ACUTE PAIN OF LEFT KNEE: ICD-10-CM

## 2022-12-30 DIAGNOSIS — M17.12 PRIMARY OSTEOARTHRITIS OF LEFT KNEE: Primary | ICD-10-CM

## 2022-12-30 PROCEDURE — 97110 THERAPEUTIC EXERCISES: CPT | Mod: CQ

## 2022-12-30 NOTE — TELEPHONE ENCOUNTER
----- Message from Karina Bahena MA sent at 12/30/2022  2:55 PM CST -----  Regarding: FW: PT'S REQUESTING A CALL BACK REGARDING CANE THAT WAS ORDERED AFTER SURGERY  Contact: pt    ----- Message -----  From: Scooby Schulz  Sent: 12/30/2022   2:37 PM CST  To: [facility] Staff  Subject: PT'S REQUESTING A CALL BACK REGARDING CANE T#    Pt really needs can to assist with walking for recovery.. Pt said she signed paperwork for cane but cane was not available at the moment and had to be ordered and sent.     Confirmed contact info below:  Contact Name: Poonam Alvarez  Phone Number: [phone]

## 2022-12-30 NOTE — PROGRESS NOTES
[facility]   Physical Therapy Treatment Note     Name: Poonam Alvarez  Clinic Number: [id number]    Therapy Diagnosis:   Encounter Diagnoses   Name Primary?    Decreased range of motion (ROM) of left knee Yes    Decreased strength of lower extremity     Acute pain of left knee     Impaired gait and mobility      Physician: Gordon Schneider MD    Visit Date: 12/30/2022    Physician Orders: PT Eval and Treat   Medical Diagnosis from Referral: M17.12 (ICD-10-CM) - Primary osteoarthritis of left knee   Evaluation Date: 12/15/2022  Authorization Period Expiration: 2/9/2023   Plan of Care Expiration: 1/27/2023  Visit # / Visits authorized: 6 / 20 + eval    PTA Visit #: 2 / 5     Time In: 1505  Time Out: 1610  Total Treatment Time: 65 minutes  Total Billable Time: 60 minutes (4 TE)    Precautions: Standard    SUBJECTIVE     Patient reports: that she feels good today.   She was compliant with home exercise program.  Response to previous treatment: good; no adverse reaction  Functional change: ambulated into clinic with SPC    Pain: 0/10, currently  Location: Left knee    Patient goals: get back to walking in the park, riding a bike, being able to be more active     OBJECTIVE     Objective Measures updated at progress report unless specified.   L TKA 12/13/2022  6 week follow up 1/24/2023 12/21/2022; 71 degrees AAROM Flexion  12/27/2022: 84 degrees AAROM Flexion, 0 degrees extension  12/28/2022; 87 degrees AAROM Flexion  12/30/2022; 87 degrees AROM Flexion    Treatment     Poonam received the treatments listed below:      THERAPEUTIC EXERCISES to develop strength, endurance, ROM, flexibility, posture, and core stabilization for 50 minutes including:  Aerobic Activity: Recumbent Bike for AAROM flexion at half revolutions for 5 minutes at seat 18  Quad sets with hinge 5 second hold, 20 reps + green strap for TKE  Heel slides: 10 seconds, 10x with strap - NT  Quad sets with heel prop; 5 second  hold, 20 reps - NT  Short arc quads with medium bolster 5 second hold, 10 reps with 3lb AW; 1 x 10 reps with no AW  Long arc quads at EOM; resume next visit if able to complete without pain  Long sitting gastroc/HS stretch; 30 second hold, 3 reps    +Step ups on 4-inch step; 20 reps leading with LLE   +Heel raises; 30 reps   +Lateral walks; 3 laps  +Standing hip abduction; 2 x 10 each LE  +Sit to stands in std chair + 2 airex pads; 3 x 5 reps     MANUAL THERAPY TECHNIQUES including Joint mobilizations were applied to Left knee for 10 minutes.   - Grade II-III Patella Mobs  - Tibiofemoral distraction in sitting  - Seated tibiofemoral distraction + PROM flexion     cold pack for 5 minutes to Left LE (towel instead of pillow case).    Patient Education and Home Exercises     Home Exercises Provided and Patient Education Provided     Education provided:   - Temporary compression issued in the form of edemawear size L, donned from foot to thigh. Edemawear size S for ankle swelling. Patient educated to remove at night or if painful.  - Continued compliance with HEP  - Elevation in combination with ice to help reduce swelling  - ROM expectations    Written Home Exercises Provided: Patient instructed to cont prior HEP. Exercises were reviewed and Poonam was able to demonstrate them prior to the end of the session.  Poonam demonstrated good  understanding of the education provided. See EMR under Patient Instructions for exercises provided during therapy sessions    ASSESSMENT     Tolerated addition of closed chain exercises fairly well today. Mod cueing for upright posture with standing hip abduction. Increased discomfort noted in L knee with step ups on 6-inch step along with use of bilateral UE's to pull herself up; changed to 4-inch step with improved tolerance. Achieved 87 degrees of active knee flexion today. Attempt long arc quads next visit pending patient tolerance to exercise.   Poonam Is progressing well towards  her goals.   Pt prognosis is Good.     Pt will continue to benefit from skilled outpatient physical therapy to address the deficits listed in the problem list box on initial evaluation, provide pt/family education and to maximize pt's level of independence in the home and community environment.     Pt's spiritual, cultural and educational needs considered and pt agreeable to plan of care and goals.     Anticipated barriers to physical therapy: none    Goals:   Short Term Goals (3 Weeks):   1. Patient will be independent with home exercise program to supplement physical therapy treatment in improving functional status. - Appropriate, ongoing  2. Patient will improve Llower extremity strength to at least 75% of R lower extremity strength as measured via MicroFet handheld dynamometer to improve strength for closed chain tasks. - Appropriate, ongoing  3. Patient will improve L knee active range of motion to 0-90 degrees to promote increased ease of sit<>stand transfers. - Appropriate, ongoing  4. Patient will perform timed up and go with less restrictive assistive device in < 20 seconds to improve gait speed and safety with community ambulation. - Appropriate, ongoing     Long Term Goals (6 Weeks):   1. Patient will improve L lower extremity strength to at least 90% of Rlower extremity strength as measured via MicroFet handheld dynamometer to improve strength for closed chain tasks. - Appropriate, ongoing  2. Patient will improve the total FOTO Knee Survey Score to </= 30% limited to demonstrate increased perceived functional mobility. - Appropriate, ongoing  3. Patient will perform timed up and go with least restrictive assistive device in < 13.5 seconds to improve gait speed and safety with community ambulation. - Appropriate, ongoing  4. Patient will perform at least 14 sit to stands in 30 seconds without UE support to demonstrate increased functional strength. - Appropriate, ongoing  5. Patient will improve Lknee  active range of motion to 0-120 degrees to promote higher level transfers and transitions without limitation. - Appropriate, ongoing    PLAN     Plan of Care Certification: 12/15/2022 to 1/27/2023.     Outpatient Physical Therapy 4 times weekly for 6 weeks to include the following interventions: Gait Training, Manual Therapy, Moist Heat/ Ice, Neuromuscular Re-ed, Orthotic Management and Training, Patient Education, Therapeutic Activites and Therapeutic Exercise, Instrument Assisted Soft Tissue Mobilization (IASTM), Kinesiotape.    Sonja Lindsay, PTA

## 2022-12-30 NOTE — TELEPHONE ENCOUNTER
Spoke to pt and then called DME to get update on cane. Entered new order as dx code was not billable. Faxed new order and informed pt will get a call early next week before they deliver. Pt pleased and verbalized understanding.

## 2023-01-03 ENCOUNTER — CLINICAL SUPPORT (OUTPATIENT)
Dept: REHABILITATION | Facility: HOSPITAL | Age: 73
End: 2023-01-03
Payer: MEDICARE

## 2023-01-03 DIAGNOSIS — R29.898 DECREASED STRENGTH OF LOWER EXTREMITY: ICD-10-CM

## 2023-01-03 DIAGNOSIS — M25.662 DECREASED RANGE OF MOTION (ROM) OF LEFT KNEE: Primary | ICD-10-CM

## 2023-01-03 DIAGNOSIS — R26.89 IMPAIRED GAIT AND MOBILITY: ICD-10-CM

## 2023-01-03 DIAGNOSIS — M25.562 ACUTE PAIN OF LEFT KNEE: ICD-10-CM

## 2023-01-03 PROCEDURE — 97140 MANUAL THERAPY 1/> REGIONS: CPT

## 2023-01-03 PROCEDURE — 97110 THERAPEUTIC EXERCISES: CPT

## 2023-01-03 NOTE — PROGRESS NOTES
[facility]   Physical Therapy Treatment Note     Name: Poonam Alvarez  Clinic Number: [id number]    Therapy Diagnosis:   Encounter Diagnoses   Name Primary?    Decreased range of motion (ROM) of left knee Yes    Decreased strength of lower extremity     Acute pain of left knee     Impaired gait and mobility      Physician: Gordon Schneider MD    Visit Date: 1/3/2023    Physician Orders: PT Eval and Treat   Medical Diagnosis from Referral: M17.12 (ICD-10-CM) - Primary osteoarthritis of left knee   Evaluation Date: 12/15/2022  Authorization Period Expiration: 2/9/2023   Plan of Care Expiration: 1/27/2023  Visit # / Visits authorized: 2 / 20 + eval  Visit: 7    PTA Visit #: 0 / 5     Time In: 3:00 pm  Time Out: 3:59 pm  Total Treatment Time: 59 minutes  Total Billable Time: 30 minutes (1 TE + 1 MT)    Precautions: Standard    SUBJECTIVE     Patient reports: doing well and she feels her range of motion is going well  She was compliant with home exercise program.  Response to previous treatment: good; no adverse reaction  Functional change: ambulated into clinic with SPC    Pain: 0/10, currently  Location: Left knee    Patient goals: get back to walking in the park, riding a bike, being able to be more active     OBJECTIVE     Objective Measures updated at progress report unless specified.   L TKA 12/13/2022  6 week follow up 1/24/2023 12/21/2022; 71 degrees AAROM Flexion  12/27/2022: 84 degrees AAROM Flexion, 0 degrees extension  12/28/2022; 87 degrees AAROM Flexion  12/30/2022; 87 degrees AROM Flexion  1/3/2022: 0 degrees; 103 PROM flexion, 90 degrees AROM    Treatment     Poonam received the treatments listed below:      THERAPEUTIC EXERCISES to develop strength, endurance, ROM, flexibility, posture, and core stabilization for 49 minutes including:  Aerobic Activity: Recumbent Bike for AAROM flexion at half revolutions for 7 minutes at seat 18  Heel slides: 10 seconds, 10x with strap  with PT overpressure  Short arc quads with medium bolster 5 second hold, 10 reps with 5lb AW  Long arc quads: 5 lbs 5 seconds, 3x10  Sit to stands in std chair + 2 airex pads; 3 x 10 reps  Step ups on 6-inch step; 20 reps leading with LLE   Lateral Step ups on 6 inch step    MANUAL THERAPY TECHNIQUES including Joint mobilizations were applied to Left knee for 10 minutes.   - Grade II-III Patella Mobs  - seated PA and AP tibiofemoral mobilizations  -Seated distraction + oscillation    cold pack for 00 minutes to Left LE (towel instead of pillow case).    Patient Education and Home Exercises     Home Exercises Provided and Patient Education Provided     Education provided:   - Continued compliance with HEP  - ROM expectations  - no longer need to ambulate with SPC    Written Home Exercises Provided: Patient instructed to cont prior HEP. Exercises were reviewed and Poonam was able to demonstrate them prior to the end of the session.  Poonam demonstrated good  understanding of the education provided. See EMR under Patient Instructions for exercises provided during therapy sessions    ASSESSMENT     Patient is 3 weeks s/p left total knee arthroplasty. Patient demonstrates 0-90 degrees AROM, and 104 degrees of PROM flexion. Patient demonstrates swelling to tibiofemoral joint likely limiting flexion range of motion, and patient tolerance for flexion is limited.     Poonam Is progressing well towards her goals.   Pt prognosis is Good.     Pt will continue to benefit from skilled outpatient physical therapy to address the deficits listed in the problem list box on initial evaluation, provide pt/family education and to maximize pt's level of independence in the home and community environment.     Pt's spiritual, cultural and educational needs considered and pt agreeable to plan of care and goals.     Anticipated barriers to physical therapy: none    Goals:   Short Term Goals (3 Weeks):   1. Patient will be independent with  home exercise program to supplement physical therapy treatment in improving functional status. - Appropriate, ongoing  2. Patient will improve Llower extremity strength to at least 75% of R lower extremity strength as measured via MicroFet handheld dynamometer to improve strength for closed chain tasks. - Appropriate, ongoing  3. Patient will improve L knee active range of motion to 0-90 degrees to promote increased ease of sit<>stand transfers. - Appropriate, ongoing  4. Patient will perform timed up and go with less restrictive assistive device in < 20 seconds to improve gait speed and safety with community ambulation. - Appropriate, ongoing     Long Term Goals (6 Weeks):   1. Patient will improve L lower extremity strength to at least 90% of Rlower extremity strength as measured via MicroFet handheld dynamometer to improve strength for closed chain tasks. - Appropriate, ongoing  2. Patient will improve the total FOTO Knee Survey Score to </= 30% limited to demonstrate increased perceived functional mobility. - Appropriate, ongoing  3. Patient will perform timed up and go with least restrictive assistive device in < 13.5 seconds to improve gait speed and safety with community ambulation. - Appropriate, ongoing  4. Patient will perform at least 14 sit to stands in 30 seconds without UE support to demonstrate increased functional strength. - Appropriate, ongoing  5. Patient will improve Lknee active range of motion to 0-120 degrees to promote higher level transfers and transitions without limitation. - Appropriate, ongoing    PLAN     Plan of Care Certification: 12/15/2022 to 1/27/2023.     Outpatient Physical Therapy 4 times weekly for 6 weeks to include the following interventions: Gait Training, Manual Therapy, Moist Heat/ Ice, Neuromuscular Re-ed, Orthotic Management and Training, Patient Education, Therapeutic Activites and Therapeutic Exercise, Instrument Assisted Soft Tissue Mobilization (IASTM),  Kinesiotape.    Barbi Barrera, PT

## 2023-01-04 ENCOUNTER — CLINICAL SUPPORT (OUTPATIENT)
Dept: REHABILITATION | Facility: HOSPITAL | Age: 73
End: 2023-01-04
Payer: MEDICARE

## 2023-01-04 DIAGNOSIS — R29.898 DECREASED STRENGTH OF LOWER EXTREMITY: ICD-10-CM

## 2023-01-04 DIAGNOSIS — M25.562 ACUTE PAIN OF LEFT KNEE: ICD-10-CM

## 2023-01-04 DIAGNOSIS — M25.662 DECREASED RANGE OF MOTION (ROM) OF LEFT KNEE: Primary | ICD-10-CM

## 2023-01-04 DIAGNOSIS — R26.89 IMPAIRED GAIT AND MOBILITY: ICD-10-CM

## 2023-01-04 PROCEDURE — 97110 THERAPEUTIC EXERCISES: CPT | Mod: CQ

## 2023-01-04 NOTE — PROGRESS NOTES
[facility]   Physical Therapy Treatment Note     Name: Poonam Alvarez  Clinic Number: [id number]    Therapy Diagnosis:   Encounter Diagnoses   Name Primary?    Decreased range of motion (ROM) of left knee Yes    Decreased strength of lower extremity     Acute pain of left knee     Impaired gait and mobility        Physician: Gordon Schneider MD    Visit Date: 1/4/2023    Physician Orders: PT Eval and Treat   Medical Diagnosis from Referral: M17.12 (ICD-10-CM) - Primary osteoarthritis of left knee   Evaluation Date: 12/15/2022  Authorization Period Expiration: 1/2/2024  Plan of Care Expiration: 1/27/2023  Visit # / Visits authorized: 2 / 20 ( new auth)  9 total     PTA Visit #: 1 / 5     Time In: 3:00 pm  Time Out: 4:10 pm  Total Treatment Time: 70 minutes  Total Billable Time: 60 minutes (4 TE)    Precautions: Standard    SUBJECTIVE     Patient reports: that she is very sore from her session yesterday.    She was compliant with home exercise program.  Response to previous treatment: good; no adverse reaction  Functional change: ambulated into clinic with SPC    Pain: 0/10, currently - soreness reported   Location: Left knee    Patient goals: get back to walking in the park, riding a bike, being able to be more active     OBJECTIVE     Objective Measures updated at progress report unless specified.   L TKA 12/13/2022  6 week follow up 1/24/2023 12/21/2022; 71 degrees AAROM Flexion  12/27/2022: 84 degrees AAROM Flexion, 0 degrees extension  12/28/2022; 87 degrees AAROM Flexion  12/30/2022; 87 degrees AROM Flexion  1/3/2022: 0 degrees; 103 PROM flexion, 90 degrees AROM  1/4/2022: 0 degrees ; 95 degrees AAROM ; 91 degrees AROM      Treatment     Poonam received the treatments listed below:      THERAPEUTIC EXERCISES to develop strength, endurance, ROM, flexibility, posture, and core stabilization for 60 minutes including:  Aerobic Activity: Recumbent Bike for AAROM flexion at half  revolutions for 7 minutes at seat 18  Heel slides: 10 seconds, 10x with strap with PT overpressure  Short arc quads with medium bolster 5 second hold, 10 reps with 5lb AW; 1 x 10 reps with no AW  Long arc quads: 5 lbs 5 seconds, 3x10  Sit to stands in std chair + 2 airex pads; 3 x 10 reps  Step ups on 6-inch step; 20 reps leading with LLE   Lateral Step ups on 6 inch step    MANUAL THERAPY TECHNIQUES including Joint mobilizations were applied to Left knee for 2 minutes.   - Grade II-III Patella Mobs      cold pack for 10 minutes to Left LE (towel instead of pillow case).    Patient Education and Home Exercises     Home Exercises Provided and Patient Education Provided     Education provided:   - Temporary compression issued in the form of edemawear size L, donned from foot to thigh. Edemawear size S for ankle swelling. Patient educated to remove at night or if painful.  - Continued compliance with HEP  - Elevation in combination with ice to help reduce swelling  - ROM expectations    Written Home Exercises Provided: Patient instructed to cont prior HEP. Exercises were reviewed and Poonam was able to demonstrate them prior to the end of the session.  Poonam demonstrated good  understanding of the education provided. See EMR under Patient Instructions for exercises provided during therapy sessions    ASSESSMENT     Pt demonstrated a good tolerance to session today . Cues required to promote TKE with SAQs and LAQs . Cues to prevent knee valgus with sit to stands which pt responds well too. Also good tolerance to step ups with no pain and light to no UE use required.     Poonam Is progressing well towards her goals.   Pt prognosis is Good.     Pt will continue to benefit from skilled outpatient physical therapy to address the deficits listed in the problem list box on initial evaluation, provide pt/family education and to maximize pt's level of independence in the home and community environment.   Pt's spiritual,  cultural and educational needs considered and pt agreeable to plan of care and goals.     Anticipated barriers to physical therapy: none    Goals:   Short Term Goals (3 Weeks):   1. Patient will be independent with home exercise program to supplement physical therapy treatment in improving functional status. - Appropriate, ongoing  2. Patient will improve Llower extremity strength to at least 75% of R lower extremity strength as measured via MicroFet handheld dynamometer to improve strength for closed chain tasks. - Appropriate, ongoing  3. Patient will improve L knee active range of motion to 0-90 degrees to promote increased ease of sit<>stand transfers. - Appropriate, ongoing  4. Patient will perform timed up and go with less restrictive assistive device in < 20 seconds to improve gait speed and safety with community ambulation. - Appropriate, ongoing     Long Term Goals (6 Weeks):   1. Patient will improve L lower extremity strength to at least 90% of Rlower extremity strength as measured via MicroFet handheld dynamometer to improve strength for closed chain tasks. - Appropriate, ongoing  2. Patient will improve the total FOTO Knee Survey Score to </= 30% limited to demonstrate increased perceived functional mobility. - Appropriate, ongoing  3. Patient will perform timed up and go with least restrictive assistive device in < 13.5 seconds to improve gait speed and safety with community ambulation. - Appropriate, ongoing  4. Patient will perform at least 14 sit to stands in 30 seconds without UE support to demonstrate increased functional strength. - Appropriate, ongoing  5. Patient will improve Lknee active range of motion to 0-120 degrees to promote higher level transfers and transitions without limitation. - Appropriate, ongoing    PLAN     Plan of Care Certification: 12/15/2022 to 1/27/2023.     Outpatient Physical Therapy 4 times weekly for 6 weeks to include the following interventions: Gait Training, Manual  Therapy, Moist Heat/ Ice, Neuromuscular Re-ed, Orthotic Management and Training, Patient Education, Therapeutic Activites and Therapeutic Exercise, Instrument Assisted Soft Tissue Mobilization (IASTM), Kinesiotape.    Jackie Frias, PTA

## 2023-01-06 ENCOUNTER — CLINICAL SUPPORT (OUTPATIENT)
Dept: REHABILITATION | Facility: HOSPITAL | Age: 73
End: 2023-01-06
Payer: MEDICARE

## 2023-01-06 DIAGNOSIS — M25.662 DECREASED RANGE OF MOTION (ROM) OF LEFT KNEE: Primary | ICD-10-CM

## 2023-01-06 DIAGNOSIS — M25.562 ACUTE PAIN OF LEFT KNEE: ICD-10-CM

## 2023-01-06 DIAGNOSIS — R29.898 DECREASED STRENGTH OF LOWER EXTREMITY: ICD-10-CM

## 2023-01-06 DIAGNOSIS — R26.89 IMPAIRED GAIT AND MOBILITY: ICD-10-CM

## 2023-01-06 PROCEDURE — 97110 THERAPEUTIC EXERCISES: CPT | Mod: CQ

## 2023-01-06 NOTE — PROGRESS NOTES
[facility]   Physical Therapy Treatment Note     Name: Poonam Alvarez  Clinic Number: [id number]    Therapy Diagnosis:   Encounter Diagnoses   Name Primary?    Decreased range of motion (ROM) of left knee Yes    Decreased strength of lower extremity     Acute pain of left knee     Impaired gait and mobility        Physician: Gordon Schneider MD    Visit Date: 1/6/2023    Physician Orders: PT Eval and Treat   Medical Diagnosis from Referral: M17.12 (ICD-10-CM) - Primary osteoarthritis of left knee   Evaluation Date: 12/15/2022  Authorization Period Expiration: 1/2/2024  Plan of Care Expiration: 1/27/2023  Visit # / Visits authorized: 3 / 20 ( new auth)  10 total     PTA Visit #: 2/ 5     Time In: 3:00 pm  Time Out: 4:10 pm  Total Treatment Time: 70 minutes  Total Billable Time: 60 minutes (4 TE)    Precautions: Standard    SUBJECTIVE     Patient reports:   doing well with no pain.   She was compliant with home exercise program.  Response to previous treatment: good; no adverse reaction  Functional change: ambulated into clinic with SPC    Pain: 0/10, currently  Location: Left knee    Patient goals: get back to walking in the park, riding a bike, being able to be more active     OBJECTIVE     Objective Measures updated at progress report unless specified.   L TKA 12/13/2022  6 week follow up 1/24/2023 12/21/2022; 71 degrees AAROM Flexion  12/27/2022: 84 degrees AAROM Flexion, 0 degrees extension  12/28/2022; 87 degrees AAROM Flexion  12/30/2022; 87 degrees AROM Flexion  1/3/2022: 0 degrees; 103 PROM flexion, 90 degrees AROM  1/4/2022: 0 degrees ; 95 degrees AAROM ; 91 degrees AROM  1/6/2022: 0 - 104 degrees AAROM     Treatment     Poonam received the treatments listed below:      THERAPEUTIC EXERCISES to develop strength, endurance, ROM, flexibility, posture, and core stabilization for 60 minutes including:  Aerobic Activity: Recumbent Bike for AAROM flexion at half revolutions for 7  minutes at seat 16  Heel slides: 10 seconds, 10x with strap with PT overpressure  Short arc quads with medium bolster 5 second hold, 10 reps with 5lb AW; 1 x 10 reps with no AW  Long arc quads: 5 lbs 5 seconds, 3x10  Sit to stands in std chair + 2 airex pads; 3 x 10 reps  Step ups on 6-inch step; 20 reps leading with LLE   Lateral Step ups on 6 inch step  Step downs 6' step : 3 x 10 reps   Shuttle DL leg press 65.5lb : 3 x 10 reps     MANUAL THERAPY TECHNIQUES including Joint mobilizations were applied to Left knee for 2 minutes.   - Grade II-III Patella Mobs      cold pack for 10 minutes to Left LE (towel instead of pillow case).    Patient Education and Home Exercises     Home Exercises Provided and Patient Education Provided     Education provided:   - Temporary compression issued in the form of edemawear size L, donned from foot to thigh. Edemawear size S for ankle swelling. Patient educated to remove at night or if painful.  - Continued compliance with HEP  - Elevation in combination with ice to help reduce swelling  - ROM expectations    Written Home Exercises Provided: Patient instructed to cont prior HEP. Exercises were reviewed and Poonam was able to demonstrate them prior to the end of the session.  Poonam demonstrated good  understanding of the education provided. See EMR under Patient Instructions for exercises provided during therapy sessions    ASSESSMENT     Progressed with functional strengthening which pt tolerated well . Good eccentric control noted with step downs and able to perform with no compensations with min cues. Pt was able to achieve 104 degrees of flexion today and is slowly progressing well with her ROM.    Poonam Is progressing well towards her goals.   Pt prognosis is Good.     Pt will continue to benefit from skilled outpatient physical therapy to address the deficits listed in the problem list box on initial evaluation, provide pt/family education and to maximize pt's level of  independence in the home and community environment.   Pt's spiritual, cultural and educational needs considered and pt agreeable to plan of care and goals.     Anticipated barriers to physical therapy: none    Goals:   Short Term Goals (3 Weeks):   1. Patient will be independent with home exercise program to supplement physical therapy treatment in improving functional status. - Appropriate, ongoing  2. Patient will improve Llower extremity strength to at least 75% of R lower extremity strength as measured via MicroFet handheld dynamometer to improve strength for closed chain tasks. - Appropriate, ongoing  3. Patient will improve L knee active range of motion to 0-90 degrees to promote increased ease of sit<>stand transfers. - Appropriate, ongoing  4. Patient will perform timed up and go with less restrictive assistive device in < 20 seconds to improve gait speed and safety with community ambulation. - Appropriate, ongoing     Long Term Goals (6 Weeks):   1. Patient will improve L lower extremity strength to at least 90% of Rlower extremity strength as measured via MicroFet handheld dynamometer to improve strength for closed chain tasks. - Appropriate, ongoing  2. Patient will improve the total FOTO Knee Survey Score to </= 30% limited to demonstrate increased perceived functional mobility. - Appropriate, ongoing  3. Patient will perform timed up and go with least restrictive assistive device in < 13.5 seconds to improve gait speed and safety with community ambulation. - Appropriate, ongoing  4. Patient will perform at least 14 sit to stands in 30 seconds without UE support to demonstrate increased functional strength. - Appropriate, ongoing  5. Patient will improve Lknee active range of motion to 0-120 degrees to promote higher level transfers and transitions without limitation. - Appropriate, ongoing    PLAN     Plan of Care Certification: 12/15/2022 to 1/27/2023.     Outpatient Physical Therapy 4 times weekly for  6 weeks to include the following interventions: Gait Training, Manual Therapy, Moist Heat/ Ice, Neuromuscular Re-ed, Orthotic Management and Training, Patient Education, Therapeutic Activites and Therapeutic Exercise, Instrument Assisted Soft Tissue Mobilization (IASTM), Kinesiotape.    Jackie Frias, PTA

## 2023-01-06 NOTE — PROGRESS NOTES
[facility]   Physical Therapy Treatment Note     Name: Poonam Alvarez  Clinic Number: [id number]    Therapy Diagnosis:   No diagnosis found.      Physician: Gordon Schneider MD    Visit Date: 1/6/2023    Physician Orders: PT Eval and Treat   Medical Diagnosis from Referral: M17.12 (ICD-10-CM) - Primary osteoarthritis of left knee   Evaluation Date: 12/15/2022  Authorization Period Expiration: 1/2/2024  Plan of Care Expiration: 1/27/2023  Visit # / Visits authorized: 3 / 20  Visit: 10   FOTO: 1/3    PTA Visit #: 2 / 5     Time In: ***  Time Out: ***  Total Treatment Time: *** minutes  Total Billable Time: *** minutes (4 TE)    Precautions: Standard    SUBJECTIVE     Patient reports: ***  She was compliant with home exercise program.  Response to previous treatment: good; no adverse reaction  Functional change: ambulated into clinic with SPC    Pain: 0/10, currently - soreness reported   Location: Left knee    Patient goals: get back to walking in the park, riding a bike, being able to be more active     OBJECTIVE     Objective Measures updated at progress report unless specified.   L TKA 12/13/2022  6 week follow up 1/24/2023 12/21/2022; 71 degrees AAROM Flexion  12/27/2022: 84 degrees AAROM Flexion, 0 degrees extension  12/28/2022; 87 degrees AAROM Flexion  12/30/2022; 87 degrees AROM Flexion  1/3/2022: 0 degrees; 103 PROM flexion, 90 degrees AROM  1/4/2022: 0 degrees ; 95 degrees AAROM ; 91 degrees AROM    Treatment     Poonam received the treatments listed below:      THERAPEUTIC EXERCISES to develop strength, endurance, ROM, flexibility, posture, and core stabilization for 60 minutes including:  Aerobic Activity: Recumbent Bike for AAROM flexion at half revolutions for 7 minutes at seat 18  Heel slides: 10 seconds, 10x with strap with PT overpressure  Short arc quads with medium bolster 5 second hold, 10 reps with 5lb AW; 1 x 10 reps with no AW  Long arc quads: 5 lbs 5 seconds,  3x10  Sit to stands in std chair + 2 airex pads; 3 x 10 reps  Step ups on 6-inch step; 20 reps leading with LLE   Lateral Step ups on 6 inch step    MANUAL THERAPY TECHNIQUES including Joint mobilizations were applied to Left knee for 2 minutes.   - Grade II-III Patella Mobs    cold pack for 10 minutes to Left LE (towel instead of pillow case).    Patient Education and Home Exercises     Home Exercises Provided and Patient Education Provided     Education provided:   - Temporary compression issued in the form of edemawear size L, donned from foot to thigh. Edemawear size S for ankle swelling. Patient educated to remove at night or if painful.  - Continued compliance with HEP  - Elevation in combination with ice to help reduce swelling  - ROM expectations    Written Home Exercises Provided: Patient instructed to cont prior HEP. Exercises were reviewed and Poonam was able to demonstrate them prior to the end of the session.  Poonam demonstrated good  understanding of the education provided. See EMR under Patient Instructions for exercises provided during therapy sessions    ASSESSMENT     ***  Poonam Is progressing well towards her goals.   Pt prognosis is Good.     Pt will continue to benefit from skilled outpatient physical therapy to address the deficits listed in the problem list box on initial evaluation, provide pt/family education and to maximize pt's level of independence in the home and community environment.   Pt's spiritual, cultural and educational needs considered and pt agreeable to plan of care and goals.     Anticipated barriers to physical therapy: none    Goals:   Short Term Goals (3 Weeks):   1. Patient will be independent with home exercise program to supplement physical therapy treatment in improving functional status. - Appropriate, ongoing  2. Patient will improve Llower extremity strength to at least 75% of R lower extremity strength as measured via [employer] handheld dynamometer to improve  strength for closed chain tasks. - Appropriate, ongoing  3. Patient will improve L knee active range of motion to 0-90 degrees to promote increased ease of sit<>stand transfers. - Appropriate, ongoing  4. Patient will perform timed up and go with less restrictive assistive device in < 20 seconds to improve gait speed and safety with community ambulation. - Appropriate, ongoing     Long Term Goals (6 Weeks):   1. Patient will improve L lower extremity strength to at least 90% of Rlower extremity strength as measured via MicroFet handheld dynamometer to improve strength for closed chain tasks. - Appropriate, ongoing  2. Patient will improve the total FOTO Knee Survey Score to </= 30% limited to demonstrate increased perceived functional mobility. - Appropriate, ongoing  3. Patient will perform timed up and go with least restrictive assistive device in < 13.5 seconds to improve gait speed and safety with community ambulation. - Appropriate, ongoing  4. Patient will perform at least 14 sit to stands in 30 seconds without UE support to demonstrate increased functional strength. - Appropriate, ongoing  5. Patient will improve Lknee active range of motion to 0-120 degrees to promote higher level transfers and transitions without limitation. - Appropriate, ongoing    PLAN     Plan of Care Certification: 12/15/2022 to 1/27/2023.     Outpatient Physical Therapy 4 times weekly for 6 weeks to include the following interventions: Gait Training, Manual Therapy, Moist Heat/ Ice, Neuromuscular Re-ed, Orthotic Management and Training, Patient Education, Therapeutic Activites and Therapeutic Exercise, Instrument Assisted Soft Tissue Mobilization (IASTM), Kinesiotape.    Sonja Lindasy, PTA

## 2023-01-09 ENCOUNTER — CLINICAL SUPPORT (OUTPATIENT)
Dept: REHABILITATION | Facility: HOSPITAL | Age: 73
End: 2023-01-09
Payer: MEDICARE

## 2023-01-09 DIAGNOSIS — M25.662 DECREASED RANGE OF MOTION (ROM) OF LEFT KNEE: Primary | ICD-10-CM

## 2023-01-09 DIAGNOSIS — R29.898 DECREASED STRENGTH OF LOWER EXTREMITY: ICD-10-CM

## 2023-01-09 DIAGNOSIS — R26.89 IMPAIRED GAIT AND MOBILITY: ICD-10-CM

## 2023-01-09 DIAGNOSIS — M25.562 ACUTE PAIN OF LEFT KNEE: ICD-10-CM

## 2023-01-09 PROCEDURE — 97110 THERAPEUTIC EXERCISES: CPT | Mod: CQ

## 2023-01-09 NOTE — PROGRESS NOTES
"[facility]   Physical Therapy Treatment Note     Name: Poonam Alvarez  Clinic Number: [id number]    Therapy Diagnosis:   Encounter Diagnoses   Name Primary?    Decreased range of motion (ROM) of left knee Yes    Decreased strength of lower extremity     Acute pain of left knee     Impaired gait and mobility      Physician: Gordon Schneider MD    Visit Date: 1/9/2023    Physician Orders: PT Eval and Treat   Medical Diagnosis from Referral: M17.12 (ICD-10-CM) - Primary osteoarthritis of left knee   Evaluation Date: 12/15/2022  Authorization Period Expiration: 1/2/2024  Plan of Care Expiration: 1/27/2023  Visit # / Visits authorized: 4 / 20   Visit: 11  FOTO: NT    PTA Visit #: 3 / 5     Time In: 1500  Time Out: 1620  Total Treatment Time: 80 minutes  Total Billable Time: 70 minutes (5 TE)    Precautions: Standard    SUBJECTIVE     Patient reports: that she is doing good. "I really have no pain." Reports feeling sensitive to the touch of her sheets along her knee.  She was compliant with home exercise program.  Response to previous treatment: good; no adverse reaction  Functional change: ambulated into clinic with no AD    Pain: 0/10, currently  Location: Left knee    Patient goals: get back to walking in the park, riding a bike, being able to be more active     OBJECTIVE     Objective Measures updated at progress report unless specified.   L TKA 12/13/2022  6 week follow up 1/24/2023 12/21/2022; 71 degrees AAROM Flexion  12/27/2022: 84 degrees AAROM Flexion, 0 degrees extension  12/28/2022; 87 degrees AAROM Flexion  12/30/2022; 87 degrees AROM Flexion  1/3/2022: 0 degrees; 103 PROM flexion, 90 degrees AROM  1/4/2022: 0 degrees; 95 degrees AAROM; 91 degrees AROM  1/6/2022: 0-104 degrees AAROM   1/9/2023; 103 degrees AROM Flexion; 106 degrees PROM Flexion    Treatment     Poonam received the treatments listed below:      THERAPEUTIC EXERCISES to develop strength, endurance, ROM, " flexibility, posture, and core stabilization for 70 minutes including:  Aerobic Activity: Recumbent Bike for AAROM flexion at full revolutions for 7 minutes at seat 14  Heel slides: 10 seconds, 10x with strap with PT overpressure - NT  Short arc quads with medium bolster; 5 second hold, 10 reps with 5lb AW; 1 x 10 reps with no AW - NT  Long arc quads + 5 lbs; 5 second hold, 3 x 10 reps - NT    Sit to stands in std chair + 2 airex pads; 2 x 15 reps (encouraged arms across chest to avoid UE assist)  Step ups on 8-inch step; 2 x 15 reps leading with LLE   Lateral step ups on 8-inch step; 2 x 15 reps  Forward step downs on 6-inch step; 2 x 10 reps (focus on slow and controlled descent; avoiding hip drop)  Shuttle DL Press; 62.5lbs (2 black, 1 red cord), 2 x 15 reps   +Shuttle SL Press; 25lbs (1 black cord), 2 x 15 reps  +Matrix HS Curls; 35#, 2 x 12 reps with 3 second hold at end range    MANUAL THERAPY TECHNIQUES including Joint mobilizations were applied to Left knee for 00 minutes.   - Grade II-III Patella Mobs    cold pack for 10 minutes to Left LE (towel instead of pillow case).    Patient Education and Home Exercises     Home Exercises Provided and Patient Education Provided     Education provided:   - Temporary compression issued in the form of edemawear size L, donned from foot to thigh. Edemawear size S for ankle swelling. Patient educated to remove at night or if painful.  - Continued compliance with HEP  - Elevation in combination with ice to help reduce swelling  - ROM expectations    Written Home Exercises Provided: Patient instructed to cont prior HEP. Exercises were reviewed and Poonam was able to demonstrate them prior to the end of the session.  Poonam demonstrated good  understanding of the education provided. See EMR under Patient Instructions for exercises provided during therapy sessions    ASSESSMENT     Tolerated the above exercises fairly well today reporting appropriate muscle response. She  requires mod cueing with forward step downs to avoid hip drop and perform in a slow and controlled motion. She responded well to cueing provided. Achieved 103 degrees of active knee flexion today. Continue to progress per POC towards treatment goals.   Poonam Is progressing well towards her goals.   Pt prognosis is Good.     Pt will continue to benefit from skilled outpatient physical therapy to address the deficits listed in the problem list box on initial evaluation, provide pt/family education and to maximize pt's level of independence in the home and community environment.   Pt's spiritual, cultural and educational needs considered and pt agreeable to plan of care and goals.     Anticipated barriers to physical therapy: none    Goals:   Short Term Goals (3 Weeks):   1. Patient will be independent with home exercise program to supplement physical therapy treatment in improving functional status. - MET (1/9/23)  2. Patient will improve Llower extremity strength to at least 75% of R lower extremity strength as measured via MicroFet handheld dynamometer to improve strength for closed chain tasks. - Appropriate, ongoing  3. Patient will improve L knee active range of motion to 0-90 degrees to promote increased ease of sit<>stand transfers. - MET (1/9/23)  4. Patient will perform timed up and go with less restrictive assistive device in < 20 seconds to improve gait speed and safety with community ambulation. - Appropriate, ongoing     Long Term Goals (6 Weeks):   1. Patient will improve L lower extremity strength to at least 90% of Rlower extremity strength as measured via MicroFet handheld dynamometer to improve strength for closed chain tasks. - Appropriate, ongoing  2. Patient will improve the total FOTO Knee Survey Score to </= 30% limited to demonstrate increased perceived functional mobility. - Appropriate, ongoing  3. Patient will perform timed up and go with least restrictive assistive device in < 13.5 seconds  to improve gait speed and safety with community ambulation. - Appropriate, ongoing  4. Patient will perform at least 14 sit to stands in 30 seconds without UE support to demonstrate increased functional strength. - Appropriate, ongoing  5. Patient will improve Lknee active range of motion to 0-120 degrees to promote higher level transfers and transitions without limitation. - Appropriate, ongoing    PLAN     Plan of Care Certification: 12/15/2022 to 1/27/2023.     Outpatient Physical Therapy 4 times weekly for 6 weeks to include the following interventions: Gait Training, Manual Therapy, Moist Heat/ Ice, Neuromuscular Re-ed, Orthotic Management and Training, Patient Education, Therapeutic Activites and Therapeutic Exercise, Instrument Assisted Soft Tissue Mobilization (IASTM), Kinesiotape.    Sonja Lindsay, PTA

## 2023-01-11 ENCOUNTER — TELEPHONE (OUTPATIENT)
Dept: ADMINISTRATIVE | Facility: CLINIC | Age: 73
End: 2023-01-11
Payer: MEDICARE

## 2023-01-11 ENCOUNTER — CLINICAL SUPPORT (OUTPATIENT)
Dept: REHABILITATION | Facility: HOSPITAL | Age: 73
End: 2023-01-11
Payer: MEDICARE

## 2023-01-11 DIAGNOSIS — M25.562 ACUTE PAIN OF LEFT KNEE: ICD-10-CM

## 2023-01-11 DIAGNOSIS — R26.89 IMPAIRED GAIT AND MOBILITY: ICD-10-CM

## 2023-01-11 DIAGNOSIS — R29.898 DECREASED STRENGTH OF LOWER EXTREMITY: ICD-10-CM

## 2023-01-11 DIAGNOSIS — M25.662 DECREASED RANGE OF MOTION (ROM) OF LEFT KNEE: Primary | ICD-10-CM

## 2023-01-11 PROCEDURE — 97110 THERAPEUTIC EXERCISES: CPT | Mod: CQ

## 2023-01-11 NOTE — PROGRESS NOTES
[facility]   Physical Therapy Treatment Note     Name: Poonam Alvarez  Clinic Number: [id number]    Therapy Diagnosis:   Encounter Diagnoses   Name Primary?    Decreased range of motion (ROM) of left knee Yes    Decreased strength of lower extremity     Acute pain of left knee     Impaired gait and mobility      Physician: Gordon Schneider MD    Visit Date: 1/11/2023    Physician Orders: PT Eval and Treat   Medical Diagnosis from Referral: M17.12 (ICD-10-CM) - Primary osteoarthritis of left knee   Evaluation Date: 12/15/2022  Authorization Period Expiration: 1/2/2024  Plan of Care Expiration: 1/27/2023  Visit # / Visits authorized: 5 / 20   Visit: 12  FOTO: NT    PTA Visit #: 4 / 5     Time In: 1500  Time Out: 1600  Total Treatment Time: 60 minutes  Total Billable Time: 30 minutes (2 TE)    Precautions: Standard    SUBJECTIVE     Patient reports: that she has been working on bending her knee the last couple of days. States that she feels sore today.   She was compliant with home exercise program.  Response to previous treatment: good; no adverse reaction  Functional change: ambulated into clinic with no AD    Pain: 0/10, currently  Location: Left knee    Patient goals: get back to walking in the park, riding a bike, being able to be more active     OBJECTIVE     Objective Measures updated at progress report unless specified.   L TKA 12/13/2022  6 week follow up 1/24/2023 12/21/2022; 71 degrees AAROM Flexion  12/27/2022: 84 degrees AAROM Flexion, 0 degrees extension  12/28/2022; 87 degrees AAROM Flexion  12/30/2022; 87 degrees AROM Flexion  1/3/2022: 0 degrees; 103 PROM flexion, 90 degrees AROM  1/4/2022: 0 degrees; 95 degrees AAROM; 91 degrees AROM  1/6/2022: 0-104 degrees AAROM   1/9/2023; 103 degrees AROM Flexion; 106 degrees PROM Flexion    Treatment     Poonam received the treatments listed below:      THERAPEUTIC EXERCISES to develop strength, endurance, ROM, flexibility,  posture, and core stabilization for 60 minutes including:  Aerobic Activity: Recumbent Bike for AAROM flexion at full revolutions for 10 minutes at seat 14  Heel slides: 10 seconds, 10x with strap with PT overpressure - NT  Short arc quads with medium bolster; 5 second hold, 10 reps with 5lb AW; 1 x 10 reps with no AW - NT  Long arc quads + 5 lbs; 5 second hold, 3 x 10 reps - NT    Sit to stands in std chair + 2 airex pads; 2 x 15 reps (encouraged arms across chest to avoid UE assist)  Step ups on 8-inch step; 2 x 15 reps leading with LLE   Lateral step ups on 8-inch step; 2 x 15 reps  Forward step downs on 6-inch step; 3 x 10 reps (focus on slow and controlled descent; avoiding hip drop)  Shuttle DL Press; 62.5lbs (2 black, 1 red cord), 2 x 15 reps   Shuttle SL Press; 25lbs (1 black cord), 2 x 15 reps  Matrix HS Curls; 35#, 3 x 10 reps with 3 second hold at end range    MANUAL THERAPY TECHNIQUES including Joint mobilizations were applied to Left knee for 00 minutes.   - Grade II-III Patella Mobs    cold pack for 00 minutes to Left LE (towel instead of pillow case).    Patient Education and Home Exercises     Home Exercises Provided and Patient Education Provided     Education provided:   - Continued compliance with HEP  - Elevation in combination with ice to help reduce swelling  - ROM expectations    Written Home Exercises Provided: Patient instructed to cont prior HEP. Exercises were reviewed and Poonam was able to demonstrate them prior to the end of the session.  Poonam demonstrated good  understanding of the education provided. See EMR under Patient Instructions for exercises provided during therapy sessions    ASSESSMENT     Tolerated exercises good today despite subjective complaints of increased soreness. Continue to progress per POC towards treatment goals. Measure flexion ROM next visit.  Poonam Is progressing well towards her goals.   Pt prognosis is Good.     Pt will continue to benefit from skilled  outpatient physical therapy to address the deficits listed in the problem list box on initial evaluation, provide pt/family education and to maximize pt's level of independence in the home and community environment.   Pt's spiritual, cultural and educational needs considered and pt agreeable to plan of care and goals.     Anticipated barriers to physical therapy: none    Goals:   Short Term Goals (3 Weeks):   1. Patient will be independent with home exercise program to supplement physical therapy treatment in improving functional status. - MET (1/9/23)  2. Patient will improve Llower extremity strength to at least 75% of R lower extremity strength as measured via MicroFet handheld dynamometer to improve strength for closed chain tasks. - Appropriate, ongoing  3. Patient will improve L knee active range of motion to 0-90 degrees to promote increased ease of sit<>stand transfers. - MET (1/9/23)  4. Patient will perform timed up and go with less restrictive assistive device in < 20 seconds to improve gait speed and safety with community ambulation. - Appropriate, ongoing     Long Term Goals (6 Weeks):   1. Patient will improve L lower extremity strength to at least 90% of Rlower extremity strength as measured via MicroFet handheld dynamometer to improve strength for closed chain tasks. - Appropriate, ongoing  2. Patient will improve the total FOTO Knee Survey Score to </= 30% limited to demonstrate increased perceived functional mobility. - Appropriate, ongoing  3. Patient will perform timed up and go with least restrictive assistive device in < 13.5 seconds to improve gait speed and safety with community ambulation. - Appropriate, ongoing  4. Patient will perform at least 14 sit to stands in 30 seconds without UE support to demonstrate increased functional strength. - Appropriate, ongoing  5. Patient will improve Lknee active range of motion to 0-120 degrees to promote higher level transfers and transitions without  limitation. - Appropriate, ongoing    PLAN     Plan of Care Certification: 12/15/2022 to 1/27/2023.     Outpatient Physical Therapy 4 times weekly for 6 weeks to include the following interventions: Gait Training, Manual Therapy, Moist Heat/ Ice, Neuromuscular Re-ed, Orthotic Management and Training, Patient Education, Therapeutic Activites and Therapeutic Exercise, Instrument Assisted Soft Tissue Mobilization (IASTM), Kinesiotape.    Sonja Lindsay, PTA

## 2023-01-13 ENCOUNTER — CLINICAL SUPPORT (OUTPATIENT)
Dept: REHABILITATION | Facility: HOSPITAL | Age: 73
End: 2023-01-13
Payer: MEDICARE

## 2023-01-13 DIAGNOSIS — M25.662 DECREASED RANGE OF MOTION (ROM) OF LEFT KNEE: Primary | ICD-10-CM

## 2023-01-13 DIAGNOSIS — R26.89 IMPAIRED GAIT AND MOBILITY: ICD-10-CM

## 2023-01-13 DIAGNOSIS — R29.898 DECREASED STRENGTH OF LOWER EXTREMITY: ICD-10-CM

## 2023-01-13 DIAGNOSIS — M25.562 ACUTE PAIN OF LEFT KNEE: ICD-10-CM

## 2023-01-13 PROCEDURE — 97110 THERAPEUTIC EXERCISES: CPT | Mod: CQ

## 2023-01-13 NOTE — PROGRESS NOTES
[facility]   Physical Therapy Treatment Note     Name: Pooanm Alvarez  Clinic Number: [id number]    Therapy Diagnosis:   Encounter Diagnoses   Name Primary?    Decreased range of motion (ROM) of left knee Yes    Decreased strength of lower extremity     Acute pain of left knee     Impaired gait and mobility      Physician: Gordon Schneider MD    Visit Date: 1/13/2023    Physician Orders: PT Eval and Treat   Medical Diagnosis from Referral: M17.12 (ICD-10-CM) - Primary osteoarthritis of left knee   Evaluation Date: 12/15/2022  Authorization Period Expiration: 1/2/2024  Plan of Care Expiration: 1/27/2023  Visit # / Visits authorized: 6 / 20   Visit: 13  FOTO: NT    PTA Visit #: 5 / 5 *Confirmed, seeing PT on Tuesday, 1/17*    Time In: 1500  Time Out: 1610  Total Treatment Time: 70 minutes  Total Billable Time: 30 minutes (2 TE)    Precautions: Standard    SUBJECTIVE     Patient reports: that she is having discomfort in her knee after working on going up and down her stairs.  She was compliant with home exercise program.  Response to previous treatment: good; no adverse reaction  Functional change: ambulated into clinic with no AD    Pain: 0/10, currently  Location: Left knee    Patient goals: get back to walking in the park, riding a bike, being able to be more active     OBJECTIVE     Objective Measures updated at progress report unless specified.   L TKA 12/13/2022  6 week follow up 1/24/2023 12/21/2022; 71 degrees AAROM Flexion  12/27/2022: 84 degrees AAROM Flexion, 0 degrees extension  12/28/2022; 87 degrees AAROM Flexion  12/30/2022; 87 degrees AROM Flexion  1/3/2022: 0 degrees; 103 PROM flexion, 90 degrees AROM  1/4/2022: 0 degrees; 95 degrees AAROM; 91 degrees AROM  1/6/2022: 0-104 degrees AAROM   1/9/2023; 103 degrees AROM Flexion; 106 degrees PROM Flexion  1/13/2023; 108 degrees AROM Flexion    Treatment     Poonam received the treatments listed below:      THERAPEUTIC  EXERCISES to develop strength, endurance, ROM, flexibility, posture, and core stabilization for 60 minutes including:  Aerobic Activity: Recumbent Bike for AAROM flexion at full revolutions for 10 minutes at seat 14  Heel slides: 10 seconds, 10x with strap with PT overpressure - NT  Short arc quads with medium bolster; 5 second hold, 10 reps with 5lb AW; 1 x 10 reps with no AW - NT  Long arc quads + 5 lbs; 5 second hold, 3 x 10 reps - NT    Sit to stands in std chair + 2 airex pads; 2 x 15 reps (encouraged arms across chest to avoid UE assist)  Step ups on 8-inch step; 2 x 15 reps leading with LLE   Lateral step ups on 8-inch step; 2 x 15 reps  Forward step downs on 6-inch step; 3 x 10 reps (focus on slow and controlled descent; avoiding hip drop)  Shuttle DL Press; 62.5lbs (2 black, 1 red cord), 2 x 15 reps   Shuttle SL Press; 25lbs (1 black cord), 2 x 15 reps  Matrix HS Curls; 35#, 3 x 10 reps with 3 second hold at end range  +Knee Flexion Stretch at stairs; 10 second hold, 10 reps    MANUAL THERAPY TECHNIQUES including Joint mobilizations were applied to Left knee for 00 minutes.   - Grade II-III Patella Mobs    cold pack for 10 minutes to Left LE (towel instead of pillow case).    Patient Education and Home Exercises     Home Exercises Provided and Patient Education Provided     Education provided:   - Continued compliance with HEP  - Elevation in combination with ice to help reduce swelling  - ROM expectations    Written Home Exercises Provided: Patient instructed to cont prior HEP. Exercises were reviewed and Poonam was able to demonstrate them prior to the end of the session.  Poonam demonstrated good  understanding of the education provided. See EMR under Patient Instructions for exercises provided during therapy sessions    ASSESSMENT     Tolerated exercise fairly well. She reported appropriate muscle response post session. Achieved 108 degrees of active knee flexion ROM post session. Continue to progress  per POC towards treatment goals.  Poonam Is progressing well towards her goals.   Pt prognosis is Good.     Pt will continue to benefit from skilled outpatient physical therapy to address the deficits listed in the problem list box on initial evaluation, provide pt/family education and to maximize pt's level of independence in the home and community environment.   Pt's spiritual, cultural and educational needs considered and pt agreeable to plan of care and goals.     Anticipated barriers to physical therapy: none    Goals:   Short Term Goals (3 Weeks):   1. Patient will be independent with home exercise program to supplement physical therapy treatment in improving functional status. - MET (1/9/23)  2. Patient will improve Llower extremity strength to at least 75% of R lower extremity strength as measured via MicroFet handheld dynamometer to improve strength for closed chain tasks. - Appropriate, ongoing  3. Patient will improve L knee active range of motion to 0-90 degrees to promote increased ease of sit<>stand transfers. - MET (1/9/23)  4. Patient will perform timed up and go with less restrictive assistive device in < 20 seconds to improve gait speed and safety with community ambulation. - Appropriate, ongoing     Long Term Goals (6 Weeks):   1. Patient will improve L lower extremity strength to at least 90% of Rlower extremity strength as measured via MicroFet handheld dynamometer to improve strength for closed chain tasks. - Appropriate, ongoing  2. Patient will improve the total FOTO Knee Survey Score to </= 30% limited to demonstrate increased perceived functional mobility. - Appropriate, ongoing  3. Patient will perform timed up and go with least restrictive assistive device in < 13.5 seconds to improve gait speed and safety with community ambulation. - Appropriate, ongoing  4. Patient will perform at least 14 sit to stands in 30 seconds without UE support to demonstrate increased functional strength. -  Appropriate, ongoing  5. Patient will improve Lknee active range of motion to 0-120 degrees to promote higher level transfers and transitions without limitation. - Appropriate, ongoing    PLAN     Plan of Care Certification: 12/15/2022 to 1/27/2023.     Outpatient Physical Therapy 4 times weekly for 6 weeks to include the following interventions: Gait Training, Manual Therapy, Moist Heat/ Ice, Neuromuscular Re-ed, Orthotic Management and Training, Patient Education, Therapeutic Activites and Therapeutic Exercise, Instrument Assisted Soft Tissue Mobilization (IASTM), Kinesiotape.    Sonja Lindsay, PTA

## 2023-01-17 ENCOUNTER — CLINICAL SUPPORT (OUTPATIENT)
Dept: REHABILITATION | Facility: HOSPITAL | Age: 73
End: 2023-01-17
Payer: MEDICARE

## 2023-01-17 DIAGNOSIS — R29.898 DECREASED STRENGTH OF LOWER EXTREMITY: ICD-10-CM

## 2023-01-17 DIAGNOSIS — R26.89 IMPAIRED GAIT AND MOBILITY: ICD-10-CM

## 2023-01-17 DIAGNOSIS — M25.662 DECREASED RANGE OF MOTION (ROM) OF LEFT KNEE: Primary | ICD-10-CM

## 2023-01-17 DIAGNOSIS — M25.562 LEFT KNEE PAIN, UNSPECIFIED CHRONICITY: ICD-10-CM

## 2023-01-17 PROCEDURE — 97110 THERAPEUTIC EXERCISES: CPT

## 2023-01-17 NOTE — PROGRESS NOTES
[facility]   Physical Therapy Treatment Note     Name: Poonam Alvarez  Clinic Number: [id number]    Therapy Diagnosis:   Encounter Diagnoses   Name Primary?    Decreased range of motion (ROM) of left knee Yes    Decreased strength of lower extremity     Left knee pain, unspecified chronicity     Impaired gait and mobility        Physician: Gordon Schneider MD    Visit Date: 2023    Physician Orders: PT Eval and Treat   Medical Diagnosis from Referral: M17.12 (ICD-10-CM) - Primary osteoarthritis of left knee   Evaluation Date: 12/15/2022  Authorization Period Expiration: 2024  Plan of Care Expiration: 2023  Visit # / Visits authorized:    Visit: 13  FOTO: NT    PTA Visit #: 0 / 5     Time In: 3:00 pm  Time Out: 3:55 pm  Total Treatment Time: 55 minutes  Total Billable Time: 25 minutes (2 TE)    Precautions: Standard    SUBJECTIVE     Patient reports: that she went back to work.  She was compliant with home exercise program.  Response to previous treatment: good; no adverse reaction  Functional change: ambulated into clinic with no AD    Pain: 0/10, currently  Location: Left knee    Patient goals: get back to walking in the park, riding a bike, being able to be more active     OBJECTIVE     Objective Measures updated at progress report unless specified.   L TKA 2022  6 week follow up 2022; 71 degrees AAROM Flexion  2022: 84 degrees AAROM Flexion, 0 degrees extension  2022; 87 degrees AAROM Flexion  2022; 87 degrees AROM Flexion  1/3/2022: 0 degrees; 103 PROM flexion, 90 degrees AROM  2022: 0 degrees; 95 degrees AAROM; 91 degrees AROM  2022: 0-104 degrees AAROM   2023; 103 degrees AROM Flexion; 106 degrees PROM Flexion  2023; 108 degrees AROM Flexion      2023:   TU seconds  30 STS: 6x      Treatment     Poonam received the treatments listed below:      THERAPEUTIC EXERCISES to develop strength,  endurance, ROM, flexibility, posture, and core stabilization for 55 minutes including:  Aerobic Activity: Recumbent Bike for AAROM flexion at full revolutions for 10 minutes at seat 14  Heel slides: 10 seconds, 10x with strap   Short arc quads with medium bolster; 5 second hold, 10 reps with 5lb AW; 1 x 10 reps with no AW - NT  Long arc quads + 7.5 lbs; 5 second hold, 3 x 10 reps   Step ups on 8-inch step; 2 x 15 reps leading with LLE   Lateral step ups on 8-inch step; 2 x 15 reps  Forward step downs on 6-inch step; 3 x 10 reps (focus on slow and controlled descent; avoiding hip drop)  Shuttle DL Press; 62.5lbs (2 black, 1 red cord), 2 x 15 reps   Shuttle SL Press; 25lbs (1 black cord), 2 x 15 reps  Matrix HS Curls; 35#, 3 x 10 reps with 3 second hold at end range  Knee Flexion Stretch at stairs; 10 second hold, 10 reps    MANUAL THERAPY TECHNIQUES including Joint mobilizations were applied to Left knee for 00 minutes.   - Grade II-III Patella Mobs    cold pack for 10 minutes to Left LE (towel instead of pillow case).    Patient Education and Home Exercises     Home Exercises Provided and Patient Education Provided     Education provided:   - Continued compliance with HEP  - Elevation in combination with ice to help reduce swelling  - ROM expectations    Written Home Exercises Provided: Patient instructed to cont prior HEP. Exercises were reviewed and Poonam was able to demonstrate them prior to the end of the session.  Poonam demonstrated good  understanding of the education provided. See EMR under Patient Instructions for exercises provided during therapy sessions    ASSESSMENT     Discussed importance of continuing to work on her flexion range of motion as she is 5 weeks s/p left total knee arthroplasty. Patient progressing well with functional strengthening, however continues to progress towards functional range of motion goals.     Poonam Is progressing well towards her goals.   Pt prognosis is Good.     Pt  will continue to benefit from skilled outpatient physical therapy to address the deficits listed in the problem list box on initial evaluation, provide pt/family education and to maximize pt's level of independence in the home and community environment.   Pt's spiritual, cultural and educational needs considered and pt agreeable to plan of care and goals.     Anticipated barriers to physical therapy: none    Goals:   Short Term Goals (3 Weeks):   1. Patient will be independent with home exercise program to supplement physical therapy treatment in improving functional status. - MET (1/9/23)  2. Patient will improve Llower extremity strength to at least 75% of R lower extremity strength as measured via MicroFet handheld dynamometer to improve strength for closed chain tasks. - Appropriate, ongoing  3. Patient will improve L knee active range of motion to 0-90 degrees to promote increased ease of sit<>stand transfers. - MET (1/9/23)  4. Patient will perform timed up and go with less restrictive assistive device in < 20 seconds to improve gait speed and safety with community ambulation. - Appropriate, ongoing     Long Term Goals (6 Weeks):   1. Patient will improve L lower extremity strength to at least 90% of Rlower extremity strength as measured via MicroFet handheld dynamometer to improve strength for closed chain tasks. - Appropriate, ongoing  2. Patient will improve the total FOTO Knee Survey Score to </= 30% limited to demonstrate increased perceived functional mobility. - Appropriate, ongoing  3. Patient will perform timed up and go with least restrictive assistive device in < 13.5 seconds to improve gait speed and safety with community ambulation. - Appropriate, ongoing  4. Patient will perform at least 14 sit to stands in 30 seconds without UE support to demonstrate increased functional strength. - Appropriate, ongoing  5. Patient will improve Lknee active range of motion to 0-120 degrees to promote higher  level transfers and transitions without limitation. - Appropriate, ongoing    PLAN     Plan of Care Certification: 12/15/2022 to 1/27/2023.     Outpatient Physical Therapy 4 times weekly for 6 weeks to include the following interventions: Gait Training, Manual Therapy, Moist Heat/ Ice, Neuromuscular Re-ed, Orthotic Management and Training, Patient Education, Therapeutic Activites and Therapeutic Exercise, Instrument Assisted Soft Tissue Mobilization (IASTM), Kinesiotape.    Barbi Barrera, PT

## 2023-01-19 ENCOUNTER — CLINICAL SUPPORT (OUTPATIENT)
Dept: REHABILITATION | Facility: HOSPITAL | Age: 73
End: 2023-01-19
Payer: MEDICARE

## 2023-01-19 DIAGNOSIS — R26.89 IMPAIRED GAIT AND MOBILITY: ICD-10-CM

## 2023-01-19 DIAGNOSIS — M25.662 DECREASED RANGE OF MOTION (ROM) OF LEFT KNEE: Primary | ICD-10-CM

## 2023-01-19 DIAGNOSIS — R29.898 DECREASED STRENGTH OF LOWER EXTREMITY: ICD-10-CM

## 2023-01-19 DIAGNOSIS — M25.562 LEFT KNEE PAIN, UNSPECIFIED CHRONICITY: ICD-10-CM

## 2023-01-19 PROCEDURE — 97110 THERAPEUTIC EXERCISES: CPT | Mod: CQ

## 2023-01-19 NOTE — PROGRESS NOTES
[facility]   Physical Therapy Treatment Note     Name: Poonam Alvarez  Clinic Number: [id number]    Therapy Diagnosis:   Encounter Diagnoses   Name Primary?    Decreased range of motion (ROM) of left knee Yes    Decreased strength of lower extremity     Left knee pain, unspecified chronicity     Impaired gait and mobility      Physician: Gordon Schneider MD    Visit Date: 2023    Physician Orders: PT Eval and Treat   Medical Diagnosis from Referral: M17.12 (ICD-10-CM) - Primary osteoarthritis of left knee   Evaluation Date: 12/15/2022  Authorization Period Expiration: 2024  Plan of Care Expiration: 2023  Visit # / Visits authorized:    Visit: 15  FOTO: NT    PTA Visit #:      Time In: 1500  Time Out: 1600  Total Treatment Time: 60 minutes  Total Billable Time: 30 minutes (2 TE)    Precautions: Standard    SUBJECTIVE     Patient reports: no new complaints today.  She was compliant with home exercise program.  Response to previous treatment: good; no adverse reaction  Functional change: ambulated into clinic with no AD    Pain: 0/10, currently  Location: Left knee    Patient goals: get back to walking in the park, riding a bike, being able to be more active     OBJECTIVE     Objective Measures updated at progress report unless specified.   L TKA 2022  6 week follow up 2022; 71 degrees AAROM Flexion  2022: 84 degrees AAROM Flexion, 0 degrees extension  2022; 87 degrees AAROM Flexion  2022; 87 degrees AROM Flexion  1/3/2022: 0 degrees; 103 PROM flexion, 90 degrees AROM  2022: 0 degrees; 95 degrees AAROM; 91 degrees AROM  2022: 0-104 degrees AAROM   2023; 103 degrees AROM Flexion; 106 degrees PROM Flexion  2023; 108 degrees AROM Flexion  2023:   TU seconds  30 STS: 6x  1/19/2023; 110 degrees AROM Flexion    Treatment     Poonam received the treatments listed below:      THERAPEUTIC EXERCISES to  develop strength, endurance, ROM, flexibility, posture, and core stabilization for 60 minutes including:  Aerobic Activity: Recumbent Bike for AAROM flexion at full revolutions for 5 minutes at seat 14, Level 2  Heel slides: 10 seconds, 10x with strap with PT overpressure   Short arc quads with medium bolster; 5 second hold, 10 reps with 5lb AW; 1 x 10 reps with no AW   Long arc quads + 7.5 lbs; 5 second hold, 3 x 10 reps   Step ups on 8-inch step; 2 x 15 reps leading with LLE   Lateral step ups on 8-inch step; 2 x 15 reps  Forward step downs on 6-inch step; 3 x 10 reps (focus on slow and controlled descent; avoiding hip drop)  Shuttle DL Press; 62.5lbs (2 black, 1 red cord), 3 x 15 reps   Shuttle SL Press; 37.5lbs (1 black, 1 red cord), 2 x 8 reps  Matrix HS Curls; 45#, 2 x 15 reps with 3 second hold at end range  Knee Flexion Stretch at stairs; 10 second hold, 10 reps  +Eccentric step downs on 4-inch step; 10 reps    MANUAL THERAPY TECHNIQUES including Joint mobilizations were applied to Left knee for 00 minutes.   - Grade II-III Patella Mobs    cold pack for 00 minutes to Left LE (towel instead of pillow case).    Patient Education and Home Exercises     Home Exercises Provided and Patient Education Provided     Education provided:   - Continued compliance with HEP  - Elevation in combination with ice to help reduce swelling  - ROM expectations    Written Home Exercises Provided: Patient instructed to cont prior HEP. Exercises were reviewed and Poonam was able to demonstrate them prior to the end of the session.  Poonam demonstrated good  understanding of the education provided. See EMR under Patient Instructions for exercises provided during therapy sessions    ASSESSMENT     Patient demonstrating good tolerance to exercises listed above. She reports appropriate muscle response post session. Visible difficulty with eccentric step downs with mod cueing to avoid hip drop. Able to increase resistance on Matrix HS  sofi. Achieved 110 degrees of active knee flexion ROM today. Continue to progress per POC towards treatment goals.   Poonam Is progressing well towards her goals.   Pt prognosis is Good.     Pt will continue to benefit from skilled outpatient physical therapy to address the deficits listed in the problem list box on initial evaluation, provide pt/family education and to maximize pt's level of independence in the home and community environment.   Pt's spiritual, cultural and educational needs considered and pt agreeable to plan of care and goals.     Anticipated barriers to physical therapy: none    Goals:   Short Term Goals (3 Weeks):   1. Patient will be independent with home exercise program to supplement physical therapy treatment in improving functional status. - MET (1/9/23)  2. Patient will improve Llower extremity strength to at least 75% of R lower extremity strength as measured via MicroFet handheld dynamometer to improve strength for closed chain tasks. - Appropriate, ongoing  3. Patient will improve L knee active range of motion to 0-90 degrees to promote increased ease of sit<>stand transfers. - MET (1/9/23)  4. Patient will perform timed up and go with less restrictive assistive device in < 20 seconds to improve gait speed and safety with community ambulation. - Appropriate, ongoing     Long Term Goals (6 Weeks):   1. Patient will improve L lower extremity strength to at least 90% of Rlower extremity strength as measured via MicroFet handheld dynamometer to improve strength for closed chain tasks. - Appropriate, ongoing  2. Patient will improve the total FOTO Knee Survey Score to </= 30% limited to demonstrate increased perceived functional mobility. - Appropriate, ongoing  3. Patient will perform timed up and go with least restrictive assistive device in < 13.5 seconds to improve gait speed and safety with community ambulation. - Appropriate, ongoing  4. Patient will perform at least 14 sit to stands  in 30 seconds without UE support to demonstrate increased functional strength. - Appropriate, ongoing  5. Patient will improve Lknee active range of motion to 0-120 degrees to promote higher level transfers and transitions without limitation. - Appropriate, ongoing    PLAN     Plan of Care Certification: 12/15/2022 to 1/27/2023.     Outpatient Physical Therapy 4 times weekly for 6 weeks to include the following interventions: Gait Training, Manual Therapy, Moist Heat/ Ice, Neuromuscular Re-ed, Orthotic Management and Training, Patient Education, Therapeutic Activites and Therapeutic Exercise, Instrument Assisted Soft Tissue Mobilization (IASTM), Kinesiotape.    Sonja Lindsay, PTA

## 2023-01-24 ENCOUNTER — CLINICAL SUPPORT (OUTPATIENT)
Dept: REHABILITATION | Facility: HOSPITAL | Age: 73
End: 2023-01-24
Payer: MEDICARE

## 2023-01-24 DIAGNOSIS — R29.898 DECREASED STRENGTH OF LOWER EXTREMITY: ICD-10-CM

## 2023-01-24 DIAGNOSIS — M25.562 LEFT KNEE PAIN, UNSPECIFIED CHRONICITY: ICD-10-CM

## 2023-01-24 DIAGNOSIS — R26.89 IMPAIRED GAIT AND MOBILITY: ICD-10-CM

## 2023-01-24 DIAGNOSIS — M25.662 DECREASED RANGE OF MOTION (ROM) OF LEFT KNEE: Primary | ICD-10-CM

## 2023-01-24 PROCEDURE — 97110 THERAPEUTIC EXERCISES: CPT

## 2023-01-24 NOTE — PROGRESS NOTES
[facility]   Physical Therapy Treatment Note     Name: Poonam Alvarez  Clinic Number: [id number]    Therapy Diagnosis:   Encounter Diagnoses   Name Primary?    Decreased range of motion (ROM) of left knee Yes    Decreased strength of lower extremity     Left knee pain, unspecified chronicity     Impaired gait and mobility        Physician: Gordon Schneider MD    Visit Date: 2023    Physician Orders: PT Eval and Treat   Medical Diagnosis from Referral: M17.12 (ICD-10-CM) - Primary osteoarthritis of left knee   Evaluation Date: 12/15/2022  Authorization Period Expiration: 2024  Plan of Care Expiration: 2023  Visit # / Visits authorized:    Visit: 15  FOTO: NT    PTA Visit #: 0 / 5     Time In: 3:15 pm  Time Out: 4:00 pm  Total Treatment Time: 45 minutes  Total Billable Time: 15 minutes (1 TE)    Precautions: Standard    SUBJECTIVE     Patient reports: no new complaints today.  She was compliant with home exercise program.  Response to previous treatment: good; no adverse reaction  Functional change: ambulated into clinic with no AD    Pain: 0/10, currently  Location: Left knee    Patient goals: get back to walking in the park, riding a bike, being able to be more active     OBJECTIVE     Objective Measures updated at progress report unless specified.   L TKA 2022  6 week follow up 2022; 71 degrees AAROM Flexion  2022: 84 degrees AAROM Flexion, 0 degrees extension  2022; 87 degrees AAROM Flexion  2022; 87 degrees AROM Flexion  1/3/2022: 0 degrees; 103 PROM flexion, 90 degrees AROM  2022: 0 degrees; 95 degrees AAROM; 91 degrees AROM  2022: 0-104 degrees AAROM   2023; 103 degrees AROM Flexion; 106 degrees PROM Flexion  2023; 108 degrees AROM Flexion  2023:   TU seconds  30 STS: 6x  1/19/2023; 110 degrees AROM Flexion  2023: 112 degrees AROM flexion    Treatment     Poonam received the treatments  listed below:      THERAPEUTIC EXERCISES to develop strength, endurance, ROM, flexibility, posture, and core stabilization for 45 minutes including:  Aerobic Activity: Recumbent Bike for AAROM flexion at full revolutions for 5 minutes at seat 14, Level 2   Step ups on 8-inch step; 2 x 15 reps leading with LLE   Lateral step ups on 8-inch step; 2 x 15 reps  Shuttle DL Press; 62.5lbs (2 black, 1 red cord), 3 x 15 reps   Shuttle SL Press; 37.5lbs (1 black, 1 red cord), 2 x 8 reps  Matrix HS Curls; 45#, 2 x 15 reps with 3 second hold at end range  Knee Flexion Stretch at stairs; 10 second hold, 10 reps  Kneeling on 6 inch step 3 minutes on airex  +Eccentric step downs on 4-inch step; 10 reps    MANUAL THERAPY TECHNIQUES including Joint mobilizations were applied to Left knee for 00 minutes.   - Grade II-III Patella Mobs    cold pack for 00 minutes to Left LE (towel instead of pillow case).    Patient Education and Home Exercises     Home Exercises Provided and Patient Education Provided     Education provided:   - Continued compliance with HEP  - Elevation in combination with ice to help reduce swelling  - ROM expectations    Written Home Exercises Provided: Patient instructed to cont prior HEP. Exercises were reviewed and Poonam was able to demonstrate them prior to the end of the session.  Poonam demonstrated good  understanding of the education provided. See EMR under Patient Instructions for exercises provided during therapy sessions    ASSESSMENT     Patient progressing well overall. Slow progress with knee flexion range of motion, however improvements each session.       Poonam Is progressing well towards her goals.   Pt prognosis is Good.     Pt will continue to benefit from skilled outpatient physical therapy to address the deficits listed in the problem list box on initial evaluation, provide pt/family education and to maximize pt's level of independence in the home and community environment.   Pt's spiritual,  cultural and educational needs considered and pt agreeable to plan of care and goals.     Anticipated barriers to physical therapy: none    Goals:   Short Term Goals (3 Weeks):   1. Patient will be independent with home exercise program to supplement physical therapy treatment in improving functional status. - MET (1/9/23)  2. Patient will improve Llower extremity strength to at least 75% of R lower extremity strength as measured via MicroFet handheld dynamometer to improve strength for closed chain tasks. - Appropriate, ongoing  3. Patient will improve L knee active range of motion to 0-90 degrees to promote increased ease of sit<>stand transfers. - MET (1/9/23)  4. Patient will perform timed up and go with less restrictive assistive device in < 20 seconds to improve gait speed and safety with community ambulation. - Appropriate, ongoing     Long Term Goals (6 Weeks):   1. Patient will improve L lower extremity strength to at least 90% of Rlower extremity strength as measured via MicroFet handheld dynamometer to improve strength for closed chain tasks. - Appropriate, ongoing  2. Patient will improve the total FOTO Knee Survey Score to </= 30% limited to demonstrate increased perceived functional mobility. - Appropriate, ongoing  3. Patient will perform timed up and go with least restrictive assistive device in < 13.5 seconds to improve gait speed and safety with community ambulation. - Appropriate, ongoing  4. Patient will perform at least 14 sit to stands in 30 seconds without UE support to demonstrate increased functional strength. - Appropriate, ongoing  5. Patient will improve Lknee active range of motion to 0-120 degrees to promote higher level transfers and transitions without limitation. - Appropriate, ongoing    PLAN     Plan of Care Certification: 12/15/2022 to 1/27/2023.     Outpatient Physical Therapy 4 times weekly for 6 weeks to include the following interventions: Gait Training, Manual Therapy, Moist  Heat/ Ice, Neuromuscular Re-ed, Orthotic Management and Training, Patient Education, Therapeutic Activites and Therapeutic Exercise, Instrument Assisted Soft Tissue Mobilization (IASTM), Kinesiotape.    Barbi Barrera, PT

## 2023-01-26 ENCOUNTER — CLINICAL SUPPORT (OUTPATIENT)
Dept: REHABILITATION | Facility: HOSPITAL | Age: 73
End: 2023-01-26
Payer: MEDICARE

## 2023-01-26 DIAGNOSIS — M25.662 DECREASED RANGE OF MOTION (ROM) OF LEFT KNEE: Primary | ICD-10-CM

## 2023-01-26 DIAGNOSIS — M25.562 LEFT KNEE PAIN, UNSPECIFIED CHRONICITY: ICD-10-CM

## 2023-01-26 DIAGNOSIS — R26.89 IMPAIRED GAIT AND MOBILITY: ICD-10-CM

## 2023-01-26 DIAGNOSIS — R29.898 DECREASED STRENGTH OF LOWER EXTREMITY: ICD-10-CM

## 2023-01-26 PROCEDURE — 97110 THERAPEUTIC EXERCISES: CPT

## 2023-01-26 NOTE — PROGRESS NOTES
[facility]   Physical Therapy Treatment Note     Name: Poonam Alvarez  Clinic Number: [id number]    Therapy Diagnosis:   Encounter Diagnoses   Name Primary?    Decreased range of motion (ROM) of left knee Yes    Decreased strength of lower extremity     Left knee pain, unspecified chronicity     Impaired gait and mobility        Physician: Gordon Schneider MD    Visit Date: 2023    Physician Orders: PT Eval and Treat   Medical Diagnosis from Referral: M17.12 (ICD-10-CM) - Primary osteoarthritis of left knee   Evaluation Date: 12/15/2022  Authorization Period Expiration: 2024  Plan of Care Expiration: 2023  Visit # / Visits authorized: 10 / 20   Visit: 16  FOTO: NT    PTA Visit #: 0 / 5     Time In: 3:00 pm  Time Out: 3:58 pm  Total Treatment Time: 58 minutes  Total Billable Time: 58 minutes (4 TE)    Precautions: Standard    SUBJECTIVE     Patient reports: she has an MD appointment and wants to extend her therapy.   She was compliant with home exercise program.  Response to previous treatment: good; no adverse reaction  Functional change: ambulated into clinic with no AD    Pain: 0/10, currently  Location: Left knee    Patient goals: get back to walking in the park, riding a bike, being able to be more active     OBJECTIVE     Objective Measures updated at progress report unless specified.   L TKA 2022  6 week follow up 2022; 71 degrees AAROM Flexion  2022: 84 degrees AAROM Flexion, 0 degrees extension  2022; 87 degrees AAROM Flexion  2022; 87 degrees AROM Flexion  1/3/2022: 0 degrees; 103 PROM flexion, 90 degrees AROM  2022: 0 degrees; 95 degrees AAROM; 91 degrees AROM  2022: 0-104 degrees AAROM   2023; 103 degrees AROM Flexion; 106 degrees PROM Flexion  2023; 108 degrees AROM Flexion  2023:   TU seconds  30 STS: 6x  1/19/2023; 110 degrees AROM Flexion  2023: 112 degrees AROM flexion  2023:  110 degrees AROM flexion    Treatment     Poonam received the treatments listed below:      THERAPEUTIC EXERCISES to develop strength, endurance, ROM, flexibility, posture, and core stabilization for 60 minutes including:  Aerobic Activity: Recumbent Bike for AAROM flexion at full revolutions for 5 minutes at seat 14, Level 5  Long arc quads + 10 lbs; 5 second hold, 30x reps   Shuttle DL Press; 62.5lbs (2 black, 1 red cord), 3 x 15 reps   Shuttle SL Press; 37.5lbs (1 black, 1 red cord), 2 x 15 reps  Knee Flexion Stretch at stairs; 10 second hold, 10 reps  Eccentric step downs on 4-inch step; 10 reps  Standing TKE: black theraband 2x15  Lateral walks: red theraband 3 laps      MANUAL THERAPY TECHNIQUES including Joint mobilizations were applied to Left knee for 00 minutes.   - Grade II-III Patella Mobs    cold pack for 00 minutes to Left LE (towel instead of pillow case).    Patient Education and Home Exercises     Home Exercises Provided and Patient Education Provided     Education provided:   - patient educated to use a belt instead of a theraband.     Written Home Exercises Provided: Patient instructed to cont prior HEP. Exercises were reviewed and Poonam was able to demonstrate them prior to the end of the session.  Poonam demonstrated good  understanding of the education provided. See EMR under Patient Instructions for exercises provided during therapy sessions    ASSESSMENT     Patient is 6 weeks s/p left total knee arthroplasty. Patient demonstrates 110 degrees of AROM at this time. Patient continues to demonstrate difficulty achieving terminal knee extension during long arc quads. Significant difficulty with eccentric control of left quadriceps. Patient to see MD next week.     Poonam Is progressing well towards her goals.   Pt prognosis is Good.     Pt will continue to benefit from skilled outpatient physical therapy to address the deficits listed in the problem list box on initial evaluation, provide  pt/family education and to maximize pt's level of independence in the home and community environment.   Pt's spiritual, cultural and educational needs considered and pt agreeable to plan of care and goals.     Anticipated barriers to physical therapy: none    Goals:   Short Term Goals (3 Weeks):   1. Patient will be independent with home exercise program to supplement physical therapy treatment in improving functional status. - MET (1/9/23)  2. Patient will improve Llower extremity strength to at least 75% of R lower extremity strength as measured via MicroFet handheld dynamometer to improve strength for closed chain tasks. - Appropriate, ongoing  3. Patient will improve L knee active range of motion to 0-90 degrees to promote increased ease of sit<>stand transfers. - MET (1/9/23)  4. Patient will perform timed up and go with less restrictive assistive device in < 20 seconds to improve gait speed and safety with community ambulation. - Appropriate, ongoing     Long Term Goals (6 Weeks):   1. Patient will improve L lower extremity strength to at least 90% of Rlower extremity strength as measured via MicroFet handheld dynamometer to improve strength for closed chain tasks. - Appropriate, ongoing  2. Patient will improve the total FOTO Knee Survey Score to </= 30% limited to demonstrate increased perceived functional mobility. - Appropriate, ongoing  3. Patient will perform timed up and go with least restrictive assistive device in < 13.5 seconds to improve gait speed and safety with community ambulation. - Appropriate, ongoing  4. Patient will perform at least 14 sit to stands in 30 seconds without UE support to demonstrate increased functional strength. - Appropriate, ongoing  5. Patient will improve Lknee active range of motion to 0-120 degrees to promote higher level transfers and transitions without limitation. - Appropriate, ongoing    PLAN     Plan of Care Certification: 12/15/2022 to 1/27/2023.     Outpatient  Physical Therapy 4 times weekly for 6 weeks to include the following interventions: Gait Training, Manual Therapy, Moist Heat/ Ice, Neuromuscular Re-ed, Orthotic Management and Training, Patient Education, Therapeutic Activites and Therapeutic Exercise, Instrument Assisted Soft Tissue Mobilization (IASTM), Kinesiotape.    Barbi Barrera, PT

## 2023-01-30 ENCOUNTER — HOSPITAL ENCOUNTER (OUTPATIENT)
Dept: RADIOLOGY | Facility: HOSPITAL | Age: 73
Discharge: HOME | End: 2023-01-30
Attending: NURSE PRACTITIONER
Payer: MEDICARE

## 2023-01-30 ENCOUNTER — OFFICE VISIT (OUTPATIENT)
Dept: ORTHOPEDICS | Facility: CLINIC | Age: 73
End: 2023-01-30
Payer: MEDICARE

## 2023-01-30 VITALS — BODY MASS INDEX: 28.99 KG/M2 | WEIGHT: 214 LBS | HEIGHT: 72 IN

## 2023-01-30 DIAGNOSIS — L29.9 ITCHING: Primary | ICD-10-CM

## 2023-01-30 DIAGNOSIS — Z96.659 STATUS POST KNEE REPLACEMENT, UNSPECIFIED LATERALITY: ICD-10-CM

## 2023-01-30 PROCEDURE — 73560 XR KNEE ORTHO LEFT: ICD-10-PCS | Mod: 26,RT,, | Performed by: RADIOLOGY

## 2023-01-30 PROCEDURE — 99999 PR PBB SHADOW E&M-EST. PATIENT-LVL III: ICD-10-PCS | Mod: PBBFAC,,, | Performed by: NURSE PRACTITIONER

## 2023-01-30 PROCEDURE — 73560 X-RAY EXAM OF KNEE 1 OR 2: CPT | Mod: 26,RT,, | Performed by: RADIOLOGY

## 2023-01-30 PROCEDURE — 73562 XR KNEE ORTHO LEFT: ICD-10-PCS | Mod: 26,LT,, | Performed by: RADIOLOGY

## 2023-01-30 PROCEDURE — 99024 PR POST-OP FOLLOW-UP VISIT: ICD-10-PCS | Mod: POP,,, | Performed by: NURSE PRACTITIONER

## 2023-01-30 PROCEDURE — 99024 POSTOP FOLLOW-UP VISIT: CPT | Mod: POP,,, | Performed by: NURSE PRACTITIONER

## 2023-01-30 PROCEDURE — 73562 X-RAY EXAM OF KNEE 3: CPT | Mod: 26,LT,, | Performed by: RADIOLOGY

## 2023-01-30 PROCEDURE — 99999 PR PBB SHADOW E&M-EST. PATIENT-LVL III: CPT | Mod: PBBFAC,,, | Performed by: NURSE PRACTITIONER

## 2023-01-30 PROCEDURE — 73560 X-RAY EXAM OF KNEE 1 OR 2: CPT | Mod: 59,TC,RT

## 2023-01-30 PROCEDURE — 99213 OFFICE O/P EST LOW 20 MIN: CPT | Mod: PBBFAC | Performed by: NURSE PRACTITIONER

## 2023-01-30 RX ORDER — HYDROCORTISONE 25 MG/G
OINTMENT TOPICAL 2 TIMES DAILY
Qty: 20 G | Refills: 0 | Status: SHIPPED | OUTPATIENT
Start: 2023-01-30

## 2023-01-30 NOTE — PROGRESS NOTES
Poonam Alvarez presents for 6 week post-operative visit following a left total knee arthroplasty performed by Dr. Schneider on 12/13/2023.      Exam:   Height 6' (1.829 m), weight 97.1 kg (214 lb).   Ambulating well without assistive device.  Incision is healed   ROM:0-110    Post-operative radiographs reviewed today revealing a well fixed and aligned prosthesis.    A/P:  6 weeks s/p left total knee replacement  - Outpatient PT ongoing at [place]. Message sent to Barbi regarding extending therapy  - Follow up in 6 weeks with Dr. Schneider. Pt will call clinic with problems/concerns.

## 2023-02-01 ENCOUNTER — TELEPHONE (OUTPATIENT)
Dept: ADMINISTRATIVE | Facility: CLINIC | Age: 73
End: 2023-02-01
Payer: MEDICARE

## 2023-02-03 ENCOUNTER — OFFICE VISIT (OUTPATIENT)
Dept: INTERNAL MEDICINE | Facility: CLINIC | Age: 73
End: 2023-02-03
Payer: MEDICARE

## 2023-02-03 VITALS
WEIGHT: 214.06 LBS | DIASTOLIC BLOOD PRESSURE: 86 MMHG | HEART RATE: 71 BPM | OXYGEN SATURATION: 99 % | HEIGHT: 72 IN | SYSTOLIC BLOOD PRESSURE: 118 MMHG | BODY MASS INDEX: 28.99 KG/M2

## 2023-02-03 DIAGNOSIS — I77.819 ECTATIC AORTA: ICD-10-CM

## 2023-02-03 DIAGNOSIS — K21.9 GASTROESOPHAGEAL REFLUX DISEASE, UNSPECIFIED WHETHER ESOPHAGITIS PRESENT: ICD-10-CM

## 2023-02-03 DIAGNOSIS — E89.0 HYPOTHYROIDISM, POSTABLATIVE: ICD-10-CM

## 2023-02-03 DIAGNOSIS — Z00.00 ENCOUNTER FOR PREVENTIVE HEALTH EXAMINATION: Primary | ICD-10-CM

## 2023-02-03 DIAGNOSIS — M15.9 PRIMARY OSTEOARTHRITIS INVOLVING MULTIPLE JOINTS: ICD-10-CM

## 2023-02-03 DIAGNOSIS — I10 ESSENTIAL HYPERTENSION: ICD-10-CM

## 2023-02-03 PROCEDURE — 99999 PR PBB SHADOW E&M-EST. PATIENT-LVL IV: CPT | Mod: PBBFAC,,, | Performed by: PHYSICIAN ASSISTANT

## 2023-02-03 PROCEDURE — G0439 PPPS, SUBSEQ VISIT: HCPCS | Mod: ,,, | Performed by: PHYSICIAN ASSISTANT

## 2023-02-03 PROCEDURE — 99214 OFFICE O/P EST MOD 30 MIN: CPT | Mod: PBBFAC | Performed by: PHYSICIAN ASSISTANT

## 2023-02-03 PROCEDURE — 99999 PR PBB SHADOW E&M-EST. PATIENT-LVL IV: ICD-10-PCS | Mod: PBBFAC,,, | Performed by: PHYSICIAN ASSISTANT

## 2023-02-03 PROCEDURE — G0439 PR MEDICARE ANNUAL WELLNESS SUBSEQUENT VISIT: ICD-10-PCS | Mod: ,,, | Performed by: PHYSICIAN ASSISTANT

## 2023-02-03 NOTE — PATIENT INSTRUCTIONS
Counseling and Referral of Other Preventative  (Italic type indicates deductible and co-insurance are waived)    Patient Name: Poonam Alvarez  Today's Date: 2/3/2023    Health Maintenance       Date Due Completion Date    Pneumococcal Vaccines (Age 65+) (3 - PPSV23 if available, else PCV20) 12/20/2017 11/30/2015    Shingles Vaccine (1 of 2) 02/03/2024 (Originally 12/12/1998) ---    Mammogram 11/25/2023 11/25/2022    Colorectal Cancer Screening 11/05/2025 11/5/2015    Lipid Panel 07/06/2027 7/6/2022    TETANUS VACCINE 10/30/2028 10/30/2018        No orders of the defined types were placed in this encounter.    The following information is provided to all patients.  This information is to help you find resources for any of the problems found today that may be affecting your health:                Living healthy guide: www.[facility].[place].[facility]      Understanding Diabetes: www.diabetes.org      Eating healthy: www.cdc.gov/healthyweight      CDC home safety checklist: www.cdc.gov/steadi/patient.html      Agency on Aging: www.goea.[place]      Alcoholics anonymous (AA): www.aa.org      Physical Activity: www.ravinder.nih.gov/na0tuln      Tobacco use: www.quitwithusla.org

## 2023-02-03 NOTE — PROGRESS NOTES
Poonam Alvarez presented for a  Medicare AWV and comprehensive Health Risk Assessment today. The following components were reviewed and updated:    Medical history  Family History  Social history  Allergies and Current Medications  Health Risk Assessment  Health Maintenance  Care Team         ** See Completed Assessments for Annual Wellness Visit within the encounter summary.**         The following assessments were completed:  Living Situation  CAGE  Depression Screening  Timed Get Up and Go  Whisper Test  Cognitive Function Screening      Nutrition Screening  ADL Screening  PAQ Screening        Vitals:    02/03/23 0857   BP: 118/86   BP Location: Left arm   Patient Position: Sitting   BP Method: Large (Manual)   Pulse: 71   SpO2: 99%   Weight: 97.1 kg (214 lb 1.1 oz)   Height: 6' (1.829 m)     Body mass index is 29.03 kg/m².  Physical Exam  Vitals reviewed.   Constitutional:       General: She is not in acute distress.     Appearance: She is well-developed.   HENT:      Head: Normocephalic and atraumatic.      Right Ear: Tympanic membrane, ear canal and external ear normal.      Left Ear: Tympanic membrane, ear canal and external ear normal.   Cardiovascular:      Rate and Rhythm: Normal rate and regular rhythm.      Heart sounds: No murmur heard.  Pulmonary:      Effort: Pulmonary effort is normal.      Breath sounds: Normal breath sounds. No wheezing or rales.   Abdominal:      General: Bowel sounds are normal.      Palpations: Abdomen is soft.      Tenderness: There is no abdominal tenderness.   Musculoskeletal:      Right lower leg: No edema.      Left lower leg: No edema.   Skin:     General: Skin is warm and dry.      Findings: No rash.   Neurological:      Mental Status: She is alert.   Psychiatric:         Mood and Affect: Mood normal.             Diagnoses and health risks identified today and associated recommendations/orders:    1. Encounter for preventive health examination  Exam and Assessments  performed  Chart Review Complete  Health Maintenance Reviewed and updated    2. Essential hypertension  Well controlled on current meds  Followed by PCP    3. Hypothyroidism, postablative  Lab Results   Component Value Date    TSH 0.386 (L) 11/30/2022   Stable  Followed by PCP    4. Gastroesophageal reflux disease, unspecified whether esophagitis present  Stable on current meds   Followed by PCP    5. Primary osteoarthritis involving multiple joints  Stable  S/p Left TKR Dec 2022, in physical therapy  Followed by Ortho    6. Ectatic aorta-  Noted on imaging 7/27/2015  Stable  Followed by PCP      Provided Poonam with a 5-10 year written screening schedule and personal prevention plan. Recommendations were developed using the USPSTF age appropriate recommendations. Education, counseling, and referrals were provided as needed. After Visit Summary printed and given to patient which includes a list of additional screenings\tests needed.    Follow up in about 5 months (around 7/3/2023) for PCP follow up and 1 year for next Medicare AWV.    Nola Nguyen PA-C    Future Appointments   Date Time Provider Department Center   2/7/2023  4:00 PM Barbi Scorsone, PT [facility] [place]   2/9/2023  4:00 PM Barbi Scorsone, PT [facility] [place]   2/14/2023  3:00 PM Sonja Lindsay, PTA [facility] [place]   2/16/2023  4:00 PM Barbi Scorsone, PT [facility] [place]   2/23/2023  4:00 PM Barbi Scorsone, PT [facility] [place]   2/28/2023  4:00 PM Barbi Scorsone, PT [facility] [place]   3/2/2023  4:00 PM Barbi Scorsone, PT [facility] [place]   3/7/2023  4:00 PM Barbi Scorsone, PT [facility] [place]   3/9/2023  4:00 PM Barbi Scorsone, PT [facility] [place]   3/29/2023  3:45 PM Gordon Schneider MD [facility]THO [facility]   4/10/2023 12:45 PM Dionte Daniels OD [facility] OPTOMTY [facility]   7/7/2023 10:00 AM Lindsey Bach MD [facility]

## 2023-02-07 ENCOUNTER — CLINICAL SUPPORT (OUTPATIENT)
Dept: REHABILITATION | Facility: HOSPITAL | Age: 73
End: 2023-02-07
Payer: MEDICARE

## 2023-02-07 DIAGNOSIS — M25.562 LEFT KNEE PAIN, UNSPECIFIED CHRONICITY: ICD-10-CM

## 2023-02-07 DIAGNOSIS — R29.898 DECREASED STRENGTH OF LOWER EXTREMITY: ICD-10-CM

## 2023-02-07 DIAGNOSIS — R26.89 IMPAIRED GAIT AND MOBILITY: ICD-10-CM

## 2023-02-07 DIAGNOSIS — M25.662 DECREASED RANGE OF MOTION (ROM) OF LEFT KNEE: Primary | ICD-10-CM

## 2023-02-07 PROCEDURE — 97110 THERAPEUTIC EXERCISES: CPT

## 2023-02-07 NOTE — PROGRESS NOTES
[facility]   Physical Therapy Treatment Note / Discharge Summary    Name: Poonam Alvarez  Clinic Number: [id number]    Therapy Diagnosis:   Encounter Diagnoses   Name Primary?    Decreased range of motion (ROM) of left knee Yes    Decreased strength of lower extremity     Left knee pain, unspecified chronicity     Impaired gait and mobility          Physician: Gordon Schneider MD    Visit Date: 2023    Physician Orders: PT Eval and Treat   Medical Diagnosis from Referral: M17.12 (ICD-10-CM) - Primary osteoarthritis of left knee   Evaluation Date: 12/15/2022  Authorization Period Expiration: 2024  Plan of Care Expiration: 2023  Visit # / Visits authorized:    Visit: 16  FOTO: NT  Date of Last visit: 2023  Total Visits Received: 18  PTA Visit #: 0 / 5     Time In: 4:00 pm  Time Out: 4:30 pm pm  Total Treatment Time: 30 minutes  Total Billable Time: 30 minutes (2 TE)    Precautions: Standard    SUBJECTIVE     Patient reports: she would like to discharge; feels she is ready  She was compliant with home exercise program.  Response to previous treatment: good; no adverse reaction  Functional change: ambulated into clinic with no AD    Pain: 0/10, currently  Location: Left knee    Patient goals: get back to walking in the park, riding a bike, being able to be more active     OBJECTIVE     Objective Measures updated at progress report unless specified.   L TKA 2022  6 week follow up 2022; 71 degrees AAROM Flexion  2022: 84 degrees AAROM Flexion, 0 degrees extension  2022; 87 degrees AAROM Flexion  2022; 87 degrees AROM Flexion  1/3/2022: 0 degrees; 103 PROM flexion, 90 degrees AROM  2022: 0 degrees; 95 degrees AAROM; 91 degrees AROM  2022: 0-104 degrees AAROM   2023; 103 degrees AROM Flexion; 106 degrees PROM Flexion  2023; 108 degrees AROM Flexion  2023:   TU seconds  30 STS: 6x  1/19/2023; 110  degrees AROM Flexion  2023: 112 degrees AROM flexion  2023: 110 degrees AROM flexion    2023: TU seconds  30 Sit to stands: 10x   ROM: 111 degrees flexion, 0 degrees extension    Treatment     Poonam received the treatments listed below:      THERAPEUTIC EXERCISES to develop strength, endurance, ROM, flexibility, posture, and core stabilization for 30 minutes including: reassessment  Aerobic Activity: Recumbent Bike for AAROM flexion at full revolutions for 5 minutes at seat 14, Level 5  Long arc quads + 10 lbs; 5 second hold, 30x reps   Knee Flexion Stretch at stairs; 10 second hold, 10 reps  Eccentric step downs on 4-inch step; 10 reps      MANUAL THERAPY TECHNIQUES including Joint mobilizations were applied to Left knee for 00 minutes.   - Grade II-III Patella Mobs    cold pack for 00 minutes to Left LE (towel instead of pillow case).    Patient Education and Home Exercises     Home Exercises Provided and Patient Education Provided     Education provided:   - patient educated to use a belt instead of a theraband.     Written Home Exercises Provided: Patient instructed to cont prior HEP. Exercises were reviewed and Poonam was able to demonstrate them prior to the end of the session.  Poonam demonstrated good  understanding of the education provided. See EMR under Patient Instructions for exercises provided during therapy sessions    ASSESSMENT     Patient is 8 weeks status post L total knee replacement. Patient has met all short term PT goals, however several unmet long term physical therapy goals at this time. Patient demonstrates functional range of motion and overall improved activity tolerance. Patient demonstrates 0-111 degrees of active L knee range of motion Patient has returned to full independence with all household and personal ADL's at this time. Patient will be discharged with an updated HEP.         Discharge FOTO Score: NA   Discharge reason: Patient has reached the maximum rehab  potential for the present time    Poonam Is progressing well towards her goals.   Pt prognosis is Good.     Pt will continue to benefit from skilled outpatient physical therapy to address the deficits listed in the problem list box on initial evaluation, provide pt/family education and to maximize pt's level of independence in the home and community environment.   Pt's spiritual, cultural and educational needs considered and pt agreeable to plan of care and goals.     Anticipated barriers to physical therapy: none    Goals:   Short Term Goals (3 Weeks):   1. Patient will be independent with home exercise program to supplement physical therapy treatment in improving functional status. - MET (1/9/23)  2. Patient will improve Llower extremity strength to at least 75% of R lower extremity strength as measured via MicroFet handheld dynamometer to improve strength for closed chain tasks. MET via MMT  3. Patient will improve L knee active range of motion to 0-90 degrees to promote increased ease of sit<>stand transfers. - MET (1/9/23)  4. Patient will perform timed up and go with less restrictive assistive device in < 20 seconds to improve gait speed and safety with community ambulation. - MET     Long Term Goals (6 Weeks):   1. Patient will improve L lower extremity strength to at least 90% of Rlower extremity strength as measured via MicroFet handheld dynamometer to improve strength for closed chain tasks. MET via MMT  2. Patient will improve the total FOTO Knee Survey Score to </= 30% limited to demonstrate increased perceived functional mobility. -NT  3. Patient will perform timed up and go with least restrictive assistive device in < 13.5 seconds to improve gait speed and safety with community ambulation. - MET  4. Patient will perform at least 14 sit to stands in 30 seconds without UE support to demonstrate increased functional strength.NOT MET  5. Patient will improve Lknee active range of motion to 0-120 degrees to  promote higher level transfers and transitions without limitation. NOT MET    PLAN     This patient is discharged from Physical Therapy    Barbi Barrera, PT

## 2023-02-13 ENCOUNTER — TELEPHONE (OUTPATIENT)
Dept: INTERNAL MEDICINE | Facility: CLINIC | Age: 73
End: 2023-02-13
Payer: MEDICARE

## 2023-02-13 NOTE — TELEPHONE ENCOUNTER
Pt would like to be called back regarding cramps in stomach has to get up and walk to stop cramp     Pt can be reached at  [phone]

## 2023-02-13 NOTE — TELEPHONE ENCOUNTER
Returned pt call. Pt c/o abdominal cramping for the past week that comes and goes. Pt denies NVD, fever, abd pain, dyspnea or CP. Pt states she hasn't eaten anything that she thinks caused the cramping. Pt requesting same day appt Friday. Explained should she start with abd pain, fever, vomiting or any other worrisome sx, she should report to ED for evaluation. Pt verbalized understanding.

## 2023-03-19 RX ORDER — HYDROCHLOROTHIAZIDE 12.5 MG/1
TABLET ORAL
Qty: 90 TABLET | Refills: 1 | Status: SHIPPED | OUTPATIENT
Start: 2023-03-19 | End: 2023-07-07 | Stop reason: SDUPTHER

## 2023-03-19 NOTE — TELEPHONE ENCOUNTER
Refill Decision Note   Poonam Alvarez  is requesting a refill authorization.  Brief Assessment and Rationale for Refill:  Approve     Medication Therapy Plan:       Medication Reconciliation Completed: No   Comments:     No Care Gaps recommended.     Note composed:4:09 PM 03/19/2023

## 2023-03-19 NOTE — TELEPHONE ENCOUNTER
No new care gaps identified.  [facility] Embedded Care Gaps. Reference number: [id number]. 3/19/2023   5:57:13 AM ANASTASIYAT

## 2023-03-29 ENCOUNTER — PATIENT MESSAGE (OUTPATIENT)
Dept: ADMINISTRATIVE | Facility: OTHER | Age: 73
End: 2023-03-29
Payer: MEDICARE

## 2023-03-29 ENCOUNTER — OFFICE VISIT (OUTPATIENT)
Dept: ORTHOPEDICS | Facility: CLINIC | Age: 73
End: 2023-03-29
Payer: MEDICARE

## 2023-03-29 VITALS — WEIGHT: 210.56 LBS | BODY MASS INDEX: 28.52 KG/M2 | HEIGHT: 72 IN

## 2023-03-29 DIAGNOSIS — Z96.659 STATUS POST KNEE REPLACEMENT, UNSPECIFIED LATERALITY: Primary | ICD-10-CM

## 2023-03-29 PROCEDURE — 99999 PR PBB SHADOW E&M-EST. PATIENT-LVL III: ICD-10-PCS | Mod: PBBFAC,,, | Performed by: ORTHOPAEDIC SURGERY

## 2023-03-29 PROCEDURE — 99024 POSTOP FOLLOW-UP VISIT: CPT | Mod: POP,,, | Performed by: ORTHOPAEDIC SURGERY

## 2023-03-29 PROCEDURE — 99024 PR POST-OP FOLLOW-UP VISIT: ICD-10-PCS | Mod: POP,,, | Performed by: ORTHOPAEDIC SURGERY

## 2023-03-29 PROCEDURE — 99999 PR PBB SHADOW E&M-EST. PATIENT-LVL III: CPT | Mod: PBBFAC,,, | Performed by: ORTHOPAEDIC SURGERY

## 2023-03-29 PROCEDURE — 99213 OFFICE O/P EST LOW 20 MIN: CPT | Mod: PBBFAC | Performed by: ORTHOPAEDIC SURGERY

## 2023-03-29 NOTE — PROGRESS NOTES
Poonam Alvarez is in for 3 month follow up for a  left TKA.  She is doing very well.  No pain in the knee.  She has resumed activities of daily living. She has completted PT  Exam demonstrates  A well developed female in no distress.  Alert and oriented.  Mood and affect are appropriate.    Knee incision is well healed.  ROM is 0-125.  The patella tracks well and there is no instability. The extremity is neurovascularly intact.    Xrays demonstrate a well fixed and positioned prosthesis.    PROMIS-10 Questionnaire Scores 3/29/2023 10/9/2022   Global Physical Health 12 13   Global Mental health Score 16 16       KOOS Jr. Questionnaire Score 3/29/2023 10/7/2022   KOOS Jr Score 0 14       Oxford Knee Questionnaire Score 3/29/2023 10/9/2022   Oxford Knee Score 48 30       Knee Society and Function Score 3/29/2023 11/30/2022   FINDINGS - KNEE SOCIETY SCORE 100 48   FINDINGS - KNEE SOCIETY FUNCTION SCORE 90 50       [place]ton Joint Score 3/29/2023 11/30/2022   In which leg do you have an artificial knee? Left Knee Right Knee   In bed at night? Never -   When sitting on a chair for more than one hour? Never -   When you are walking for more than 15 minutes? Never -   When taking a bath or shower? Never -   When traveling in a car? Never -   When climbing stairs? Never -   When walking on uneven ground? Never -   When standing up from a low-sitting position? Never -   When standing for long periods of time? Never -   When doing housework or gardening? Never -   When taking a walk or hiking? Never -   When doing your favorite sport? Never -   Forgotten Joint Score Left Knee 100 Error   In bed at night? - Seldom   When sitting on a chair for more than one hour? - Sometimes   When you are walking for more than 15 minutes? - Sometimes   When taking a bath or shower? - Never   When traveling in a car? - Sometimes   When climbing stairs? - Sometimes   When walking on uneven ground? - Mostly   When standing up from a low-sitting  position? - Mostly   When standing for long periods of time? - Sometimes   When doing housework or gardening? - Sometimes   When taking a walk or hiking? - Sometimes   When doing your favorite sport? - Sometimes   Forgotten Joint Score Right Knee Error 29.17       Imp:Doing well    F/u in 3 months with bilateral knee xrays

## 2023-04-10 ENCOUNTER — OFFICE VISIT (OUTPATIENT)
Dept: OPTOMETRY | Facility: CLINIC | Age: 73
End: 2023-04-10
Payer: MEDICARE

## 2023-04-10 DIAGNOSIS — H26.492 POSTERIOR CAPSULAR OPACIFICATION NON VISUALLY SIGNIFICANT OF LEFT EYE: ICD-10-CM

## 2023-04-10 DIAGNOSIS — H52.7 REFRACTIVE ERRORS: ICD-10-CM

## 2023-04-10 DIAGNOSIS — Z98.41 S/P BILATERAL CATARACT EXTRACTION: ICD-10-CM

## 2023-04-10 DIAGNOSIS — Z98.42 S/P BILATERAL CATARACT EXTRACTION: ICD-10-CM

## 2023-04-10 DIAGNOSIS — H26.491 POSTERIOR CAPSULAR OPACIFICATION OF RIGHT EYE, OBSCURING VISION: Primary | ICD-10-CM

## 2023-04-10 DIAGNOSIS — Z96.1 PSEUDOPHAKIA OF BOTH EYES: ICD-10-CM

## 2023-04-10 PROCEDURE — 92014 COMPRE OPH EXAM EST PT 1/>: CPT | Mod: S$PBB,,, | Performed by: OPTOMETRIST

## 2023-04-10 PROCEDURE — 92015 DETERMINE REFRACTIVE STATE: CPT | Mod: ,,, | Performed by: OPTOMETRIST

## 2023-04-10 PROCEDURE — 99999 PR PBB SHADOW E&M-EST. PATIENT-LVL II: CPT | Mod: PBBFAC,,, | Performed by: OPTOMETRIST

## 2023-04-10 PROCEDURE — 99212 OFFICE O/P EST SF 10 MIN: CPT | Mod: PBBFAC | Performed by: OPTOMETRIST

## 2023-04-10 PROCEDURE — 99999 PR PBB SHADOW E&M-EST. PATIENT-LVL II: ICD-10-PCS | Mod: PBBFAC,,, | Performed by: OPTOMETRIST

## 2023-04-10 PROCEDURE — 92014 PR EYE EXAM, EST PATIENT,COMPREHESV: ICD-10-PCS | Mod: S$PBB,,, | Performed by: OPTOMETRIST

## 2023-04-10 PROCEDURE — 92015 PR REFRACTION: ICD-10-PCS | Mod: ,,, | Performed by: OPTOMETRIST

## 2023-04-10 NOTE — PROGRESS NOTES
"HPI     eye examination             Comments: General eye examination and refraction.  S/p cataract surgery OU.  No YAG laser posterior capsulotomy in either   eye.  Evaluated by Dr. Maldonado on 08/12/2022.  Recommended against YAG   laser posterior capsulotomy at that time           Comments    Patient's age: 72 y.o. AA female   Occupation: Retired   Approximate date of last eye examination: 08/12/2022  Name of last eye doctor seen: Dr Maldonado   City/State: [place]   Wears glasses? Yes, OTC  reading glasses       If yes, wears  Full-time or part-time?  Part-time   Present glasses are: Bifocal, SV Distance, SV Reading?  SV reading lenses   (OTC)  Approximate age of present glasses: n/a   Got new glasses following last exam, or subsequently?:  No       Wears CLs?:  No   Headaches?  No   Eye pain/discomfort?  No                                                                                     Flashes?  No .   Floaters?  "sporadic" - "every now and then" - unaware of which eye   Diplopia/Double vision?  No   Patient's Ocular History:          Any eye surgeries? Cataract SX OU - none other         Any eye injury?  No          Any treatment for eye disease?  No   Family history of eye disease?     Parents + Cataracts   Significant patient medical history:         1. Diabetes?  No           2. HBP?  No              3.  H/o of hyperthyroid with Graves Disease.   S/P radiation Tx   many years ago.  Now takes thyroid supplement     OTC eyedrops currently using:  No   Prescription eye meds currently using:  No   Any history of allergy/adverse reaction to any eye meds used previously?    No   Any history of allergy/adverse reaction to eyedrops used during prior eye   exam(s)? No   Any history of allergy/adverse reaction to Novacaine or similar meds? No   Any history of allergy/adverse reaction to Epinephrine or similar meds? No     Patient okay with use of anesthetic eyedrops to check eye pressure?  Yes         Patient okay " "with use of eyedrops to dilate pupils today?  Yes   Allergies/Medications/Medical History/Family History reviewed today?  Yes       PD =  73/69   Reading distance = 21.5"             Last edited by Dionte Daniels, OD on 4/10/2023 12:38 PM.            Assessment /Plan     For exam results, see Encounter Report.    1. Posterior capsular opacification of right eye, obscuring vision        2. Posterior capsular opacification non visually significant of left eye        3. S/P bilateral cataract extraction        4. Pseudophakia of both eyes        5. Refractive errors                       S/p bilateral cataract surgery with bilateral posterior chamber intraocular lens.  Slit lamp examination reveals bilateral posterior capsular opacification.  Evaluated by Dr. Maldonado who chose not to do YAG laser posterior capsulotomy at that time      Refraction indicates need for spectacle lens Rx for optimal distance VA in each eye, but Ms. Alvarez not unhappy with unaided distance VA.  Spectacle  lens Rx issued for use as desired, but okay to continue to use OTC reading glasses only, since she is happy with her unaided distance VA.    Recheck in 12 months - or prior, if any problems or bothersome changes in visual acuity noted in the interim.    "

## 2023-04-10 NOTE — PATIENT INSTRUCTIONS
S/p bilateral cataract surgery with bilateral posterior chamber intraocular lens.  Slit lamp examination reveals bilateral posterior capsular opacification.  Evaluated by Dr. Maldonado who chose not to do YAG laser posterior capsulotomy at that time      Refraction indicates need for spectacle lens Rx for optimal distance VA in each eye, but Ms. Alvarez not unhappy with unaided distance VA.  Spectacle  lens Rx issued for use as desired, but okay to continue to use OTC reading glasses only, since she is happy with her unaided distance VA.    Recheck in 12 months - or prior, if any problems or bothersome changes in visual acuity noted in the interim.

## 2023-04-12 ENCOUNTER — OFFICE VISIT (OUTPATIENT)
Dept: PODIATRY | Facility: CLINIC | Age: 73
End: 2023-04-12
Payer: MEDICARE

## 2023-04-12 VITALS
WEIGHT: 210 LBS | BODY MASS INDEX: 28.44 KG/M2 | HEART RATE: 80 BPM | HEIGHT: 72 IN | DIASTOLIC BLOOD PRESSURE: 73 MMHG | SYSTOLIC BLOOD PRESSURE: 132 MMHG

## 2023-04-12 DIAGNOSIS — B35.1 DERMATOPHYTOSIS OF NAIL: Primary | ICD-10-CM

## 2023-04-12 DIAGNOSIS — M79.674 TOE PAIN, RIGHT: ICD-10-CM

## 2023-04-12 PROCEDURE — 99999 PR PBB SHADOW E&M-EST. PATIENT-LVL IV: CPT | Mod: PBBFAC,,, | Performed by: PODIATRIST

## 2023-04-12 PROCEDURE — 99213 OFFICE O/P EST LOW 20 MIN: CPT | Mod: S$PBB,,, | Performed by: PODIATRIST

## 2023-04-12 PROCEDURE — 99213 PR OFFICE/OUTPT VISIT, EST, LEVL III, 20-29 MIN: ICD-10-PCS | Mod: S$PBB,,, | Performed by: PODIATRIST

## 2023-04-12 PROCEDURE — 99214 OFFICE O/P EST MOD 30 MIN: CPT | Mod: PBBFAC,PN | Performed by: PODIATRIST

## 2023-04-12 PROCEDURE — 99999 PR PBB SHADOW E&M-EST. PATIENT-LVL IV: ICD-10-PCS | Mod: PBBFAC,,, | Performed by: PODIATRIST

## 2023-04-12 RX ORDER — CLOTRIMAZOLE AND BETAMETHASONE DIPROPIONATE 10; .64 MG/G; MG/G
CREAM TOPICAL DAILY
Qty: 45 G | Refills: 1 | Status: SHIPPED | OUTPATIENT
Start: 2023-04-12 | End: 2023-07-07

## 2023-04-12 RX ORDER — DOXYCYCLINE 100 MG/1
CAPSULE ORAL
COMMUNITY
Start: 2022-11-15 | End: 2023-07-07

## 2023-04-12 RX ORDER — COVID-19 MOLECULAR TEST ASSAY
KIT MISCELLANEOUS
COMMUNITY
Start: 2023-01-12 | End: 2023-07-07

## 2023-04-12 NOTE — PROGRESS NOTES
Subjective:      Patient ID: Poonam Alvarez is a 72 y.o. female.    Chief Complaint: Foot Problem (Fungus)    Poonam Alvarez is a 72 y.o. female , presents for right hallux pain, present for a few weeks.  No acute trauma recalled.  Lateral border thickened and tender with direct palpation.         Patient Active Problem List   Diagnosis    Osteoarthritis    History of Graves' disease    Hypothyroidism, postablative    Radial styloid tenosynovitis    Pseudophakia of both eyes    Other specified disorder of skin    GERD (gastroesophageal reflux disease)    Left knee pain    Primary localized osteoarthrosis, lower leg    Knee pain, right    Pain in joint, lower leg    S/P total knee arthroplasty    Pre-syncope    Constipation    Ankle edema    Screening for colon cancer    Bilateral knee pain    Essential hypertension    Ectatic aorta-Noted on imaging 7/27/2015    Posture abnormality    Balance problem    Hypotension    History of atrial flutter    Weight gain    Keloid    Family history of thrombosis    Decreased range of motion (ROM) of left knee    Impaired gait and mobility    Decreased strength of lower extremity           Current Outpatient Medications on File Prior to Visit   Medication Sig Dispense Refill    ascorbic acid/multivit-min (EMERGEN-C ORAL) Take 1,000 mg by mouth once daily.      cholecalciferol, vitamin D3, (VITAMIN D3) 50 mcg (2,000 unit) Cap Take 1 capsule by mouth once daily.      doxycycline (VIBRAMYCIN) 100 MG Cap       hydroCHLOROthiazide (HYDRODIURIL) 12.5 MG Tab TAKE 1/2 TO 1 TABLET BY MOUTH DAILY AS NEEDED 90 tablet 1    hydrocortisone 2.5 % ointment Apply topically 2 (two) times daily. 20 g 0    ID NOW COVID-19 TEST KIT Kit TEST AS DIRECTED TODAY      magnesium hydroxide 400 mg/5 ml (MILK OF MAGNESIA) 400 mg/5 mL Susp Take 30 mLs by mouth daily as needed.      multivitamin-minerals-lutein Tab Take 1 tablet by mouth once daily.       pneumococcal 23-rafael ps (PNEUMOVAX 23) 25 mcg/0.5 mL  vaccine Inject into the muscle. 0.5 mL 0    promethazine-dextromethorphan (PROMETHAZINE-DM) 6.25-15 mg/5 mL Syrp Take 2.5 mLs by mouth nightly as needed (cough supression). 120 mL 0    SYNTHROID 137 mcg Tab tablet TAKE 1 TABLET(137 MCG) BY MOUTH EVERY DAY (Patient taking differently: TAKE 1 TABLET(137 MCG) BY MOUTH EVERY DAY EXCEPT MONDAY) 90 tablet 4    aspirin (ECOTRIN) 81 MG EC tablet Take 1 tablet (81 mg total) by mouth 2 (two) times daily. 60 tablet 0    blood pressure monitor (BLOOD PRESSURE KIT) Kit Use as directed 1 each 0     No current facility-administered medications on file prior to visit.         Review of patient's allergies indicates:   Allergen Reactions    Hydrocodone-acetaminophen Other (See Comments)     Low blood pressure; doesn't want    Oxycodone-acetaminophen Other (See Comments)     Causes low blood pressure; doesn't want  4/4/19 - patient reports she is not actually allergic to it (just doesn't like how it makes her feel)                 Objective:        Vitals:    04/12/23 1450   BP: 132/73   Pulse: 80   Weight: 95.3 kg (210 lb)   Height: 6' (1.829 m)         Physical Exam  Cardiovascular:      Pulses:           Dorsalis pedis pulses are 2+ on the right side and 2+ on the left side.        Posterior tibial pulses are 2+ on the right side and 2+ on the left side.      Comments: 1+ edema bilateral lower extremities.   +varicosities      Musculoskeletal:      Comments: Adequate joint ROM noted to all lower extremity muscle groups with no pain or crepitation noted. Muscle strength is 5/5 in all groups bilaterally.     Feet:      Right foot:      Protective Sensation: 5 sites tested.  5 sites sensed.      Left foot:      Protective Sensation: 5 sites tested.  5 sites sensed.      Comments:       Skin:     Comments: Thickened, callused distal lateral border of the right hallux toenail.  No underlying wound.  Some dry blood.  No active drainage.          Neurological:      Comments: Intact gross  sensation noted to b/L LEs.  Negative Tinels.   Negative Mulders.   No sensorimotor.                 Assessment:       Encounter Diagnoses   Name Primary?    Dermatophytosis of nail Yes    Toe pain, right          Plan:         I counseled the patient on her conditions, their implications and medical management.    Maintain good foot hygiene.     Shoe and activity modification as needed for relief.     General nail care measures for abnormal nails include:  Keeping nails trimmed short, but avoid overzealous trimming  Avoiding trauma   Avoiding contact irritants   Keeping nails dry (avoiding wet work)  Avoiding all nail cosmetics  Wearing shoes that fit well at the toe box.  Avoid tight shoes.     Nail border trimmed.   Meds as ordered.     Call or return to clinic prn if these symptoms worsen or fail to improve as anticipated.   .

## 2023-05-19 ENCOUNTER — TELEPHONE (OUTPATIENT)
Dept: INTERNAL MEDICINE | Facility: CLINIC | Age: 73
End: 2023-05-19
Payer: MEDICARE

## 2023-05-19 DIAGNOSIS — M79.605 PAIN IN BOTH LOWER EXTREMITIES: Primary | ICD-10-CM

## 2023-05-19 DIAGNOSIS — M79.604 PAIN IN BOTH LOWER EXTREMITIES: Primary | ICD-10-CM

## 2023-05-19 NOTE — TELEPHONE ENCOUNTER
Results of screening of the blood flow to the legas reveals possible decreased blood flow to the legs    Lets do an arterial ultrasound of the legs.   Ordered. Please book

## 2023-05-19 NOTE — TELEPHONE ENCOUNTER
----- Message from Hannah Eaton sent at 5/19/2023 10:36 AM CDT -----  Contact: [phone]  Pt had a screening test done two weeks ago. Per pt, she received the results last week and it said the blood in her is not flowing in a normal way it is a little abnormal. Pt would like a call back to go over these results and discuss maybe getting a referral. Please call.                  Thank  you

## 2023-05-19 NOTE — TELEPHONE ENCOUNTER
Returned pt call. Pt states she had a PAD screening done with [facility] and her results were abnormal. Pt states her results were supposed to be sent to PCP. Pt asking if she should see a specialist.

## 2023-06-08 ENCOUNTER — HOSPITAL ENCOUNTER (OUTPATIENT)
Dept: RADIOLOGY | Facility: HOSPITAL | Age: 73
Discharge: HOME | End: 2023-06-08
Attending: INTERNAL MEDICINE
Payer: MEDICARE

## 2023-06-08 DIAGNOSIS — M79.605 PAIN IN BOTH LOWER EXTREMITIES: ICD-10-CM

## 2023-06-08 DIAGNOSIS — M79.604 PAIN IN BOTH LOWER EXTREMITIES: ICD-10-CM

## 2023-06-08 PROCEDURE — 93925 LOWER EXTREMITY STUDY: CPT | Mod: TC

## 2023-06-08 PROCEDURE — 93922 US ARTERIAL LOWER EXTREMITY BILAT WITH ABI (XPD): ICD-10-PCS | Mod: 26,,, | Performed by: RADIOLOGY

## 2023-06-08 PROCEDURE — 93925 LOWER EXTREMITY STUDY: CPT | Mod: 26,,, | Performed by: RADIOLOGY

## 2023-06-08 PROCEDURE — 93922 UPR/L XTREMITY ART 2 LEVELS: CPT | Mod: 26,,, | Performed by: RADIOLOGY

## 2023-06-08 PROCEDURE — 93925 US ARTERIAL LOWER EXTREMITY BILAT WITH ABI (XPD): ICD-10-PCS | Mod: 26,,, | Performed by: RADIOLOGY

## 2023-06-09 ENCOUNTER — TELEPHONE (OUTPATIENT)
Dept: INTERNAL MEDICINE | Facility: CLINIC | Age: 73
End: 2023-06-09
Payer: MEDICARE

## 2023-06-09 DIAGNOSIS — I77.9 PAOD (PERIPHERAL ARTERIAL OCCLUSIVE DISEASE): Primary | ICD-10-CM

## 2023-06-09 NOTE — TELEPHONE ENCOUNTER
----- Message from Sepideh Galicia sent at 6/9/2023  8:22 AM CDT -----  Contact: Pt Mobile [phone]  Patient would like to discuss her test results from the Imaging Department that she had done on yesterday.

## 2023-06-12 NOTE — TELEPHONE ENCOUNTER
Called and spoke to pt. Relayed message from PCP. Pt verbalized understanding but states that she is experiencing calf paion that comes and goes. Pt states she would prefer to be seen immediately as she feels that is too far away. Rescheduled to first available appt which was June 21 and placed pt on a waitlist. Pt also scheduled herself to see Gen Surgery Wednesday for leg pain.

## 2023-06-12 NOTE — TELEPHONE ENCOUNTER
Please notify pt  The ultrasound reveals a blockage in the right lower leg.  She should see vascular surgery for further evaluation    DR JIANG booked you to see Dr Coyle for 7/7/23 at 8:45 am at [facility] before your apt with her that day at 10  am    If you are not having calf pain when you walk, then no need to be seen sooner

## 2023-06-14 ENCOUNTER — OFFICE VISIT (OUTPATIENT)
Dept: SURGERY | Facility: CLINIC | Age: 73
End: 2023-06-14
Attending: SPECIALIST
Payer: MEDICARE

## 2023-06-14 VITALS
BODY MASS INDEX: 29.8 KG/M2 | HEIGHT: 72 IN | HEART RATE: 90 BPM | DIASTOLIC BLOOD PRESSURE: 57 MMHG | WEIGHT: 220 LBS | OXYGEN SATURATION: 97 % | SYSTOLIC BLOOD PRESSURE: 118 MMHG

## 2023-06-14 DIAGNOSIS — I87.2 CHRONIC VENOUS INSUFFICIENCY: Primary | ICD-10-CM

## 2023-06-14 PROCEDURE — 99202 OFFICE O/P NEW SF 15 MIN: CPT | Mod: S$PBB,,, | Performed by: SPECIALIST

## 2023-06-14 PROCEDURE — 99999 PR PBB SHADOW E&M-EST. PATIENT-LVL IV: CPT | Mod: PBBFAC,,, | Performed by: SPECIALIST

## 2023-06-14 PROCEDURE — 99202 PR OFFICE/OUTPT VISIT, NEW, LEVL II, 15-29 MIN: ICD-10-PCS | Mod: S$PBB,,, | Performed by: SPECIALIST

## 2023-06-14 PROCEDURE — 99214 OFFICE O/P EST MOD 30 MIN: CPT | Mod: PBBFAC | Performed by: SPECIALIST

## 2023-06-14 PROCEDURE — 99999 PR PBB SHADOW E&M-EST. PATIENT-LVL IV: ICD-10-PCS | Mod: PBBFAC,,, | Performed by: SPECIALIST

## 2023-06-15 ENCOUNTER — PATIENT MESSAGE (OUTPATIENT)
Dept: SURGERY | Facility: CLINIC | Age: 73
End: 2023-06-15
Payer: MEDICARE

## 2023-06-15 NOTE — PROGRESS NOTES
72-year-old female alarm by color changes in the gaiter zone of both lower extremities.    Patient underwent screening arterial ultrasound which showed stenosis in the left distal popliteal artery    Review of systems   No dizziness, syncope, lateralizing neurologic signs   No amaurosis fugax   No abdominal pain   No claudication, rest pain   No history of tissue breakdown or ulceration lower extremities    PE   No palpable pedal pulses   Feet pink and warm, 1-2 second capillary refill   No tissue breakdown either lower extremity   Trace edema bilaterally and ankles  Hemosiderosis both Gators zones   Prominent small venous vessels    Impression/plan   No evidence of significant peripheral arterial disease   Chronic venous insufficiency   Discussed good foot care, compression hose, elevation, exercise with patient  Follow-up with primary care regarding blood sugar, hypertension, diabetes

## 2023-06-27 DIAGNOSIS — Z96.651 H/O TOTAL KNEE REPLACEMENT, RIGHT: ICD-10-CM

## 2023-06-27 DIAGNOSIS — Z96.659 STATUS POST KNEE REPLACEMENT, UNSPECIFIED LATERALITY: Primary | ICD-10-CM

## 2023-06-27 DIAGNOSIS — Z96.652 STATUS POST LEFT KNEE REPLACEMENT: ICD-10-CM

## 2023-06-28 ENCOUNTER — HOSPITAL ENCOUNTER (OUTPATIENT)
Dept: RADIOLOGY | Facility: HOSPITAL | Age: 73
Discharge: HOME | End: 2023-06-28
Attending: ORTHOPAEDIC SURGERY
Payer: MEDICARE

## 2023-06-28 ENCOUNTER — OFFICE VISIT (OUTPATIENT)
Dept: ORTHOPEDICS | Facility: CLINIC | Age: 73
End: 2023-06-28
Payer: MEDICARE

## 2023-06-28 DIAGNOSIS — M17.12 PRIMARY OSTEOARTHRITIS OF LEFT KNEE: Primary | ICD-10-CM

## 2023-06-28 DIAGNOSIS — Z96.652 STATUS POST LEFT KNEE REPLACEMENT: ICD-10-CM

## 2023-06-28 DIAGNOSIS — Z96.651 H/O TOTAL KNEE REPLACEMENT, RIGHT: ICD-10-CM

## 2023-06-28 DIAGNOSIS — Z96.659 STATUS POST KNEE REPLACEMENT, UNSPECIFIED LATERALITY: ICD-10-CM

## 2023-06-28 PROCEDURE — 99212 OFFICE O/P EST SF 10 MIN: CPT | Mod: S$PBB,,, | Performed by: ORTHOPAEDIC SURGERY

## 2023-06-28 PROCEDURE — 99999 PR PBB SHADOW E&M-EST. PATIENT-LVL III: CPT | Mod: PBBFAC,,, | Performed by: ORTHOPAEDIC SURGERY

## 2023-06-28 PROCEDURE — 73562 X-RAY EXAM OF KNEE 3: CPT | Mod: 26,50,, | Performed by: RADIOLOGY

## 2023-06-28 PROCEDURE — 99213 OFFICE O/P EST LOW 20 MIN: CPT | Mod: PBBFAC | Performed by: ORTHOPAEDIC SURGERY

## 2023-06-28 PROCEDURE — 99212 PR OFFICE/OUTPT VISIT, EST, LEVL II, 10-19 MIN: ICD-10-PCS | Mod: S$PBB,,, | Performed by: ORTHOPAEDIC SURGERY

## 2023-06-28 PROCEDURE — 99999 PR PBB SHADOW E&M-EST. PATIENT-LVL III: ICD-10-PCS | Mod: PBBFAC,,, | Performed by: ORTHOPAEDIC SURGERY

## 2023-06-28 PROCEDURE — 73562 XR KNEE ORTHO BILAT: ICD-10-PCS | Mod: 26,50,, | Performed by: RADIOLOGY

## 2023-06-28 PROCEDURE — 73562 X-RAY EXAM OF KNEE 3: CPT | Mod: TC,50

## 2023-06-28 NOTE — PROGRESS NOTES
Poonam Alvarez is in for 6 mnth follow up for a  left TKA.  She is doing very well.  No pain in the knee.  She has resumed activities of daily living. She has been quite acite  Exam demonstrates  A well developed female in no distress.  Alert and oriented.  Mood and affect are appropriate.    Knee incision is well healed.  ROM is 0-120.  The patella tracks well and there is no instability. The extremity is neurovascularly intact.    Xrays demonstrate a well fixed and positioned prosthesis.    PROMIS-10 Questionnaire Scores 3/29/2023 10/9/2022   Global Physical Health 12 13   Global Mental health Score 16 16       KOOS Jr. Questionnaire Score 3/29/2023 10/7/2022   KOOS Jr Score 0 14       Oxford Knee Questionnaire Score 3/29/2023 10/9/2022   Oxford Knee Score 48 30       Knee Society and Function Score 3/29/2023 11/30/2022   FINDINGS - KNEE SOCIETY SCORE 100 48   FINDINGS - KNEE SOCIETY FUNCTION SCORE 90 50       [place] Joint Score 3/29/2023 11/30/2022   In which leg do you have an artificial knee? Left Knee Right Knee   In bed at night? Never -   When sitting on a chair for more than one hour? Never -   When you are walking for more than 15 minutes? Never -   When taking a bath or shower? Never -   When traveling in a car? Never -   When climbing stairs? Never -   When walking on uneven ground? Never -   When standing up from a low-sitting position? Never -   When standing for long periods of time? Never -   When doing housework or gardening? Never -   When taking a walk or hiking? Never -   When doing your favorite sport? Never -   Forgotten Joint Score Left Knee 100 Error   In bed at night? - Seldom   When sitting on a chair for more than one hour? - Sometimes   When you are walking for more than 15 minutes? - Sometimes   When taking a bath or shower? - Never   When traveling in a car? - Sometimes   When climbing stairs? - Sometimes   When walking on uneven ground? - Mostly   When standing up from a  low-sitting position? - Mostly   When standing for long periods of time? - Sometimes   When doing housework or gardening? - Sometimes   When taking a walk or hiking? - Sometimes   When doing your favorite sport? - Sometimes   Forgotten Joint Score Right Knee Error 29.17       Imp:Doing well    F/u in 6 months

## 2023-07-07 ENCOUNTER — LAB VISIT (OUTPATIENT)
Dept: LAB | Facility: HOSPITAL | Age: 73
End: 2023-07-07
Attending: INTERNAL MEDICINE
Payer: MEDICARE

## 2023-07-07 ENCOUNTER — OFFICE VISIT (OUTPATIENT)
Dept: INTERNAL MEDICINE | Facility: CLINIC | Age: 73
End: 2023-07-07
Payer: MEDICARE

## 2023-07-07 VITALS
HEIGHT: 72 IN | OXYGEN SATURATION: 97 % | DIASTOLIC BLOOD PRESSURE: 78 MMHG | HEART RATE: 72 BPM | BODY MASS INDEX: 29.84 KG/M2 | SYSTOLIC BLOOD PRESSURE: 126 MMHG

## 2023-07-07 DIAGNOSIS — R79.9 ABNORMAL BLOOD CHEMISTRY: ICD-10-CM

## 2023-07-07 DIAGNOSIS — E53.8 VITAMIN B12 DEFICIENCY: ICD-10-CM

## 2023-07-07 DIAGNOSIS — Z00.00 ROUTINE GENERAL MEDICAL EXAMINATION AT A HEALTH CARE FACILITY: Primary | ICD-10-CM

## 2023-07-07 DIAGNOSIS — R05.9 COUGH: ICD-10-CM

## 2023-07-07 DIAGNOSIS — E55.9 VITAMIN D DEFICIENCY DISEASE: ICD-10-CM

## 2023-07-07 DIAGNOSIS — I73.9 PERIPHERAL VASCULAR DISEASE: ICD-10-CM

## 2023-07-07 DIAGNOSIS — E89.0 HYPOTHYROIDISM, POSTABLATIVE: ICD-10-CM

## 2023-07-07 DIAGNOSIS — I87.2 VENOUS INSUFFICIENCY: ICD-10-CM

## 2023-07-07 DIAGNOSIS — D70.9 NEUTROPENIA, UNSPECIFIED TYPE: ICD-10-CM

## 2023-07-07 DIAGNOSIS — Z12.31 SCREENING MAMMOGRAM FOR BREAST CANCER: ICD-10-CM

## 2023-07-07 LAB
25(OH)D3+25(OH)D2 SERPL-MCNC: 61 NG/ML (ref 30–96)
ALBUMIN SERPL BCP-MCNC: 3.9 G/DL (ref 3.5–5.2)
ALP SERPL-CCNC: 86 U/L (ref 55–135)
ALT SERPL W/O P-5'-P-CCNC: 18 U/L (ref 10–44)
ANION GAP SERPL CALC-SCNC: 7 MMOL/L (ref 8–16)
AST SERPL-CCNC: 23 U/L (ref 10–40)
BASOPHILS # BLD AUTO: 0.03 K/UL (ref 0–0.2)
BASOPHILS NFR BLD: 0.7 % (ref 0–1.9)
BILIRUB SERPL-MCNC: 0.3 MG/DL (ref 0.1–1)
BUN SERPL-MCNC: 12 MG/DL (ref 8–23)
CALCIUM SERPL-MCNC: 9.8 MG/DL (ref 8.7–10.5)
CHLORIDE SERPL-SCNC: 108 MMOL/L (ref 95–110)
CHOLEST SERPL-MCNC: 190 MG/DL (ref 120–199)
CHOLEST/HDLC SERPL: 3.3 {RATIO} (ref 2–5)
CO2 SERPL-SCNC: 28 MMOL/L (ref 23–29)
CREAT SERPL-MCNC: 0.8 MG/DL (ref 0.5–1.4)
DIFFERENTIAL METHOD: ABNORMAL
EOSINOPHIL # BLD AUTO: 0.1 K/UL (ref 0–0.5)
EOSINOPHIL NFR BLD: 2.6 % (ref 0–8)
ERYTHROCYTE [DISTWIDTH] IN BLOOD BY AUTOMATED COUNT: 16.3 % (ref 11.5–14.5)
EST. GFR  (NO RACE VARIABLE): >60 ML/MIN/1.73 M^2
FERRITIN SERPL-MCNC: 137 NG/ML (ref 20–300)
GLUCOSE SERPL-MCNC: 88 MG/DL (ref 70–110)
HCT VFR BLD AUTO: 37.6 % (ref 37–48.5)
HDLC SERPL-MCNC: 57 MG/DL (ref 40–75)
HDLC SERPL: 30 % (ref 20–50)
HGB BLD-MCNC: 11.7 G/DL (ref 12–16)
IMM GRANULOCYTES # BLD AUTO: 0.01 K/UL (ref 0–0.04)
IMM GRANULOCYTES NFR BLD AUTO: 0.2 % (ref 0–0.5)
IRON SERPL-MCNC: 51 UG/DL (ref 30–160)
LDLC SERPL CALC-MCNC: 121.6 MG/DL (ref 63–159)
LYMPHOCYTES # BLD AUTO: 1.6 K/UL (ref 1–4.8)
LYMPHOCYTES NFR BLD: 39.2 % (ref 18–48)
MCH RBC QN AUTO: 26.4 PG (ref 27–31)
MCHC RBC AUTO-ENTMCNC: 31.1 G/DL (ref 32–36)
MCV RBC AUTO: 85 FL (ref 82–98)
MONOCYTES # BLD AUTO: 0.4 K/UL (ref 0.3–1)
MONOCYTES NFR BLD: 9.1 % (ref 4–15)
NEUTROPHILS # BLD AUTO: 2 K/UL (ref 1.8–7.7)
NEUTROPHILS NFR BLD: 48.2 % (ref 38–73)
NONHDLC SERPL-MCNC: 133 MG/DL
NRBC BLD-RTO: 0 /100 WBC
PLATELET # BLD AUTO: 264 K/UL (ref 150–450)
PMV BLD AUTO: 11.3 FL (ref 9.2–12.9)
POTASSIUM SERPL-SCNC: 3.9 MMOL/L (ref 3.5–5.1)
PROT SERPL-MCNC: 7.5 G/DL (ref 6–8.4)
RBC # BLD AUTO: 4.43 M/UL (ref 4–5.4)
SATURATED IRON: 17 % (ref 20–50)
SODIUM SERPL-SCNC: 143 MMOL/L (ref 136–145)
T4 FREE SERPL-MCNC: 1.25 NG/DL (ref 0.71–1.51)
TOTAL IRON BINDING CAPACITY: 303 UG/DL (ref 250–450)
TRANSFERRIN SERPL-MCNC: 205 MG/DL (ref 200–375)
TRIGL SERPL-MCNC: 57 MG/DL (ref 30–150)
TSH SERPL DL<=0.005 MIU/L-ACNC: 0.05 UIU/ML (ref 0.4–4)
VIT B12 SERPL-MCNC: 531 PG/ML (ref 210–950)
WBC # BLD AUTO: 4.18 K/UL (ref 3.9–12.7)

## 2023-07-07 PROCEDURE — 80053 COMPREHEN METABOLIC PANEL: CPT | Performed by: INTERNAL MEDICINE

## 2023-07-07 PROCEDURE — 84439 ASSAY OF FREE THYROXINE: CPT | Performed by: INTERNAL MEDICINE

## 2023-07-07 PROCEDURE — 82728 ASSAY OF FERRITIN: CPT | Performed by: INTERNAL MEDICINE

## 2023-07-07 PROCEDURE — 99213 OFFICE O/P EST LOW 20 MIN: CPT | Mod: PBBFAC | Performed by: INTERNAL MEDICINE

## 2023-07-07 PROCEDURE — 80061 LIPID PANEL: CPT | Performed by: INTERNAL MEDICINE

## 2023-07-07 PROCEDURE — 99397 PER PM REEVAL EST PAT 65+ YR: CPT | Mod: S$PBB,,, | Performed by: INTERNAL MEDICINE

## 2023-07-07 PROCEDURE — 85025 COMPLETE CBC W/AUTO DIFF WBC: CPT | Performed by: INTERNAL MEDICINE

## 2023-07-07 PROCEDURE — 82306 VITAMIN D 25 HYDROXY: CPT | Performed by: INTERNAL MEDICINE

## 2023-07-07 PROCEDURE — 36415 COLL VENOUS BLD VENIPUNCTURE: CPT | Performed by: INTERNAL MEDICINE

## 2023-07-07 PROCEDURE — 82607 VITAMIN B-12: CPT | Performed by: INTERNAL MEDICINE

## 2023-07-07 PROCEDURE — 99999 PR PBB SHADOW E&M-EST. PATIENT-LVL III: CPT | Mod: PBBFAC,,, | Performed by: INTERNAL MEDICINE

## 2023-07-07 PROCEDURE — 99999 PR PBB SHADOW E&M-EST. PATIENT-LVL III: ICD-10-PCS | Mod: PBBFAC,,, | Performed by: INTERNAL MEDICINE

## 2023-07-07 PROCEDURE — 84466 ASSAY OF TRANSFERRIN: CPT | Performed by: INTERNAL MEDICINE

## 2023-07-07 PROCEDURE — 84443 ASSAY THYROID STIM HORMONE: CPT | Performed by: INTERNAL MEDICINE

## 2023-07-07 PROCEDURE — 99397 PR PREVENTIVE VISIT,EST,65 & OVER: ICD-10-PCS | Mod: S$PBB,,, | Performed by: INTERNAL MEDICINE

## 2023-07-07 RX ORDER — LEVOTHYROXINE SODIUM 137 UG/1
TABLET ORAL
Qty: 90 TABLET | Refills: 4 | Status: SHIPPED | OUTPATIENT
Start: 2023-07-07

## 2023-07-07 RX ORDER — HYDROCHLOROTHIAZIDE 12.5 MG/1
TABLET ORAL
Qty: 90 TABLET | Refills: 4 | Status: SHIPPED | OUTPATIENT
Start: 2023-07-07 | End: 2024-01-04

## 2023-07-07 RX ORDER — PROMETHAZINE HYDROCHLORIDE AND DEXTROMETHORPHAN HYDROBROMIDE 6.25; 15 MG/5ML; MG/5ML
2.5 SYRUP ORAL NIGHTLY PRN
Qty: 120 ML | Refills: 0 | Status: SHIPPED | OUTPATIENT
Start: 2023-07-07 | End: 2024-01-17

## 2023-07-07 NOTE — PROGRESS NOTES
CHIEF COMPLAINT: Annual exam      HISTORY OF PRESENT ILLNESS: This is a 72-year-old woman who presents for he annual exam     Cough is better- only at night sporadically     No more dizziness or syncopal episodes.      She takes HCTZ 12.5 mg once daily 6 days a week (skips one day a week). No swelling.     She has leg cramps once every 2-3 months,  starts in feet and moves up.    She has had some swelling and redness in he lower legs. Has varicose veins.       She continues to take Synthroid 137 mcg daily (Skips ) for hypothyroidism.       She has a history of positive PPD 2010 - took INH for 9 months, CXR  was fine. NO fever, chills, night sweats, weight loss.       She has chronic constipation.   She is taking milk of magnesia - cherry - 3-4 times a week which keeps her regular       She is currently working part time for [employer] at [employer] - as a .        She takes meloxicam 15 mg twice a month. Joints are doing OK- up and down with activity and the weather    She had her left knee replaced 2022- doing fine.      PAST MEDICAL HISTORY:   1. Atrial flutter, status post ablation in 2008.   2. Graves disease, status post radioactive iodine, now hypothyroid.   3. Hypertension.   4. History of headaches.   5. Osteoarthritis of the shoulders, hands and knees.   6. Status post arthroscopic surgery of the right knee 2008.   7. Status post arthroscopic surgery of the right knee in .   8. Tummy tuck 20 years ago.   9. Hemorrhoids removed.   10. Cataract removal bilaterally  11. Positive PPD diagnosed , completed 9 months of INH 1/10  12. GERD     SOCIAL HISTORY: She does not smoke. She drinks alcohol twice a year.   She is , has three children. She is a .     FAMILY HISTORY: Father  at age 83 from complications of pneumonia.   Mother  at age 98 from old age. Sister had a pulmonary embolus, another   sister  had sarcoidosis. A son has Crohn's disease.        MEDICATIONS AND ALLERGIES: Updated in EPIC.       PHYSICAL EXAMINATION:    /78 (BP Location: Right arm, Patient Position: Sitting, BP Method: Large (Manual))   Pulse 72   Ht 6' (1.829 m)   SpO2 97%   BMI 29.84 kg/m²        GENERAL: She is alert, oriented, no apparent distress. Affect within normal limits.   Conjunctiva anicteric. Tympanic membranes clear. Oropharynx clear.    NECK: Supple.   RESPIRATORY: Effort normal. Lungs are clear to auscultation.   HEART: Regular rate and rhythm without murmurs, gallops or rubs.   No lower extremity edema.   ABDOMEN: soft, non distended, non tender, bowel sounds present, no hepatosplenomgaly  BREAST: no abn seen, no nodules palpated, no axillary lad       Varicose veins of the ankles and feet.  Trace edema of the lower extremity      ASSESSMENT AND PLAN:      Annual exam - discussed diet, exercise and safety issues.        1. Venous insufficiency - compression stocking   2. PAOD in right popliteal artery - to see vascular surgery  3. OA knees - s/p injection in left knee - s/p right knee replacement - doing well. Will have left knee done at the end of the year.   4. ALlergic rhinitis -stable   5. Hypothyroidism - tsh   6. GERD - asx  7. Obestiy - doing well with diet, exercise and weight loss.   8. Positive ppd - cxr stable 12/2019  9. Left inguinal hernia repair - doing well  10. History of low wbc - cbc today     Screening - mammogram 11/2022. Bone density was normal 2/2020  Colonoscopy due 11/5/2015 - normal due 2025   Normal pap Dec 2020.  I will see her back in 12 months, sooner if problems arise.

## 2023-07-10 ENCOUNTER — PATIENT MESSAGE (OUTPATIENT)
Dept: INTERNAL MEDICINE | Facility: CLINIC | Age: 73
End: 2023-07-10
Payer: MEDICARE

## 2023-08-04 ENCOUNTER — INITIAL CONSULT (OUTPATIENT)
Dept: VASCULAR SURGERY | Facility: CLINIC | Age: 73
End: 2023-08-04
Payer: MEDICARE

## 2023-08-04 ENCOUNTER — OFFICE VISIT (OUTPATIENT)
Dept: URGENT CARE | Facility: CLINIC | Age: 73
End: 2023-08-04
Payer: MEDICARE

## 2023-08-04 VITALS
SYSTOLIC BLOOD PRESSURE: 120 MMHG | HEIGHT: 72 IN | HEART RATE: 91 BPM | WEIGHT: 220 LBS | DIASTOLIC BLOOD PRESSURE: 58 MMHG | RESPIRATION RATE: 20 BRPM | TEMPERATURE: 99 F | BODY MASS INDEX: 29.8 KG/M2

## 2023-08-04 VITALS
HEART RATE: 93 BPM | SYSTOLIC BLOOD PRESSURE: 119 MMHG | BODY MASS INDEX: 29.8 KG/M2 | RESPIRATION RATE: 16 BRPM | HEIGHT: 72 IN | TEMPERATURE: 99 F | DIASTOLIC BLOOD PRESSURE: 56 MMHG | OXYGEN SATURATION: 97 % | WEIGHT: 220 LBS

## 2023-08-04 DIAGNOSIS — M79.89 PAIN AND SWELLING OF LOWER EXTREMITY, UNSPECIFIED LATERALITY: Primary | ICD-10-CM

## 2023-08-04 DIAGNOSIS — I80.03 PHLEBITIS AND THROMBOPHLEBITIS OF SUPERFICIAL VESSELS OF LOWER EXTREMITIES, BILATERAL: ICD-10-CM

## 2023-08-04 DIAGNOSIS — U07.1 COVID-19 VIRUS DETECTED: ICD-10-CM

## 2023-08-04 DIAGNOSIS — I87.2 CHRONIC VENOUS INSUFFICIENCY: ICD-10-CM

## 2023-08-04 DIAGNOSIS — U07.1 COVID: Primary | ICD-10-CM

## 2023-08-04 DIAGNOSIS — R05.9 COUGH, UNSPECIFIED TYPE: ICD-10-CM

## 2023-08-04 DIAGNOSIS — M79.606 PAIN AND SWELLING OF LOWER EXTREMITY, UNSPECIFIED LATERALITY: Primary | ICD-10-CM

## 2023-08-04 LAB
CTP QC/QA: YES
SARS-COV-2 AG RESP QL IA.RAPID: POSITIVE

## 2023-08-04 PROCEDURE — 99213 OFFICE O/P EST LOW 20 MIN: CPT | Mod: S$GLB,,,

## 2023-08-04 PROCEDURE — 99214 OFFICE O/P EST MOD 30 MIN: CPT | Mod: PBBFAC | Performed by: SURGERY

## 2023-08-04 PROCEDURE — 99999 PR PBB SHADOW E&M-EST. PATIENT-LVL IV: CPT | Mod: PBBFAC,,, | Performed by: SURGERY

## 2023-08-04 PROCEDURE — 99999 PR PBB SHADOW E&M-EST. PATIENT-LVL IV: ICD-10-PCS | Mod: PBBFAC,,, | Performed by: SURGERY

## 2023-08-04 PROCEDURE — 99204 OFFICE O/P NEW MOD 45 MIN: CPT | Mod: S$PBB,,, | Performed by: SURGERY

## 2023-08-04 PROCEDURE — 99213 PR OFFICE/OUTPT VISIT, EST, LEVL III, 20-29 MIN: ICD-10-PCS | Mod: S$GLB,,,

## 2023-08-04 PROCEDURE — 99204 PR OFFICE/OUTPT VISIT, NEW, LEVL IV, 45-59 MIN: ICD-10-PCS | Mod: S$PBB,,, | Performed by: SURGERY

## 2023-08-04 PROCEDURE — 87811 SARS CORONAVIRUS 2 ANTIGEN POCT, MANUAL READ: ICD-10-PCS | Mod: QW,S$GLB,,

## 2023-08-04 PROCEDURE — 87811 SARS-COV-2 COVID19 W/OPTIC: CPT | Mod: QW,S$GLB,,

## 2023-08-04 RX ORDER — PROMETHAZINE HYDROCHLORIDE AND DEXTROMETHORPHAN HYDROBROMIDE 6.25; 15 MG/5ML; MG/5ML
5 SYRUP ORAL NIGHTLY PRN
Qty: 118 ML | Refills: 0 | Status: SHIPPED | OUTPATIENT
Start: 2023-08-04 | End: 2024-01-17

## 2023-08-04 RX ORDER — CETIRIZINE HYDROCHLORIDE 10 MG/1
5 TABLET ORAL DAILY
Qty: 15 TABLET | Refills: 0 | Status: SHIPPED | OUTPATIENT
Start: 2023-08-04 | End: 2024-02-14

## 2023-08-04 RX ORDER — FLUTICASONE PROPIONATE 50 MCG
1 SPRAY, SUSPENSION (ML) NASAL DAILY
Qty: 16 G | Refills: 0 | Status: SHIPPED | OUTPATIENT
Start: 2023-08-04 | End: 2023-09-03

## 2023-08-04 RX ORDER — BENZONATATE 100 MG/1
100 CAPSULE ORAL 3 TIMES DAILY PRN
Qty: 30 CAPSULE | Refills: 0 | Status: SHIPPED | OUTPATIENT
Start: 2023-08-04 | End: 2023-08-14

## 2023-08-04 NOTE — PROGRESS NOTES
Subjective:      Patient ID: Poonam Alvarez is a 72 y.o. female.    Vitals:  height is 6' (1.829 m) and weight is 99.8 kg (220 lb 0.3 oz). Her oral temperature is 98.8 °F (37.1 °C). Her blood pressure is 119/56 (abnormal) and her pulse is 93. Her respiration is 16 and oxygen saturation is 97%.     Chief Complaint: Cough    Pt presents slightly productive cough (white sputum) with sx worsening in the evening. Pt was exposed to covid at [employer] last Sunday. Symptoms started on 8/2/2023.    Cough  The cough is Productive of sputum. Associated symptoms include postnasal drip and rhinorrhea. Pertinent negatives include no chest pain, ear congestion, ear pain, fever, headaches, myalgias, nasal congestion or shortness of breath. She has tried prescription cough suppressant (Promethazine dm) for the symptoms. The treatment provided no relief. There is no history of asthma, bronchiectasis, bronchitis, COPD, emphysema, environmental allergies or pneumonia.       Constitution: Negative for fever.   HENT:  Positive for congestion and postnasal drip. Negative for ear pain, sinus pain and sinus pressure.    Cardiovascular:  Negative for chest pain.   Respiratory:  Positive for cough. Negative for shortness of breath.    Gastrointestinal:  Negative for abdominal pain, nausea, vomiting and diarrhea.   Musculoskeletal:  Negative for muscle ache.   Allergic/Immunologic: Negative for environmental allergies.   Neurological:  Negative for headaches.      Objective:     Physical Exam   Constitutional: She is oriented to person, place, and time. She appears well-developed. She is cooperative.  Non-toxic appearance. She does not appear ill. No distress.   HENT:   Head: Normocephalic and atraumatic.   Ears:   Right Ear: Hearing, tympanic membrane, external ear and ear canal normal.   Left Ear: Hearing, tympanic membrane, external ear and ear canal normal.   Nose: No mucosal edema, rhinorrhea or nasal deformity. No epistaxis. Right sinus  exhibits no maxillary sinus tenderness and no frontal sinus tenderness. Left sinus exhibits no maxillary sinus tenderness and no frontal sinus tenderness.   Mouth/Throat: Uvula is midline, oropharynx is clear and moist and mucous membranes are normal. No trismus in the jaw. Normal dentition. No uvula swelling. No oropharyngeal exudate, posterior oropharyngeal edema or posterior oropharyngeal erythema.   Eyes: Conjunctivae and lids are normal. No scleral icterus.   Neck: Trachea normal and phonation normal. Neck supple. No edema present. No erythema present. No neck rigidity present.   Cardiovascular: Normal rate, regular rhythm, normal heart sounds and normal pulses.   Pulmonary/Chest: Effort normal and breath sounds normal. No stridor. No respiratory distress. She has no decreased breath sounds. She has no wheezes. She has no rhonchi. She has no rales.   Abdominal: Normal appearance.   Musculoskeletal: Normal range of motion.         General: No deformity. Normal range of motion.   Lymphadenopathy:     She has no cervical adenopathy.   Neurological: She is alert and oriented to person, place, and time. She exhibits normal muscle tone. Coordination normal.   Skin: Skin is warm, dry, intact, not diaphoretic and not pale.   Psychiatric: Her speech is normal and behavior is normal. Judgment and thought content normal.   Nursing note and vitals reviewed.      Assessment:     1. COVID    2. Cough, unspecified type        Plan:     Results for orders placed or performed in visit on 08/04/23   SARS Coronavirus 2 Antigen, POCT Manual Read   Result Value Ref Range    SARS Coronavirus 2 Antigen Positive (A) Negative     Acceptable Yes      *Note: Due to a large number of results and/or encounters for the requested time period, some results have not been displayed. A complete set of results can be found in Results Review.       COVID  -     nirmatrelvir-ritonavir 300 mg (150 mg x 2)-100 mg copackaged tablets  (EUA); Take 3 tablets by mouth 2 (two) times daily for 5 days. Each dose contains 2 nirmatrelvir (pink tablets) and 1 ritonavir (white tablet). Take all 3 tablets together  Dispense: 30 tablet; Refill: 0    Cough, unspecified type  -     SARS Coronavirus 2 Antigen, POCT Manual Read  -     benzonatate (TESSALON) 100 MG capsule; Take 1 capsule (100 mg total) by mouth 3 (three) times daily as needed for Cough.  Dispense: 30 capsule; Refill: 0  -     promethazine-dextromethorphan (PROMETHAZINE-DM) 6.25-15 mg/5 mL Syrp; Take 5 mLs by mouth nightly as needed (cough).  Dispense: 118 mL; Refill: 0  -     cetirizine (ZYRTEC) 10 MG tablet; Take 0.5 tablets (5 mg total) by mouth once daily.  Dispense: 15 tablet; Refill: 0  -     fluticasone propionate (FLONASE) 50 mcg/actuation nasal spray; 1 spray (50 mcg total) by Each Nostril route once daily.  Dispense: 16 g; Refill: 0            Discussed results/diagnosis/plan with patient in clinic. Strict precautions given to patient to monitor for worsening signs and symptoms. Advised to follow up with PCP or specialist.  Explained side effects of medications prescribed with patient and informed him/her to discontinue use if he/she has any side effects and to inform UC or PCP if this occurs. All questions answered. Strict ED verses clinic return precautions stressed and given in depth. Advised if symptoms worsens of fail to improve he/she should go to the Emergency Room. Discharge and follow-up instructions given verbally/printed with the patient who expressed understanding and willingness to comply with my recommendations. Patient voiced understanding and in agreement with current treatment plan. Patient exits the exam room in no acute distress. Conversant and engaged during discharge discussion, verbalized understanding.

## 2023-08-04 NOTE — PATIENT INSTRUCTIONS
You are COVID positive today. Take anti-viral (Paxlovid) twice a day for the next 5 days to decrease symptoms. Side/adverse effects discussed. I gave you a handout with information of the medication for you to review.     The CDC recommends isolating at home 5 days from symptom start then wear a mask in public for 5 days afterwards.     Try tessalon perles as directed during the day for your cough. It will help numb the back of your throat so you do not have the urge to cough.      Take promethazine/DM cough syrup at night for cough. Promethazine is an oral anti-histamine and will help with sinus relief. DM (dextromethorphan) is a decongestant. This medication will make you drowsy. Make sure you do not drive a vehicle, drink alcohol, operate machinery or take other sedating medications while taking.     Try a decongestant and corticosteroid nasal spray like flonase for the next few days for sinus relief. Initial: 2 sprays in each nostril once daily for 1 week. Reduce to 1 spray in each nostril once per day. Stop taking if you develop a nose bleed. Nasal saline spray can be used together with flonase to help moisten nostrils. An antihistamine like zyrtec, claritin, allegra can also be helpful for sinus relief and will help dry nasal passages.     Regular (Guaifenesin) Mucinex 1200 mg twice per day for 10 days can help thin secretions for better clearance. Drink plenty of fluids with this.     Honey is a natural cough suppressant.     -If you do NOT have high blood pressure, you may use a decongestant form (D)  of this medication (ie. Claritin- D, zyrtec-D, allegra-D, Mucinex-D) or if you do not take the D form, you can take sudafed (pseudoephedrine) over the counter, which is a decongestant. Do NOT take two decongestant (D) medications at the same time (such as mucinex-D and claritin-D or plain sudafed and claritin D). Dextromethorphan (DM) is a cough suppressant over the counter (ie. mucinex DM, robitussin, delsym;  dayquil/nyquil has DM as well.)    Warm tea/warm liquids will help soothe the back of your throat. Warm water salt gurgles can also be helpful. A dry throat will cause pain. Make sure to stay hydrated. Water and pedilyte are the best to drink. Neti pot irrigation, humidifier in your room, avoiding fans, warm compresses to face, eating/drinking hot soups, hot shower before bedtime can help.     The recommended daily fluid intake for women is 2.7 liters (five 16 oz bottles).      Alternate Tylenol and ibuprofen every 4 hours as needed for fever and body aches.  Please take NSAIDs with a full glass of water and food to avoid GI upset.      Please only use over the counter cough and cold medications for 3-5 days at a time to avoid rebound symptoms.     Getting plenty of rest can aid in a faster recovery of illnesses.     Please follow-up with your primary care provider or return to the clinic if not better/worsening symptoms in 1 week.     Report to the ER if you have chest pain, shortness of breath, palpitations.

## 2023-08-04 NOTE — PROGRESS NOTES
VASCULAR SURGERY SERVICE    REFERRING DOCTOR: Lindsey Bach MD    CHIEF COMPLAINT:  leg Swelling    HISTORY OF PRESENT ILLNESS: Poonam Alvarez is a 72 y.o. female with a 3 to four-month history of ankle edema swelling and itching.  She denies any limitations in her walking distance.  She was prescribed compression stockings but has only worn it for a couple of weeks.  She is no history of lower extremity ulceration.  No history of DVT.      Past Medical History:   Diagnosis Date    AR (allergic rhinitis) 12/07/2012    Atrial flutter     ablation October 2008    Cataract     Generalized headaches     GERD (gastroesophageal reflux disease) 03/08/2013    resolved    Grave's disease     s/p radioactive iodine, now hypothyroidism    Keloid cicatrix     Obesity     Osteoarthritis     shoulders, hands, knees    Positive PPD, treated     9 months of INH, completed 1/2010       Past Surgical History:   Procedure Laterality Date    ABLATION OF DYSRHYTHMIC FOCUS  10/24/2008    atrial flutter    ARTHROPLASTY OF SHOULDER Right 04/01/2019    Procedure: ARTHROPLASTY, SHOULDER;  Surgeon: Sage Xie MD;  Location: [facility];  Service: Orthopedics;  Laterality: Right;  regional w/ catheter (q-ball)    CATARACT EXTRACTION W/  INTRAOCULAR LENS IMPLANT Bilateral     COLONOSCOPY N/A 11/05/2015    Procedure: COLONOSCOPY;  Surgeon: Jose Ly MD;  [facility]  Service: Endoscopy;  Laterality: N/A;  Last colonoscopy 2008 with Dr. Vieira; Pt wanted this day    EYE SURGERY      cataract removal right eye    HERNIA REPAIR      JOINT REPLACEMENT      KNEE ARTHROSCOPY W/ DEBRIDEMENT      right, 2005 and 2008    KNEE SURGERY  03/27/2014    right TKA    TOTAL KNEE ARTHROPLASTY Left 12/13/2022    Procedure: ARTHROPLASTY, KNEE, TOTAL;  Surgeon: Gordon Schneider MD;  Location: [facility];  Service: Orthopedics;  Laterality: Left;         Current Outpatient Medications:     ascorbic acid/multivit-min  (EMERGEN-C ORAL), Take 1,000 mg by mouth once daily., Disp: , Rfl:     aspirin (ECOTRIN) 81 MG EC tablet, Take 1 tablet (81 mg total) by mouth 2 (two) times daily., Disp: 60 tablet, Rfl: 0    cholecalciferol, vitamin D3, (VITAMIN D3) 50 mcg (2,000 unit) Cap, Take 1 capsule by mouth once daily., Disp: , Rfl:     hydroCHLOROthiazide (HYDRODIURIL) 12.5 MG Tab, TAKE 1/2 TO 1 TABLET BY MOUTH DAILY AS NEEDED, Disp: 90 tablet, Rfl: 4    hydrocortisone 2.5 % ointment, Apply topically 2 (two) times daily., Disp: 20 g, Rfl: 0    multivitamin-minerals-lutein Tab, Take 1 tablet by mouth once daily. , Disp: , Rfl:     promethazine-dextromethorphan (PROMETHAZINE-DM) 6.25-15 mg/5 mL Syrp, Take 2.5 mLs by mouth nightly as needed (cough supression)., Disp: 120 mL, Rfl: 0    SYNTHROID 137 mcg Tab tablet, TAKE 1 TABLET(137 MCG) BY MOUTH EVERY DAY EXCEPT MONDAY, Disp: 90 tablet, Rfl: 4    magnesium hydroxide 400 mg/5 ml (MILK OF MAGNESIA) 400 mg/5 mL Susp, Take 30 mLs by mouth daily as needed., Disp: , Rfl:     Review of patient's allergies indicates:   Allergen Reactions    Hydrocodone-acetaminophen Other (See Comments)     Low blood pressure; doesn't want    Oxycodone-acetaminophen Other (See Comments)     Causes low blood pressure; doesn't want  4/4/19 - patient reports she is not actually allergic to it (just doesn't like how it makes her feel)       Family History   Problem Relation Age of Onset    Arthritis Mother     Arthritis Father     Glaucoma Father     Cataracts Father     Hepatitis Brother     No Known Problems Daughter     No Known Problems Son     No Known Problems Son     No Known Problems Maternal Aunt     No Known Problems Maternal Uncle     No Known Problems Paternal Aunt     No Known Problems Paternal Uncle     No Known Problems Maternal Grandmother     No Known Problems Maternal Grandfather     No Known Problems Paternal Grandmother     No Known Problems Paternal Grandfather     Colon cancer Neg Hx     Ovarian  cancer Neg Hx     Breast cancer Neg Hx        Social History     Tobacco Use    Smoking status: Never    Smokeless tobacco: Never   Substance Use Topics    Alcohol use: No    Drug use: No       REVIEW OF SYSTEMS:  General: No chills, fever, malaise, changes in weight  HEENT: No visual changes, difficulty hearing  Cardiovascular: No chest pain, palpitations, claudication  Pulmonary: No dyspnea, cough, wheezing  Gastrointestinal: No nausea, vomiting, diarrhea, constipation  Genitourinary: No dysuria, low urine output, hematuria  Endocrine: No polydipsia, polyphagia  Hematologic: No fatigue with exertion, pica, pallor  Musculoskeletal: No extremity or joint pain, no back pain, no difficulty with ambulation  Neurologic: no seizures, no headaches, no weakness  Psychiatric: no mood disturbance      PHYSICAL EXAM:   BP (!) 120/58 (BP Location: Right arm, Patient Position: Sitting, BP Method: Large (Automatic))   Pulse 91   Temp 98.8 °F (37.1 °C) (Oral)   Resp 20   Ht 6' (1.829 m)   Wt 99.8 kg (220 lb)   BMI 29.84 kg/m²   Constitutional:  Alert,   Well-appearing  In no distress.   Neurological: Normal speech  no focal findings  CN II - XII grossly intact.    Psychiatric: Mood and affect appropriate and symmetric.   HEENT: Normocephalic / atraumatic  PERRLA  Midline trachea  No scars across the neck   Cardiac: Regular rate and rhythm.   Pulmonary: Normal pulmonary effort.   Abdomen: Soft, not distended.     Skin: Warm and well perfused.    Vascular:  2+ femoral pulses 2+ PT pulses palpable bilaterally   Extremities/  Musculoskeletal: No edema.   Modest edema at the ankle, modest hyperpigmentation, no ulceration     IMAGING:  Radiology ABIs show modest arterial occlusive disease, right 0.95, left 0.85.    IMPRESSION:    Chronic venous insufficiency.  Asymptomatic mild PVD.  Personally is already on aspirin    PLAN:  Three-month trial Of compression hose  If her symptoms are not significantly improved, we will set up an  appointment in our [facility] vein clinic with a pre EVLT venous ultrasound in 3 months' time    LM Weldon III, MD, FACS  Professor and Chief, Vascular and Endovascular Surgery

## 2023-08-04 NOTE — LETTER
[facility] - Vascular Surg 5th Fl  [address]  [facility] [address]  Phone: [phone]  Fax: [phone] August 8, 2023        Lindsey Bach MD  [address]  [facility] [address]    Patient: Poonam Alvarez   MR Number: [id number]   YOB: 1950   Date of Visit: 8/4/2023     Dear Dr. Bach:    Thank you for referring Poonam Alvarez to me for evaluation. Attached are the relevant portions of my assessment and plan of care.    If you have questions, please do not hesitate to call me. I look forward to following Poonam along with you.    Sincerely,    PATRICK Weldon III, MD   Professor and Chief  Vascular and Endovascular Surgery  Vice-Chair, Department of Surgery  [facility]

## 2023-10-27 ENCOUNTER — HOSPITAL ENCOUNTER (OUTPATIENT)
Dept: RADIOLOGY | Facility: OTHER | Age: 73
Discharge: HOME | End: 2023-10-27
Attending: SURGERY
Payer: MEDICARE

## 2023-10-27 DIAGNOSIS — M79.89 PAIN AND SWELLING OF LOWER EXTREMITY, UNSPECIFIED LATERALITY: ICD-10-CM

## 2023-10-27 DIAGNOSIS — I80.03 PHLEBITIS AND THROMBOPHLEBITIS OF SUPERFICIAL VESSELS OF LOWER EXTREMITIES, BILATERAL: ICD-10-CM

## 2023-10-27 DIAGNOSIS — M79.606 PAIN AND SWELLING OF LOWER EXTREMITY, UNSPECIFIED LATERALITY: ICD-10-CM

## 2023-10-27 PROCEDURE — 93970 EXTREMITY STUDY: CPT | Mod: 26,,, | Performed by: RADIOLOGY

## 2023-10-27 PROCEDURE — 93970 EXTREMITY STUDY: CPT | Mod: TC

## 2023-10-27 PROCEDURE — 93970 US LOWER EXTREMITY VEINS BILATERAL INSUFFICIENCY: ICD-10-PCS | Mod: 26,,, | Performed by: RADIOLOGY

## 2023-11-03 ENCOUNTER — OFFICE VISIT (OUTPATIENT)
Dept: VASCULAR SURGERY | Facility: CLINIC | Age: 73
End: 2023-11-03
Payer: MEDICARE

## 2023-11-03 DIAGNOSIS — I87.2 VENOUS INSUFFICIENCY: Primary | ICD-10-CM

## 2023-11-03 DIAGNOSIS — I87.2 VENOUS STASIS DERMATITIS OF BOTH LOWER EXTREMITIES: ICD-10-CM

## 2023-11-03 PROCEDURE — 99214 PR OFFICE/OUTPT VISIT, EST, LEVL IV, 30-39 MIN: ICD-10-PCS | Mod: S$GLB,,, | Performed by: SURGERY

## 2023-11-03 PROCEDURE — 99214 OFFICE O/P EST MOD 30 MIN: CPT | Mod: S$GLB,,, | Performed by: SURGERY

## 2023-11-03 NOTE — PROGRESS NOTES
Johann Baez MD, RPVI                                 [facility]                           [facility]                             11/03/2023    HPI:  Poonam Alvarez is a 72 y.o. female with   Patient Active Problem List   Diagnosis    Osteoarthritis    History of Graves' disease    Hypothyroidism, postablative    Radial styloid tenosynovitis    Pseudophakia of both eyes    Other specified disorder of skin    GERD (gastroesophageal reflux disease)    Left knee pain    Primary localized osteoarthrosis, lower leg    Knee pain, right    Pain in joint, lower leg    S/P total knee arthroplasty    Pre-syncope    Constipation    Ankle edema    Screening for colon cancer    Bilateral knee pain    Essential hypertension    Ectatic aorta-Noted on imaging 7/27/2015    Posture abnormality    Balance problem    Hypotension    History of atrial flutter    Weight gain    Keloid    Family history of thrombosis    Decreased range of motion (ROM) of left knee    Impaired gait and mobility    Decreased strength of lower extremity    Neutropenia, unspecified type    Chronic venous insufficiency    being managed by PCP and specialists who is here today for evaluation of BLE edema.  Patient states location is BLE occurring for months.  Associated signs and symptoms include discoloration and itching.  Quality is heavy and severity is 5/10.  Symptoms began mo ago after her L knee open replacement.  Alleviating factors include elevation.  Worsening factors include dependency.  Patient has been wearing compression stockings for greater than 3 months.  Symptoms do limit patient's functional status and daily activities.  no DVT history.  no venous interventions.  no low sodium diet.  no excessive water intake.    Migraine with aura: no  PFO/ASD/right to left shunt: no  Pregnant: no  Breastfeeding: no    no MI  no Stroke  Tobacco use: denies    Past Medical History:   Diagnosis Date    AR (allergic  rhinitis) 12/07/2012    Atrial flutter     ablation October 2008    Cataract     Generalized headaches     GERD (gastroesophageal reflux disease) 03/08/2013    resolved    Grave's disease     s/p radioactive iodine, now hypothyroidism    Keloid cicatrix     Obesity     Osteoarthritis     shoulders, hands, knees    Positive PPD, treated     9 months of INH, completed 1/2010     Past Surgical History:   Procedure Laterality Date    ABLATION OF DYSRHYTHMIC FOCUS  10/24/2008    atrial flutter    ARTHROPLASTY OF SHOULDER Right 04/01/2019    Procedure: ARTHROPLASTY, SHOULDER;  Surgeon: Sage Xie MD;  Location: [facility] OR;  Service: Orthopedics;  Laterality: Right;  regional w/ catheter (q-ball)    CATARACT EXTRACTION W/  INTRAOCULAR LENS IMPLANT Bilateral     COLONOSCOPY N/A 11/05/2015    Procedure: COLONOSCOPY;  Surgeon: Jose Ly MD;  Location: [facility] ENDO (4TH FLR);  Service: Endoscopy;  Laterality: N/A;  Last colonoscopy 2008 with Dr. Vieira; Pt wanted this day    EYE SURGERY      cataract removal right eye    HERNIA REPAIR      JOINT REPLACEMENT      KNEE ARTHROSCOPY W/ DEBRIDEMENT      right, 2005 and 2008    KNEE SURGERY  03/27/2014    right TKA    TOTAL KNEE ARTHROPLASTY Left 12/13/2022    Procedure: ARTHROPLASTY, KNEE, TOTAL;  Surgeon: Gordon Schneider MD;  Location: [facility] OR;  Service: Orthopedics;  Laterality: Left;     Family History   Problem Relation Age of Onset    Arthritis Mother     Arthritis Father     Glaucoma Father     Cataracts Father     Hepatitis Brother     No Known Problems Daughter     No Known Problems Son     No Known Problems Son     No Known Problems Maternal Aunt     No Known Problems Maternal Uncle     No Known Problems Paternal Aunt     No Known Problems Paternal Uncle     No Known Problems Maternal Grandmother     No Known Problems Maternal Grandfather     No Known Problems Paternal Grandmother     No Known Problems Paternal Grandfather     Colon cancer Neg Hx      Ovarian cancer Neg Hx     Breast cancer Neg Hx      Social History     Socioeconomic History    Marital status:    Occupational History    Occupation:      Employer: [employer]   Tobacco Use    Smoking status: Never    Smokeless tobacco: Never   Substance and Sexual Activity    Alcohol use: No    Drug use: No    Sexual activity: Not Currently     Partners: Male     Birth control/protection: None   Other Topics Concern    Are you pregnant or think you may be? No    Breast-feeding No   Social History Narrative         Social Determinants of Health     Financial Resource Strain: Low Risk  (2/3/2023)    Overall Financial Resource Strain (CARDIA)     Difficulty of Paying Living Expenses: Not hard at all   Food Insecurity: No Food Insecurity (2/3/2023)    Hunger Vital Sign     Worried About Running Out of Food in the Last Year: Never true     Ran Out of Food in the Last Year: Never true   Transportation Needs: No Transportation Needs (2/3/2023)    PRAPARE - Transportation     Lack of Transportation (Medical): No     Lack of Transportation (Non-Medical): No   Physical Activity: Inactive (2/3/2023)    Exercise Vital Sign     Days of Exercise per Week: 0 days     Minutes of Exercise per Session: 0 min   Stress: No Stress Concern Present (2/3/2023)    [employer] [employer] - Occupational Stress Questionnaire     Feeling of Stress : Not at all   Social Connections: Moderately Isolated (2/3/2023)    Social Connection and Isolation Panel [NHANES]     Frequency of Communication with Friends and Family: More than three times a week     Frequency of Social Gatherings with Friends and Family: Once a week     Attends Gnosticism Services: More than 4 times per year     Active Member of Clubs or Organizations: No     Attends Club or Organization Meetings: Never     Marital Status:    Housing Stability: Low Risk  (2/3/2023)    Housing Stability Vital Sign      Unable to Pay for Housing in the Last Year: No     Number of Places Lived in the Last Year: 1     Unstable Housing in the Last Year: No       Current Outpatient Medications:     ascorbic acid/multivit-min (EMERGEN-C ORAL), Take 1,000 mg by mouth once daily., Disp: , Rfl:     aspirin (ECOTRIN) 81 MG EC tablet, Take 1 tablet (81 mg total) by mouth 2 (two) times daily., Disp: 60 tablet, Rfl: 0    cetirizine (ZYRTEC) 10 MG tablet, Take 0.5 tablets (5 mg total) by mouth once daily., Disp: 15 tablet, Rfl: 0    cholecalciferol, vitamin D3, (VITAMIN D3) 50 mcg (2,000 unit) Cap, Take 1 capsule by mouth once daily., Disp: , Rfl:     hydroCHLOROthiazide (HYDRODIURIL) 12.5 MG Tab, TAKE 1/2 TO 1 TABLET BY MOUTH DAILY AS NEEDED, Disp: 90 tablet, Rfl: 4    hydrocortisone 2.5 % ointment, Apply topically 2 (two) times daily., Disp: 20 g, Rfl: 0    magnesium hydroxide 400 mg/5 ml (MILK OF MAGNESIA) 400 mg/5 mL Susp, Take 30 mLs by mouth daily as needed., Disp: , Rfl:     multivitamin-minerals-lutein Tab, Take 1 tablet by mouth once daily. , Disp: , Rfl:     promethazine-dextromethorphan (PROMETHAZINE-DM) 6.25-15 mg/5 mL Syrp, Take 2.5 mLs by mouth nightly as needed (cough supression)., Disp: 120 mL, Rfl: 0    promethazine-dextromethorphan (PROMETHAZINE-DM) 6.25-15 mg/5 mL Syrp, Take 5 mLs by mouth nightly as needed (cough)., Disp: 118 mL, Rfl: 0    SYNTHROID 137 mcg Tab tablet, TAKE 1 TABLET(137 MCG) BY MOUTH EVERY DAY EXCEPT MONDAY, Disp: 90 tablet, Rfl: 4    REVIEW OF SYSTEMS:  General: No fevers or chills; ENT: No sore throat; Allergy and Immunology: no persistent infections; Hematological and Lymphatic: No history of bleeding or easy bruising; Endocrine: negative; Respiratory: no cough, shortness of breath, or wheezing; Cardiovascular: no chest pain or dyspnea on exertion; Gastrointestinal: no abdominal pain/back, change in bowel habits, or bloody stools; Genito-Urinary: no dysuria, trouble voiding, or hematuria;  "Musculoskeletal: edema; Neurological: no TIA or stroke symptoms; Psychiatric: no nervousness, anxiety or depression.    PHYSICAL EXAM:                General appearance:  Alert, well-appearing, and in no distress.  Oriented to person, place, and time                    Neurological: Normal speech, no focal findings noted; CN II - XII grossly intact. RLE with sensation to light touch, LLE with sensation to light touch.            Musculoskeletal: Digits/nail without cyanosis/clubbing.  Strength 5/5 BLE.                    Neck: Supple, no significant adenopathy                  Chest:  No wheezes, symmetric air entry. No use of accessory muscles               Cardiac: Normal rate and regular rhythm            Abdomen: Soft, nontender, nondistended      Extremities:   2+ R DP pulse, 2+ L DP pulse      2+ RLE edema, 1+ LLE edema    Skin:  RLE no ulcer; LLE no ulcer      RLE no spider veins, LLE no spider veins      RLE no varicose veins, LLE no varicose veins    CEAP 3/3    VCSS 4    LAB RESULTS:  No results found for: "CBC"  Lab Results   Component Value Date    LABPROT 10.9 11/30/2022    INR 1.0 11/30/2022     Lab Results   Component Value Date     07/07/2023    K 3.9 07/07/2023     07/07/2023    CO2 28 07/07/2023    GLU 88 07/07/2023    BUN 12 07/07/2023    CREATININE 0.8 07/07/2023    CALCIUM 9.8 07/07/2023    ANIONGAP 7 (L) 07/07/2023    EGFRNONAA >60.0 07/06/2022     Lab Results   Component Value Date    WBC 4.18 07/07/2023    RBC 4.43 07/07/2023    HGB 11.7 (L) 07/07/2023    HCT 37.6 07/07/2023    MCV 85 07/07/2023    MCH 26.4 (L) 07/07/2023    MCHC 31.1 (L) 07/07/2023    RDW 16.3 (H) 07/07/2023     07/07/2023    MPV 11.3 07/07/2023    GRAN 2.0 07/07/2023    GRAN 48.2 07/07/2023    LYMPH 1.6 07/07/2023    LYMPH 39.2 07/07/2023    MONO 0.4 07/07/2023    MONO 9.1 07/07/2023    EOS 0.1 07/07/2023    BASO 0.03 07/07/2023    EOSINOPHIL 2.6 07/07/2023    BASOPHIL 0.7 07/07/2023    DIFFMETHOD " Automated 07/07/2023     .  Lab Results   Component Value Date    HGBA1C 5.4 07/06/2022       IMAGING:  All pertinent imaging has been reviewed and interpreted independently.    Venous US 10/2023 Impression:  FINDINGS:  No evidence of DVT in either lower extremity deep venous system.     On the right, the greater saphenous vein has maximum diameter of 13 mm.  There is hemodynamically significant reflux identified from the distal thigh through the mid calf.  The lesser saphenous vein has maximum diameter of 3 mm with hemodynamically significant reflux at the level of the mid calf.  The anterior accessory saphenous vein has maximum diameter of 4 mm without hemodynamically significant reflux.     On the left, the greater saphenous veins maximum diameter of 11 mm.  There is hemodynamically significant reflux identified at the level of the mid calf.  The lesser saphenous vein has maximum diameter of 3 mm.  There is hemodynamically significant reflux at the level of the mid calf as well.  The anterior accessory saphenous vein has maximum diameter of 4 mm without hemodynamically significant reflux.     Impression:     No evidence of DVT in either lower extremity deep venous system.     On the right, there is hemodynamically significant reflux noted within the greater saphenous vein from the level of the distal thigh through the calf and in the lesser saphenous vein at the level of the calf.     On the left, there is hemodynamically significant reflux identified within the greater and lesser saphenous veins at the level of the calf          IMP/PLAN:  72 y.o. female with   Patient Active Problem List   Diagnosis    Osteoarthritis    History of Graves' disease    Hypothyroidism, postablative    Radial styloid tenosynovitis    Pseudophakia of both eyes    Other specified disorder of skin    GERD (gastroesophageal reflux disease)    Left knee pain    Primary localized osteoarthrosis, lower leg    Knee pain, right    Pain in  joint, lower leg    S/P total knee arthroplasty    Pre-syncope    Constipation    Ankle edema    Screening for colon cancer    Bilateral knee pain    Essential hypertension    Ectatic aorta-Noted on imaging 7/27/2015    Posture abnormality    Balance problem    Hypotension    History of atrial flutter    Weight gain    Keloid    Family history of thrombosis    Decreased range of motion (ROM) of left knee    Impaired gait and mobility    Decreased strength of lower extremity    Neutropenia, unspecified type    Chronic venous insufficiency    being managed by PCP and specialists who is here today for evaluation of BLE edema.    -recommend compression with Rx stockings, elevation, dietary changes associated with water and sodium intake discussed at length with patient  -Exercise   -Derm referral  -rec R GSV EVLT mid thigh to calf    I spent 15 minutes evaluating this patient and greater than 50% of the time was spent counseling, coordinator care and discussing the plan of care.  All questions were answered and patient stated understanding with agreement with the above treatment plan.    Johann Baez MD [facility]  Vascular and Endovascular Surgery

## 2023-11-10 ENCOUNTER — IMMUNIZATION (OUTPATIENT)
Dept: INTERNAL MEDICINE | Facility: CLINIC | Age: 73
End: 2023-11-10
Payer: MEDICARE

## 2023-11-10 DIAGNOSIS — Z23 NEED FOR VACCINATION: Primary | ICD-10-CM

## 2023-11-10 PROCEDURE — 99999PBSHW COVID-19 VAC, MRNA 2023 (MODERNA)(PF) 50 MCG/0.5 ML IM SUSR (12+): ICD-10-PCS | Mod: PBBFAC,,,

## 2023-11-10 PROCEDURE — 91322 SARSCOV2 VAC 50 MCG/0.5ML IM: CPT | Mod: PBBFAC

## 2023-11-10 PROCEDURE — 99999PBSHW COVID-19 VAC, MRNA 2023 (MODERNA)(PF) 50 MCG/0.5 ML IM SUSR (12+): Mod: PBBFAC,,,

## 2023-11-27 ENCOUNTER — HOSPITAL ENCOUNTER (OUTPATIENT)
Dept: RADIOLOGY | Facility: HOSPITAL | Age: 73
Discharge: HOME | End: 2023-11-27
Attending: INTERNAL MEDICINE
Payer: MEDICARE

## 2023-11-27 DIAGNOSIS — Z12.31 SCREENING MAMMOGRAM FOR BREAST CANCER: ICD-10-CM

## 2023-11-27 PROCEDURE — 77067 SCR MAMMO BI INCL CAD: CPT | Mod: TC

## 2023-11-27 PROCEDURE — 77063 BREAST TOMOSYNTHESIS BI: CPT | Mod: 26,,, | Performed by: RADIOLOGY

## 2023-11-27 PROCEDURE — 77067 SCR MAMMO BI INCL CAD: CPT | Mod: 26,,, | Performed by: RADIOLOGY

## 2023-11-27 PROCEDURE — 77063 MAMMO DIGITAL SCREENING BILAT WITH TOMO: ICD-10-PCS | Mod: 26,,, | Performed by: RADIOLOGY

## 2023-11-27 PROCEDURE — 77067 MAMMO DIGITAL SCREENING BILAT WITH TOMO: ICD-10-PCS | Mod: 26,,, | Performed by: RADIOLOGY

## 2023-12-01 ENCOUNTER — IMMUNIZATION (OUTPATIENT)
Dept: INTERNAL MEDICINE | Facility: CLINIC | Age: 73
End: 2023-12-01
Payer: MEDICARE

## 2023-12-01 DIAGNOSIS — Z23 NEED FOR VACCINATION: Primary | ICD-10-CM

## 2023-12-01 PROCEDURE — G0008 ADMIN INFLUENZA VIRUS VAC: HCPCS | Mod: PBBFAC

## 2023-12-01 PROCEDURE — 99999PBSHW FLU VACCINE - QUADRIVALENT - ADJUVANTED: Mod: PBBFAC,,,

## 2023-12-01 PROCEDURE — 99999PBSHW FLU VACCINE - QUADRIVALENT - ADJUVANTED: ICD-10-PCS | Mod: PBBFAC,,,

## 2023-12-27 DIAGNOSIS — Z96.651 H/O TOTAL KNEE REPLACEMENT, RIGHT: Primary | ICD-10-CM

## 2024-01-03 ENCOUNTER — PATIENT MESSAGE (OUTPATIENT)
Dept: ADMINISTRATIVE | Facility: OTHER | Age: 74
End: 2024-01-03
Payer: MEDICARE

## 2024-01-03 ENCOUNTER — OFFICE VISIT (OUTPATIENT)
Dept: ORTHOPEDICS | Facility: CLINIC | Age: 74
End: 2024-01-03
Payer: MEDICARE

## 2024-01-03 ENCOUNTER — HOSPITAL ENCOUNTER (OUTPATIENT)
Dept: RADIOLOGY | Facility: HOSPITAL | Age: 74
Discharge: HOME | End: 2024-01-03
Attending: ORTHOPAEDIC SURGERY
Payer: MEDICARE

## 2024-01-03 VITALS — HEIGHT: 72 IN | BODY MASS INDEX: 29.8 KG/M2 | WEIGHT: 220 LBS

## 2024-01-03 DIAGNOSIS — Z96.659 STATUS POST KNEE REPLACEMENT, UNSPECIFIED LATERALITY: Primary | ICD-10-CM

## 2024-01-03 DIAGNOSIS — Z96.651 H/O TOTAL KNEE REPLACEMENT, RIGHT: ICD-10-CM

## 2024-01-03 PROCEDURE — 73562 X-RAY EXAM OF KNEE 3: CPT | Mod: 26,LT,, | Performed by: RADIOLOGY

## 2024-01-03 PROCEDURE — 73560 X-RAY EXAM OF KNEE 1 OR 2: CPT | Mod: 26,59,RT, | Performed by: RADIOLOGY

## 2024-01-03 PROCEDURE — 99212 OFFICE O/P EST SF 10 MIN: CPT | Mod: S$PBB,,, | Performed by: ORTHOPAEDIC SURGERY

## 2024-01-03 PROCEDURE — 99213 OFFICE O/P EST LOW 20 MIN: CPT | Mod: PBBFAC | Performed by: ORTHOPAEDIC SURGERY

## 2024-01-03 PROCEDURE — 73562 X-RAY EXAM OF KNEE 3: CPT | Mod: TC,LT

## 2024-01-03 PROCEDURE — 99999 PR PBB SHADOW E&M-EST. PATIENT-LVL III: CPT | Mod: PBBFAC,,, | Performed by: ORTHOPAEDIC SURGERY

## 2024-01-03 PROCEDURE — 73560 X-RAY EXAM OF KNEE 1 OR 2: CPT | Mod: TC,RT

## 2024-01-03 NOTE — PROGRESS NOTES
Poonam Alvarez is in for 1 year follow up for a  left TKA.  She is doing very well.  No pain in the knee.  She has resumed activities of daily living. She has been walking.   Exam demonstrates  A well developed female in no distress.  Alert and oriented.  Mood and affect are appropriate.    Knee incision is well healed.  ROM is 0-120.  The patella tracks well and there is no instability. The extremity is neurovascularly intact.    Xrays demonstrate a well fixed and positioned prosthesis.        1/3/2024     2:18 PM 3/29/2023     4:01 PM 10/9/2022     3:25 PM   PROMIS-10 Questionnaire Scores   Global Physical Health 10 12 13   Global Mental health Score 13 16 16           1/3/2024     2:17 PM 3/29/2023     3:59 PM 10/7/2022     4:24 PM   KOOS Jr. Questionnaire Score   KOOS Jr Score 0 0 14           1/3/2024     2:18 PM 3/29/2023     4:01 PM 10/9/2022     3:23 PM   Martin Knee Questionnaire Score   [place] Knee Score 48 48 30           3/29/2023     3:45 PM 11/30/2022    11:00 AM   Knee Society and Function Score   FINDINGS - KNEE SOCIETY SCORE 100 48   FINDINGS - KNEE SOCIETY FUNCTION SCORE 90 50           1/3/2024     2:19 PM 3/29/2023     4:02 PM 11/30/2022    11:48 AM   [place] Joint Score   In which leg do you have an artificial knee? Left Knee Left Knee Right Knee   In bed at night? Never Never    When sitting on a chair for more than one hour? Never Never    When you are walking for more than 15 minutes? Never Never    When taking a bath or shower? Never Never    When traveling in a car? Never Never    When climbing stairs? Never Never    When walking on uneven ground? Never Never    When standing up from a low-sitting position? Never Never    When standing for long periods of time? Never Never    When doing housework or gardening? Never Never    When taking a walk or hiking? Never Never    When doing your favorite sport? Never Never    Forgotten Joint Score Left Knee 100 100 Error   In bed at night?    Seldom   When sitting on a chair for more than one hour?   Sometimes   When you are walking for more than 15 minutes?   Sometimes   When taking a bath or shower?   Never   When traveling in a car?   Sometimes   When climbing stairs?   Sometimes   When walking on uneven ground?   Mostly   When standing up from a low-sitting position?   Mostly   When standing for long periods of time?   Sometimes   When doing housework or gardening?   Sometimes   When taking a walk or hiking?   Sometimes   When doing your favorite sport?   Sometimes   Forgotten Joint Score Right Knee Incomplete Error 29.17       Imp:Doing well    F/u in one year with xrays both knees and PROMS

## 2024-01-04 RX ORDER — HYDROCHLOROTHIAZIDE 12.5 MG/1
TABLET ORAL
Qty: 90 TABLET | Refills: 1 | Status: SHIPPED | OUTPATIENT
Start: 2024-01-04

## 2024-01-04 NOTE — TELEPHONE ENCOUNTER
No care due was identified.  [facility] Embedded Care Due Messages. Reference number: [id number].   1/04/2024 3:17:45 AM CST

## 2024-01-04 NOTE — TELEPHONE ENCOUNTER
Refill Decision Note   Poonam Alvarez  is requesting a refill authorization.  Brief Assessment and Rationale for Refill:  Approve     Medication Therapy Plan:         Comments:     Note composed:3:19 PM 01/04/2024

## 2024-01-12 ENCOUNTER — PATIENT MESSAGE (OUTPATIENT)
Dept: VASCULAR SURGERY | Facility: CLINIC | Age: 74
End: 2024-01-12
Payer: MEDICARE

## 2024-01-17 ENCOUNTER — OFFICE VISIT (OUTPATIENT)
Dept: URGENT CARE | Facility: CLINIC | Age: 74
End: 2024-01-17
Payer: MEDICARE

## 2024-01-17 VITALS
BODY MASS INDEX: 29.8 KG/M2 | RESPIRATION RATE: 18 BRPM | TEMPERATURE: 98 F | HEIGHT: 72 IN | SYSTOLIC BLOOD PRESSURE: 122 MMHG | DIASTOLIC BLOOD PRESSURE: 56 MMHG | OXYGEN SATURATION: 100 % | HEART RATE: 99 BPM | WEIGHT: 220 LBS

## 2024-01-17 DIAGNOSIS — J06.9 UPPER RESPIRATORY TRACT INFECTION, UNSPECIFIED TYPE: ICD-10-CM

## 2024-01-17 DIAGNOSIS — R05.9 COUGH, UNSPECIFIED TYPE: Primary | ICD-10-CM

## 2024-01-17 PROCEDURE — 87502 INFLUENZA DNA AMP PROBE: CPT | Mod: QW,S$GLB,, | Performed by: NURSE PRACTITIONER

## 2024-01-17 PROCEDURE — 99214 OFFICE O/P EST MOD 30 MIN: CPT | Mod: S$GLB,,, | Performed by: NURSE PRACTITIONER

## 2024-01-17 PROCEDURE — 87811 SARS-COV-2 COVID19 W/OPTIC: CPT | Mod: QW,S$GLB,, | Performed by: NURSE PRACTITIONER

## 2024-01-17 RX ORDER — PROMETHAZINE HYDROCHLORIDE AND DEXTROMETHORPHAN HYDROBROMIDE 6.25; 15 MG/5ML; MG/5ML
5 SYRUP ORAL NIGHTLY PRN
Qty: 118 ML | Refills: 0 | Status: SHIPPED | OUTPATIENT
Start: 2024-01-17 | End: 2024-02-14 | Stop reason: SDUPTHER

## 2024-01-17 NOTE — PATIENT INSTRUCTIONS
"Patient Instructions   PLEASE READ YOUR DISCHARGE INSTRUCTIONS ENTIRELY AS IT CONTAINS IMPORTANT INFORMATION.        Please drink plenty of fluids. You body needs increased water but other beverages may aid in comfort.  You will know that you have had enough water to be hydrated when your urine is clear or at least a very pale yellow. Nasal saline may help with removal of mucus as well.  Ibuprofen is preferred for aches and pains as well as fever reduction. If you do not have high blood pressure, then you may use a decongestant such as pseudoephedrine or one of the above medications that have the letter, "-D" following it.  Hot tea with honey can help with sore throats as the heat with reduce the inflammation and the honey will coat your throat to help it feel better. Prescription pain medicine should be used for the significant throat sxs you are experiencing. Do not drive after taking this medication.     Lastly, good hand washing and cough hygiene (cough into your elbow) will help prevent the spread of the illness.  A general rule is that you are no longer contagious once you have been without a fever for over 24 hours without requiring fever reducing medications.   Please get plenty of rest.     Please return here or go to the Emergency Department for any concerns or worsening of condition.     Please take an over the counter antihistamine medication (allegra/Claritin/Zyrtec) of your choice as directed, they may help with some of the runny nose symptoms if you are having them.       Can continue mucinex. Use mucinex (guaifenisin) to break up mucous up to 2400mg/day to loosen any mucous. The mucinex DM pill has a cough suppressant that can be used at night to stop the tickle at the back of your throat. You may use Mucinex to help thin thick secretions to allow you to expel them but it only works if you drink more water.     If not allergic, please take over the counter Tylenol (Acetaminophen) and/or Motrin " (Ibuprofen) as directed for control of pain and/or fever.  Please follow up with your primary care doctor or specialist as needed.     Sore throat recommendations: Warm fluids, warm salt water gargles, throat lozenges, tea, honey, soup, rest, hydration.     Use over the counter flonase: one spray each nostril twice daily OR two sprays each nostril once daily.      Sinus rinses DO NOT USE TAP WATER, if you must, water must be a rolling boil for 1 minute, let it cool, then use.  May use distilled water, or over the counter nasal saline rinses.  Vics vapor rub in shower to help open nasal passages.  May use nasal gel to keep passages moisturized.  May use Nasal saline sprays during the day for added relief of congestion.   For those who go to the gym, please do not use the sauna or steam room now to clear sinuses.     If you  smoke, please stop smoking.        Please return or see your primary care doctor if you develop new or worsening symptoms.      Please arrange follow up with your primary medical clinic as soon as possible. You must understand that you've received an Urgent Care treatment only and that you may be released before all of your medical problems are known or treated. You, the patient, will arrange for follow up as instructed. If your symptoms worsen or fail to improve you should go to the Emergency Room.    You must understand that you've received an Urgent Care treatment only and that you may be released before all your medical problems are known or treated. You, the patient, will arrange for follow up care as instructed.  Follow up with your PCP or specialty clinic as directed in the next 1-2 weeks if not improved or as needed.  You can call [phone] to schedule an appointment with the appropriate provider.  If your condition worsens we recommend that you receive another evaluation at the emergency room immediately or contact your primary medical clinics after hours call service to discuss your  concerns.  Please return here or go to the Emergency Department for any concerns or worsening of condition.    If you were prescribed a narcotic or controlled medication, do not drive or operate heavy equipment or machinery while taking these medications.    Thank you for choosing [facility]!    Our goal in the Urgent Care is to always provide outstanding medical care. You may receive a survey by mail or e-mail in the next week regarding your experience today. We would greatly appreciate you completing and returning the survey. Your feedback provides us with a way to recognize our staff who provide very good care, and it helps us learn how to improve when your experience was below our aspiration of excellence.      We appreciate you trusting us with your medical care. We hope you feel better soon. We will be happy to take care of you for all of your future medical needs.   This note was prepared using voice-recognition software.  Although efforts are made to proofread the note, some errors may persist in the final document.     Sincerely,    Steve De Leon DNP, JOSE-C

## 2024-01-17 NOTE — PROGRESS NOTES
Subjective:      Patient ID: Poonam Alvarez is a 73 y.o. female.    Vitals:  height is 6' (1.829 m) and weight is 99.8 kg (220 lb 0.3 oz). Her temperature is 98.3 °F (36.8 °C). Her blood pressure is 122/56 (abnormal) and her pulse is 99. Her respiration is 18 and oxygen saturation is 100%.     Chief Complaint: Cough    73-year-old female presents with a complaint of a nonproductive cough.  Patient reports onset of cough, today.  She reports a history of COVID in August 2023.  Patient states she just attended a [employer] function with multiple people and would like to be screened for COVID today.  She denies fever, productive cough, shortness a breath, chest pain or history of COPD or emphysema.      Cough  This is a new problem. The current episode started today. The problem occurs constantly. The cough is Non-productive. Pertinent negatives include no chest pain, chills, ear congestion, ear pain, fever, headaches, heartburn, hemoptysis, myalgias, nasal congestion, postnasal drip, rash, rhinorrhea, sore throat, shortness of breath, sweats, weight loss or wheezing. She has tried OTC cough suppressant for the symptoms. The treatment provided mild relief. There is no history of asthma, bronchiectasis, bronchitis, COPD, emphysema, environmental allergies or pneumonia.       Constitution: Negative for chills and fever.   HENT:  Negative for ear pain, congestion, postnasal drip and sore throat.    Cardiovascular:  Negative for chest pain and palpitations.   Respiratory:  Positive for cough. Negative for chest tightness, sputum production, bloody sputum, COPD, shortness of breath, stridor, wheezing and asthma.    Gastrointestinal:  Negative for heartburn.   Musculoskeletal:  Negative for muscle ache.   Skin:  Negative for rash.   Allergic/Immunologic: Negative for environmental allergies and asthma.   Neurological:  Negative for headaches.      Objective:     Physical Exam   Constitutional: She is oriented to person, place,  and time. She appears well-developed. She is cooperative.  Non-toxic appearance. She does not appear ill. No distress.   HENT:   Head: Normocephalic and atraumatic.   Ears:   Right Ear: Hearing, tympanic membrane, external ear and ear canal normal.   Left Ear: Hearing, tympanic membrane, external ear and ear canal normal.   Nose: Nose normal. No mucosal edema, rhinorrhea or nasal deformity. No epistaxis. Right sinus exhibits no maxillary sinus tenderness and no frontal sinus tenderness. Left sinus exhibits no maxillary sinus tenderness and no frontal sinus tenderness.   Mouth/Throat: Uvula is midline, oropharynx is clear and moist and mucous membranes are normal. No trismus in the jaw. Normal dentition. No uvula swelling. No oropharyngeal exudate, posterior oropharyngeal edema or posterior oropharyngeal erythema.   Eyes: Conjunctivae and lids are normal. No scleral icterus.   Neck: Trachea normal and phonation normal. Neck supple. No edema present. No erythema present. No neck rigidity present.   Cardiovascular: Normal rate, regular rhythm, normal heart sounds and normal pulses.   Pulmonary/Chest: Effort normal and breath sounds normal. No stridor. No respiratory distress. She has no decreased breath sounds. She has no wheezes. She has no rhonchi. She has no rales. She exhibits no tenderness.   Abdominal: Normal appearance.   Musculoskeletal: Normal range of motion.         General: No deformity. Normal range of motion.   Neurological: She is alert and oriented to person, place, and time. She exhibits normal muscle tone. Coordination normal.   Skin: Skin is warm, dry, intact, not diaphoretic and not pale.   Psychiatric: Her speech is normal and behavior is normal. Judgment and thought content normal.   Nursing note and vitals reviewed.      Assessment:     1. Cough, unspecified type    2. Upper respiratory tract infection, unspecified type      Results for orders placed or performed in visit on 01/17/24   SARS  "Coronavirus 2 Antigen, POCT Manual Read   Result Value Ref Range    SARS Coronavirus 2 Antigen Negative Negative     Acceptable Yes    POCT Influenza A/B MOLECULAR   Result Value Ref Range    POC Molecular Influenza A Ag Negative Negative, Not Reported    POC Molecular Influenza B Ag Negative Negative, Not Reported     Acceptable Yes      *Note: Due to a large number of results and/or encounters for the requested time period, some results have not been displayed. A complete set of results can be found in Results Review.      Plan:     Cough, unspecified type  -     SARS Coronavirus 2 Antigen, POCT Manual Read  -     POCT Influenza A/B MOLECULAR    Upper respiratory tract infection, unspecified type  -     promethazine-dextromethorphan (PROMETHAZINE-DM) 6.25-15 mg/5 mL Syrp; Take 5 mLs by mouth nightly as needed (cough).  Dispense: 118 mL; Refill: 0      Please drink plenty of fluids. You body needs increased water but other beverages may aid in comfort.  You will know that you have had enough water to be hydrated when your urine is clear or at least a very pale yellow. Nasal saline may help with removal of mucus as well.  Ibuprofen is preferred for aches and pains as well as fever reduction. If you do not have high blood pressure, then you may use a decongestant such as pseudoephedrine or one of the above medications that have the letter, "-D" following it.  Hot tea with honey can help with sore throats as the heat with reduce the inflammation and the honey will coat your throat to help it feel better. Prescription pain medicine should be used for the significant throat sxs you are experiencing. Do not drive after taking this medication.     Lastly, good hand washing and cough hygiene (cough into your elbow) will help prevent the spread of the illness.  A general rule is that you are no longer contagious once you have been without a fever for over 24 hours without requiring fever " reducing medications.   Please get plenty of rest.     Please return here or go to the Emergency Department for any concerns or worsening of condition.     Please take an over the counter antihistamine medication (allegra/Claritin/Zyrtec) of your choice as directed, they may help with some of the runny nose symptoms if you are having them.       Can continue mucinex. Use mucinex (guaifenisin) to break up mucous up to 2400mg/day to loosen any mucous. The mucinex DM pill has a cough suppressant that can be used at night to stop the tickle at the back of your throat. You may use Mucinex to help thin thick secretions to allow you to expel them but it only works if you drink more water.     If not allergic, please take over the counter Tylenol (Acetaminophen) and/or Motrin (Ibuprofen) as directed for control of pain and/or fever.  Please follow up with your primary care doctor or specialist as needed.     Sore throat recommendations: Warm fluids, warm salt water gargles, throat lozenges, tea, honey, soup, rest, hydration.     Use over the counter flonase: one spray each nostril twice daily OR two sprays each nostril once daily.      Sinus rinses DO NOT USE TAP WATER, if you must, water must be a rolling boil for 1 minute, let it cool, then use.  May use distilled water, or over the counter nasal saline rinses.  Vics vapor rub in shower to help open nasal passages.  May use nasal gel to keep passages moisturized.  May use Nasal saline sprays during the day for added relief of congestion.   For those who go to the gym, please do not use the sauna or steam room now to clear sinuses.     If you  smoke, please stop smoking.        Please return or see your primary care doctor if you develop new or worsening symptoms.      Please arrange follow up with your primary medical clinic as soon as possible. You must understand that you've received an Urgent Care treatment only and that you may be released before all of your medical  problems are known or treated. You, the patient, will arrange for follow up as instructed. If your symptoms worsen or fail to improve you should go to the Emergency Room.    You must understand that you've received an Urgent Care treatment only and that you may be released before all your medical problems are known or treated. You, the patient, will arrange for follow up care as instructed.  Follow up with your PCP or specialty clinic as directed in the next 1-2 weeks if not improved or as needed.  You can call [phone] to schedule an appointment with the appropriate provider.  If your condition worsens we recommend that you receive another evaluation at the emergency room immediately or contact your primary medical clinics after hours call service to discuss your concerns.  Please return here or go to the Emergency Department for any concerns or worsening of condition.    If you were prescribed a narcotic or controlled medication, do not drive or operate heavy equipment or machinery while taking these medications.    Thank you for choosing [facility]!    Our goal in the Urgent Care is to always provide outstanding medical care. You may receive a survey by mail or e-mail in the next week regarding your experience today. We would greatly appreciate you completing and returning the survey. Your feedback provides us with a way to recognize our staff who provide very good care, and it helps us learn how to improve when your experience was below our aspiration of excellence.      We appreciate you trusting us with your medical care. We hope you feel better soon. We will be happy to take care of you for all of your future medical needs.   This note was prepared using voice-recognition software.  Although efforts are made to proofread the note, some errors may persist in the final document.     Sincerely,    Steve De Leon DNP, FNP-C        Additional MDM:     Heart Failure Score:   COPD = No

## 2024-01-22 ENCOUNTER — OFFICE VISIT (OUTPATIENT)
Dept: PODIATRY | Facility: CLINIC | Age: 74
End: 2024-01-22
Payer: MEDICARE

## 2024-01-22 VITALS
HEART RATE: 91 BPM | BODY MASS INDEX: 29.8 KG/M2 | SYSTOLIC BLOOD PRESSURE: 117 MMHG | HEIGHT: 72 IN | WEIGHT: 220 LBS | DIASTOLIC BLOOD PRESSURE: 71 MMHG

## 2024-01-22 DIAGNOSIS — M79.672 LEFT FOOT PAIN: ICD-10-CM

## 2024-01-22 DIAGNOSIS — L84 CORN OR CALLUS: Primary | ICD-10-CM

## 2024-01-22 DIAGNOSIS — L60.3 DYSTROPHIC NAIL: ICD-10-CM

## 2024-01-22 PROCEDURE — 99999 PR PBB SHADOW E&M-EST. PATIENT-LVL III: CPT | Mod: PBBFAC,,, | Performed by: PODIATRIST

## 2024-01-22 PROCEDURE — 99213 OFFICE O/P EST LOW 20 MIN: CPT | Mod: S$PBB,,, | Performed by: PODIATRIST

## 2024-01-22 PROCEDURE — 99213 OFFICE O/P EST LOW 20 MIN: CPT | Mod: PBBFAC,PN | Performed by: PODIATRIST

## 2024-01-22 RX ORDER — UREA 200 MG/G
1 CREAM TOPICAL DAILY
Qty: 75 G | Refills: 10 | Status: SHIPPED | OUTPATIENT
Start: 2024-01-22

## 2024-01-22 NOTE — PROGRESS NOTES
Subjective:      Patient ID: Poonam Alvarez is a 73 y.o. female.    Chief Complaint: Toe Pain (Pt states she's having pain regarding rt toe)    Poonam Alvarez is a 73 y.o. female , presents for right hallux pain, present for a few weeks.  No acute trauma recalled.  Medial border of the toe.  No trauma recalled.  Pain worse with direct pressure.         Patient Active Problem List   Diagnosis    Osteoarthritis    History of Graves' disease    Hypothyroidism, postablative    Radial styloid tenosynovitis    Pseudophakia of both eyes    Other specified disorder of skin    GERD (gastroesophageal reflux disease)    Left knee pain    Primary localized osteoarthrosis, lower leg    Knee pain, right    Pain in joint, lower leg    S/P total knee arthroplasty    Pre-syncope    Constipation    Ankle edema    Screening for colon cancer    Bilateral knee pain    Essential hypertension    Ectatic aorta-Noted on imaging 7/27/2015    Posture abnormality    Balance problem    Hypotension    History of atrial flutter    Weight gain    Keloid    Family history of thrombosis    Decreased range of motion (ROM) of left knee    Impaired gait and mobility    Decreased strength of lower extremity    Neutropenia, unspecified type    Chronic venous insufficiency           Current Outpatient Medications on File Prior to Visit   Medication Sig Dispense Refill    ascorbic acid/multivit-min (EMERGEN-C ORAL) Take 1,000 mg by mouth once daily.      cetirizine (ZYRTEC) 10 MG tablet Take 0.5 tablets (5 mg total) by mouth once daily. 15 tablet 0    cholecalciferol, vitamin D3, (VITAMIN D3) 50 mcg (2,000 unit) Cap Take 1 capsule by mouth once daily.      hydroCHLOROthiazide (HYDRODIURIL) 12.5 MG Tab TAKE 1/2 TO 1 TABLET BY MOUTH DAILY AS NEEDED 90 tablet 1    hydrocortisone 2.5 % ointment Apply topically 2 (two) times daily. 20 g 0    multivitamin-minerals-lutein Tab Take 1 tablet by mouth once daily.       promethazine-dextromethorphan  (PROMETHAZINE-DM) 6.25-15 mg/5 mL Syrp Take 5 mLs by mouth nightly as needed (cough). 118 mL 0    SYNTHROID 137 mcg Tab tablet TAKE 1 TABLET(137 MCG) BY MOUTH EVERY DAY EXCEPT MONDAY 90 tablet 4    tirzepatide 10 mg/0.5 mL PnIj Inject 10 mg into the skin every 7 days.      aspirin (ECOTRIN) 81 MG EC tablet Take 1 tablet (81 mg total) by mouth 2 (two) times daily. 60 tablet 0     No current facility-administered medications on file prior to visit.         Review of patient's allergies indicates:   Allergen Reactions    Hydrocodone-acetaminophen Other (See Comments)     Low blood pressure; doesn't want    Oxycodone-acetaminophen Other (See Comments)     Causes low blood pressure; doesn't want  4/4/19 - patient reports she is not actually allergic to it (just doesn't like how it makes her feel)                 Objective:        Vitals:    01/22/24 1349   BP: 117/71   Pulse: 91   Weight: 99.8 kg (220 lb)   Height: 6' (1.829 m)     Physical Exam  Cardiovascular:      Pulses:           Dorsalis pedis pulses are 2+ on the right side and 2+ on the left side.        Posterior tibial pulses are 2+ on the right side and 2+ on the left side.      Comments: 1+ edema bilateral lower extremities.   +varicosities      Musculoskeletal:      Comments: Adequate joint ROM noted to all lower extremity muscle groups with no pain or crepitation noted. Muscle strength is 5/5 in all groups bilaterally.     Feet:      Right foot:      Protective Sensation: 5 sites tested.  5 sites sensed.      Left foot:      Protective Sensation: 5 sites tested.  5 sites sensed.      Comments:       Skin:     Comments: Thickened, callused distal lateral border of the right hallux toenail.  No underlying wound.  Some dry blood.  No active drainage.          Neurological:      Comments: Intact gross sensation noted to b/L LEs.  Negative Tinels.   Negative Mulders.   No sensorimotor.               Assessment:       Problem List Items Addressed This Visit     None  Visit Diagnoses       Corn or callus    -  Primary    Relevant Medications    urea 20 % Crea    Left foot pain        Relevant Orders    X-Ray Foot Complete Left (Completed)    Dystrophic nail        right hallux pain                Plan:         I counseled the patient on her conditions, their implications and medical management.    Xrays ordered for left foot pain.    Urea as ordered for corns/calluses.      Shoe and activity modification as needed for relief.     General nail care measures for abnormal nails include:  Keeping nails trimmed short, but avoid overzealous trimming  Avoiding trauma   Avoiding contact irritants   Keeping nails dry (avoiding wet work)  Avoiding all nail cosmetics  Wearing shoes that fit well at the toe box.  Avoid tight shoes.     Call or return to clinic prn if these symptoms worsen or fail to improve as anticipated.   .     I spent a total of 25 minutes on the day of the visit.  This includes face to face time and non-face to face time preparing to see the patient (eg, review of tests), obtaining and/or reviewing separately obtained history, documenting clinical information in the electronic or other health record, independently interpreting results and communicating results to the patient/family/caregiver, or care coordinator.

## 2024-01-26 ENCOUNTER — HOSPITAL ENCOUNTER (OUTPATIENT)
Dept: RADIOLOGY | Facility: HOSPITAL | Age: 74
Discharge: HOME | End: 2024-01-26
Attending: PODIATRIST
Payer: MEDICARE

## 2024-01-26 DIAGNOSIS — M79.672 LEFT FOOT PAIN: ICD-10-CM

## 2024-01-26 PROCEDURE — 73630 X-RAY EXAM OF FOOT: CPT | Mod: TC,LT

## 2024-01-26 PROCEDURE — 73630 X-RAY EXAM OF FOOT: CPT | Mod: 26,LT,, | Performed by: RADIOLOGY

## 2024-02-08 ENCOUNTER — TELEPHONE (OUTPATIENT)
Dept: INTERNAL MEDICINE | Facility: CLINIC | Age: 74
End: 2024-02-08
Payer: MEDICARE

## 2024-02-08 NOTE — TELEPHONE ENCOUNTER
I called and spoke to pt   I got her scheduled to see Yumiko Escalona on the 14th but she still wants you talk to her   She isnt having any urgent issues at this time but wanted me to send the message still over to you

## 2024-02-08 NOTE — TELEPHONE ENCOUNTER
----- Message from Guanakito Cisneros sent at 2/8/2024 10:00 AM CST -----  Contact: Pt @ [phone]  Caller is requesting an earlier appointment then we can schedule.  Caller is requesting a message be sent to the provider.  If this is for urgent care symptoms, did you offer other providers at this location, providers at other locations, or [facility]? (yes, no, n/a):  yes  If this is for the patients physical, did you offer to schedule next available and put on wait list, or to see NP or PA for their physical?  (yes, no, n/a):  yes  When is the next available appointment with their provider:  May  Reason for the appointment:  Stomach issues and night cough  Patient preference of timeframe to be scheduled:  next week  Would the patient like a call back, or a response through their [facility] portal?:   call   Comments:

## 2024-02-09 NOTE — TELEPHONE ENCOUNTER
Spoke with patient.     She has had abdominal pain - lower abdomen - mid abdomen. Not daily. Started 3 weeks ago after she had 2 days of diarrhea. Has occurred 3-4 times a week. No nausea, vomiting. No heartburn. BOwels are moving fine with Milk of magnesium. No dysuria, no hematuria.     Suggested that she try Align probiotic.    she has a cough at night - occasionally productive.  She will cough and gag. Try over the counter famotidine 20 mg at night.

## 2024-02-12 NOTE — PROGRESS NOTES
"INTERNAL MEDICINE CLINIC - SAME DAY APPOINTMENT  Progress Note    PRESENTING HISTORY     PCP: Lindsey Bach MD    Chief Complaint/Reason for Visit:   No chief complaint on file.    History of Present Illness & ROS : Ms. Poonam Alvarez is a 73 y.o. female.    Same day apt.   New to me.   Est'd with Dr. Bach.   Very pleasant lady.   *Notations made per Dr. Bach on 2/9 in regards to both: rec'd Align Probiotics for abd discomforts and Pepcid nightly for the 'cough'.  Reports that has been having a cough for the past "2 months, seems to be worse at night; went to local , prescribed some cough syrup, works, but never went away'.   Abdominal pain is 'better with the align probiotics'. Not taking the Pepcid.   No headaches, ear pain or other 'cold' symptoms, but has been 'feeling like cold in throat'. No SOB or chest wall discomforts. No fever or chills.   *Not a smoker, current or remote.   Has had some intermittent swelling to ankles, happened 'several years ago, went to see a Cardiologist and a Vascular Doctor'. Had'some chest flutters, did a stress test and all was ok'.   Uncertain if 'has allergies'. Not taking the listed Zyrtec on chart.     Review of Systems:  Eyes: denies visual changes at this time denies floaters   ENT: no nasal congestion or sore throat  Respiratory: no shorness of breath  Cardiovascular: no chest pain or palpitations  Gastrointestinal: no nausea or vomiting, no abdominal pain or change in bowel habits  Genitourinary: no hematuria or dysuria; denies frequency  Hematologic/Lymphatic: no easy bruising or lymphadenopathy  Musculoskeletal: no arthralgias or myalgias  Neurological: no seizures or tremors  Endocrine: no heat or cold intolerance      PAST HISTORY:     Past Medical History:   Diagnosis Date    AR (allergic rhinitis) 12/07/2012    Atrial flutter     ablation October 2008    Cataract     Generalized headaches     GERD (gastroesophageal reflux disease) 03/08/2013    " resolved    Grave's disease     s/p radioactive iodine, now hypothyroidism    Keloid cicatrix     Obesity     Osteoarthritis     shoulders, hands, knees    Positive PPD, treated     9 months of INH, completed 1/2010       Past Surgical History:   Procedure Laterality Date    ABLATION OF DYSRHYTHMIC FOCUS  10/24/2008    atrial flutter    ARTHROPLASTY OF SHOULDER Right 04/01/2019    Procedure: ARTHROPLASTY, SHOULDER;  Surgeon: Sage Xie MD;  Location: [facility] OR;  Service: Orthopedics;  Laterality: Right;  regional w/ catheter (q-ball)    CATARACT EXTRACTION W/  INTRAOCULAR LENS IMPLANT Bilateral     COLONOSCOPY N/A 11/05/2015    Procedure: COLONOSCOPY;  Surgeon: Jose Ly MD;  [facility]  Service: Endoscopy;  Laterality: N/A;  Last colonoscopy 2008 with Dr. Vieira; Pt wanted this day    EYE SURGERY      cataract removal right eye    HERNIA REPAIR      JOINT REPLACEMENT      KNEE ARTHROSCOPY W/ DEBRIDEMENT      right, 2005 and 2008    KNEE SURGERY  03/27/2014    right TKA    TOTAL KNEE ARTHROPLASTY Left 12/13/2022    Procedure: ARTHROPLASTY, KNEE, TOTAL;  Surgeon: Gordon Schneider MD;  Location: [facility];  Service: Orthopedics;  Laterality: Left;       Family History   Problem Relation Age of Onset    Arthritis Mother     Arthritis Father     Glaucoma Father     Cataracts Father     Hepatitis Brother     No Known Problems Daughter     No Known Problems Son     No Known Problems Son     No Known Problems Maternal Aunt     No Known Problems Maternal Uncle     No Known Problems Paternal Aunt     No Known Problems Paternal Uncle     No Known Problems Maternal Grandmother     No Known Problems Maternal Grandfather     No Known Problems Paternal Grandmother     No Known Problems Paternal Grandfather     Colon cancer Neg Hx     Ovarian cancer Neg Hx     Breast cancer Neg Hx        Social History     Socioeconomic History    Marital status:    Occupational History    Occupation:       Employer: [employer]   Tobacco Use    Smoking status: Never     Passive exposure: Never    Smokeless tobacco: Never   Substance and Sexual Activity    Alcohol use: No    Drug use: No    Sexual activity: Not Currently     Partners: Male     Birth control/protection: None   Other Topics Concern    Are you pregnant or think you may be? No    Breast-feeding No   Social History Narrative         Social Determinants of Health     Financial Resource Strain: Low Risk  (2/3/2023)    Overall Financial Resource Strain (CARDIA)     Difficulty of Paying Living Expenses: Not hard at all   Food Insecurity: No Food Insecurity (2/3/2023)    Hunger Vital Sign     Worried About Running Out of Food in the Last Year: Never true     Ran Out of Food in the Last Year: Never true   Transportation Needs: No Transportation Needs (2/3/2023)    PRAPARE - Transportation     Lack of Transportation (Medical): No     Lack of Transportation (Non-Medical): No   Physical Activity: Inactive (2/3/2023)    Exercise Vital Sign     Days of Exercise per Week: 0 days     Minutes of Exercise per Session: 0 min   Stress: No Stress Concern Present (2/3/2023)    [employer] - Occupational Stress Questionnaire     Feeling of Stress : Not at all   Social Connections: Moderately Isolated (2/3/2023)    Social Connection and Isolation Panel [NHANES]     Frequency of Communication with Friends and Family: More than three times a week     Frequency of Social Gatherings with Friends and Family: Once a week     Attends Confucianism Services: More than 4 times per year     Active Member of Clubs or Organizations: No     Attends Club or Organization Meetings: Never     Marital Status:    Housing Stability: Low Risk  (2/3/2023)    Housing Stability Vital Sign     Unable to Pay for Housing in the Last Year: No     Number of Places Lived in the Last Year: 1     Unstable Housing in the Last  Year: No       MEDICATIONS & ALLERGIES:     Current Outpatient Medications on File Prior to Visit   Medication Sig Dispense Refill    ascorbic acid/multivit-min (EMERGEN-C ORAL) Take 1,000 mg by mouth once daily.      aspirin (ECOTRIN) 81 MG EC tablet Take 1 tablet (81 mg total) by mouth 2 (two) times daily. 60 tablet 0    cetirizine (ZYRTEC) 10 MG tablet Take 0.5 tablets (5 mg total) by mouth once daily. 15 tablet 0    cholecalciferol, vitamin D3, (VITAMIN D3) 50 mcg (2,000 unit) Cap Take 1 capsule by mouth once daily.      hydroCHLOROthiazide (HYDRODIURIL) 12.5 MG Tab TAKE 1/2 TO 1 TABLET BY MOUTH DAILY AS NEEDED 90 tablet 1    hydrocortisone 2.5 % ointment Apply topically 2 (two) times daily. 20 g 0    multivitamin-minerals-lutein Tab Take 1 tablet by mouth once daily.       promethazine-dextromethorphan (PROMETHAZINE-DM) 6.25-15 mg/5 mL Syrp Take 5 mLs by mouth nightly as needed (cough). 118 mL 0    SYNTHROID 137 mcg Tab tablet TAKE 1 TABLET(137 MCG) BY MOUTH EVERY DAY EXCEPT MONDAY 90 tablet 4    tirzepatide 10 mg/0.5 mL PnIj Inject 10 mg into the skin every 7 days.      urea 20 % Crea Apply 1 application  topically once daily. To dry skin on the feet. 75 g 10     No current facility-administered medications on file prior to visit.        Review of patient's allergies indicates:   Allergen Reactions    Hydrocodone-acetaminophen Other (See Comments)     Low blood pressure; doesn't want    Oxycodone-acetaminophen Other (See Comments)     Causes low blood pressure; doesn't want  4/4/19 - patient reports she is not actually allergic to it (just doesn't like how it makes her feel)       Medications Reconciliation:   I have reconciled the patient's home medications with the patient/family. I have updated all changes.  Refer to After-Visit Medication List.    OBJECTIVE:     Vital Signs:  There were no vitals filed for this visit.  Wt Readings from Last 3 Encounters:   01/22/24 1349 99.8 kg (220 lb)   01/17/24 1648  99.8 kg (220 lb 0.3 oz)   01/03/24 1415 99.8 kg (220 lb)     There is no height or weight on file to calculate BMI.   Wt Readings from Last 3 Encounters:   02/14/24 92.8 kg (204 lb 9.4 oz)   01/22/24 99.8 kg (220 lb)   01/17/24 99.8 kg (220 lb 0.3 oz)     Temp Readings from Last 3 Encounters:   01/17/24 98.3 °F (36.8 °C)   08/04/23 98.8 °F (37.1 °C) (Oral)   08/04/23 98.8 °F (37.1 °C) (Oral)     BP Readings from Last 3 Encounters:   02/14/24 110/70   01/22/24 117/71   01/17/24 (!) 122/56     Pulse Readings from Last 3 Encounters:   02/14/24 (!) 57   01/22/24 91   01/17/24 99       Physical Exam:  (Focused Exam)  General: Well developed, well nourished. No distress.  HEENT: Head is normocephalic, atraumatic  Eyes: Clear conjunctiva.  Neck: Supple, symmetrical neck; trachea midline.  Lungs: Clear to auscultation bilaterally and normal respiratory effort.  Cardiovascular: Heart with regular rate and rhythm. No murmurs, gallops or rubs  Extremities: No LE edema. Pulses 2+ and symmetric.   Skin: Skin color, texture, turgor normal. No rashes.  Musculoskeletal: Normal gait.   Neurologic: Normal strength and tone. No focal numbness or weakness.       Laboratory  Lab Results   Component Value Date    WBC 4.18 07/07/2023    HGB 11.7 (L) 07/07/2023    HCT 37.6 07/07/2023     07/07/2023    CHOL 190 07/07/2023    TRIG 57 07/07/2023    HDL 57 07/07/2023    ALT 18 07/07/2023    AST 23 07/07/2023     07/07/2023    K 3.9 07/07/2023     07/07/2023    CREATININE 0.8 07/07/2023    BUN 12 07/07/2023    CO2 28 07/07/2023    TSH 0.048 (L) 07/07/2023    INR 1.0 11/30/2022    HGBA1C 5.4 07/06/2022       ASSESSMENT & PLAN:     Same day apt.     Nocturnal Cough:   ? Reflux related; however, since starting the probiotics, 'stomach pain has resolved and no diarrhea'.   ? Underlying Cardio-Pulm etiology   ? Underlying Allergy Triggers  *Seen in UC on 1/17/2024 for this problem; negative for COVID and Flu, Rx'd Phenergan  DM.  *Noted Rx'd Phen D in 2024  Satin% (RA)  *Rec starting Pepcid as rec'd by PCP  -     POCT COVID-19 Rapid Screening (negative)  -     POCT Influenza A/B Rapid Antigen (negative)  -     X-Ray Chest PA And Lateral; Future; Expected date: 2024  -     B-TYPE NATRIURETIC PEPTIDE; Future; Expected date: 2024  -     EKG 12-lead; Future  -     Echo; Future  -     Magnesium; Future; Expected date: 2024  -     TSH; Future; Expected date: 2024 (notations made to 2023 levels, darcy T4)  -     T4, Free; Future; Expected date: 2024  -     promethazine-dextromethorphan (PROMETHAZINE-DM) 6.25-15 mg/5 mL Syrp; Take 5 mLs by mouth every 6 (six) hours as needed (cough).  Dispense: 118 mL; Refill: 0  -     benzonatate (TESSALON) 100 MG capsule; Take 1 capsule (100 mg total) by mouth 3 (three) times daily as needed for Cough (Mild Cough).  Dispense: 30 capsule; Refill: 0  -     fluticasone propionate (FLONASE) 50 mcg/actuation nasal spray; 1 spray (50 mcg total) by Each Nostril route once daily.  Dispense: 16 g; Refill: 1    Bradycardia  57 today, ? New; most recent HRs: 90s  -     EKG 12-lead; Future  -     Echo; Future  -     TSH; Future; Expected date: 2024  -     T4, Free; Future; Expected date: 2024    Swelling of both ankles, intermittent, by history   -     B-TYPE NATRIURETIC PEPTIDE; Future; Expected date: 2024  -     EKG 12-lead; Future  -     Echo; Future    Hypothyroidism, postablative  *On replacement therapy   Lab Results   Component Value Date    TSH 0.048 (L) 2023   -     TSH; Future; Expected date: 2024  -     T4, Free; Future; Expected date: 2024    Chronic ABD Discomforts;  Stable, resolved.   *Stomach pain has resolved and no diarrhea   *C Scope negative in , due repeat screening in   ` continue Align and starf the Pepcid as rec'd by PCP      *Same Day appt today; will start here and she will follow up with her PCP at this time.    Future Appointments   Date Time Provider Department Center   2/14/2024 11:30 AM LAB, APPOINTMENT [facility] INTMED [facility] LAB IM [facility]   2/14/2024 11:45 AM [facility] [id number] LB LIMIT [facility] XRAY IM [facility] PCW   2/14/2024  2:15 PM EKG, APPT [facility] EKG [facility]   2/14/2024  2:30 PM ECHO, [facility] ECHOSTR [facility]   4/5/2024  2:45 PM Johann Baez MD [facility] VEINCL [facility]   5/27/2024  2:30 PM Dionte Daniels OD [facility] OPTOMTY [facility]        Medication List            Accurate as of February 14, 2024 11:26 AM. If you have any questions, ask your nurse or doctor.                START taking these medications      benzonatate 100 MG capsule  Commonly known as: TESSALON  Take 1 capsule (100 mg total) by mouth 3 (three) times daily as needed for Cough (Mild Cough).  Started by: JOSE Costa     fluticasone propionate 50 mcg/actuation nasal spray  Commonly known as: FLONASE  1 spray (50 mcg total) by Each Nostril route once daily.  Started by: JOSE Costa            CHANGE how you take these medications      promethazine-dextromethorphan 6.25-15 mg/5 mL Syrp  Commonly known as: PROMETHAZINE-DM  Take 5 mLs by mouth every 6 (six) hours as needed (cough).  What changed: when to take this  Changed by: JOSE Costa            CONTINUE taking these medications      aspirin 81 MG EC tablet  Commonly known as: ECOTRIN  Take 1 tablet (81 mg total) by mouth 2 (two) times daily.     cholecalciferol (vitamin D3) 50 mcg (2,000 unit) Cap capsule  Commonly known as: VITAMIN D3     EMERGEN-C ORAL     hydroCHLOROthiazide 12.5 MG Tab  Commonly known as: HYDRODIURIL  TAKE 1/2 TO 1 TABLET BY MOUTH DAILY AS NEEDED     hydrocortisone 2.5 % ointment  Apply topically 2 (two) times daily.     multivitamin-minerals-lutein Tab     SYNTHROID 137 MCG Tab tablet  Generic drug: levothyroxine  TAKE 1 TABLET(137 MCG) BY MOUTH EVERY DAY EXCEPT MONDAY     tirzepatide 10 mg/0.5 mL Pnij     urea  20 % Crea  Apply 1 application  topically once daily. To dry skin on the feet.            STOP taking these medications      cetirizine 10 MG tablet  Commonly known as: ZYRTEC  Stopped by: JOSE Costa               Where to Get Your Medications        These medications were sent to [id number] - [facility] [address] AT [place] [id number], [facility] [address]      Phone: [phone]   benzonatate 100 MG capsule  fluticasone propionate 50 mcg/actuation nasal spray  promethazine-dextromethorphan 6.25-15 mg/5 mL Syrp         Signing Physician:  JOSE Costa

## 2024-02-14 ENCOUNTER — OFFICE VISIT (OUTPATIENT)
Dept: INTERNAL MEDICINE | Facility: CLINIC | Age: 74
End: 2024-02-14
Payer: MEDICARE

## 2024-02-14 ENCOUNTER — HOSPITAL ENCOUNTER (OUTPATIENT)
Dept: RADIOLOGY | Facility: HOSPITAL | Age: 74
Discharge: HOME | End: 2024-02-14
Attending: NURSE PRACTITIONER
Payer: MEDICARE

## 2024-02-14 ENCOUNTER — HOSPITAL ENCOUNTER (OUTPATIENT)
Dept: CARDIOLOGY | Facility: CLINIC | Age: 74
Discharge: HOME | End: 2024-02-14
Payer: MEDICARE

## 2024-02-14 ENCOUNTER — TELEPHONE (OUTPATIENT)
Dept: INTERNAL MEDICINE | Facility: CLINIC | Age: 74
End: 2024-02-14

## 2024-02-14 ENCOUNTER — HOSPITAL ENCOUNTER (OUTPATIENT)
Dept: CARDIOLOGY | Facility: HOSPITAL | Age: 74
Discharge: HOME | End: 2024-02-14
Attending: NURSE PRACTITIONER
Payer: MEDICARE

## 2024-02-14 VITALS
BODY MASS INDEX: 27.63 KG/M2 | DIASTOLIC BLOOD PRESSURE: 70 MMHG | WEIGHT: 204 LBS | HEART RATE: 80 BPM | SYSTOLIC BLOOD PRESSURE: 110 MMHG | HEIGHT: 72 IN

## 2024-02-14 VITALS
HEIGHT: 72 IN | BODY MASS INDEX: 27.71 KG/M2 | SYSTOLIC BLOOD PRESSURE: 110 MMHG | WEIGHT: 204.56 LBS | HEART RATE: 57 BPM | DIASTOLIC BLOOD PRESSURE: 70 MMHG | OXYGEN SATURATION: 97 %

## 2024-02-14 DIAGNOSIS — J06.9 UPPER RESPIRATORY TRACT INFECTION, UNSPECIFIED TYPE: ICD-10-CM

## 2024-02-14 DIAGNOSIS — M25.472 SWELLING OF BOTH ANKLES: ICD-10-CM

## 2024-02-14 DIAGNOSIS — E89.0 HYPOTHYROIDISM, POSTABLATIVE: ICD-10-CM

## 2024-02-14 DIAGNOSIS — M25.471 SWELLING OF BOTH ANKLES: ICD-10-CM

## 2024-02-14 DIAGNOSIS — I25.3 CARDIAC ANEURYSM: Primary | ICD-10-CM

## 2024-02-14 DIAGNOSIS — R00.1 BRADYCARDIA: ICD-10-CM

## 2024-02-14 DIAGNOSIS — R05.9 COUGH, UNSPECIFIED TYPE: ICD-10-CM

## 2024-02-14 DIAGNOSIS — R93.1 ABNORMAL ECHOCARDIOGRAM: ICD-10-CM

## 2024-02-14 DIAGNOSIS — R05.9 COUGH, UNSPECIFIED TYPE: Primary | ICD-10-CM

## 2024-02-14 DIAGNOSIS — Q24.8 ABNORMALITY OF HEART VALVE: ICD-10-CM

## 2024-02-14 LAB
AORTIC ARCH: 3.4 CM
ASCENDING AORTA: 3.6 CM
AV INDEX (PROSTH): 0.81
AV MEAN GRADIENT: 4 MMHG
AV PEAK GRADIENT: 5 MMHG
AV VALVE AREA BY VELOCITY RATIO: 3.65 CM²
AV VALVE AREA: 3.64 CM²
AV VELOCITY RATIO: 0.81
BSA FOR ECHO PROCEDURE: 2.16 M2
CTP QC/QA: YES
CTP QC/QA: YES
CV ECHO LV RWT: 0.3 CM
DESCENDING AORTA: 2.9 CM
DOP CALC AO PEAK VEL: 1.14 M/S
DOP CALC AO VTI: 28.99 CM
DOP CALC LVOT AREA: 4.5 CM2
DOP CALC LVOT DIAMETER: 2.4 CM
DOP CALC LVOT PEAK VEL: 0.92 M/S
DOP CALC LVOT STROKE VOLUME: 105.58 CM3
DOP CALCLVOT PEAK VEL VTI: 23.35 CM
E WAVE DECELERATION TIME: 236.56 MSEC
E/A RATIO: 1.04
E/E' RATIO: 7.78 M/S
ECHO LV POSTERIOR WALL: 0.76 CM (ref 0.6–1.1)
FLUAV AG NPH QL: NEGATIVE
FLUBV AG NPH QL: NEGATIVE
FRACTIONAL SHORTENING: 41 % (ref 28–44)
INTERVENTRICULAR SEPTUM: 0.64 CM (ref 0.6–1.1)
IVRT: 121.79 MSEC
LA MAJOR: 5.46 CM
LA MINOR: 5.57 CM
LA WIDTH: 3.96 CM
LEFT ATRIUM SIZE: 3.5 CM
LEFT ATRIUM VOLUME INDEX MOD: 31 ML/M2
LEFT ATRIUM VOLUME INDEX: 30.4 ML/M2
LEFT ATRIUM VOLUME MOD: 66.32 CM3
LEFT ATRIUM VOLUME: 64.97 CM3
LEFT INTERNAL DIMENSION IN SYSTOLE: 3 CM (ref 2.1–4)
LEFT VENTRICLE DIASTOLIC VOLUME INDEX: 57.14 ML/M2
LEFT VENTRICLE DIASTOLIC VOLUME: 122.27 ML
LEFT VENTRICLE MASS INDEX: 55 G/M2
LEFT VENTRICLE SYSTOLIC VOLUME INDEX: 16.4 ML/M2
LEFT VENTRICLE SYSTOLIC VOLUME: 34.99 ML
LEFT VENTRICULAR INTERNAL DIMENSION IN DIASTOLE: 5.07 CM (ref 3.5–6)
LEFT VENTRICULAR MASS: 117.51 G
LV LATERAL E/E' RATIO: 7.78 M/S
LV SEPTAL E/E' RATIO: 7.78 M/S
MV A" WAVE DURATION": 23.98 MSEC
MV PEAK A VEL: 0.67 M/S
MV PEAK E VEL: 0.7 M/S
MV STENOSIS PRESSURE HALF TIME: 68.6 MS
MV VALVE AREA P 1/2 METHOD: 3.21 CM2
OHS QRS DURATION: 78 MS
OHS QTC CALCULATION: 415 MS
PISA TR MAX VEL: 2.17 M/S
PULM VEIN S/D RATIO: 1.61
PV PEAK D VEL: 0.38 M/S
PV PEAK S VEL: 0.61 M/S
RA MAJOR: 4.67 CM
RA PRESSURE ESTIMATED: 8 MMHG
RA WIDTH: 4.43 CM
RIGHT VENTRICULAR END-DIASTOLIC DIMENSION: 3.72 CM
RV TB RVSP: 10 MMHG
SARS-COV-2 RDRP RESP QL NAA+PROBE: NEGATIVE
SINUS: 4.02 CM
STJ: 3 CM
TDI LATERAL: 0.09 M/S
TDI SEPTAL: 0.09 M/S
TDI: 0.09 M/S
TR MAX PG: 19 MMHG
TRICUSPID ANNULAR PLANE SYSTOLIC EXCURSION: 1.84 CM
TV REST PULMONARY ARTERY PRESSURE: 27 MMHG
Z-SCORE OF LEFT VENTRICULAR DIMENSION IN END DIASTOLE: -3.06
Z-SCORE OF LEFT VENTRICULAR DIMENSION IN END SYSTOLE: -2.65

## 2024-02-14 PROCEDURE — 93005 ELECTROCARDIOGRAM TRACING: CPT | Mod: PBBFAC | Performed by: INTERNAL MEDICINE

## 2024-02-14 PROCEDURE — 93306 TTE W/DOPPLER COMPLETE: CPT

## 2024-02-14 PROCEDURE — 93010 ELECTROCARDIOGRAM REPORT: CPT | Mod: S$PBB,,, | Performed by: INTERNAL MEDICINE

## 2024-02-14 PROCEDURE — 87635 SARS-COV-2 COVID-19 AMP PRB: CPT | Mod: PBBFAC | Performed by: NURSE PRACTITIONER

## 2024-02-14 PROCEDURE — 93306 TTE W/DOPPLER COMPLETE: CPT | Mod: 26,,, | Performed by: INTERNAL MEDICINE

## 2024-02-14 PROCEDURE — 99999 PR PBB SHADOW E&M-EST. PATIENT-LVL III: CPT | Mod: PBBFAC,,, | Performed by: NURSE PRACTITIONER

## 2024-02-14 PROCEDURE — 71046 X-RAY EXAM CHEST 2 VIEWS: CPT | Mod: 26,,, | Performed by: RADIOLOGY

## 2024-02-14 PROCEDURE — 99999PBSHW POCT INFLUENZA A/B: Mod: 59,PBBFAC,,

## 2024-02-14 PROCEDURE — 99214 OFFICE O/P EST MOD 30 MIN: CPT | Mod: S$PBB,,, | Performed by: NURSE PRACTITIONER

## 2024-02-14 PROCEDURE — 87502 INFLUENZA DNA AMP PROBE: CPT | Mod: PBBFAC | Performed by: NURSE PRACTITIONER

## 2024-02-14 PROCEDURE — 99999PBSHW: Mod: PBBFAC,,,

## 2024-02-14 PROCEDURE — 71046 X-RAY EXAM CHEST 2 VIEWS: CPT | Mod: TC

## 2024-02-14 PROCEDURE — 99213 OFFICE O/P EST LOW 20 MIN: CPT | Mod: PBBFAC | Performed by: NURSE PRACTITIONER

## 2024-02-14 RX ORDER — BENZONATATE 100 MG/1
100 CAPSULE ORAL 3 TIMES DAILY PRN
Qty: 30 CAPSULE | Refills: 0 | Status: SHIPPED | OUTPATIENT
Start: 2024-02-14 | End: 2024-02-24

## 2024-02-14 RX ORDER — PROMETHAZINE HYDROCHLORIDE AND DEXTROMETHORPHAN HYDROBROMIDE 6.25; 15 MG/5ML; MG/5ML
5 SYRUP ORAL EVERY 6 HOURS PRN
Qty: 118 ML | Refills: 0 | Status: SHIPPED | OUTPATIENT
Start: 2024-02-14

## 2024-02-14 RX ORDER — FLUTICASONE PROPIONATE 50 MCG
1 SPRAY, SUSPENSION (ML) NASAL DAILY
Qty: 16 G | Refills: 1 | Status: SHIPPED | OUTPATIENT
Start: 2024-02-14

## 2024-02-14 NOTE — TELEPHONE ENCOUNTER
----- Message from JOSE Arrington sent at 2/14/2024 11:51 AM CST -----  Please call and let her know that her CXR is with normal findings. Nothing concerning.   Thanks

## 2024-02-14 NOTE — TELEPHONE ENCOUNTER
02/14/24 1329  Echo Final result Details   Narrative:    Left Ventricle: The left ventricle is normal in size. Normal wall   thickness. Normal wall motion. There is normal systolic function with a   visually estimated ejection fraction of 55 - 60%. There is normal   diastolic function.     Right Ventricle: Normal right ventricular cavity size. Wall thickness   is normal. Right ventricle wall motion  is normal. Systolic function is   normal.     Left Atrium: There is an aneurysm along the interatrial septum.     Mitral Valve: There is mild regurgitation.     Tricuspid Valve: There is mild regurgitation.     Aorta: Aortic root is mildly dilated measuring 4.02 cm. Ascending aorta   is mildly dilated measuring 3.6 cm.     Pulmonary Artery: The estimated pulmonary artery systolic pressure is   27 mmHg.     IVC/SVC: Intermediate venous pressure at 8 mmHg.         *Referred to Cardiology (STAT placed) and she has been made aware of ER Red Flags, including but not limited to 'dizziness, onset of chest wall discomforts, headaches, SOB resting or with activity'.   *Note has been shared with her Primary Care Physician.     MAGDA

## 2024-02-14 NOTE — TELEPHONE ENCOUNTER
Called and spoke with pt, informed her of CXR, pt expressed understanding with no further questions

## 2024-02-15 ENCOUNTER — TELEPHONE (OUTPATIENT)
Dept: INTERNAL MEDICINE | Facility: CLINIC | Age: 74
End: 2024-02-15
Payer: MEDICARE

## 2024-02-15 NOTE — TELEPHONE ENCOUNTER
----- Message from Zaria Herbert MA sent at 2/14/2024  4:18 PM CST -----  Regarding: FW: Scheduling change    ----- Message -----  From: Charley Chaparro  Sent: 2/14/2024   3:41 PM CST  To: Jennifer Barragan Staff  Subject: Scheduling change                                Dr. Jacinto sent me a message they are moving her up to the 22nd.   Also she would like you to call her back, she has a question about what exactly is an Anurism

## 2024-02-27 DIAGNOSIS — Z00.00 ENCOUNTER FOR MEDICARE ANNUAL WELLNESS EXAM: ICD-10-CM

## 2024-02-28 ENCOUNTER — OFFICE VISIT (OUTPATIENT)
Dept: CARDIOLOGY | Facility: CLINIC | Age: 74
End: 2024-02-28
Payer: MEDICARE

## 2024-02-28 VITALS
DIASTOLIC BLOOD PRESSURE: 68 MMHG | BODY MASS INDEX: 29.23 KG/M2 | HEIGHT: 71 IN | SYSTOLIC BLOOD PRESSURE: 112 MMHG | HEART RATE: 73 BPM | WEIGHT: 208.75 LBS

## 2024-02-28 DIAGNOSIS — I77.810 ASCENDING AORTA DILATATION: ICD-10-CM

## 2024-02-28 DIAGNOSIS — I73.9 PERIPHERAL VASCULAR DISEASE: ICD-10-CM

## 2024-02-28 DIAGNOSIS — I25.3 ATRIAL SEPTAL ANEURYSM: ICD-10-CM

## 2024-02-28 PROCEDURE — 99999 PR PBB SHADOW E&M-EST. PATIENT-LVL III: CPT | Mod: PBBFAC,,, | Performed by: INTERNAL MEDICINE

## 2024-02-28 PROCEDURE — 99213 OFFICE O/P EST LOW 20 MIN: CPT | Mod: PBBFAC | Performed by: INTERNAL MEDICINE

## 2024-02-28 PROCEDURE — 99204 OFFICE O/P NEW MOD 45 MIN: CPT | Mod: S$PBB,,, | Performed by: INTERNAL MEDICINE

## 2024-02-28 NOTE — PROGRESS NOTES
Subjective:   02/28/2024     Patient ID:  Poonam Alvarez is a 73 y.o. female who presents for evaulation of abnormal echo, Hypertension, and Venous Insufficiency      Patient had been having a cough.  Underwent echocardiography.  Noted to have an atrial septal aneurysm and mild dilatation of the ascending aorta.  The patient is not having exertional chest pains or tightness, no PND or orthopnea.  Her nocturnal cough has improved.  She did have a history of lower extremity swelling, improved now on hydrochlorothiazide, she has been on this for awhile.      She would otherwise does not have a history of heart problems.      Echocardiographic review shows the presence of a interatrial septal aneurysm.  She has never had a stroke.    Also note is mild dilatation of the aortic root and ascending aorta.  The patient is almost 6 ft.  Her aortic diameter to height ratio was well within normal limits.    Blood pressure well controlled today.        ·  Left Ventricle: The left ventricle is normal in size. Normal wall thickness. Normal wall motion. There is normal systolic function with a visually estimated ejection fraction of 55 - 60%. There is normal diastolic function.  ·  Right Ventricle: Normal right ventricular cavity size. Wall thickness is normal. Right ventricle wall motion  is normal. Systolic function is normal.  ·  Left Atrium: There is an aneurysm along the interatrial septum.  ·  Mitral Valve: There is mild regurgitation.  ·  Tricuspid Valve: There is mild regurgitation.  ·  Aorta: Aortic root is mildly dilated measuring 4.02 cm. Ascending aorta is mildly dilated measuring 3.6 cm.  ·  Pulmonary Artery: The estimated pulmonary artery systolic pressure is 27 mmHg.  ·  IVC/SVC: Intermediate venous pressure at 8 mmHg.      The 10-year ASCVD risk score (Mariajose MONTANA, et al., 2019) is: 12.3%    Values used to calculate the score:      Age: 73 years      Sex: Female      Is Non- : Yes       Diabetic: No      Tobacco smoker: No      Systolic Blood Pressure: 112 mmHg      Is BP treated: Yes      HDL Cholesterol: 57 mg/dL      Total Cholesterol: 190 mg/dL               Past Medical History:   Diagnosis Date    AR (allergic rhinitis) 12/07/2012    Atrial flutter     ablation October 2008    Cataract     Generalized headaches     GERD (gastroesophageal reflux disease) 03/08/2013    resolved    Grave's disease     s/p radioactive iodine, now hypothyroidism    Keloid cicatrix     Obesity     Osteoarthritis     shoulders, hands, knees    Positive PPD, treated     9 months of INH, completed 1/2010       Review of patient's allergies indicates:   Allergen Reactions    Hydrocodone-acetaminophen Other (See Comments)     Low blood pressure; doesn't want    Oxycodone-acetaminophen Other (See Comments)     Causes low blood pressure; doesn't want  4/4/19 - patient reports she is not actually allergic to it (just doesn't like how it makes her feel)         Current Outpatient Medications:     ascorbic acid/multivit-min (EMERGEN-C ORAL), Take 1,000 mg by mouth once daily., Disp: , Rfl:     aspirin (ECOTRIN) 81 MG EC tablet, Take 1 tablet (81 mg total) by mouth 2 (two) times daily., Disp: 60 tablet, Rfl: 0    cholecalciferol, vitamin D3, (VITAMIN D3) 50 mcg (2,000 unit) Cap, Take 1 capsule by mouth once daily., Disp: , Rfl:     fluticasone propionate (FLONASE) 50 mcg/actuation nasal spray, 1 spray (50 mcg total) by Each Nostril route once daily., Disp: 16 g, Rfl: 1    hydroCHLOROthiazide (HYDRODIURIL) 12.5 MG Tab, TAKE 1/2 TO 1 TABLET BY MOUTH DAILY AS NEEDED, Disp: 90 tablet, Rfl: 1    hydrocortisone 2.5 % ointment, Apply topically 2 (two) times daily., Disp: 20 g, Rfl: 0    multivitamin-minerals-lutein Tab, Take 1 tablet by mouth once daily. , Disp: , Rfl:     promethazine-dextromethorphan (PROMETHAZINE-DM) 6.25-15 mg/5 mL Syrp, Take 5 mLs by mouth every 6 (six) hours as needed (cough)., Disp: 118 mL, Rfl: 0     SYNTHROID 137 mcg Tab tablet, TAKE 1 TABLET(137 MCG) BY MOUTH EVERY DAY EXCEPT MONDAY, Disp: 90 tablet, Rfl: 4    urea 20 % Crea, Apply 1 application  topically once daily. To dry skin on the feet., Disp: 75 g, Rfl: 10     Objective:   Review of Systems   Cardiovascular:  Negative for chest pain, claudication, cyanosis, dyspnea on exertion, irregular heartbeat, leg swelling, near-syncope, orthopnea, palpitations, paroxysmal nocturnal dyspnea and syncope.         Vitals:    02/28/24 1356   BP: 112/68   Pulse: 73     Wt Readings from Last 3 Encounters:   02/28/24 94.7 kg (208 lb 12.4 oz)   02/14/24 92.5 kg (204 lb)   02/14/24 92.8 kg (204 lb 9.4 oz)     Temp Readings from Last 3 Encounters:   01/17/24 98.3 °F (36.8 °C)   08/04/23 98.8 °F (37.1 °C) (Oral)   08/04/23 98.8 °F (37.1 °C) (Oral)     BP Readings from Last 3 Encounters:   02/28/24 112/68   02/14/24 110/70   02/14/24 110/70     Pulse Readings from Last 3 Encounters:   02/28/24 73   02/14/24 80   02/14/24 (!) 57             Physical Exam  Vitals reviewed.   Constitutional:       General: She is not in acute distress.     Appearance: She is well-developed.   HENT:      Head: Normocephalic and atraumatic.      Nose: Nose normal.   Eyes:      Conjunctiva/sclera: Conjunctivae normal.      Pupils: Pupils are equal, round, and reactive to light.   Neck:      Vascular: No carotid bruit or JVD.   Cardiovascular:      Rate and Rhythm: Normal rate and regular rhythm.      Pulses: Normal pulses and intact distal pulses.      Heart sounds: Normal heart sounds. No murmur heard.     No friction rub. No gallop.   Pulmonary:      Effort: Pulmonary effort is normal. No respiratory distress.      Breath sounds: Normal breath sounds. No wheezing or rales.   Chest:      Chest wall: No tenderness.   Abdominal:      General: Bowel sounds are normal. There is no distension.      Palpations: Abdomen is soft.      Tenderness: There is no abdominal tenderness.   Musculoskeletal:          General: No tenderness or deformity. Normal range of motion.      Cervical back: Normal range of motion and neck supple.      Right lower leg: No edema.      Left lower leg: No edema.   Skin:     General: Skin is warm and dry.      Findings: No erythema or rash.   Neurological:      Mental Status: She is alert and oriented to person, place, and time.      Cranial Nerves: No cranial nerve deficit.      Motor: No abnormal muscle tone.      Coordination: Coordination normal.   Psychiatric:         Behavior: Behavior normal.         Thought Content: Thought content normal.         Judgment: Judgment normal.           Lab Results   Component Value Date    CHOL 190 07/07/2023    CHOL 202 (H) 07/06/2022    CHOL 210 (H) 10/22/2021     Lab Results   Component Value Date    HDL 57 07/07/2023    HDL 64 07/06/2022    HDL 73 10/22/2021     Lab Results   Component Value Date    LDLCALC 121.6 07/07/2023    LDLCALC 123.6 07/06/2022    LDLCALC 126.6 10/22/2021     Lab Results   Component Value Date    ALT 18 07/07/2023    AST 23 07/07/2023    AST 26 07/06/2022    AST 27 10/22/2021     Lab Results   Component Value Date    CREATININE 0.8 07/07/2023    BUN 12 07/07/2023     07/07/2023    K 3.9 07/07/2023    CO2 28 07/07/2023    CO2 31 (H) 11/30/2022    CO2 26 07/06/2022     Lab Results   Component Value Date    HGB 11.7 (L) 07/07/2023    HCT 37.6 07/07/2023    HCT 39.7 11/30/2022    HCT 39.5 07/06/2022                   EKG independent review from February 14, 2024 shows sinus rhythm, no significant abnormalities present      Assessment and Plan:     Atrial septal aneurysm  Comments:  Patient with no history of stroke, not of clinical significance  Orders:  -     Ambulatory referral/consult to Cardiology  -     Echo; Future; Expected date: 03/06/2025    Ascending aorta dilatation  Comments:  Ascending aorta probably normal for height, aortic dilatation to patient's height ratio is 1.9, low risk  Orders:  -     Ambulatory  referral/consult to Cardiology  -     Echo; Future; Expected date: 03/06/2025    Peripheral vascular disease  Comments:  Asymptomatic, swelling improved with low-dose diuretic therapy       Patient reassured, no significant abnormalities present on echocardiography, will repeat in 1 year to ensure stability.      Follow up in about 1 year (around 2/28/2025).          Future Appointments   Date Time Provider Department Center   4/5/2024  2:45 PM Johann Baez MD [facility]   5/27/2024  2:30 PM Dionte Daniels OD [facility] OPTOM[facility]

## 2024-03-31 ENCOUNTER — OFFICE VISIT (OUTPATIENT)
Dept: URGENT CARE | Facility: CLINIC | Age: 74
End: 2024-03-31
Payer: MEDICARE

## 2024-03-31 VITALS
HEART RATE: 74 BPM | DIASTOLIC BLOOD PRESSURE: 59 MMHG | TEMPERATURE: 98 F | BODY MASS INDEX: 29.12 KG/M2 | WEIGHT: 208 LBS | OXYGEN SATURATION: 97 % | HEIGHT: 71 IN | RESPIRATION RATE: 16 BRPM | SYSTOLIC BLOOD PRESSURE: 126 MMHG

## 2024-03-31 DIAGNOSIS — J01.90 ACUTE BACTERIAL SINUSITIS: Primary | ICD-10-CM

## 2024-03-31 DIAGNOSIS — R05.9 COUGH, UNSPECIFIED TYPE: ICD-10-CM

## 2024-03-31 DIAGNOSIS — B96.89 ACUTE BACTERIAL SINUSITIS: Primary | ICD-10-CM

## 2024-03-31 PROCEDURE — 87502 INFLUENZA DNA AMP PROBE: CPT | Mod: QW,S$GLB,,

## 2024-03-31 PROCEDURE — 99213 OFFICE O/P EST LOW 20 MIN: CPT | Mod: S$GLB,,,

## 2024-03-31 PROCEDURE — 87811 SARS-COV-2 COVID19 W/OPTIC: CPT | Mod: QW,S$GLB,,

## 2024-03-31 RX ORDER — LEVOCETIRIZINE DIHYDROCHLORIDE 5 MG/1
5 TABLET, FILM COATED ORAL NIGHTLY
Qty: 30 TABLET | Refills: 0 | Status: SHIPPED | OUTPATIENT
Start: 2024-03-31

## 2024-03-31 RX ORDER — PROMETHAZINE HYDROCHLORIDE AND DEXTROMETHORPHAN HYDROBROMIDE 6.25; 15 MG/5ML; MG/5ML
5 SYRUP ORAL NIGHTLY PRN
Qty: 118 ML | Refills: 0 | Status: SHIPPED | OUTPATIENT
Start: 2024-03-31

## 2024-03-31 RX ORDER — DOXYCYCLINE 100 MG/1
100 CAPSULE ORAL EVERY 12 HOURS
Qty: 10 CAPSULE | Refills: 0 | Status: SHIPPED | OUTPATIENT
Start: 2024-03-31 | End: 2024-04-05

## 2024-03-31 RX ORDER — FLUTICASONE PROPIONATE 50 MCG
1 SPRAY, SUSPENSION (ML) NASAL DAILY
Qty: 11.1 ML | Refills: 0 | Status: SHIPPED | OUTPATIENT
Start: 2024-03-31

## 2024-03-31 NOTE — PATIENT INSTRUCTIONS
Take doxycycline twice daily for 5 days.  Flonase and Xyzal daily for your sinus.  Promethazine cough syrup as needed at night.  Please be were promethazine can cause drowsiness.    What care is needed at home?   Call your regular doctor to let them know you were in the ED. Make a follow-up appointment if you were told to.  To help you feel better:  Use a cool mist humidifier to avoid breathing dry air.  Use hard candy or cough drops to soothe sore throat and cough.  Gargle with salt water (mix 1/2 teaspoon salt with 1 cup warm water) a few times a day.  Spray saltwater mist in each nostril. Any normal saline spray works.  Sip warm liquids to keep your throat moist.  Take warm, steamy showers to help soothe the cough.  Do not smoke or be in smoke-filled places. Avoid things that may cause breathing problems like vaping, fumes, pollution, dust, and other common allergens.  You may want to use over-the-counter medicines for allergies or acid reflux if your cough is due to one of these problems.  You can also use an over-the-counter cough medicine.  When do I need to get emergency help?   Return to the ED if:   You have chest pain when you cough or trouble breathing.  You start to cough up blood or yellow or green mucus.  When do I need to call the doctor?   You have a fever of 100.4°F (38°C) or higher or chills.  You cough so hard you throw up.  You are still coughing in 10 days.  You have new or worsening symptoms.  - Rest.    - Drink plenty of fluids.    - Acetaminophen (tylenol) or Ibuprofen (advil,motrin) as directed as needed for fever/pain. Avoid tylenol if you have a history of liver disease. Do not take ibuprofen if you have a history of GI bleeding, kidney disease, or if you take blood thinners.     - Follow up with your PCP or specialty clinic as directed in the next 1-2 weeks if not improved or as needed.  You can call [phone] to schedule an appointment with the appropriate provider.    - Go to the  ER or seek medical attention immediately if you develop new or worsening symptoms.     - You must understand that you have received an Urgent Care treatment only and that you may be released before all of your medical problems are known or treated.   - You, the patient, will arrange for follow up care as instructed.   - If your condition worsens or fails to improve we recommend that you receive another evaluation at the ER immediately or contact your PCP to discuss your concerns or return here.

## 2024-03-31 NOTE — PROGRESS NOTES
"Subjective:      Patient ID: Poonam Alvarez is a 73 y.o. female.    Vitals:  height is 5' 11" (1.803 m) and weight is 94.3 kg (208 lb). Her oral temperature is 98.2 °F (36.8 °C). Her blood pressure is 126/59 (abnormal) and her pulse is 74. Her respiration is 16 and oxygen saturation is 97%.     Chief Complaint: Sinus Problem    73-year-old female patient with history of reoccurring sinus infection ongoing for the last 3 months.  Patient states she has been having productive cough with runny nose for the last week.  Patient states she has been taking multiple over-the-counter medications and finished her promethazine cough syrup. Patient states coughing spells has been keeping her up at night. Denies fever, SOB, CP, GI complaints or any other associated symptoms.         Sinus Problem  This is a new problem. The current episode started in the past 7 days (Friday). The problem is unchanged. There has been no fever. Her pain is at a severity of 8/10. The pain is moderate. Associated symptoms include congestion, coughing, sinus pressure and a sore throat. Pertinent negatives include no chills, diaphoresis, ear pain, headaches, hoarse voice, neck pain, shortness of breath, sneezing or swollen glands. Past treatments include nasal decongestants (Aleve). The treatment provided no relief.       Constitution: Negative for activity change, appetite change, chills, sweating, fatigue and fever.   HENT:  Positive for congestion, sinus pain, sinus pressure and sore throat. Negative for ear pain, ear discharge, foreign body in ear, facial swelling, facial trauma, nosebleeds, foreign body in nose, postnasal drip, trouble swallowing and voice change.    Neck: Negative for neck pain, neck stiffness, painful lymph nodes and neck swelling.   Cardiovascular:  Negative for chest trauma, chest pain, leg swelling and palpitations.   Eyes:  Negative for eye trauma, foreign body in eye, eye discharge, eye itching, eye pain and eye redness. "   Respiratory:  Positive for cough and sputum production. Negative for sleep apnea, chest tightness, bloody sputum, COPD, shortness of breath, stridor, wheezing and asthma.    Gastrointestinal:  Negative for abdominal trauma, abdominal pain, abdominal bloating, history of abdominal surgery, nausea, hemorrhoids, heartburn and bowel incontinence.   Endocrine: hair loss, cold intolerance, heat intolerance and excessive thirst.   Genitourinary:  Negative for dysuria, frequency, urgency, urine decreased, flank pain and bladder incontinence.   Musculoskeletal:  Negative for pain, trauma, joint pain, joint swelling, abnormal ROM of joint and arthritis.   Skin:  Negative for color change, pale, rash, wound and abrasion.   Allergic/Immunologic: Positive for recurrent sinus infections. Negative for environmental allergies, seasonal allergies, food allergies, eczema, asthma, immunocompromised state, chronic cough and sneezing.   Neurological:  Negative for dizziness, history of vertigo, light-headedness, headaches, history of migraines, disorientation and altered mental status.   Hematologic/Lymphatic: Negative for swollen lymph nodes, easy bruising/bleeding, trouble clotting and history of blood clots. Does not bruise/bleed easily.   Psychiatric/Behavioral:  Negative for altered mental status, disorientation, confusion and agitation.       Objective:     Physical Exam   Constitutional: She is oriented to person, place, and time. She appears well-developed. She is cooperative.  Non-toxic appearance. She does not appear ill. No distress.   HENT:   Head: Normocephalic and atraumatic.   Ears:   Right Ear: Hearing, tympanic membrane, external ear and ear canal normal.   Left Ear: Hearing, tympanic membrane, external ear and ear canal normal.   Nose: Congestion present. No mucosal edema, rhinorrhea or nasal deformity. No epistaxis. Right sinus exhibits frontal sinus tenderness. Right sinus exhibits no maxillary sinus tenderness.  Left sinus exhibits frontal sinus tenderness. Left sinus exhibits no maxillary sinus tenderness.   Mouth/Throat: Uvula is midline, oropharynx is clear and moist and mucous membranes are normal. No trismus in the jaw. Normal dentition. No uvula swelling. No oropharyngeal exudate, posterior oropharyngeal edema or posterior oropharyngeal erythema.   Eyes: Conjunctivae and lids are normal. No scleral icterus.   Neck: Trachea normal and phonation normal. Neck supple. No edema present. No erythema present. No neck rigidity present.   Cardiovascular: Normal rate, regular rhythm, normal heart sounds and normal pulses.   Pulmonary/Chest: Effort normal and breath sounds normal. No stridor. No respiratory distress. She has no decreased breath sounds. She has no wheezes. She has no rhonchi. She has no rales. She exhibits no tenderness.   Abdominal: Normal appearance and bowel sounds are normal. She exhibits no mass. Soft. There is no rebound and no left CVA tenderness. No hernia.   Musculoskeletal: Normal range of motion.         General: No deformity. Normal range of motion.   Neurological: She is alert and oriented to person, place, and time. She exhibits normal muscle tone. Coordination normal.   Skin: Skin is warm, dry, intact, not diaphoretic and not pale.   Psychiatric: Her speech is normal and behavior is normal. Judgment and thought content normal.   Nursing note and vitals reviewed.    Results for orders placed or performed in visit on 03/31/24   SARS Coronavirus 2 Antigen, POCT Manual Read   Result Value Ref Range    SARS Coronavirus 2 Antigen Negative Negative     Acceptable Yes    POCT Influenza A/B MOLECULAR   Result Value Ref Range    POC Molecular Influenza A Ag Negative Negative, Not Reported    POC Molecular Influenza B Ag Negative Negative, Not Reported     Acceptable Yes      *Note: Due to a large number of results and/or encounters for the requested time period, some results  have not been displayed. A complete set of results can be found in Results Review.      Assessment:     1. Acute bacterial sinusitis    2. Cough, unspecified type        Plan:       Acute bacterial sinusitis  -     fluticasone propionate (FLONASE) 50 mcg/actuation nasal spray; 1 spray (50 mcg total) by Each Nostril route once daily.  Dispense: 11.1 mL; Refill: 0  -     doxycycline (VIBRAMYCIN) 100 MG Cap; Take 1 capsule (100 mg total) by mouth every 12 (twelve) hours. for 5 days  Dispense: 10 capsule; Refill: 0  -     levocetirizine (XYZAL) 5 MG tablet; Take 1 tablet (5 mg total) by mouth every evening.  Dispense: 30 tablet; Refill: 0    Cough, unspecified type  -     SARS Coronavirus 2 Antigen, POCT Manual Read  -     POCT Influenza A/B MOLECULAR  -     promethazine-dextromethorphan (PROMETHAZINE-DM) 6.25-15 mg/5 mL Syrp; Take 5 mLs by mouth nightly as needed (cough).  Dispense: 118 mL; Refill: 0             Additional MDM:     Heart Failure Score:   COPD = No

## 2024-04-02 RX ORDER — ALPRAZOLAM 0.5 MG/1
TABLET ORAL
Qty: 2 TABLET | Refills: 0 | Status: SHIPPED | OUTPATIENT
Start: 2024-04-02

## 2024-04-02 RX ORDER — MELOXICAM 7.5 MG/1
7.5 TABLET ORAL 2 TIMES DAILY
Qty: 14 TABLET | Refills: 0 | Status: SHIPPED | OUTPATIENT
Start: 2024-04-02 | End: 2024-04-09

## 2024-04-04 DIAGNOSIS — I80.03 PHLEBITIS AND THROMBOPHLEBITIS OF SUPERFICIAL VESSELS OF LOWER EXTREMITIES, BILATERAL: ICD-10-CM

## 2024-04-04 DIAGNOSIS — Z98.890 STATUS POST ABLATION OF INCOMPETENT VEIN USING LASER: Primary | ICD-10-CM

## 2024-04-04 DIAGNOSIS — I87.2 VENOUS INSUFFICIENCY: Primary | ICD-10-CM

## 2024-04-19 ENCOUNTER — HOSPITAL ENCOUNTER (OUTPATIENT)
Dept: CARDIOLOGY | Facility: HOSPITAL | Age: 74
Discharge: HOME | End: 2024-04-19
Attending: INTERNAL MEDICINE
Payer: MEDICARE

## 2024-04-19 VITALS
HEIGHT: 71 IN | BODY MASS INDEX: 29.12 KG/M2 | SYSTOLIC BLOOD PRESSURE: 98 MMHG | HEART RATE: 85 BPM | WEIGHT: 208 LBS | DIASTOLIC BLOOD PRESSURE: 50 MMHG

## 2024-04-19 DIAGNOSIS — I77.819 ECTATIC AORTA: Primary | ICD-10-CM

## 2024-04-19 DIAGNOSIS — I77.810 ASCENDING AORTA DILATATION: ICD-10-CM

## 2024-04-19 DIAGNOSIS — I25.3 ATRIAL SEPTAL ANEURYSM: ICD-10-CM

## 2024-04-19 LAB
ASCENDING AORTA: 3.4 CM
AV INDEX (PROSTH): 0.79
AV MEAN GRADIENT: 5 MMHG
AV PEAK GRADIENT: 7 MMHG
AV VALVE AREA BY VELOCITY RATIO: 2.69 CM²
AV VALVE AREA: 2.99 CM²
AV VELOCITY RATIO: 0.71
BSA FOR ECHO PROCEDURE: 2.17 M2
CV ECHO LV RWT: 0.34 CM
DOP CALC AO PEAK VEL: 1.36 M/S
DOP CALC AO VTI: 34.99 CM
DOP CALC LVOT AREA: 3.8 CM2
DOP CALC LVOT DIAMETER: 2.19 CM
DOP CALC LVOT PEAK VEL: 0.97 M/S
DOP CALC LVOT STROKE VOLUME: 104.63 CM3
DOP CALCLVOT PEAK VEL VTI: 27.79 CM
E WAVE DECELERATION TIME: 277.29 MSEC
E/A RATIO: 0.88
E/E' RATIO: 5.58 M/S
ECHO LV POSTERIOR WALL: 0.78 CM (ref 0.6–1.1)
FRACTIONAL SHORTENING: 45 % (ref 28–44)
HR MV ECHO: 85 BPM
INTERVENTRICULAR SEPTUM: 0.8 CM (ref 0.6–1.1)
LA MAJOR: 4.85 CM
LA MINOR: 4.22 CM
LA WIDTH: 3.41 CM
LEFT ATRIUM SIZE: 2.93 CM
LEFT ATRIUM VOLUME INDEX MOD: 21.7 ML/M2
LEFT ATRIUM VOLUME INDEX: 17.9 ML/M2
LEFT ATRIUM VOLUME MOD: 46.5 CM3
LEFT ATRIUM VOLUME: 38.33 CM3
LEFT INTERNAL DIMENSION IN SYSTOLE: 2.54 CM (ref 2.1–4)
LEFT VENTRICLE DIASTOLIC VOLUME INDEX: 45 ML/M2
LEFT VENTRICLE DIASTOLIC VOLUME: 96.31 ML
LEFT VENTRICLE MASS INDEX: 54 G/M2
LEFT VENTRICLE SYSTOLIC VOLUME INDEX: 10.8 ML/M2
LEFT VENTRICLE SYSTOLIC VOLUME: 23.17 ML
LEFT VENTRICULAR INTERNAL DIMENSION IN DIASTOLE: 4.58 CM (ref 3.5–6)
LEFT VENTRICULAR MASS: 115.14 G
LV LATERAL E/E' RATIO: 5.58 M/S
LV SEPTAL E/E' RATIO: 5.58 M/S
MV A" WAVE DURATION": 17.89 MSEC
MV PEAK A VEL: 0.76 M/S
MV PEAK E VEL: 0.67 M/S
MV STENOSIS PRESSURE HALF TIME: 80.42 MS
MV VALVE AREA P 1/2 METHOD: 2.74 CM2
OHS CV RV/LV RATIO: 0.56 CM
PISA TR MAX VEL: 2.31 M/S
PULM VEIN S/D RATIO: 1.44
PV PEAK D VEL: 0.32 M/S
PV PEAK S VEL: 0.46 M/S
RA MAJOR: 4.49 CM
RA PRESSURE ESTIMATED: 3 MMHG
RA WIDTH: 2.59 CM
RIGHT VENTRICULAR END-DIASTOLIC DIMENSION: 2.55 CM
RV TB RVSP: 5 MMHG
SINUS: 3.44 CM
STJ: 3.14 CM
TDI LATERAL: 0.12 M/S
TDI SEPTAL: 0.12 M/S
TDI: 0.12 M/S
TR MAX PG: 21 MMHG
TRICUSPID ANNULAR PLANE SYSTOLIC EXCURSION: 1.78 CM
TV REST PULMONARY ARTERY PRESSURE: 24 MMHG
Z-SCORE OF LEFT VENTRICULAR DIMENSION IN END DIASTOLE: -4.07
Z-SCORE OF LEFT VENTRICULAR DIMENSION IN END SYSTOLE: -3.97

## 2024-04-19 PROCEDURE — 93306 TTE W/DOPPLER COMPLETE: CPT | Mod: 26,,, | Performed by: INTERNAL MEDICINE

## 2024-04-19 PROCEDURE — 93306 TTE W/DOPPLER COMPLETE: CPT

## 2024-05-17 NOTE — PROGRESS NOTES
Poonam Alvarez presented for a follow-up Medicare AWV today. The following components were reviewed and updated:  Very pleasant lady.     Medical history  Family History  Social history  Allergies and Current Medications  Health Risk Assessment  Health Maintenance  Care Team    **See Completed Assessments for Annual Wellness visit with in the encounter summary    The following assessments were completed:  Depression Screening  Cognitive function Screening      Timed Get Up Test  Whisper Test      Opioid documentation:  Patient does not have a current opioid prescription.        Wt Readings from Last 3 Encounters:   05/24/24 94 kg (207 lb 3.7 oz)   04/19/24 94.3 kg (208 lb)   03/31/24 94.3 kg (208 lb)     Temp Readings from Last 3 Encounters:   03/31/24 98.2 °F (36.8 °C) (Oral)   01/17/24 98.3 °F (36.8 °C)   08/04/23 98.8 °F (37.1 °C) (Oral)     BP Readings from Last 3 Encounters:   05/24/24 118/80   04/19/24 (!) 98/50   03/31/24 (!) 126/59     Pulse Readings from Last 3 Encounters:   05/24/24 75   04/19/24 85   03/31/24 74       Physical Exam  General: Well developed, well nourished. No distress.  HEENT: Head is normocephalic, atraumatic  Eyes: Clear conjunctiva.  Neck: Supple, symmetrical neck; trachea midline.  Extremities: No LE edema. Pulses 2+ and symmetric.   Skin: Skin color, texture, turgor normal. No rashes.  Musculoskeletal: Normal gait.   Neurologic: Normal strength and tone. No focal numbness or weakness.   Psychiatric: Not depressed.    Diagnoses and health risks identified today and associated recommendations/orders:  1 Encounter for Medicare annual wellness exam / Encounter for preventive health examination  -     Ambulatory Referral/Consult to Enhanced Annual Wellness Visit (eAWV)    2 Pseudophakia of both eyes  Chronic, stable. Followed by Ophthalmology and PCP.     3 Hypothyroidism, postablative  Lab Results   Component Value Date    TSH 0.114 (L) 02/14/2024   Chronic. Synthroid therapy. Followed  by PCP    4 Chronic venous insufficiency  Chronic, stable. Followed by PCP and Vascular, Dr. Baez.     5 Ectatic aorta-Noted on imaging 7/27/2015  Chronic, stable. ASA. Followed by PCP and Cardiology.     6 Essential hypertension  BP Readings from Last 3 Encounters:   05/24/24 118/80   04/19/24 (!) 98/50   03/31/24 (!) 126/59   Chronic, stable. HCTZ therapy. Followed by Cardiology and PcP    7 Gastroesophageal reflux disease, unspecified whether esophagitis present  Chronic, stable. Followed by PCP    8 History of atrial flutter  Chronic, stable. Followed by PCP    Vaccines:   Declines any additional COVID vaccines; she presented with card today and was with adamant voiced concern about the 2 additional vaccines she mahesh to her vaccine profile and request to have those removed today.   Shingrix Series: declines   RSV: declines       Provided Poonam with a 5-10 year written screening schedule and personal prevention plan. Recommendations were developed using the USPSTF age appropriate recommendations. Education, counseling, and referrals were provided as needed.  After Visit Summary printed and given to patient which includes a list of additional screenings\tests needed.    Future Appointments   Date Time Provider Department Center   5/27/2024  2:30 PM Dionte Daniels OD [facility]   6/3/2024  2:45 PM [facility]   6/5/2024  9:00 AM [facility] USOP[facility]   9/4/2024 10:45 AM Johann Baez MD [facility]        Medication List            Accurate as of May 24, 2024  9:56 AM. If you have any questions, ask your nurse or doctor.                CONTINUE taking these medications      ALPRAZolam 0.5 MG tablet  Commonly known as: XANAX  Take one tablet by mouth 1 hour before procedure and bring other tablet with you to the procedure.     aspirin 81 MG EC tablet  Commonly known as: ECOTRIN  Take 1 tablet (81 mg total) by mouth 2  (two) times daily.     cholecalciferol (vitamin D3) 50 mcg (2,000 unit) Cap capsule  Commonly known as: VITAMIN D3     EMERGEN-C ORAL     * fluticasone propionate 50 mcg/actuation nasal spray  Commonly known as: FLONASE  1 spray (50 mcg total) by Each Nostril route once daily.     * fluticasone propionate 50 mcg/actuation nasal spray  Commonly known as: FLONASE  1 spray (50 mcg total) by Each Nostril route once daily.     hydroCHLOROthiazide 12.5 MG Tab  Commonly known as: HYDRODIURIL  TAKE 1/2 TO 1 TABLET BY MOUTH DAILY AS NEEDED     hydrocortisone 2.5 % ointment  Apply topically 2 (two) times daily.     levocetirizine 5 MG tablet  Commonly known as: XYZAL  Take 1 tablet (5 mg total) by mouth every evening.     multivitamin-minerals-lutein Tab     * promethazine-dextromethorphan 6.25-15 mg/5 mL Syrp  Commonly known as: PROMETHAZINE-DM  Take 5 mLs by mouth every 6 (six) hours as needed (cough).     * promethazine-dextromethorphan 6.25-15 mg/5 mL Syrp  Commonly known as: PROMETHAZINE-DM  Take 5 mLs by mouth nightly as needed (cough).     SYNTHROID 137 mcg Tab tablet  Generic drug: levothyroxine  TAKE 1 TABLET(137 MCG) BY MOUTH EVERY DAY EXCEPT MONDAY     urea 20 % Crea  Apply 1 application  topically once daily. To dry skin on the feet.           * This list has 4 medication(s) that are the same as other medications prescribed for you. Read the directions carefully, and ask your doctor or other care provider to review them with you.                  JOSE Costa      I offered to discuss advanced care planning, including how to pick a person who would make decisions for you if you were unable to make them for yourself, called a health care power of , and what kind of decisions you might make such as use of life sustaining treatments such as ventilators and tube feeding when faced with a life limiting illness recorded on a living will that they will need to know. (How you want to be cared for as  you near the end of your natural life)     X Patient is interested in learning more about how to make advanced directives.  I provided them paperwork and offered to discuss this with them. (Son: Jr Priya)

## 2024-05-24 ENCOUNTER — OFFICE VISIT (OUTPATIENT)
Dept: INTERNAL MEDICINE | Facility: CLINIC | Age: 74
End: 2024-05-24
Payer: MEDICARE

## 2024-05-24 VITALS
HEART RATE: 75 BPM | OXYGEN SATURATION: 100 % | BODY MASS INDEX: 28.9 KG/M2 | WEIGHT: 207.25 LBS | DIASTOLIC BLOOD PRESSURE: 80 MMHG | SYSTOLIC BLOOD PRESSURE: 118 MMHG

## 2024-05-24 DIAGNOSIS — Z00.00 ENCOUNTER FOR MEDICARE ANNUAL WELLNESS EXAM: Primary | ICD-10-CM

## 2024-05-24 DIAGNOSIS — I10 ESSENTIAL HYPERTENSION: ICD-10-CM

## 2024-05-24 DIAGNOSIS — I77.819 ECTATIC AORTA: ICD-10-CM

## 2024-05-24 DIAGNOSIS — E89.0 HYPOTHYROIDISM, POSTABLATIVE: ICD-10-CM

## 2024-05-24 DIAGNOSIS — Z96.1 PSEUDOPHAKIA OF BOTH EYES: ICD-10-CM

## 2024-05-24 DIAGNOSIS — Z86.79 HISTORY OF ATRIAL FLUTTER: ICD-10-CM

## 2024-05-24 DIAGNOSIS — K21.9 GASTROESOPHAGEAL REFLUX DISEASE, UNSPECIFIED WHETHER ESOPHAGITIS PRESENT: ICD-10-CM

## 2024-05-24 DIAGNOSIS — I87.2 CHRONIC VENOUS INSUFFICIENCY: ICD-10-CM

## 2024-05-24 DIAGNOSIS — Z00.00 ENCOUNTER FOR PREVENTIVE HEALTH EXAMINATION: ICD-10-CM

## 2024-05-24 PROCEDURE — G0439 PPPS, SUBSEQ VISIT: HCPCS | Mod: ,,, | Performed by: NURSE PRACTITIONER

## 2024-05-24 PROCEDURE — 99215 OFFICE O/P EST HI 40 MIN: CPT | Mod: PBBFAC | Performed by: NURSE PRACTITIONER

## 2024-05-24 PROCEDURE — 99999 PR PBB SHADOW E&M-EST. PATIENT-LVL V: CPT | Mod: PBBFAC,,, | Performed by: NURSE PRACTITIONER

## 2024-05-24 NOTE — PATIENT INSTRUCTIONS
Counseling and Referral of Other Preventative  (Italic type indicates deductible and co-insurance are waived)    Patient Name: Poonam Alvarez  Today's Date: 5/24/2024    Health Maintenance       Date Due Completion Date    Shingles Vaccine (1 of 2) Never done ---    RSV Vaccine (Age 60+ and Pregnant patients) (1 - 1-dose 60+ series) Never done ---    COVID-19 Vaccine (7 - 2023-24 season) 03/10/2024 11/10/2023    Colorectal Cancer Screening 11/05/2025 11/5/2015    Lipid Panel 07/07/2028 7/7/2023    TETANUS VACCINE 10/30/2028 10/30/2018        No orders of the defined types were placed in this encounter.    The following information is provided to all patients.  This information is to help you find resources for any of the problems found today that may be affecting your health:                  Living healthy guide: www.[facility].[place].gov      Understanding Diabetes: www.diabetes.org      Eating healthy: www.cdc.gov/healthyweight      [facility] home safety checklist: www.cdc.gov/steadi/patient.html      Agency on Aging: www.goea.[place].[facility]      Alcoholics anonymous (AA): www.aa.org      Physical Activity: www.ravinder.nih.gov/to9ikcc      Tobacco use: www.quitwithusla.org

## 2024-05-30 ENCOUNTER — OFFICE VISIT (OUTPATIENT)
Dept: OPTOMETRY | Facility: CLINIC | Age: 74
End: 2024-05-30
Payer: MEDICARE

## 2024-05-30 DIAGNOSIS — H26.492 POSTERIOR CAPSULAR OPACIFICATION NON VISUALLY SIGNIFICANT OF LEFT EYE: Primary | ICD-10-CM

## 2024-05-30 DIAGNOSIS — Z98.41 S/P BILATERAL CATARACT EXTRACTION: ICD-10-CM

## 2024-05-30 DIAGNOSIS — Z96.1 PSEUDOPHAKIA OF BOTH EYES: ICD-10-CM

## 2024-05-30 DIAGNOSIS — Z98.42 S/P BILATERAL CATARACT EXTRACTION: ICD-10-CM

## 2024-05-30 DIAGNOSIS — H52.7 REFRACTIVE ERRORS: ICD-10-CM

## 2024-05-30 PROCEDURE — 99213 OFFICE O/P EST LOW 20 MIN: CPT | Mod: PBBFAC | Performed by: OPTOMETRIST

## 2024-05-30 PROCEDURE — 92014 COMPRE OPH EXAM EST PT 1/>: CPT | Mod: S$PBB,,, | Performed by: OPTOMETRIST

## 2024-05-30 PROCEDURE — 99999 PR PBB SHADOW E&M-EST. PATIENT-LVL III: CPT | Mod: PBBFAC,,, | Performed by: OPTOMETRIST

## 2024-05-30 PROCEDURE — 92015 DETERMINE REFRACTIVE STATE: CPT | Mod: ,,, | Performed by: OPTOMETRIST

## 2024-05-30 NOTE — PROGRESS NOTES
"HPI    Patient's age: 73 y.o. AA female   Occupation: Retired   Approximate date of last eye examination: 04/10/2023  Name of last eye doctor seen: Dr Daniels and Dr. Maldonado 08/12/12/2022  City/State: [facility]   Wears glasses? Yes, OTC  reading glasses       If yes, wears  Full-time or part-time?  Part-time   Present glasses are: Bifocal, SV Distance, SV Reading?  SV reading lenses   (OTC)  Approximate age of present glasses: n/a   Got new glasses following last exam, or subsequently?:  No   Wears CLs?:  No   Headaches?  No   Eye pain/discomfort?  No                                                                                     Flashes?  No .   Floaters? No  Diplopia/Double vision?  No   Patient's Ocular History:          Any eye surgeries? Cataract SX OU          Any eye injury?  No          Any treatment for eye disease?  No   Family history of eye disease?     Parents + Cataracts   Significant patient medical history:         1. Diabetes?  No           2. HBP?  No              3.  H/o of hyperthyroid with Graves Disease.   S/P radiation Tx   many years ago.  Now takes thyroid supplement     OTC eyedrops currently using:  No   Prescription eye meds currently using:  No   Any history of allergy/adverse reaction to any eye meds used previously?    No   Any history of allergy/adverse reaction to eyedrops used during prior eye   exam(s)? No   Any history of allergy/adverse reaction to Novacaine or similar meds? No   Any history of allergy/adverse reaction to Epinephrine or similar meds? No     Patient okay with use of anesthetic eyedrops to check eye pressure?  Yes         Patient okay with use of eyedrops to dilate pupils today?  Yes   Allergies/Medications/Medical History/Family History reviewed today?  Yes       PD =  73/69   Reading distance = 21.5"     Last edited by Celina Tello on 5/30/2024  2:05 PM.            Assessment /Plan     For exam results, see Encounter Report.    1. Posterior capsular " opacification non visually significant of left eye        2. S/P bilateral cataract extraction        3. Pseudophakia of both eyes        4. Refractive errors                         S/p bilateral cataract surgery with bilateral posterior chamber intraocular lens.  Slit lamp examination reveals bilateral posterior capsular opacification.  Evaluated by Dr. Maldonado who chose not to do YAG laser posterior capsulotomy at that time        Refraction indicates need for spectacle lens Rx for optimal distance VA in each eye, but Ms. Alvarez not unhappy with unaided distance VA.  Spectacle  lens Rx issued for use as desired, but okay to continue to use OTC reading glasses only, since she is happy with her unaided distance VA.     Recheck in 12 months - or prior, if any problems or bothersome changes in visual acuity noted in the interim.

## 2024-05-31 ENCOUNTER — TELEPHONE (OUTPATIENT)
Dept: VASCULAR SURGERY | Facility: CLINIC | Age: 74
End: 2024-05-31
Payer: MEDICARE

## 2024-05-31 RX ORDER — MELOXICAM 7.5 MG/1
TABLET ORAL
Qty: 14 TABLET | Refills: 0 | OUTPATIENT
Start: 2024-05-31

## 2024-05-31 NOTE — TELEPHONE ENCOUNTER
Called pt to confirm EVLT for Monday, pt stated she cx this appt a week ago. Stated she spoke to central scheduling and told them to cx. Informed that we did not receive a message stating she wanted to cx and apologized for bothering her. Asked if she would like to r/s for a later date, and pt stated she will call back when she is ready. Gave pt direct number to clinic to call when she is ready to schedule.

## 2024-06-10 ENCOUNTER — PATIENT MESSAGE (OUTPATIENT)
Dept: INTERNAL MEDICINE | Facility: CLINIC | Age: 74
End: 2024-06-10
Payer: MEDICARE

## 2024-06-14 NOTE — PROGRESS NOTES
ANNUAL VISIT NOTE     PRESENTING HISTORY     Reason for Visit:  Annual visit.    No chief complaint on file.      History of Present Illness & ROS: Ms. Poonam Alvarez is a 73 y.o. female.  Annual   New to me.   Very pleasant lady.   Est'd with Dr. Bach.  Request to have her annual today. Fasting for labs today.  Licensed Social Worker.   ` chronic swelling in ankles; Rx'd compression stockings by Dr. Bach. Concerned about the 'dark spots to lower leg; don't want to have any procedure done; seen by Vascular in the past'. No pain to either LE, no numbness to either LE and does not endorse any opened wounds / blistering or change in temperature sensations to either LE. No unusual 'swelling'.   ` interested in starting an 'all natural organic substance for memory' stabilization.   ` request refills on routine medications.     Review of Systems:  Eyes: denies visual changes at this time denies floaters   ENT: no nasal congestion or sore throat  Respiratory: no cough or shorness of breath  Cardiovascular: no chest pain or palpitations  Gastrointestinal: no nausea or vomiting, no abdominal pain or change in bowel habits  Genitourinary: no hematuria or dysuria; denies frequency  Hematologic/Lymphatic: no easy bruising or lymphadenopathy  Musculoskeletal: no arthralgias or myalgias  Neurological: no seizures or tremors  Endocrine: no heat or cold intolerance    PAST HISTORY:     Past Medical History:   Diagnosis Date    AR (allergic rhinitis) 12/07/2012    Atrial flutter     ablation October 2008    Cataract     Generalized headaches     GERD (gastroesophageal reflux disease) 03/08/2013    resolved    Grave's disease     s/p radioactive iodine, now hypothyroidism    Keloid cicatrix     Obesity     Osteoarthritis     shoulders, hands, knees    Positive PPD, treated     9 months of INH, completed 1/2010       Past Surgical History:   Procedure Laterality Date    ABLATION OF DYSRHYTHMIC FOCUS  10/24/2008    atrial  flutter    ARTHROPLASTY OF SHOULDER Right 04/01/2019    Procedure: ARTHROPLASTY, SHOULDER;  Surgeon: Sage Xie MD;  Location: [facility] OR;  Service: Orthopedics;  Laterality: Right;  regional w/ catheter (q-ball)    CATARACT EXTRACTION W/  INTRAOCULAR LENS IMPLANT Bilateral     COLONOSCOPY N/A 11/05/2015    Procedure: COLONOSCOPY;  Surgeon: Jose Ly MD;  [facility]  Service: Endoscopy;  Laterality: N/A;  Last colonoscopy 2008 with Dr. Vieira; Pt wanted this day    EYE SURGERY      cataract removal right eye    HERNIA REPAIR      JOINT REPLACEMENT      KNEE ARTHROSCOPY W/ DEBRIDEMENT      right, 2005 and 2008    KNEE SURGERY  03/27/2014    right TKA    TOTAL KNEE ARTHROPLASTY Left 12/13/2022    Procedure: ARTHROPLASTY, KNEE, TOTAL;  Surgeon: Gordon Schneider MD;  Location: [facility];  Service: Orthopedics;  Laterality: Left;       Family History   Problem Relation Name Age of Onset    Arthritis Mother      Arthritis Father      Glaucoma Father      Cataracts Father      Hepatitis Brother x3     No Known Problems Daughter      No Known Problems Son      No Known Problems Son      No Known Problems Maternal Aunt      No Known Problems Maternal Uncle      No Known Problems Paternal Aunt      No Known Problems Paternal Uncle      No Known Problems Maternal Grandmother      No Known Problems Maternal Grandfather      No Known Problems Paternal Grandmother      No Known Problems Paternal Grandfather      Colon cancer Neg Hx      Ovarian cancer Neg Hx      Breast cancer Neg Hx         Social History     Socioeconomic History    Marital status:    Occupational History    Occupation:      Employer: [employer]   Tobacco Use    Smoking status: Never     Passive exposure: Never    Smokeless tobacco: Never   Substance and Sexual Activity    Alcohol use: No    Drug use: No    Sexual activity: Not Currently     Partners: Male     Birth  control/protection: None   Other Topics Concern    Are you pregnant or think you may be? No    Breast-feeding No   Social History Narrative         Social Determinants of Health     Financial Resource Strain: Low Risk  (5/24/2024)    Overall Financial Resource Strain (CARDIA)     Difficulty of Paying Living Expenses: Not hard at all   Food Insecurity: No Food Insecurity (5/24/2024)    Hunger Vital Sign     Worried About Running Out of Food in the Last Year: Never true     Ran Out of Food in the Last Year: Never true   Transportation Needs: No Transportation Needs (5/24/2024)    PRAPARE - Transportation     Lack of Transportation (Medical): No     Lack of Transportation (Non-Medical): No   Physical Activity: Insufficiently Active (5/24/2024)    Exercise Vital Sign     Days of Exercise per Week: 4 days     Minutes of Exercise per Session: 30 min   Stress: No Stress Concern Present (5/24/2024)    [employer] - Occupational Stress Questionnaire     Feeling of Stress : Not at all   Housing Stability: Low Risk  (5/24/2024)    Housing Stability Vital Sign     Unable to Pay for Housing in the Last Year: No     Homeless in the Last Year: No       MEDICATIONS & ALLERGIES:     Current Outpatient Medications on File Prior to Visit   Medication Sig Dispense Refill    ALPRAZolam (XANAX) 0.5 MG tablet Take one tablet by mouth 1 hour before procedure and bring other tablet with you to the procedure. 2 tablet 0    ascorbic acid/multivit-min (EMERGEN-C ORAL) Take 1,000 mg by mouth once daily.      aspirin (ECOTRIN) 81 MG EC tablet Take 1 tablet (81 mg total) by mouth 2 (two) times daily. 60 tablet 0    cholecalciferol, vitamin D3, (VITAMIN D3) 50 mcg (2,000 unit) Cap Take 1 capsule by mouth once daily.      fluticasone propionate (FLONASE) 50 mcg/actuation nasal spray 1 spray (50 mcg total) by Each Nostril route once daily. 16 g 1    fluticasone propionate (FLONASE) 50 mcg/actuation nasal spray 1  spray (50 mcg total) by Each Nostril route once daily. 11.1 mL 0    hydroCHLOROthiazide (HYDRODIURIL) 12.5 MG Tab TAKE 1/2 TO 1 TABLET BY MOUTH DAILY AS NEEDED 90 tablet 1    hydrocortisone 2.5 % ointment Apply topically 2 (two) times daily. 20 g 0    levocetirizine (XYZAL) 5 MG tablet Take 1 tablet (5 mg total) by mouth every evening. 30 tablet 0    multivitamin-minerals-lutein Tab Take 1 tablet by mouth once daily.       promethazine-dextromethorphan (PROMETHAZINE-DM) 6.25-15 mg/5 mL Syrp Take 5 mLs by mouth every 6 (six) hours as needed (cough). 118 mL 0    promethazine-dextromethorphan (PROMETHAZINE-DM) 6.25-15 mg/5 mL Syrp Take 5 mLs by mouth nightly as needed (cough). 118 mL 0    SYNTHROID 137 mcg Tab tablet TAKE 1 TABLET(137 MCG) BY MOUTH EVERY DAY EXCEPT MONDAY 90 tablet 4    urea 20 % Crea Apply 1 application  topically once daily. To dry skin on the feet. 75 g 10     No current facility-administered medications on file prior to visit.        Review of patient's allergies indicates:   Allergen Reactions    Hydrocodone-acetaminophen Other (See Comments)     Low blood pressure; doesn't want    Oxycodone-acetaminophen Other (See Comments)     Causes low blood pressure; doesn't want  4/4/19 - patient reports she is not actually allergic to it (just doesn't like how it makes her feel)       Medications Reconciliation:   I have reconciled the patient's home medications and discharge medications with the patient/family. I have updated all changes.  Refer to After-Visit Medication List.    OBJECTIVE:     Vital Signs:  There were no vitals filed for this visit.  Wt Readings from Last 3 Encounters:   05/24/24 0914 94 kg (207 lb 3.7 oz)   04/19/24 0922 94.3 kg (208 lb)   03/31/24 0918 94.3 kg (208 lb)     There is no height or weight on file to calculate BMI.   Wt Readings from Last 3 Encounters:   06/21/24 94 kg (207 lb 3.7 oz)   05/24/24 94 kg (207 lb 3.7 oz)   04/19/24 94.3 kg (208 lb)     Temp Readings from Last 3  Encounters:   03/31/24 98.2 °F (36.8 °C) (Oral)   01/17/24 98.3 °F (36.8 °C)   08/04/23 98.8 °F (37.1 °C) (Oral)     BP Readings from Last 3 Encounters:   06/21/24 102/70   05/24/24 118/80   04/19/24 (!) 98/50     Pulse Readings from Last 3 Encounters:   06/21/24 (!) 56   05/24/24 75   04/19/24 85       Physical Exam:  General: Well developed, well nourished. No distress.  HEENT: Head is normocephalic, atraumatic; ears are normal.   Eyes: Clear conjunctiva.  Neck: Supple, symmetrical neck; trachea midline.  Lungs: Clear to auscultation bilaterally and normal respiratory effort.  Cardiovascular: Heart with regular rate and rhythm. No murmurs, gallops or rubs  Skin: Skin color, texture, turgor normal. No rashes.  + hyperpigmentation to BLE, R>L; no alteration in integrity of skin  Musculoskeletal: Normal gait.   Neurologic: Normal strength and tone. No focal numbness or weakness.   Psychiatric: Not depressed.    Laboratory  Lab Results   Component Value Date    WBC 4.18 07/07/2023    HGB 11.7 (L) 07/07/2023    HCT 37.6 07/07/2023     07/07/2023    CHOL 190 07/07/2023    TRIG 57 07/07/2023    HDL 57 07/07/2023    ALT 18 07/07/2023    AST 23 07/07/2023     07/07/2023    K 3.9 07/07/2023     07/07/2023    CREATININE 0.8 07/07/2023    BUN 12 07/07/2023    CO2 28 07/07/2023    TSH 0.114 (L) 02/14/2024    INR 1.0 11/30/2022    HGBA1C 5.4 07/06/2022           ASSESSMENT & PLAN:     Annual   Last in 7/2023 with PCP    Annual physical exam  -     Comprehensive Metabolic Panel; Future; Expected date: 06/21/2024  -     CBC Auto Differential; Future; Expected date: 06/21/2024  -     Lipid Panel; Future; Expected date: 06/21/2024  -     Hemoglobin A1C; Future; Expected date: 06/21/2024  -     TSH; Future; Expected date: 06/21/2024  -     Vitamin D; Future; Expected date: 06/21/2024  -     IRON AND TIBC; Future; Expected date: 06/21/2024  -     FERRITIN; Future; Expected date: 06/21/2024  -     US Lower Extrem  Arteries Bilat with STANLEY (xpd); Future; Expected date: 06/21/2024    Hypothyroidism, postablative  Lab Results   Component Value Date    TSH 0.114 (L) 02/14/2024   -     SYNTHROID 137 mcg Tab tablet; TAKE 1 TABLET(137 MCG) BY MOUTH EVERY DAY EXCEPT MONDAY  Dispense: 90 tablet; Refill: 4        Essential hypertension  Today: 102/70  ` HCTZ taking PRN   -     hydroCHLOROthiazide (HYDRODIURIL) 12.5 MG Tab; TAKE 1/2 TO 1 TABLET BY MOUTH DAILY AS NEEDED  Dispense: 90 tablet; Refill: 0      History of atrial flutter  Post ablation 10/2008/ stable   HR: 56  -     Comprehensive Metabolic Panel; Future; Expected date: 06/21/2024  -     TSH; Future; Expected date: 06/21/2024  -     IRON AND TIBC; Future; Expected date: 06/21/2024  -     FERRITIN; Future; Expected date: 06/21/2024      Occlusive disease, arterial / Hyperpigmentation BLE (R>L)  Chronic. Will check repeat study; and she understands that if with progression, recommendations would be to be seen by Vascular once again and she will consider.  ` continue Daily ASA and check Lipid Panel   -     Lipid Panel; Future; Expected date: 06/21/2024  -     US Lower Extrem Arteries Bilat with STANLEY (xpd); Future; Expected date: 06/21/2024    Vitamin D deficiency, unspecified  -     Vitamin D; Future; Expected date: 06/21/2024    TOSHIA:   17 in 2023  Not on supplements; will check repeat panel.       *Annual and fasting labs today. Follow up PRN.     Future Appointments   Date Time Provider Department Center   7/12/2024  9:00 AM VASCULAR, CARDIOLOGY [facility]   7/12/2024 10:30 AM VASCULAR, CARDIOLOGY [facility]        Medication List            Accurate as of June 21, 2024  3:28 PM. If you have any questions, ask your nurse or doctor.                CHANGE how you take these medications      fluticasone propionate 50 mcg/actuation nasal spray  Commonly known as: FLONASE  1 spray (50 mcg total) by Each Nostril route once daily.  What changed: Another medication  with the same name was removed. Continue taking this medication, and follow the directions you see here.  Changed by: JOSE Costa            CONTINUE taking these medications      aspirin 81 MG EC tablet  Commonly known as: ECOTRIN  Take 1 tablet (81 mg total) by mouth 2 (two) times daily.     cholecalciferol (vitamin D3) 50 mcg (2,000 unit) Cap capsule  Commonly known as: VITAMIN D3     EMERGEN-C ORAL     hydroCHLOROthiazide 12.5 MG Tab  Commonly known as: HYDRODIURIL  TAKE 1/2 TO 1 TABLET BY MOUTH DAILY AS NEEDED     hydrocortisone 2.5 % ointment  Apply topically 2 (two) times daily.     multivitamin-minerals-lutein Tab     SYNTHROID 137 mcg Tab tablet  Generic drug: levothyroxine  TAKE 1 TABLET(137 MCG) BY MOUTH EVERY DAY EXCEPT MONDAY     urea 20 % Crea  Apply 1 application  topically once daily. To dry skin on the feet.            STOP taking these medications      ALPRAZolam 0.5 MG tablet  Commonly known as: XANAX  Stopped by: JOSE Costa     levocetirizine 5 MG tablet  Commonly known as: XYZAL  Stopped by: JOSE Costa     promethazine-dextromethorphan 6.25-15 mg/5 mL Syrp  Commonly known as: PROMETHAZINE-DM  Stopped by: JOSE Costa               Where to Get Your Medications        These medications were sent to [id number] - [facility] [address] [facility]  [facility], [facility] [address]      Phone: [phone]   hydroCHLOROthiazide 12.5 MG Tab  SYNTHROID 137 mcg Tab tablet       Signing Physician:  JOSE Costa

## 2024-06-21 ENCOUNTER — LAB VISIT (OUTPATIENT)
Dept: LAB | Facility: HOSPITAL | Age: 74
End: 2024-06-21
Payer: MEDICARE

## 2024-06-21 ENCOUNTER — OFFICE VISIT (OUTPATIENT)
Dept: INTERNAL MEDICINE | Facility: CLINIC | Age: 74
End: 2024-06-21
Payer: MEDICARE

## 2024-06-21 VITALS
BODY MASS INDEX: 29.02 KG/M2 | OXYGEN SATURATION: 100 % | HEIGHT: 71 IN | HEART RATE: 56 BPM | SYSTOLIC BLOOD PRESSURE: 102 MMHG | DIASTOLIC BLOOD PRESSURE: 70 MMHG | WEIGHT: 207.25 LBS

## 2024-06-21 DIAGNOSIS — Z00.00 ANNUAL PHYSICAL EXAM: ICD-10-CM

## 2024-06-21 DIAGNOSIS — Z00.00 ANNUAL PHYSICAL EXAM: Primary | ICD-10-CM

## 2024-06-21 DIAGNOSIS — I10 ESSENTIAL HYPERTENSION: ICD-10-CM

## 2024-06-21 DIAGNOSIS — Z86.39 HISTORY OF GRAVES' DISEASE: ICD-10-CM

## 2024-06-21 DIAGNOSIS — I70.90 OCCLUSIVE DISEASE, ARTERIAL: ICD-10-CM

## 2024-06-21 DIAGNOSIS — L81.9 HYPERPIGMENTATION OF SKIN: ICD-10-CM

## 2024-06-21 DIAGNOSIS — E55.9 VITAMIN D DEFICIENCY, UNSPECIFIED: ICD-10-CM

## 2024-06-21 DIAGNOSIS — Z86.79 HISTORY OF ATRIAL FLUTTER: ICD-10-CM

## 2024-06-21 DIAGNOSIS — E89.0 HYPOTHYROIDISM, POSTABLATIVE: ICD-10-CM

## 2024-06-21 LAB
25(OH)D3+25(OH)D2 SERPL-MCNC: 54 NG/ML (ref 30–96)
ALBUMIN SERPL BCP-MCNC: 3.9 G/DL (ref 3.5–5.2)
ALP SERPL-CCNC: 94 U/L (ref 55–135)
ALT SERPL W/O P-5'-P-CCNC: 18 U/L (ref 10–44)
ANION GAP SERPL CALC-SCNC: 11 MMOL/L (ref 8–16)
AST SERPL-CCNC: 26 U/L (ref 10–40)
BASOPHILS # BLD AUTO: 0.04 K/UL (ref 0–0.2)
BASOPHILS NFR BLD: 0.8 % (ref 0–1.9)
BILIRUB SERPL-MCNC: 0.4 MG/DL (ref 0.1–1)
BUN SERPL-MCNC: 17 MG/DL (ref 8–23)
CALCIUM SERPL-MCNC: 10.2 MG/DL (ref 8.7–10.5)
CHLORIDE SERPL-SCNC: 100 MMOL/L (ref 95–110)
CHOLEST SERPL-MCNC: 195 MG/DL (ref 120–199)
CHOLEST/HDLC SERPL: 3.2 {RATIO} (ref 2–5)
CO2 SERPL-SCNC: 26 MMOL/L (ref 23–29)
CREAT SERPL-MCNC: 1 MG/DL (ref 0.5–1.4)
DIFFERENTIAL METHOD BLD: ABNORMAL
EOSINOPHIL # BLD AUTO: 0.1 K/UL (ref 0–0.5)
EOSINOPHIL NFR BLD: 1.3 % (ref 0–8)
ERYTHROCYTE [DISTWIDTH] IN BLOOD BY AUTOMATED COUNT: 17 % (ref 11.5–14.5)
EST. GFR  (NO RACE VARIABLE): 58.8 ML/MIN/1.73 M^2
ESTIMATED AVG GLUCOSE: 105 MG/DL (ref 68–131)
FERRITIN SERPL-MCNC: 194 NG/ML (ref 20–300)
GLUCOSE SERPL-MCNC: 69 MG/DL (ref 70–110)
HBA1C MFR BLD: 5.3 % (ref 4–5.6)
HCT VFR BLD AUTO: 39 % (ref 37–48.5)
HDLC SERPL-MCNC: 61 MG/DL (ref 40–75)
HDLC SERPL: 31.3 % (ref 20–50)
HGB BLD-MCNC: 12.2 G/DL (ref 12–16)
IMM GRANULOCYTES # BLD AUTO: 0.01 K/UL (ref 0–0.04)
IMM GRANULOCYTES NFR BLD AUTO: 0.2 % (ref 0–0.5)
IRON SERPL-MCNC: 51 UG/DL (ref 30–160)
LDLC SERPL CALC-MCNC: 119.8 MG/DL (ref 63–159)
LYMPHOCYTES # BLD AUTO: 2.1 K/UL (ref 1–4.8)
LYMPHOCYTES NFR BLD: 45.2 % (ref 18–48)
MCH RBC QN AUTO: 26.8 PG (ref 27–31)
MCHC RBC AUTO-ENTMCNC: 31.3 G/DL (ref 32–36)
MCV RBC AUTO: 86 FL (ref 82–98)
MONOCYTES # BLD AUTO: 0.4 K/UL (ref 0.3–1)
MONOCYTES NFR BLD: 8.2 % (ref 4–15)
NEUTROPHILS # BLD AUTO: 2.1 K/UL (ref 1.8–7.7)
NEUTROPHILS NFR BLD: 44.3 % (ref 38–73)
NONHDLC SERPL-MCNC: 134 MG/DL
NRBC BLD-RTO: 0 /100 WBC
PLATELET # BLD AUTO: 238 K/UL (ref 150–450)
PMV BLD AUTO: 11.7 FL (ref 9.2–12.9)
POTASSIUM SERPL-SCNC: 3.8 MMOL/L (ref 3.5–5.1)
PROT SERPL-MCNC: 7.6 G/DL (ref 6–8.4)
RBC # BLD AUTO: 4.56 M/UL (ref 4–5.4)
SATURATED IRON: 15 % (ref 20–50)
SODIUM SERPL-SCNC: 137 MMOL/L (ref 136–145)
TOTAL IRON BINDING CAPACITY: 340 UG/DL (ref 250–450)
TRANSFERRIN SERPL-MCNC: 230 MG/DL (ref 200–375)
TRIGL SERPL-MCNC: 71 MG/DL (ref 30–150)
TSH SERPL DL<=0.005 MIU/L-ACNC: 0.52 UIU/ML (ref 0.4–4)
WBC # BLD AUTO: 4.73 K/UL (ref 3.9–12.7)

## 2024-06-21 PROCEDURE — 80053 COMPREHEN METABOLIC PANEL: CPT | Performed by: NURSE PRACTITIONER

## 2024-06-21 PROCEDURE — 82306 VITAMIN D 25 HYDROXY: CPT | Performed by: NURSE PRACTITIONER

## 2024-06-21 PROCEDURE — 80061 LIPID PANEL: CPT | Performed by: NURSE PRACTITIONER

## 2024-06-21 PROCEDURE — 84443 ASSAY THYROID STIM HORMONE: CPT | Performed by: NURSE PRACTITIONER

## 2024-06-21 PROCEDURE — 82728 ASSAY OF FERRITIN: CPT | Performed by: NURSE PRACTITIONER

## 2024-06-21 PROCEDURE — 36415 COLL VENOUS BLD VENIPUNCTURE: CPT | Performed by: NURSE PRACTITIONER

## 2024-06-21 PROCEDURE — 83036 HEMOGLOBIN GLYCOSYLATED A1C: CPT | Performed by: NURSE PRACTITIONER

## 2024-06-21 PROCEDURE — 99999 PR PBB SHADOW E&M-EST. PATIENT-LVL IV: CPT | Mod: PBBFAC,,, | Performed by: NURSE PRACTITIONER

## 2024-06-21 PROCEDURE — 83540 ASSAY OF IRON: CPT | Performed by: NURSE PRACTITIONER

## 2024-06-21 PROCEDURE — 85025 COMPLETE CBC W/AUTO DIFF WBC: CPT | Performed by: NURSE PRACTITIONER

## 2024-06-21 PROCEDURE — 99214 OFFICE O/P EST MOD 30 MIN: CPT | Mod: PBBFAC | Performed by: NURSE PRACTITIONER

## 2024-06-21 RX ORDER — HYDROCHLOROTHIAZIDE 12.5 MG/1
TABLET ORAL
Qty: 90 TABLET | Refills: 0 | Status: SHIPPED | OUTPATIENT
Start: 2024-06-21

## 2024-06-21 RX ORDER — LEVOTHYROXINE SODIUM 137 UG/1
TABLET ORAL
Qty: 90 TABLET | Refills: 4 | Status: SHIPPED | OUTPATIENT
Start: 2024-06-21

## 2024-07-12 ENCOUNTER — PATIENT MESSAGE (OUTPATIENT)
Dept: INTERNAL MEDICINE | Facility: CLINIC | Age: 74
End: 2024-07-12
Payer: MEDICARE

## 2024-07-12 ENCOUNTER — HOSPITAL ENCOUNTER (OUTPATIENT)
Dept: CARDIOLOGY | Facility: HOSPITAL | Age: 74
Discharge: HOME | End: 2024-07-12
Attending: NURSE PRACTITIONER
Payer: MEDICARE

## 2024-07-12 DIAGNOSIS — E89.0 HYPOTHYROIDISM, POSTABLATIVE: ICD-10-CM

## 2024-07-12 DIAGNOSIS — Z00.00 ANNUAL PHYSICAL EXAM: ICD-10-CM

## 2024-07-12 DIAGNOSIS — I70.90 OCCLUSIVE DISEASE, ARTERIAL: ICD-10-CM

## 2024-07-12 DIAGNOSIS — I10 ESSENTIAL HYPERTENSION: ICD-10-CM

## 2024-07-12 DIAGNOSIS — E55.9 VITAMIN D DEFICIENCY, UNSPECIFIED: ICD-10-CM

## 2024-07-12 DIAGNOSIS — Z86.79 HISTORY OF ATRIAL FLUTTER: ICD-10-CM

## 2024-07-12 DIAGNOSIS — Z86.39 HISTORY OF GRAVES' DISEASE: ICD-10-CM

## 2024-07-12 LAB
LEFT ANT TIBIAL SYS PSV: 89 CM/S
LEFT CFA PSV: 72 CM/S
LEFT EXTERNAL ILIAC PSV: 87 CM/S
LEFT POPLITEAL PSV: 38 CM/S
LEFT POST TIBIAL SYS PSV: 25 CM/S
LEFT PROFUNDA SYS PSV: 67 CM/S
LEFT SUPER FEMORAL DIST SYS PSV: 71 CM/S
LEFT SUPER FEMORAL MID SYS PSV: 101 CM/S
LEFT SUPER FEMORAL OSTIAL SYS PSV: 64 CM/S
LEFT SUPER FEMORAL PROX SYS PSV: 96 CM/S
LEFT TIB/PER TRUNK SYS PSV: 60 CM/S
OHS CV LEFT LOWER EXTREMITY ABI (NO CALC): 0.89
OHS CV RIGHT ABI LOWER EXTREMITY (NO CALC): 0.87
RIGHT ANT TIBIAL SYS PSV: 47 CM/S
RIGHT CFA PSV: 73 CM/S
RIGHT EXTERNAL ILLIAC PSV: 149 CM/S
RIGHT PERONEAL SYS PSV: 64 CM/S
RIGHT POPLITEAL PSV: 28 CM/S
RIGHT POST TIBIAL SYS PSV: 58 CM/S
RIGHT PROFUNDA SYS PSV: 54 CM/S
RIGHT SUPER FEMORAL DIST SYS PSV: 68 CM/S
RIGHT SUPER FEMORAL MID SYS PSV: 73 CM/S
RIGHT SUPER FEMORAL OSTIAL SYS PSV: 65 CM/S
RIGHT SUPER FEMORAL PROX SYS PSV: 105 CM/S
RIGHT TIB/PER TRUNK SYS PSV: 135 CM/S

## 2024-07-12 PROCEDURE — 93925 LOWER EXTREMITY STUDY: CPT | Mod: 26,,, | Performed by: INTERNAL MEDICINE

## 2024-07-12 PROCEDURE — 93925 LOWER EXTREMITY STUDY: CPT

## 2024-07-15 DIAGNOSIS — I87.2 VENOUS INSUFFICIENCY: Primary | ICD-10-CM

## 2024-09-12 ENCOUNTER — TELEPHONE (OUTPATIENT)
Dept: INTERNAL MEDICINE | Facility: CLINIC | Age: 74
End: 2024-09-12
Payer: MEDICARE

## 2024-09-12 NOTE — TELEPHONE ENCOUNTER
----- Message from Barbi Eaton sent at 9/12/2024  9:48 AM CDT -----  Contact: Self [phone]  Would like to receive medical advice.    Would they like a call back or a response via [facility]:  call back    Additional information:  Pt is requesting an appt for a cough.

## 2024-09-13 ENCOUNTER — TELEPHONE (OUTPATIENT)
Dept: INTERNAL MEDICINE | Facility: CLINIC | Age: 74
End: 2024-09-13
Payer: MEDICARE

## 2024-09-13 DIAGNOSIS — F41.9 ANXIETY DUE TO INVASIVE PROCEDURE: ICD-10-CM

## 2024-09-13 DIAGNOSIS — G89.18 PAIN ASSOCIATED WITH SURGICAL PROCEDURE: Primary | ICD-10-CM

## 2024-09-13 RX ORDER — ALPRAZOLAM 0.5 MG/1
TABLET ORAL
Qty: 2 TABLET | Refills: 0 | Status: SHIPPED | OUTPATIENT
Start: 2024-09-13

## 2024-09-13 RX ORDER — MELOXICAM 7.5 MG/1
7.5 TABLET ORAL 2 TIMES DAILY
Qty: 14 TABLET | Refills: 0 | Status: SHIPPED | OUTPATIENT
Start: 2024-09-13 | End: 2024-09-20

## 2024-09-13 NOTE — TELEPHONE ENCOUNTER
Pt stated its not a well woman exam she's coming because blue cross blue [facility] requires a annual exam for her insurance.

## 2024-09-13 NOTE — TELEPHONE ENCOUNTER
----- Message from JOSE Arrington sent at 9/12/2024  6:08 PM CDT -----  Please call and reach out to Ms. Alvarez before her appointment....unfortunately, we don't do 'Well Woman' exams in clinic... the number to call: [phone] and their office can get her taken care timely with an appointment with one of the GYN Providers at a location that would work well for her.     Thanks.

## 2024-09-17 ENCOUNTER — TELEPHONE (OUTPATIENT)
Dept: VASCULAR SURGERY | Facility: CLINIC | Age: 74
End: 2024-09-17
Payer: MEDICARE

## 2024-09-17 ENCOUNTER — TELEPHONE (OUTPATIENT)
Dept: INTERNAL MEDICINE | Facility: CLINIC | Age: 74
End: 2024-09-17
Payer: MEDICARE

## 2024-09-17 NOTE — TELEPHONE ENCOUNTER
Confirmed 8 am arrival, reviewed pre-post instructions. Pt is not taking xanax,so she is driving herself, has thigh high compression stockings. Pt verb date/time/location of procedure and post procedure US.

## 2024-09-18 ENCOUNTER — PROCEDURE VISIT (OUTPATIENT)
Dept: VASCULAR SURGERY | Facility: CLINIC | Age: 74
End: 2024-09-18
Payer: MEDICARE

## 2024-09-18 VITALS
BODY MASS INDEX: 29.02 KG/M2 | SYSTOLIC BLOOD PRESSURE: 116 MMHG | HEIGHT: 71 IN | DIASTOLIC BLOOD PRESSURE: 67 MMHG | WEIGHT: 207.25 LBS | HEART RATE: 71 BPM

## 2024-09-18 DIAGNOSIS — G89.18 PAIN ASSOCIATED WITH SURGICAL PROCEDURE: Primary | ICD-10-CM

## 2024-09-18 DIAGNOSIS — I87.2 VENOUS INSUFFICIENCY: ICD-10-CM

## 2024-09-18 RX ORDER — LIDOCAINE HYDROCHLORIDE 10 MG/ML
1 INJECTION, SOLUTION INFILTRATION; PERINEURAL
Status: SHIPPED | OUTPATIENT
Start: 2024-09-18

## 2024-09-18 NOTE — PROCEDURES
Poonam Alvarez  09/18/2024     Pre-Procedure Diagnosis: Right greater saphenous vein reflux; significant superficial venous insufficiency    Post-Procedure Diagnsosis: Same    Procedure: Laser endovenous ablation of the right greater saphenous vein    Surgeon: Johann Baez MD    Anesthesia: Local    The skin of the leg was prepped and draped in sterile fashion.  Ultrasound-guidance was used throughout the procedure with a portable duplex ultrasound machine.  The GSV was cannulated using a micro-puncture system.  An 0.035 wire J-tip followed by a 4-Fr Angiodynamics sheath was placed throughout the GSV.   Using tumescent anesthesia, the entire sheathed portion of the GSV was anesthesized keeping the vein at least one cm from the skin; Klein pump was used.  Position of the tip of the laser was then reconfirmed to be approximately 2 cm from the saphenofemoral junction.  The 1470 nm laser was then activated and the entire length of the vein was treated at ~ 49 J/cm.  The fiber and sheath were then removed intact.  Duplex confirmed occlusion of the GSV with continued patency of the common femoral vein.   The incision was closed with Steri-strips.   Sterile compression dressings and a compression stocking were applied and the patient was discharged to home in a satisfactory condition.    Cannulation site: calf    Sheath length: 42 cm    Sheth of power: 7 W    Laser time: 287 sec    Joules: 2016.0 J             Johann Baez MD   Vascular & Endovascular Surgery

## 2024-09-20 ENCOUNTER — TELEPHONE (OUTPATIENT)
Dept: VASCULAR SURGERY | Facility: CLINIC | Age: 74
End: 2024-09-20
Payer: MEDICARE

## 2024-09-20 ENCOUNTER — HOSPITAL ENCOUNTER (OUTPATIENT)
Dept: RADIOLOGY | Facility: OTHER | Age: 74
Discharge: HOME | End: 2024-09-20
Attending: SURGERY
Payer: MEDICARE

## 2024-09-20 DIAGNOSIS — Z98.890 STATUS POST ABLATION OF INCOMPETENT VEIN USING LASER: ICD-10-CM

## 2024-09-20 DIAGNOSIS — I80.03 PHLEBITIS AND THROMBOPHLEBITIS OF SUPERFICIAL VESSELS OF LOWER EXTREMITIES, BILATERAL: ICD-10-CM

## 2024-09-20 PROCEDURE — 93970 EXTREMITY STUDY: CPT | Mod: TC,RT

## 2024-09-20 PROCEDURE — 93970 EXTREMITY STUDY: CPT | Mod: 26,ICN,, | Performed by: RADIOLOGY

## 2024-09-20 NOTE — TELEPHONE ENCOUNTER
Informed pt that post EVLT  ultrasound is WNL , pt reports no issues w/access site or leg otherwise. Pt stated that she is extremely satisfied with the care she received in clinic as well as the results so far. Instructed pt to contact us directly w/any questions or concerns prior to f/up visit, pt verb understanding.

## 2024-09-21 DIAGNOSIS — E89.0 HYPOTHYROIDISM, POSTABLATIVE: ICD-10-CM

## 2024-09-21 RX ORDER — LEVOTHYROXINE SODIUM 137 UG/1
137 TABLET ORAL DAILY
Qty: 90 TABLET | Refills: 4 | Status: SHIPPED | OUTPATIENT
Start: 2024-09-21

## 2024-09-21 NOTE — TELEPHONE ENCOUNTER
No care due was identified.  [facility] Embedded Care Due Messages. Reference number: [id number].   9/21/2024 7:04:28 AM CDT

## 2024-09-22 NOTE — TELEPHONE ENCOUNTER
Refill Decision Note   Poonam Alvarez  is requesting a refill authorization.  Brief Assessment and Rationale for Refill:  Approve     Medication Therapy Plan:         Comments:     Note composed:7:21 PM 09/21/2024

## 2024-09-23 RX ORDER — HYDROCHLOROTHIAZIDE 12.5 MG/1
TABLET ORAL
Qty: 90 TABLET | Refills: 3 | Status: SHIPPED | OUTPATIENT
Start: 2024-09-23

## 2024-09-23 NOTE — TELEPHONE ENCOUNTER
Refill Routing Note   Medication(s) are not appropriate for processing by [facility] for the following reason(s):        Outside of protocol    ORC action(s):  Route        Medication Therapy Plan: PRN usage on HCTZ outside of protocol for [facility]       Appointments  past 12m or future 3m with PCP    Date Provider   Last Visit   Visit date not found Lindsey Bach MD   Next Visit   1/7/2025 Lindsey Bach MD   ED visits in past 90 days: 0        Note composed:10:05 AM 09/23/2024

## 2024-09-30 ENCOUNTER — TELEPHONE (OUTPATIENT)
Dept: VASCULAR SURGERY | Facility: CLINIC | Age: 74
End: 2024-09-30
Payer: MEDICARE

## 2024-09-30 NOTE — TELEPHONE ENCOUNTER
"Pt called stating that she has some "tingling in my upper thigh and a little by my buttocks on the right leg that started last night and woke me up". Pt stated she took aleve and was fine after that, and has no symptoms at all since she woke up and has been going about her day. Reports no pain or tightness in R leg at all, has been wearing compression stockings as directed, and will start her walking routine again tomorrow. Pt denies swelling of extremity. She just wanted to know if this is normal. I explained that many patients develop different sensations following EVLT procedures at different times, or it could be from the way she was laying in bed, how she was sitting earlier in the day, etc. Suggested that she try wearing compression at night to see if this helps, and if it worsens Dr. Baez will likely refer her to neurology for evaluation. Pt declined sooner f/up with , and stated she will contact us should symptoms worsen or continue.   "

## 2024-11-11 ENCOUNTER — TELEPHONE (OUTPATIENT)
Dept: INTERNAL MEDICINE | Facility: CLINIC | Age: 74
End: 2024-11-11
Payer: MEDICARE

## 2024-11-11 DIAGNOSIS — Z12.31 SCREENING MAMMOGRAM FOR BREAST CANCER: Primary | ICD-10-CM

## 2024-11-11 NOTE — TELEPHONE ENCOUNTER
----- Message from Silvana sent at 11/11/2024  3:12 PM CST -----  Regarding: Order  Contact: pt [phone]  Patient Requesting Order      Order Needed: Mammogram      Communication Preference: Please call pt @[phone]      Additional Information: Pt is requesting order for mammogram to be placed in Epic

## 2024-11-15 ENCOUNTER — TELEPHONE (OUTPATIENT)
Dept: INTERNAL MEDICINE | Facility: CLINIC | Age: 74
End: 2024-11-15
Payer: MEDICARE

## 2024-11-15 NOTE — TELEPHONE ENCOUNTER
----- Message from Harsha sent at 11/15/2024 10:06 AM CST -----  Contact: Pt  [phone]  [email or web address]     Patient is calling for Medical Advice regarding:Cough , chest congestion     How long has patient had these symptoms:3 days     Pharmacy name and phone#:[id number] - [place] [address] AT [facility] [address]   Phone: [phone]  Fax: [phone]        Patient wants a call back or thru [facility]:Callback    Comments:    Please advise patient replies from provider may take up to 48 hours.

## 2024-11-26 ENCOUNTER — PATIENT MESSAGE (OUTPATIENT)
Dept: ADMINISTRATIVE | Facility: HOSPITAL | Age: 74
End: 2024-11-26
Payer: MEDICARE

## 2024-11-29 ENCOUNTER — HOSPITAL ENCOUNTER (OUTPATIENT)
Dept: RADIOLOGY | Facility: HOSPITAL | Age: 74
Discharge: HOME | End: 2024-11-29
Attending: INTERNAL MEDICINE
Payer: MEDICARE

## 2024-11-29 DIAGNOSIS — Z12.31 SCREENING MAMMOGRAM FOR BREAST CANCER: ICD-10-CM

## 2024-11-29 PROCEDURE — 77063 BREAST TOMOSYNTHESIS BI: CPT | Mod: 26,,, | Performed by: RADIOLOGY

## 2024-11-29 PROCEDURE — 77067 SCR MAMMO BI INCL CAD: CPT | Mod: TC

## 2024-11-29 PROCEDURE — 77067 SCR MAMMO BI INCL CAD: CPT | Mod: 26,,, | Performed by: RADIOLOGY

## 2024-12-13 ENCOUNTER — OFFICE VISIT (OUTPATIENT)
Dept: VASCULAR SURGERY | Facility: CLINIC | Age: 74
End: 2024-12-13
Payer: MEDICARE

## 2024-12-13 VITALS — HEART RATE: 88 BPM | DIASTOLIC BLOOD PRESSURE: 59 MMHG | SYSTOLIC BLOOD PRESSURE: 115 MMHG

## 2024-12-13 DIAGNOSIS — I80.03 PHLEBITIS AND THROMBOPHLEBITIS OF SUPERFICIAL VESSELS OF LOWER EXTREMITIES, BILATERAL: ICD-10-CM

## 2024-12-13 DIAGNOSIS — Z98.890 STATUS POST ABLATION OF INCOMPETENT VEIN USING LASER: ICD-10-CM

## 2024-12-13 DIAGNOSIS — I87.2 VENOUS INSUFFICIENCY: Primary | ICD-10-CM

## 2024-12-13 NOTE — PROGRESS NOTES
Johann Baez MD, RPVI                                 [facility]                           [facility]                             12/13/2024    HPI:  Poonam Alvarez is a 74 y.o. female with   Patient Active Problem List   Diagnosis    Osteoarthritis    History of Graves' disease    Hypothyroidism, postablative    Radial styloid tenosynovitis    Pseudophakia of both eyes    Other specified disorder of skin    GERD (gastroesophageal reflux disease)    Left knee pain    Primary localized osteoarthrosis, lower leg    Knee pain, right    Pain in joint, lower leg    S/P total knee arthroplasty    Pre-syncope    Constipation    Ankle edema    Screening for colon cancer    Bilateral knee pain    Essential hypertension    Ectatic aorta-Noted on imaging 7/27/2015    Posture abnormality    Balance problem    Hypotension    History of atrial flutter    Weight gain    Keloid    Family history of thrombosis    Decreased range of motion (ROM) of left knee    Impaired gait and mobility    Decreased strength of lower extremity    Neutropenia, unspecified type    Chronic venous insufficiency    being managed by PCP and specialists who is here today for evaluation of BLE edema.  Patient states location is BLE occurring for months.  Associated signs and symptoms include discoloration and itching.  Quality is heavy and severity is 5/10.  Symptoms began mo ago after her L knee open replacement.  Alleviating factors include elevation.  Worsening factors include dependency.  Patient has been wearing compression stockings for greater than 3 months.  Symptoms do limit patient's functional status and daily activities.  no DVT history.  no venous interventions.  no low sodium diet.  no excessive water intake.    Migraine with aura: no  PFO/ASD/right to left shunt: no  Pregnant: no  Breastfeeding: no    no MI  no Stroke  Tobacco use: denies    12/2024: s/p R GSV EVLT mid thigh to calf 9/2024.  No complains  today, + compression.    Past Medical History:   Diagnosis Date    AR (allergic rhinitis) 12/07/2012    Atrial flutter     ablation October 2008    Cataract     Generalized headaches     GERD (gastroesophageal reflux disease) 03/08/2013    resolved    Grave's disease     s/p radioactive iodine, now hypothyroidism    Keloid cicatrix     Obesity     Osteoarthritis     shoulders, hands, knees    Positive PPD, treated     9 months of INH, completed 1/2010     Past Surgical History:   Procedure Laterality Date    ABLATION OF DYSRHYTHMIC FOCUS  10/24/2008    atrial flutter    ARTHROPLASTY OF SHOULDER Right 04/01/2019    Procedure: ARTHROPLASTY, SHOULDER;  Surgeon: Sage Xie MD;  Location: [facility] OR;  Service: Orthopedics;  Laterality: Right;  regional w/ catheter (q-ball)    CATARACT EXTRACTION W/  INTRAOCULAR LENS IMPLANT Bilateral     COLONOSCOPY N/A 11/05/2015    Procedure: COLONOSCOPY;  Surgeon: Jose Ly MD;  [facility]  Service: Endoscopy;  Laterality: N/A;  Last colonoscopy 2008 with Dr. Vieira; Pt wanted this day    EYE SURGERY      cataract removal right eye    HERNIA REPAIR      JOINT REPLACEMENT      KNEE ARTHROSCOPY W/ DEBRIDEMENT      right, 2005 and 2008    KNEE SURGERY  03/27/2014    right TKA    TOTAL KNEE ARTHROPLASTY Left 12/13/2022    Procedure: ARTHROPLASTY, KNEE, TOTAL;  Surgeon: Gordon Schneider MD;  Location: [facility];  Service: Orthopedics;  Laterality: Left;     Family History   Problem Relation Name Age of Onset    Arthritis Mother      Arthritis Father      Glaucoma Father      Cataracts Father      Hepatitis Brother x3     No Known Problems Daughter      No Known Problems Son      No Known Problems Son      No Known Problems Maternal Aunt      No Known Problems Maternal Uncle      No Known Problems Paternal Aunt      No Known Problems Paternal Uncle      No Known Problems Maternal Grandmother      No Known Problems Maternal Grandfather      No Known  Problems Paternal Grandmother      No Known Problems Paternal Grandfather      Colon cancer Neg Hx      Ovarian cancer Neg Hx      Breast cancer Neg Hx       Social History     Socioeconomic History    Marital status:    Occupational History    Occupation:      Employer: [employer]   Tobacco Use    Smoking status: Never     Passive exposure: Never    Smokeless tobacco: Never   Substance and Sexual Activity    Alcohol use: No    Drug use: No    Sexual activity: Not Currently     Partners: Male     Birth control/protection: None   Other Topics Concern    Are you pregnant or think you may be? No    Breast-feeding No   Social History Narrative         Social Drivers of Health     Financial Resource Strain: Low Risk  (9/16/2024)    Overall Financial Resource Strain (CARDIA)     Difficulty of Paying Living Expenses: Not hard at all   Food Insecurity: No Food Insecurity (9/16/2024)    Hunger Vital Sign     Worried About Running Out of Food in the Last Year: Never true     Ran Out of Food in the Last Year: Never true   Transportation Needs: No Transportation Needs (5/24/2024)    PRAPARE - Transportation     Lack of Transportation (Medical): No     Lack of Transportation (Non-Medical): No   Physical Activity: Insufficiently Active (9/16/2024)    Exercise Vital Sign     Days of Exercise per Week: 3 days     Minutes of Exercise per Session: 40 min   Stress: No Stress Concern Present (9/16/2024)    Citizen of [place] [employer] - Occupational Stress Questionnaire     Feeling of Stress : Not at all   Housing Stability: Low Risk  (9/16/2024)    Housing Stability Vital Sign     Unable to Pay for Housing in the Last Year: No     Homeless in the Last Year: No       Current Outpatient Medications:     ALPRAZolam (XANAX) 0.5 MG tablet, Take one tablet by mouth 1 hour before procedure and bring other tablet with you to the procedure., Disp: 2 tablet, Rfl: 0    ascorbic  acid/multivit-min (EMERGEN-C ORAL), Take 1,000 mg by mouth once daily., Disp: , Rfl:     aspirin (ECOTRIN) 81 MG EC tablet, Take 1 tablet (81 mg total) by mouth 2 (two) times daily., Disp: 60 tablet, Rfl: 0    cholecalciferol, vitamin D3, (VITAMIN D3) 50 mcg (2,000 unit) Cap, Take 1 capsule by mouth once daily., Disp: , Rfl:     fluticasone propionate (FLONASE) 50 mcg/actuation nasal spray, 1 spray (50 mcg total) by Each Nostril route once daily., Disp: 11.1 mL, Rfl: 0    hydroCHLOROthiazide (HYDRODIURIL) 12.5 MG Tab, TAKE 1/2 TO 1 TABLET BY MOUTH DAILY AS NEEDED, Disp: 90 tablet, Rfl: 3    hydrocortisone 2.5 % ointment, Apply topically 2 (two) times daily., Disp: 20 g, Rfl: 0    multivitamin-minerals-lutein Tab, Take 1 tablet by mouth once daily. , Disp: , Rfl:     SYNTHROID 137 mcg Tab tablet, Take 1 tablet (137 mcg total) by mouth once daily. Except Monday, Disp: 90 tablet, Rfl: 4    urea 20 % Crea, Apply 1 application  topically once daily. To dry skin on the feet., Disp: 75 g, Rfl: 10    Current Facility-Administered Medications:     LIDOcaine HCL 10 mg/ml (1%) injection 1 mL, 1 mL, Other, 1 time in Clinic/HOD,     LIDOcaine-EPINEPHrine 1%-1:100,000 30 mL, LIDOcaine HCL 10 mg/ml (1%) 20 mL, sodium bicarbonate 10 mL in 0.9% NaCl 500 mL solution, , MISCELLANEOUS, 1 time in Clinic/HOD,     REVIEW OF SYSTEMS:  General: No fevers or chills; ENT: No sore throat; Allergy and Immunology: no persistent infections; Hematological and Lymphatic: No history of bleeding or easy bruising; Endocrine: negative; Respiratory: no cough, shortness of breath, or wheezing; Cardiovascular: no chest pain or dyspnea on exertion; Gastrointestinal: no abdominal pain/back, change in bowel habits, or bloody stools; Genito-Urinary: no dysuria, trouble voiding, or hematuria; Musculoskeletal: edema; Neurological: no TIA or stroke symptoms; Psychiatric: no nervousness, anxiety or depression.    PHYSICAL EXAM:                General appearance:  " Alert, well-appearing, and in no distress.  Oriented to person, place, and time                    Neurological: Normal speech, no focal findings noted; CN II - XII grossly intact. RLE with sensation to light touch, LLE with sensation to light touch.            Musculoskeletal: Digits/nail without cyanosis/clubbing.  Strength 5/5 BLE.                    Neck: Supple, no significant adenopathy                  Chest:  No wheezes, symmetric air entry. No use of accessory muscles               Cardiac: Normal rate and regular rhythm            Abdomen: Soft, nontender, nondistended      Extremities:   2+ R DP pulse, 2+ L DP pulse      2+ RLE edema, 1+ LLE edema    Skin:  RLE no ulcer; LLE no ulcer      RLE no spider veins, LLE no spider veins      RLE no varicose veins, LLE no varicose veins    CEAP 3/3    VCSS 4    LAB RESULTS:  No results found for: "CBC"  Lab Results   Component Value Date    LABPROT 10.9 11/30/2022    INR 1.0 11/30/2022     Lab Results   Component Value Date     06/21/2024    K 3.8 06/21/2024     06/21/2024    CO2 26 06/21/2024    GLU 69 (L) 06/21/2024    BUN 17 06/21/2024    CREATININE 1.0 06/21/2024    CALCIUM 10.2 06/21/2024    ANIONGAP 11 06/21/2024    EGFRNONAA >60.0 07/06/2022     Lab Results   Component Value Date    WBC 4.73 06/21/2024    RBC 4.56 06/21/2024    HGB 12.2 06/21/2024    HCT 39.0 06/21/2024    MCV 86 06/21/2024    MCH 26.8 (L) 06/21/2024    MCHC 31.3 (L) 06/21/2024    RDW 17.0 (H) 06/21/2024     06/21/2024    MPV 11.7 06/21/2024    GRAN 2.1 06/21/2024    GRAN 44.3 06/21/2024    LYMPH 2.1 06/21/2024    LYMPH 45.2 06/21/2024    MONO 0.4 06/21/2024    MONO 8.2 06/21/2024    EOS 0.1 06/21/2024    BASO 0.04 06/21/2024    EOSINOPHIL 1.3 06/21/2024    BASOPHIL 0.8 06/21/2024    DIFFMETHOD Automated 06/21/2024     .  Lab Results   Component Value Date    HGBA1C 5.3 06/21/2024       IMAGING:  All pertinent imaging has been reviewed and interpreted " independently.    Venous US 10/2023 Impression:  FINDINGS:  No evidence of DVT in either lower extremity deep venous system.     On the right, the greater saphenous vein has maximum diameter of 13 mm.  There is hemodynamically significant reflux identified from the distal thigh through the mid calf.  The lesser saphenous vein has maximum diameter of 3 mm with hemodynamically significant reflux at the level of the mid calf.  The anterior accessory saphenous vein has maximum diameter of 4 mm without hemodynamically significant reflux.     On the left, the greater saphenous veins maximum diameter of 11 mm.  There is hemodynamically significant reflux identified at the level of the mid calf.  The lesser saphenous vein has maximum diameter of 3 mm.  There is hemodynamically significant reflux at the level of the mid calf as well.  The anterior accessory saphenous vein has maximum diameter of 4 mm without hemodynamically significant reflux.     Impression:     No evidence of DVT in either lower extremity deep venous system.     On the right, there is hemodynamically significant reflux noted within the greater saphenous vein from the level of the distal thigh through the calf and in the lesser saphenous vein at the level of the calf.     On the left, there is hemodynamically significant reflux identified within the greater and lesser saphenous veins at the level of the calf          IMP/PLAN:  74 y.o. female with   Patient Active Problem List   Diagnosis    Osteoarthritis    History of Graves' disease    Hypothyroidism, postablative    Radial styloid tenosynovitis    Pseudophakia of both eyes    Other specified disorder of skin    GERD (gastroesophageal reflux disease)    Left knee pain    Primary localized osteoarthrosis, lower leg    Knee pain, right    Pain in joint, lower leg    S/P total knee arthroplasty    Pre-syncope    Constipation    Ankle edema    Screening for colon cancer    Bilateral knee pain    Essential  hypertension    Ectatic aorta-Noted on imaging 7/27/2015    Posture abnormality    Balance problem    Hypotension    History of atrial flutter    Weight gain    Keloid    Family history of thrombosis    Decreased range of motion (ROM) of left knee    Impaired gait and mobility    Decreased strength of lower extremity    Neutropenia, unspecified type    Chronic venous insufficiency    being managed by PCP and specialists who is here today for evaluation of BLE edema.    -recommend compression with Rx stockings, elevation, dietary changes associated with water and sodium intake discussed at length with patient  -Exercise   -Derm referral  -s/p R GSV EVLT mid thigh to calf 9/2024  -Doing great, RTC 6 mo to eval for L SSV EVLT    I spent 15 minutes evaluating this patient and greater than 50% of the time was spent counseling, coordinator care and discussing the plan of care.  All questions were answered and patient stated understanding with agreement with the above treatment plan.    Johann Baez MD [facility]  Vascular and Endovascular Surgery

## 2024-12-29 RX ORDER — HYDROCHLOROTHIAZIDE 12.5 MG/1
TABLET ORAL
Qty: 90 TABLET | Refills: 3 | Status: SHIPPED | OUTPATIENT
Start: 2024-12-29

## 2024-12-29 NOTE — TELEPHONE ENCOUNTER
No care due was identified.  [facility] Embedded Care Due Messages. Reference number: [id number].   12/29/2024 5:38:24 AM CST

## 2024-12-29 NOTE — TELEPHONE ENCOUNTER
No care due was identified.  [facility] Embedded Care Due Messages. Reference number: [id number].   12/29/2024 11:57:20 AM CST

## 2025-02-03 ENCOUNTER — TELEPHONE (OUTPATIENT)
Dept: ORTHOPEDICS | Facility: CLINIC | Age: 75
End: 2025-02-03
Payer: MEDICARE

## 2025-02-04 ENCOUNTER — TELEPHONE (OUTPATIENT)
Dept: INTERNAL MEDICINE | Facility: CLINIC | Age: 75
End: 2025-02-04
Payer: MEDICARE

## 2025-02-04 NOTE — TELEPHONE ENCOUNTER
Can not get rid of cough   Taking imodium for the diarrhea and it has been getting better   She's staying hydrated   Offered her a same day appointment but does not want to be seen by anyone else   Offered her the appts in March she declined does not want to wait till then .   Wants to discuss the cough and a few other things

## 2025-02-04 NOTE — TELEPHONE ENCOUNTER
----- Message from Bibiana sent at 2/4/2025  7:02 AM CST -----  Type:  Sooner Appointment Request    Caller is requesting a sooner appointment.  Caller declined first available appointment listed below.  Caller will not accept being placed on the waitlist and is requesting a message be sent to doctor.  Name of Caller:pt  When is the first available appointment?05/16/25  Symptoms:: Diarrhea and upset stomach  Would the patient rather a call back or a response via [facility]? call  Best Call Back Number: [phone]  Additional Information:

## 2025-02-06 NOTE — TELEPHONE ENCOUNTER
Please contact her to see how she is doing today.  Do not have any apts available. I suggest that she see my nurse practioner Vivien Chou

## 2025-02-18 ENCOUNTER — TELEPHONE (OUTPATIENT)
Dept: CARDIOLOGY | Facility: CLINIC | Age: 75
End: 2025-02-18
Payer: MEDICARE

## 2025-02-18 NOTE — TELEPHONE ENCOUNTER
----- Message from José sent at 2/18/2025 11:13 AM CST -----  Patient is calling to schedule 1yr FU. She can be reached at [phone].Thank you

## 2025-02-25 ENCOUNTER — OCCUPATIONAL HEALTH (OUTPATIENT)
Dept: URGENT CARE | Facility: CLINIC | Age: 75
End: 2025-02-25

## 2025-02-25 DIAGNOSIS — Z00.00 ENCOUNTER FOR PHYSICAL EXAMINATION: Primary | ICD-10-CM

## 2025-02-25 NOTE — PROGRESS NOTES
----- Message from Max ANDREWS sent at 12/19/2024 12:13 PM EST -----  Please call patient and inform her all labs are normal.  I would like patient to check additional testing.  Blood work and urine testing ordered please have patient obtain at her convenience   ANNUAL/BASIC PHYSICAL NOT NEEDED PER PROTOCOL/ CHARGE REMOVED  TITERS  TB SKIN TEST  N95 MASK FIT/OSHA QUEST  FLU VACCINE  TDAP UTD/PROOF    KSD

## 2025-02-28 ENCOUNTER — OCCUPATIONAL HEALTH (OUTPATIENT)
Dept: URGENT CARE | Facility: CLINIC | Age: 75
End: 2025-02-28

## 2025-03-05 DIAGNOSIS — Z96.651 H/O TOTAL KNEE REPLACEMENT, RIGHT: ICD-10-CM

## 2025-03-05 DIAGNOSIS — Z96.652 STATUS POST LEFT KNEE REPLACEMENT: Primary | ICD-10-CM

## 2025-03-12 ENCOUNTER — OFFICE VISIT (OUTPATIENT)
Dept: ORTHOPEDICS | Facility: CLINIC | Age: 75
End: 2025-03-12
Payer: MEDICARE

## 2025-03-12 ENCOUNTER — HOSPITAL ENCOUNTER (OUTPATIENT)
Dept: RADIOLOGY | Facility: HOSPITAL | Age: 75
Discharge: HOME | End: 2025-03-12
Attending: ORTHOPAEDIC SURGERY
Payer: MEDICARE

## 2025-03-12 VITALS — HEIGHT: 71 IN | BODY MASS INDEX: 29.04 KG/M2 | WEIGHT: 207.44 LBS

## 2025-03-12 DIAGNOSIS — Z96.651 H/O TOTAL KNEE REPLACEMENT, RIGHT: ICD-10-CM

## 2025-03-12 DIAGNOSIS — Z96.652 STATUS POST LEFT KNEE REPLACEMENT: Primary | ICD-10-CM

## 2025-03-12 DIAGNOSIS — Z96.652 STATUS POST LEFT KNEE REPLACEMENT: ICD-10-CM

## 2025-03-12 PROCEDURE — 99213 OFFICE O/P EST LOW 20 MIN: CPT | Mod: PBBFAC,25 | Performed by: ORTHOPAEDIC SURGERY

## 2025-03-12 PROCEDURE — 73562 X-RAY EXAM OF KNEE 3: CPT | Mod: 26,50,, | Performed by: RADIOLOGY

## 2025-03-12 PROCEDURE — 99999 PR PBB SHADOW E&M-EST. PATIENT-LVL III: CPT | Mod: PBBFAC,,, | Performed by: ORTHOPAEDIC SURGERY

## 2025-03-12 PROCEDURE — 73562 X-RAY EXAM OF KNEE 3: CPT | Mod: TC,50

## 2025-03-12 PROCEDURE — 99213 OFFICE O/P EST LOW 20 MIN: CPT | Mod: S$PBB,,, | Performed by: ORTHOPAEDIC SURGERY

## 2025-03-12 NOTE — PROGRESS NOTES
Poonam Alvarez is in for 2 year follow up for a  left TKA.  She is doing very well.  No pain in the knee.  She has resumed activities of daily living. She has a right Exactech TKA from 2014 which I doing well  Exam demonstrates  A well developed female in no distress.  Alert and oriented.  Mood and affect are appropriate.    Knee incision is well healed.  ROM is 0-120.  The patella tracks well and there is no instability. The extremity is neurovascularly intact.    Xrays demonstrate a well fixed and positioned prosthesis.      Imp:Doing well    F/u in one year with xrays and PROMS

## 2025-03-14 ENCOUNTER — TELEPHONE (OUTPATIENT)
Dept: CARDIOLOGY | Facility: CLINIC | Age: 75
End: 2025-03-14
Payer: MEDICARE

## 2025-03-14 ENCOUNTER — OFFICE VISIT (OUTPATIENT)
Dept: CARDIOLOGY | Facility: CLINIC | Age: 75
End: 2025-03-14
Payer: MEDICARE

## 2025-03-14 VITALS — HEART RATE: 65 BPM | SYSTOLIC BLOOD PRESSURE: 103 MMHG | DIASTOLIC BLOOD PRESSURE: 67 MMHG

## 2025-03-14 DIAGNOSIS — I87.2 CHRONIC VENOUS INSUFFICIENCY: ICD-10-CM

## 2025-03-14 DIAGNOSIS — Z13.6 ENCOUNTER FOR SCREENING FOR CARDIOVASCULAR DISORDERS: ICD-10-CM

## 2025-03-14 DIAGNOSIS — I73.9 PERIPHERAL ARTERIAL DISEASE: ICD-10-CM

## 2025-03-14 DIAGNOSIS — I77.819 ECTATIC AORTA: Primary | ICD-10-CM

## 2025-03-14 PROCEDURE — 99213 OFFICE O/P EST LOW 20 MIN: CPT | Mod: PBBFAC | Performed by: INTERNAL MEDICINE

## 2025-03-14 PROCEDURE — 99999 PR PBB SHADOW E&M-EST. PATIENT-LVL III: CPT | Mod: PBBFAC,,, | Performed by: INTERNAL MEDICINE

## 2025-03-14 NOTE — PROGRESS NOTES
Subjective:   03/14/2025     Patient ID:  Poonam Alvarez is a 74 y.o. female who presents for evaulation of Follow-up      Comes back in follow-up, not having chest pains or tightness, no PND or orthopnea.  I had seen her last year, she had an atrial septal aneurysm and mild dilatation of the ascending aorta, she has never had a stroke.  No TIAs.      She does appear to have peripheral arterial disease, STANLEY on the right 0.87, left 0.89, suggestive of mild arterial disease.  She does see vascular surgery already for venous insufficiency, asked her to review that with them.      She has a history of any ascending aortic dilatation, aortic diameter height ratio is well within normal limits.      Blood pressure well controlled.      Hypercholesterolemia is present with LDL cholesterol of 120.        Prior notes reviewed:  Patient had been having a cough.  Underwent echocardiography.  Noted to have an atrial septal aneurysm and mild dilatation of the ascending aorta.  The patient is not having exertional chest pains or tightness, no PND or orthopnea.  Her nocturnal cough has improved.  She did have a history of lower extremity swelling, improved now on hydrochlorothiazide, she has been on this for awhile.      She would otherwise does not have a history of heart problems.      Echocardiographic review shows the presence of a interatrial septal aneurysm.  She has never had a stroke.    Also note is mild dilatation of the aortic root and ascending aorta.  The patient is almost 6 ft.  Her aortic diameter to height ratio was well within normal limits.    Blood pressure well controlled today.        ·  Left Ventricle: The left ventricle is normal in size. Normal wall thickness. Normal wall motion. There is normal systolic function with a visually estimated ejection fraction of 55 - 60%. There is normal diastolic function.  ·  Right Ventricle: Normal right ventricular cavity size. Wall thickness is normal. Right ventricle  wall motion  is normal. Systolic function is normal.  ·  Left Atrium: There is an aneurysm along the interatrial septum.  ·  Mitral Valve: There is mild regurgitation.  ·  Tricuspid Valve: There is mild regurgitation.  ·  Aorta: Aortic root is mildly dilated measuring 4.02 cm. Ascending aorta is mildly dilated measuring 3.6 cm.  ·  Pulmonary Artery: The estimated pulmonary artery systolic pressure is 27 mmHg.  ·  IVC/SVC: Intermediate venous pressure at 8 mmHg.      The 10-year ASCVD risk score (Mariajose DK, et al., 2019) is: 12.3%    Values used to calculate the score:      Age: 73 years      Sex: Female      Is Non- : Yes      Diabetic: No      Tobacco smoker: No      Systolic Blood Pressure: 112 mmHg      Is BP treated: Yes      HDL Cholesterol: 57 mg/dL      Total Cholesterol: 190 mg/dL               Past Medical History:   Diagnosis Date    AR (allergic rhinitis) 12/07/2012    Atrial flutter     ablation October 2008    Cataract     Generalized headaches     GERD (gastroesophageal reflux disease) 03/08/2013    resolved    Grave's disease     s/p radioactive iodine, now hypothyroidism    Keloid cicatrix     Obesity     Osteoarthritis     shoulders, hands, knees    Positive PPD, treated     9 months of INH, completed 1/2010       Review of patient's allergies indicates:   Allergen Reactions    Hydrocodone-acetaminophen Other (See Comments)     Low blood pressure; doesn't want    Oxycodone-acetaminophen Other (See Comments)     Causes low blood pressure; doesn't want  4/4/19 - patient reports she is not actually allergic to it (just doesn't like how it makes her feel)         Current Outpatient Medications:     ascorbic acid/multivit-min (EMERGEN-C ORAL), Take 1,000 mg by mouth once daily., Disp: , Rfl:     cholecalciferol, vitamin D3, (VITAMIN D3) 50 mcg (2,000 unit) Cap, Take 1 capsule by mouth once daily., Disp: , Rfl:     fluticasone propionate (FLONASE) 50 mcg/actuation nasal spray, 1  spray (50 mcg total) by Each Nostril route once daily., Disp: 11.1 mL, Rfl: 0    hydroCHLOROthiazide (HYDRODIURIL) 12.5 MG Tab, TAKE 1/2 TO 1 TABLET BY MOUTH DAILY AS NEEDED, Disp: 90 tablet, Rfl: 3    hydroCHLOROthiazide (HYDRODIURIL) 12.5 MG Tab, TAKE 1/2 TO 1 TABLET BY MOUTH DAILY AS NEEDED, Disp: 90 tablet, Rfl: 3    hydrocortisone 2.5 % ointment, Apply topically 2 (two) times daily., Disp: 20 g, Rfl: 0    multivitamin-minerals-lutein Tab, Take 1 tablet by mouth once daily. , Disp: , Rfl:     SYNTHROID 137 mcg Tab tablet, Take 1 tablet (137 mcg total) by mouth once daily. Except Monday, Disp: 90 tablet, Rfl: 4    urea 20 % Crea, Apply 1 application  topically once daily. To dry skin on the feet., Disp: 75 g, Rfl: 10    ALPRAZolam (XANAX) 0.5 MG tablet, Take one tablet by mouth 1 hour before procedure and bring other tablet with you to the procedure. (Patient not taking: Reported on 3/14/2025), Disp: 2 tablet, Rfl: 0    aspirin (ECOTRIN) 81 MG EC tablet, Take 1 tablet (81 mg total) by mouth 2 (two) times daily., Disp: 60 tablet, Rfl: 0    Current Facility-Administered Medications:     LIDOcaine HCL 10 mg/ml (1%) injection 1 mL, 1 mL, Other, 1 time in Clinic/HOD,     LIDOcaine-EPINEPHrine 1%-1:100,000 30 mL, LIDOcaine HCL 10 mg/ml (1%) 20 mL, sodium bicarbonate 10 mL in 0.9% NaCl 500 mL solution, , MISCELLANEOUS, 1 time in Clinic/HOD,      Objective:   Review of Systems   Cardiovascular:  Negative for chest pain, claudication, cyanosis, dyspnea on exertion, irregular heartbeat, leg swelling, near-syncope, orthopnea, palpitations, paroxysmal nocturnal dyspnea and syncope.         Vitals:    03/14/25 1424   BP: 103/67   Pulse: 65     Wt Readings from Last 3 Encounters:   03/12/25 94.1 kg (207 lb 7.3 oz)   09/18/24 94 kg (207 lb 3.7 oz)   06/21/24 94 kg (207 lb 3.7 oz)     Temp Readings from Last 3 Encounters:   03/31/24 98.2 °F (36.8 °C) (Oral)   01/17/24 98.3 °F (36.8 °C)   08/04/23 98.8 °F (37.1 °C) (Oral)     BP  Readings from Last 3 Encounters:   03/14/25 103/67   12/13/24 (!) 115/59   09/18/24 116/67     Pulse Readings from Last 3 Encounters:   03/14/25 65   12/13/24 88   09/18/24 71             Physical Exam  Vitals reviewed.   Constitutional:       General: She is not in acute distress.     Appearance: She is well-developed.   HENT:      Head: Normocephalic and atraumatic.      Nose: Nose normal.   Eyes:      Conjunctiva/sclera: Conjunctivae normal.      Pupils: Pupils are equal, round, and reactive to light.   Neck:      Vascular: No carotid bruit or JVD.   Cardiovascular:      Rate and Rhythm: Normal rate and regular rhythm.      Pulses: Normal pulses and intact distal pulses.      Heart sounds: Normal heart sounds. No murmur heard.     No friction rub. No gallop.   Pulmonary:      Effort: Pulmonary effort is normal. No respiratory distress.      Breath sounds: Normal breath sounds. No wheezing or rales.   Chest:      Chest wall: No tenderness.   Abdominal:      General: Bowel sounds are normal. There is no distension.      Palpations: Abdomen is soft.      Tenderness: There is no abdominal tenderness.   Musculoskeletal:         General: No tenderness or deformity. Normal range of motion.      Cervical back: Normal range of motion and neck supple.      Right lower leg: No edema.      Left lower leg: No edema.   Skin:     General: Skin is warm and dry.      Findings: No erythema or rash.   Neurological:      Mental Status: She is alert and oriented to person, place, and time.      Cranial Nerves: No cranial nerve deficit.      Motor: No abnormal muscle tone.      Coordination: Coordination normal.   Psychiatric:         Behavior: Behavior normal.         Thought Content: Thought content normal.         Judgment: Judgment normal.           Lab Results   Component Value Date    CHOL 195 06/21/2024    CHOL 190 07/07/2023    CHOL 202 (H) 07/06/2022     Lab Results   Component Value Date    HDL 61 06/21/2024    HDL 57  07/07/2023    HDL 64 07/06/2022     Lab Results   Component Value Date    LDLCALC 119.8 06/21/2024    LDLCALC 121.6 07/07/2023    LDLCALC 123.6 07/06/2022     Lab Results   Component Value Date    ALT 18 06/21/2024    AST 26 06/21/2024    AST 23 07/07/2023    AST 26 07/06/2022     Lab Results   Component Value Date    CREATININE 1.0 06/21/2024    BUN 17 06/21/2024     06/21/2024    K 3.8 06/21/2024    CO2 26 06/21/2024    CO2 28 07/07/2023    CO2 31 (H) 11/30/2022     Lab Results   Component Value Date    HGB 12.2 06/21/2024    HCT 39.0 06/21/2024    HCT 37.6 07/07/2023    HCT 39.7 11/30/2022                   EKG independent review from February 14, 2024 shows sinus rhythm, no significant abnormalities present      Assessment and Plan:     Encounter for screening for cardiovascular disorders  -     CT Cardiac Scoring; Future; Expected date: 03/14/2025    Ectatic aorta    Chronic venous insufficiency    Peripheral arterial disease         I will have her obtain a calcium score, this will be able to look at her ascending aorta see if there would be any dilatation, but also, if coronary calcification would be present, would need to be more aggressive with lipid management.    Follow up in about 1 year (around 3/14/2026).          Future Appointments   Date Time Provider Department Center   4/7/2025  9:30 AM Lindsey Bach MD [facility]   6/11/2025  1:00 PM Johann Baez MD [facility]                  Hydroxychloroquine Pregnancy And Lactation Text: This medication has been shown to cause fetal harm but it isn't assigned a Pregnancy Risk Category. There are small amounts excreted in breast milk.

## 2025-03-17 ENCOUNTER — HOSPITAL ENCOUNTER (OUTPATIENT)
Dept: RADIOLOGY | Facility: HOSPITAL | Age: 75
Discharge: HOME | End: 2025-03-17
Attending: INTERNAL MEDICINE
Payer: MEDICARE

## 2025-03-17 DIAGNOSIS — Z13.6 ENCOUNTER FOR SCREENING FOR CARDIOVASCULAR DISORDERS: ICD-10-CM

## 2025-03-17 PROCEDURE — 75571 CT HRT W/O DYE W/CA TEST: CPT | Mod: TC

## 2025-03-17 PROCEDURE — 75571 CT HRT W/O DYE W/CA TEST: CPT | Mod: 26,,, | Performed by: RADIOLOGY

## 2025-03-18 ENCOUNTER — RESULTS FOLLOW-UP (OUTPATIENT)
Dept: CARDIOLOGY | Facility: CLINIC | Age: 75
End: 2025-03-18

## 2025-03-31 ENCOUNTER — TELEPHONE (OUTPATIENT)
Dept: OPTOMETRY | Facility: CLINIC | Age: 75
End: 2025-03-31
Payer: MEDICARE

## 2025-04-07 ENCOUNTER — OFFICE VISIT (OUTPATIENT)
Dept: INTERNAL MEDICINE | Facility: CLINIC | Age: 75
End: 2025-04-07
Payer: MEDICARE

## 2025-04-07 VITALS
OXYGEN SATURATION: 98 % | SYSTOLIC BLOOD PRESSURE: 116 MMHG | BODY MASS INDEX: 29.32 KG/M2 | WEIGHT: 209.44 LBS | HEIGHT: 71 IN | HEART RATE: 70 BPM | DIASTOLIC BLOOD PRESSURE: 80 MMHG

## 2025-04-07 DIAGNOSIS — Z12.11 SCREENING FOR MALIGNANT NEOPLASM OF COLON: ICD-10-CM

## 2025-04-07 DIAGNOSIS — E55.9 VITAMIN D DEFICIENCY, UNSPECIFIED: ICD-10-CM

## 2025-04-07 DIAGNOSIS — R79.9 ABNORMAL BLOOD CHEMISTRY: ICD-10-CM

## 2025-04-07 DIAGNOSIS — I10 ESSENTIAL HYPERTENSION: ICD-10-CM

## 2025-04-07 DIAGNOSIS — E53.8 VITAMIN B12 DEFICIENCY: ICD-10-CM

## 2025-04-07 DIAGNOSIS — E89.0 HYPOTHYROIDISM, POSTABLATIVE: ICD-10-CM

## 2025-04-07 DIAGNOSIS — Z00.00 ROUTINE GENERAL MEDICAL EXAMINATION AT A HEALTH CARE FACILITY: Primary | ICD-10-CM

## 2025-04-07 DIAGNOSIS — Z01.84 IMMUNITY STATUS TESTING: ICD-10-CM

## 2025-04-07 DIAGNOSIS — Z01.419 GYNECOLOGIC EXAM NORMAL: ICD-10-CM

## 2025-04-07 PROBLEM — D70.9 NEUTROPENIA, UNSPECIFIED TYPE: Status: RESOLVED | Noted: 2023-07-07 | Resolved: 2025-04-07

## 2025-04-07 PROCEDURE — 99214 OFFICE O/P EST MOD 30 MIN: CPT | Mod: PBBFAC | Performed by: INTERNAL MEDICINE

## 2025-04-07 PROCEDURE — 99999 PR PBB SHADOW E&M-EST. PATIENT-LVL IV: CPT | Mod: PBBFAC,,, | Performed by: INTERNAL MEDICINE

## 2025-04-07 RX ORDER — LEVOTHYROXINE SODIUM 137 UG/1
137 TABLET ORAL DAILY
Qty: 90 TABLET | Refills: 4 | Status: SHIPPED | OUTPATIENT
Start: 2025-04-07

## 2025-04-07 RX ORDER — FAMOTIDINE 40 MG/1
40 TABLET, FILM COATED ORAL DAILY PRN
Qty: 30 TABLET | Refills: 3 | Status: SHIPPED | OUTPATIENT
Start: 2025-04-07 | End: 2026-04-07

## 2025-04-07 RX ORDER — HYDROCHLOROTHIAZIDE 12.5 MG/1
TABLET ORAL
Qty: 90 TABLET | Refills: 3 | Status: SHIPPED | OUTPATIENT
Start: 2025-04-07

## 2025-04-07 RX ORDER — NAPROXEN SODIUM 220 MG/1
81 TABLET, FILM COATED ORAL DAILY
COMMUNITY

## 2025-04-07 NOTE — PROGRESS NOTES
CHIEF COMPLAINT: Annual exam      HISTORY OF PRESENT ILLNESS: This is a 74-year-old woman who presents for he annual exam     She had some bloating and belching and passing gas several days ago.  It is better now. She had some nausea that resolved. No constipation or diarrhea. No abdominal pain.     She saw cardiology on 2/28/24 for echo findings of atrial septal naeurysm and ascending aorta dilation  - no further testing necessary.     She saw DR Coyle for venous insufficiency 8/2023 - she had endovascular laser of the right lower leg on 9/18/24. Swelling has resolved.  Left leg is fine.     Ct coronary calcium score 3/17/25 is ONE.      No more dizziness or syncopal episodes.  She takes HCTZ 12.5 mg once daily 3 days a week. No swelling.     She has cramps in the left toe.  Legs are much improved in the right leg after venous ablation.     Knees are fine      She continues to take Synthroid 137 mcg daily for hypothyroidism.       She has a history of positive PPD 01/2010 - took INH for 9 months, CXR 11/16 was fine. NO fever, chills, night sweats, weight loss.       She has chronic constipation.   She is taking milk of magnesia - cherry - 3-4 times a week which keeps her regular       She is currently working part time for [email or web address] at [employer] - as a .        She takes meloxicam 15 mg daily once a week. Joints are doing OK- up and down with activity and the weather     She has a cough at night - she will take the promethazine DM 1-2 times a month which helps.      She was bit by an insect a month ago - the area is improved. The skin is still thick.       PAST MEDICAL HISTORY:   1. Atrial flutter, status post ablation in October 2008.   2. Graves disease, status post radioactive iodine, now hypothyroid.   3. Hypertension.   4. History of headaches.   5. Osteoarthritis of the shoulders, hands and knees.   6. Status post arthroscopic surgery of the right knee February 2008.   7.  "Status post arthroscopic surgery of the right knee in .   8. Tumandi stahl 20 years ago.   9. Hemorrhoids removed.   10. Cataract removal bilaterally  11. Positive PPD diagnosed , completed 9 months of INH 1/10  12. GERD     SOCIAL HISTORY: She does not smoke. She drinks alcohol twice a year.   She is , has three children. She is a .     FAMILY HISTORY: Father  at age 83 from complications of pneumonia.   Mother  at age 98 from old age. Sister had a pulmonary embolus, another   sister had sarcoidosis. A son has Crohn's disease.        MEDICATIONS AND ALLERGIES: Updated in EPIC.       PHYSICAL EXAMINATION:   /80 (BP Location: Left arm, Patient Position: Sitting)   Pulse 70   Ht 5' 11" (1.803 m)   Wt 95 kg (209 lb 7 oz)   SpO2 98%   BMI 29.21 kg/m²           GENERAL: She is alert, oriented, no apparent distress. Affect within normal limits.   Conjunctiva anicteric. Tympanic membranes clear. Oropharynx clear.    NECK: Supple.   RESPIRATORY: Effort normal. Lungs are clear to auscultation.   HEART: Regular rate and rhythm without murmurs, gallops or rubs.   No lower extremity edema.          ASSESSMENT AND PLAN:      Annual exam - discussed diet, exercise and safety issues.        1. OA knees - s/p injection in left knee - s/p right knee replacement - doing well.   4. ALlergic rhinitis -stable   5. Hypothyroidism - tsh   6. GERD - famotidine 40 mg daily as needed  7. Obestiy - doing well with diet, exercise and weight loss.   8. Positive ppd - cxr stable 2019- repeat PPD 2025 negative.   9. Left inguinal hernia repair - doing well     Screening - mammogram 2024. Bone density was normal 2020  Colonoscopy due 2015 - normal due    Normal pap Dec 2020.  I will see her back in 6 months, sooner if problems arise.   Answers submitted by the patient for this visit:  Abdominal Pain Questionnaire (Submitted on 2025)  Chief Complaint: Abdominal pain  Chronicity: " new  Onset: in the past 7 days  Onset quality: gradual  Frequency: every several days  Episode duration: 1 Hours  Progression since onset: unchanged  Pain location: periumbilical region  Pain - numeric: 2/10  Pain quality: a sensation of fullness  anorexia: No  arthralgias: No  belching: Yes  constipation: Yes  diarrhea: No  dysuria: No  fever: No  flatus: Yes  frequency: No  headaches: No  hematochezia: No  hematuria: No  melena: No  myalgias: No  nausea: Yes  weight loss: No  vomiting: No  Aggravated by: coughing, movement  Relieved by: nothing  Pain severity: mild  Treatments tried: nothing  abdominal surgery: No  colon cancer: No  Crohn's disease: No  gallstones: No  GERD: No  irritable bowel syndrome: No  kidney stones: No  pancreatitis: No  PUD: No  ulcerative colitis: No  UTI: No

## 2025-04-13 ENCOUNTER — RESULTS FOLLOW-UP (OUTPATIENT)
Dept: INTERNAL MEDICINE | Facility: CLINIC | Age: 75
End: 2025-04-13

## 2025-04-15 ENCOUNTER — OFFICE VISIT (OUTPATIENT)
Dept: ORTHOPEDICS | Facility: CLINIC | Age: 75
End: 2025-04-15
Payer: MEDICARE

## 2025-04-15 DIAGNOSIS — M25.561 ACUTE PAIN OF RIGHT KNEE: Primary | ICD-10-CM

## 2025-04-15 PROCEDURE — 99214 OFFICE O/P EST MOD 30 MIN: CPT | Mod: S$PBB,,, | Performed by: NURSE PRACTITIONER

## 2025-04-15 PROCEDURE — 99999 PR PBB SHADOW E&M-EST. PATIENT-LVL II: CPT | Mod: PBBFAC,,, | Performed by: NURSE PRACTITIONER

## 2025-04-15 PROCEDURE — 99212 OFFICE O/P EST SF 10 MIN: CPT | Mod: PBBFAC | Performed by: NURSE PRACTITIONER

## 2025-04-15 RX ORDER — DICLOFENAC SODIUM 10 MG/G
2 GEL TOPICAL 4 TIMES DAILY
Qty: 50 G | Refills: 0 | Status: SHIPPED | OUTPATIENT
Start: 2025-04-15

## 2025-04-15 RX ORDER — METHYLPREDNISOLONE 4 MG/1
TABLET ORAL
Qty: 1 EACH | Refills: 0 | Status: SHIPPED | OUTPATIENT
Start: 2025-04-15 | End: 2025-05-06

## 2025-04-15 NOTE — PROGRESS NOTES
CC: right knee pain      HPI:   Pt with c/o right knee pain for the past day and 1/2. The pain is aching and only occurs when she first gets up from sleeping or standing. The pain started with she got out of bed yesterday. The pain is aching. It is located over the anterior knee. She has to stand for a minute and then start walking. She took 2 tylenol and that helped. She hasn't taken any medication for pain today. She has been walking for exercise. She has a history of right knee replacement by Dr. Schneider in 2022 and a left knee replacement in 2014.  She ambulates without assistive device. There is a mild limp.      ROS  General: denies fever and chills  Resp: no c/o sob  CVS: no c/o cp  MSK: c/o right knee pain    PE  General: AAOx3, pleasant and cooperative  Resp: respirations even and unlabored  MSK: right knee exam  0 degrees extension  120 degrees flexion  No warmth or erythema   - effusion  + tenderness over the length of the patella tendon      Xray:  Personally interpreted by me and reviewed with the patient: no changes noted from previous xray. Prosthesis' are intact with proper position and alignment.    Assessment:  Patella tendinitis    Plan:  Medrol dose pack  Voltaren gel with rest and heating pad for sympomatic relief

## 2025-04-28 DIAGNOSIS — Z00.00 ENCOUNTER FOR MEDICARE ANNUAL WELLNESS EXAM: ICD-10-CM

## 2025-05-08 ENCOUNTER — OFFICE VISIT (OUTPATIENT)
Dept: OPTOMETRY | Facility: CLINIC | Age: 75
End: 2025-05-08
Payer: MEDICARE

## 2025-05-08 DIAGNOSIS — H53.9 TRANSIENT VISION DISTURBANCE: Primary | ICD-10-CM

## 2025-05-08 PROCEDURE — 99999 PR PBB SHADOW E&M-EST. PATIENT-LVL III: CPT | Mod: PBBFAC,,, | Performed by: OPTOMETRIST

## 2025-05-08 PROCEDURE — 99213 OFFICE O/P EST LOW 20 MIN: CPT | Mod: PBBFAC,PO | Performed by: OPTOMETRIST

## 2025-05-08 PROCEDURE — 92014 COMPRE OPH EXAM EST PT 1/>: CPT | Mod: S$PBB,,, | Performed by: OPTOMETRIST

## 2025-05-08 NOTE — PROGRESS NOTES
HPI    75 Y/o female is here for routine eye exam with C/o pt say's off and on   for about 3 mos she has been seeing [facility]. Pt wears otc   readers +2.00    Pt denies pain and discomfort   No f/f    Eye med:   Last edited by Rory oYu MA on 5/8/2025 12:58 PM.            Assessment /Plan     For exam results, see Encounter Report.    Transient vision disturbance  -     Watts Visual Field - OU - Extended - Both Eyes; Future; Expected date: 06/08/2025      Sp pciol OU--pt happy w otc readers  2 episodes of oc migraine type visual disturbances in the past few months.  Denies migraine hx.  Will run VF to ro other neurovasc causes of sx    PLAN:    HVF 24-2sf.  I will call pt with results

## 2025-05-12 ENCOUNTER — CLINICAL SUPPORT (OUTPATIENT)
Dept: OPHTHALMOLOGY | Facility: CLINIC | Age: 75
End: 2025-05-12
Payer: MEDICARE

## 2025-05-12 DIAGNOSIS — H53.9 TRANSIENT VISION DISTURBANCE: ICD-10-CM

## 2025-05-13 ENCOUNTER — PATIENT MESSAGE (OUTPATIENT)
Dept: OPTOMETRY | Facility: CLINIC | Age: 75
End: 2025-05-13
Payer: MEDICARE

## 2025-05-30 ENCOUNTER — OFFICE VISIT (OUTPATIENT)
Dept: OBSTETRICS AND GYNECOLOGY | Facility: CLINIC | Age: 75
End: 2025-05-30
Payer: MEDICARE

## 2025-05-30 VITALS — SYSTOLIC BLOOD PRESSURE: 118 MMHG | DIASTOLIC BLOOD PRESSURE: 82 MMHG | BODY MASS INDEX: 29.21 KG/M2 | HEIGHT: 71 IN

## 2025-05-30 DIAGNOSIS — Z01.419 WOMEN'S ANNUAL ROUTINE GYNECOLOGICAL EXAMINATION: Primary | ICD-10-CM

## 2025-05-30 DIAGNOSIS — Z12.4 ENCOUNTER FOR PAPANICOLAOU SMEAR FOR CERVICAL CANCER SCREENING: ICD-10-CM

## 2025-05-30 DIAGNOSIS — Z11.51 SCREENING FOR HPV (HUMAN PAPILLOMAVIRUS): ICD-10-CM

## 2025-05-30 DIAGNOSIS — Z78.0 MENOPAUSE: ICD-10-CM

## 2025-05-30 DIAGNOSIS — Z12.31 ENCOUNTER FOR SCREENING MAMMOGRAM FOR BREAST CANCER: ICD-10-CM

## 2025-05-30 DIAGNOSIS — Z12.11 COLON CANCER SCREENING: ICD-10-CM

## 2025-05-30 DIAGNOSIS — Z01.419 GYNECOLOGIC EXAM NORMAL: ICD-10-CM

## 2025-05-30 PROCEDURE — 99214 OFFICE O/P EST MOD 30 MIN: CPT | Mod: PBBFAC,PN | Performed by: NURSE PRACTITIONER

## 2025-05-30 PROCEDURE — 99999 PR PBB SHADOW E&M-EST. PATIENT-LVL IV: CPT | Mod: PBBFAC,,, | Performed by: NURSE PRACTITIONER

## 2025-05-30 PROCEDURE — G0101 CA SCREEN;PELVIC/BREAST EXAM: HCPCS | Mod: PBBFAC,PN | Performed by: NURSE PRACTITIONER

## 2025-05-30 NOTE — PROGRESS NOTES
CC: Annual  HPI: Pt is a 74 y.o.  female who presents for routine annual exam. She is still working part time as  with [employer] patients at [employer]. She enjoys traveling with her friends from [place]. She is postmenopausal. She denies any PMB.  She is not sexually active since  passed.   The patient participates in regular exercise: yes- walking and hula hoop.  The patient does not smoke.   MMG due in 11/25. Colonoscopy due in 2025.  She desires pap screening.       ROS:  GENERAL: Feeling well overall. Denies fever or chills.   SKIN: Denies rash or lesions.   HEAD: Denies head injury or headache.   NODES: Denies enlarged lymph nodes.   CHEST: Denies chest pain or shortness of breath.   CARDIOVASCULAR: Denies palpitations or left sided chest pain.   ABDOMEN: No abdominal pain, constipation, diarrhea, nausea, vomiting or rectal bleeding.   URINARY: No dysuria, hematuria, or burning on urination.  REPRODUCTIVE: See HPI.   BREASTS: Denies pain, lumps, or nipple discharge.   HEMATOLOGIC: No easy bruisability or excessive bleeding.   MUSCULOSKELETAL: Denies joint pain or swelling.   NEUROLOGIC: Denies syncope or weakness.   PSYCHIATRIC: Denies depression, anxiety or mood swings.    PE:   APPEARANCE: Well nourished, well developed, Black or  female in no acute distress.  NODES: no cervical, supraclavicular, or inguinal lymphadenopathy  BREASTS: Symmetrical, no skin changes or visible lesions. No palpable masses, nipple discharge or adenopathy bilaterally.  ABDOMEN: Soft. No tenderness or masses. No distention. No hernias palpated. No CVA tenderness.  VULVA: No lesions. Normal external female genitalia.  URETHRAL MEATUS: Normal size and location, no lesions, no prolapse.  URETHRA: No masses, tenderness, or prolapse.  VAGINA: Moist. No lesions or lacerations noted. No abnormal discharge present. No odor present.   CERVIX: No lesions or discharge. No cervical motion tenderness.    UTERUS: Normal size, regular shape, mobile, non-tender.  ADNEXA: No tenderness. No fullness or masses palpated in the adnexal regions.   ANUS PERINEUM: Normal.      Diagnosis:  1. Women's annual routine gynecological examination    2. Gynecologic exam normal    3. Menopause    4. Colon cancer screening    5. Encounter for screening mammogram for breast cancer        Plan:   Pap/ HPV   MMG in 11/25  DEXA scan   Orders Placed This Encounter    DXA Bone Density Axial Skeleton 1 or more sites    Mammo Digital Screening Bilat    Ambulatory referral/consult to Endo Procedure        Patient was counseled today on the new ACS guidelines for cervical cytology screening as well as the current recommendations for breast cancer screening. She was counseled to follow up with her PCP for other routine health maintenance. Counseling session lasted approximately 10 minutes, and all her questions were answered.    Follow-up with me in 1 year for routine exam    NARDA Silverman

## 2025-06-02 ENCOUNTER — CLINICAL SUPPORT (OUTPATIENT)
Dept: ENDOSCOPY | Facility: HOSPITAL | Age: 75
End: 2025-06-02
Payer: MEDICARE

## 2025-06-02 DIAGNOSIS — Z12.11 COLON CANCER SCREENING: ICD-10-CM

## 2025-06-05 ENCOUNTER — RESULTS FOLLOW-UP (OUTPATIENT)
Dept: OBSTETRICS AND GYNECOLOGY | Facility: CLINIC | Age: 75
End: 2025-06-05

## 2025-06-13 ENCOUNTER — TELEPHONE (OUTPATIENT)
Dept: VASCULAR SURGERY | Facility: CLINIC | Age: 75
End: 2025-06-13
Payer: MEDICARE

## 2025-06-16 ENCOUNTER — OFFICE VISIT (OUTPATIENT)
Dept: VASCULAR SURGERY | Facility: CLINIC | Age: 75
End: 2025-06-16
Payer: MEDICARE

## 2025-06-16 VITALS
OXYGEN SATURATION: 100 % | WEIGHT: 209.44 LBS | BODY MASS INDEX: 29.32 KG/M2 | SYSTOLIC BLOOD PRESSURE: 104 MMHG | DIASTOLIC BLOOD PRESSURE: 73 MMHG | HEART RATE: 70 BPM | HEIGHT: 71 IN

## 2025-06-16 DIAGNOSIS — I87.2 VENOUS INSUFFICIENCY: Primary | ICD-10-CM

## 2025-06-16 DIAGNOSIS — R25.2 CRAMPING OF FEET: ICD-10-CM

## 2025-06-16 DIAGNOSIS — Z98.890 STATUS POST ABLATION OF INCOMPETENT VEIN USING LASER: ICD-10-CM

## 2025-06-16 DIAGNOSIS — I73.9 PVD (PERIPHERAL VASCULAR DISEASE): ICD-10-CM

## 2025-06-16 PROCEDURE — 99214 OFFICE O/P EST MOD 30 MIN: CPT | Mod: S$GLB,,, | Performed by: SURGERY

## 2025-06-16 NOTE — PROGRESS NOTES
Johann Baez MD, RPVI                                 [facility]                           [facility]                             06/16/2025    HPI:  Poonam Avlarez is a 74 y.o. female with   Patient Active Problem List   Diagnosis    Osteoarthritis    History of Graves' disease    Hypothyroidism, postablative    Radial styloid tenosynovitis    Pseudophakia of both eyes    Other specified disorder of skin    GERD (gastroesophageal reflux disease)    Left knee pain    Primary localized osteoarthrosis, lower leg    Knee pain, right    Pain in joint, lower leg    S/P total knee arthroplasty    Pre-syncope    Constipation    Ankle edema    Screening for colon cancer    Bilateral knee pain    Essential hypertension    Ectatic aorta-Noted on imaging 7/27/2015    Posture abnormality    Balance problem    Hypotension    History of atrial flutter    Weight gain    Keloid    Family history of thrombosis    Decreased range of motion (ROM) of left knee    Impaired gait and mobility    Decreased strength of lower extremity    Chronic venous insufficiency    Peripheral arterial disease    being managed by PCP and specialists who is here today for evaluation of BLE edema.  Patient states location is BLE occurring for months.  Associated signs and symptoms include discoloration and itching.  Quality is heavy and severity is 5/10.  Symptoms began mo ago after her L knee open replacement.  Alleviating factors include elevation.  Worsening factors include dependency.  Patient has been wearing compression stockings for greater than 3 months.  Symptoms do limit patient's functional status and daily activities.  no DVT history.  no venous interventions.  no low sodium diet.  no excessive water intake.    Migraine with aura: no  PFO/ASD/right to left shunt: no  Pregnant: no  Breastfeeding: no    no MI  no Stroke  Tobacco use: denies    12/2024: s/p R GSV EVLT mid thigh to calf 9/2024.  No complains  today, + compression.    6/2025: c/o L toe cramping nightly not each night.    Past Medical History:   Diagnosis Date    AR (allergic rhinitis) 12/07/2012    Atrial flutter     ablation October 2008    Cataract     Generalized headaches     GERD (gastroesophageal reflux disease) 03/08/2013    resolved    Grave's disease     s/p radioactive iodine, now hypothyroidism    Keloid cicatrix     Obesity     Osteoarthritis     shoulders, hands, knees    Positive PPD, treated     9 months of INH, completed 1/2010     Past Surgical History:   Procedure Laterality Date    ABLATION OF DYSRHYTHMIC FOCUS  10/24/2008    atrial flutter    ARTHROPLASTY OF SHOULDER Right 04/01/2019    Procedure: ARTHROPLASTY, SHOULDER;  Surgeon: Sage Xie MD;  Location: [facility];  Service: Orthopedics;  Laterality: Right;  regional w/ catheter (q-ball)    CATARACT EXTRACTION W/  INTRAOCULAR LENS IMPLANT Bilateral     COLONOSCOPY N/A 11/05/2015    Procedure: COLONOSCOPY;  Surgeon: Jose Ly MD;  Location: [facility] ([address] [place] [address]);  Service: Endoscopy;  Laterality: N/A;  Last colonoscopy 2008 with Dr. Vieira; Pt wanted this day    EYE SURGERY      cataract removal right eye    HERNIA REPAIR      JOINT REPLACEMENT      KNEE ARTHROSCOPY W/ DEBRIDEMENT      right, 2005 and 2008    KNEE SURGERY  03/27/2014    right TKA    TOTAL KNEE ARTHROPLASTY Left 12/13/2022    Procedure: ARTHROPLASTY, KNEE, TOTAL;  Surgeon: Gordon Schneider MD;  Location: [facility];  Service: Orthopedics;  Laterality: Left;     Family History   Problem Relation Name Age of Onset    Arthritis Mother      Arthritis Father      Glaucoma Father      Cataracts Father      Hepatitis Brother x3     No Known Problems Daughter      No Known Problems Son      No Known Problems Son      No Known Problems Maternal Aunt      No Known Problems Maternal Uncle      No Known Problems Paternal Aunt      No Known Problems Paternal Uncle      No Known Problems Maternal Grandmother      No  Known Problems Maternal Grandfather      No Known Problems Paternal Grandmother      No Known Problems Paternal Grandfather      Colon cancer Neg Hx      Ovarian cancer Neg Hx      Breast cancer Neg Hx       Social History     Socioeconomic History    Marital status:    Occupational History    Occupation:      Employer: [employer]   Tobacco Use    Smoking status: Never     Passive exposure: Never    Smokeless tobacco: Never   Substance and Sexual Activity    Alcohol use: No    Drug use: No    Sexual activity: Not Currently     Partners: Male     Birth control/protection: None   Other Topics Concern    Are you pregnant or think you may be? No    Breast-feeding No   Social History Narrative         Social Drivers of Health     Financial Resource Strain: Low Risk  (5/24/2025)    Overall Financial Resource Strain (CARDIA)     Difficulty of Paying Living Expenses: Not hard at all   Food Insecurity: No Food Insecurity (5/24/2025)    Hunger Vital Sign     Worried About Running Out of Food in the Last Year: Never true     Ran Out of Food in the Last Year: Never true   Transportation Needs: No Transportation Needs (5/24/2025)    PRAPARE - Transportation     Lack of Transportation (Medical): No     Lack of Transportation (Non-Medical): No   Physical Activity: Insufficiently Active (5/24/2025)    Exercise Vital Sign     Days of Exercise per Week: 4 days     Minutes of Exercise per Session: 30 min   Stress: No Stress Concern Present (5/24/2025)    [employer] - Occupational Stress Questionnaire     Feeling of Stress : Not at all   Housing Stability: Low Risk  (5/24/2025)    Housing Stability Vital Sign     Unable to Pay for Housing in the Last Year: No     Number of Times Moved in the Last Year: 0     Homeless in the Last Year: No       Current Outpatient Medications:     ascorbic acid/multivit-min (EMERGEN-C ORAL), Take 1,000 mg by mouth once  daily., Disp: , Rfl:     aspirin 81 MG Chew, Take 81 mg by mouth once daily., Disp: , Rfl:     cholecalciferol, vitamin D3, (VITAMIN D3) 50 mcg (2,000 unit) Cap, Take 1 capsule by mouth once daily., Disp: , Rfl:     diclofenac sodium (VOLTAREN) 1 % Gel, Apply 2 g topically 4 (four) times daily. Apply to affected area up to 4 times per day PRN pain, Disp: 50 g, Rfl: 0    famotidine (PEPCID) 40 MG tablet, Take 1 tablet (40 mg total) by mouth daily as needed for Heartburn., Disp: 30 tablet, Rfl: 3    hydroCHLOROthiazide 12.5 MG Tab, TAKE 1/2 TO 1 TABLET BY MOUTH DAILY AS NEEDED, Disp: 90 tablet, Rfl: 3    hydrocortisone 2.5 % ointment, Apply topically 2 (two) times daily., Disp: 20 g, Rfl: 0    multivitamin-minerals-lutein Tab, Take 1 tablet by mouth once daily. , Disp: , Rfl:     SYNTHROID 137 mcg Tab tablet, Take 1 tablet (137 mcg total) by mouth once daily. Except Monday, Disp: 90 tablet, Rfl: 4    urea 20 % Crea, Apply 1 application  topically once daily. To dry skin on the feet., Disp: 75 g, Rfl: 10    REVIEW OF SYSTEMS:  General: No fevers or chills; ENT: No sore throat; Allergy and Immunology: no persistent infections; Hematological and Lymphatic: No history of bleeding or easy bruising; Endocrine: negative; Respiratory: no cough, shortness of breath, or wheezing; Cardiovascular: no chest pain or dyspnea on exertion; Gastrointestinal: no abdominal pain/back, change in bowel habits, or bloody stools; Genito-Urinary: no dysuria, trouble voiding, or hematuria; Musculoskeletal: edema; Neurological: no TIA or stroke symptoms; Psychiatric: no nervousness, anxiety or depression.    PHYSICAL EXAM:      Pulse: 70         General appearance:  Alert, well-appearing, and in no distress.  Oriented to person, place, and time                    Neurological: Normal speech, no focal findings noted; CN II - XII grossly intact. RLE with sensation to light touch, LLE with sensation to light touch.             "Musculoskeletal: Digits/nail without cyanosis/clubbing.  Strength 5/5 BLE.                    Neck: Supple, no significant adenopathy                  Chest:  No wheezes, symmetric air entry. No use of accessory muscles               Cardiac: Normal rate and regular rhythm            Abdomen: Soft, nontender, nondistended      Extremities:   2+ R DP pulse, 2+ L DP pulse      2+ RLE edema, 1+ LLE edema    Skin:  RLE no ulcer; LLE no ulcer      RLE no spider veins, LLE no spider veins      RLE no varicose veins, LLE no varicose veins    CEAP 3/3    VCSS 2    LAB RESULTS:  No results found for: "CBC"  Lab Results   Component Value Date    LABPROT 10.9 11/30/2022    INR 1.0 11/30/2022     Lab Results   Component Value Date     (L) 04/07/2025    K 3.8 04/07/2025     04/07/2025    CO2 26 04/07/2025    GLU 72 04/07/2025    BUN 12 04/07/2025    CREATININE 0.8 04/07/2025    CALCIUM 9.6 04/07/2025    ANIONGAP 6 (L) 04/07/2025    EGFRNONAA >60.0 07/06/2022     Lab Results   Component Value Date    WBC 3.75 (L) 04/07/2025    RBC 4.70 04/07/2025    HGB 12.3 04/07/2025    HCT 39.4 04/07/2025    MCV 84 04/07/2025    MCH 26.2 (L) 04/07/2025    MCHC 31.2 (L) 04/07/2025    RDW 15.0 (H) 04/07/2025     04/07/2025    MPV 11.9 04/07/2025    GRAN 2.1 06/21/2024    GRAN 44.3 06/21/2024    LYMPH 44.5 04/07/2025    LYMPH 1.67 04/07/2025    MONO 9.3 04/07/2025    MONO 0.35 04/07/2025    EOS 1.9 04/07/2025    EOS 0.07 04/07/2025    BASO 0.04 06/21/2024    EOSINOPHIL 1.3 06/21/2024    BASOPHIL 0.8 04/07/2025    BASOPHIL 0.03 04/07/2025    DIFFMETHOD Automated 06/21/2024     .  Lab Results   Component Value Date    HGBA1C 5.1 04/07/2025       IMAGING:  All pertinent imaging has been reviewed and interpreted independently.    Venous US 10/2023 Impression:  FINDINGS:  No evidence of DVT in either lower extremity deep venous system.     On the right, the greater saphenous vein has maximum diameter of 13 mm.  There is " hemodynamically significant reflux identified from the distal thigh through the mid calf.  The lesser saphenous vein has maximum diameter of 3 mm with hemodynamically significant reflux at the level of the mid calf.  The anterior accessory saphenous vein has maximum diameter of 4 mm without hemodynamically significant reflux.     On the left, the greater saphenous veins maximum diameter of 11 mm.  There is hemodynamically significant reflux identified at the level of the mid calf.  The lesser saphenous vein has maximum diameter of 3 mm.  There is hemodynamically significant reflux at the level of the mid calf as well.  The anterior accessory saphenous vein has maximum diameter of 4 mm without hemodynamically significant reflux.     Impression:     No evidence of DVT in either lower extremity deep venous system.     On the right, there is hemodynamically significant reflux noted within the greater saphenous vein from the level of the distal thigh through the calf and in the lesser saphenous vein at the level of the calf.     On the left, there is hemodynamically significant reflux identified within the greater and lesser saphenous veins at the level of the calf     7/2024    Right resting STANLEY 0.87, is suggestive of mild arterial disease.    Right popliteal artery with blunted waveform and low velocity, suggestive of 50-75% stenosis.    Right tibioperoneal trunk artery stenosis, greater than 75%.    Left resting STANLEY 0.89, is suggestive of mild artery disease.    Right posterior tibial artery with blunted waveform and low velocity, suggestive of 50-75% stenosis.     IMP/PLAN:  74 y.o. female with   Patient Active Problem List   Diagnosis    Osteoarthritis    History of Graves' disease    Hypothyroidism, postablative    Radial styloid tenosynovitis    Pseudophakia of both eyes    Other specified disorder of skin    GERD (gastroesophageal reflux disease)    Left knee pain    Primary localized osteoarthrosis, lower leg     Knee pain, right    Pain in joint, lower leg    S/P total knee arthroplasty    Pre-syncope    Constipation    Ankle edema    Screening for colon cancer    Bilateral knee pain    Essential hypertension    Ectatic aorta-Noted on imaging 7/27/2015    Posture abnormality    Balance problem    Hypotension    History of atrial flutter    Weight gain    Keloid    Family history of thrombosis    Decreased range of motion (ROM) of left knee    Impaired gait and mobility    Decreased strength of lower extremity    Chronic venous insufficiency    Peripheral arterial disease    being managed by PCP and specialists who is here today for evaluation of BLE edema.    -rec Podiatry eval for cramping of L toes - mild PVD and venous insufficiency do not appear to be etiology of nightly intermittent toe cramping  -recommend compression with Rx stockings, elevation, dietary changes associated with water and sodium intake discussed at length with patient  -Exercise   -s/p R GSV EVLT mid thigh to calf 9/2024  -RTC 6 mo with ABIs    I spent 15 minutes evaluating this patient and greater than 50% of the time was spent counseling, coordinator care and discussing the plan of care.  All questions were answered and patient stated understanding with agreement with the above treatment plan.    Johann Baez MD [facility]  Vascular and Endovascular Surgery

## 2025-06-17 ENCOUNTER — TELEPHONE (OUTPATIENT)
Dept: VASCULAR SURGERY | Facility: CLINIC | Age: 75
End: 2025-06-17
Payer: MEDICARE

## 2025-06-17 DIAGNOSIS — I87.2 VENOUS INSUFFICIENCY: ICD-10-CM

## 2025-06-17 DIAGNOSIS — R25.2 CRAMPING OF FEET: Primary | ICD-10-CM

## 2025-06-17 DIAGNOSIS — I73.9 PVD (PERIPHERAL VASCULAR DISEASE): Primary | ICD-10-CM

## 2025-06-17 DIAGNOSIS — R25.2 CRAMPING OF FEET: ICD-10-CM

## 2025-06-18 ENCOUNTER — CLINICAL SUPPORT (OUTPATIENT)
Dept: ENDOSCOPY | Facility: HOSPITAL | Age: 75
End: 2025-06-18
Payer: MEDICARE

## 2025-06-18 DIAGNOSIS — Z12.11 COLON CANCER SCREENING: ICD-10-CM

## 2025-06-18 NOTE — PLAN OF CARE
Spoke with Patient.Patient is not due until November 2025. Will like a call back and to be scheduled on a Thursday

## 2025-06-20 ENCOUNTER — HOSPITAL ENCOUNTER (OUTPATIENT)
Dept: VASCULAR SURGERY | Facility: CLINIC | Age: 75
Discharge: HOME | End: 2025-06-20
Attending: SURGERY
Payer: MEDICARE

## 2025-06-20 DIAGNOSIS — I73.9 PAD (PERIPHERAL ARTERY DISEASE): Primary | ICD-10-CM

## 2025-06-20 DIAGNOSIS — I87.2 VENOUS INSUFFICIENCY: ICD-10-CM

## 2025-06-20 DIAGNOSIS — R25.2 CRAMPING OF FEET: ICD-10-CM

## 2025-06-20 DIAGNOSIS — I73.9 PVD (PERIPHERAL VASCULAR DISEASE): ICD-10-CM

## 2025-06-20 PROCEDURE — 93923 UPR/LXTR ART STDY 3+ LVLS: CPT | Mod: 26,S$PBB,, | Performed by: STUDENT IN AN ORGANIZED HEALTH CARE EDUCATION/TRAINING PROGRAM

## 2025-06-20 PROCEDURE — 93923 UPR/LXTR ART STDY 3+ LVLS: CPT | Mod: PBBFAC | Performed by: STUDENT IN AN ORGANIZED HEALTH CARE EDUCATION/TRAINING PROGRAM

## 2025-06-26 ENCOUNTER — RESULTS FOLLOW-UP (OUTPATIENT)
Dept: VASCULAR SURGERY | Facility: CLINIC | Age: 75
End: 2025-06-26
Payer: MEDICARE

## 2025-07-03 ENCOUNTER — TELEPHONE (OUTPATIENT)
Dept: VASCULAR SURGERY | Facility: CLINIC | Age: 75
End: 2025-07-03
Payer: MEDICARE

## 2025-07-31 ENCOUNTER — TELEPHONE (OUTPATIENT)
Dept: VASCULAR SURGERY | Facility: CLINIC | Age: 75
End: 2025-07-31
Payer: MEDICARE

## 2025-07-31 NOTE — TELEPHONE ENCOUNTER
Returned patients call, pt reports LLE tingling sensation and skin discoloration that she believes is contributed by a venous issue. Pt would like a sooner appt to see provider for possible procedure. Pt instructed to report to ED is s/s worsen or she experiences new s/s prior to f/u appt. Soonest availability offered and scheduled.

## 2025-08-05 ENCOUNTER — TELEPHONE (OUTPATIENT)
Dept: VASCULAR SURGERY | Facility: CLINIC | Age: 75
End: 2025-08-05
Payer: MEDICARE

## 2025-08-22 ENCOUNTER — OFFICE VISIT (OUTPATIENT)
Dept: PODIATRY | Facility: CLINIC | Age: 75
End: 2025-08-22
Payer: MEDICARE

## 2025-08-22 VITALS
HEIGHT: 71 IN | BODY MASS INDEX: 29.32 KG/M2 | HEART RATE: 83 BPM | WEIGHT: 209.44 LBS | SYSTOLIC BLOOD PRESSURE: 115 MMHG | DIASTOLIC BLOOD PRESSURE: 74 MMHG

## 2025-08-22 DIAGNOSIS — B35.1 DERMATOPHYTOSIS OF NAIL: Chronic | ICD-10-CM

## 2025-08-22 DIAGNOSIS — R25.2 CRAMP OF TOE: Primary | ICD-10-CM

## 2025-08-22 DIAGNOSIS — I87.2 CHRONIC VENOUS INSUFFICIENCY: ICD-10-CM

## 2025-08-22 DIAGNOSIS — L85.3 DRY SKIN: Chronic | ICD-10-CM

## 2025-08-22 DIAGNOSIS — I73.9 PAD (PERIPHERAL ARTERY DISEASE): ICD-10-CM

## 2025-08-22 PROCEDURE — 99214 OFFICE O/P EST MOD 30 MIN: CPT | Mod: PBBFAC,PN | Performed by: PODIATRIST

## 2025-08-22 PROCEDURE — 99999 PR PBB SHADOW E&M-EST. PATIENT-LVL IV: CPT | Mod: PBBFAC,,, | Performed by: PODIATRIST

## 2025-08-22 RX ORDER — CLOTRIMAZOLE AND BETAMETHASONE DIPROPIONATE 10; .64 MG/G; MG/G
CREAM TOPICAL DAILY
Qty: 45 G | Refills: 1 | Status: SHIPPED | OUTPATIENT
Start: 2025-08-22

## 2025-08-22 RX ORDER — AMMONIUM LACTATE 12 G/100G
1 CREAM TOPICAL DAILY
Qty: 140 G | Refills: 6 | Status: SHIPPED | OUTPATIENT
Start: 2025-08-22

## (undated) DEVICE — ADHESIVE MASTISOL VIAL 48/BX

## (undated) DEVICE — SUT MCRYL PLUS 4-0 PS2 27IN

## (undated) DEVICE — POSITIONER IV ARMBOARD FOAM

## (undated) DEVICE — BLADE SAGITTAL 18 X 1.27 X 90M

## (undated) DEVICE — SPONGE KITTNER 1/4X 5/8 L STRL

## (undated) DEVICE — DRAPE STERI INSTRUMENT 1018

## (undated) DEVICE — SUT MONOCRYL 4-0 PS-2

## (undated) DEVICE — DRAIN PENROSE XRAY 18 X 1/4 ST

## (undated) DEVICE — DRAPE PLASTIC U 60X72

## (undated) DEVICE — BOWL CEMENT

## (undated) DEVICE — SUT VICRYL+ 1 CT1 18IN

## (undated) DEVICE — Device

## (undated) DEVICE — BANDAGE ACE DOUBLE STER 6IN

## (undated) DEVICE — DRAPE T EXTRM SURG 121X128X90

## (undated) DEVICE — SET CEMENT (SCULP)

## (undated) DEVICE — SUPPORT SLING SHOT II MEDIUM

## (undated) DEVICE — ELECTRODE REM PLYHSV RETURN 9

## (undated) DEVICE — KIT TOTAL HIP OPTIVAC

## (undated) DEVICE — DRAPE INCISE IOBAN 2 23X17IN

## (undated) DEVICE — SEE MEDLINE ITEM 157148

## (undated) DEVICE — DRAPE THREE-QTR REINF 53X77IN

## (undated) DEVICE — SUT VICRYL PLUS 2-0 CT1 18

## (undated) DEVICE — SUT MONOCRYL 3-0 PS-1

## (undated) DEVICE — DRAPE INCISE IOBAN 2 23X33IN

## (undated) DEVICE — GLOVE BIOGEL SKINSENSE PI 8.0

## (undated) DEVICE — TAPE SURG MEDIPORE 6X72IN

## (undated) DEVICE — PULSAVAC ZIMMER

## (undated) DEVICE — SOL NACL IRR 3000ML

## (undated) DEVICE — CLOSURE SKIN STERI STRIP 1/2X4

## (undated) DEVICE — PUMP COLD THERAPY

## (undated) DEVICE — DRESSING AQUACEL AG 3.5X10IN

## (undated) DEVICE — UNDERGLOVES BIOGEL PI SIZE 8

## (undated) DEVICE — SEE MEDLINE ITEM 157160

## (undated) DEVICE — PIN TEMPORARY
Type: IMPLANTABLE DEVICE | Site: SHOULDER | Status: NON-FUNCTIONAL
Removed: 2019-04-01

## (undated) DEVICE — SOL 9P NACL IRR PIC IL

## (undated) DEVICE — KIT TOTAL KNEE TKOFG OMC

## (undated) DEVICE — ADHESIVE DERMABOND ADVANCED

## (undated) DEVICE — SEE MEDLINE ITEM 153151

## (undated) DEVICE — BLADE SAW SAG 25.40MM X 1.27MM

## (undated) DEVICE — MASK FLYTE HOOD PEEL AWAY

## (undated) DEVICE — DRESSING AQUACEL AG ADV 3.5X12

## (undated) DEVICE — PAD ABD 8X10 STERILE

## (undated) DEVICE — SEE MEDLINE ITEM 157126

## (undated) DEVICE — GLOVE BIOGEL ECLIPSE SZ 7

## (undated) DEVICE — SOL IRR NACL .9% 3000ML

## (undated) DEVICE — SEE MEDLINE ITEM 156930

## (undated) DEVICE — SUT VICRYL PLUS 3-0 SH 18IN

## (undated) DEVICE — UNDERGLOVES BIOGEL PI SIZE 7.5

## (undated) DEVICE — COVER MAYO STAND REINFRCD 30

## (undated) DEVICE — KIT EVACUATOR 3-SPRING 1/8 DRN

## (undated) DEVICE — ORTHOCORD W/OS-6

## (undated) DEVICE — NDL 18GA X1 1/2 REG BEVEL

## (undated) DEVICE — TOURNIQUET SB QC DP 34X4IN

## (undated) DEVICE — NDL MAYO CAT 1/2 CIR #5

## (undated) DEVICE — TAPE ADH MEDIPORE 4 X 10YDS

## (undated) DEVICE — GAUZE SPONGE 4X4 12PLY

## (undated) DEVICE — SKIN MARKER DEVON 160

## (undated) DEVICE — DRESSING TEGADERM CHG 4X4.5

## (undated) DEVICE — PAD ELECTRODE STER 1.5X3

## (undated) DEVICE — DRESSING XEROFORM FOIL PK 1X8

## (undated) DEVICE — DRESSING TRANS 4X4 TEGADERM

## (undated) DEVICE — SOL BETADINE 5%

## (undated) DEVICE — APPLICATOR CHLORAPREP ORN 26ML

## (undated) DEVICE — CAUTERY TIP POLISHER

## (undated) DEVICE — PAD SHOULDER CARE POLAR

## (undated) DEVICE — DRESSING TELFA STRL 4X3 LF

## (undated) DEVICE — DRESSING TELFA N ADH 3X8

## (undated) DEVICE — NDL HYPO REG 25G X 1 1/2

## (undated) DEVICE — MIXER BONE CEMENT

## (undated) DEVICE — KIT IRR SUCTION HND PIECE

## (undated) DEVICE — PAD CAST SPECIALIST STRL 6

## (undated) DEVICE — GOWN SMART IMP BREATHABLE XXLG

## (undated) DEVICE — WRAP KNEE ACCU THERM GEL PACK

## (undated) DEVICE — SUT VICRYL PLUS 0 CT1 18IN

## (undated) DEVICE — SEE MEDLINE ITEM 157116

## (undated) DEVICE — WRAP PROTECTIVE LEG POS STRL